# Patient Record
Sex: MALE | Race: WHITE | Employment: FULL TIME | URBAN - METROPOLITAN AREA
[De-identification: names, ages, dates, MRNs, and addresses within clinical notes are randomized per-mention and may not be internally consistent; named-entity substitution may affect disease eponyms.]

---

## 2017-01-05 ENCOUNTER — ANESTHESIA EVENT (OUTPATIENT)
Dept: SURGERY | Age: 52
DRG: 470 | End: 2017-01-05
Payer: COMMERCIAL

## 2017-01-06 ENCOUNTER — ANESTHESIA (OUTPATIENT)
Dept: SURGERY | Age: 52
DRG: 470 | End: 2017-01-06
Payer: COMMERCIAL

## 2017-01-06 ENCOUNTER — APPOINTMENT (OUTPATIENT)
Dept: GENERAL RADIOLOGY | Age: 52
DRG: 470 | End: 2017-01-06
Attending: ORTHOPAEDIC SURGERY
Payer: COMMERCIAL

## 2017-01-06 ENCOUNTER — SURGERY (OUTPATIENT)
Age: 52
End: 2017-01-06

## 2017-01-06 PROBLEM — Z96.649 S/P TOTAL HIP ARTHROPLASTY: Status: ACTIVE | Noted: 2017-01-06

## 2017-01-06 PROBLEM — M16.12 ARTHRITIS OF LEFT HIP: Status: ACTIVE | Noted: 2017-01-06

## 2017-01-06 PROCEDURE — 77030007880 HC KT SPN EPDRL BBMI -B: Performed by: ANESTHESIOLOGY

## 2017-01-06 PROCEDURE — 72170 X-RAY EXAM OF PELVIS: CPT

## 2017-01-06 PROCEDURE — 77030003665 HC NDL SPN BBMI -A: Performed by: ANESTHESIOLOGY

## 2017-01-06 PROCEDURE — 74011250636 HC RX REV CODE- 250/636

## 2017-01-06 PROCEDURE — 77030020782 HC GWN BAIR PAWS FLX 3M -B: Performed by: ANESTHESIOLOGY

## 2017-01-06 PROCEDURE — 74011000250 HC RX REV CODE- 250

## 2017-01-06 RX ORDER — ONDANSETRON 2 MG/ML
INJECTION INTRAMUSCULAR; INTRAVENOUS AS NEEDED
Status: DISCONTINUED | OUTPATIENT
Start: 2017-01-06 | End: 2017-01-06

## 2017-01-06 RX ORDER — SODIUM CHLORIDE, SODIUM LACTATE, POTASSIUM CHLORIDE, CALCIUM CHLORIDE 600; 310; 30; 20 MG/100ML; MG/100ML; MG/100ML; MG/100ML
INJECTION, SOLUTION INTRAVENOUS
Status: DISCONTINUED | OUTPATIENT
Start: 2017-01-06 | End: 2017-01-06 | Stop reason: HOSPADM

## 2017-01-06 RX ORDER — TRANEXAMIC ACID 100 MG/ML
INJECTION, SOLUTION INTRAVENOUS AS NEEDED
Status: DISCONTINUED | OUTPATIENT
Start: 2017-01-06 | End: 2017-01-06 | Stop reason: HOSPADM

## 2017-01-06 RX ORDER — CEFAZOLIN SODIUM 1 G/3ML
INJECTION, POWDER, FOR SOLUTION INTRAMUSCULAR; INTRAVENOUS AS NEEDED
Status: DISCONTINUED | OUTPATIENT
Start: 2017-01-06 | End: 2017-01-06 | Stop reason: HOSPADM

## 2017-01-06 RX ORDER — PROPOFOL 10 MG/ML
INJECTION, EMULSION INTRAVENOUS
Status: DISCONTINUED | OUTPATIENT
Start: 2017-01-06 | End: 2017-01-06 | Stop reason: HOSPADM

## 2017-01-06 RX ORDER — MIDAZOLAM HYDROCHLORIDE 1 MG/ML
INJECTION, SOLUTION INTRAMUSCULAR; INTRAVENOUS AS NEEDED
Status: DISCONTINUED | OUTPATIENT
Start: 2017-01-06 | End: 2017-01-06 | Stop reason: HOSPADM

## 2017-01-06 RX ORDER — DIPHENHYDRAMINE HYDROCHLORIDE 50 MG/ML
INJECTION, SOLUTION INTRAMUSCULAR; INTRAVENOUS AS NEEDED
Status: DISCONTINUED | OUTPATIENT
Start: 2017-01-06 | End: 2017-01-06 | Stop reason: HOSPADM

## 2017-01-06 RX ORDER — DEXAMETHASONE SODIUM PHOSPHATE 4 MG/ML
INJECTION, SOLUTION INTRA-ARTICULAR; INTRALESIONAL; INTRAMUSCULAR; INTRAVENOUS; SOFT TISSUE AS NEEDED
Status: DISCONTINUED | OUTPATIENT
Start: 2017-01-06 | End: 2017-01-06 | Stop reason: HOSPADM

## 2017-01-06 RX ORDER — ONDANSETRON 2 MG/ML
INJECTION INTRAMUSCULAR; INTRAVENOUS AS NEEDED
Status: DISCONTINUED | OUTPATIENT
Start: 2017-01-06 | End: 2017-01-06 | Stop reason: HOSPADM

## 2017-01-06 RX ADMIN — MORPHINE SULFATE 10 MG: 10 INJECTION INTRAMUSCULAR; INTRAVENOUS; SUBCUTANEOUS at 09:56

## 2017-01-06 RX ADMIN — MIDAZOLAM HYDROCHLORIDE 0.5 MG: 1 INJECTION, SOLUTION INTRAMUSCULAR; INTRAVENOUS at 10:20

## 2017-01-06 RX ADMIN — DEXAMETHASONE SODIUM PHOSPHATE 10 MG: 4 INJECTION, SOLUTION INTRA-ARTICULAR; INTRALESIONAL; INTRAMUSCULAR; INTRAVENOUS; SOFT TISSUE at 09:55

## 2017-01-06 RX ADMIN — ROPIVACAINE HYDROCHLORIDE 60 ML: 2 INJECTION, SOLUTION EPIDURAL; INFILTRATION at 09:56

## 2017-01-06 RX ADMIN — CEFAZOLIN SODIUM 2 G: 1 INJECTION, POWDER, FOR SOLUTION INTRAMUSCULAR; INTRAVENOUS at 09:15

## 2017-01-06 RX ADMIN — MIDAZOLAM HYDROCHLORIDE 1 MG: 1 INJECTION, SOLUTION INTRAMUSCULAR; INTRAVENOUS at 09:20

## 2017-01-06 RX ADMIN — ONDANSETRON 4 MG: 2 INJECTION INTRAMUSCULAR; INTRAVENOUS at 10:25

## 2017-01-06 RX ADMIN — TRANEXAMIC ACID 1000 MG: 100 INJECTION, SOLUTION INTRAVENOUS at 09:30

## 2017-01-06 RX ADMIN — KETOROLAC TROMETHAMINE 30 MG: 30 INJECTION, SOLUTION INTRAMUSCULAR; INTRAVENOUS at 09:56

## 2017-01-06 RX ADMIN — DIPHENHYDRAMINE HYDROCHLORIDE 25 MG: 50 INJECTION, SOLUTION INTRAMUSCULAR; INTRAVENOUS at 09:30

## 2017-01-06 RX ADMIN — MIDAZOLAM HYDROCHLORIDE 0.5 MG: 1 INJECTION, SOLUTION INTRAMUSCULAR; INTRAVENOUS at 09:45

## 2017-01-06 RX ADMIN — SODIUM CHLORIDE, SODIUM LACTATE, POTASSIUM CHLORIDE, CALCIUM CHLORIDE: 600; 310; 30; 20 INJECTION, SOLUTION INTRAVENOUS at 09:00

## 2017-01-06 RX ADMIN — PROPOFOL 100 MCG/KG/MIN: 10 INJECTION, EMULSION INTRAVENOUS at 09:30

## 2017-01-06 RX ADMIN — NEOMYCIN AND POLYMYXIN B SULFATES 3 AMPULE: 40; 200000 SOLUTION IRRIGATION at 09:56

## 2017-01-06 RX ADMIN — SODIUM CHLORIDE, SODIUM LACTATE, POTASSIUM CHLORIDE, CALCIUM CHLORIDE: 600; 310; 30; 20 INJECTION, SOLUTION INTRAVENOUS at 10:00

## 2017-01-06 NOTE — ANESTHESIA POSTPROCEDURE EVALUATION
Post-Anesthesia Evaluation and Assessment    Patient: Terrie Ridley MRN: [id number]  SSN: [id number]    YOB: 1965  Age: 54 y.o. Sex: male       Cardiovascular Function/Vital Signs  Visit Vitals    /72    Pulse 62    Temp 36.1 °C (97 °F)    Resp 18    Ht 5' 7\" (1.702 m)    Wt 98.9 kg (218 lb)    SpO2 97%    BMI 34.14 kg/m2       Patient is status post spinal anesthesia for Procedure(s):  LEFT TOTAL HIP ARTHROPLASTY . Nausea/Vomiting: None    Postoperative hydration reviewed and adequate. Pain:  Pain Scale 1: Visual (01/06/17 1149)  Pain Intensity 1: 0 (01/06/17 1149)   Managed    Neurological Status:   Neuro (WDL): Exceptions to WDL (01/06/17 1104)  Neuro  Neurologic State: Drowsy (01/06/17 1104)  Orientation Level: Unable to verbalize (01/06/17 1104)   At baseline    Mental Status and Level of Consciousness: Arousable    Pulmonary Status:   O2 Device: Room air (01/06/17 1149)   Adequate oxygenation and airway patent    Complications related to anesthesia: None    Post-anesthesia assessment completed.  No concerns    Signed By: Lala Palomo MD     January 6, 2017

## 2017-01-06 NOTE — ANESTHESIA PROCEDURE NOTES
Spinal Block    Start time: 1/6/2017 9:22 AM  End time: 1/6/2017 9:24 AM  Performed by: Minerva Le  Authorized by: Minerva eL     Pre-procedure:   Indications: primary anesthetic  Preanesthetic Checklist: patient identified, risks and benefits discussed, anesthesia consent, site marked, patient being monitored and timeout performed    Timeout Time: 09:22          Spinal Block:   Patient Position:  Seated  Prep Region:  Lumbar  Prep: chlorhexidine and patient draped      Location:  L3-4  Technique:  Single shot        Needle:   Needle Type:  Pencan  Needle Gauge:  25 G  Attempts:  1      Events: CSF confirmed, no blood with aspiration and no paresthesia        Assessment:  Insertion:  Uncomplicated  Patient tolerance:  Patient tolerated the procedure well with no immediate complications  9 mgs hyperbaric bupivacaine

## 2017-01-06 NOTE — ANESTHESIA PREPROCEDURE EVALUATION
Anesthetic History   No history of anesthetic complications            Review of Systems / Medical History  Patient summary reviewed and pertinent labs reviewed    Pulmonary  Within defined limits                 Neuro/Psych   Within defined limits           Cardiovascular                  Exercise tolerance: >4 METS     GI/Hepatic/Renal  Within defined limits              Endo/Other        Arthritis     Other Findings              Physical Exam    Airway  Mallampati: II  TM Distance: > 6 cm  Neck ROM: normal range of motion   Mouth opening: Normal     Cardiovascular    Rhythm: regular           Dental  No notable dental hx       Pulmonary                 Abdominal  GI exam deferred       Other Findings            Anesthetic Plan    ASA: 2  Anesthesia type: spinal          Induction: Intravenous  Anesthetic plan and risks discussed with: Patient

## 2017-01-08 ENCOUNTER — HOME HEALTH ADMISSION (OUTPATIENT)
Dept: HOME HEALTH SERVICES | Facility: HOME HEALTH | Age: 52
End: 2017-01-08
Payer: COMMERCIAL

## 2017-01-09 ENCOUNTER — HOME CARE VISIT (OUTPATIENT)
Dept: HOME HEALTH SERVICES | Facility: HOME HEALTH | Age: 52
End: 2017-01-09

## 2017-01-10 ENCOUNTER — HOME CARE VISIT (OUTPATIENT)
Dept: SCHEDULING | Facility: HOME HEALTH | Age: 52
End: 2017-01-10
Payer: COMMERCIAL

## 2017-01-10 VITALS
DIASTOLIC BLOOD PRESSURE: 68 MMHG | WEIGHT: 214 LBS | TEMPERATURE: 98 F | RESPIRATION RATE: 16 BRPM | SYSTOLIC BLOOD PRESSURE: 160 MMHG | HEART RATE: 76 BPM | BODY MASS INDEX: 34.39 KG/M2 | HEIGHT: 66 IN

## 2017-01-10 PROCEDURE — 400013 HH SOC

## 2017-01-10 PROCEDURE — G0151 HHCP-SERV OF PT,EA 15 MIN: HCPCS

## 2017-01-11 ENCOUNTER — HOME CARE VISIT (OUTPATIENT)
Dept: SCHEDULING | Facility: HOME HEALTH | Age: 52
End: 2017-01-11
Payer: COMMERCIAL

## 2017-01-11 VITALS
TEMPERATURE: 97.5 F | RESPIRATION RATE: 18 BRPM | DIASTOLIC BLOOD PRESSURE: 80 MMHG | HEART RATE: 68 BPM | SYSTOLIC BLOOD PRESSURE: 138 MMHG

## 2017-01-11 PROCEDURE — G0157 HHC PT ASSISTANT EA 15: HCPCS

## 2017-01-13 ENCOUNTER — HOME CARE VISIT (OUTPATIENT)
Dept: SCHEDULING | Facility: HOME HEALTH | Age: 52
End: 2017-01-13
Payer: COMMERCIAL

## 2017-01-13 PROCEDURE — G0157 HHC PT ASSISTANT EA 15: HCPCS

## 2017-01-14 VITALS
TEMPERATURE: 97.4 F | DIASTOLIC BLOOD PRESSURE: 78 MMHG | HEART RATE: 68 BPM | SYSTOLIC BLOOD PRESSURE: 138 MMHG | RESPIRATION RATE: 16 BRPM

## 2017-01-16 ENCOUNTER — HOME CARE VISIT (OUTPATIENT)
Dept: SCHEDULING | Facility: HOME HEALTH | Age: 52
End: 2017-01-16
Payer: COMMERCIAL

## 2017-01-16 PROCEDURE — G0157 HHC PT ASSISTANT EA 15: HCPCS

## 2017-01-17 VITALS
SYSTOLIC BLOOD PRESSURE: 124 MMHG | RESPIRATION RATE: 18 BRPM | HEART RATE: 72 BPM | TEMPERATURE: 97.1 F | DIASTOLIC BLOOD PRESSURE: 72 MMHG

## 2017-01-18 ENCOUNTER — HOME CARE VISIT (OUTPATIENT)
Dept: SCHEDULING | Facility: HOME HEALTH | Age: 52
End: 2017-01-18
Payer: COMMERCIAL

## 2017-01-18 VITALS
SYSTOLIC BLOOD PRESSURE: 122 MMHG | HEART RATE: 78 BPM | DIASTOLIC BLOOD PRESSURE: 70 MMHG | RESPIRATION RATE: 16 BRPM | TEMPERATURE: 96.9 F

## 2017-01-18 PROCEDURE — G0157 HHC PT ASSISTANT EA 15: HCPCS

## 2017-01-24 ENCOUNTER — HOME CARE VISIT (OUTPATIENT)
Dept: SCHEDULING | Facility: HOME HEALTH | Age: 52
End: 2017-01-24
Payer: COMMERCIAL

## 2017-01-24 VITALS — DIASTOLIC BLOOD PRESSURE: 88 MMHG | TEMPERATURE: 96.1 F | HEART RATE: 99 BPM | SYSTOLIC BLOOD PRESSURE: 137 MMHG

## 2017-01-24 PROCEDURE — G0151 HHCP-SERV OF PT,EA 15 MIN: HCPCS

## 2017-01-26 ENCOUNTER — HOME CARE VISIT (OUTPATIENT)
Dept: SCHEDULING | Facility: HOME HEALTH | Age: 52
End: 2017-01-26
Payer: COMMERCIAL

## 2017-01-26 VITALS — HEART RATE: 83 BPM | SYSTOLIC BLOOD PRESSURE: 140 MMHG | TEMPERATURE: 96.3 F | DIASTOLIC BLOOD PRESSURE: 94 MMHG

## 2017-01-26 PROCEDURE — G0151 HHCP-SERV OF PT,EA 15 MIN: HCPCS

## 2017-01-31 ENCOUNTER — HOME CARE VISIT (OUTPATIENT)
Dept: SCHEDULING | Facility: HOME HEALTH | Age: 52
End: 2017-01-31
Payer: COMMERCIAL

## 2017-01-31 VITALS
TEMPERATURE: 97 F | SYSTOLIC BLOOD PRESSURE: 132 MMHG | HEART RATE: 76 BPM | DIASTOLIC BLOOD PRESSURE: 80 MMHG | RESPIRATION RATE: 18 BRPM

## 2017-01-31 PROCEDURE — G0157 HHC PT ASSISTANT EA 15: HCPCS

## 2017-02-03 ENCOUNTER — HOME CARE VISIT (OUTPATIENT)
Dept: SCHEDULING | Facility: HOME HEALTH | Age: 52
End: 2017-02-03
Payer: COMMERCIAL

## 2017-02-03 VITALS
TEMPERATURE: 97 F | DIASTOLIC BLOOD PRESSURE: 80 MMHG | SYSTOLIC BLOOD PRESSURE: 142 MMHG | HEART RATE: 76 BPM | RESPIRATION RATE: 17 BRPM

## 2017-02-03 PROCEDURE — G0151 HHCP-SERV OF PT,EA 15 MIN: HCPCS

## 2017-02-07 ENCOUNTER — HOSPITAL ENCOUNTER (OUTPATIENT)
Dept: PHYSICAL THERAPY | Age: 52
Discharge: HOME | End: 2017-02-07
Payer: COMMERCIAL

## 2017-02-07 PROCEDURE — 97162 PT EVAL MOD COMPLEX 30 MIN: CPT

## 2017-02-07 PROCEDURE — 97110 THERAPEUTIC EXERCISES: CPT

## 2017-02-07 NOTE — PROGRESS NOTES
Raegan Philippe  : 1965 [facility] at [address] [place], [place] [address] [address] [place] [address],[address], [address] [facility]  [phone]   [phone]          OUTPATIENT PHYSICAL THERAPY:Initial Assessment 2017    ICD-10: Treatment Diagnosis: Low back pain (M54.5)  Precautions/Allergies:   Review of patient's allergies indicates no known allergies. Fall Risk Score: 1 (? 5 = High Risk)  MD Orders: eval and treat MEDICAL/REFERRING DIAGNOSIS:  hip, left   DATE OF ONSET: 1 month ago  REFERRING PHYSICIAN: Katherine Escudero,*  RETURN PHYSICIAN APPOINTMENT: unsure date     INITIAL ASSESSMENT:  Mr. Conley Nageotte presents s/p L JEIMY. He will benefit from skilled PT to assist with return to heavy physical job and working out at [employer]. PROBLEM LIST (Impacting functional limitations):  1. Decreased Strength  2. Decreased Ambulation Ability/Technique  3. Increased Pain  4. Decreased Activity Tolerance  5. Decreased Flexibility/Joint Mobility  6. Decreased Knowledge of Precautions INTERVENTIONS PLANNED:  1. Balance Exercise  2. Bed Mobility  3. Continuous Passive Motion (CPM)  4. Cryotherapy  5. Electrical Stimulation  6. Gait Training  7. Heat  8. Home Exercise Program (HEP)  9. Manual Therapy  10. Range of Motion (ROM)  11. Therapeutic Activites  12. Therapeutic Exercise/Strengthening   TREATMENT PLAN:  Effective Dates: 17 TO 3-7-17. Frequency/Duration: 2 times a week for 8 weeks  GOALS: (Goals have been discussed and agreed upon with patient.)  Short-Term Functional Goals: Time Frame: 4 weeks  1. Pt will be I with phase appropriate HEP to assist with strength and ROM lower quarter. 2. Pt will demonstrate reciprocal stair climbing with 1 hand rail. 3. Pt will ambulate with no assistive device and non-antalgic gait. Discharge Goals: Time Frame: 8 weeks  1. Pt will demonstrate ability to squat and left 50 pounds or greater to be able to tolerate return to work activities safely.   2. Pt will report ability perform all tasks at gym without increased pain or difficulty L hip. 3. Pt will demonstrate 5/5 strength hip flexion, hip ABD, and hip extension to assist with functional mobility ease. Rehabilitation Potential For Stated Goals: Good  Regarding Wandra Mode therapy, I certify that the treatment plan above will be carried out by a therapist or under their direction. Thank you for this referral,  Olya Scanlon PT     Referring Physician Signature: Haydee Olivares,*              Date                    The information in this section was collected on 2-7-17 (except where otherwise noted). HISTORY:   History of Present Injury/Illness (Reason for Referral):  S/p L JEIMY due to OA  Past Medical History/Comorbidities:   Mr. Diane Gao  has a past medical history of Chronic pain; Insomnia; MRSA (methicillin resistant Staphylococcus aureus) infection (2007); and Personal history of kidney stones. He also has no past medical history of Adverse effect of anesthesia; Difficult intubation; Malignant hyperthermia due to anesthesia; Nausea & vomiting; or Pseudocholinesterase deficiency. Mr. Diane Gao  has a past surgical history that includes other surgical and rotator cuff repair (Left, 2010). Social History/Living Environment:     private residence, no barriers  Prior Level of Function/Work/Activity:  I all activities   Personal Factors:          Sex:  male        Age:  54 y.o. Past/Current Experience:  Hx of PT and having good outcome with shoulder        Overall Behavior:  Very intense personality. May not listen to percaustions  Current Medications:       Current Outpatient Prescriptions:     oxyCODONE IR (ROXICODONE) 10 mg tab immediate release tablet, Take 10 mg by mouth as needed (pain). every 4-6 hours, Disp: , Rfl:     docusate sodium 100 mg tab, Take 100 mg by mouth daily.  1-3 softgels PRN for constipation  Indications: Constipation, Disp: , Rfl:     aspirin (ASPIRIN) 325 mg tablet, Take 325 mg by mouth daily. every 12 hours, Disp: , Rfl:     diphenhydrAMINE (BENADRYL) 25 mg tablet, Take 75 mg by mouth nightly. Indications: sleep, Disp: , Rfl:    Date Last Reviewed:  2-7-17   Number of Personal Factors/Comorbidities that affect the Plan of Care: 1-2: MODERATE COMPLEXITY   EXAMINATION:   ROM:          Hip: R flexion 90, IR 10, external rotation 30; L flexion 120, IR 30, external rotation 45  Strength:          Hip: L 5/5 throughout; R flexion 3/5, ABD 3/5, IR 3-/5, external roation 3/5  Functional Mobility:         Gait/Ambulation:  Use of straight cane in R hand. Antalgic gait        Transfers: Moderate use hands to push sit to stand        Stairs:  Not tested today  Balance:          Less than 10 second L, 10 seconds plus   Body Structures Involved:  1. Bones  2. Joints  3. Muscles  4. Ligaments Body Functions Affected:  1. Mental  2. Neuromusculoskeletal  3. Movement Related Activities and Participation Affected:  1. Learning and Applying Knowledge  2. Mobility  3. Self Care  4. Domestic Life  5. Community, Social and [place]   Number of elements (examined above) that affect the Plan of Care: 3: MODERATE COMPLEXITY   CLINICAL PRESENTATION:   Presentation: Evolving clinical presentation with changing clinical characteristics: MODERATE COMPLEXITY   CLINICAL DECISION MAKING:   Outcome Measure: Tool Used: Lower Extremity Functional Scale (LEFS)  Score:  Initial: 26/80 Most Recent: X/80 (Date: -- )   Interpretation of Score: 20 questions each scored on a 5 point scale with 0 representing \"extreme difficulty or unable to perform\" and 4 representing \"no difficulty\". The lower the score, the greater the functional disability. 80/80 represents no disability. Minimal detectable change is 9 points.   Score 80 79-63 62-48 47-32 31-16 15-1 0   Modifier CH CI CJ CK CL CM CN       Medical Necessity:   · Patient is expected to demonstrate progress in strength, range of motion and balance to decrease assistance required with functional mobility and ADLs. Reason for Services/Other Comments:  · Patient continues to require skilled intervention due to rescent L JEIMY. Use of outcome tool(s) and clinical judgement create a POC that gives a: Questionable prediction of patient's progress: MODERATE COMPLEXITY            TREATMENT:   (In addition to Assessment/Re-Assessment sessions the following treatments were rendered)  Pre-treatment Symptoms/Complaints:  5/10 with L hip movements  Pain: Initial:     5/10 L hip Post Session:  4/10 L hip     THERAPEUTIC EXERCISE: (10 minutes):  Exercises per grid below to improve mobility, strength and balance. Required minimal verbal, manual and tactile cues to promote proper body alignment. Progressed resistance, range and repetitions as indicated. MANUAL THERAPY: (5 minutes): Soft tissue mobilization was utilized and necessary because of the patient's restricted motion of soft tissue. Date:  2-8-17 Date:   Date:     Activity/Exercise Parameters Parameters Parameters   education Safety with gym and recreation activities; hip precautions in detail     clam L x20     Step up/over 2 inch x20 L                                   Treatment/Session Assessment:    · Response to Treatment:  Felt it felt a little better after treatment. · Compliance with Program/Exercises: Will assess as treatment progresses. · Recommendations/Intent for next treatment session: \"Next visit will focus on advancements to more challenging activities\".   Total Treatment Duration:  PT Patient Time In/Time Out  Time In: 1015  Time Out: Zeb 634, PT

## 2017-02-07 NOTE — PROGRESS NOTES
Ambulatory/Rehab Services H2 Model Falls Risk Assessment    Risk Factor Pts. ·   Confusion/Disorientation/Impulsivity  []    4 ·   Symptomatic Depression  []   2 ·   Altered Elimination  []   1 ·   Dizziness/Vertigo  []   1 ·   Gender (Male)  [x]   1 ·   Any administered antiepileptics (anticonvulsants):  []   2 ·   Any administered benzodiazepines:  []   1 ·   Visual Impairment (specify):  []   1 ·   Portable Oxygen Use  []   1 ·   Orthostatic ? BP  []   1 ·   History of Recent Falls (within 3 mos.)  []   5     Ability to Rise from Chair (choose one) Pts. ·   Ability to rise in a single movement  [x]   0 ·   Pushes up, successful in one attempt  []   1 ·   Multiple attempts, but successful  []   3 ·   Unable to rise without assistance  []   4   Total: (5 or greater = High Risk) 1     Falls Prevention Plan:   []                Physical Limitations to Exercise (specify):   []                Mobility Assistance Device (type):   []                Exercise/Equipment Adaptation (specify):    ©2010 [facility] [employer] Patent #[id number].  Federal Law prohibits the replication, distribution or use without written permission from [facility] [employer]

## 2017-02-09 ENCOUNTER — HOSPITAL ENCOUNTER (OUTPATIENT)
Dept: PHYSICAL THERAPY | Age: 52
Discharge: HOME | End: 2017-02-09
Payer: COMMERCIAL

## 2017-02-09 PROCEDURE — 97110 THERAPEUTIC EXERCISES: CPT

## 2017-02-09 PROCEDURE — 97140 MANUAL THERAPY 1/> REGIONS: CPT

## 2017-02-09 NOTE — PROGRESS NOTES
Anat Hightower  : 1965 Therapy Center at [address], [address] [place] [address],[address], [address] [facility]  [phone]   [phone]          OUTPATIENT PHYSICAL THERAPY:Daily Note 2017    ICD-10: Treatment Diagnosis: Low back pain (M54.5)  Precautions/Allergies:   Review of patient's allergies indicates no known allergies. Fall Risk Score: 1 (? 5 = High Risk)  MD Orders: eval and treat MEDICAL/REFERRING DIAGNOSIS:  hip, left   DATE OF ONSET: 1 month ago  REFERRING PHYSICIAN: Jacques Murillo,*  RETURN PHYSICIAN APPOINTMENT: unsure date     INITIAL ASSESSMENT:  Mr. Nikko Luevano presents s/p L JEIMY. He will benefit from skilled PT to assist with return to heavy physical job and working out at [employer]. PROBLEM LIST (Impacting functional limitations):  1. Decreased Strength  2. Decreased Ambulation Ability/Technique  3. Increased Pain  4. Decreased Activity Tolerance  5. Decreased Flexibility/Joint Mobility  6. Decreased Knowledge of Precautions INTERVENTIONS PLANNED:  1. Balance Exercise  2. Bed Mobility  3. Continuous Passive Motion (CPM)  4. Cryotherapy  5. Electrical Stimulation  6. Gait Training  7. Heat  8. Home Exercise Program (HEP)  9. Manual Therapy  10. Range of Motion (ROM)  11. Therapeutic Activites  12. Therapeutic Exercise/Strengthening   TREATMENT PLAN:  Effective Dates: 17 TO 3-7-17. Frequency/Duration: 2 times a week for 8 weeks  GOALS: (Goals have been discussed and agreed upon with patient.)  Short-Term Functional Goals: Time Frame: 4 weeks  1. Pt will be I with phase appropriate HEP to assist with strength and ROM lower quarter. 2. Pt will demonstrate reciprocal stair climbing with 1 hand rail. 3. Pt will ambulate with no assistive device and non-antalgic gait. Discharge Goals: Time Frame: 8 weeks  1. Pt will demonstrate ability to squat and left 50 pounds or greater to be able to tolerate return to work activities safely.   2. Pt will report ability perform all tasks at gym without increased pain or difficulty L hip. 3. Pt will demonstrate 5/5 strength hip flexion, hip ABD, and hip extension to assist with functional mobility ease. Rehabilitation Potential For Stated Goals: Good  Regarding Diana Lewis therapy, I certify that the treatment plan above will be carried out by a therapist or under their direction. Thank you for this referral,  Eliot Dang, PT     Referring Physician Signature: Alyssa Jamison,*              Date                    The information in this section was collected on 2-7-17 (except where otherwise noted). HISTORY:   History of Present Injury/Illness (Reason for Referral):  S/p L JEIMY due to OA  Past Medical History/Comorbidities:   Mr. Shyanne Hadley  has a past medical history of Chronic pain; Insomnia; MRSA (methicillin resistant Staphylococcus aureus) infection (2007); and Personal history of kidney stones. He also has no past medical history of Adverse effect of anesthesia; Difficult intubation; Malignant hyperthermia due to anesthesia; Nausea & vomiting; or Pseudocholinesterase deficiency. Mr. Shyanne Hadley  has a past surgical history that includes other surgical and rotator cuff repair (Left, 2010). Social History/Living Environment:     private residence, no barriers  Prior Level of Function/Work/Activity:  I all activities   Personal Factors:          Sex:  male        Age:  54 y.o. Past/Current Experience:  Hx of PT and having good outcome with shoulder        Overall Behavior:  Very intense personality. May not listen to percaustions  Current Medications:       Current Outpatient Prescriptions:     oxyCODONE IR (ROXICODONE) 10 mg tab immediate release tablet, Take 10 mg by mouth as needed (pain). every 4-6 hours, Disp: , Rfl:     docusate sodium 100 mg tab, Take 100 mg by mouth daily.  1-3 softgels PRN for constipation  Indications: Constipation, Disp: , Rfl:     aspirin (ASPIRIN) 325 mg tablet, Take 325 mg by mouth daily. every 12 hours, Disp: , Rfl:     diphenhydrAMINE (BENADRYL) 25 mg tablet, Take 75 mg by mouth nightly. Indications: sleep, Disp: , Rfl:    Date Last Reviewed:  2-7-17   Number of Personal Factors/Comorbidities that affect the Plan of Care: 1-2: MODERATE COMPLEXITY   EXAMINATION:   ROM:          Hip: R flexion 90, IR 10, external rotation 30; L flexion 120, IR 30, external rotation 45  Strength:          Hip: L 5/5 throughout; R flexion 3/5, ABD 3/5, IR 3-/5, external roation 3/5  Functional Mobility:         Gait/Ambulation:  Use of straight cane in R hand. Antalgic gait        Transfers: Moderate use hands to push sit to stand        Stairs:  Not tested today  Balance:          Less than 10 second L, 10 seconds plus   Body Structures Involved:  1. Bones  2. Joints  3. Muscles  4. Ligaments Body Functions Affected:  1. Mental  2. Neuromusculoskeletal  3. Movement Related Activities and Participation Affected:  1. Learning and Applying Knowledge  2. Mobility  3. Self Care  4. Domestic Life  5. Community, Social and [place]   Number of elements (examined above) that affect the Plan of Care: 3: MODERATE COMPLEXITY   CLINICAL PRESENTATION:   Presentation: Evolving clinical presentation with changing clinical characteristics: MODERATE COMPLEXITY   CLINICAL DECISION MAKING:   Outcome Measure: Tool Used: Lower Extremity Functional Scale (LEFS)  Score:  Initial: 26/80 Most Recent: X/80 (Date: -- )   Interpretation of Score: 20 questions each scored on a 5 point scale with 0 representing \"extreme difficulty or unable to perform\" and 4 representing \"no difficulty\". The lower the score, the greater the functional disability. 80/80 represents no disability. Minimal detectable change is 9 points.   Score 80 79-63 62-48 47-32 31-16 15-1 0   Modifier CH CI CJ CK CL CM CN       Medical Necessity:   · Patient is expected to demonstrate progress in strength, range of motion and balance to decrease assistance required with functional mobility and ADLs. Reason for Services/Other Comments:  · Patient continues to require skilled intervention due to rescent L JEIMY. Use of outcome tool(s) and clinical judgement create a POC that gives a: Questionable prediction of patient's progress: MODERATE COMPLEXITY            TREATMENT:   (In addition to Assessment/Re-Assessment sessions the following treatments were rendered)  Pre-treatment Symptoms/Complaints:  5/10 with L hip movements  Pain: Initial:     5/10 L hip Post Session:  4/10 L hip     THERAPEUTIC EXERCISE: (25 minutes):  Exercises per grid below to improve mobility, strength and balance. Required minimal verbal, manual and tactile cues to promote proper body alignment. Progressed resistance, range and repetitions as indicated. MANUAL THERAPY: (12 minutes): Soft tissue mobilization was utilized and necessary because of the patient's restricted motion of soft tissue. MODALITIES: (8 minutes):      *  Cold Pack Therapy in order to provide analgesia and reduce inflammation and edema. L hip. Date:  2-7-17 Date:  2-9-17 Date:     Activity/Exercise Parameters Parameters Parameters   education Safety with gym and recreation activities; hip precautions in detail Reviewed precautions and activities    clam L x20 L x20    Step up/over 2 inch x20 L  4 inch x20 L    bridge  To neutral x20    elliptical   10 minutes L2    Wall squat  10sx6               Treatment/Session Assessment:    · Response to Treatment:  Tolerated treatment well. No difficulty, some soreness after. · Compliance with Program/Exercises: Will assess as treatment progresses. · Recommendations/Intent for next treatment session: \"Next visit will focus on advancements to more challenging activities\".   Total Treatment Duration:  PT Patient Time In/Time Out  Time In: 1215  Time Out: 1300    Eugenia De Paz, PT

## 2017-02-14 ENCOUNTER — HOSPITAL ENCOUNTER (OUTPATIENT)
Dept: PHYSICAL THERAPY | Age: 52
Discharge: HOME | End: 2017-02-14
Payer: COMMERCIAL

## 2017-02-14 PROCEDURE — 97140 MANUAL THERAPY 1/> REGIONS: CPT

## 2017-02-14 PROCEDURE — 97110 THERAPEUTIC EXERCISES: CPT

## 2017-02-14 NOTE — PROGRESS NOTES
Linda Palomo  : 1965 [facility] at [address] [place] [address], [address], [address] [facility]  [phone]   [phone]          OUTPATIENT PHYSICAL THERAPY:Daily Note 2017    ICD-10: Treatment Diagnosis: Low back pain (M54.5)  Precautions/Allergies:   Review of patient's allergies indicates no known allergies. Fall Risk Score: 1 (? 5 = High Risk)  MD Orders: eval and treat MEDICAL/REFERRING DIAGNOSIS:  hip, left   DATE OF ONSET: 1 month ago  REFERRING PHYSICIAN: Rosita Gaucher,*  RETURN PHYSICIAN APPOINTMENT: unsure date     INITIAL ASSESSMENT:  Mr. Bernard Kelly presents s/p L JEIMY. He will benefit from skilled PT to assist with return to heavy physical job and working out at [employer]. PROBLEM LIST (Impacting functional limitations):  1. Decreased Strength  2. Decreased Ambulation Ability/Technique  3. Increased Pain  4. Decreased Activity Tolerance  5. Decreased Flexibility/Joint Mobility  6. Decreased Knowledge of Precautions INTERVENTIONS PLANNED:  1. Balance Exercise  2. Bed Mobility  3. Continuous Passive Motion (CPM)  4. Cryotherapy  5. Electrical Stimulation  6. Gait Training  7. Heat  8. Home Exercise Program (HEP)  9. Manual Therapy  10. Range of Motion (ROM)  11. Therapeutic Activites  12. Therapeutic Exercise/Strengthening   TREATMENT PLAN:  Effective Dates: 17 TO 3-7-17. Frequency/Duration: 2 times a week for 8 weeks  GOALS: (Goals have been discussed and agreed upon with patient.)  Short-Term Functional Goals: Time Frame: 4 weeks  1. Pt will be I with phase appropriate HEP to assist with strength and ROM lower quarter. 2. Pt will demonstrate reciprocal stair climbing with 1 hand rail. 3. Pt will ambulate with no assistive device and non-antalgic gait. Discharge Goals: Time Frame: 8 weeks  1. Pt will demonstrate ability to squat and left 50 pounds or greater to be able to tolerate return to work activities safely.   2. Pt will report ability perform all tasks at gym without increased pain or difficulty L hip. 3. Pt will demonstrate 5/5 strength hip flexion, hip ABD, and hip extension to assist with functional mobility ease. Rehabilitation Potential For Stated Goals: Good  Regarding Earnstine Hazard therapy, I certify that the treatment plan above will be carried out by a therapist or under their direction. Thank you for this referral,  Wendy Roberts, PT     Referring Physician Signature: Missy Moreno,*              Date                    The information in this section was collected on 2-7-17 (except where otherwise noted). HISTORY:   History of Present Injury/Illness (Reason for Referral):  S/p L JEIMY due to OA  Past Medical History/Comorbidities:   Mr. Sabine Lubin  has a past medical history of Chronic pain; Insomnia; MRSA (methicillin resistant Staphylococcus aureus) infection (2007); and Personal history of kidney stones. He also has no past medical history of Adverse effect of anesthesia; Difficult intubation; Malignant hyperthermia due to anesthesia; Nausea & vomiting; or Pseudocholinesterase deficiency. Mr. Sabine Lubin  has a past surgical history that includes other surgical and rotator cuff repair (Left, 2010). Social History/Living Environment:     private residence, no barriers  Prior Level of Function/Work/Activity:  I all activities   Personal Factors:          Sex:  male        Age:  54 y.o. Past/Current Experience:  Hx of PT and having good outcome with shoulder        Overall Behavior:  Very intense personality. May not listen to percaustions  Current Medications:       Current Outpatient Prescriptions:     oxyCODONE IR (ROXICODONE) 10 mg tab immediate release tablet, Take 10 mg by mouth as needed (pain). every 4-6 hours, Disp: , Rfl:     docusate sodium 100 mg tab, Take 100 mg by mouth daily.  1-3 softgels PRN for constipation  Indications: Constipation, Disp: , Rfl:     aspirin (ASPIRIN) 325 mg tablet, Take 325 mg by mouth daily. every 12 hours, Disp: , Rfl:     diphenhydrAMINE (BENADRYL) 25 mg tablet, Take 75 mg by mouth nightly. Indications: sleep, Disp: , Rfl:    Date Last Reviewed:  2-7-17   Number of Personal Factors/Comorbidities that affect the Plan of Care: 1-2: MODERATE COMPLEXITY   EXAMINATION:   ROM:          Hip: R flexion 90, IR 10, external rotation 30; L flexion 120, IR 30, external rotation 45  Strength:          Hip: L 5/5 throughout; R flexion 3/5, ABD 3/5, IR 3-/5, external roation 3/5  Functional Mobility:         Gait/Ambulation:  Use of straight cane in R hand. Antalgic gait        Transfers: Moderate use hands to push sit to stand        Stairs:  Not tested today  Balance:          Less than 10 second L, 10 seconds plus   Body Structures Involved:  1. Bones  2. Joints  3. Muscles  4. Ligaments Body Functions Affected:  1. Mental  2. Neuromusculoskeletal  3. Movement Related Activities and Participation Affected:  1. Learning and Applying Knowledge  2. Mobility  3. Self Care  4. Domestic Life  5. Community, Social and [place]   Number of elements (examined above) that affect the Plan of Care: 3: MODERATE COMPLEXITY   CLINICAL PRESENTATION:   Presentation: Evolving clinical presentation with changing clinical characteristics: MODERATE COMPLEXITY   CLINICAL DECISION MAKING:   Outcome Measure: Tool Used: Lower Extremity Functional Scale (LEFS)  Score:  Initial: 26/80 Most Recent: X/80 (Date: -- )   Interpretation of Score: 20 questions each scored on a 5 point scale with 0 representing \"extreme difficulty or unable to perform\" and 4 representing \"no difficulty\". The lower the score, the greater the functional disability. 80/80 represents no disability. Minimal detectable change is 9 points.   Score 80 79-63 62-48 47-32 31-16 15-1 0   Modifier CH CI CJ CK CL CM CN       Medical Necessity:   · Patient is expected to demonstrate progress in strength, range of motion and balance to decrease assistance required with functional mobility and ADLs. Reason for Services/Other Comments:  · Patient continues to require skilled intervention due to rescent L JEIMY. Use of outcome tool(s) and clinical judgement create a POC that gives a: Questionable prediction of patient's progress: MODERATE COMPLEXITY            TREATMENT:   (In addition to Assessment/Re-Assessment sessions the following treatments were rendered)  Pre-treatment Symptoms/Complaints:  5/10 with L hip movements  Pain: Initial:     5/10 L hip Post Session:  4/10 L hip     THERAPEUTIC EXERCISE: (30 minutes):  Exercises per grid below to improve mobility, strength and balance. Required minimal verbal, manual and tactile cues to promote proper body alignment. Progressed resistance, range and repetitions as indicated. MANUAL THERAPY: (15 minutes): Soft tissue mobilization was utilized and necessary because of the patient's restricted motion of soft tissue. MODALITIES: (0 minutes):      declined  L hip. Date:  2-7-17 Date:  2-9-17 Date:  2-14-17   Activity/Exercise Parameters Parameters Parameters   education Safety with gym and recreation activities; hip precautions in detail Reviewed precautions and activities cotninue to review percautions   clam L x20 L x20 HEP   Step up/over 2 inch x20 L  4 inch x20 L 6 inch x20 L and step taps   bridge  To neutral x20 To neutral x20   elliptical   10 minutes L2 10 minutes L2   Wall squat  10sx6 --   sled   50# push pull 50ft 3x each   Kneeling    With black t-band D2 chop and rows   Side stepping   With 12# MB psuh outs mini squats              Treatment/Session Assessment:    · Response to Treatment:  Tolerated treatment well. No difficulty, some soreness after. · Compliance with Program/Exercises: Will assess as treatment progresses. · Recommendations/Intent for next treatment session: \"Next visit will focus on advancements to more challenging activities\".   Total Treatment Duration:  PT Patient Time In/Time Out  Time In: 0730  Time Out: 0815    Eliecer Guillaume, PT

## 2017-02-16 ENCOUNTER — HOSPITAL ENCOUNTER (OUTPATIENT)
Dept: PHYSICAL THERAPY | Age: 52
Discharge: HOME | End: 2017-02-16
Payer: COMMERCIAL

## 2017-02-16 NOTE — PROGRESS NOTES
Claudio Fernandez  : 1965 [facility] at [place] [address] [facility], [address] [facility], [place] [address].  [phone]   [phone]          OUTPATIENT PHYSICAL THERAPY:Daily Note 2017              cx appointment

## 2017-02-17 ENCOUNTER — HOSPITAL ENCOUNTER (OUTPATIENT)
Dept: PHYSICAL THERAPY | Age: 52
Discharge: HOME | End: 2017-02-17
Payer: COMMERCIAL

## 2017-02-17 PROCEDURE — 97140 MANUAL THERAPY 1/> REGIONS: CPT

## 2017-02-17 PROCEDURE — 97110 THERAPEUTIC EXERCISES: CPT

## 2017-02-17 NOTE — PROGRESS NOTES
Gretta Poster  : 1965 [facility] at [place] [address] [facility], [address], [facility] [address]  [phone]   [phone]          OUTPATIENT PHYSICAL THERAPY:Daily Note 2017    ICD-10: Treatment Diagnosis: Low back pain (M54.5)  Precautions/Allergies:   Review of patient's allergies indicates no known allergies. Fall Risk Score: 1 (? 5 = High Risk)  MD Orders: eval and treat MEDICAL/REFERRING DIAGNOSIS:  hip, left   DATE OF ONSET: 1 month ago  REFERRING PHYSICIAN: Maria Antonia Townsend,*  RETURN PHYSICIAN APPOINTMENT: unsure date     INITIAL ASSESSMENT:  Mr. Lupe Roy presents s/p L JEIMY. He will benefit from skilled PT to assist with return to heavy physical job and working out at [employer]. PROBLEM LIST (Impacting functional limitations):  1. Decreased Strength  2. Decreased Ambulation Ability/Technique  3. Increased Pain  4. Decreased Activity Tolerance  5. Decreased Flexibility/Joint Mobility  6. Decreased Knowledge of Precautions INTERVENTIONS PLANNED:  1. Balance Exercise  2. Bed Mobility  3. Continuous Passive Motion (CPM)  4. Cryotherapy  5. Electrical Stimulation  6. Gait Training  7. Heat  8. Home Exercise Program (HEP)  9. Manual Therapy  10. Range of Motion (ROM)  11. Therapeutic Activites  12. Therapeutic Exercise/Strengthening   TREATMENT PLAN:  Effective Dates: 17 TO 3-7-17. Frequency/Duration: 2 times a week for 8 weeks  GOALS: (Goals have been discussed and agreed upon with patient.)  Short-Term Functional Goals: Time Frame: 4 weeks  1. Pt will be I with phase appropriate HEP to assist with strength and ROM lower quarter. 2. Pt will demonstrate reciprocal stair climbing with 1 hand rail. 3. Pt will ambulate with no assistive device and non-antalgic gait. Discharge Goals: Time Frame: 8 weeks  1. Pt will demonstrate ability to squat and left 50 pounds or greater to be able to tolerate return to work activities safely.   2. Pt will report ability perform all tasks at gym without increased pain or difficulty L hip. 3. Pt will demonstrate 5/5 strength hip flexion, hip ABD, and hip extension to assist with functional mobility ease. Rehabilitation Potential For Stated Goals: Good  Regarding Rajendra Bangura therapy, I certify that the treatment plan above will be carried out by a therapist or under their direction. Thank you for this referral,  Armen Cr, PT     Referring Physician Signature: Ewa Fine,*              Date                    The information in this section was collected on 2-7-17 (except where otherwise noted). HISTORY:   History of Present Injury/Illness (Reason for Referral):  S/p L JEIMY due to OA  Past Medical History/Comorbidities:   Mr. Tobias Tavares  has a past medical history of Chronic pain; Insomnia; MRSA (methicillin resistant Staphylococcus aureus) infection (2007); and Personal history of kidney stones. He also has no past medical history of Adverse effect of anesthesia; Difficult intubation; Malignant hyperthermia due to anesthesia; Nausea & vomiting; or Pseudocholinesterase deficiency. Mr. Tobias Tavares  has a past surgical history that includes other surgical and rotator cuff repair (Left, 2010). Social History/Living Environment:     private residence, no barriers  Prior Level of Function/Work/Activity:  I all activities   Personal Factors:          Sex:  male        Age:  54 y.o. Past/Current Experience:  Hx of PT and having good outcome with shoulder        Overall Behavior:  Very intense personality. May not listen to percaustions  Current Medications:       Current Outpatient Prescriptions:     oxyCODONE IR (ROXICODONE) 10 mg tab immediate release tablet, Take 10 mg by mouth as needed (pain). every 4-6 hours, Disp: , Rfl:     docusate sodium 100 mg tab, Take 100 mg by mouth daily.  1-3 softgels PRN for constipation  Indications: Constipation, Disp: , Rfl:     aspirin (ASPIRIN) 325 mg tablet, Take 325 mg by mouth daily. every 12 hours, Disp: , Rfl:     diphenhydrAMINE (BENADRYL) 25 mg tablet, Take 75 mg by mouth nightly. Indications: sleep, Disp: , Rfl:    Date Last Reviewed:  2-7-17   Number of Personal Factors/Comorbidities that affect the Plan of Care: 1-2: MODERATE COMPLEXITY   EXAMINATION:   ROM:          Hip: R flexion 90, IR 10, external rotation 30; L flexion 120, IR 30, external rotation 45  Strength:          Hip: L 5/5 throughout; R flexion 3/5, ABD 3/5, IR 3-/5, external roation 3/5  Functional Mobility:         Gait/Ambulation:  Use of straight cane in R hand. Antalgic gait        Transfers: Moderate use hands to push sit to stand        Stairs:  Not tested today  Balance:          Less than 10 second L, 10 seconds plus   Body Structures Involved:  1. Bones  2. Joints  3. Muscles  4. Ligaments Body Functions Affected:  1. Mental  2. Neuromusculoskeletal  3. Movement Related Activities and Participation Affected:  1. Learning and Applying Knowledge  2. Mobility  3. Self Care  4. Domestic Life  5. Community, Social and [place]   Number of elements (examined above) that affect the Plan of Care: 3: MODERATE COMPLEXITY   CLINICAL PRESENTATION:   Presentation: Evolving clinical presentation with changing clinical characteristics: MODERATE COMPLEXITY   CLINICAL DECISION MAKING:   Outcome Measure: Tool Used: Lower Extremity Functional Scale (LEFS)  Score:  Initial: 26/80 Most Recent: X/80 (Date: -- )   Interpretation of Score: 20 questions each scored on a 5 point scale with 0 representing \"extreme difficulty or unable to perform\" and 4 representing \"no difficulty\". The lower the score, the greater the functional disability. 80/80 represents no disability. Minimal detectable change is 9 points.   Score 80 79-63 62-48 47-32 31-16 15-1 0   Modifier CH CI CJ CK CL CM CN       Medical Necessity:   · Patient is expected to demonstrate progress in strength, range of motion and balance to decrease assistance required with functional mobility and ADLs. Reason for Services/Other Comments:  · Patient continues to require skilled intervention due to rescent L JEIMY. Use of outcome tool(s) and clinical judgement create a POC that gives a: Questionable prediction of patient's progress: MODERATE COMPLEXITY            TREATMENT:   (In addition to Assessment/Re-Assessment sessions the following treatments were rendered)  Pre-treatment Symptoms/Complaints:  5/10 with L hip movements  Pain: Initial:     5/10 L hip Post Session:  4/10 L hip     THERAPEUTIC EXERCISE: (30 minutes):  Exercises per grid below to improve mobility, strength and balance. Required minimal verbal, manual and tactile cues to promote proper body alignment. Progressed resistance, range and repetitions as indicated. MANUAL THERAPY: (15 minutes): Soft tissue mobilization was utilized and necessary because of the patient's restricted motion of soft tissue. MODALITIES: (0 minutes):      declined  L hip. Date:  2-7-17 Date:  2-9-17 Date:  2-14-17 Date  2-17-17   Activity/Exercise Parameters Parameters Parameters    education Safety with gym and recreation activities; hip precautions in detail Reviewed precautions and activities cotninue to review percautions Hip percautions   clam L x20 L x20 HEP    Step up/over 2 inch x20 L  4 inch x20 L 6 inch x20 L and step taps 6 inch x20 L   And step taps   bridge  To neutral x20 To neutral x20 To neutral x20   elliptical   10 minutes L2 10 minutes L2 10 minutes L2   Wall squat  10sx6 --    sled   50# push pull 50ft 3x each 50# push pull 50ft 3x each   Kneeling    With black t-band D2 chop and rows With black t-band D2 chop and rows   Side stepping   With 12# MB psuh outs mini squats With 12# MB    trampoline    Marching on 1 minx3        Treatment/Session Assessment:    · Response to Treatment:  Tolerated treatment well. No difficulty, some soreness after. · Compliance with Program/Exercises:  Will assess as treatment progresses. · Recommendations/Intent for next treatment session: \"Next visit will focus on advancements to more challenging activities\".   Total Treatment Duration:  PT Patient Time In/Time Out  Time In: 0730  Time Out: 0830    Armen Cr PT

## 2017-02-21 ENCOUNTER — HOSPITAL ENCOUNTER (OUTPATIENT)
Dept: PHYSICAL THERAPY | Age: 52
Discharge: HOME | End: 2017-02-21
Payer: COMMERCIAL

## 2017-02-21 PROCEDURE — 97140 MANUAL THERAPY 1/> REGIONS: CPT

## 2017-02-21 PROCEDURE — 97110 THERAPEUTIC EXERCISES: CPT

## 2017-02-21 NOTE — PROGRESS NOTES
Wilbert Wilde  : 1965 [facility] [place] [id number] [facility], [address], [facility] [place].  [phone]   [phone]          OUTPATIENT PHYSICAL THERAPY:Daily Note 2017    ICD-10: Treatment Diagnosis: Low back pain (M54.5)  Precautions/Allergies:   Review of patient's allergies indicates no known allergies. Fall Risk Score: 1 (? 5 = High Risk)  MD Orders: eval and treat MEDICAL/REFERRING DIAGNOSIS:  hip, left   DATE OF ONSET: 1 month ago  REFERRING PHYSICIAN: Hilario Dela Cruz,*  RETURN PHYSICIAN APPOINTMENT: unsure date     INITIAL ASSESSMENT:  Mr. Simms Shock presents s/p L JEIMY. He will benefit from skilled PT to assist with return to heavy physical job and working out at [employer]. PROBLEM LIST (Impacting functional limitations):  1. Decreased Strength  2. Decreased Ambulation Ability/Technique  3. Increased Pain  4. Decreased Activity Tolerance  5. Decreased Flexibility/Joint Mobility  6. Decreased Knowledge of Precautions INTERVENTIONS PLANNED:  1. Balance Exercise  2. Bed Mobility  3. Continuous Passive Motion (CPM)  4. Cryotherapy  5. Electrical Stimulation  6. Gait Training  7. Heat  8. Home Exercise Program (HEP)  9. Manual Therapy  10. Range of Motion (ROM)  11. Therapeutic Activites  12. Therapeutic Exercise/Strengthening   TREATMENT PLAN:  Effective Dates: 17 TO 3-7-17. Frequency/Duration: 2 times a week for 8 weeks  GOALS: (Goals have been discussed and agreed upon with patient.)  Short-Term Functional Goals: Time Frame: 4 weeks  1. Pt will be I with phase appropriate HEP to assist with strength and ROM lower quarter. 2. Pt will demonstrate reciprocal stair climbing with 1 hand rail. 3. Pt will ambulate with no assistive device and non-antalgic gait. Discharge Goals: Time Frame: 8 weeks  1. Pt will demonstrate ability to squat and left 50 pounds or greater to be able to tolerate return to work activities safely.   2. Pt will report ability perform all tasks at gym without increased pain or difficulty L hip. 3. Pt will demonstrate 5/5 strength hip flexion, hip ABD, and hip extension to assist with functional mobility ease. Rehabilitation Potential For Stated Goals: Good  Regarding [facility] therapy, I certify that the treatment plan above will be carried out by a therapist or under their direction. Thank you for this referral,  Sonia York, PT     Referring Physician Signature: Afsaneh Kearns,*              Date                    The information in this section was collected on 2-7-17 (except where otherwise noted). HISTORY:   History of Present Injury/Illness (Reason for Referral):  S/p L JEIMY due to OA  Past Medical History/Comorbidities:   Mr. Charlie Palma  has a past medical history of Chronic pain; Insomnia; MRSA (methicillin resistant Staphylococcus aureus) infection (2007); and Personal history of kidney stones. He also has no past medical history of Adverse effect of anesthesia; Difficult intubation; Malignant hyperthermia due to anesthesia; Nausea & vomiting; or Pseudocholinesterase deficiency. Mr. Charlie Palma  has a past surgical history that includes other surgical and rotator cuff repair (Left, 2010). Social History/Living Environment:     private residence, no barriers  Prior Level of Function/Work/Activity:  I all activities   Personal Factors:          Sex:  male        Age:  54 y.o. Past/Current Experience:  Hx of PT and having good outcome with shoulder        Overall Behavior:  Very intense personality. May not listen to percaustions  Current Medications:       Current Outpatient Prescriptions:     oxyCODONE IR (ROXICODONE) 10 mg tab immediate release tablet, Take 10 mg by mouth as needed (pain). every 4-6 hours, Disp: , Rfl:     docusate sodium 100 mg tab, Take 100 mg by mouth daily.  1-3 softgels PRN for constipation  Indications: Constipation, Disp: , Rfl:     aspirin (ASPIRIN) 325 mg tablet, Take 325 mg by mouth daily. every 12 hours, Disp: , Rfl:     diphenhydrAMINE (BENADRYL) 25 mg tablet, Take 75 mg by mouth nightly. Indications: sleep, Disp: , Rfl:    Date Last Reviewed:  2-7-17   Number of Personal Factors/Comorbidities that affect the Plan of Care: 1-2: MODERATE COMPLEXITY   EXAMINATION:   ROM:          Hip: R flexion 90, IR 10, external rotation 30; L flexion 120, IR 30, external rotation 45  Strength:          Hip: L 5/5 throughout; R flexion 3/5, ABD 3/5, IR 3-/5, external roation 3/5  Functional Mobility:         Gait/Ambulation:  Use of straight cane in R hand. Antalgic gait        Transfers: Moderate use hands to push sit to stand        Stairs:  Not tested today  Balance:          Less than 10 second L, 10 seconds plus   Body Structures Involved:  1. Bones  2. Joints  3. Muscles  4. Ligaments Body Functions Affected:  1. Mental  2. Neuromusculoskeletal  3. Movement Related Activities and Participation Affected:  1. Learning and Applying Knowledge  2. Mobility  3. Self Care  4. Domestic Life  5. Community, Social and [place]   Number of elements (examined above) that affect the Plan of Care: 3: MODERATE COMPLEXITY   CLINICAL PRESENTATION:   Presentation: Evolving clinical presentation with changing clinical characteristics: MODERATE COMPLEXITY   CLINICAL DECISION MAKING:   Outcome Measure: Tool Used: Lower Extremity Functional Scale (LEFS)  Score:  Initial: 26/80 Most Recent: X/80 (Date: -- )   Interpretation of Score: 20 questions each scored on a 5 point scale with 0 representing \"extreme difficulty or unable to perform\" and 4 representing \"no difficulty\". The lower the score, the greater the functional disability. 80/80 represents no disability. Minimal detectable change is 9 points.   Score 80 79-63 62-48 47-32 31-16 15-1 0   Modifier CH CI CJ CK CL CM CN       Medical Necessity:   · Patient is expected to demonstrate progress in strength, range of motion and balance to decrease assistance required with functional mobility and ADLs. Reason for Services/Other Comments:  · Patient continues to require skilled intervention due to rescent L JEIMY. Use of outcome tool(s) and clinical judgement create a POC that gives a: Questionable prediction of patient's progress: MODERATE COMPLEXITY            TREATMENT:   (In addition to Assessment/Re-Assessment sessions the following treatments were rendered)  Pre-treatment Symptoms/Complaints:  5/10 with L hip movements  Pain: Initial:     3-4/10 L hip Post Session:  3-4/10 L hip     THERAPEUTIC EXERCISE: (30 minutes):  Exercises per grid below to improve mobility, strength and balance. Required minimal verbal, manual and tactile cues to promote proper body alignment. Progressed resistance, range and repetitions as indicated. MANUAL THERAPY: (15 minutes): Soft tissue mobilization was utilized and necessary because of the patient's restricted motion of soft tissue. MODALITIES: (0 minutes):      declined  L hip.     Date:  2-7-17 Date:  2-9-17 Date:  2-14-17 Date  2-17-17 Date  2-21-17   Activity/Exercise Parameters Parameters Parameters     education Safety with gym and recreation activities; hip precautions in detail Reviewed precautions and activities cotninue to review percautions Hip percautions Continued educating on percautions   clam L x20 L x20 HEP     Step up/over 2 inch x20 L  4 inch x20 L 6 inch x20 L and step taps 6 inch x20 L   And step taps 6 inch x20 L and step taps   bridge  To neutral x20 To neutral x20 To neutral x20 To neutral x20   elliptical   10 minutes L2 10 minutes L2 10 minutes L2 10 min L3   Wall squat  10sx6 --  10sx3   sled   50# push pull 50ft 3x each 50# push pull 50ft 3x each --   Kneeling    With black t-band D2 chop and rows With black t-band D2 chop and rows With black t-band D2 chop/rows   Side stepping   With 12# MB psuh outs mini squats With 12# MB  With 12# MB    trampoline    Marching on 1 minx3 Marching on for 1 min x3        Treatment/Session Assessment:    · Response to Treatment:  Tolerated treatment well. No difficulty, some soreness after. · Compliance with Program/Exercises: Will assess as treatment progresses. · Recommendations/Intent for next treatment session: \"Next visit will focus on advancements to more challenging activities\".   Total Treatment Duration:  PT Patient Time In/Time Out  Time In: 1030  Time Out: 1115    Romaine Olvera PT

## 2017-02-23 ENCOUNTER — HOSPITAL ENCOUNTER (OUTPATIENT)
Dept: PHYSICAL THERAPY | Age: 52
Discharge: HOME | End: 2017-02-23
Payer: COMMERCIAL

## 2017-02-28 ENCOUNTER — HOSPITAL ENCOUNTER (OUTPATIENT)
Dept: PHYSICAL THERAPY | Age: 52
Discharge: HOME | End: 2017-02-28
Payer: COMMERCIAL

## 2017-02-28 PROCEDURE — 97110 THERAPEUTIC EXERCISES: CPT

## 2017-02-28 PROCEDURE — 97140 MANUAL THERAPY 1/> REGIONS: CPT

## 2017-02-28 NOTE — PROGRESS NOTES
Issa Rowe  : 1965 [facility] [place] [address] [facility], [address], [facility] [address]  [phone]   [phone]          OUTPATIENT PHYSICAL THERAPY:Daily Note 2017    ICD-10: Treatment Diagnosis: Low back pain (M54.5)  Precautions/Allergies:   Review of patient's allergies indicates no known allergies. Fall Risk Score: 1 (? 5 = High Risk)  MD Orders: eval and treat MEDICAL/REFERRING DIAGNOSIS:  hip, left   DATE OF ONSET: 1 month ago  REFERRING PHYSICIAN: Sulaiman Allan,*  RETURN PHYSICIAN APPOINTMENT: unsure date     INITIAL ASSESSMENT:  Mr. Ruthy Reed presents s/p L JEIMY. He will benefit from skilled PT to assist with return to heavy physical job and working out at [employer]. PROBLEM LIST (Impacting functional limitations):  1. Decreased Strength  2. Decreased Ambulation Ability/Technique  3. Increased Pain  4. Decreased Activity Tolerance  5. Decreased Flexibility/Joint Mobility  6. Decreased Knowledge of Precautions INTERVENTIONS PLANNED:  1. Balance Exercise  2. Bed Mobility  3. Continuous Passive Motion (CPM)  4. Cryotherapy  5. Electrical Stimulation  6. Gait Training  7. Heat  8. Home Exercise Program (HEP)  9. Manual Therapy  10. Range of Motion (ROM)  11. Therapeutic Activites  12. Therapeutic Exercise/Strengthening   TREATMENT PLAN:  Effective Dates: 17 TO 3-7-17. Frequency/Duration: 2 times a week for 8 weeks  GOALS: (Goals have been discussed and agreed upon with patient.)  Short-Term Functional Goals: Time Frame: 4 weeks  1. Pt will be I with phase appropriate HEP to assist with strength and ROM lower quarter. 2. Pt will demonstrate reciprocal stair climbing with 1 hand rail. 3. Pt will ambulate with no assistive device and non-antalgic gait. Discharge Goals: Time Frame: 8 weeks  1. Pt will demonstrate ability to squat and left 50 pounds or greater to be able to tolerate return to work activities safely.   2. Pt will report ability perform all tasks at gym without increased pain or difficulty L hip. 3. Pt will demonstrate 5/5 strength hip flexion, hip ABD, and hip extension to assist with functional mobility ease. Rehabilitation Potential For Stated Goals: Good  Regarding Jeimy Herzog therapy, I certify that the treatment plan above will be carried out by a therapist or under their direction. Thank you for this referral,  Luci Balderas PT     Referring Physician Signature: Katherine Escudero,*              Date                    The information in this section was collected on 2-7-17 (except where otherwise noted). HISTORY:   History of Present Injury/Illness (Reason for Referral):  S/p L JEIMY due to OA  Past Medical History/Comorbidities:   Mr. Conley Nageotte  has a past medical history of Chronic pain; Insomnia; MRSA (methicillin resistant Staphylococcus aureus) infection (2007); and Personal history of kidney stones. He also has no past medical history of Adverse effect of anesthesia; Difficult intubation; Malignant hyperthermia due to anesthesia; Nausea & vomiting; or Pseudocholinesterase deficiency. Mr. Conley Nageotte  has a past surgical history that includes other surgical and rotator cuff repair (Left, 2010). Social History/Living Environment:     private residence, no barriers  Prior Level of Function/Work/Activity:  I all activities   Personal Factors:          Sex:  male        Age:  54 y.o. Past/Current Experience:  Hx of PT and having good outcome with shoulder        Overall Behavior:  Very intense personality. May not listen to percaustions  Current Medications:       Current Outpatient Prescriptions:     oxyCODONE IR (ROXICODONE) 10 mg tab immediate release tablet, Take 10 mg by mouth as needed (pain). every 4-6 hours, Disp: , Rfl:     docusate sodium 100 mg tab, Take 100 mg by mouth daily.  1-3 softgels PRN for constipation  Indications: Constipation, Disp: , Rfl:     aspirin (ASPIRIN) 325 mg tablet, Take 325 mg by mouth daily. every 12 hours, Disp: , Rfl:     diphenhydrAMINE (BENADRYL) 25 mg tablet, Take 75 mg by mouth nightly. Indications: sleep, Disp: , Rfl:    Date Last Reviewed:  2-7-17   Number of Personal Factors/Comorbidities that affect the Plan of Care: 1-2: MODERATE COMPLEXITY   EXAMINATION:   ROM:          Hip: R flexion 90, IR 10, external rotation 30; L flexion 120, IR 30, external rotation 45  Strength:          Hip: L 5/5 throughout; R flexion 3/5, ABD 3/5, IR 3-/5, external roation 3/5  Functional Mobility:         Gait/Ambulation:  Use of straight cane in R hand. Antalgic gait        Transfers: Moderate use hands to push sit to stand        Stairs:  Not tested today  Balance:          Less than 10 second L, 10 seconds plus   Body Structures Involved:  1. Bones  2. Joints  3. Muscles  4. Ligaments Body Functions Affected:  1. Mental  2. Neuromusculoskeletal  3. Movement Related Activities and Participation Affected:  1. Learning and Applying Knowledge  2. Mobility  3. Self Care  4. Domestic Life  5. Community, Social and [place]   Number of elements (examined above) that affect the Plan of Care: 3: MODERATE COMPLEXITY   CLINICAL PRESENTATION:   Presentation: Evolving clinical presentation with changing clinical characteristics: MODERATE COMPLEXITY   CLINICAL DECISION MAKING:   Outcome Measure: Tool Used: Lower Extremity Functional Scale (LEFS)  Score:  Initial: 26/80 Most Recent: X/80 (Date: -- )   Interpretation of Score: 20 questions each scored on a 5 point scale with 0 representing \"extreme difficulty or unable to perform\" and 4 representing \"no difficulty\". The lower the score, the greater the functional disability. 80/80 represents no disability. Minimal detectable change is 9 points.   Score 80 79-63 62-48 47-32 31-16 15-1 0   Modifier CH CI CJ CK CL CM CN       Medical Necessity:   · Patient is expected to demonstrate progress in strength, range of motion and balance to decrease assistance required with functional mobility and ADLs. Reason for Services/Other Comments:  · Patient continues to require skilled intervention due to rescent L JEIMY. Use of outcome tool(s) and clinical judgement create a POC that gives a: Questionable prediction of patient's progress: MODERATE COMPLEXITY            TREATMENT:   (In addition to Assessment/Re-Assessment sessions the following treatments were rendered)  Pre-treatment Symptoms/Complaints:  3-4/10 with L hip movements  Pain: Initial:     3-4/10 L hip Post Session:  3-4/10 L hip     THERAPEUTIC EXERCISE: (30 minutes):  Exercises per grid below to improve mobility, strength and balance. Required minimal verbal, manual and tactile cues to promote proper body alignment. Progressed resistance, range and repetitions as indicated. MANUAL THERAPY: (15 minutes): Soft tissue mobilization was utilized and necessary because of the patient's restricted motion of soft tissue. MODALITIES: (0 minutes):      declined  L hip.     Date:  2-7-17 Date:  2-9-17 Date:  2-14-17 Date  2-17-17 Date  2-21-17 Date  2-28-17   Activity/Exercise Parameters Parameters Parameters      education Safety with gym and recreation activities; hip precautions in detail Reviewed precautions and activities cotninue to review percautions Hip percautions Continued educating on percautions precautions with all exercise   clam L x20 L x20 HEP      Step up/over 2 inch x20 L  4 inch x20 L 6 inch x20 L and step taps 6 inch x20 L   And step taps 6 inch x20 L and step taps 10 inch step taps and step ups   bridge  To neutral x20 To neutral x20 To neutral x20 To neutral x20 On swiss ball double leg x10 single leg x10   elliptical   10 minutes L2 10 minutes L2 10 minutes L2 10 min L3 10 minutes L3   Wall squat  10sx6 --  10sx3 15sx3   sled   50# push pull 50ft 3x each 50# push pull 50ft 3x each -- 75# push/pull in hallway up/down ramps   Kneeling    With black t-band D2 chop and rows With black t-band D2 chop and rows With black t-band D2 chop/rows Lunge kneel x20 each   Side stepping   With 12# MB psuh outs mini squats With 12# MB  With 12# MB  With squat 12# MB   trampoline    Marching on 1 minx3 Marching on for 1 min x3 Marching on for 1 min x3        Treatment/Session Assessment:    · Response to Treatment:  Tolerated treatment well. No difficulty, some soreness after. · Compliance with Program/Exercises: attends gym doing most machines. Pt does not complete exercise offered at PT even though instructed. · Recommendations/Intent for next treatment session: \"Next visit will focus on advancements to more challenging activities\".   Total Treatment Duration:       Eugenia De Paz, PT

## 2017-03-03 ENCOUNTER — HOSPITAL ENCOUNTER (OUTPATIENT)
Dept: PHYSICAL THERAPY | Age: 52
Discharge: HOME | End: 2017-03-03
Payer: COMMERCIAL

## 2017-03-03 PROCEDURE — 97110 THERAPEUTIC EXERCISES: CPT

## 2017-03-03 PROCEDURE — 97140 MANUAL THERAPY 1/> REGIONS: CPT

## 2017-03-03 NOTE — PROGRESS NOTES
Evita Castanon  : 1965 [facility]  [address], [address] [address],[address], [address] [facility]  [phone]   [phone]          OUTPATIENT PHYSICAL THERAPY:Daily Note 3/3/2017    ICD-10: Treatment Diagnosis: Low back pain (M54.5)  Precautions/Allergies:   Review of patient's allergies indicates no known allergies. Fall Risk Score: 1 (? 5 = High Risk)  MD Orders: eval and treat MEDICAL/REFERRING DIAGNOSIS:  hip, left   DATE OF ONSET: 1 month ago  REFERRING PHYSICIAN: Ewa Fine,*  RETURN PHYSICIAN APPOINTMENT: unsure date     INITIAL ASSESSMENT:  Mr. Tobias Tavares presents s/p L JEIMY. He will benefit from skilled PT to assist with return to heavy physical job and working out at [employer]. PROBLEM LIST (Impacting functional limitations):  1. Decreased Strength  2. Decreased Ambulation Ability/Technique  3. Increased Pain  4. Decreased Activity Tolerance  5. Decreased Flexibility/Joint Mobility  6. Decreased Knowledge of Precautions INTERVENTIONS PLANNED:  1. Balance Exercise  2. Bed Mobility  3. Continuous Passive Motion (CPM)  4. Cryotherapy  5. Electrical Stimulation  6. Gait Training  7. Heat  8. Home Exercise Program (HEP)  9. Manual Therapy  10. Range of Motion (ROM)  11. Therapeutic Activites  12. Therapeutic Exercise/Strengthening   TREATMENT PLAN:  Effective Dates: 17 TO 3-7-17. Frequency/Duration: 2 times a week for 8 weeks  GOALS: (Goals have been discussed and agreed upon with patient.)  Short-Term Functional Goals: Time Frame: 4 weeks  1. Pt will be I with phase appropriate HEP to assist with strength and ROM lower quarter. 2. Pt will demonstrate reciprocal stair climbing with 1 hand rail. 3. Pt will ambulate with no assistive device and non-antalgic gait. Discharge Goals: Time Frame: 8 weeks  1. Pt will demonstrate ability to squat and left 50 pounds or greater to be able to tolerate return to work activities safely.   2. Pt will report ability perform all tasks at gym without increased pain or difficulty L hip. 3. Pt will demonstrate 5/5 strength hip flexion, hip ABD, and hip extension to assist with functional mobility ease. Rehabilitation Potential For Stated Goals: Good  Regarding [facility] [place] therapy, I certify that the treatment plan above will be carried out by a therapist or under their direction. Thank you for this referral,  Eliecer Guillaume, PT     Referring Physician Signature: Lissette Carvalho,*              Date                    The information in this section was collected on 2-7-17 (except where otherwise noted). HISTORY:   History of Present Injury/Illness (Reason for Referral):  S/p L JEIMY due to OA  Past Medical History/Comorbidities:   Mr. Woody Seymour  has a past medical history of Chronic pain; Insomnia; MRSA (methicillin resistant Staphylococcus aureus) infection (2007); and Personal history of kidney stones. He also has no past medical history of Adverse effect of anesthesia; Difficult intubation; Malignant hyperthermia due to anesthesia; Nausea & vomiting; or Pseudocholinesterase deficiency. Mr. Woody Seymour  has a past surgical history that includes other surgical and rotator cuff repair (Left, 2010). Social History/Living Environment:     private residence, no barriers  Prior Level of Function/Work/Activity:  I all activities   Personal Factors:          Sex:  male        Age:  54 y.o. Past/Current Experience:  Hx of PT and having good outcome with shoulder        Overall Behavior:  Very intense personality. May not listen to percaustions  Current Medications:       Current Outpatient Prescriptions:     oxyCODONE IR (ROXICODONE) 10 mg tab immediate release tablet, Take 10 mg by mouth as needed (pain). every 4-6 hours, Disp: , Rfl:     docusate sodium 100 mg tab, Take 100 mg by mouth daily.  1-3 softgels PRN for constipation  Indications: Constipation, Disp: , Rfl:     aspirin (ASPIRIN) 325 mg tablet, Take 325 mg by mouth daily. every 12 hours, Disp: , Rfl:     diphenhydrAMINE (BENADRYL) 25 mg tablet, Take 75 mg by mouth nightly. Indications: sleep, Disp: , Rfl:    Date Last Reviewed:  2-7-17   Number of Personal Factors/Comorbidities that affect the Plan of Care: 1-2: MODERATE COMPLEXITY   EXAMINATION:   ROM:          Hip: R flexion 90, IR 10, external rotation 30; L flexion 120, IR 30, external rotation 45  Strength:          Hip: L 5/5 throughout; R flexion 3/5, ABD 3/5, IR 3-/5, external roation 3/5  Functional Mobility:         Gait/Ambulation:  Use of straight cane in R hand. Antalgic gait        Transfers: Moderate use hands to push sit to stand        Stairs:  Not tested today  Balance:          Less than 10 second L, 10 seconds plus   Body Structures Involved:  1. Bones  2. Joints  3. Muscles  4. Ligaments Body Functions Affected:  1. Mental  2. Neuromusculoskeletal  3. Movement Related Activities and Participation Affected:  1. Learning and Applying Knowledge  2. Mobility  3. Self Care  4. Domestic Life  5. Community, Social and [place]   Number of elements (examined above) that affect the Plan of Care: 3: MODERATE COMPLEXITY   CLINICAL PRESENTATION:   Presentation: Evolving clinical presentation with changing clinical characteristics: MODERATE COMPLEXITY   CLINICAL DECISION MAKING:   Outcome Measure: Tool Used: Lower Extremity Functional Scale (LEFS)  Score:  Initial: 26/80 Most Recent: X/80 (Date: -- )   Interpretation of Score: 20 questions each scored on a 5 point scale with 0 representing \"extreme difficulty or unable to perform\" and 4 representing \"no difficulty\". The lower the score, the greater the functional disability. 80/80 represents no disability. Minimal detectable change is 9 points.   Score 80 79-63 62-48 47-32 31-16 15-1 0   Modifier CH CI CJ CK CL CM CN       Medical Necessity:   · Patient is expected to demonstrate progress in strength, range of motion and balance to decrease assistance required with functional mobility and ADLs. Reason for Services/Other Comments:  · Patient continues to require skilled intervention due to rescent L JEIMY. Use of outcome tool(s) and clinical judgement create a POC that gives a: Questionable prediction of patient's progress: MODERATE COMPLEXITY            TREATMENT:   (In addition to Assessment/Re-Assessment sessions the following treatments were rendered)  Pre-treatment Symptoms/Complaints:  3-4/10 with L hip movements  Pain: Initial:     3-4/10 L hip Post Session:  3-4/10 L hip     THERAPEUTIC EXERCISE: (25 minutes):  Exercises per grid below to improve mobility, strength and balance. Required minimal verbal, manual and tactile cues to promote proper body alignment. Progressed resistance, range and repetitions as indicated. MANUAL THERAPY: (20 minutes): Soft tissue mobilization was utilized and necessary because of the patient's restricted motion of soft tissue. L hip.     Date:  2-7-17 Date:  2-9-17 Date:  2-14-17 Date  2-17-17 Date  2-21-17 Date  2-28-17 Date  3-3-17   Activity/Exercise Parameters Parameters Parameters       education Safety with gym and recreation activities; hip precautions in detail Reviewed precautions and activities cotninue to review percautions Hip percautions Continued educating on percautions precautions with all exercise same   clam L x20 L x20 HEP    --   Step up/over 2 inch x20 L  4 inch x20 L 6 inch x20 L and step taps 6 inch x20 L   And step taps 6 inch x20 L and step taps 10 inch step taps and step ups --   bridge  To neutral x20 To neutral x20 To neutral x20 To neutral x20 On swiss ball double leg x10 single leg x10 --   elliptical   10 minutes L2 10 minutes L2 10 minutes L2 10 min L3 10 minutes L3 12 minutes L3   Wall squat  10sx6 --  10sx3 15sx3 20sx2   sled   50# push pull 50ft 3x each 50# push pull 50ft 3x each -- 75# push/pull in hallway up/down ramps    Kneeling    With black t-band D2 chop and rows With black t-band D2 chop and rows With black t-band D2 chop/rows Lunge kneel x20 each x20 each   Side stepping   With 12# MB psuh outs mini squats With 12# MB  With 12# MB  With squat 12# MB --   trampoline    Marching on 1 minx3 Marching on for 1 min x3 Marching on for 1 min x3    Body mechanics education       With squatting safely, kneeling, lifting 10 minutes        Treatment/Session Assessment:    · Response to Treatment:  Tolerated treatment well. No difficulty, some soreness after. · Compliance with Program/Exercises: attends gym doing most machines. Pt does not complete exercise offered at PT even though instructed. · Recommendations/Intent for next treatment session: \"Next visit will focus on advancements to more challenging activities\".   Total Treatment Duration:  PT Patient Time In/Time Out  Time In: 0830  Time Out: 0915    Luci Balderas PT

## 2017-03-07 ENCOUNTER — HOSPITAL ENCOUNTER (OUTPATIENT)
Dept: PHYSICAL THERAPY | Age: 52
Discharge: HOME | End: 2017-03-07
Payer: COMMERCIAL

## 2017-03-07 PROCEDURE — 97110 THERAPEUTIC EXERCISES: CPT

## 2017-03-07 PROCEDURE — 97140 MANUAL THERAPY 1/> REGIONS: CPT

## 2017-03-07 NOTE — PROGRESS NOTES
Anat Hightower  : 1965 [facility] [address], [address] [place] [address],[address], [place] [address].  [phone]   [phone]          OUTPATIENT PHYSICAL THERAPY:Daily Note 3/7/2017    ICD-10: Treatment Diagnosis: Low back pain (M54.5)  Precautions/Allergies:   Review of patient's allergies indicates no known allergies. Fall Risk Score: 1 (? 5 = High Risk)  MD Orders: eval and treat MEDICAL/REFERRING DIAGNOSIS:  hip, left   DATE OF ONSET: 1 month ago  REFERRING PHYSICIAN: Jacques Murillo,*  RETURN PHYSICIAN APPOINTMENT: unsure date     INITIAL ASSESSMENT:  Mr. Nikko Luevano presents s/p L JEIMY. He will benefit from skilled PT to assist with return to heavy physical job and working out at [employer]. PROBLEM LIST (Impacting functional limitations):  1. Decreased Strength  2. Decreased Ambulation Ability/Technique  3. Increased Pain  4. Decreased Activity Tolerance  5. Decreased Flexibility/Joint Mobility  6. Decreased Knowledge of Precautions INTERVENTIONS PLANNED:  1. Balance Exercise  2. Bed Mobility  3. Continuous Passive Motion (CPM)  4. Cryotherapy  5. Electrical Stimulation  6. Gait Training  7. Heat  8. Home Exercise Program (HEP)  9. Manual Therapy  10. Range of Motion (ROM)  11. Therapeutic Activites  12. Therapeutic Exercise/Strengthening   TREATMENT PLAN:  Effective Dates: 17 TO 3-7-17. Frequency/Duration: 2 times a week for 8 weeks  GOALS: (Goals have been discussed and agreed upon with patient.)  Short-Term Functional Goals: Time Frame: 4 weeks  1. Pt will be I with phase appropriate HEP to assist with strength and ROM lower quarter. 2. Pt will demonstrate reciprocal stair climbing with 1 hand rail. 3. Pt will ambulate with no assistive device and non-antalgic gait. Discharge Goals: Time Frame: 8 weeks  1. Pt will demonstrate ability to squat and left 50 pounds or greater to be able to tolerate return to work activities safely.   2. Pt will report ability perform all tasks at gym without increased pain or difficulty L hip. 3. Pt will demonstrate 5/5 strength hip flexion, hip ABD, and hip extension to assist with functional mobility ease. Rehabilitation Potential For Stated Goals: Good  Regarding [place] therapy, I certify that the treatment plan above will be carried out by a therapist or under their direction. Thank you for this referral,  Liz Kidd, PT     Referring Physician Signature: Óscar Bolivar,*              Date                    The information in this section was collected on 17 (except where otherwise noted). HISTORY:   History of Present Injury/Illness (Reason for Referral):  S/p L JEIMY due to OA  Past Medical History/Comorbidities:   Mr. Jessica Denney  has a past medical history of Chronic pain; Insomnia; MRSA (methicillin resistant Staphylococcus aureus) infection (); and Personal history of kidney stones. He also has no past medical history of Adverse effect of anesthesia; Difficult intubation; Malignant hyperthermia due to anesthesia; Nausea & vomiting; or Pseudocholinesterase deficiency. Mr. Jessica Denney  has a past surgical history that includes other surgical and rotator cuff repair (Left, ). Social History/Living Environment:     private residence, no barriers  Prior Level of Function/Work/Activity:  I all activities   Personal Factors:          Sex:  male        Age:  54 y.o. Past/Current Experience:  Hx of PT and having good outcome with shoulder        Overall Behavior:  Very intense personality. May not listen to percaustions  Current Medications:       Current Outpatient Prescriptions:     oxyCODONE IR (ROXICODONE) 10 mg tab immediate release tablet, Take 10 mg by mouth as needed (pain). every 4-6 hours, Disp: , Rfl:     docusate sodium 100 mg tab, Take 100 mg by mouth daily.  1-3 softgels PRN for constipation  Indications: Constipation, Disp: , Rfl:     aspirin (ASPIRIN) 325 mg tablet, Take 325 mg by mouth daily. every 12 hours, Disp: , Rfl:     diphenhydrAMINE (BENADRYL) 25 mg tablet, Take 75 mg by mouth nightly. Indications: sleep, Disp: , Rfl:    Date Last Reviewed:  2-7-17   Number of Personal Factors/Comorbidities that affect the Plan of Care: 1-2: MODERATE COMPLEXITY   EXAMINATION:   ROM:          Hip: R flexion 90, IR 10, external rotation 30; L flexion 120, IR 30, external rotation 45  Strength:          Hip: L 5/5 throughout; R flexion 3/5, ABD 3/5, IR 3-/5, external roation 3/5  Functional Mobility:         Gait/Ambulation:  Use of straight cane in R hand. Antalgic gait        Transfers: Moderate use hands to push sit to stand        Stairs:  Not tested today  Balance:          Less than 10 second L, 10 seconds plus   Body Structures Involved:  1. Bones  2. Joints  3. Muscles  4. Ligaments Body Functions Affected:  1. Mental  2. Neuromusculoskeletal  3. Movement Related Activities and Participation Affected:  1. Learning and Applying Knowledge  2. Mobility  3. Self Care  4. Domestic Life  5. Community, Social and [place]   Number of elements (examined above) that affect the Plan of Care: 3: MODERATE COMPLEXITY   CLINICAL PRESENTATION:   Presentation: Evolving clinical presentation with changing clinical characteristics: MODERATE COMPLEXITY   CLINICAL DECISION MAKING:   Outcome Measure: Tool Used: Lower Extremity Functional Scale (LEFS)  Score:  Initial: 26/80 Most Recent: X/80 (Date: -- )   Interpretation of Score: 20 questions each scored on a 5 point scale with 0 representing \"extreme difficulty or unable to perform\" and 4 representing \"no difficulty\". The lower the score, the greater the functional disability. 80/80 represents no disability. Minimal detectable change is 9 points.   Score 80 79-63 62-48 47-32 31-16 15-1 0   Modifier CH CI CJ CK CL CM CN       Medical Necessity:   · Patient is expected to demonstrate progress in strength, range of motion and balance to decrease assistance required with functional mobility and ADLs. Reason for Services/Other Comments:  · Patient continues to require skilled intervention due to rescent L JEIMY. Use of outcome tool(s) and clinical judgement create a POC that gives a: Questionable prediction of patient's progress: MODERATE COMPLEXITY            TREATMENT:   (In addition to Assessment/Re-Assessment sessions the following treatments were rendered)  Pre-treatment Symptoms/Complaints:  3-4/10 with L hip movements  Pain: Initial:     3-4/10 L hip Post Session:  3-4/10 L hip     THERAPEUTIC EXERCISE: (25 minutes):  Exercises per grid below to improve mobility, strength and balance. Required minimal verbal, manual and tactile cues to promote proper body alignment. Progressed resistance, range and repetitions as indicated. MANUAL THERAPY: (20 minutes): Soft tissue mobilization was utilized and necessary because of the patient's restricted motion of soft tissue. L hip.     Date:  2-7-17 Date:  2-9-17 Date:  2-14-17 Date  2-17-17 Date  2-21-17 Date  2-28-17 Date  3-3-17 Date  3-7-17   Activity/Exercise Parameters Parameters Parameters        education Safety with gym and recreation activities; hip precautions in detail Reviewed precautions and activities cotninue to review percautions Hip percautions Continued educating on percautions precautions with all exercise same    clam L x20 L x20 HEP    --    Step up/over 2 inch x20 L  4 inch x20 L 6 inch x20 L and step taps 6 inch x20 L   And step taps 6 inch x20 L and step taps 10 inch step taps and step ups --    bridge  To neutral x20 To neutral x20 To neutral x20 To neutral x20 On swiss ball double leg x10 single leg x10 --    elliptical   10 minutes L2 10 minutes L2 10 minutes L2 10 min L3 10 minutes L3 12 minutes L3 12 minutes L3   Wall squat  10sx6 --  10sx3 15sx3 20sx2 20sx2   sled   50# push pull 50ft 3x each 50# push pull 50ft 3x each -- 75# push/pull in hallway up/down ramps     Kneeling    With black t-band D2 chop and rows With black t-band D2 chop and rows With black t-band D2 chop/rows Lunge kneel x20 each x20 each Kneel to stand x20 each side   Side stepping   With 12# MB psuh outs mini squats With 12# MB  With 12# MB  With squat 12# MB -- With squat 10x each way   trampoline    Marching on 1 minx3 Marching on for 1 min x3 Marching on for 1 min x3     Body mechanics education       With squatting safely, kneeling, lifting 10 minutes         Treatment/Session Assessment:    · Response to Treatment:  Tolerated treatment well. more focus manual work since patient is doing most work at gym for strengthening independently. · Compliance with Program/Exercises: attends gym doing most machines. Pt does not complete exercise offered at PT even though instructed. · Recommendations/Intent for next treatment session: \"Next visit will focus on advancements to more challenging activities\".   Total Treatment Duration:  PT Patient Time In/Time Out  Time In: 0830  Time Out: 0915    Fox Mullen, PT

## 2017-03-10 ENCOUNTER — APPOINTMENT (OUTPATIENT)
Dept: PHYSICAL THERAPY | Age: 52
End: 2017-03-10
Payer: COMMERCIAL

## 2017-03-15 ENCOUNTER — HOSPITAL ENCOUNTER (OUTPATIENT)
Dept: PHYSICAL THERAPY | Age: 52
Discharge: HOME | End: 2017-03-15
Payer: COMMERCIAL

## 2017-03-15 PROCEDURE — 97140 MANUAL THERAPY 1/> REGIONS: CPT

## 2017-03-15 PROCEDURE — 97110 THERAPEUTIC EXERCISES: CPT

## 2017-03-15 NOTE — PROGRESS NOTES
Ritchie Ring  : 1965 [facility]  [address], [address] [place] [address],[address], [address] [facility]  [phone]   [phone]          OUTPATIENT PHYSICAL THERAPY:Daily Note 3/15/2017    ICD-10: Treatment Diagnosis: Low back pain (M54.5)  Precautions/Allergies:   Review of patient's allergies indicates no known allergies. Fall Risk Score: 1 (? 5 = High Risk)  MD Orders: eval and treat MEDICAL/REFERRING DIAGNOSIS:  hip, left   DATE OF ONSET: 1 month ago  REFERRING PHYSICIAN: Carl Atwood,*  RETURN PHYSICIAN APPOINTMENT: unsure date     INITIAL ASSESSMENT:  Mr. Shyanne Hadley presents s/p L JEIMY. He will benefit from skilled PT to assist with return to heavy physical job and working out at [employer]. PROBLEM LIST (Impacting functional limitations):  1. Decreased Strength  2. Decreased Ambulation Ability/Technique  3. Increased Pain  4. Decreased Activity Tolerance  5. Decreased Flexibility/Joint Mobility  6. Decreased Knowledge of Precautions INTERVENTIONS PLANNED:  1. Balance Exercise  2. Bed Mobility  3. Continuous Passive Motion (CPM)  4. Cryotherapy  5. Electrical Stimulation  6. Gait Training  7. Heat  8. Home Exercise Program (HEP)  9. Manual Therapy  10. Range of Motion (ROM)  11. Therapeutic Activites  12. Therapeutic Exercise/Strengthening   TREATMENT PLAN:  Effective Dates: 17 TO 3-7-17. Frequency/Duration: 2 times a week for 8 weeks  GOALS: (Goals have been discussed and agreed upon with patient.)  Short-Term Functional Goals: Time Frame: 4 weeks  1. Pt will be I with phase appropriate HEP to assist with strength and ROM lower quarter. 2. Pt will demonstrate reciprocal stair climbing with 1 hand rail. 3. Pt will ambulate with no assistive device and non-antalgic gait. Discharge Goals: Time Frame: 8 weeks  1. Pt will demonstrate ability to squat and left 50 pounds or greater to be able to tolerate return to work activities safely.   2. Pt will report ability perform all tasks at gym without increased pain or difficulty L hip. 3. Pt will demonstrate 5/5 strength hip flexion, hip ABD, and hip extension to assist with functional mobility ease. Rehabilitation Potential For Stated Goals: Good  Regarding Jeimy Herzog therapy, I certify that the treatment plan above will be carried out by a therapist or under their direction. Thank you for this referral,  Luci Balderas PT     Referring Physician Signature: Katherine Escudero,*              Date                    The information in this section was collected on 2-7-17 (except where otherwise noted). HISTORY:   History of Present Injury/Illness (Reason for Referral):  S/p L JEIMY due to OA  Past Medical History/Comorbidities:   Mr. Conley Nageotte  has a past medical history of Chronic pain; Insomnia; MRSA (methicillin resistant Staphylococcus aureus) infection (2007); and Personal history of kidney stones. He also has no past medical history of Adverse effect of anesthesia; Difficult intubation; Malignant hyperthermia due to anesthesia; Nausea & vomiting; or Pseudocholinesterase deficiency. Mr. Conley Nageotte  has a past surgical history that includes other surgical and rotator cuff repair (Left, 2010). Social History/Living Environment:     private residence, no barriers  Prior Level of Function/Work/Activity:  I all activities   Personal Factors:          Sex:  male        Age:  54 y.o. Past/Current Experience:  Hx of PT and having good outcome with shoulder        Overall Behavior:  Very intense personality. May not listen to percaustions  Current Medications:       Current Outpatient Prescriptions:     oxyCODONE IR (ROXICODONE) 10 mg tab immediate release tablet, Take 10 mg by mouth as needed (pain). every 4-6 hours, Disp: , Rfl:     docusate sodium 100 mg tab, Take 100 mg by mouth daily.  1-3 softgels PRN for constipation  Indications: Constipation, Disp: , Rfl:     aspirin (ASPIRIN) 325 mg tablet, Take 325 mg by mouth daily. every 12 hours, Disp: , Rfl:     diphenhydrAMINE (BENADRYL) 25 mg tablet, Take 75 mg by mouth nightly. Indications: sleep, Disp: , Rfl:    Date Last Reviewed:  2-7-17   Number of Personal Factors/Comorbidities that affect the Plan of Care: 1-2: MODERATE COMPLEXITY   EXAMINATION:   ROM:          Hip: R flexion 90, IR 10, external rotation 30; L flexion 120, IR 30, external rotation 45  Strength:          Hip: L 5/5 throughout; R flexion 3/5, ABD 3/5, IR 3-/5, external roation 3/5  Functional Mobility:         Gait/Ambulation:  Use of straight cane in R hand. Antalgic gait        Transfers: Moderate use hands to push sit to stand        Stairs:  Not tested today  Balance:          Less than 10 second L, 10 seconds plus   Body Structures Involved:  1. Bones  2. Joints  3. Muscles  4. Ligaments Body Functions Affected:  1. Mental  2. Neuromusculoskeletal  3. Movement Related Activities and Participation Affected:  1. Learning and Applying Knowledge  2. Mobility  3. Self Care  4. Domestic Life  5. Community, Social and [place]   Number of elements (examined above) that affect the Plan of Care: 3: MODERATE COMPLEXITY   CLINICAL PRESENTATION:   Presentation: Evolving clinical presentation with changing clinical characteristics: MODERATE COMPLEXITY   CLINICAL DECISION MAKING:   Outcome Measure: Tool Used: Lower Extremity Functional Scale (LEFS)  Score:  Initial: 26/80 Most Recent: X/80 (Date: -- )   Interpretation of Score: 20 questions each scored on a 5 point scale with 0 representing \"extreme difficulty or unable to perform\" and 4 representing \"no difficulty\". The lower the score, the greater the functional disability. 80/80 represents no disability. Minimal detectable change is 9 points.   Score 80 79-63 62-48 47-32 31-16 15-1 0   Modifier CH CI CJ CK CL CM CN       Medical Necessity:   · Patient is expected to demonstrate progress in strength, range of motion and balance to decrease assistance required with functional mobility and ADLs. Reason for Services/Other Comments:  · Patient continues to require skilled intervention due to rescent L JEIMY. Use of outcome tool(s) and clinical judgement create a POC that gives a: Questionable prediction of patient's progress: MODERATE COMPLEXITY            TREATMENT:   (In addition to Assessment/Re-Assessment sessions the following treatments were rendered)  Pre-treatment Symptoms/Complaints:  3-4/10 with L hip movements  Pain: Initial:     3-4/10 L hip Post Session:  3-4/10 L hip     THERAPEUTIC EXERCISE: (25 minutes):  Exercises per grid below to improve mobility, strength and balance. Required minimal verbal, manual and tactile cues to promote proper body alignment. Progressed resistance, range and repetitions as indicated. MANUAL THERAPY: (20 minutes): Soft tissue mobilization was utilized and necessary because of the patient's restricted motion of soft tissue. L hip. Date  3-3-17 Date  3-7-17 Date  3/15/17    Activity/Exercise       education same  Lifting postures    clam --      Step up/over --      bridge --      elliptical  12 minutes L3 12 minutes L3 12 minutes L3    Wall squat 20sx2 20sx2     sled       Kneeling  x20 each Kneel to stand x20 each side Kneel to stand x20 each side    Side stepping -- With squat 10x each way With 25# box    trampoline       Body mechanics education With squatting safely, kneeling, lifting 10 minutes      Knee to chest lifts    30# box 20x    Chest to overhead   30# 20x    Knee to chest lift with turns   30# box stepping around 20x    sled   100# push/pull 50ft x4         Treatment/Session Assessment:    · Response to Treatment:  Tolerated treatment well. more focus manual work since patient is doing most work at gym for strengthening independently. · Compliance with Program/Exercises: attends gym doing most machines. Pt does not complete exercise offered at PT even though instructed. · Recommendations/Intent for next treatment session: \"Next visit will focus on advancements to more challenging activities\".   Total Treatment Duration:  PT Patient Time In/Time Out  Time In: 1215  Time Out: 1300    Armen Cr PT

## 2017-03-23 ENCOUNTER — HOSPITAL ENCOUNTER (OUTPATIENT)
Dept: PHYSICAL THERAPY | Age: 52
Discharge: HOME | End: 2017-03-23
Payer: COMMERCIAL

## 2017-03-23 PROCEDURE — 97110 THERAPEUTIC EXERCISES: CPT

## 2017-03-23 PROCEDURE — 97140 MANUAL THERAPY 1/> REGIONS: CPT

## 2017-03-23 NOTE — PROGRESS NOTES
Bindu Carranza  : 1965 [facility] [address], [address] [place] [address],[address], [place] [address].  [phone]   [phone]          OUTPATIENT PHYSICAL THERAPY:Daily Note and Discharge 3/23/2017    ICD-10: Treatment Diagnosis: Low back pain (M54.5)  Precautions/Allergies:   Review of patient's allergies indicates no known allergies. Fall Risk Score: 1 (? 5 = High Risk)  MD Orders: eval and treat MEDICAL/REFERRING DIAGNOSIS:  hip, left   DATE OF ONSET: 1 month ago  REFERRING PHYSICIAN: Atif Ellison,*  RETURN PHYSICIAN APPOINTMENT: unsure date     INITIAL ASSESSMENT:  Mr. Paul Mclean presents s/p L JEIMY. He is I all activities. He needed to return a very physical job starting in April and has been attending gym I preporation. PT attempted to get clearance from physician on 3 different occassions through his assistant in regards to persecutions and work but they would not call back  He will continue working out at the gym on own and plans to go back to work in April. He has an appointment with doctor prior. PROBLEM LIST (Impacting functional limitations):  1. Decreased Strength  2. Decreased Ambulation Ability/Technique  3. Increased Pain  4. Decreased Activity Tolerance  5. Decreased Flexibility/Joint Mobility  6. Decreased Knowledge of Precautions INTERVENTIONS PLANNED:  1. Balance Exercise  2. Bed Mobility  3. Continuous Passive Motion (CPM)  4. Cryotherapy  5. Electrical Stimulation  6. Gait Training  7. Heat  8. Home Exercise Program (HEP)  9. Manual Therapy  10. Range of Motion (ROM)  11. Therapeutic Activites  12. Therapeutic Exercise/Strengthening   TREATMENT PLAN:  Effective Dates: 17 TO 3-7-17. Frequency/Duration: 2 times a week for 8 weeks  GOALS: (Goals have been discussed and agreed upon with patient.)  Short-Term Functional Goals: Time Frame: 4 weeks  1. Pt will be I with phase appropriate HEP to assist with strength and ROM lower quarter.  GOAL MET  2. Pt will demonstrate reciprocal stair climbing with 1 hand rail. GOAL MET  3. Pt will ambulate with no assistive device and non-antalgic gait. GOAL MET  Discharge Goals: Time Frame: 8 weeks  1. Pt will demonstrate ability to squat and left 50 pounds or greater to be able to tolerate return to work activities safely. reports he completed this at gym (felt unsafe this early because still under percautions)  2. Pt will report ability perform all tasks at gym without increased pain or difficulty L hip. GOAL MET  3. Pt will demonstrate 5/5 strength hip flexion, hip ABD, and hip extension to assist with functional mobility ease. ONGOING  Rehabilitation Potential For Stated Goals: Good  Regarding [facility] therapy, I certify that the treatment plan above will be carried out by a therapist or under their direction. Thank you for this referral,  Maxim Meek, PT     Referring Physician Signature: Jairo Ibanez,*              Date                    The information in this section was collected on 2-7-17 (except where otherwise noted). HISTORY:   History of Present Injury/Illness (Reason for Referral):  S/p L JEIMY due to OA  Past Medical History/Comorbidities:   Mr. Aayush Ludwig  has a past medical history of Chronic pain; Insomnia; MRSA (methicillin resistant Staphylococcus aureus) infection (2007); and Personal history of kidney stones. He also has no past medical history of Adverse effect of anesthesia; Difficult intubation; Malignant hyperthermia due to anesthesia; Nausea & vomiting; or Pseudocholinesterase deficiency. Mr. Aayush Ludwig  has a past surgical history that includes other surgical and rotator cuff repair (Left, 2010). Social History/Living Environment:     private residence, no barriers  Prior Level of Function/Work/Activity:  I all activities   Personal Factors:          Sex:  male        Age:  54 y.o.         Past/Current Experience:  Hx of PT and having good outcome with shoulder Overall Behavior:  Very intense personality. May not listen to percaustions  Current Medications:       Current Outpatient Prescriptions:     oxyCODONE IR (ROXICODONE) 10 mg tab immediate release tablet, Take 10 mg by mouth as needed (pain). every 4-6 hours, Disp: , Rfl:     docusate sodium 100 mg tab, Take 100 mg by mouth daily. 1-3 softgels PRN for constipation  Indications: Constipation, Disp: , Rfl:     aspirin (ASPIRIN) 325 mg tablet, Take 325 mg by mouth daily. every 12 hours, Disp: , Rfl:     diphenhydrAMINE (BENADRYL) 25 mg tablet, Take 75 mg by mouth nightly. Indications: sleep, Disp: , Rfl:    Date Last Reviewed:  2-7-17   Number of Personal Factors/Comorbidities that affect the Plan of Care: 1-2: MODERATE COMPLEXITY   EXAMINATION:   ROM:          Hip: R flexion 100, IR 15, external rotation 30; L flexion 120, IR 30, external rotation 45  Strength:          Hip: L 5/5 throughout; R flexion 4-/5, ABD 4-/5, IR 3+/5, external roation 4-/5  Functional Mobility:         Gait/Ambulation:  Non-antalgic gait pattern        Transfers:  Good form, no deviations        Stairs: no difficulty occasional use of hand rail  Balance:          10 seconds plus single leg both   Body Structures Involved:  1. Bones  2. Joints  3. Muscles  4. Ligaments Body Functions Affected:  1. Mental  2. Neuromusculoskeletal  3. Movement Related Activities and Participation Affected:  1. Learning and Applying Knowledge  2. Mobility  3. Self Care  4. Domestic Life  5. Community, Social and [place] [facility] [place]   Number of elements (examined above) that affect the Plan of Care: 3: MODERATE COMPLEXITY   CLINICAL PRESENTATION:   Presentation: Evolving clinical presentation with changing clinical characteristics: MODERATE COMPLEXITY   CLINICAL DECISION MAKING:   Outcome Measure:    Tool Used: Lower Extremity Functional Scale (LEFS)  Score:  Initial: 26/80 Most Recent: 63/80 (Date: 3-23-17 )   Interpretation of Score: 20 questions each scored on a 5 point scale with 0 representing \"extreme difficulty or unable to perform\" and 4 representing \"no difficulty\". The lower the score, the greater the functional disability. 80/80 represents no disability. Minimal detectable change is 9 points. Score 80 79-63 62-48 47-32 31-16 15-1 0   Modifier CH CI CJ CK CL CM CN        Use of outcome tool(s) and clinical judgement create a POC that gives a: Questionable prediction of patient's progress: MODERATE COMPLEXITY            TREATMENT:   (In addition to Assessment/Re-Assessment sessions the following treatments were rendered)  Pre-treatment Symptoms/Complaints:  3-4/10 L hip after hard work outs and yard work  Pain: Initial:     3-4/10 L hip Post Session:  3-4/10 L hip     THERAPEUTIC EXERCISE: (25 minutes):  Exercises per grid below to improve mobility, strength and balance. Required minimal verbal, manual and tactile cues to promote proper body alignment. Progressed resistance, range and repetitions as indicated. MANUAL THERAPY: (20 minutes): Soft tissue mobilization was utilized and necessary because of the patient's restricted motion of soft tissue. L hip. Date  3-3-17 Date  3-7-17 Date  3/15/17    Activity/Exercise       education same  Lifting postures    clam --      Step up/over --      bridge --      elliptical  12 minutes L3 12 minutes L3 12 minutes L3    Wall squat 20sx2 20sx2     sled       Kneeling  x20 each Kneel to stand x20 each side Kneel to stand x20 each side    Side stepping -- With squat 10x each way With 25# box    trampoline       Body mechanics education With squatting safely, kneeling, lifting 10 minutes      Knee to chest lifts    30# box 20x    Chest to overhead   30# 20x    Knee to chest lift with turns   30# box stepping around 20x    sled   100# push/pull 50ft x4         Treatment/Session Assessment:    · Response to Treatment:  Tolerated treatment well. more focus manual work since patient is doing most work at gym for strengthening independently. · Compliance with Program/Exercises: attends gym doing most machines. Pt does not complete exercise offered at PT even though instructed.    · Recommendations/Intent for next treatment session: DC at this time  Total Treatment Duration:       Barbra Justice PT

## 2017-06-12 ENCOUNTER — ANESTHESIA EVENT (OUTPATIENT)
Dept: SURGERY | Age: 52
End: 2017-06-12
Payer: COMMERCIAL

## 2017-06-13 ENCOUNTER — HOSPITAL ENCOUNTER (OUTPATIENT)
Age: 52
Setting detail: OUTPATIENT SURGERY
Discharge: HOME | End: 2017-06-13
Attending: ORTHOPAEDIC SURGERY | Admitting: ORTHOPAEDIC SURGERY
Payer: COMMERCIAL

## 2017-06-13 ENCOUNTER — ANESTHESIA (OUTPATIENT)
Dept: SURGERY | Age: 52
End: 2017-06-13
Payer: COMMERCIAL

## 2017-06-13 VITALS
OXYGEN SATURATION: 98 % | DIASTOLIC BLOOD PRESSURE: 79 MMHG | TEMPERATURE: 97.4 F | SYSTOLIC BLOOD PRESSURE: 129 MMHG | WEIGHT: 216.38 LBS | HEART RATE: 67 BPM | BODY MASS INDEX: 33.89 KG/M2 | RESPIRATION RATE: 16 BRPM

## 2017-06-13 PROCEDURE — 77030006868 HC BLD SHV AUG STRY -B: Performed by: ORTHOPAEDIC SURGERY

## 2017-06-13 PROCEDURE — 77030013367: Performed by: ORTHOPAEDIC SURGERY

## 2017-06-13 PROCEDURE — 76010010054 HC POST OP PAIN BLOCK: Performed by: ORTHOPAEDIC SURGERY

## 2017-06-13 PROCEDURE — 77030036560 HC SHLDR ARM PAD ABDUCTN S2SG -B: Performed by: ORTHOPAEDIC SURGERY

## 2017-06-13 PROCEDURE — 74011250636 HC RX REV CODE- 250/636: Performed by: ANESTHESIOLOGY

## 2017-06-13 PROCEDURE — 77030035253 HC BIT DRL ICONIX DISP STRY -B: Performed by: ORTHOPAEDIC SURGERY

## 2017-06-13 PROCEDURE — 76210000020 HC REC RM PH II FIRST 0.5 HR: Performed by: ORTHOPAEDIC SURGERY

## 2017-06-13 PROCEDURE — 77030020275 HC MISC ORTHOPEDIC: Performed by: ORTHOPAEDIC SURGERY

## 2017-06-13 PROCEDURE — 74011250636 HC RX REV CODE- 250/636

## 2017-06-13 PROCEDURE — 77030003602 HC NDL NRV BLK BBMI -B: Performed by: ANESTHESIOLOGY

## 2017-06-13 PROCEDURE — 74011250636 HC RX REV CODE- 250/636: Performed by: ORTHOPAEDIC SURGERY

## 2017-06-13 PROCEDURE — 77030033005 HC TBNG ARTHSC PMP STRY -B: Performed by: ORTHOPAEDIC SURGERY

## 2017-06-13 PROCEDURE — 76210000063 HC OR PH I REC FIRST 0.5 HR: Performed by: ORTHOPAEDIC SURGERY

## 2017-06-13 PROCEDURE — 76010000162 HC OR TIME 1.5 TO 2 HR INTENSV-TIER 1: Performed by: ORTHOPAEDIC SURGERY

## 2017-06-13 PROCEDURE — 77030032490 HC SLV COMPR SCD KNE COVD -B: Performed by: ORTHOPAEDIC SURGERY

## 2017-06-13 PROCEDURE — 74011000250 HC RX REV CODE- 250

## 2017-06-13 PROCEDURE — 77030003666 HC NDL SPINAL BD -A: Performed by: ORTHOPAEDIC SURGERY

## 2017-06-13 PROCEDURE — 77030027384 HC PRB ARTHSCP SERFAS STRY -C: Performed by: ORTHOPAEDIC SURGERY

## 2017-06-13 PROCEDURE — 77030006891 HC BLD SHV RESECT STRY -B: Performed by: ORTHOPAEDIC SURGERY

## 2017-06-13 PROCEDURE — 77030018836 HC SOL IRR NACL ICUM -A: Performed by: ORTHOPAEDIC SURGERY

## 2017-06-13 PROCEDURE — 77030020143 HC AIRWY LARYN INTUB CGAS -A: Performed by: ANESTHESIOLOGY

## 2017-06-13 PROCEDURE — C1713 ANCHOR/SCREW BN/BN,TIS/BN: HCPCS | Performed by: ORTHOPAEDIC SURGERY

## 2017-06-13 PROCEDURE — 77030019940 HC BLNKT HYPOTHRM STRY -B: Performed by: ANESTHESIOLOGY

## 2017-06-13 PROCEDURE — 76942 ECHO GUIDE FOR BIOPSY: CPT | Performed by: ORTHOPAEDIC SURGERY

## 2017-06-13 PROCEDURE — 76060000034 HC ANESTHESIA 1.5 TO 2 HR: Performed by: ORTHOPAEDIC SURGERY

## 2017-06-13 PROCEDURE — 77030033073 HC TBNG ARTHSC PMP OUTFLO STRY -B: Performed by: ORTHOPAEDIC SURGERY

## 2017-06-13 PROCEDURE — 77030035258: Performed by: ORTHOPAEDIC SURGERY

## 2017-06-13 DEVICE — 2.3MM ICONIX 2 ANCHOR W/INTELLIBRAID TECHNOLOGY 2 STRANDS #2 FORCE FIBER
Type: IMPLANTABLE DEVICE | Site: SHOULDER | Status: FUNCTIONAL
Brand: ICONIX

## 2017-06-13 RX ORDER — SODIUM CHLORIDE 0.9 % (FLUSH) 0.9 %
5-10 SYRINGE (ML) INJECTION EVERY 8 HOURS
Status: DISCONTINUED | OUTPATIENT
Start: 2017-06-13 | End: 2017-06-13 | Stop reason: HOSPADM

## 2017-06-13 RX ORDER — SODIUM CHLORIDE 0.9 % (FLUSH) 0.9 %
5-10 SYRINGE (ML) INJECTION AS NEEDED
Status: DISCONTINUED | OUTPATIENT
Start: 2017-06-13 | End: 2017-06-13 | Stop reason: HOSPADM

## 2017-06-13 RX ORDER — SODIUM CHLORIDE, SODIUM LACTATE, POTASSIUM CHLORIDE, CALCIUM CHLORIDE 600; 310; 30; 20 MG/100ML; MG/100ML; MG/100ML; MG/100ML
125 INJECTION, SOLUTION INTRAVENOUS CONTINUOUS
Status: DISCONTINUED | OUTPATIENT
Start: 2017-06-13 | End: 2017-06-13 | Stop reason: HOSPADM

## 2017-06-13 RX ORDER — FENTANYL CITRATE 50 UG/ML
100 INJECTION, SOLUTION INTRAMUSCULAR; INTRAVENOUS ONCE
Status: COMPLETED | OUTPATIENT
Start: 2017-06-13 | End: 2017-06-13

## 2017-06-13 RX ORDER — EPINEPHRINE 1 MG/ML
INJECTION, SOLUTION, CONCENTRATE INTRAVENOUS AS NEEDED
Status: DISCONTINUED | OUTPATIENT
Start: 2017-06-13 | End: 2017-06-13 | Stop reason: HOSPADM

## 2017-06-13 RX ORDER — ONDANSETRON 2 MG/ML
INJECTION INTRAMUSCULAR; INTRAVENOUS AS NEEDED
Status: DISCONTINUED | OUTPATIENT
Start: 2017-06-13 | End: 2017-06-13 | Stop reason: HOSPADM

## 2017-06-13 RX ORDER — SODIUM CHLORIDE, SODIUM LACTATE, POTASSIUM CHLORIDE, CALCIUM CHLORIDE 600; 310; 30; 20 MG/100ML; MG/100ML; MG/100ML; MG/100ML
125 INJECTION, SOLUTION INTRAVENOUS CONTINUOUS
Status: CANCELLED | OUTPATIENT
Start: 2017-06-13

## 2017-06-13 RX ORDER — NALOXONE HYDROCHLORIDE 0.4 MG/ML
0.1 INJECTION, SOLUTION INTRAMUSCULAR; INTRAVENOUS; SUBCUTANEOUS AS NEEDED
Status: CANCELLED | OUTPATIENT
Start: 2017-06-13

## 2017-06-13 RX ORDER — MIDAZOLAM HYDROCHLORIDE 1 MG/ML
2 INJECTION, SOLUTION INTRAMUSCULAR; INTRAVENOUS ONCE
Status: COMPLETED | OUTPATIENT
Start: 2017-06-13 | End: 2017-06-13

## 2017-06-13 RX ORDER — SODIUM CHLORIDE 0.9 % (FLUSH) 0.9 %
5-10 SYRINGE (ML) INJECTION AS NEEDED
Status: CANCELLED | OUTPATIENT
Start: 2017-06-13

## 2017-06-13 RX ORDER — HYDROMORPHONE HYDROCHLORIDE 2 MG/ML
0.5 INJECTION, SOLUTION INTRAMUSCULAR; INTRAVENOUS; SUBCUTANEOUS
Status: CANCELLED | OUTPATIENT
Start: 2017-06-13

## 2017-06-13 RX ORDER — PROPOFOL 10 MG/ML
INJECTION, EMULSION INTRAVENOUS AS NEEDED
Status: DISCONTINUED | OUTPATIENT
Start: 2017-06-13 | End: 2017-06-13 | Stop reason: HOSPADM

## 2017-06-13 RX ORDER — DEXAMETHASONE SODIUM PHOSPHATE 4 MG/ML
INJECTION, SOLUTION INTRA-ARTICULAR; INTRALESIONAL; INTRAMUSCULAR; INTRAVENOUS; SOFT TISSUE AS NEEDED
Status: DISCONTINUED | OUTPATIENT
Start: 2017-06-13 | End: 2017-06-13 | Stop reason: HOSPADM

## 2017-06-13 RX ORDER — CEFAZOLIN SODIUM IN 0.9 % NACL 2 G/50 ML
2 INTRAVENOUS SOLUTION, PIGGYBACK (ML) INTRAVENOUS ONCE
Status: COMPLETED | OUTPATIENT
Start: 2017-06-13 | End: 2017-06-13

## 2017-06-13 RX ORDER — OXYCODONE HYDROCHLORIDE 5 MG/1
10 TABLET ORAL
Status: CANCELLED | OUTPATIENT
Start: 2017-06-13

## 2017-06-13 RX ORDER — BUPIVACAINE HYDROCHLORIDE AND EPINEPHRINE 5; 5 MG/ML; UG/ML
INJECTION, SOLUTION EPIDURAL; INTRACAUDAL; PERINEURAL AS NEEDED
Status: DISCONTINUED | OUTPATIENT
Start: 2017-06-13 | End: 2017-06-13 | Stop reason: HOSPADM

## 2017-06-13 RX ORDER — KETOROLAC TROMETHAMINE 30 MG/ML
30 INJECTION, SOLUTION INTRAMUSCULAR; INTRAVENOUS AS NEEDED
Status: CANCELLED | OUTPATIENT
Start: 2017-06-13 | End: 2017-06-13

## 2017-06-13 RX ORDER — LIDOCAINE HYDROCHLORIDE 10 MG/ML
0.1 INJECTION INFILTRATION; PERINEURAL AS NEEDED
Status: DISCONTINUED | OUTPATIENT
Start: 2017-06-13 | End: 2017-06-13 | Stop reason: HOSPADM

## 2017-06-13 RX ORDER — BUPIVACAINE HYDROCHLORIDE 2.5 MG/ML
INJECTION, SOLUTION EPIDURAL; INFILTRATION; INTRACAUDAL AS NEEDED
Status: DISCONTINUED | OUTPATIENT
Start: 2017-06-13 | End: 2017-06-13 | Stop reason: HOSPADM

## 2017-06-13 RX ORDER — LIDOCAINE HYDROCHLORIDE 20 MG/ML
INJECTION, SOLUTION EPIDURAL; INFILTRATION; INTRACAUDAL; PERINEURAL AS NEEDED
Status: DISCONTINUED | OUTPATIENT
Start: 2017-06-13 | End: 2017-06-13 | Stop reason: HOSPADM

## 2017-06-13 RX ADMIN — DEXAMETHASONE SODIUM PHOSPHATE 4 MG: 4 INJECTION, SOLUTION INTRA-ARTICULAR; INTRALESIONAL; INTRAMUSCULAR; INTRAVENOUS; SOFT TISSUE at 12:49

## 2017-06-13 RX ADMIN — BUPIVACAINE HYDROCHLORIDE AND EPINEPHRINE 10 ML: 5; 5 INJECTION, SOLUTION EPIDURAL; INTRACAUDAL; PERINEURAL at 12:04

## 2017-06-13 RX ADMIN — LIDOCAINE HYDROCHLORIDE 100 MG: 20 INJECTION, SOLUTION EPIDURAL; INFILTRATION; INTRACAUDAL; PERINEURAL at 12:29

## 2017-06-13 RX ADMIN — MIDAZOLAM HYDROCHLORIDE 2 MG: 1 INJECTION, SOLUTION INTRAMUSCULAR; INTRAVENOUS at 11:59

## 2017-06-13 RX ADMIN — ONDANSETRON 4 MG: 2 INJECTION INTRAMUSCULAR; INTRAVENOUS at 12:49

## 2017-06-13 RX ADMIN — BUPIVACAINE HYDROCHLORIDE 10 ML: 2.5 INJECTION, SOLUTION EPIDURAL; INFILTRATION; INTRACAUDAL at 12:04

## 2017-06-13 RX ADMIN — SODIUM CHLORIDE, SODIUM LACTATE, POTASSIUM CHLORIDE, AND CALCIUM CHLORIDE 125 ML/HR: 600; 310; 30; 20 INJECTION, SOLUTION INTRAVENOUS at 10:50

## 2017-06-13 RX ADMIN — CEFAZOLIN 2 G: 1 INJECTION, POWDER, FOR SOLUTION INTRAMUSCULAR; INTRAVENOUS; PARENTERAL at 12:21

## 2017-06-13 RX ADMIN — PROPOFOL 200 MG: 10 INJECTION, EMULSION INTRAVENOUS at 12:29

## 2017-06-13 RX ADMIN — FENTANYL CITRATE 100 MCG: 50 INJECTION, SOLUTION INTRAMUSCULAR; INTRAVENOUS at 11:59

## 2017-06-13 NOTE — ANESTHESIA PREPROCEDURE EVALUATION
Anesthetic History               Review of Systems / Medical History  Patient summary reviewed, nursing notes reviewed and pertinent labs reviewed    Pulmonary                   Neuro/Psych              Cardiovascular                  Exercise tolerance: >4 METS     GI/Hepatic/Renal         Renal disease: stones       Endo/Other        Obesity and arthritis     Other Findings   Comments: MRSA infection 10 yrs ago         Physical Exam    Airway  Mallampati: I  TM Distance: > 6 cm  Neck ROM: normal range of motion   Mouth opening: Normal     Cardiovascular  Regular rate and rhythm,  S1 and S2 normal,  no murmur, click, rub, or gallop             Dental  No notable dental hx       Pulmonary  Breath sounds clear to auscultation               Abdominal  GI exam deferred       Other Findings            Anesthetic Plan    ASA: 1  Anesthesia type: general      Post-op pain plan if not by surgeon: peripheral nerve block single      Anesthetic plan and risks discussed with: Patient and Spouse

## 2017-06-13 NOTE — OP NOTES
PATIENT:    Clint Powell      DATE OF SURGERY:  6/13/2017      PREOPERATIVE  DIAGNOSIS:  Rupture of supraspinatus tendon, right, subsequent encounter [S41.277D]  Rotator cuff impingement syndrome, right [M75.41]  Arthritis of shoulder region, right [M19.011]      POSTOPERATIVE DIAGNOSIS:   Rotator cuff impingement/  Arthritis of shoulder region. RIGHT SHOULDER      PROCEDURE:   Procedure(s):  RIGHT SHOULDER ARTHROSCOPY SUBACROMIAL DECOMPRESSION ROTATOR CUFF REPAIR/ DISTAL CLAVICLE RESECTION      PROCEDURE IN DETAIL:   The patient's right   shoulder  was prepped and draped in a sterile fashion. The arthroscope was inserted through a posterior portal  and the interior of the joint was examined. The labrum was intact. The biceps tendon was normal as well. The articular surfaces were normal. There was a tear of the rotator cuff which was full thickness. It involved the supraspinatus. The scope was then removed from the shoulder joint and then placed into the subacromial space. The bursal tissue was removed and visualization was obtained. The anterior acromion was prominent. A subacromial decompression was performed through the posterior portal using a cutting block technique. This provided a good decompression of the subacromial space. The rotator cuff was examined and a full thickness tear was seen. It was repaired next. The greater tuberosity was prepared with the arthroscopic brody. The edges of the tendon were freshened as well. It was repaired using two Iconix  anchors. The cuff was repaired back down to the tuberosity. four Darius sutures were used. The cuff tear measured 2.5 cms in length. The shoulder had good range of motion without undue tension on the repair. The distal clavicle was very degenerative. A distal clavicle resection was done through the anterior portal. The arthroscopic brody was used to remove approximately 1 centimeter of the bone.    The stab wounds were then closed with simple nylon sutures. A sterile dressing was applied. A sling with abduction pillow  was placed as well. The patient was then taken to the recovery room in satisfactory condition.           Wendy Gates M.D.

## 2017-06-13 NOTE — DISCHARGE INSTRUCTIONS
ACTIVITY  · As tolerated and as directed by your doctor. · Bathe or shower as directed by your doctor. DIET  · Clear liquids until no nausea or vomiting; then light diet for the first day. · Advance to regular diet on second day, unless your doctor orders otherwise. · If nausea and vomiting continues, call your doctor. PAIN  · Take pain medication as directed by your doctor. · Call your doctor if pain is NOT relieved by medication. · DO NOT take aspirin of blood thinners unless directed by your doctor. DRESSING CARE       CALL YOUR DOCTOR IF   · Excessive bleeding that does not stop after holding pressure over the area  · Temperature of 101 degrees F or above  · Excessive redness, swelling or bruising, and/ or green or yellow, smelly discharge from incision    AFTER ANESTHESIA   · For the first 24 hours: DO NOT Drive, Drink alcoholic beverages, or Make important decisions. · Be aware of dizziness following anesthesia and while taking pain medication. APPOINTMENT DATE/ TIME    June 20 @  1110am    @ [facility]  office    Vance Saucedo DOCTOR'S PHONE NUMBER #[phone]      DISCHARGE SUMMARY from Nurse    PATIENT INSTRUCTIONS:    After general anesthesia or intravenous sedation, for 24 hours or while taking prescription Narcotics:  · Limit your activities  · Do not drive and operate hazardous machinery  · Do not make important personal or business decisions  · Do  not drink alcoholic beverages  · If you have not urinated within 8 hours after discharge, please contact your surgeon on call. *  Please give a list of your current medications to your Primary Care Provider. *  Please update this list whenever your medications are discontinued, doses are      changed, or new medications (including over-the-counter products) are added. *  Please carry medication information at all times in case of emergency situations.       These are general instructions for a healthy lifestyle:    No smoking/ No tobacco products/ Avoid exposure to second hand smoke    Surgeon General's Warning:  Quitting smoking now greatly reduces serious risk to your health. Obesity, smoking, and sedentary lifestyle greatly increases your risk for illness    A healthy diet, regular physical exercise & weight monitoring are important for maintaining a healthy lifestyle    You may be retaining fluid if you have a history of heart failure or if you experience any of the following symptoms:  Weight gain of 3 pounds or more overnight or 5 pounds in a week, increased swelling in our hands or feet or shortness of breath while lying flat in bed. Please call your doctor as soon as you notice any of these symptoms; do not wait until your next office visit. Recognize signs and symptoms of STROKE:    F-face looks uneven    A-arms unable to move or move unevenly    S-speech slurred or non-existent    T-time-call 911 as soon as signs and symptoms begin-DO NOT go       Back to bed or wait to see if you get better-TIME IS BRAIN. Post-Operative Instructions   For  [facility]  Shoulder Rotator Cuff Repair   Phone:  [phone]    1. Apply an  Ice pack to the shoulder. 2. You may shower in three days  after the arthroscopy. Keep dressings in place for the first three days and do not get them wet. After three days, you may remove the dressings and leave the sutures in place. Cover the sutures with a waterproof bandaid. 3. Keep the sling on. You had a rotator cuff repair and it must be protected. Do not raise the arm at all. Be very careful with the arm. 5. Use the pain medication as instructed on the bottle. If you have pain medication from another physician do not use it with this medication. Also take ibuprofen with the pain medication that you receive here. If you cannot take ibuprofen then use acetaminophen. 6. You may have some side effects from your pain medication.   If you have nausea, try taking your medication with food. For itching, you may take over the counter Benadryl. 7. If you have a problem, please call [address] [place] [address] at [phone], P.A.

## 2017-06-13 NOTE — BRIEF OP NOTE
BRIEF OPERATIVE NOTE    Date of Procedure: 6/13/2017   Preoperative Diagnosis: Rupture of supraspinatus tendon, right, subsequent encounter [S43.421D]  Rotator cuff impingement syndrome, right [M75.41]  Arthritis of shoulder region, right [M19.011]  Postoperative Diagnosis: Rotator cuff impingement/  Arthritis of shoulder region. RIGHT SHOULDER    Procedure(s):  RIGHT SHOULDER ARTHROSCOPY SUBACROMIAL DECOMPRESSION ROTATOR CUFF REPAIR/ DISTAL CLAVICLE RESECTION  Surgeon(s) and Role:     * Sara Bowling III, MD - Primary         Assistant Staff:       Surgical Staff:  Circ-1: Margarita Dyson RN  Scrub Tech-1: Shalini Ramirez  Event Time In   Incision Start 1254   Incision Close      Anesthesia: General   Estimated Blood Loss:   Specimens: * No specimens in log *   Findings:    Complications:   Implants:   Implant Name Type Inv.  Item Serial No.  Lot No. LRB No. Used Action   ANCHOR SUT 2.3MM Kermit Sierra - [id number]   ANCHOR SUT 2.3MM Kermit Ortiz Annas ENDOSCOPY [id number] Right 2 Implanted

## 2017-06-13 NOTE — H&P
Outpatient Surgery History and Physical:  Asif Brown was seen and examined. CHIEF COMPLAINT:   Right shoulder. PE:     Visit Vitals    /89 (BP 1 Location: Left arm, BP Patient Position: Supine)    Pulse 78    Temp 98.3 °F (36.8 °C)    Resp 18    Wt 98.1 kg (216 lb 6 oz)    SpO2 98%    BMI 33.89 kg/m2       Heart:   Regular rhythm      Lungs:  Are clear      Past Medical History:    Patient Active Problem List    Diagnosis    S/P total hip arthroplasty    Arthritis of left hip    Acute pain of right shoulder    Hip pain       Surgical History:   Past Surgical History:   Procedure Laterality Date    HX ORTHOPAEDIC Left 01/2017    left hip replacement    HX OTHER SURGICAL      posterior rt leg spider bite excision per pt.  HX ROTATOR CUFF REPAIR Left 2010       Social History: Patient  reports that he quit smoking about 16 years ago. He has a 0.50 pack-year smoking history. He has never used smokeless tobacco. He reports that he drinks alcohol. He reports that he does not use illicit drugs. Family History:   Family History   Problem Relation Age of Onset    Hypertension Mother     Heart Disease Father        Allergies: Reviewed per EMR  No Known Allergies    Medications:    No current facility-administered medications on file prior to encounter. Current Outpatient Prescriptions on File Prior to Encounter   Medication Sig    diphenhydrAMINE (BENADRYL) 25 mg tablet Take 75 mg by mouth nightly. Indications: sleep       The surgery is planned for the right shoulder. The patient is here today for outpatient surgery. I have examined the patient, no changes are noted in the patient's medical status. Necessity for the procedure/care is still present and the history and physical above is current. See the office notes for the full long term history of the problem. Please see the recent office notes for the musculoskeletal examination.     Signed By: Caitlin Steel MD June 13, 2017 12:06 PM

## 2017-06-13 NOTE — ANESTHESIA PROCEDURE NOTES
Peripheral Block    Start time: 6/13/2017 12:00 PM  End time: 6/13/2017 12:04 PM  Performed by: Sil Berg by: Alma Knutson       Pre-procedure: Indications: at surgeon's request and post-op pain management    Preanesthetic Checklist: patient identified, risks and benefits discussed, site marked, timeout performed, anesthesia consent given and patient being monitored    Timeout Time: 11:59          Block Type:   Block Type:   Interscalene  Laterality:  Right  Monitoring:  Standard ASA monitoring, responsive to questions, oxygen, continuous pulse ox, frequent vital sign checks and heart rate  Injection Technique:  Single shot  Procedures: ultrasound guided and nerve stimulator    Patient Position: supine  Prep: chlorhexidine    Location:  Interscalene  Needle Type:  Stimuplex  Needle Gauge:  22 G  Needle Localization:  Nerve stimulator and ultrasound guidance  Motor Response: minimal motor response >0.4 mA    Medication Injected:  0.375%  bupivacaine  Adds:  Epi 1:400K  Volume (mL):  20    Assessment:  Number of attempts:  1  Injection Assessment:  Incremental injection every 5 mL, negative aspiration for CSF, ultrasound image on chart, local visualized surrounding nerve on ultrasound, negative aspiration for blood, no intravascular symptoms and no paresthesia  Patient tolerance:  Patient tolerated the procedure well with no immediate complications

## 2017-06-13 NOTE — IP AVS SNAPSHOT
[address] 
[phone] Patient: Kolby Sanford MRN: [id number] :1965 You are allergic to the following No active allergies Recent Documentation Weight BMI Smoking Status 98.1 kg 33.89 kg/m2 Former Smoker Emergency Contacts Name Discharge Info Relation Home Work Mobile Jammie Tello DISCHARGE CAREGIVER [3] Life Partner [7] [phone] About your hospitalization You were admitted on:  2017 You last received care in the:  [facility] OP PACU You were discharged on:  2017 Unit phone number:  [phone] Why you were hospitalized Your primary diagnosis was:  Not on File Providers Seen During Your Hospitalizations Provider Role Specialty Primary office phone Nena Church MD Attending Provider Orthopedic Surgery [phone] Your Primary Care Physician (PCP) Primary Care Physician Office Phone Office Fax Fransiscosylvia Bergok [phone] Follow-up Information Follow up With Details Comments Contact Info Steve Obando MD   [address] [place] [id number] 
[phone] Current Discharge Medication List  
  
CONTINUE these medications which have NOT CHANGED Dose & Instructions Dispensing Information Comments Morning Noon Evening Bedtime diphenhydrAMINE 25 mg tablet Commonly known as:  BENADRYL Your last dose was: Your next dose is:    
   
   
 Dose:  75 mg Take 75 mg by mouth nightly. Indications: sleep Refills:  0 Discharge Instructions ACTIVITY · As tolerated and as directed by your doctor. · Bathe or shower as directed by your doctor. DIET · Clear liquids until no nausea or vomiting; then light diet for the first day. 
· Advance to regular diet on second day, unless your doctor orders otherwise. · If nausea and vomiting continues, call your doctor. PAIN 
· Take pain medication as directed by your doctor. · Call your doctor if pain is NOT relieved by medication. · DO NOT take aspirin of blood thinners unless directed by your doctor. DRESSING CARE  
 
 
CALL YOUR DOCTOR IF  
· Excessive bleeding that does not stop after holding pressure over the area · Temperature of 101 degrees F or above · Excessive redness, swelling or bruising, and/ or green or yellow, smelly discharge from incision AFTER ANESTHESIA · For the first 24 hours: DO NOT Drive, Drink alcoholic beverages, or Make important decisions. · Be aware of dizziness following anesthesia and while taking pain medication. APPOINTMENT DATE/ TIME    June 20 @  1110am    @ [facility] [address] office YOUR DOCTOR'S PHONE NUMBER #[phone] DISCHARGE SUMMARY from Nurse PATIENT INSTRUCTIONS: 
 
After general anesthesia or intravenous sedation, for 24 hours or while taking prescription Narcotics: · Limit your activities · Do not drive and operate hazardous machinery · Do not make important personal or business decisions · Do  not drink alcoholic beverages · If you have not urinated within 8 hours after discharge, please contact your surgeon on call. *  Please give a list of your current medications to your Primary Care Provider. *  Please update this list whenever your medications are discontinued, doses are 
    changed, or new medications (including over-the-counter products) are added. *  Please carry medication information at all times in case of emergency situations. These are general instructions for a healthy lifestyle: No smoking/ No tobacco products/ Avoid exposure to second hand smoke Surgeon General's Warning:  Quitting smoking now greatly reduces serious risk to your health. Obesity, smoking, and sedentary lifestyle greatly increases your risk for illness A healthy diet, regular physical exercise & weight monitoring are important for maintaining a healthy lifestyle You may be retaining fluid if you have a history of heart failure or if you experience any of the following symptoms:  Weight gain of 3 pounds or more overnight or 5 pounds in a week, increased swelling in our hands or feet or shortness of breath while lying flat in bed. Please call your doctor as soon as you notice any of these symptoms; do not wait until your next office visit. Recognize signs and symptoms of STROKE: 
 
F-face looks uneven A-arms unable to move or move unevenly S-speech slurred or non-existent T-time-call 911 as soon as signs and symptoms begin-DO NOT go Back to bed or wait to see if you get better-TIME IS BRAIN. Post-Operative Instructions For  Sourav Hernandez Shoulder Rotator Cuff Repair Phone:  [phone] 1. Apply an  Ice pack to the shoulder. 2. You may shower in three days  after the arthroscopy. Keep dressings in place for the first three days and do not get them wet. After three days, you may remove the dressings and leave the sutures in place. Cover the sutures with a waterproof bandaid. 3. Keep the sling on. You had a rotator cuff repair and it must be protected. Do not raise the arm at all. Be very careful with the arm. 5. Use the pain medication as instructed on the bottle. If you have pain medication from another physician do not use it with this medication. Also take ibuprofen with the pain medication that you receive here. If you cannot take ibuprofen then use acetaminophen. 6. You may have some side effects from your pain medication. If you have nausea, try taking your medication with food. For itching, you may take over the counter Benadryl. 7. If you have a problem, please call [address] [place] [address] at [phone] Bibimie Levers III [employer] Discharge Orders None Introducing [facility]! Dear Rekha Hernandez: Thank you for requesting a [employer] account. Our records indicate that you already have an active [employer] account. You can access your account anytime at [email or web address] Did you know that you can access your hospital and ER discharge instructions at any time in [employer]? You can also review all of your test results from your hospital stay or ER visit. Additional Information If you have questions, please visit the Frequently Asked Questions section of the [employer] website at [email or web address] Remember, [employer] is NOT to be used for urgent needs. For medical emergencies, dial 911. Now available from your iPhone and Android! General Information Please provide this summary of care documentation to your next provider. Patient Signature:  ____________________________________________________________ Date:  ____________________________________________________________  
  
Tarik Breath Provider Signature:  ____________________________________________________________ Date:  ____________________________________________________________

## 2017-06-13 NOTE — ANESTHESIA POSTPROCEDURE EVALUATION
Post-Anesthesia Evaluation and Assessment    Patient: Raymond Coello MRN: [id number]  SSN: [id number]    YOB: 1965  Age: 50 y.o. Sex: male       Cardiovascular Function/Vital Signs  Visit Vitals    /79    Pulse 67    Temp 36.3 °C (97.4 °F)    Resp 16    Wt 98.1 kg (216 lb 6 oz)    SpO2 98%    BMI 33.89 kg/m2       Patient is status post general anesthesia for Procedure(s):  RIGHT SHOULDER ARTHROSCOPY SUBACROMIAL DECOMPRESSION ROTATOR CUFF REPAIR/ DISTAL CLAVICLE RESECTION. Nausea/Vomiting: None    Postoperative hydration reviewed and adequate. Pain:  Pain Scale 1: Numeric (0 - 10) (06/13/17 1412)  Pain Intensity 1: 0 (06/13/17 1412)   Managed    Neurological Status:   Neuro (WDL): Within Defined Limits (06/13/17 1412)   At baseline    Mental Status and Level of Consciousness: Arousable    Pulmonary Status:   O2 Device: Room air (06/13/17 1439)   Adequate oxygenation and airway patent    Complications related to anesthesia: None    Post-anesthesia assessment completed.  No concerns    Signed By: Beny Dawn MD     June 13, 2017

## 2017-07-05 ENCOUNTER — HOSPITAL ENCOUNTER (OUTPATIENT)
Dept: PHYSICAL THERAPY | Age: 52
Discharge: HOME | End: 2017-07-05
Payer: COMMERCIAL

## 2017-07-05 PROCEDURE — 97161 PT EVAL LOW COMPLEX 20 MIN: CPT

## 2017-07-05 NOTE — PROGRESS NOTES
Mine Moran  : 1965 [facility] [place] [id number] [facility], [address] [place] [address],[address], [facility].  [phone]   [phone]          OUTPATIENT PHYSICAL THERAPY:Initial Assessment 2017    ICD-10: Treatment Diagnosis:   · Pain in right shoulder (M25.511)  · Stiffness of right shoulder, not elsewhere classified (M25.611)      Precautions/Allergies:   Review of patient's allergies indicates no known allergies. Fall Risk Score: 1 (? 5 = High Risk)  MD Orders: 3 weeks post-op so PROM only 1x/week for 3 weeks, the increase to 2x/week AROM MEDICAL/REFERRING DIAGNOSIS:  Primary osteoarthritis, right shoulder [M19.011]  Sprain of right rotator cuff capsule, subsequent encounter [S43.421D]  Impingement syndrome of right shoulder [M75.41]   DATE OF ONSET: 17  REFERRING PHYSICIAN: Meseret Champagne MD  RETURN PHYSICIAN APPOINTMENT: 2 weeks     INITIAL ASSESSMENT:  Mr. Bingham Adjutant presents following full thickness tear and repair of supraspinatus R shoulder. He is restricted currently by the protocol. He will benefit from PT services to assist with regaining ROM and strength to resume current high level activity at gym and work. Pt has not been wearing sling and has been using arm to perform work around the house during normal ADLs as well as heavier physical labor like putting up a deck. PROBLEM LIST (Impacting functional limitations):  1. Decreased Strength  2. Decreased ADL/Functional Activities  3. Increased Pain  4. Decreased Activity Tolerance  5. Decreased Flexibility/Joint Mobility  6. Decreased Knowledge of Precautions  7. Decreased [place] with Home Exercise Program INTERVENTIONS PLANNED:  1. Cold  2. Cryotherapy  3. Electrical Stimulation  4. Heat  5. Home Exercise Program (HEP)  6. Manual Therapy  7. Range of Motion (ROM)  8. Therapeutic Activites  9. Therapeutic Exercise/Strengthening   TREATMENT PLAN:  Effective Dates: 17 TO 17. Frequency/Duration: 1-2 times a week for 8 weeks  GOALS: (Goals have been discussed and agreed upon with patient.)  Short-Term Functional Goals: Time Frame: 3 weeks  1. Pt will be I with phase appropriate HEP to assist with ROM R shoulder. 2. Pt will achieve R passive shoulder flexion to 160 degrees or greater to assist with overhead reaching. Discharge Goals: Time Frame: 8 weeks  1. Pt will improve R shoulder active external rotation to reach C7 or further down to assist with ADLs. 2. Pt will improve mid range strength R shoulder all planes to 5/5 to improve strength for work activities. 3. Pt will report pain no greater than 2/10 R shoulder on occasion. Rehabilitation Potential For Stated Goals: Good  Regarding Eva Hawkins therapy, I certify that the treatment plan above will be carried out by a therapist or under their direction. Thank you for this referral,  Bonnie Pickett PT     Referring Physician Signature: Víctor Polanco MD              Date                    The information in this section was collected on 75-17 (except where otherwise noted). HISTORY:   History of Present Injury/Illness (Reason for Referral):  Rotator cuff repair of full thickness tear 2.5cm R shoulder with subacromial decompression 6-20-17   Past Medical History/Comorbidities:   Mr. Lydia Nicholson  has a past medical history of Arthritis; Biceps muscle tear; Chronic pain; Insomnia; MRSA (methicillin resistant Staphylococcus aureus) infection (2007); Personal history of kidney stones; and Right shoulder pain. He also has no past medical history of Adverse effect of anesthesia; Difficult intubation; Malignant hyperthermia due to anesthesia; Nausea & vomiting; or Pseudocholinesterase deficiency. Mr. Lydia Nicholson  has a past surgical history that includes other surgical; rotator cuff repair (Left, 2010); and orthopaedic (Left, 01/2017). Social History/Living Environment:     lives in private residence with wife.   Prior Level of Function/Work/Activity:  I all activities. High physical activites  Dominant Side:         RIGHT    Current Medications:       Current Outpatient Prescriptions:     diphenhydrAMINE (BENADRYL) 25 mg tablet, Take 75 mg by mouth nightly. Indications: sleep, Disp: , Rfl:    Date Last Reviewed:  7-5-17   Number of Personal Factors/Comorbidities that affect the Plan of Care: 0: LOW COMPLEXITY   EXAMINATION:   Observation/Orthostatic Postural Assessment:          Scope holes healing nicely. Some atrophy present through supraspinatus region R vs L  PROM:          Left shoulder:   Flexion 160  External rotation 80  IR 75       Right shoulder:  Flexion 100  External rotation 30  IR 30  Strength:          No specific testing at this time secondary to restrictions in protocol         Body Structures Involved:  1. Bones  2. Joints  3. Muscles  4. Ligaments Body Functions Affected:  1. Sensory/Pain  2. Neuromusculoskeletal Activities and Participation Affected:  1. Learning and Applying Knowledge  2. Self Care  3. Domestic Life  4. Interpersonal Interactions and Relationships  5. Community, Social and [place]   Number of elements (examined above) that affect the Plan of Care: 3: MODERATE COMPLEXITY   CLINICAL PRESENTATION:   Presentation: Stable and uncomplicated: LOW COMPLEXITY   CLINICAL DECISION MAKING:   Outcome Measure: Tool Used: Disabilities of the Arm, Shoulder and Hand (DASH) Questionnaire - Quick Version  Score:  Initial: 24/55  Most Recent: X/55 (Date: -- )   Interpretation of Score: The DASH is designed to measure the activities of daily living in person's with upper extremity dysfunction or pain. Each section is scored on a 1-5 scale, 5 representing the greatest disability. The scores of each section are added together for a total score of 55.     Score 11 12-19 20-28 29-37 38-45 46-54 55   Modifier CH CI CJ CK CL CM CN     Medical Necessity:   · Patient is expected to demonstrate progress in strength and range of motion to increase independence with ADLs. .  Reason for Services/Other Comments:  · Patient continues to require skilled intervention due to rescent shoulder surgery. Use of outcome tool(s) and clinical judgement create a POC that gives a: Clear prediction of patient's progress: LOW COMPLEXITY            TREATMENT:   (In addition to Assessment/Re-Assessment sessions the following treatments were rendered)  Pre-treatment Symptoms/Complaints:  5/10 R shoulder aching mainly at rest and at night  Pain: Initial:   5/10 R shoulder Post Session:  5/10 r shoulder     Assessment only today, no treatment provided. PROM provided during evaluation. Treatment/Session Assessment:    · Response to Treatment:  Tolerated well. · Compliance with Program/Exercises: Will assess as treatment progresses. · Recommendations/Intent for next treatment session: \"Next visit will focus on advancements to more challenging activities\".   Total Treatment Duration:  PT Patient Time In/Time Out  Time In: 0815  Time Out: 0900    Nupur Bello, PT

## 2017-07-05 NOTE — PROGRESS NOTES
Ambulatory/Rehab Services H2 Model Falls Risk Assessment    Risk Factor Pts. ·   Confusion/Disorientation/Impulsivity  []    4 ·   Symptomatic Depression  []   2 ·   Altered Elimination  []   1 ·   Dizziness/Vertigo  []   1 ·   Gender (Male)  [x]   1 ·   Any administered antiepileptics (anticonvulsants):  []   2 ·   Any administered benzodiazepines:  []   1 ·   Visual Impairment (specify):  []   1 ·   Portable Oxygen Use  []   1 ·   Orthostatic ? BP  []   1 ·   History of Recent Falls (within 3 mos.)  []   5     Ability to Rise from Chair (choose one) Pts. ·   Ability to rise in a single movement  [x]   0 ·   Pushes up, successful in one attempt  []   1 ·   Multiple attempts, but successful  []   3 ·   Unable to rise without assistance  []   4   Total: (5 or greater = High Risk) 1     Falls Prevention Plan:   []                Physical Limitations to Exercise (specify):   []                Mobility Assistance Device (type):   []                Exercise/Equipment Adaptation (specify):    ©2010 [facility] Patent #[id number].  Federal Law prohibits the replication, distribution or use without written permission from [facility] [employer]

## 2017-07-11 ENCOUNTER — HOSPITAL ENCOUNTER (OUTPATIENT)
Dept: PHYSICAL THERAPY | Age: 52
Discharge: HOME | End: 2017-07-11
Payer: COMMERCIAL

## 2017-07-11 PROCEDURE — 97140 MANUAL THERAPY 1/> REGIONS: CPT

## 2017-07-11 NOTE — PROGRESS NOTES
Rosariopinocr Moses  : 1965 [facility] at [facility]  [address], [address] [address],[address], [address] [facility]  [phone]   [phone]          OUTPATIENT PHYSICAL THERAPY:Daily Note 2017    ICD-10: Treatment Diagnosis:   · Pain in right shoulder (M25.511)  · Stiffness of right shoulder, not elsewhere classified (M25.611)      Precautions/Allergies:   Review of patient's allergies indicates no known allergies. Fall Risk Score: 1 (? 5 = High Risk)  MD Orders: 3 weeks post-op so PROM only 1x/week for 3 weeks, the increase to 2x/week AROM MEDICAL/REFERRING DIAGNOSIS:  Primary osteoarthritis, right shoulder [M19.011]  Sprain of right rotator cuff capsule, subsequent encounter [S43.421D]  Impingement syndrome of right shoulder [M75.41]   DATE OF ONSET: 17  REFERRING PHYSICIAN: Mimi Champagne MD  RETURN PHYSICIAN APPOINTMENT: 2 weeks     INITIAL ASSESSMENT:  Mr. Kae Slade presents following full thickness tear and repair of supraspinatus R shoulder. He is restricted currently by the protocol. He will benefit from PT services to assist with regaining ROM and strength to resume current high level activity at gym and work. Pt has not been wearing sling and has been using arm to perform work around the house during normal ADLs as well as heavier physical labor like putting up a deck. PROBLEM LIST (Impacting functional limitations):  1. Decreased Strength  2. Decreased ADL/Functional Activities  3. Increased Pain  4. Decreased Activity Tolerance  5. Decreased Flexibility/Joint Mobility  6. Decreased Knowledge of Precautions  7. Decreased [place] with Home Exercise Program INTERVENTIONS PLANNED:  1. Cold  2. Cryotherapy  3. Electrical Stimulation  4. Heat  5. Home Exercise Program (HEP)  6. Manual Therapy  7. Range of Motion (ROM)  8. Therapeutic Activites  9. Therapeutic Exercise/Strengthening   TREATMENT PLAN:  Effective Dates: 17 TO 17.   Frequency/Duration: 1-2 times a week for 8 weeks  GOALS: (Goals have been discussed and agreed upon with patient.)  Short-Term Functional Goals: Time Frame: 3 weeks  1. Pt will be I with phase appropriate HEP to assist with ROM R shoulder. 2. Pt will achieve R passive shoulder flexion to 160 degrees or greater to assist with overhead reaching. Discharge Goals: Time Frame: 8 weeks  1. Pt will improve R shoulder active external rotation to reach C7 or further down to assist with ADLs. 2. Pt will improve mid range strength R shoulder all planes to 5/5 to improve strength for work activities. 3. Pt will report pain no greater than 2/10 R shoulder on occasion. Rehabilitation Potential For Stated Goals: Good  Regarding Lodema Lodi therapy, I certify that the treatment plan above will be carried out by a therapist or under their direction. Thank you for this referral,  Jenny Seals PT     Referring Physician Signature: Cheryl Hernandez MD              Date                    The information in this section was collected on 75-17 (except where otherwise noted). HISTORY:   History of Present Injury/Illness (Reason for Referral):  Rotator cuff repair of full thickness tear 2.5cm R shoulder with subacromial decompression 6-20-17   Past Medical History/Comorbidities:   Mr. Ludmila Orozco  has a past medical history of Arthritis; Biceps muscle tear; Chronic pain; Insomnia; MRSA (methicillin resistant Staphylococcus aureus) infection (2007); Personal history of kidney stones; and Right shoulder pain. He also has no past medical history of Adverse effect of anesthesia; Difficult intubation; Malignant hyperthermia due to anesthesia; Nausea & vomiting; or Pseudocholinesterase deficiency. Mr. Ludmila Orozco  has a past surgical history that includes other surgical; rotator cuff repair (Left, 2010); and orthopaedic (Left, 01/2017). Social History/Living Environment:     lives in private residence with wife.   Prior Level of Function/Work/Activity:  I all activities. High physical activites  Dominant Side:         RIGHT    Current Medications:       Current Outpatient Prescriptions:     diphenhydrAMINE (BENADRYL) 25 mg tablet, Take 75 mg by mouth nightly. Indications: sleep, Disp: , Rfl:    Date Last Reviewed:  7-5-17   Number of Personal Factors/Comorbidities that affect the Plan of Care: 0: LOW COMPLEXITY   EXAMINATION:   Observation/Orthostatic Postural Assessment:          Scope holes healing nicely. Some atrophy present through supraspinatus region R vs L  PROM:          Left shoulder:   Flexion 160  External rotation 80  IR 75       Right shoulder:  Flexion 100  External rotation 30  IR 30  Strength:          No specific testing at this time secondary to restrictions in protocol         Body Structures Involved:  1. Bones  2. Joints  3. Muscles  4. Ligaments Body Functions Affected:  1. Sensory/Pain  2. Neuromusculoskeletal Activities and Participation Affected:  1. Learning and Applying Knowledge  2. Self Care  3. Domestic Life  4. Interpersonal Interactions and Relationships  5. Community, Social and [place]   Number of elements (examined above) that affect the Plan of Care: 3: MODERATE COMPLEXITY   CLINICAL PRESENTATION:   Presentation: Stable and uncomplicated: LOW COMPLEXITY   CLINICAL DECISION MAKING:   Outcome Measure: Tool Used: Disabilities of the Arm, Shoulder and Hand (DASH) Questionnaire - Quick Version  Score:  Initial: 24/55  Most Recent: X/55 (Date: -- )   Interpretation of Score: The DASH is designed to measure the activities of daily living in person's with upper extremity dysfunction or pain. Each section is scored on a 1-5 scale, 5 representing the greatest disability. The scores of each section are added together for a total score of 55.     Score 11 12-19 20-28 29-37 38-45 46-54 55   Modifier CH CI CJ CK CL CM CN     Medical Necessity:   · Patient is expected to demonstrate progress in strength and range of motion to increase independence with ADLs. .  Reason for Services/Other Comments:  · Patient continues to require skilled intervention due to rescent shoulder surgery. Use of outcome tool(s) and clinical judgement create a POC that gives a: Clear prediction of patient's progress: LOW COMPLEXITY            TREATMENT:   (In addition to Assessment/Re-Assessment sessions the following treatments were rendered)  Pre-treatment Symptoms/Complaints:  5/10 R shoulder aching mainly at rest and at night  Pain: Initial:   5/10 R shoulder Post Session:  5/10 r shoulder     MANUAL THERAPY: (45 minutes): PROM was utilized and necessary because of the patient's restricted joint motion, painful spasm and restricted motion of soft tissue. performed PROM all planes R shoulder motion following physician instruction. Soft tissue through upper trap, lats. Scapular mobs. Treatment/Session Assessment:    · Response to Treatment:  Tolerated well. · Compliance with Program/Exercises: Will assess as treatment progresses. · Recommendations/Intent for next treatment session: \"Next visit will focus on advancements to more challenging activities\".   Total Treatment Duration:  PT Patient Time In/Time Out  Time In: 7275  Time Out: 622 [address],

## 2017-07-19 ENCOUNTER — HOSPITAL ENCOUNTER (OUTPATIENT)
Dept: PHYSICAL THERAPY | Age: 52
Discharge: HOME | End: 2017-07-19
Payer: COMMERCIAL

## 2017-07-19 PROCEDURE — 97140 MANUAL THERAPY 1/> REGIONS: CPT

## 2017-07-19 PROCEDURE — 97110 THERAPEUTIC EXERCISES: CPT

## 2017-07-19 NOTE — PROGRESS NOTES
Mony Horvath  : 1965 Therapy Center at [place] [address] [facility], [address] [place] [address],[address], [facility] [address]  [phone]   [phone]          OUTPATIENT PHYSICAL THERAPY:Daily Note 2017    ICD-10: Treatment Diagnosis:   · Pain in right shoulder (M25.511)  · Stiffness of right shoulder, not elsewhere classified (M25.611)      Precautions/Allergies:   Review of patient's allergies indicates no known allergies. Fall Risk Score: 1 (? 5 = High Risk)  MD Orders: 3 weeks post-op so PROM only 1x/week for 3 weeks, the increase to 2x/week AROM MEDICAL/REFERRING DIAGNOSIS:  Primary osteoarthritis, right shoulder [M19.011]  Sprain of right rotator cuff capsule, subsequent encounter [S43.421D]  Impingement syndrome of right shoulder [M75.41]   DATE OF ONSET: 17  REFERRING PHYSICIAN: Michael Champagne MD  RETURN PHYSICIAN APPOINTMENT: 2 weeks     INITIAL ASSESSMENT:  Mr. Augusta Lance presents following full thickness tear and repair of supraspinatus R shoulder. He is restricted currently by the protocol. He will benefit from PT services to assist with regaining ROM and strength to resume current high level activity at gym and work. Pt has not been wearing sling and has been using arm to perform work around the house during normal ADLs as well as heavier physical labor like putting up a deck. PROBLEM LIST (Impacting functional limitations):  1. Decreased Strength  2. Decreased ADL/Functional Activities  3. Increased Pain  4. Decreased Activity Tolerance  5. Decreased Flexibility/Joint Mobility  6. Decreased Knowledge of Precautions  7. Decreased [place] with Home Exercise Program INTERVENTIONS PLANNED:  1. Cold  2. Cryotherapy  3. Electrical Stimulation  4. Heat  5. Home Exercise Program (HEP)  6. Manual Therapy  7. Range of Motion (ROM)  8. Therapeutic Activites  9. Therapeutic Exercise/Strengthening   TREATMENT PLAN:  Effective Dates: 17 TO 17.   Frequency/Duration: 1-2 times a week for 8 weeks  GOALS: (Goals have been discussed and agreed upon with patient.)  Short-Term Functional Goals: Time Frame: 3 weeks  1. Pt will be I with phase appropriate HEP to assist with ROM R shoulder. 2. Pt will achieve R passive shoulder flexion to 160 degrees or greater to assist with overhead reaching. Discharge Goals: Time Frame: 8 weeks  1. Pt will improve R shoulder active external rotation to reach C7 or further down to assist with ADLs. 2. Pt will improve mid range strength R shoulder all planes to 5/5 to improve strength for work activities. 3. Pt will report pain no greater than 2/10 R shoulder on occasion. Rehabilitation Potential For Stated Goals: Good  Regarding Alba Schooling therapy, I certify that the treatment plan above will be carried out by a therapist or under their direction. Thank you for this referral,  Ilene Clark PT     Referring Physician Signature: Uday Maravilla MD              Date                    The information in this section was collected on 75-17 (except where otherwise noted). HISTORY:   History of Present Injury/Illness (Reason for Referral):  Rotator cuff repair of full thickness tear 2.5cm R shoulder with subacromial decompression 6-20-17   Past Medical History/Comorbidities:   Mr. Author Underwood  has a past medical history of Arthritis; Biceps muscle tear; Chronic pain; Insomnia; MRSA (methicillin resistant Staphylococcus aureus) infection (2007); Personal history of kidney stones; and Right shoulder pain. He also has no past medical history of Adverse effect of anesthesia; Difficult intubation; Malignant hyperthermia due to anesthesia; Nausea & vomiting; or Pseudocholinesterase deficiency. Mr. Author Underwood  has a past surgical history that includes other surgical; rotator cuff repair (Left, 2010); and orthopaedic (Left, 01/2017). Social History/Living Environment:     lives in private residence with wife.   Prior Level of Function/Work/Activity:  I all activities. High physical activites  Dominant Side:         RIGHT    Current Medications:       Current Outpatient Prescriptions:     diphenhydrAMINE (BENADRYL) 25 mg tablet, Take 75 mg by mouth nightly. Indications: sleep, Disp: , Rfl:    Date Last Reviewed:  7-5-17   Number of Personal Factors/Comorbidities that affect the Plan of Care: 0: LOW COMPLEXITY   EXAMINATION:   Observation/Orthostatic Postural Assessment:          Scope holes healing nicely. Some atrophy present through supraspinatus region R vs L  PROM:          Left shoulder:   Flexion 160  External rotation 80  IR 75       Right shoulder:  Flexion 100  External rotation 30  IR 30  Strength:          No specific testing at this time secondary to restrictions in protocol         Body Structures Involved:  1. Bones  2. Joints  3. Muscles  4. Ligaments Body Functions Affected:  1. Sensory/Pain  2. Neuromusculoskeletal Activities and Participation Affected:  1. Learning and Applying Knowledge  2. Self Care  3. Domestic Life  4. Interpersonal Interactions and Relationships  5. Community, Social and [place]   Number of elements (examined above) that affect the Plan of Care: 3: MODERATE COMPLEXITY   CLINICAL PRESENTATION:   Presentation: Stable and uncomplicated: LOW COMPLEXITY   CLINICAL DECISION MAKING:   Outcome Measure: Tool Used: Disabilities of the Arm, Shoulder and Hand (DASH) Questionnaire - Quick Version  Score:  Initial: 24/55  Most Recent: X/55 (Date: -- )   Interpretation of Score: The DASH is designed to measure the activities of daily living in person's with upper extremity dysfunction or pain. Each section is scored on a 1-5 scale, 5 representing the greatest disability. The scores of each section are added together for a total score of 55.     Score 11 12-19 20-28 29-37 38-45 46-54 55   Modifier CH CI CJ CK CL CM CN     Medical Necessity:   · Patient is expected to demonstrate progress in strength and range of motion to increase independence with ADLs. .  Reason for Services/Other Comments:  · Patient continues to require skilled intervention due to rescent shoulder surgery. Use of outcome tool(s) and clinical judgement create a POC that gives a: Clear prediction of patient's progress: LOW COMPLEXITY            TREATMENT:   (In addition to Assessment/Re-Assessment sessions the following treatments were rendered)  Pre-treatment Symptoms/Complaints:  4/10 R shoulder aching mainly at rest and at night  Pain: Initial:   5/10 R shoulder Post Session:  3/10 R shoulder     THERAPEUTIC EXERCISE: (20 minutes):  Exercises per grid below to improve mobility and strength. Required moderate verbal, manual and tactile cues to promote proper body alignment, promote proper body posture and promote proper body mechanics. Progressed resistance, range and repetitions as indicated. MANUAL THERAPY: (25 minutes): PROM was utilized and necessary because of the patient's restricted joint motion, painful spasm and restricted motion of soft tissue. performed PROM all planes R shoulder motion following physician instruction. Soft tissue through upper trap, lats. Scapular mobs. Date:  7-24-17 Date:   Date:     Activity/Exercise Parameters Parameters Parameters   pulleys x20     UBE L1      AROM All planes un supine     AAROM All planes in supine                            Treatment/Session Assessment:    · Response to Treatment:  Tolerated well. · Compliance with Program/Exercises: Will assess as treatment progresses. · Recommendations/Intent for next treatment session: \"Next visit will focus on advancements to more challenging activities\".   Total Treatment Duration:  PT Patient Time In/Time Out  Time In: 1130  Time Out: 1215    Delphine Ball PT

## 2017-07-25 ENCOUNTER — HOSPITAL ENCOUNTER (OUTPATIENT)
Dept: PHYSICAL THERAPY | Age: 52
Discharge: HOME | End: 2017-07-25
Payer: COMMERCIAL

## 2017-07-25 PROCEDURE — 97140 MANUAL THERAPY 1/> REGIONS: CPT

## 2017-07-25 PROCEDURE — 97110 THERAPEUTIC EXERCISES: CPT

## 2017-07-25 NOTE — PROGRESS NOTES
Rosemary Rockbridge  : 1965 Therapy Center at [facility]  [address], [address] [address],[address], [address] [facility]  [phone]   [phone]          OUTPATIENT PHYSICAL THERAPY:Daily Note 2017    ICD-10: Treatment Diagnosis:   · Pain in right shoulder (M25.511)  · Stiffness of right shoulder, not elsewhere classified (M25.611)      Precautions/Allergies:   Review of patient's allergies indicates no known allergies. Fall Risk Score: 1 (? 5 = High Risk)  MD Orders: 3 weeks post-op so PROM only 1x/week for 3 weeks, the increase to 2x/week AROM MEDICAL/REFERRING DIAGNOSIS:  Primary osteoarthritis, right shoulder [M19.011]  Sprain of right rotator cuff capsule, subsequent encounter [S43.421D]  Impingement syndrome of right shoulder [M75.41]   DATE OF ONSET: 17  REFERRING PHYSICIAN: Evelio Champagne MD  RETURN PHYSICIAN APPOINTMENT: 2 weeks     INITIAL ASSESSMENT:  Mr. Robbert Goodpasture presents following full thickness tear and repair of supraspinatus R shoulder. He is restricted currently by the protocol. He will benefit from PT services to assist with regaining ROM and strength to resume current high level activity at gym and work. Pt has not been wearing sling and has been using arm to perform work around the house during normal ADLs as well as heavier physical labor like putting up a deck. PROBLEM LIST (Impacting functional limitations):  1. Decreased Strength  2. Decreased ADL/Functional Activities  3. Increased Pain  4. Decreased Activity Tolerance  5. Decreased Flexibility/Joint Mobility  6. Decreased Knowledge of Precautions  7. Decreased [place] with Home Exercise Program INTERVENTIONS PLANNED:  1. Cold  2. Cryotherapy  3. Electrical Stimulation  4. Heat  5. Home Exercise Program (HEP)  6. Manual Therapy  7. Range of Motion (ROM)  8. Therapeutic Activites  9. Therapeutic Exercise/Strengthening   TREATMENT PLAN:  Effective Dates: 17 TO 17.   Frequency/Duration: 1-2 times a week for 8 weeks  GOALS: (Goals have been discussed and agreed upon with patient.)  Short-Term Functional Goals: Time Frame: 3 weeks  1. Pt will be I with phase appropriate HEP to assist with ROM R shoulder. 2. Pt will achieve R passive shoulder flexion to 160 degrees or greater to assist with overhead reaching. Discharge Goals: Time Frame: 8 weeks  1. Pt will improve R shoulder active external rotation to reach C7 or further down to assist with ADLs. 2. Pt will improve mid range strength R shoulder all planes to 5/5 to improve strength for work activities. 3. Pt will report pain no greater than 2/10 R shoulder on occasion. Rehabilitation Potential For Stated Goals: Good  Regarding Juanita Daniele therapy, I certify that the treatment plan above will be carried out by a therapist or under their direction. Thank you for this referral,  Emperatriz Douglas PT     Referring Physician Signature: Sue Corona MD              Date                    The information in this section was collected on 75-17 (except where otherwise noted). HISTORY:   History of Present Injury/Illness (Reason for Referral):  Rotator cuff repair of full thickness tear 2.5cm R shoulder with subacromial decompression 6-20-17   Past Medical History/Comorbidities:   Mr. David Cyr  has a past medical history of Arthritis; Biceps muscle tear; Chronic pain; Insomnia; MRSA (methicillin resistant Staphylococcus aureus) infection (2007); Personal history of kidney stones; and Right shoulder pain. He also has no past medical history of Adverse effect of anesthesia; Difficult intubation; Malignant hyperthermia due to anesthesia; Nausea & vomiting; or Pseudocholinesterase deficiency. Mr. David Cyr  has a past surgical history that includes other surgical; rotator cuff repair (Left, 2010); and orthopaedic (Left, 01/2017). Social History/Living Environment:     lives in private residence with wife.   Prior Level of Function/Work/Activity:  I all activities. High physical activites  Dominant Side:         RIGHT    Current Medications:       Current Outpatient Prescriptions:     diphenhydrAMINE (BENADRYL) 25 mg tablet, Take 75 mg by mouth nightly. Indications: sleep, Disp: , Rfl:    Date Last Reviewed:  7-5-17   Number of Personal Factors/Comorbidities that affect the Plan of Care: 0: LOW COMPLEXITY   EXAMINATION:   Observation/Orthostatic Postural Assessment:          Scope holes healing nicely. Some atrophy present through supraspinatus region R vs L  PROM:          Left shoulder:   Flexion 160  External rotation 80  IR 75       Right shoulder:  Flexion 100  External rotation 30  IR 30  Strength:          No specific testing at this time secondary to restrictions in protocol         Body Structures Involved:  1. Bones  2. Joints  3. Muscles  4. Ligaments Body Functions Affected:  1. Sensory/Pain  2. Neuromusculoskeletal Activities and Participation Affected:  1. Learning and Applying Knowledge  2. Self Care  3. Domestic Life  4. Interpersonal Interactions and Relationships  5. Community, Social and [place]   Number of elements (examined above) that affect the Plan of Care: 3: MODERATE COMPLEXITY   CLINICAL PRESENTATION:   Presentation: Stable and uncomplicated: LOW COMPLEXITY   CLINICAL DECISION MAKING:   Outcome Measure: Tool Used: Disabilities of the Arm, Shoulder and Hand (DASH) Questionnaire - Quick Version  Score:  Initial: 24/55  Most Recent: X/55 (Date: -- )   Interpretation of Score: The DASH is designed to measure the activities of daily living in person's with upper extremity dysfunction or pain. Each section is scored on a 1-5 scale, 5 representing the greatest disability. The scores of each section are added together for a total score of 55.     Score 11 12-19 20-28 29-37 38-45 46-54 55   Modifier CH CI CJ CK CL CM CN     Medical Necessity:   · Patient is expected to demonstrate progress in strength and range of motion to increase independence with ADLs. .  Reason for Services/Other Comments:  · Patient continues to require skilled intervention due to rescent shoulder surgery. Use of outcome tool(s) and clinical judgement create a POC that gives a: Clear prediction of patient's progress: LOW COMPLEXITY            TREATMENT:   (In addition to Assessment/Re-Assessment sessions the following treatments were rendered)  Pre-treatment Symptoms/Complaints:  4/10 R shoulder aching mainly at rest and at night  Pain: Initial:   5/10 R shoulder Post Session:  3/10 R shoulder     THERAPEUTIC EXERCISE: (20 minutes):  Exercises per grid below to improve mobility and strength. Required moderate verbal, manual and tactile cues to promote proper body alignment, promote proper body posture and promote proper body mechanics. Progressed resistance, range and repetitions as indicated. MANUAL THERAPY: (25 minutes): PROM was utilized and necessary because of the patient's restricted joint motion, painful spasm and restricted motion of soft tissue. performed PROM all planes R shoulder motion following physician instruction. Soft tissue through upper trap, lats. Scapular mobs. Date:  7-25-17 Date:   Date:     Activity/Exercise Parameters Parameters Parameters   pulleys x20     UBE L1 8 minutes     AROM All planes un supine     AAROM All planes in supine     S/L external rotation x20     Supine overhead reach x20     Standing scaption x20          Treatment/Session Assessment:    · Response to Treatment:  Tolerated well. He needs constant reminder to take it easy and not do too much with shoulder. He see's Dr. Ronit Bledsoe Monday. We will wait to add any resistance to motion until doctor gives the ok. · Compliance with Program/Exercises: Will assess as treatment progresses. · Recommendations/Intent for next treatment session: \"Next visit will focus on advancements to more challenging activities\".   Total Treatment Duration:  PT Patient Time In/Time Out  Time In: 1030  Time Out: 1115    Sharaurszula Ro, PT

## 2017-07-27 ENCOUNTER — HOSPITAL ENCOUNTER (OUTPATIENT)
Dept: PHYSICAL THERAPY | Age: 52
Discharge: HOME | End: 2017-07-27
Payer: COMMERCIAL

## 2017-07-27 PROCEDURE — 97140 MANUAL THERAPY 1/> REGIONS: CPT

## 2017-07-27 PROCEDURE — 97110 THERAPEUTIC EXERCISES: CPT

## 2017-07-27 NOTE — PROGRESS NOTES
Mine Moran  : 1965 [facility] [place] [id number] [facility], [address] [place] [address],[address], [facility].  [phone]   [phone]          OUTPATIENT PHYSICAL THERAPY:Daily Note 2017    ICD-10: Treatment Diagnosis:   · Pain in right shoulder (M25.511)  · Stiffness of right shoulder, not elsewhere classified (M25.611)      Precautions/Allergies:   Review of patient's allergies indicates no known allergies. Fall Risk Score: 1 (? 5 = High Risk)  MD Orders: 3 weeks post-op so PROM only 1x/week for 3 weeks, the increase to 2x/week AROM MEDICAL/REFERRING DIAGNOSIS:  Primary osteoarthritis, right shoulder [M19.011]  Sprain of right rotator cuff capsule, subsequent encounter [S43.421D]  Impingement syndrome of right shoulder [M75.41]   DATE OF ONSET: 17  REFERRING PHYSICIAN: Meseret Champagne MD  RETURN PHYSICIAN APPOINTMENT: 2 weeks     INITIAL ASSESSMENT:  Mr. Bingham Adjutant presents following full thickness tear and repair of supraspinatus R shoulder. He is restricted currently by the protocol. He will benefit from PT services to assist with regaining ROM and strength to resume current high level activity at gym and work. Pt has not been wearing sling and has been using arm to perform work around the house during normal ADLs as well as heavier physical labor like putting up a deck. PROBLEM LIST (Impacting functional limitations):  1. Decreased Strength  2. Decreased ADL/Functional Activities  3. Increased Pain  4. Decreased Activity Tolerance  5. Decreased Flexibility/Joint Mobility  6. Decreased Knowledge of Precautions  7. Decreased [place] with Home Exercise Program INTERVENTIONS PLANNED:  1. Cold  2. Cryotherapy  3. Electrical Stimulation  4. Heat  5. Home Exercise Program (HEP)  6. Manual Therapy  7. Range of Motion (ROM)  8. Therapeutic Activites  9. Therapeutic Exercise/Strengthening   TREATMENT PLAN:  Effective Dates: 17 TO 17.   Frequency/Duration: 1-2 times a week for 8 weeks  GOALS: (Goals have been discussed and agreed upon with patient.)  Short-Term Functional Goals: Time Frame: 3 weeks  1. Pt will be I with phase appropriate HEP to assist with ROM R shoulder. 2. Pt will achieve R passive shoulder flexion to 160 degrees or greater to assist with overhead reaching. Discharge Goals: Time Frame: 8 weeks  1. Pt will improve R shoulder active external rotation to reach C7 or further down to assist with ADLs. 2. Pt will improve mid range strength R shoulder all planes to 5/5 to improve strength for work activities. 3. Pt will report pain no greater than 2/10 R shoulder on occasion. Rehabilitation Potential For Stated Goals: Good  Regarding Emelyn Willard therapy, I certify that the treatment plan above will be carried out by a therapist or under their direction. Thank you for this referral,  Cecil Galvez PT     Referring Physician Signature: Nayeli Lazo MD              Date                    The information in this section was collected on 75-17 (except where otherwise noted). HISTORY:   History of Present Injury/Illness (Reason for Referral):  Rotator cuff repair of full thickness tear 2.5cm R shoulder with subacromial decompression 6-20-17   Past Medical History/Comorbidities:   Mr. Gretta Saini  has a past medical history of Arthritis; Biceps muscle tear; Chronic pain; Insomnia; MRSA (methicillin resistant Staphylococcus aureus) infection (2007); Personal history of kidney stones; and Right shoulder pain. He also has no past medical history of Adverse effect of anesthesia; Difficult intubation; Malignant hyperthermia due to anesthesia; Nausea & vomiting; or Pseudocholinesterase deficiency. Mr. Gretta Saini  has a past surgical history that includes other surgical; rotator cuff repair (Left, 2010); and orthopaedic (Left, 01/2017). Social History/Living Environment:     lives in private residence with wife.   Prior Level of Function/Work/Activity:  I all activities. High physical activites  Dominant Side:         RIGHT    Current Medications:       Current Outpatient Prescriptions:     diphenhydrAMINE (BENADRYL) 25 mg tablet, Take 75 mg by mouth nightly. Indications: sleep, Disp: , Rfl:    Date Last Reviewed:  7-5-17   Number of Personal Factors/Comorbidities that affect the Plan of Care: 0: LOW COMPLEXITY   EXAMINATION:   Observation/Orthostatic Postural Assessment:          Scope holes healing nicely. Some atrophy present through supraspinatus region R vs L  PROM:          Left shoulder:   Flexion 160  External rotation 80  IR 75       Right shoulder:  Flexion 100  External rotation 30  IR 30  Strength:          No specific testing at this time secondary to restrictions in protocol         Body Structures Involved:  1. Bones  2. Joints  3. Muscles  4. Ligaments Body Functions Affected:  1. Sensory/Pain  2. Neuromusculoskeletal Activities and Participation Affected:  1. Learning and Applying Knowledge  2. Self Care  3. Domestic Life  4. Interpersonal Interactions and Relationships  5. Community, Social and [place]   Number of elements (examined above) that affect the Plan of Care: 3: MODERATE COMPLEXITY   CLINICAL PRESENTATION:   Presentation: Stable and uncomplicated: LOW COMPLEXITY   CLINICAL DECISION MAKING:   Outcome Measure: Tool Used: Disabilities of the Arm, Shoulder and Hand (DASH) Questionnaire - Quick Version  Score:  Initial: 24/55  Most Recent: X/55 (Date: -- )   Interpretation of Score: The DASH is designed to measure the activities of daily living in person's with upper extremity dysfunction or pain. Each section is scored on a 1-5 scale, 5 representing the greatest disability. The scores of each section are added together for a total score of 55.     Score 11 12-19 20-28 29-37 38-45 46-54 55   Modifier CH CI CJ CK CL CM CN     Medical Necessity:   · Patient is expected to demonstrate progress in strength and range of motion to increase independence with ADLs. .  Reason for Services/Other Comments:  · Patient continues to require skilled intervention due to rescent shoulder surgery. Use of outcome tool(s) and clinical judgement create a POC that gives a: Clear prediction of patient's progress: LOW COMPLEXITY            TREATMENT:   (In addition to Assessment/Re-Assessment sessions the following treatments were rendered)  Pre-treatment Symptoms/Complaints:  4/10 R shoulder aching mainly at rest and at night  Pain: Initial:   5/10 R shoulder Post Session:  1/10 R shoulder     THERAPEUTIC EXERCISE: (20 minutes):  Exercises per grid below to improve mobility and strength. Required moderate verbal, manual and tactile cues to promote proper body alignment, promote proper body posture and promote proper body mechanics. Progressed resistance, range and repetitions as indicated. MANUAL THERAPY: (25 minutes): PROM was utilized and necessary because of the patient's restricted joint motion, painful spasm and restricted motion of soft tissue. performed PROM all planes R shoulder motion following physician instruction. Soft tissue through upper trap, lats. Scapular mobs. Date:  7-27-17 Date:   Date:     Activity/Exercise Parameters Parameters Parameters   pulleys x20     UBE L1 8 minutes     AROM All planes un supine     AAROM All planes in supine     S/L external rotation x20     Supine overhead reach x20     Standing scaption x20          Treatment/Session Assessment:    · Response to Treatment:  Tolerated well. He needs constant reminder to take it easy and not do too much with shoulder. He see's Dr. Britney Guaman Monday. We will wait to add any resistance to motion until doctor gives the ok. This is the same today. · Compliance with Program/Exercises: Will assess as treatment progresses. · Recommendations/Intent for next treatment session: \"Next visit will focus on advancements to more challenging activities\".   Total Treatment Duration:  PT Patient Time In/Time Out  Time In: 0915  Time Out: 1800 [address], PT

## 2017-08-01 ENCOUNTER — HOSPITAL ENCOUNTER (OUTPATIENT)
Dept: PHYSICAL THERAPY | Age: 52
Discharge: HOME | End: 2017-08-01
Payer: COMMERCIAL

## 2017-08-01 PROCEDURE — 97110 THERAPEUTIC EXERCISES: CPT

## 2017-08-01 PROCEDURE — 97140 MANUAL THERAPY 1/> REGIONS: CPT

## 2017-08-01 NOTE — PROGRESS NOTES
Morelia Beard  : 1965 [facility]  [address], [address] [place] [address],[address], [facility] 91.  [phone]   [phone]          OUTPATIENT PHYSICAL THERAPY:Daily Note 2017    ICD-10: Treatment Diagnosis:   · Pain in right shoulder (M25.511)  · Stiffness of right shoulder, not elsewhere classified (M25.611)      Precautions/Allergies:   Review of patient's allergies indicates no known allergies. Fall Risk Score: 1 (? 5 = High Risk)  MD Orders: 3 weeks post-op so PROM only 1x/week for 3 weeks, the increase to 2x/week AROM MEDICAL/REFERRING DIAGNOSIS:  Primary osteoarthritis, right shoulder [M19.011]  Sprain of right rotator cuff capsule, subsequent encounter [S43.421D]  Impingement syndrome of right shoulder [M75.41]   DATE OF ONSET: 17  REFERRING PHYSICIAN: Ho Champagne MD  RETURN PHYSICIAN APPOINTMENT: 2 weeks     INITIAL ASSESSMENT:  Mr. Hosea Lugo presents following full thickness tear and repair of supraspinatus R shoulder. He is restricted currently by the protocol. He will benefit from PT services to assist with regaining ROM and strength to resume current high level activity at gym and work. Pt has not been wearing sling and has been using arm to perform work around the house during normal ADLs as well as heavier physical labor like putting up a deck. PROBLEM LIST (Impacting functional limitations):  1. Decreased Strength  2. Decreased ADL/Functional Activities  3. Increased Pain  4. Decreased Activity Tolerance  5. Decreased Flexibility/Joint Mobility  6. Decreased Knowledge of Precautions  7. Decreased [place] with Home Exercise Program INTERVENTIONS PLANNED:  1. Cold  2. Cryotherapy  3. Electrical Stimulation  4. Heat  5. Home Exercise Program (HEP)  6. Manual Therapy  7. Range of Motion (ROM)  8. Therapeutic Activites  9. Therapeutic Exercise/Strengthening   TREATMENT PLAN:  Effective Dates: 17 TO 17.   Frequency/Duration: 1-2 times a week for 8 weeks  GOALS: (Goals have been discussed and agreed upon with patient.)  Short-Term Functional Goals: Time Frame: 3 weeks  1. Pt will be I with phase appropriate HEP to assist with ROM R shoulder. 2. Pt will achieve R passive shoulder flexion to 160 degrees or greater to assist with overhead reaching. Discharge Goals: Time Frame: 8 weeks  1. Pt will improve R shoulder active external rotation to reach C7 or further down to assist with ADLs. 2. Pt will improve mid range strength R shoulder all planes to 5/5 to improve strength for work activities. 3. Pt will report pain no greater than 2/10 R shoulder on occasion. Rehabilitation Potential For Stated Goals: Good  Regarding Gaston Nolberto therapy, I certify that the treatment plan above will be carried out by a therapist or under their direction. Thank you for this referral,  Elder Avalos PT     Referring Physician Signature: Maggie Wagner MD              Date                    The information in this section was collected on 75-17 (except where otherwise noted). HISTORY:   History of Present Injury/Illness (Reason for Referral):  Rotator cuff repair of full thickness tear 2.5cm R shoulder with subacromial decompression 6-20-17   Past Medical History/Comorbidities:   Mr. Memo Samson  has a past medical history of Arthritis; Biceps muscle tear; Chronic pain; Insomnia; MRSA (methicillin resistant Staphylococcus aureus) infection (2007); Personal history of kidney stones; and Right shoulder pain. He also has no past medical history of Adverse effect of anesthesia; Difficult intubation; Malignant hyperthermia due to anesthesia; Nausea & vomiting; or Pseudocholinesterase deficiency. Mr. Memo Samson  has a past surgical history that includes other surgical; rotator cuff repair (Left, 2010); and orthopaedic (Left, 01/2017). Social History/Living Environment:     lives in private residence with wife.   Prior Level of Function/Work/Activity:  I all activities. High physical activites  Dominant Side:         RIGHT    Current Medications:       Current Outpatient Prescriptions:     traMADol (ULTRAM) 50 mg tablet, TAKE 1 TABLET BY MOUTH EVERY 6 HOURS AS NEEDED FOR PAIN, Disp: , Rfl: 0    diphenhydrAMINE (BENADRYL) 25 mg tablet, Take 75 mg by mouth nightly. Indications: sleep, Disp: , Rfl:    Date Last Reviewed:  7-5-17   Number of Personal Factors/Comorbidities that affect the Plan of Care: 0: LOW COMPLEXITY   EXAMINATION:   Observation/Orthostatic Postural Assessment:          Scope holes healing nicely. Some atrophy present through supraspinatus region R vs L  PROM:          Left shoulder:   Flexion 160  External rotation 80  IR 75       Right shoulder:  Flexion 100  External rotation 30  IR 30  Strength:          No specific testing at this time secondary to restrictions in protocol         Body Structures Involved:  1. Bones  2. Joints  3. Muscles  4. Ligaments Body Functions Affected:  1. Sensory/Pain  2. Neuromusculoskeletal Activities and Participation Affected:  1. Learning and Applying Knowledge  2. Self Care  3. Domestic Life  4. Interpersonal Interactions and Relationships  5. Community, Social and [place]   Number of elements (examined above) that affect the Plan of Care: 3: MODERATE COMPLEXITY   CLINICAL PRESENTATION:   Presentation: Stable and uncomplicated: LOW COMPLEXITY   CLINICAL DECISION MAKING:   Outcome Measure: Tool Used: Disabilities of the Arm, Shoulder and Hand (DASH) Questionnaire - Quick Version  Score:  Initial: 24/55  Most Recent: X/55 (Date: -- )   Interpretation of Score: The DASH is designed to measure the activities of daily living in person's with upper extremity dysfunction or pain. Each section is scored on a 1-5 scale, 5 representing the greatest disability. The scores of each section are added together for a total score of 55.     Score 11 12-19 20-28 29-37 38-45 46-54 55   Modifier CH CI CJ CK CL CM CN     Medical Necessity:   · Patient is expected to demonstrate progress in strength and range of motion to increase independence with ADLs. .  Reason for Services/Other Comments:  · Patient continues to require skilled intervention due to rescent shoulder surgery. Use of outcome tool(s) and clinical judgement create a POC that gives a: Clear prediction of patient's progress: LOW COMPLEXITY            TREATMENT:   (In addition to Assessment/Re-Assessment sessions the following treatments were rendered)  Pre-treatment Symptoms/Complaints:  4/10 R shoulder aching mainly at rest and at night  Pain: Initial:   5/10 R shoulder Post Session:  1/10 R shoulder     THERAPEUTIC EXERCISE: (25 minutes):  Exercises per grid below to improve mobility and strength. Required moderate verbal, manual and tactile cues to promote proper body alignment, promote proper body posture and promote proper body mechanics. Progressed resistance, range and repetitions as indicated. MANUAL THERAPY: (20 minutes): PROM was utilized and necessary because of the patient's restricted joint motion, painful spasm and restricted motion of soft tissue. performed PROM all planes R shoulder motion following physician instruction. Soft tissue through upper trap, lats. Scapular mobs. Date:  7-27-17 Date:  8-1-17 Date:     Activity/Exercise Parameters Parameters Parameters   pulleys x20 x20    UBE L1 8 minutes L3 8 minutes    AROM All planes un supine All planes in supine    AAROM All planes in supine --    S/L external rotation x20 x20 5#    Supine overhead reach x20     Standing scaption x20     Green t-band  ER x20 at side then at 90 ABD    Ball drops  In flex, ABD, ER 20 drops each                     Treatment/Session Assessment:    · Response to Treatment:   was very pleased with his progress. He cleared him to workout with minding pain but ok to use restrictions. · Compliance with Program/Exercises:  Will assess as treatment progresses. · Recommendations/Intent for next treatment session: \"Next visit will focus on advancements to more challenging activities\".   Total Treatment Duration:   10:30to 11:15    Reyes Johnson PT

## 2017-08-03 ENCOUNTER — HOSPITAL ENCOUNTER (OUTPATIENT)
Dept: PHYSICAL THERAPY | Age: 52
Discharge: HOME | End: 2017-08-03
Payer: COMMERCIAL

## 2017-08-03 PROCEDURE — 97110 THERAPEUTIC EXERCISES: CPT

## 2017-08-03 PROCEDURE — 97140 MANUAL THERAPY 1/> REGIONS: CPT

## 2017-08-03 NOTE — PROGRESS NOTES
Dawn Bose  : 1965 [facility] [address], [address] [place] [address],[address], [place] [address].  [phone]   [phone]          OUTPATIENT PHYSICAL THERAPY:Daily Note 8/3/2017    ICD-10: Treatment Diagnosis:   · Pain in right shoulder (M25.511)  · Stiffness of right shoulder, not elsewhere classified (M25.611)      Precautions/Allergies:   Review of patient's allergies indicates no known allergies. Fall Risk Score: 1 (? 5 = High Risk)  MD Orders: 3 weeks post-op so PROM only 1x/week for 3 weeks, the increase to 2x/week AROM MEDICAL/REFERRING DIAGNOSIS:  Primary osteoarthritis, right shoulder [M19.011]  Sprain of right rotator cuff capsule, subsequent encounter [S43.421D]  Impingement syndrome of right shoulder [M75.41]   DATE OF ONSET: 17  REFERRING PHYSICIAN: Pantera Champagne MD  RETURN PHYSICIAN APPOINTMENT: 2 weeks     INITIAL ASSESSMENT:  Mr. Gretta Saini presents following full thickness tear and repair of supraspinatus R shoulder. He is restricted currently by the protocol. He will benefit from PT services to assist with regaining ROM and strength to resume current high level activity at gym and work. Pt has not been wearing sling and has been using arm to perform work around the house during normal ADLs as well as heavier physical labor like putting up a deck. PROBLEM LIST (Impacting functional limitations):  1. Decreased Strength  2. Decreased ADL/Functional Activities  3. Increased Pain  4. Decreased Activity Tolerance  5. Decreased Flexibility/Joint Mobility  6. Decreased Knowledge of Precautions  7. Decreased [place] with Home Exercise Program INTERVENTIONS PLANNED:  1. Cold  2. Cryotherapy  3. Electrical Stimulation  4. Heat  5. Home Exercise Program (HEP)  6. Manual Therapy  7. Range of Motion (ROM)  8. Therapeutic Activites  9. Therapeutic Exercise/Strengthening   TREATMENT PLAN:  Effective Dates: 17 TO 17.   Frequency/Duration: 1-2 times a week for 8 weeks  GOALS: (Goals have been discussed and agreed upon with patient.)  Short-Term Functional Goals: Time Frame: 3 weeks  1. Pt will be I with phase appropriate HEP to assist with ROM R shoulder. 2. Pt will achieve R passive shoulder flexion to 160 degrees or greater to assist with overhead reaching. Discharge Goals: Time Frame: 8 weeks  1. Pt will improve R shoulder active external rotation to reach C7 or further down to assist with ADLs. 2. Pt will improve mid range strength R shoulder all planes to 5/5 to improve strength for work activities. 3. Pt will report pain no greater than 2/10 R shoulder on occasion. Rehabilitation Potential For Stated Goals: Good  Regarding [facility] [place] therapy, I certify that the treatment plan above will be carried out by a therapist or under their direction. Thank you for this referral,  Farhana Browning PT     Referring Physician Signature: Michelle Gao MD              Date                    The information in this section was collected on 75-17 (except where otherwise noted). HISTORY:   History of Present Injury/Illness (Reason for Referral):  Rotator cuff repair of full thickness tear 2.5cm R shoulder with subacromial decompression 6-20-17   Past Medical History/Comorbidities:   Mr. Cristal Dumont  has a past medical history of Arthritis; Biceps muscle tear; Chronic pain; Insomnia; MRSA (methicillin resistant Staphylococcus aureus) infection (2007); Personal history of kidney stones; and Right shoulder pain. He also has no past medical history of Adverse effect of anesthesia; Difficult intubation; Malignant hyperthermia due to anesthesia; Nausea & vomiting; or Pseudocholinesterase deficiency. Mr. Cristal Dumont  has a past surgical history that includes other surgical; rotator cuff repair (Left, 2010); and orthopaedic (Left, 01/2017). Social History/Living Environment:     lives in private residence with wife.   Prior Level of Function/Work/Activity:  I all activities. High physical activites  Dominant Side:         RIGHT    Current Medications:       Current Outpatient Prescriptions:     traMADol (ULTRAM) 50 mg tablet, TAKE 1 TABLET BY MOUTH EVERY 6 HOURS AS NEEDED FOR PAIN, Disp: , Rfl: 0    diphenhydrAMINE (BENADRYL) 25 mg tablet, Take 75 mg by mouth nightly. Indications: sleep, Disp: , Rfl:    Date Last Reviewed:  7-5-17   Number of Personal Factors/Comorbidities that affect the Plan of Care: 0: LOW COMPLEXITY   EXAMINATION:   Observation/Orthostatic Postural Assessment:          Scope holes healing nicely. Some atrophy present through supraspinatus region R vs L  PROM:          Left shoulder:   Flexion 160  External rotation 80  IR 75       Right shoulder:  Flexion 100  External rotation 30  IR 30  Strength:          No specific testing at this time secondary to restrictions in protocol         Body Structures Involved:  1. Bones  2. Joints  3. Muscles  4. Ligaments Body Functions Affected:  1. Sensory/Pain  2. Neuromusculoskeletal Activities and Participation Affected:  1. Learning and Applying Knowledge  2. Self Care  3. Domestic Life  4. Interpersonal Interactions and Relationships  5. Community, Social and [place]   Number of elements (examined above) that affect the Plan of Care: 3: MODERATE COMPLEXITY   CLINICAL PRESENTATION:   Presentation: Stable and uncomplicated: LOW COMPLEXITY   CLINICAL DECISION MAKING:   Outcome Measure: Tool Used: Disabilities of the Arm, Shoulder and Hand (DASH) Questionnaire - Quick Version  Score:  Initial: 24/55  Most Recent: X/55 (Date: -- )   Interpretation of Score: The DASH is designed to measure the activities of daily living in person's with upper extremity dysfunction or pain. Each section is scored on a 1-5 scale, 5 representing the greatest disability. The scores of each section are added together for a total score of 55.     Score 11 12-19 20-28 29-37 38-45 46-54 55   Modifier CH CI CJ CK CL CM CN     Medical Necessity:   · Patient is expected to demonstrate progress in strength and range of motion to increase independence with ADLs. .  Reason for Services/Other Comments:  · Patient continues to require skilled intervention due to rescent shoulder surgery. Use of outcome tool(s) and clinical judgement create a POC that gives a: Clear prediction of patient's progress: LOW COMPLEXITY            TREATMENT:   (In addition to Assessment/Re-Assessment sessions the following treatments were rendered)  Pre-treatment Symptoms/Complaints:  4/10 R shoulder aching mainly at rest and at night  Pain: Initial:   5/10 R shoulder Post Session:  1/10 R shoulder     THERAPEUTIC EXERCISE: (25 minutes):  Exercises per grid below to improve mobility and strength. Required moderate verbal, manual and tactile cues to promote proper body alignment, promote proper body posture and promote proper body mechanics. Progressed resistance, range and repetitions as indicated. MANUAL THERAPY: (20 minutes): PROM was utilized and necessary because of the patient's restricted joint motion, painful spasm and restricted motion of soft tissue. performed PROM all planes R shoulder motion following physician instruction. Soft tissue through upper trap, lats. Scapular mobs. Date:  7-27-17 Date:  8-1-17 Date:  8-3-17   Activity/Exercise Parameters Parameters Parameters   pulleys x20 x20 x20   UBE L1 8 minutes L3 8 minutes L3 8 minutes   AROM All planes un supine All planes in supine All planes supine   AAROM All planes in supine --    S/L external rotation x20 x20 5# x20 5#   Supine overhead reach x20     Standing scaption x20     Green t-band  ER x20 at side then at 90 ABD ER x20 at side then at 90 ABD   Ball drops  In flex, ABD, ER 20 drops each In flex, ABD, ER 20 drops each                    Treatment/Session Assessment:    · Response to Treatment:   was very pleased with his progress.  He cleared him to workout with minding pain but ok to use restrictions. · Compliance with Program/Exercises: Will assess as treatment progresses. · Recommendations/Intent for next treatment session: \"Next visit will focus on advancements to more challenging activities\".   Total Treatment Duration:  PT Patient Time In/Time Out  Time In: 1045  Time Out: 1130    Jose Tobar PT

## 2017-08-08 ENCOUNTER — HOSPITAL ENCOUNTER (OUTPATIENT)
Dept: PHYSICAL THERAPY | Age: 52
Discharge: HOME | End: 2017-08-08
Payer: COMMERCIAL

## 2017-08-08 PROCEDURE — 97110 THERAPEUTIC EXERCISES: CPT

## 2017-08-08 PROCEDURE — 97140 MANUAL THERAPY 1/> REGIONS: CPT

## 2017-08-08 NOTE — PROGRESS NOTES
Severa Lacer  : 1965 [facility] [address], [address] [place] [address],[address], [facility] [address]  [phone]   [phone]          OUTPATIENT PHYSICAL THERAPY:Daily Note 2017    ICD-10: Treatment Diagnosis:   · Pain in right shoulder (M25.511)  · Stiffness of right shoulder, not elsewhere classified (M25.611)      Precautions/Allergies:   Review of patient's allergies indicates no known allergies. Fall Risk Score: 1 (? 5 = High Risk)  MD Orders: 3 weeks post-op so PROM only 1x/week for 3 weeks, the increase to 2x/week AROM MEDICAL/REFERRING DIAGNOSIS:  Primary osteoarthritis, right shoulder [M19.011]  Sprain of right rotator cuff capsule, subsequent encounter [S43.421D]  Impingement syndrome of right shoulder [M75.41]   DATE OF ONSET: 17  REFERRING PHYSICIAN: Miller Champagne MD  RETURN PHYSICIAN APPOINTMENT: 2 weeks     INITIAL ASSESSMENT:  Mr. Karlene Vaughn presents following full thickness tear and repair of supraspinatus R shoulder. He is restricted currently by the protocol. He will benefit from PT services to assist with regaining ROM and strength to resume current high level activity at gym and work. Pt has not been wearing sling and has been using arm to perform work around the house during normal ADLs as well as heavier physical labor like putting up a deck. PROBLEM LIST (Impacting functional limitations):  1. Decreased Strength  2. Decreased ADL/Functional Activities  3. Increased Pain  4. Decreased Activity Tolerance  5. Decreased Flexibility/Joint Mobility  6. Decreased Knowledge of Precautions  7. Decreased [place] with Home Exercise Program INTERVENTIONS PLANNED:  1. Cold  2. Cryotherapy  3. Electrical Stimulation  4. Heat  5. Home Exercise Program (HEP)  6. Manual Therapy  7. Range of Motion (ROM)  8. Therapeutic Activites  9. Therapeutic Exercise/Strengthening   TREATMENT PLAN:  Effective Dates: 17 TO 17.   Frequency/Duration: 1-2 times a week for 8 weeks  GOALS: (Goals have been discussed and agreed upon with patient.)  Short-Term Functional Goals: Time Frame: 3 weeks  1. Pt will be I with phase appropriate HEP to assist with ROM R shoulder. 2. Pt will achieve R passive shoulder flexion to 160 degrees or greater to assist with overhead reaching. Discharge Goals: Time Frame: 8 weeks  1. Pt will improve R shoulder active external rotation to reach C7 or further down to assist with ADLs. 2. Pt will improve mid range strength R shoulder all planes to 5/5 to improve strength for work activities. 3. Pt will report pain no greater than 2/10 R shoulder on occasion. Rehabilitation Potential For Stated Goals: Good  Regarding [facility] therapy, I certify that the treatment plan above will be carried out by a therapist or under their direction. Thank you for this referral,  Angelito Garay PT     Referring Physician Signature: Jaclyn Latham MD              Date                    The information in this section was collected on 75-17 (except where otherwise noted). HISTORY:   History of Present Injury/Illness (Reason for Referral):  Rotator cuff repair of full thickness tear 2.5cm R shoulder with subacromial decompression 6-20-17   Past Medical History/Comorbidities:   Mr. Noris Tabares  has a past medical history of Arthritis; Biceps muscle tear; Chronic pain; Insomnia; MRSA (methicillin resistant Staphylococcus aureus) infection (2007); Personal history of kidney stones; and Right shoulder pain. He also has no past medical history of Adverse effect of anesthesia; Difficult intubation; Malignant hyperthermia due to anesthesia; Nausea & vomiting; or Pseudocholinesterase deficiency. Mr. Noris Tabares  has a past surgical history that includes other surgical; rotator cuff repair (Left, 2010); and orthopaedic (Left, 01/2017). Social History/Living Environment:     lives in private residence with wife.   Prior Level of Function/Work/Activity:  I all activities. High physical activites  Dominant Side:         RIGHT    Current Medications:       Current Outpatient Prescriptions:     traMADol (ULTRAM) 50 mg tablet, TAKE 1 TABLET BY MOUTH EVERY 6 HOURS AS NEEDED FOR PAIN, Disp: , Rfl: 0    diphenhydrAMINE (BENADRYL) 25 mg tablet, Take 75 mg by mouth nightly. Indications: sleep, Disp: , Rfl:    Date Last Reviewed:  7-5-17   Number of Personal Factors/Comorbidities that affect the Plan of Care: 0: LOW COMPLEXITY   EXAMINATION:   Observation/Orthostatic Postural Assessment:          Scope holes healing nicely. Some atrophy present through supraspinatus region R vs L  PROM:          Left shoulder:   Flexion 160  External rotation 80  IR 75       Right shoulder:  Flexion 100  External rotation 30  IR 30  Strength:          No specific testing at this time secondary to restrictions in protocol         Body Structures Involved:  1. Bones  2. Joints  3. Muscles  4. Ligaments Body Functions Affected:  1. Sensory/Pain  2. Neuromusculoskeletal Activities and Participation Affected:  1. Learning and Applying Knowledge  2. Self Care  3. Domestic Life  4. Interpersonal Interactions and Relationships  5. Community, Social and [place]   Number of elements (examined above) that affect the Plan of Care: 3: MODERATE COMPLEXITY   CLINICAL PRESENTATION:   Presentation: Stable and uncomplicated: LOW COMPLEXITY   CLINICAL DECISION MAKING:   Outcome Measure: Tool Used: Disabilities of the Arm, Shoulder and Hand (DASH) Questionnaire - Quick Version  Score:  Initial: 24/55  Most Recent: X/55 (Date: -- )   Interpretation of Score: The DASH is designed to measure the activities of daily living in person's with upper extremity dysfunction or pain. Each section is scored on a 1-5 scale, 5 representing the greatest disability. The scores of each section are added together for a total score of 55.     Score 11 12-19 20-28 29-37 38-45 46-54 55   Modifier CH CI CJ CK CL CM CN     Medical Necessity:   · Patient is expected to demonstrate progress in strength and range of motion to increase independence with ADLs. .  Reason for Services/Other Comments:  · Patient continues to require skilled intervention due to rescent shoulder surgery. Use of outcome tool(s) and clinical judgement create a POC that gives a: Clear prediction of patient's progress: LOW COMPLEXITY            TREATMENT:   (In addition to Assessment/Re-Assessment sessions the following treatments were rendered)  Pre-treatment Symptoms/Complaints:  4/10 R shoulder aching mainly at rest and at night  Pain: Initial:   5/10 R shoulder Post Session:  1/10 R shoulder     THERAPEUTIC EXERCISE: (15 minutes):  Exercises per grid below to improve mobility and strength. Required moderate verbal, manual and tactile cues to promote proper body alignment, promote proper body posture and promote proper body mechanics. Progressed resistance, range and repetitions as indicated. MANUAL THERAPY: (25 minutes): PROM was utilized and necessary because of the patient's restricted joint motion, painful spasm and restricted motion of soft tissue. performed PROM all planes R shoulder motion following physician instruction. Soft tissue through upper trap, lats. Scapular mobs. Prepared ice for pt to take with him after treatment. Date:  8-3-17 Date:  8-8-17   Activity/Exercise Parameters Parameters   pulleys x20 x20   UBE L3 8 minutes L3 8 minutes   AROM All planes in supine All planes supine   AAROM --    S/L external rotation x20 5# At gym I   Supine overhead reach     Standing scaption     Green t-band ER x20 at side then at 90 ABD At gym I    Ball drops In flex, ABD, ER 20 drops each at gym I                  Treatment/Session Assessment:    · Response to Treatment:  Pt doing all his exercise at gym. He's doing well. Spend time stretching shoulder and get full ROM.     · Compliance with Program/Exercises: complaint  · Recommendations/Intent for next treatment session: \"Next visit will focus on advancements to more challenging activities\".   Total Treatment Duration:  PT Patient Time In/Time Out  Time In: 1045  Time Out: 1130    Nupur Bello PT

## 2017-08-10 ENCOUNTER — HOSPITAL ENCOUNTER (OUTPATIENT)
Dept: PHYSICAL THERAPY | Age: 52
Discharge: HOME | End: 2017-08-10
Payer: COMMERCIAL

## 2017-08-10 PROCEDURE — 97140 MANUAL THERAPY 1/> REGIONS: CPT

## 2017-08-10 PROCEDURE — 97110 THERAPEUTIC EXERCISES: CPT

## 2017-08-10 NOTE — PROGRESS NOTES
Robson Carreon  : 1965 [facility] [address], [address], [address]  [phone]   [phone]          OUTPATIENT PHYSICAL THERAPY:Daily Note 8/10/2017    ICD-10: Treatment Diagnosis:   · Pain in right shoulder (M25.511)  · Stiffness of right shoulder, not elsewhere classified (M25.611)      Precautions/Allergies:   Review of patient's allergies indicates no known allergies. Fall Risk Score: 1 (? 5 = High Risk)  MD Orders: 3 weeks post-op so PROM only 1x/week for 3 weeks, the increase to 2x/week AROM MEDICAL/REFERRING DIAGNOSIS:  Primary osteoarthritis, right shoulder [M19.011]  Sprain of right rotator cuff capsule, subsequent encounter [S43.421D]  Impingement syndrome of right shoulder [M75.41]   DATE OF ONSET: 17  REFERRING PHYSICIAN: Harry Champagne MD  RETURN PHYSICIAN APPOINTMENT: 2 weeks     INITIAL ASSESSMENT:  Mr. Wilson Robison presents following full thickness tear and repair of supraspinatus R shoulder. He is restricted currently by the protocol. He will benefit from PT services to assist with regaining ROM and strength to resume current high level activity at gym and work. Pt has not been wearing sling and has been using arm to perform work around the house during normal ADLs as well as heavier physical labor like putting up a deck. PROBLEM LIST (Impacting functional limitations):  1. Decreased Strength  2. Decreased ADL/Functional Activities  3. Increased Pain  4. Decreased Activity Tolerance  5. Decreased Flexibility/Joint Mobility  6. Decreased Knowledge of Precautions  7. Decreased [place] with Home Exercise Program INTERVENTIONS PLANNED:  1. Cold  2. Cryotherapy  3. Electrical Stimulation  4. Heat  5. Home Exercise Program (HEP)  6. Manual Therapy  7. Range of Motion (ROM)  8. Therapeutic Activites  9. Therapeutic Exercise/Strengthening   TREATMENT PLAN:  Effective Dates: 17 TO 17.   Frequency/Duration: 1-2 times a week for 8 weeks  GOALS: (Goals have been discussed and agreed upon with patient.)  Short-Term Functional Goals: Time Frame: 3 weeks  1. Pt will be I with phase appropriate HEP to assist with ROM R shoulder. 2. Pt will achieve R passive shoulder flexion to 160 degrees or greater to assist with overhead reaching. Discharge Goals: Time Frame: 8 weeks  1. Pt will improve R shoulder active external rotation to reach C7 or further down to assist with ADLs. 2. Pt will improve mid range strength R shoulder all planes to 5/5 to improve strength for work activities. 3. Pt will report pain no greater than 2/10 R shoulder on occasion. Rehabilitation Potential For Stated Goals: Good  Regarding [facility] Pippins therapy, I certify that the treatment plan above will be carried out by a therapist or under their direction. Thank you for this referral,  Timothy Heard PT     Referring Physician Signature: Haritha Lawrence MD              Date                    The information in this section was collected on 75-17 (except where otherwise noted). HISTORY:   History of Present Injury/Illness (Reason for Referral):  Rotator cuff repair of full thickness tear 2.5cm R shoulder with subacromial decompression 6-20-17   Past Medical History/Comorbidities:   Mr. Corina Dumont  has a past medical history of Arthritis; Biceps muscle tear; Chronic pain; Insomnia; MRSA (methicillin resistant Staphylococcus aureus) infection (2007); Personal history of kidney stones; and Right shoulder pain. He also has no past medical history of Adverse effect of anesthesia; Difficult intubation; Malignant hyperthermia due to anesthesia; Nausea & vomiting; or Pseudocholinesterase deficiency. Mr. Corina Dumont  has a past surgical history that includes other surgical; rotator cuff repair (Left, 2010); and orthopaedic (Left, 01/2017). Social History/Living Environment:     lives in private residence with wife.   Prior Level of Function/Work/Activity:  I all activities. High physical activites  Dominant Side:         RIGHT    Current Medications:       Current Outpatient Prescriptions:     traMADol (ULTRAM) 50 mg tablet, TAKE 1 TABLET BY MOUTH EVERY 6 HOURS AS NEEDED FOR PAIN, Disp: , Rfl: 0    diphenhydrAMINE (BENADRYL) 25 mg tablet, Take 75 mg by mouth nightly. Indications: sleep, Disp: , Rfl:    Date Last Reviewed:  7-5-17   Number of Personal Factors/Comorbidities that affect the Plan of Care: 0: LOW COMPLEXITY   EXAMINATION:   Observation/Orthostatic Postural Assessment:          Scope holes healing nicely. Some atrophy present through supraspinatus region R vs L  PROM:          Left shoulder:   Flexion 160  External rotation 80  IR 75       Right shoulder:  Flexion 100  External rotation 30  IR 30  Strength:          No specific testing at this time secondary to restrictions in protocol         Body Structures Involved:  1. Bones  2. Joints  3. Muscles  4. Ligaments Body Functions Affected:  1. Sensory/Pain  2. Neuromusculoskeletal Activities and Participation Affected:  1. Learning and Applying Knowledge  2. Self Care  3. Domestic Life  4. Interpersonal Interactions and Relationships  5. Community, Social and [place]   Number of elements (examined above) that affect the Plan of Care: 3: MODERATE COMPLEXITY   CLINICAL PRESENTATION:   Presentation: Stable and uncomplicated: LOW COMPLEXITY   CLINICAL DECISION MAKING:   Outcome Measure: Tool Used: Disabilities of the Arm, Shoulder and Hand (DASH) Questionnaire - Quick Version  Score:  Initial: 24/55  Most Recent: X/55 (Date: -- )   Interpretation of Score: The DASH is designed to measure the activities of daily living in person's with upper extremity dysfunction or pain. Each section is scored on a 1-5 scale, 5 representing the greatest disability. The scores of each section are added together for a total score of 55.     Score 11 12-19 20-28 29-37 38-45 46-54 55   Modifier CH CI CJ CK CL CM CN     Medical Necessity:   · Patient is expected to demonstrate progress in strength and range of motion to increase independence with ADLs. .  Reason for Services/Other Comments:  · Patient continues to require skilled intervention due to rescent shoulder surgery. Use of outcome tool(s) and clinical judgement create a POC that gives a: Clear prediction of patient's progress: LOW COMPLEXITY            TREATMENT:   (In addition to Assessment/Re-Assessment sessions the following treatments were rendered)  Pre-treatment Symptoms/Complaints:  4/10 R shoulder aching mainly at rest and at night  Pain: Initial:   5/10 R shoulder Post Session:  1/10 R shoulder     THERAPEUTIC EXERCISE: (15 minutes):  Exercises per grid below to improve mobility and strength. Required moderate verbal, manual and tactile cues to promote proper body alignment, promote proper body posture and promote proper body mechanics. Progressed resistance, range and repetitions as indicated. MANUAL THERAPY: (25 minutes): PROM was utilized and necessary because of the patient's restricted joint motion, painful spasm and restricted motion of soft tissue. performed PROM all planes R shoulder motion following physician instruction. Soft tissue through upper trap, lats. Scapular mobs. Prepared ice for pt to take with him after treatment. Date:  8-3-17 Date:  8-8-17 Date  8-10-17   Activity/Exercise Parameters Parameters    pulleys x20 x20 x20   UBE L3 8 minutes L3 8 minutes L3 8 minutes   AROM All planes in supine All planes supine All planes    AAROM --     S/L external rotation x20 5# At gym I At gym I   Supine overhead reach      Standing scaption      Green t-band ER x20 at side then at 90 ABD At gym I  At gym I   Ball drops In flex, ABD, ER 20 drops each at gym I At gym I                    Treatment/Session Assessment:    · Response to Treatment:  Pt doing all his exercise at gym. He's doing well.  Spend time stretching shoulder and get full ROM. · Compliance with Program/Exercises: complaint  · Recommendations/Intent for next treatment session: \"Next visit will focus on advancements to more challenging activities\".   Total Treatment Duration:  PT Patient Time In/Time Out  Time In: 1045  Time Out: 1130    Shantell Louie, PT

## 2017-08-15 ENCOUNTER — HOSPITAL ENCOUNTER (OUTPATIENT)
Dept: PHYSICAL THERAPY | Age: 52
Discharge: HOME | End: 2017-08-15
Payer: COMMERCIAL

## 2017-08-15 PROCEDURE — 97110 THERAPEUTIC EXERCISES: CPT

## 2017-08-15 PROCEDURE — 97140 MANUAL THERAPY 1/> REGIONS: CPT

## 2017-08-15 NOTE — PROGRESS NOTES
Nakul Adam  : 1965 [facility] at [place] [address] [facility], [address] [place] [address],[address], [facility].  [phone]   [phone]          OUTPATIENT PHYSICAL THERAPY:Daily Note 8/15/2017    ICD-10: Treatment Diagnosis:   · Pain in right shoulder (M25.511)  · Stiffness of right shoulder, not elsewhere classified (M25.611)      Precautions/Allergies:   Review of patient's allergies indicates no known allergies. Fall Risk Score: 1 (? 5 = High Risk)  MD Orders: 3 weeks post-op so PROM only 1x/week for 3 weeks, the increase to 2x/week AROM MEDICAL/REFERRING DIAGNOSIS:  Primary osteoarthritis, right shoulder [M19.011]  Sprain of right rotator cuff capsule, subsequent encounter [S43.421D]  Impingement syndrome of right shoulder [M75.41]   DATE OF ONSET: 17  REFERRING PHYSICIAN: Edouard Champagne MD  RETURN PHYSICIAN APPOINTMENT: 2 weeks     INITIAL ASSESSMENT:  Mr. Lydia Nicholson presents following full thickness tear and repair of supraspinatus R shoulder. He is restricted currently by the protocol. He will benefit from PT services to assist with regaining ROM and strength to resume current high level activity at gym and work. Pt has not been wearing sling and has been using arm to perform work around the house during normal ADLs as well as heavier physical labor like putting up a deck. PROBLEM LIST (Impacting functional limitations):  1. Decreased Strength  2. Decreased ADL/Functional Activities  3. Increased Pain  4. Decreased Activity Tolerance  5. Decreased Flexibility/Joint Mobility  6. Decreased Knowledge of Precautions  7. Decreased [place] with Home Exercise Program INTERVENTIONS PLANNED:  1. Cold  2. Cryotherapy  3. Electrical Stimulation  4. Heat  5. Home Exercise Program (HEP)  6. Manual Therapy  7. Range of Motion (ROM)  8. Therapeutic Activites  9. Therapeutic Exercise/Strengthening   TREATMENT PLAN:  Effective Dates: 17 TO 17.   Frequency/Duration: 1-2 times a week for 8 weeks  GOALS: (Goals have been discussed and agreed upon with patient.)  Short-Term Functional Goals: Time Frame: 3 weeks  1. Pt will be I with phase appropriate HEP to assist with ROM R shoulder. 2. Pt will achieve R passive shoulder flexion to 160 degrees or greater to assist with overhead reaching. Discharge Goals: Time Frame: 8 weeks  1. Pt will improve R shoulder active external rotation to reach C7 or further down to assist with ADLs. 2. Pt will improve mid range strength R shoulder all planes to 5/5 to improve strength for work activities. 3. Pt will report pain no greater than 2/10 R shoulder on occasion. Rehabilitation Potential For Stated Goals: Good  Regarding Juanita Daniele therapy, I certify that the treatment plan above will be carried out by a therapist or under their direction. Thank you for this referral,  Emperatriz Douglas PT     Referring Physician Signature: Sue Corona MD              Date                    The information in this section was collected on 75-17 (except where otherwise noted). HISTORY:   History of Present Injury/Illness (Reason for Referral):  Rotator cuff repair of full thickness tear 2.5cm R shoulder with subacromial decompression 6-20-17   Past Medical History/Comorbidities:   Mr. David Cyr  has a past medical history of Arthritis; Biceps muscle tear; Chronic pain; Insomnia; MRSA (methicillin resistant Staphylococcus aureus) infection (2007); Personal history of kidney stones; and Right shoulder pain. He also has no past medical history of Adverse effect of anesthesia; Difficult intubation; Malignant hyperthermia due to anesthesia; Nausea & vomiting; or Pseudocholinesterase deficiency. Mr. David Cyr  has a past surgical history that includes other surgical; rotator cuff repair (Left, 2010); and orthopaedic (Left, 01/2017). Social History/Living Environment:     lives in private residence with wife.   Prior Level of Function/Work/Activity:  I all activities. High physical activites  Dominant Side:         RIGHT    Current Medications:       Current Outpatient Prescriptions:     traMADol (ULTRAM) 50 mg tablet, TAKE 1 TABLET BY MOUTH EVERY 6 HOURS AS NEEDED FOR PAIN, Disp: , Rfl: 0    diphenhydrAMINE (BENADRYL) 25 mg tablet, Take 75 mg by mouth nightly. Indications: sleep, Disp: , Rfl:    Date Last Reviewed:  7-5-17   Number of Personal Factors/Comorbidities that affect the Plan of Care: 0: LOW COMPLEXITY   EXAMINATION:   Observation/Orthostatic Postural Assessment:          Scope holes healing nicely. Some atrophy present through supraspinatus region R vs L  PROM:          Left shoulder:   Flexion 160  External rotation 80  IR 75       Right shoulder:  Flexion 100  External rotation 30  IR 30  Strength:          No specific testing at this time secondary to restrictions in protocol         Body Structures Involved:  1. Bones  2. Joints  3. Muscles  4. Ligaments Body Functions Affected:  1. Sensory/Pain  2. Neuromusculoskeletal Activities and Participation Affected:  1. Learning and Applying Knowledge  2. Self Care  3. Domestic Life  4. Interpersonal Interactions and Relationships  5. Community, Social and [place]   Number of elements (examined above) that affect the Plan of Care: 3: MODERATE COMPLEXITY   CLINICAL PRESENTATION:   Presentation: Stable and uncomplicated: LOW COMPLEXITY   CLINICAL DECISION MAKING:   Outcome Measure: Tool Used: Disabilities of the Arm, Shoulder and Hand (DASH) Questionnaire - Quick Version  Score:  Initial: 24/55  Most Recent: X/55 (Date: -- )   Interpretation of Score: The DASH is designed to measure the activities of daily living in person's with upper extremity dysfunction or pain. Each section is scored on a 1-5 scale, 5 representing the greatest disability. The scores of each section are added together for a total score of 55.     Score 11 12-19 20-28 29-37 38-45 46-54 55   Modifier CH CI CJ CK CL CM CN     Medical Necessity:   · Patient is expected to demonstrate progress in strength and range of motion to increase independence with ADLs. .  Reason for Services/Other Comments:  · Patient continues to require skilled intervention due to rescent shoulder surgery. Use of outcome tool(s) and clinical judgement create a POC that gives a: Clear prediction of patient's progress: LOW COMPLEXITY            TREATMENT:   (In addition to Assessment/Re-Assessment sessions the following treatments were rendered)  Pre-treatment Symptoms/Complaints:  4/10 R shoulder aching mainly at rest and at night  Pain: Initial:   5/10 R shoulder Post Session:  1/10 R shoulder     THERAPEUTIC EXERCISE: (15 minutes):  Exercises per grid below to improve mobility and strength. Required moderate verbal, manual and tactile cues to promote proper body alignment, promote proper body posture and promote proper body mechanics. Progressed resistance, range and repetitions as indicated. MANUAL THERAPY: (25 minutes): PROM was utilized and necessary because of the patient's restricted joint motion, painful spasm and restricted motion of soft tissue. performed PROM all planes R shoulder motion following physician instruction. Soft tissue through upper trap, lats. Scapular mobs. Prepared ice for pt to take with him after treatment.    Date:  8-3-17 Date:  8-8-17 Date  8-10-17 Date  8-15-17   Activity/Exercise Parameters Parameters     pulleys x20 x20 x20 x20   UBE L3 8 minutes L3 8 minutes L3 8 minutes L3 8 minutes   AROM All planes in supine All planes supine All planes  All planes    AAROM --      S/L external rotation x20 5# At gym I At gym I At gym   Supine overhead reach       Standing scaption       Green t-band ER x20 at side then at 90 ABD At gym I  At gym I At gym    Ball drops In flex, ABD, ER 20 drops each at gym I At gym I At gym                      Treatment/Session Assessment:    · Response to Treatment:  Pt is still doing all his exercise at gym. He's doing well. Spend time stretching shoulder and get full ROM. He is back to work this weekend. · Compliance with Program/Exercises: complaint  · Recommendations/Intent for next treatment session: \"Next visit will focus on advancements to more challenging activities\".   Total Treatment Duration:  PT Patient Time In/Time Out  Time In: 0815  Time Out: 0900    Delphine Ball PT

## 2017-08-23 ENCOUNTER — HOSPITAL ENCOUNTER (OUTPATIENT)
Dept: PHYSICAL THERAPY | Age: 52
Discharge: HOME | End: 2017-08-23
Payer: COMMERCIAL

## 2017-08-23 PROCEDURE — 97140 MANUAL THERAPY 1/> REGIONS: CPT

## 2017-08-23 NOTE — PROGRESS NOTES
Edison Howard  : 1965 [facility] at [address] [place] [address],[address], [address] [facility]  [phone]   [phone]          OUTPATIENT PHYSICAL THERAPY:Daily Note 2017    ICD-10: Treatment Diagnosis:   · Pain in right shoulder (M25.511)  · Stiffness of right shoulder, not elsewhere classified (M25.611)      Precautions/Allergies:   Review of patient's allergies indicates no known allergies. Fall Risk Score: 1 (? 5 = High Risk)  MD Orders: 3 weeks post-op so PROM only 1x/week for 3 weeks, the increase to 2x/week AROM MEDICAL/REFERRING DIAGNOSIS:  Primary osteoarthritis, right shoulder [M19.011]  Sprain of right rotator cuff capsule, subsequent encounter [S43.421D]  Impingement syndrome of right shoulder [M75.41]   DATE OF ONSET: 17  REFERRING PHYSICIAN: Isaac Champagne MD  RETURN PHYSICIAN APPOINTMENT: 2 weeks     INITIAL ASSESSMENT:  Mr. Fred Gordon presents following full thickness tear and repair of supraspinatus R shoulder. He is restricted currently by the protocol. He will benefit from PT services to assist with regaining ROM and strength to resume current high level activity at gym and work. Pt has not been wearing sling and has been using arm to perform work around the house during normal ADLs as well as heavier physical labor like putting up a deck. PROBLEM LIST (Impacting functional limitations):  1. Decreased Strength  2. Decreased ADL/Functional Activities  3. Increased Pain  4. Decreased Activity Tolerance  5. Decreased Flexibility/Joint Mobility  6. Decreased Knowledge of Precautions  7. Decreased [place] with Home Exercise Program INTERVENTIONS PLANNED:  1. Cold  2. Cryotherapy  3. Electrical Stimulation  4. Heat  5. Home Exercise Program (HEP)  6. Manual Therapy  7. Range of Motion (ROM)  8. Therapeutic Activites  9. Therapeutic Exercise/Strengthening   TREATMENT PLAN:  Effective Dates: 17 TO 17.   Frequency/Duration: 1-2 times a week for 8 weeks  GOALS: (Goals have been discussed and agreed upon with patient.)  Short-Term Functional Goals: Time Frame: 3 weeks  1. Pt will be I with phase appropriate HEP to assist with ROM R shoulder. 2. Pt will achieve R passive shoulder flexion to 160 degrees or greater to assist with overhead reaching. Discharge Goals: Time Frame: 8 weeks  1. Pt will improve R shoulder active external rotation to reach C7 or further down to assist with ADLs. 2. Pt will improve mid range strength R shoulder all planes to 5/5 to improve strength for work activities. 3. Pt will report pain no greater than 2/10 R shoulder on occasion. Rehabilitation Potential For Stated Goals: Good  Regarding [facility] therapy, I certify that the treatment plan above will be carried out by a therapist or under their direction. Thank you for this referral,  Negro Mcintyre PT     Referring Physician Signature: Cassandra Ahmadi MD              Date                    The information in this section was collected on 75-17 (except where otherwise noted). HISTORY:   History of Present Injury/Illness (Reason for Referral):  Rotator cuff repair of full thickness tear 2.5cm R shoulder with subacromial decompression 6-20-17   Past Medical History/Comorbidities:   Mr. Jason Hernandez  has a past medical history of Arthritis; Biceps muscle tear; Chronic pain; Insomnia; MRSA (methicillin resistant Staphylococcus aureus) infection (2007); Personal history of kidney stones; and Right shoulder pain. He also has no past medical history of Adverse effect of anesthesia; Difficult intubation; Malignant hyperthermia due to anesthesia; Nausea & vomiting; or Pseudocholinesterase deficiency. Mr. Jason Hernandez  has a past surgical history that includes other surgical; rotator cuff repair (Left, 2010); and orthopaedic (Left, 01/2017). Social History/Living Environment:     lives in private residence with wife.   Prior Level of Function/Work/Activity:  I all activities. High physical activites  Dominant Side:         RIGHT    Current Medications:       Current Outpatient Prescriptions:     traMADol (ULTRAM) 50 mg tablet, TAKE 1 TABLET BY MOUTH EVERY 6 HOURS AS NEEDED FOR PAIN, Disp: , Rfl: 0    diphenhydrAMINE (BENADRYL) 25 mg tablet, Take 75 mg by mouth nightly. Indications: sleep, Disp: , Rfl:    Date Last Reviewed:  7-5-17   Number of Personal Factors/Comorbidities that affect the Plan of Care: 0: LOW COMPLEXITY   EXAMINATION:   Observation/Orthostatic Postural Assessment:          Scope holes healing nicely. Some atrophy present through supraspinatus region R vs L  PROM:          Left shoulder:   Flexion 160  External rotation 80  IR 75       Right shoulder:  Flexion 100  External rotation 30  IR 30  Strength:          No specific testing at this time secondary to restrictions in protocol         Body Structures Involved:  1. Bones  2. Joints  3. Muscles  4. Ligaments Body Functions Affected:  1. Sensory/Pain  2. Neuromusculoskeletal Activities and Participation Affected:  1. Learning and Applying Knowledge  2. Self Care  3. Domestic Life  4. Interpersonal Interactions and Relationships  5. Community, Social and [place]   Number of elements (examined above) that affect the Plan of Care: 3: MODERATE COMPLEXITY   CLINICAL PRESENTATION:   Presentation: Stable and uncomplicated: LOW COMPLEXITY   CLINICAL DECISION MAKING:   Outcome Measure: Tool Used: Disabilities of the Arm, Shoulder and Hand (DASH) Questionnaire - Quick Version  Score:  Initial: 24/55  Most Recent: X/55 (Date: -- )   Interpretation of Score: The DASH is designed to measure the activities of daily living in person's with upper extremity dysfunction or pain. Each section is scored on a 1-5 scale, 5 representing the greatest disability. The scores of each section are added together for a total score of 55.     Score 11 12-19 20-28 29-37 38-45 46-54 55   Modifier CH CI CJ CK CL CM CN     Medical Necessity:   · Patient is expected to demonstrate progress in strength and range of motion to increase independence with ADLs. .  Reason for Services/Other Comments:  · Patient continues to require skilled intervention due to rescent shoulder surgery. Use of outcome tool(s) and clinical judgement create a POC that gives a: Clear prediction of patient's progress: LOW COMPLEXITY            TREATMENT:   (In addition to Assessment/Re-Assessment sessions the following treatments were rendered)  Pre-treatment Symptoms/Complaints:  4/10 R shoulder aching mainly at rest and at night  Pain: Initial:   5/10 R shoulder Post Session:  1/10 R shoulder     THERAPEUTIC EXERCISE: (0 minutes):  Exercises per grid below to improve mobility and strength. Required moderate verbal, manual and tactile cues to promote proper body alignment, promote proper body posture and promote proper body mechanics. Progressed resistance, range and repetitions as indicated. MANUAL THERAPY: (30 minutes): PROM was utilized and necessary because of the patient's restricted joint motion, painful spasm and restricted motion of soft tissue. performed PROMAAROM/ resisted AROM all planes R shoulder motion following physician instruction. Soft tissue through upper trap, lats. Scapular mobs. Prepared ice for pt to take with him after treatment.    Date:  8-3-17 Date:  8-8-17 Date  8-10-17 Date  8-15-17   Activity/Exercise Parameters Parameters     pulleys x20 x20 x20 x20   UBE L3 8 minutes L3 8 minutes L3 8 minutes L3 8 minutes   AROM All planes in supine All planes supine All planes  All planes    AAROM --      S/L external rotation x20 5# At gym I At gym I At gym   Supine overhead reach       Standing scaption       Green t-band ER x20 at side then at 90 ABD At gym I  At gym I At gym    Ball drops In flex, ABD, ER 20 drops each at gym I At gym I At gym                      Treatment/Session Assessment:    · Response to Treatment:  Pt is back to work now. Has not really been having any specific problems with shoulder just some soreness at time. Just stretching and ranging today. Already strengthened at gym today. · Compliance with Program/Exercises: complaint  · Recommendations/Intent for next treatment session: \"Next visit will focus on advancements to more challenging activities\".   Total Treatment Duration:  PT Patient Time In/Time Out  Time In: 1130  Time Out: 1200    Darian Jordan PT

## 2017-09-01 ENCOUNTER — APPOINTMENT (OUTPATIENT)
Dept: PHYSICAL THERAPY | Age: 52
End: 2017-09-01
Payer: COMMERCIAL

## 2017-09-05 ENCOUNTER — HOSPITAL ENCOUNTER (OUTPATIENT)
Dept: PHYSICAL THERAPY | Age: 52
Discharge: HOME | End: 2017-09-05
Payer: COMMERCIAL

## 2017-09-05 PROCEDURE — 97140 MANUAL THERAPY 1/> REGIONS: CPT

## 2017-09-05 PROCEDURE — 97110 THERAPEUTIC EXERCISES: CPT

## 2017-09-05 NOTE — PROGRESS NOTES
Yosef Rosales  : 1965 [facility] Center at [facility]  [address], [address] [place] [address],[address], [place] [address].  [phone]   [phone]          OUTPATIENT PHYSICAL THERAPY:Recertification [id number]    ICD-10: Treatment Diagnosis:   · Pain in right shoulder (M25.511)  · Stiffness of right shoulder, not elsewhere classified (M25.611)      Precautions/Allergies:   Review of patient's allergies indicates no known allergies. Fall Risk Score: 1 (? 5 = High Risk)  MD Orders: 3 weeks post-op so PROM only 1x/week for 3 weeks, the increase to 2x/week AROM MEDICAL/REFERRING DIAGNOSIS:  Primary osteoarthritis, right shoulder [M19.011]  Sprain of right rotator cuff capsule, subsequent encounter [S43.421D]  Impingement syndrome of right shoulder [M75.41]   DATE OF ONSET: 17  REFERRING PHYSICIAN: Dereje Champagne MD  RETURN PHYSICIAN APPOINTMENT: 2 weeks     INITIAL ASSESSMENT:  Mr. Kostas Zelaya presents following full thickness tear and repair of supraspinatus R shoulder. He is doing very well. We are mainly seeing home for ROM and tissue restriction now as he independently completes strength routine at gym. Please sign for patient to continue therapy additional 8 weeks if in agreement. PROBLEM LIST (Impacting functional limitations):  1. Decreased Strength  2. Decreased ADL/Functional Activities  3. Increased Pain  4. Decreased Activity Tolerance  5. Decreased Flexibility/Joint Mobility  6. Decreased Knowledge of Precautions  7. Decreased [place] with Home Exercise Program INTERVENTIONS PLANNED:  1. Cold  2. Cryotherapy  3. Electrical Stimulation  4. Heat  5. Home Exercise Program (HEP)  6. Manual Therapy  7. Range of Motion (ROM)  8. Therapeutic Activites  9. Therapeutic Exercise/Strengthening   TREATMENT PLAN:  Effective Dates: 17 TO 17.   Frequency/Duration: 1-2 times a week for 8 weeks  GOALS: (Goals have been discussed and agreed upon with patient.)  Short-Term Functional Goals: Time Frame: 3 weeks  1. Pt will be I with phase appropriate HEP to assist with ROM R shoulder. GOAL MET  2. Pt will achieve R passive shoulder flexion to 160 degrees or greater to assist with overhead reaching. ONGOING  Discharge Goals: Time Frame: 8 weeks  1. Pt will improve R shoulder active external rotation to reach C7 or further down to assist with ADLs. ONGOING  2. Pt will improve mid range strength R shoulder all planes to 5/5 to improve strength for work activities. GOAL MET  3. Pt will report pain no greater than 2/10 R shoulder on occasion. ONGOING  Rehabilitation Potential For Stated Goals: Good  Regarding [place] therapy, I certify that the treatment plan above will be carried out by a therapist or under their direction. Thank you for this referral,  Clyde Mccurdy PT     Referring Physician Signature: Tasha Alonzo MD              Date                    The information in this section was collected on 75-17 (except where otherwise noted). HISTORY:   History of Present Injury/Illness (Reason for Referral):  Rotator cuff repair of full thickness tear 2.5cm R shoulder with subacromial decompression 6-20-17   Past Medical History/Comorbidities:   Mr. Franklin Mcclain  has a past medical history of Arthritis; Biceps muscle tear; Chronic pain; Insomnia; MRSA (methicillin resistant Staphylococcus aureus) infection (2007); Personal history of kidney stones; and Right shoulder pain. He also has no past medical history of Adverse effect of anesthesia; Difficult intubation; Malignant hyperthermia due to anesthesia; Nausea & vomiting; or Pseudocholinesterase deficiency. Mr. Franklin Mcclain  has a past surgical history that includes other surgical; rotator cuff repair (Left, 2010); and orthopaedic (Left, 01/2017). Social History/Living Environment:     lives in private residence with wife. Prior Level of Function/Work/Activity:  I all activities.  High physical activites  Dominant Side: RIGHT    Current Medications:       Current Outpatient Prescriptions:     traMADol (ULTRAM) 50 mg tablet, TAKE 1 TABLET BY MOUTH EVERY 6 HOURS AS NEEDED FOR PAIN, Disp: , Rfl: 0    diphenhydrAMINE (BENADRYL) 25 mg tablet, Take 75 mg by mouth nightly. Indications: sleep, Disp: , Rfl:    Date Last Reviewed:  7-5-17   Number of Personal Factors/Comorbidities that affect the Plan of Care: 0: LOW COMPLEXITY   EXAMINATION:   Observation/Orthostatic Postural Assessment:          Scope holes healing nicely. Some atrophy present through supraspinatus region R vs L  PROM:          Left shoulder:   Flexion 160  External rotation 80  IR 75       Right shoulder:  Flexion 150  External rotation 70  IR 75  Strength:          R shoulder: 4+/5 to 5/5 all planes, L shoulder 5/5 all planes         Body Structures Involved:  1. Bones  2. Joints  3. Muscles  4. Ligaments Body Functions Affected:  1. Sensory/Pain  2. Neuromusculoskeletal Activities and Participation Affected:  1. Learning and Applying Knowledge  2. Self Care  3. Domestic Life  4. Interpersonal Interactions and Relationships  5. Community, Social and [place]   Number of elements (examined above) that affect the Plan of Care: 3: MODERATE COMPLEXITY   CLINICAL PRESENTATION:   Presentation: Stable and uncomplicated: LOW COMPLEXITY   CLINICAL DECISION MAKING:   Outcome Measure: Tool Used: Disabilities of the Arm, Shoulder and Hand (DASH) Questionnaire - Quick Version  Score:  Initial: 24/55  Most Recent: X/55 (Date: -- )   Interpretation of Score: The DASH is designed to measure the activities of daily living in person's with upper extremity dysfunction or pain. Each section is scored on a 1-5 scale, 5 representing the greatest disability. The scores of each section are added together for a total score of 55.     Score 11 12-19 20-28 29-37 38-45 46-54 55   Modifier CH CI CJ CK CL CM CN     Medical Necessity:   · Patient is expected to demonstrate progress in strength and range of motion to increase independence with ADLs. .  Reason for Services/Other Comments:  · Patient continues to require skilled intervention due to rescent shoulder surgery. Use of outcome tool(s) and clinical judgement create a POC that gives a: Clear prediction of patient's progress: LOW COMPLEXITY            TREATMENT:   (In addition to Assessment/Re-Assessment sessions the following treatments were rendered)  Pre-treatment Symptoms/Complaints:  4/10 R shoulder aching mainly at rest and at night  Pain: Initial:   5/10 R shoulder Post Session:  1/10 R shoulder     THERAPEUTIC EXERCISE: (15 minutes):  Exercises per grid below to improve mobility and strength. Required moderate verbal, manual and tactile cues to promote proper body alignment, promote proper body posture and promote proper body mechanics. Progressed resistance, range and repetitions as indicated. MANUAL THERAPY: (30 minutes): PROM, AAROM, RAROM, PNF, acessory motion was utilized and necessary because of the patient's restricted joint motion, painful spasm and restricted motion of soft tissue. performed PROMAAROM/ resisted AROM all planes R shoulder motion following physician instruction. Soft tissue through upper trap, lats. Scapular mobs. Prepared ice for pt to take with him after treatment.    Date:  8-3-17 Date:  8-8-17 Date  8-10-17 Date  8-15-17 Date  9-5-17   Activity/Exercise Parameters Parameters      pulleys x20 x20 x20 x20    UBE L3 8 minutes L3 8 minutes L3 8 minutes L3 8 minutes L4 8 minutes   AROM All planes in supine All planes supine All planes  All planes  All planes    AAROM --       S/L external rotation x20 5# At gym I At gym I At gym    Supine overhead reach        Standing scaption        Green t-band ER x20 at side then at 90 ABD At gym I  At gym I At gym     Ball drops In flex, ABD, ER 20 drops each at gym I At gym I At gym                         Treatment/Session Assessment:    · Response to Treatment:  Pt doing well at work. Doing all strengthening at gym regularly. · Compliance with Program/Exercises: complaint  · Recommendations/Intent for next treatment session: \"Next visit will focus on advancements to more challenging activities\".   Total Treatment Duration:  PT Patient Time In/Time Out  Time In: 1115  Time Out: 1200    Farhana Browning, PT

## 2017-09-14 ENCOUNTER — HOSPITAL ENCOUNTER (OUTPATIENT)
Dept: PHYSICAL THERAPY | Age: 52
Discharge: HOME | End: 2017-09-14
Payer: COMMERCIAL

## 2017-09-14 PROCEDURE — 97140 MANUAL THERAPY 1/> REGIONS: CPT

## 2017-09-14 PROCEDURE — 97110 THERAPEUTIC EXERCISES: CPT

## 2017-09-14 NOTE — PROGRESS NOTES
Shanel Sánchez  : 1965 [facility] [place] [address] [facility], [address] [place] [address],[address], [facility] .  [phone]   [phone]          OUTPATIENT PHYSICAL THERAPY:Daily Note 2017    ICD-10: Treatment Diagnosis:   · Pain in right shoulder (M25.511)  · Stiffness of right shoulder, not elsewhere classified (M25.611)      Precautions/Allergies:   Review of patient's allergies indicates no known allergies. Fall Risk Score: 1 (? 5 = High Risk)  MD Orders: 3 weeks post-op so PROM only 1x/week for 3 weeks, the increase to 2x/week AROM MEDICAL/REFERRING DIAGNOSIS:  Primary osteoarthritis, right shoulder [M19.011]  Sprain of right rotator cuff capsule, subsequent encounter [S43.421D]  Impingement syndrome of right shoulder [M75.41]   DATE OF ONSET: 17  REFERRING PHYSICIAN: Juliette Champagne MD  RETURN PHYSICIAN APPOINTMENT: 2 weeks     INITIAL ASSESSMENT:  Mr. Yuly Florence presents following full thickness tear and repair of supraspinatus R shoulder. He is restricted currently by the protocol. He will benefit from PT services to assist with regaining ROM and strength to resume current high level activity at gym and work. Pt has not been wearing sling and has been using arm to perform work around the house during normal ADLs as well as heavier physical labor like putting up a deck. PROBLEM LIST (Impacting functional limitations):  1. Decreased Strength  2. Decreased ADL/Functional Activities  3. Increased Pain  4. Decreased Activity Tolerance  5. Decreased Flexibility/Joint Mobility  6. Decreased Knowledge of Precautions  7. Decreased [place] with Home Exercise Program INTERVENTIONS PLANNED:  1. Cold  2. Cryotherapy  3. Electrical Stimulation  4. Heat  5. Home Exercise Program (HEP)  6. Manual Therapy  7. Range of Motion (ROM)  8. Therapeutic Activites  9. Therapeutic Exercise/Strengthening   TREATMENT PLAN:  Effective Dates: 17 TO 17.   Frequency/Duration: 1-2 times a week for 8 weeks  GOALS: (Goals have been discussed and agreed upon with patient.)  Short-Term Functional Goals: Time Frame: 3 weeks  1. Pt will be I with phase appropriate HEP to assist with ROM R shoulder. 2. Pt will achieve R passive shoulder flexion to 160 degrees or greater to assist with overhead reaching. Discharge Goals: Time Frame: 8 weeks  1. Pt will improve R shoulder active external rotation to reach C7 or further down to assist with ADLs. 2. Pt will improve mid range strength R shoulder all planes to 5/5 to improve strength for work activities. 3. Pt will report pain no greater than 2/10 R shoulder on occasion. Rehabilitation Potential For Stated Goals: Good  Regarding Ana Cohen therapy, I certify that the treatment plan above will be carried out by a therapist or under their direction. Thank you for this referral,  Karina Person PT     Referring Physician Signature: Vinayak Wiggins MD              Date                    The information in this section was collected on 75-17 (except where otherwise noted). HISTORY:   History of Present Injury/Illness (Reason for Referral):  Rotator cuff repair of full thickness tear 2.5cm R shoulder with subacromial decompression 6-20-17   Past Medical History/Comorbidities:   Mr. Vianey Win  has a past medical history of Arthritis; Biceps muscle tear; Chronic pain; Insomnia; MRSA (methicillin resistant Staphylococcus aureus) infection (2007); Personal history of kidney stones; and Right shoulder pain. He also has no past medical history of Adverse effect of anesthesia; Difficult intubation; Malignant hyperthermia due to anesthesia; Nausea & vomiting; or Pseudocholinesterase deficiency. Mr. Vianey Win  has a past surgical history that includes other surgical; rotator cuff repair (Left, 2010); and orthopaedic (Left, 01/2017). Social History/Living Environment:     lives in private residence with wife.   Prior Level of Function/Work/Activity:  I all activities. High physical activites  Dominant Side:         RIGHT    Current Medications:       Current Outpatient Prescriptions:     diclofenac EC (VOLTAREN) 75 mg EC tablet, Take  by mouth., Disp: , Rfl:    Date Last Reviewed:  7-5-17   Number of Personal Factors/Comorbidities that affect the Plan of Care: 0: LOW COMPLEXITY   EXAMINATION:   Observation/Orthostatic Postural Assessment:          Scope holes healing nicely. Some atrophy present through supraspinatus region R vs L  PROM:          Left shoulder:   Flexion 160  External rotation 80  IR 75       Right shoulder:  Flexion 100  External rotation 30  IR 30  Strength:          No specific testing at this time secondary to restrictions in protocol         Body Structures Involved:  1. Bones  2. Joints  3. Muscles  4. Ligaments Body Functions Affected:  1. Sensory/Pain  2. Neuromusculoskeletal Activities and Participation Affected:  1. Learning and Applying Knowledge  2. Self Care  3. Domestic Life  4. Interpersonal Interactions and Relationships  5. Community, Social and [place]   Number of elements (examined above) that affect the Plan of Care: 3: MODERATE COMPLEXITY   CLINICAL PRESENTATION:   Presentation: Stable and uncomplicated: LOW COMPLEXITY   CLINICAL DECISION MAKING:   Outcome Measure: Tool Used: Disabilities of the Arm, Shoulder and Hand (DASH) Questionnaire - Quick Version  Score:  Initial: 24/55  Most Recent: X/55 (Date: -- )   Interpretation of Score: The DASH is designed to measure the activities of daily living in person's with upper extremity dysfunction or pain. Each section is scored on a 1-5 scale, 5 representing the greatest disability. The scores of each section are added together for a total score of 55.     Score 11 12-19 20-28 29-37 38-45 46-54 55   Modifier CH CI CJ CK CL CM CN     Medical Necessity:   · Patient is expected to demonstrate progress in strength and range of motion to increase independence with ADLs. .  Reason for Services/Other Comments:  · Patient continues to require skilled intervention due to rescent shoulder surgery. Use of outcome tool(s) and clinical judgement create a POC that gives a: Clear prediction of patient's progress: LOW COMPLEXITY            TREATMENT:   (In addition to Assessment/Re-Assessment sessions the following treatments were rendered)  Pre-treatment Symptoms/Complaints:  4/10 R shoulder aching mainly at rest and at night  Pain: Initial:   5/10 R shoulder Post Session:  1/10 R shoulder     THERAPEUTIC EXERCISE: (15 minutes):  Exercises per grid below to improve mobility and strength. Required moderate verbal, manual and tactile cues to promote proper body alignment, promote proper body posture and promote proper body mechanics. Progressed resistance, range and repetitions as indicated. MANUAL THERAPY: (30 minutes): PROM, AAROM, RAROM, PNF, acessory motion was utilized and necessary because of the patient's restricted joint motion, painful spasm and restricted motion of soft tissue. performed PROMAAROM/ resisted AROM all planes R shoulder motion following physician instruction. Soft tissue through upper trap, lats. Scapular mobs. Prepared ice for pt to take with him after treatment. Date  9-14-17   Activity/Exercise    pulleys    UBE L4 8 minutes   AROM All planes    AAROM    S/L external rotation    Supine overhead reach    Standing scaption    Green t-band    Ball drops                 Treatment/Session Assessment:    · Response to Treatment:  Pt doing well at work. Doing all strengthening at gym regularly. Focusing more stretching here. · Compliance with Program/Exercises: complaint  · Recommendations/Intent for next treatment session: \"Next visit will focus on advancements to more challenging activities\".   Total Treatment Duration:   11:15 to 12:00    Shantell Louie, PT

## 2017-09-25 ENCOUNTER — HOSPITAL ENCOUNTER (OUTPATIENT)
Dept: PHYSICAL THERAPY | Age: 52
Discharge: HOME | End: 2017-09-25
Payer: COMMERCIAL

## 2017-09-25 PROCEDURE — 97140 MANUAL THERAPY 1/> REGIONS: CPT

## 2017-09-25 NOTE — PROGRESS NOTES
Ara Merritt  : 1965 [facility] [place] [address] [facility], [address] [place] [address],[address], [address] [facility]  [phone]   [phone]          OUTPATIENT PHYSICAL THERAPY:Daily Note 2017    ICD-10: Treatment Diagnosis:   · Pain in right shoulder (M25.511)  · Stiffness of right shoulder, not elsewhere classified (M25.611)      Precautions/Allergies:   Review of patient's allergies indicates no known allergies. Fall Risk Score: 1 (? 5 = High Risk)  MD Orders: 3 weeks post-op so PROM only 1x/week for 3 weeks, the increase to 2x/week AROM MEDICAL/REFERRING DIAGNOSIS:  Primary osteoarthritis, right shoulder [M19.011]  Sprain of right rotator cuff capsule, subsequent encounter [S43.421D]  Impingement syndrome of right shoulder [M75.41]   DATE OF ONSET: 17  REFERRING PHYSICIAN: Paylor, Rosita Osler, MD  RETURN PHYSICIAN APPOINTMENT: 2 weeks     INITIAL ASSESSMENT:  Mr. Talia Murillo presents following full thickness tear and repair of supraspinatus R shoulder. He is restricted currently by the protocol. He will benefit from PT services to assist with regaining ROM and strength to resume current high level activity at gym and work. Pt has not been wearing sling and has been using arm to perform work around the house during normal ADLs as well as heavier physical labor like putting up a deck. PROBLEM LIST (Impacting functional limitations):  1. Decreased Strength  2. Decreased ADL/Functional Activities  3. Increased Pain  4. Decreased Activity Tolerance  5. Decreased Flexibility/Joint Mobility  6. Decreased Knowledge of Precautions  7. Decreased [place] with Home Exercise Program INTERVENTIONS PLANNED:  1. Cold  2. Cryotherapy  3. Electrical Stimulation  4. Heat  5. Home Exercise Program (HEP)  6. Manual Therapy  7. Range of Motion (ROM)  8. Therapeutic Activites  9. Therapeutic Exercise/Strengthening   TREATMENT PLAN:  Effective Dates: 17 TO 17.   Frequency/Duration: 1-2 times a week for 8 weeks  GOALS: (Goals have been discussed and agreed upon with patient.)  Short-Term Functional Goals: Time Frame: 3 weeks  1. Pt will be I with phase appropriate HEP to assist with ROM R shoulder. 2. Pt will achieve R passive shoulder flexion to 160 degrees or greater to assist with overhead reaching. Discharge Goals: Time Frame: 8 weeks  1. Pt will improve R shoulder active external rotation to reach C7 or further down to assist with ADLs. 2. Pt will improve mid range strength R shoulder all planes to 5/5 to improve strength for work activities. 3. Pt will report pain no greater than 2/10 R shoulder on occasion. Rehabilitation Potential For Stated Goals: Good  Regarding Hector Cuenca therapy, I certify that the treatment plan above will be carried out by a therapist or under their direction. Thank you for this referral,  Wade Koenig PT     Referring Physician Signature: Tatyana Navarrete MD              Date                    The information in this section was collected on 75-17 (except where otherwise noted). HISTORY:   History of Present Injury/Illness (Reason for Referral):  Rotator cuff repair of full thickness tear 2.5cm R shoulder with subacromial decompression 6-20-17   Past Medical History/Comorbidities:   Mr. Zofia Verdugo  has a past medical history of Arthritis; Biceps muscle tear; Chronic pain; Insomnia; MRSA (methicillin resistant Staphylococcus aureus) infection (2007); Personal history of kidney stones; and Right shoulder pain. He also has no past medical history of Adverse effect of anesthesia; Difficult intubation; Malignant hyperthermia due to anesthesia; Nausea & vomiting; or Pseudocholinesterase deficiency. Mr. Zofia Verdugo  has a past surgical history that includes other surgical; rotator cuff repair (Left, 2010); and orthopaedic (Left, 01/2017). Social History/Living Environment:     lives in private residence with wife.   Prior Level of Function/Work/Activity:  I all activities. High physical activites  Dominant Side:         RIGHT    Current Medications:       Current Outpatient Prescriptions:     fluticasone (FLONASE) 50 mcg/actuation nasal spray, 2 Sprays by Both Nostrils route two (2) times a day., Disp: 1 Bottle, Rfl: 1    FLECTOR 1.3 % pt12, 1 Patch by TransDERmal route every twelve (12) hours every twelve (12) hours. , Disp: 30 Patch, Rfl: 1    diclofenac EC (VOLTAREN) 75 mg EC tablet, Take  by mouth., Disp: , Rfl:    Date Last Reviewed:  7-5-17   Number of Personal Factors/Comorbidities that affect the Plan of Care: 0: LOW COMPLEXITY   EXAMINATION:   Observation/Orthostatic Postural Assessment:          Scope holes healing nicely. Some atrophy present through supraspinatus region R vs L  PROM:          Left shoulder:   Flexion 160  External rotation 80  IR 75       Right shoulder:  Flexion 100  External rotation 30  IR 30  Strength:          No specific testing at this time secondary to restrictions in protocol         Body Structures Involved:  1. Bones  2. Joints  3. Muscles  4. Ligaments Body Functions Affected:  1. Sensory/Pain  2. Neuromusculoskeletal Activities and Participation Affected:  1. Learning and Applying Knowledge  2. Self Care  3. Domestic Life  4. Interpersonal Interactions and Relationships  5. Community, Social and [place]   Number of elements (examined above) that affect the Plan of Care: 3: MODERATE COMPLEXITY   CLINICAL PRESENTATION:   Presentation: Stable and uncomplicated: LOW COMPLEXITY   CLINICAL DECISION MAKING:   Outcome Measure: Tool Used: Disabilities of the Arm, Shoulder and Hand (DASH) Questionnaire - Quick Version  Score:  Initial: 24/55  Most Recent: X/55 (Date: -- )   Interpretation of Score: The DASH is designed to measure the activities of daily living in person's with upper extremity dysfunction or pain. Each section is scored on a 1-5 scale, 5 representing the greatest disability.   The scores of each section are added together for a total score of 55. Score 11 12-19 20-28 29-37 38-45 46-54 55   Modifier CH CI CJ CK CL CM CN     Medical Necessity:   · Patient is expected to demonstrate progress in strength and range of motion to increase independence with ADLs. .  Reason for Services/Other Comments:  · Patient continues to require skilled intervention due to rescent shoulder surgery. Use of outcome tool(s) and clinical judgement create a POC that gives a: Clear prediction of patient's progress: LOW COMPLEXITY            TREATMENT:   (In addition to Assessment/Re-Assessment sessions the following treatments were rendered)  Pre-treatment Symptoms/Complaints:  4/10 R shoulder aching mainly at rest and at night  Pain: Initial:   5/10 R shoulder Post Session:  1/10 R shoulder     MANUAL THERAPY: (40 minutes): PROM, AAROM, RAROM, PNF, acessory motion was utilized and necessary because of the patient's restricted joint motion, painful spasm and restricted motion of soft tissue. performed PROMAAROM/ resisted AROM all planes R shoulder motion following physician instruction. Soft tissue through upper trap, lats. Scapular mobs. Prepared ice for pt to take with him after treatment. Date  9-14-17   Activity/Exercise    pulleys    UBE L4 8 minutes   AROM All planes    AAROM    S/L external rotation    Supine overhead reach    Standing scaption    Green t-band    Ball drops                 Treatment/Session Assessment:    · Response to Treatment:  Pt and I focusing on stretching and manual of shoulder. Completing strengthening independently. · Compliance with Program/Exercises: complaint  · Recommendations/Intent for next treatment session: \"Next visit will focus on advancements to more challenging activities\".   Total Treatment Duration:  PT Patient Time In/Time Out  Time In: 1115  Time Out: 1200    Emperatriz Douglas PT

## 2017-10-04 ENCOUNTER — HOSPITAL ENCOUNTER (OUTPATIENT)
Dept: PHYSICAL THERAPY | Age: 52
Discharge: HOME | End: 2017-10-04
Payer: COMMERCIAL

## 2017-10-04 PROCEDURE — 97140 MANUAL THERAPY 1/> REGIONS: CPT

## 2017-10-04 NOTE — PROGRESS NOTES
Amberly Yarbrough  : 1965 [facility]  [address], [address] [place] [address],[address], [facility].  [phone]   [phone]          OUTPATIENT PHYSICAL THERAPY:Daily Note 10/4/2017    ICD-10: Treatment Diagnosis:   · Pain in right shoulder (M25.511)  · Stiffness of right shoulder, not elsewhere classified (M25.611)      Precautions/Allergies:   Review of patient's allergies indicates no known allergies. Fall Risk Score: 1 (? 5 = High Risk)  MD Orders: 3 weeks post-op so PROM only 1x/week for 3 weeks, the increase to 2x/week AROM MEDICAL/REFERRING DIAGNOSIS:  Primary osteoarthritis, right shoulder [M19.011]  Sprain of right rotator cuff capsule, subsequent encounter [S43.421D]  Impingement syndrome of right shoulder [M75.41]   DATE OF ONSET: 17  REFERRING PHYSICIAN: Glynn Champagne MD  RETURN PHYSICIAN APPOINTMENT: 2 weeks     INITIAL ASSESSMENT:  Mr. Jessica Denney presents following full thickness tear and repair of supraspinatus R shoulder. He is restricted currently by the protocol. He will benefit from PT services to assist with regaining ROM and strength to resume current high level activity at gym and work. Pt has not been wearing sling and has been using arm to perform work around the house during normal ADLs as well as heavier physical labor like putting up a deck. PROBLEM LIST (Impacting functional limitations):  1. Decreased Strength  2. Decreased ADL/Functional Activities  3. Increased Pain  4. Decreased Activity Tolerance  5. Decreased Flexibility/Joint Mobility  6. Decreased Knowledge of Precautions  7. Decreased [place] with Home Exercise Program INTERVENTIONS PLANNED:  1. Cold  2. Cryotherapy  3. Electrical Stimulation  4. Heat  5. Home Exercise Program (HEP)  6. Manual Therapy  7. Range of Motion (ROM)  8. Therapeutic Activites  9. Therapeutic Exercise/Strengthening   TREATMENT PLAN:  Effective Dates: 17 TO 17.   Frequency/Duration: 1 times a week for 8 weeks  GOALS: (Goals have been discussed and agreed upon with patient.)  Short-Term Functional Goals: Time Frame: 3 weeks  1. Pt will be I with phase appropriate HEP to assist with ROM R shoulder. 2. Pt will achieve R passive shoulder flexion to 160 degrees or greater to assist with overhead reaching. Discharge Goals: Time Frame: 8 weeks  1. Pt will improve R shoulder active external rotation to reach C7 or further down to assist with ADLs. 2. Pt will improve mid range strength R shoulder all planes to 5/5 to improve strength for work activities. 3. Pt will report pain no greater than 2/10 R shoulder on occasion. Rehabilitation Potential For Stated Goals: Good  Regarding Dannie Kandice therapy, I certify that the treatment plan above will be carried out by a therapist or under their direction. Thank you for this referral,  Gerardine Hodgkins, PT     Referring Physician Signature: Ana Cooper MD              Date                    The information in this section was collected on 75-17 (except where otherwise noted). HISTORY:   History of Present Injury/Illness (Reason for Referral):  Rotator cuff repair of full thickness tear 2.5cm R shoulder with subacromial decompression 6-20-17   Past Medical History/Comorbidities:   Mr. Hal Liao  has a past medical history of Arthritis; Biceps muscle tear; Chronic pain; Insomnia; MRSA (methicillin resistant Staphylococcus aureus) infection (2007); Personal history of kidney stones; and Right shoulder pain. He also has no past medical history of Adverse effect of anesthesia; Difficult intubation; Malignant hyperthermia due to anesthesia; Nausea & vomiting; or Pseudocholinesterase deficiency. Mr. Hal Liao  has a past surgical history that includes other surgical; rotator cuff repair (Left, 2010); and orthopaedic (Left, 01/2017). Social History/Living Environment:     lives in private residence with wife.   Prior Level of Function/Work/Activity:  I all activities. High physical activites  Dominant Side:         RIGHT    Current Medications:       Current Outpatient Prescriptions:     fluticasone (FLONASE) 50 mcg/actuation nasal spray, 2 Sprays by Both Nostrils route two (2) times a day., Disp: 1 Bottle, Rfl: 1    FLECTOR 1.3 % pt12, 1 Patch by TransDERmal route every twelve (12) hours every twelve (12) hours. , Disp: 30 Patch, Rfl: 1    diclofenac EC (VOLTAREN) 75 mg EC tablet, Take  by mouth., Disp: , Rfl:    Date Last Reviewed:  7-5-17   Number of Personal Factors/Comorbidities that affect the Plan of Care: 0: LOW COMPLEXITY   EXAMINATION:   Observation/Orthostatic Postural Assessment:          Scope holes healing nicely. Some atrophy present through supraspinatus region R vs L  PROM:          Left shoulder:   Flexion 160  External rotation 80  IR 75       Right shoulder:  Flexion 100  External rotation 30  IR 30  Strength:          No specific testing at this time secondary to restrictions in protocol         Body Structures Involved:  1. Bones  2. Joints  3. Muscles  4. Ligaments Body Functions Affected:  1. Sensory/Pain  2. Neuromusculoskeletal Activities and Participation Affected:  1. Learning and Applying Knowledge  2. Self Care  3. Domestic Life  4. Interpersonal Interactions and Relationships  5. Community, Social and [place]   Number of elements (examined above) that affect the Plan of Care: 3: MODERATE COMPLEXITY   CLINICAL PRESENTATION:   Presentation: Stable and uncomplicated: LOW COMPLEXITY   CLINICAL DECISION MAKING:   Outcome Measure: Tool Used: Disabilities of the Arm, Shoulder and Hand (DASH) Questionnaire - Quick Version  Score:  Initial: 24/55  Most Recent: X/55 (Date: -- )   Interpretation of Score: The DASH is designed to measure the activities of daily living in person's with upper extremity dysfunction or pain. Each section is scored on a 1-5 scale, 5 representing the greatest disability.   The scores of each section are added together for a total score of 55. Score 11 12-19 20-28 29-37 38-45 46-54 55   Modifier CH CI CJ CK CL CM CN     Medical Necessity:   · Patient is expected to demonstrate progress in strength and range of motion to increase independence with ADLs. .  Reason for Services/Other Comments:  · Patient continues to require skilled intervention due to rescent shoulder surgery. Use of outcome tool(s) and clinical judgement create a POC that gives a: Clear prediction of patient's progress: LOW COMPLEXITY            TREATMENT:   (In addition to Assessment/Re-Assessment sessions the following treatments were rendered)  Pre-treatment Symptoms/Complaints:  4/10 R shoulder aching mainly at rest and at night  Pain: Initial:   5/10 R shoulder Post Session:  1/10 R shoulder     MANUAL THERAPY: (40 minutes): PROM, AAROM, RAROM, PNF, acessory motion was utilized and necessary because of the patient's restricted joint motion, painful spasm and restricted motion of soft tissue. performed PROMAAROM/ resisted AROM all planes R shoulder motion following physician instruction. Soft tissue through upper trap, lats. Scapular mobs. Prepared ice for pt to take with him after treatment. Date  9-14-17 Date  10-4-17     Activity/Exercise       pulleys       UBE L4 8 minutes L4 8 minutes     AROM All planes  All planes     AAROM       S/L external rotation       Supine overhead reach       Standing scaption       Green t-band       Ball drops                          Treatment/Session Assessment:    · Response to Treatment:  Continuing to focus on manual stretching, muscles work and joint mobs of shoulder only. He is only able to come once every 1.5 weeks because of work schedule. · Compliance with Program/Exercises: complaint  · Recommendations/Intent for next treatment session: \"Next visit will focus on advancements to more challenging activities\".   Total Treatment Duration:  PT Patient Time In/Time Out  Time In: 3882  Time Out: [address] Petra Ortiz

## 2017-10-13 ENCOUNTER — HOSPITAL ENCOUNTER (OUTPATIENT)
Dept: PHYSICAL THERAPY | Age: 52
Discharge: HOME | End: 2017-10-13
Payer: COMMERCIAL

## 2017-10-13 PROCEDURE — 97140 MANUAL THERAPY 1/> REGIONS: CPT

## 2017-10-13 NOTE — PROGRESS NOTES
Roseline Reason  : 1965 [facility] [place] [id number] [facility], [address] [place] [address],[address], [place] [address].  [phone]   [phone]          OUTPATIENT PHYSICAL THERAPY:Daily Note 10/13/2017    ICD-10: Treatment Diagnosis:   · Pain in right shoulder (M25.511)  · Stiffness of right shoulder, not elsewhere classified (M25.611)      Precautions/Allergies:   Review of patient's allergies indicates no known allergies. Fall Risk Score: 1 (? 5 = High Risk)  MD Orders: 3 weeks post-op so PROM only 1x/week for 3 weeks, the increase to 2x/week AROM MEDICAL/REFERRING DIAGNOSIS:  Primary osteoarthritis, right shoulder [M19.011]  Sprain of right rotator cuff capsule, subsequent encounter [S43.421D]  Impingement syndrome of right shoulder [M75.41]   DATE OF ONSET: 17  REFERRING PHYSICIAN: Marilyn Champagne MD  RETURN PHYSICIAN APPOINTMENT: 2 weeks     INITIAL ASSESSMENT:  Mr. Jenifer Russell presents following full thickness tear and repair of supraspinatus R shoulder. He is restricted currently by the protocol. He will benefit from PT services to assist with regaining ROM and strength to resume current high level activity at gym and work. Pt has not been wearing sling and has been using arm to perform work around the house during normal ADLs as well as heavier physical labor like putting up a deck. PROBLEM LIST (Impacting functional limitations):  1. Decreased Strength  2. Decreased ADL/Functional Activities  3. Increased Pain  4. Decreased Activity Tolerance  5. Decreased Flexibility/Joint Mobility  6. Decreased Knowledge of Precautions  7. Decreased [place] with Home Exercise Program INTERVENTIONS PLANNED:  1. Cold  2. Cryotherapy  3. Electrical Stimulation  4. Heat  5. Home Exercise Program (HEP)  6. Manual Therapy  7. Range of Motion (ROM)  8. Therapeutic Activites  9. Therapeutic Exercise/Strengthening   TREATMENT PLAN:  Effective Dates: 17 TO 17.   Frequency/Duration: 1 times a week for 8 weeks  GOALS: (Goals have been discussed and agreed upon with patient.)  Short-Term Functional Goals: Time Frame: 3 weeks  1. Pt will be I with phase appropriate HEP to assist with ROM R shoulder. 2. Pt will achieve R passive shoulder flexion to 160 degrees or greater to assist with overhead reaching. Discharge Goals: Time Frame: 8 weeks  1. Pt will improve R shoulder active external rotation to reach C7 or further down to assist with ADLs. 2. Pt will improve mid range strength R shoulder all planes to 5/5 to improve strength for work activities. 3. Pt will report pain no greater than 2/10 R shoulder on occasion. Rehabilitation Potential For Stated Goals: Good  Regarding Jasper Passey therapy, I certify that the treatment plan above will be carried out by a therapist or under their direction. Thank you for this referral,  Carlota Infante PT     Referring Physician Signature: Veda Newton MD              Date                    The information in this section was collected on 75-17 (except where otherwise noted). HISTORY:   History of Present Injury/Illness (Reason for Referral):  Rotator cuff repair of full thickness tear 2.5cm R shoulder with subacromial decompression 6-20-17   Past Medical History/Comorbidities:   Mr. Yareli Chavarria  has a past medical history of Arthritis; Biceps muscle tear; Chronic pain; Insomnia; MRSA (methicillin resistant Staphylococcus aureus) infection (2007); Personal history of kidney stones; and Right shoulder pain. He also has no past medical history of Adverse effect of anesthesia; Difficult intubation; Malignant hyperthermia due to anesthesia; Nausea & vomiting; or Pseudocholinesterase deficiency. Mr. Yareli Chavarria  has a past surgical history that includes other surgical; rotator cuff repair (Left, 2010); and orthopaedic (Left, 01/2017). Social History/Living Environment:     lives in private residence with wife.   Prior Level of Function/Work/Activity:  I all activities. High physical activites  Dominant Side:         RIGHT    Current Medications:       Current Outpatient Prescriptions:     fluticasone (FLONASE) 50 mcg/actuation nasal spray, 2 Sprays by Both Nostrils route two (2) times a day., Disp: 1 Bottle, Rfl: 1    FLECTOR 1.3 % pt12, 1 Patch by TransDERmal route every twelve (12) hours every twelve (12) hours. , Disp: 30 Patch, Rfl: 1    diclofenac EC (VOLTAREN) 75 mg EC tablet, Take  by mouth., Disp: , Rfl:    Date Last Reviewed:  7-5-17   Number of Personal Factors/Comorbidities that affect the Plan of Care: 0: LOW COMPLEXITY   EXAMINATION:   Observation/Orthostatic Postural Assessment:          Scope holes healing nicely. Some atrophy present through supraspinatus region R vs L  PROM:          Left shoulder:   Flexion 160  External rotation 80  IR 75       Right shoulder:  Flexion 100  External rotation 30  IR 30  Strength:          No specific testing at this time secondary to restrictions in protocol         Body Structures Involved:  1. Bones  2. Joints  3. Muscles  4. Ligaments Body Functions Affected:  1. Sensory/Pain  2. Neuromusculoskeletal Activities and Participation Affected:  1. Learning and Applying Knowledge  2. Self Care  3. Domestic Life  4. Interpersonal Interactions and Relationships  5. Community, Social and [place]   Number of elements (examined above) that affect the Plan of Care: 3: MODERATE COMPLEXITY   CLINICAL PRESENTATION:   Presentation: Stable and uncomplicated: LOW COMPLEXITY   CLINICAL DECISION MAKING:   Outcome Measure: Tool Used: Disabilities of the Arm, Shoulder and Hand (DASH) Questionnaire - Quick Version  Score:  Initial: 24/55  Most Recent: X/55 (Date: -- )   Interpretation of Score: The DASH is designed to measure the activities of daily living in person's with upper extremity dysfunction or pain. Each section is scored on a 1-5 scale, 5 representing the greatest disability.   The scores of each section are added together for a total score of 55. Score 11 12-19 20-28 29-37 38-45 46-54 55   Modifier CH CI CJ CK CL CM CN     Medical Necessity:   · Patient is expected to demonstrate progress in strength and range of motion to increase independence with ADLs. .  Reason for Services/Other Comments:  · Patient continues to require skilled intervention due to rescent shoulder surgery. Use of outcome tool(s) and clinical judgement create a POC that gives a: Clear prediction of patient's progress: LOW COMPLEXITY            TREATMENT:   (In addition to Assessment/Re-Assessment sessions the following treatments were rendered)  Pre-treatment Symptoms/Complaints:  4/10 R shoulder aching mainly at rest and at night  Pain: Initial:   5/10 R shoulder Post Session:  1/10 R shoulder     MANUAL THERAPY: (40 minutes): PROM, AAROM, RAROM, PNF, acessory motion was utilized and necessary because of the patient's restricted joint motion, painful spasm and restricted motion of soft tissue. performed PROMAAROM/ resisted AROM all planes R shoulder motion following physician instruction. Soft tissue through upper trap, lats. Scapular mobs. Prepared ice for pt to take with him after treatment. Date  9-14-17 Date  10-4-17     Activity/Exercise       pulleys       UBE L4 8 minutes L4 8 minutes     AROM All planes  All planes     AAROM       S/L external rotation       Supine overhead reach       Standing scaption       Green t-band       Ball drops                          Treatment/Session Assessment:    · Response to Treatment:  Continuing to come as able from work schedule. Doing well. Still doing all joint soft tissue work to normalize    · Compliance with Program/Exercises: complaint  · Recommendations/Intent for next treatment session: \"Next visit will focus on advancements to more challenging activities\".   Total Treatment Duration:  PT Patient Time In/Time Out  Time In: 1130  Time Out: [address], PT

## 2017-10-16 ENCOUNTER — HOSPITAL ENCOUNTER (OUTPATIENT)
Dept: PHYSICAL THERAPY | Age: 52
Discharge: HOME | End: 2017-10-16
Payer: COMMERCIAL

## 2017-10-16 PROCEDURE — 97140 MANUAL THERAPY 1/> REGIONS: CPT

## 2017-10-16 NOTE — PROGRESS NOTES
Mercedez Mc  : 1965 [facility] [address], [address] [place] [address],[address], [facility].  [phone]   [phone]          OUTPATIENT PHYSICAL THERAPY:Daily Note 10/16/2017    ICD-10: Treatment Diagnosis:   · Pain in right shoulder (M25.511)  · Stiffness of right shoulder, not elsewhere classified (M25.611)      Precautions/Allergies:   Review of patient's allergies indicates no known allergies. Fall Risk Score: 1 (? 5 = High Risk)  MD Orders: 3 weeks post-op so PROM only 1x/week for 3 weeks, the increase to 2x/week AROM MEDICAL/REFERRING DIAGNOSIS:  Primary osteoarthritis, right shoulder [M19.011]  Sprain of right rotator cuff capsule, subsequent encounter [S43.421D]  Impingement syndrome of right shoulder [M75.41]   DATE OF ONSET: 17  REFERRING PHYSICIAN: Bella Champagne MD  RETURN PHYSICIAN APPOINTMENT: 2 weeks     INITIAL ASSESSMENT:  Mr. Deborah Kumari presents following full thickness tear and repair of supraspinatus R shoulder. He is restricted currently by the protocol. He will benefit from PT services to assist with regaining ROM and strength to resume current high level activity at gym and work. Pt has not been wearing sling and has been using arm to perform work around the house during normal ADLs as well as heavier physical labor like putting up a deck. PROBLEM LIST (Impacting functional limitations):  1. Decreased Strength  2. Decreased ADL/Functional Activities  3. Increased Pain  4. Decreased Activity Tolerance  5. Decreased Flexibility/Joint Mobility  6. Decreased Knowledge of Precautions  7. Decreased [place] with Home Exercise Program INTERVENTIONS PLANNED:  1. Cold  2. Cryotherapy  3. Electrical Stimulation  4. Heat  5. Home Exercise Program (HEP)  6. Manual Therapy  7. Range of Motion (ROM)  8. Therapeutic Activites  9. Therapeutic Exercise/Strengthening   TREATMENT PLAN:  Effective Dates: 17 TO 17.   Frequency/Duration: 1 times a week for 8 weeks  GOALS: (Goals have been discussed and agreed upon with patient.)  Short-Term Functional Goals: Time Frame: 3 weeks  1. Pt will be I with phase appropriate HEP to assist with ROM R shoulder. 2. Pt will achieve R passive shoulder flexion to 160 degrees or greater to assist with overhead reaching. Discharge Goals: Time Frame: 8 weeks  1. Pt will improve R shoulder active external rotation to reach C7 or further down to assist with ADLs. 2. Pt will improve mid range strength R shoulder all planes to 5/5 to improve strength for work activities. 3. Pt will report pain no greater than 2/10 R shoulder on occasion. Rehabilitation Potential For Stated Goals: Good  Regarding Loco Evangelistayulissa therapy, I certify that the treatment plan above will be carried out by a therapist or under their direction. Thank you for this referral,  Annemarie Guerra PT     Referring Physician Signature: Albino Arnold MD              Date                    The information in this section was collected on 75-17 (except where otherwise noted). HISTORY:   History of Present Injury/Illness (Reason for Referral):  Rotator cuff repair of full thickness tear 2.5cm R shoulder with subacromial decompression 6-20-17   Past Medical History/Comorbidities:   Mr. Deborah Kumari  has a past medical history of Arthritis; Biceps muscle tear; Chronic pain; Insomnia; MRSA (methicillin resistant Staphylococcus aureus) infection (2007); Personal history of kidney stones; and Right shoulder pain. He also has no past medical history of Adverse effect of anesthesia; Difficult intubation; Malignant hyperthermia due to anesthesia; Nausea & vomiting; or Pseudocholinesterase deficiency. Mr. Deborah Kumari  has a past surgical history that includes other surgical; rotator cuff repair (Left, 2010); and orthopaedic (Left, 01/2017). Social History/Living Environment:     lives in private residence with wife.   Prior Level of Function/Work/Activity:  I all activities. High physical activites  Dominant Side:         RIGHT    Current Medications:       Current Outpatient Prescriptions:     fluticasone (FLONASE) 50 mcg/actuation nasal spray, 2 Sprays by Both Nostrils route two (2) times a day., Disp: 1 Bottle, Rfl: 1    FLECTOR 1.3 % pt12, 1 Patch by TransDERmal route every twelve (12) hours every twelve (12) hours. , Disp: 30 Patch, Rfl: 1    diclofenac EC (VOLTAREN) 75 mg EC tablet, Take  by mouth., Disp: , Rfl:    Date Last Reviewed:  7-5-17   Number of Personal Factors/Comorbidities that affect the Plan of Care: 0: LOW COMPLEXITY   EXAMINATION:   Observation/Orthostatic Postural Assessment:          Scope holes healing nicely. Some atrophy present through supraspinatus region R vs L  PROM:          Left shoulder:   Flexion 160  External rotation 80  IR 75       Right shoulder:  Flexion 100  External rotation 30  IR 30  Strength:          No specific testing at this time secondary to restrictions in protocol         Body Structures Involved:  1. Bones  2. Joints  3. Muscles  4. Ligaments Body Functions Affected:  1. Sensory/Pain  2. Neuromusculoskeletal Activities and Participation Affected:  1. Learning and Applying Knowledge  2. Self Care  3. Domestic Life  4. Interpersonal Interactions and Relationships  5. Community, Social and [place]   Number of elements (examined above) that affect the Plan of Care: 3: MODERATE COMPLEXITY   CLINICAL PRESENTATION:   Presentation: Stable and uncomplicated: LOW COMPLEXITY   CLINICAL DECISION MAKING:   Outcome Measure: Tool Used: Disabilities of the Arm, Shoulder and Hand (DASH) Questionnaire - Quick Version  Score:  Initial: 24/55  Most Recent: X/55 (Date: -- )   Interpretation of Score: The DASH is designed to measure the activities of daily living in person's with upper extremity dysfunction or pain. Each section is scored on a 1-5 scale, 5 representing the greatest disability.   The scores of each section are added together for a total score of 55. Score 11 12-19 20-28 29-37 38-45 46-54 55   Modifier CH CI CJ CK CL CM CN     Medical Necessity:   · Patient is expected to demonstrate progress in strength and range of motion to increase independence with ADLs. .  Reason for Services/Other Comments:  · Patient continues to require skilled intervention due to rescent shoulder surgery. Use of outcome tool(s) and clinical judgement create a POC that gives a: Clear prediction of patient's progress: LOW COMPLEXITY            TREATMENT:   (In addition to Assessment/Re-Assessment sessions the following treatments were rendered)  Pre-treatment Symptoms/Complaints:  4/10 R shoulder aching mainly at rest and at night  Pain: Initial:   5/10 R shoulder Post Session:  1/10 R shoulder     MANUAL THERAPY: (40 minutes): PROM, AAROM, RAROM, PNF, acessory motion was utilized and necessary because of the patient's restricted joint motion, painful spasm and restricted motion of soft tissue. performed PROMAAROM/ resisted AROM all planes R shoulder motion following physician instruction. Soft tissue through upper trap, lats. Scapular mobs. Prepared ice for pt to take with him after treatment. Date  9-14-17 Date  10-4-17     Activity/Exercise       pulleys       UBE L4 8 minutes L4 8 minutes     AROM All planes  All planes     AAROM       S/L external rotation       Supine overhead reach       Standing scaption       Green t-band       Ball drops                          Treatment/Session Assessment:    · Response to Treatment:  Continuing to come as able from work schedule. Doing well. continuing forward with push GH range R. Doing well. Occasional catching, but shoulder is really strong. · Compliance with Program/Exercises: complaint  · Recommendations/Intent for next treatment session: \"Next visit will focus on advancements to more challenging activities\".   Total Treatment Duration:  PT Patient Time In/Time Out  Time In: 1130  Time Out: [address], PT

## 2017-10-25 ENCOUNTER — HOSPITAL ENCOUNTER (OUTPATIENT)
Dept: PHYSICAL THERAPY | Age: 52
Discharge: HOME | End: 2017-10-25
Payer: COMMERCIAL

## 2017-10-25 PROCEDURE — 97140 MANUAL THERAPY 1/> REGIONS: CPT

## 2017-10-25 NOTE — PROGRESS NOTES
Gretta Poster  : 1965 [facility] [place] [address], [address], [facility] [address]  [phone]   [phone]          OUTPATIENT PHYSICAL THERAPY:Daily Note 10/25/2017    ICD-10: Treatment Diagnosis:   · Pain in right shoulder (M25.511)  · Stiffness of right shoulder, not elsewhere classified (M25.611)      Precautions/Allergies:   Review of patient's allergies indicates no known allergies. Fall Risk Score: 1 (? 5 = High Risk)  MD Orders: 3 weeks post-op so PROM only 1x/week for 3 weeks, the increase to 2x/week AROM MEDICAL/REFERRING DIAGNOSIS:  Primary osteoarthritis, right shoulder [M19.011]  Sprain of right rotator cuff capsule, subsequent encounter [S43.421D]  Impingement syndrome of right shoulder [M75.41]   DATE OF ONSET: 17  REFERRING PHYSICIAN: Tasha Champagne MD  RETURN PHYSICIAN APPOINTMENT: 2 weeks     INITIAL ASSESSMENT:  Mr. Lupe Roy presents following full thickness tear and repair of supraspinatus R shoulder. He is restricted currently by the protocol. He will benefit from PT services to assist with regaining ROM and strength to resume current high level activity at gym and work. Pt has not been wearing sling and has been using arm to perform work around the house during normal ADLs as well as heavier physical labor like putting up a deck. PROBLEM LIST (Impacting functional limitations):  1. Decreased Strength  2. Decreased ADL/Functional Activities  3. Increased Pain  4. Decreased Activity Tolerance  5. Decreased Flexibility/Joint Mobility  6. Decreased Knowledge of Precautions  7. Decreased [place] with Home Exercise Program INTERVENTIONS PLANNED:  1. Cold  2. Cryotherapy  3. Electrical Stimulation  4. Heat  5. Home Exercise Program (HEP)  6. Manual Therapy  7. Range of Motion (ROM)  8. Therapeutic Activites  9. Therapeutic Exercise/Strengthening   TREATMENT PLAN:  Effective Dates: 17 TO 17.   Frequency/Duration: 1 times a week for 8 weeks  GOALS: (Goals have been discussed and agreed upon with patient.)  Short-Term Functional Goals: Time Frame: 3 weeks  1. Pt will be I with phase appropriate HEP to assist with ROM R shoulder. 2. Pt will achieve R passive shoulder flexion to 160 degrees or greater to assist with overhead reaching. Discharge Goals: Time Frame: 8 weeks  1. Pt will improve R shoulder active external rotation to reach C7 or further down to assist with ADLs. 2. Pt will improve mid range strength R shoulder all planes to 5/5 to improve strength for work activities. 3. Pt will report pain no greater than 2/10 R shoulder on occasion. Rehabilitation Potential For Stated Goals: Good  Regarding Alfonso Bryant therapy, I certify that the treatment plan above will be carried out by a therapist or under their direction. Thank you for this referral,  Guerita Arreola PT     Referring Physician Signature: Fito Beck MD              Date                    The information in this section was collected on 75-17 (except where otherwise noted). HISTORY:   History of Present Injury/Illness (Reason for Referral):  Rotator cuff repair of full thickness tear 2.5cm R shoulder with subacromial decompression 6-20-17   Past Medical History/Comorbidities:   Mr. Rosa Maria German  has a past medical history of Arthritis; Biceps muscle tear; Chronic pain; Insomnia; MRSA (methicillin resistant Staphylococcus aureus) infection (2007); Personal history of kidney stones; and Right shoulder pain. He also has no past medical history of Adverse effect of anesthesia; Difficult intubation; Malignant hyperthermia due to anesthesia; Nausea & vomiting; or Pseudocholinesterase deficiency. Mr. Rosa Maria German  has a past surgical history that includes other surgical; rotator cuff repair (Left, 2010); and orthopaedic (Left, 01/2017). Social History/Living Environment:     lives in private residence with wife.   Prior Level of Function/Work/Activity:  I all activities. High physical activites  Dominant Side:         RIGHT    Current Medications:       Current Outpatient Prescriptions:     fluticasone (FLONASE) 50 mcg/actuation nasal spray, 2 Sprays by Both Nostrils route two (2) times a day., Disp: 1 Bottle, Rfl: 1    FLECTOR 1.3 % pt12, 1 Patch by TransDERmal route every twelve (12) hours every twelve (12) hours. , Disp: 30 Patch, Rfl: 1    diclofenac EC (VOLTAREN) 75 mg EC tablet, Take  by mouth., Disp: , Rfl:    Date Last Reviewed:  7-5-17   Number of Personal Factors/Comorbidities that affect the Plan of Care: 0: LOW COMPLEXITY   EXAMINATION:   Observation/Orthostatic Postural Assessment:          Scope holes healing nicely. Some atrophy present through supraspinatus region R vs L  PROM:          Left shoulder:   Flexion 160  External rotation 80  IR 75       Right shoulder:  Flexion 100  External rotation 30  IR 30  Strength:          No specific testing at this time secondary to restrictions in protocol         Body Structures Involved:  1. Bones  2. Joints  3. Muscles  4. Ligaments Body Functions Affected:  1. Sensory/Pain  2. Neuromusculoskeletal Activities and Participation Affected:  1. Learning and Applying Knowledge  2. Self Care  3. Domestic Life  4. Interpersonal Interactions and Relationships  5. Community, Social and [place]   Number of elements (examined above) that affect the Plan of Care: 3: MODERATE COMPLEXITY   CLINICAL PRESENTATION:   Presentation: Stable and uncomplicated: LOW COMPLEXITY   CLINICAL DECISION MAKING:   Outcome Measure: Tool Used: Disabilities of the Arm, Shoulder and Hand (DASH) Questionnaire - Quick Version  Score:  Initial: 24/55  Most Recent: X/55 (Date: -- )   Interpretation of Score: The DASH is designed to measure the activities of daily living in person's with upper extremity dysfunction or pain. Each section is scored on a 1-5 scale, 5 representing the greatest disability.   The scores of each section are added together for a total score of 55. Score 11 12-19 20-28 29-37 38-45 46-54 55   Modifier CH CI CJ CK CL CM CN     Medical Necessity:   · Patient is expected to demonstrate progress in strength and range of motion to increase independence with ADLs. .  Reason for Services/Other Comments:  · Patient continues to require skilled intervention due to rescent shoulder surgery. Use of outcome tool(s) and clinical judgement create a POC that gives a: Clear prediction of patient's progress: LOW COMPLEXITY            TREATMENT:   (In addition to Assessment/Re-Assessment sessions the following treatments were rendered)  Pre-treatment Symptoms/Complaints:  4/10 R shoulder aching mainly at rest and at night  Pain: Initial:   5/10 R shoulder Post Session:  1/10 R shoulder     MANUAL THERAPY: (40 minutes): PROM, AAROM, RAROM, PNF, acessory motion was utilized and necessary because of the patient's restricted joint motion, painful spasm and restricted motion of soft tissue. performed PROMAAROM/ resisted AROM all planes R shoulder motion following physician instruction. Soft tissue through upper trap, lats. Scapular mobs. Prepared ice for pt to take with him after treatment. Date  9-14-17 Date  10-4-17     Activity/Exercise       pulleys       UBE L4 8 minutes L4 8 minutes     AROM All planes  All planes     AAROM       S/L external rotation       Supine overhead reach       Standing scaption       Green t-band       Ball drops                          Treatment/Session Assessment:    · Response to Treatment:  Nothing big changing. Continuing manual treatment with slow but steady progress ROM. · Compliance with Program/Exercises: complaint  · Recommendations/Intent for next treatment session: \"Next visit will focus on advancements to more challenging activities\".   Total Treatment Duration:  PT Patient Time In/Time Out  Time In: 1130  Time Out: 1215    Ky Goodpasture, PT

## 2017-11-03 ENCOUNTER — HOSPITAL ENCOUNTER (OUTPATIENT)
Dept: PHYSICAL THERAPY | Age: 52
Discharge: HOME | End: 2017-11-03
Payer: COMMERCIAL

## 2017-11-03 PROCEDURE — 97140 MANUAL THERAPY 1/> REGIONS: CPT

## 2017-11-03 NOTE — PROGRESS NOTES
Lorelei Guerrero  : 1965 [facility] [address] [address],[address], [facility] [address].  [phone]   [phone]          OUTPATIENT PHYSICAL THERAPY:Daily Note and Discharge 11/3/2017    ICD-10: Treatment Diagnosis:   · Pain in right shoulder (M25.511)  · Stiffness of right shoulder, not elsewhere classified (M25.611)      Precautions/Allergies:   Review of patient's allergies indicates no known allergies. Fall Risk Score: 1 (? 5 = High Risk)  MD Orders: 3 weeks post-op so PROM only 1x/week for 3 weeks, the increase to 2x/week AROM MEDICAL/REFERRING DIAGNOSIS:  Primary osteoarthritis, right shoulder [M19.011]  Sprain of right rotator cuff capsule, subsequent encounter [S43.421D]  Impingement syndrome of right shoulder [M75.41]   DATE OF ONSET: 17  REFERRING PHYSICIAN: Gallito Champagne MD  RETURN PHYSICIAN APPOINTMENT: 2 weeks     INITIAL ASSESSMENT:  Mr. Christo Solomon is doing great he has met goals and will DC today. PROBLEM LIST (Impacting functional limitations):  1. Decreased Strength  2. Decreased ADL/Functional Activities  3. Increased Pain  4. Decreased Activity Tolerance  5. Decreased Flexibility/Joint Mobility  6. Decreased Knowledge of Precautions  7. Decreased [place] with Home Exercise Program INTERVENTIONS PLANNED:  1. Cold  2. Cryotherapy  3. Electrical Stimulation  4. Heat  5. Home Exercise Program (HEP)  6. Manual Therapy  7. Range of Motion (ROM)  8. Therapeutic Activites  9. Therapeutic Exercise/Strengthening   TREATMENT PLAN:  Effective Dates: 17 TO 17. Frequency/Duration: 1 times a week for 8 weeks  GOALS: (Goals have been discussed and agreed upon with patient.)  Short-Term Functional Goals: Time Frame: 3 weeks  1. Pt will be I with phase appropriate HEP to assist with ROM R shoulder. GOAL MET 11/3/2017   2. Pt will achieve R passive shoulder flexion to 160 degrees or greater to assist with overhead reaching.  GOAL MET 11/3/2017   Discharge Goals: Time Frame: 8 weeks  1. Pt will improve R shoulder active external rotation to reach C7 or further down to assist with ADLs. GOAL MET 11/3/2017   2. Pt will improve mid range strength R shoulder all planes to 5/5 to improve strength for work activities. GOAL MET 11/3/2017   3. Pt will report pain no greater than 2/10 R shoulder on occasion. GOAL MET 11/3/2017   Rehabilitation Potential For Stated Goals: Good  Regarding Honorio Pool therapy, I certify that the treatment plan above will be carried out by a therapist or under their direction. Thank you for this referral,  Ann-Marie De Leon PT     Referring Physician Signature: Radha Ruvalcaba MD              Date                    The information in this section was collected on 75-17 (except where otherwise noted). HISTORY:   History of Present Injury/Illness (Reason for Referral):  Rotator cuff repair of full thickness tear 2.5cm R shoulder with subacromial decompression 6-20-17   Past Medical History/Comorbidities:   Mr. Jorge Luis Strange  has a past medical history of Arthritis; Biceps muscle tear; Chronic pain; Insomnia; MRSA (methicillin resistant Staphylococcus aureus) infection (2007); Personal history of kidney stones; and Right shoulder pain. He also has no past medical history of Adverse effect of anesthesia; Difficult intubation; Malignant hyperthermia due to anesthesia; Nausea & vomiting; or Pseudocholinesterase deficiency. Mr. Jorge Luis Strange  has a past surgical history that includes other surgical; rotator cuff repair (Left, 2010); and orthopaedic (Left, 01/2017). Social History/Living Environment:     lives in private residence with wife. Prior Level of Function/Work/Activity:  I all activities.  High physical activites  Dominant Side:         RIGHT    Current Medications:       Current Outpatient Prescriptions:     fluticasone (FLONASE) 50 mcg/actuation nasal spray, 2 Sprays by Both Nostrils route two (2) times a day., Disp: 1 Bottle, Rfl: 1    FLECTOR 1.3 % pt12, 1 Patch by TransDERmal route every twelve (12) hours every twelve (12) hours. , Disp: 30 Patch, Rfl: 1    diclofenac EC (VOLTAREN) 75 mg EC tablet, Take  by mouth., Disp: , Rfl:    Date Last Reviewed:  7-5-17   Number of Personal Factors/Comorbidities that affect the Plan of Care: 0: LOW COMPLEXITY   EXAMINATION:   Observation/Orthostatic Postural Assessment:          Scope holes healing nicely. Some atrophy present through supraspinatus region R vs L  PROM:          Left shoulder:   Flexion 160  External rotation 80  IR 75       Right shoulder:  Flexion 160  External rotation 80  IR 74  Strength:          5/5 strength all planes both shoulders         Body Structures Involved:  1. Bones  2. Joints  3. Muscles  4. Ligaments Body Functions Affected:  1. Sensory/Pain  2. Neuromusculoskeletal Activities and Participation Affected:  1. Learning and Applying Knowledge  2. Self Care  3. Domestic Life  4. Interpersonal Interactions and Relationships  5. Community, Social and [place]   Number of elements (examined above) that affect the Plan of Care: 3: MODERATE COMPLEXITY   CLINICAL PRESENTATION:   Presentation: Stable and uncomplicated: LOW COMPLEXITY   CLINICAL DECISION MAKING:   Outcome Measure: Tool Used: Disabilities of the Arm, Shoulder and Hand (DASH) Questionnaire - Quick Version  Score:  Initial: 24/55  Most Recent: 11/55 (Date: 11/3/2017  )   Interpretation of Score: The DASH is designed to measure the activities of daily living in person's with upper extremity dysfunction or pain. Each section is scored on a 1-5 scale, 5 representing the greatest disability. The scores of each section are added together for a total score of 55. Score 11 12-19 20-28 29-37 38-45 46-54 55   Modifier CH CI CJ CK CL CM CN     Medical Necessity:   · Patient is expected to demonstrate progress in strength and range of motion to increase independence with ADLs. .  Reason for Services/Other Comments:  · Patient continues to require skilled intervention due to rescent shoulder surgery. Use of outcome tool(s) and clinical judgement create a POC that gives a: Clear prediction of patient's progress: LOW COMPLEXITY            TREATMENT:   (In addition to Assessment/Re-Assessment sessions the following treatments were rendered)  Pre-treatment Symptoms/Complaints:  1/10 R shoulder on and off  Pain: Initial:   5/10 R shoulder Post Session:  0/10 R shoulder     MANUAL THERAPY: (40 minutes): PROM, AAROM, RAROM, PNF, acessory motion was utilized and necessary because of the patient's restricted joint motion, painful spasm and restricted motion of soft tissue. performed PROMAAROM/ resisted AROM all planes R shoulder motion following physician instruction. Soft tissue through upper trap, lats. Scapular mobs. Prepared ice for pt to take with him after treatment. Date  9-14-17 Date  10-4-17     Activity/Exercise       pulleys       UBE L4 8 minutes L4 8 minutes     AROM All planes  All planes     AAROM       S/L external rotation       Supine overhead reach       Standing scaption       Green t-band       Ball drops                          Treatment/Session Assessment:    · Response to Treatment:  Doing great.  Ready for DC    Total Treatment Duration:  PT Patient Time In/Time Out  Time In: 1130  Time Out: 1215    Eugenia De Paz, PT

## 2018-10-29 ENCOUNTER — HOSPITAL ENCOUNTER (OUTPATIENT)
Dept: LAB | Age: 53
Discharge: HOME | End: 2018-10-29
Payer: COMMERCIAL

## 2018-10-29 LAB
BASOPHILS # BLD: 0 K/UL (ref 0–0.2)
BASOPHILS NFR BLD: 0 % (ref 0–2)
CRP SERPL-MCNC: 33.5 MG/DL (ref 0–0.9)
DIFFERENTIAL METHOD BLD: ABNORMAL
EOSINOPHIL # BLD: 0 K/UL (ref 0–0.8)
EOSINOPHIL NFR BLD: 0 % (ref 0.5–7.8)
ERYTHROCYTE [DISTWIDTH] IN BLOOD BY AUTOMATED COUNT: 14.6 %
ERYTHROCYTE [SEDIMENTATION RATE] IN BLOOD: 91 MM/HR (ref 0–20)
HCT VFR BLD AUTO: 42.5 % (ref 41.1–50.3)
HGB BLD-MCNC: 13.9 G/DL (ref 13.6–17.2)
IMM GRANULOCYTES # BLD: 0.2 K/UL (ref 0–0.5)
IMM GRANULOCYTES NFR BLD AUTO: 1 % (ref 0–5)
LYMPHOCYTES # BLD: 1.5 K/UL (ref 0.5–4.6)
LYMPHOCYTES NFR BLD: 5 % (ref 13–44)
MCH RBC QN AUTO: 29 PG (ref 26.1–32.9)
MCHC RBC AUTO-ENTMCNC: 32.7 G/DL (ref 31.4–35)
MCV RBC AUTO: 88.7 FL (ref 79.6–97.8)
MONOCYTES # BLD: 0.9 K/UL (ref 0.1–1.3)
MONOCYTES NFR BLD: 3 % (ref 4–12)
NEUTS SEG # BLD: 24.5 K/UL (ref 1.7–8.2)
NEUTS SEG NFR BLD: 90 % (ref 43–78)
NRBC # BLD: 0 K/UL (ref 0–0.2)
PLATELET # BLD AUTO: 571 K/UL (ref 150–450)
PMV BLD AUTO: 9.6 FL (ref 9.4–12.3)
RBC # BLD AUTO: 4.79 M/UL (ref 4.23–5.6)
WBC # BLD AUTO: 27.1 K/UL (ref 4.3–11.1)

## 2018-10-29 PROCEDURE — 86140 C-REACTIVE PROTEIN: CPT

## 2018-10-29 PROCEDURE — 87205 SMEAR GRAM STAIN: CPT

## 2018-10-29 PROCEDURE — 85025 COMPLETE CBC W/AUTO DIFF WBC: CPT

## 2018-10-29 PROCEDURE — 85652 RBC SED RATE AUTOMATED: CPT

## 2018-10-29 PROCEDURE — 87077 CULTURE AEROBIC IDENTIFY: CPT

## 2018-10-29 PROCEDURE — 36415 COLL VENOUS BLD VENIPUNCTURE: CPT

## 2018-10-29 PROCEDURE — 87186 SC STD MICRODIL/AGAR DIL: CPT

## 2018-11-01 LAB
BACTERIA SPEC CULT: ABNORMAL
GRAM STN SPEC: ABNORMAL
GRAM STN SPEC: ABNORMAL
SERVICE CMNT-IMP: ABNORMAL

## 2018-11-04 ENCOUNTER — HOSPITAL ENCOUNTER (EMERGENCY)
Age: 53
Discharge: HOME | End: 2018-11-04
Attending: EMERGENCY MEDICINE
Payer: COMMERCIAL

## 2018-11-04 ENCOUNTER — APPOINTMENT (OUTPATIENT)
Dept: GENERAL RADIOLOGY | Age: 53
End: 2018-11-04
Attending: EMERGENCY MEDICINE
Payer: COMMERCIAL

## 2018-11-04 VITALS
TEMPERATURE: 98.2 F | OXYGEN SATURATION: 95 % | RESPIRATION RATE: 18 BRPM | BODY MASS INDEX: 29.82 KG/M2 | WEIGHT: 190 LBS | DIASTOLIC BLOOD PRESSURE: 74 MMHG | HEART RATE: 105 BPM | HEIGHT: 67 IN | SYSTOLIC BLOOD PRESSURE: 156 MMHG

## 2018-11-04 DIAGNOSIS — M25.511 ACUTE PAIN OF RIGHT SHOULDER: Primary | ICD-10-CM

## 2018-11-04 DIAGNOSIS — M25.552 LEFT HIP PAIN: ICD-10-CM

## 2018-11-04 LAB
ALBUMIN SERPL-MCNC: 2.6 G/DL (ref 3.5–5)
ALBUMIN/GLOB SERPL: 0.5 {RATIO}
ALP SERPL-CCNC: 66 U/L (ref 50–136)
ALT SERPL-CCNC: 96 U/L (ref 12–65)
ANION GAP SERPL CALC-SCNC: 9 MMOL/L
APPEARANCE UR: ABNORMAL
AST SERPL-CCNC: 59 U/L (ref 15–37)
BACTERIA URNS QL MICRO: 0 /HPF
BASOPHILS # BLD: 0.1 K/UL (ref 0–0.2)
BASOPHILS NFR BLD: 1 % (ref 0–2)
BILIRUB SERPL-MCNC: 0.4 MG/DL (ref 0.2–1.1)
BILIRUB UR QL: NEGATIVE
BUN SERPL-MCNC: 38 MG/DL (ref 6–23)
CALCIUM SERPL-MCNC: 10 MG/DL (ref 8.3–10.4)
CASTS URNS QL MICRO: ABNORMAL /LPF
CHLORIDE SERPL-SCNC: 96 MMOL/L (ref 98–107)
CO2 SERPL-SCNC: 29 MMOL/L (ref 21–32)
COLOR UR: YELLOW
CREAT SERPL-MCNC: 1.32 MG/DL (ref 0.8–1.5)
CRP SERPL-MCNC: 32.4 MG/DL (ref 0–0.9)
DIFFERENTIAL METHOD BLD: ABNORMAL
EOSINOPHIL # BLD: 0.1 K/UL (ref 0–0.8)
EOSINOPHIL NFR BLD: 0 % (ref 0.5–7.8)
EPI CELLS #/AREA URNS HPF: ABNORMAL /HPF
ERYTHROCYTE [DISTWIDTH] IN BLOOD BY AUTOMATED COUNT: 14.8 %
ERYTHROCYTE [SEDIMENTATION RATE] IN BLOOD: 90 MM/HR (ref 0–20)
GLOBULIN SER CALC-MCNC: 5.2 G/DL (ref 2.3–3.5)
GLUCOSE SERPL-MCNC: 86 MG/DL (ref 65–100)
GLUCOSE UR STRIP.AUTO-MCNC: NEGATIVE MG/DL
HCT VFR BLD AUTO: 41.4 % (ref 41.1–50.3)
HGB BLD-MCNC: 13.8 G/DL (ref 13.6–17.2)
HGB UR QL STRIP: ABNORMAL
IMM GRANULOCYTES # BLD: 0.3 K/UL (ref 0–0.5)
IMM GRANULOCYTES NFR BLD AUTO: 2 % (ref 0–5)
KETONES UR QL STRIP.AUTO: NEGATIVE MG/DL
LACTATE BLD-SCNC: 1.52 MMOL/L (ref 0.5–1.9)
LEUKOCYTE ESTERASE UR QL STRIP.AUTO: NEGATIVE
LYMPHOCYTES # BLD: 2.4 K/UL (ref 0.5–4.6)
LYMPHOCYTES NFR BLD: 11 % (ref 13–44)
MCH RBC QN AUTO: 29.1 PG (ref 26.1–32.9)
MCHC RBC AUTO-ENTMCNC: 33.3 G/DL (ref 31.4–35)
MCV RBC AUTO: 87.2 FL (ref 79.6–97.8)
MONOCYTES # BLD: 1.5 K/UL (ref 0.1–1.3)
MONOCYTES NFR BLD: 7 % (ref 4–12)
NEUTS SEG # BLD: 16.3 K/UL (ref 1.7–8.2)
NEUTS SEG NFR BLD: 79 % (ref 43–78)
NITRITE UR QL STRIP.AUTO: NEGATIVE
NRBC # BLD: 0 K/UL (ref 0–0.2)
PH UR STRIP: 5.5 [PH] (ref 5–9)
PLATELET # BLD AUTO: 579 K/UL (ref 150–450)
PMV BLD AUTO: 9.7 FL (ref 9.4–12.3)
POTASSIUM SERPL-SCNC: 5.3 MMOL/L (ref 3.5–5.1)
PROT SERPL-MCNC: 7.8 G/DL
PROT UR STRIP-MCNC: 100 MG/DL
RBC # BLD AUTO: 4.75 M/UL (ref 4.23–5.6)
RBC #/AREA URNS HPF: ABNORMAL /HPF
SODIUM SERPL-SCNC: 134 MMOL/L (ref 136–145)
SP GR UR REFRACTOMETRY: 1.03 (ref 1–1.02)
UROBILINOGEN UR QL STRIP.AUTO: 0.2 EU/DL (ref 0.2–1)
WBC # BLD AUTO: 20.6 K/UL (ref 4.3–11.1)
WBC URNS QL MICRO: ABNORMAL /HPF

## 2018-11-04 PROCEDURE — 87040 BLOOD CULTURE FOR BACTERIA: CPT

## 2018-11-04 PROCEDURE — 96365 THER/PROPH/DIAG IV INF INIT: CPT | Performed by: EMERGENCY MEDICINE

## 2018-11-04 PROCEDURE — 87205 SMEAR GRAM STAIN: CPT

## 2018-11-04 PROCEDURE — 96375 TX/PRO/DX INJ NEW DRUG ADDON: CPT | Performed by: EMERGENCY MEDICINE

## 2018-11-04 PROCEDURE — 87077 CULTURE AEROBIC IDENTIFY: CPT

## 2018-11-04 PROCEDURE — 81001 URINALYSIS AUTO W/SCOPE: CPT

## 2018-11-04 PROCEDURE — 99284 EMERGENCY DEPT VISIT MOD MDM: CPT | Performed by: EMERGENCY MEDICINE

## 2018-11-04 PROCEDURE — 87641 MR-STAPH DNA AMP PROBE: CPT

## 2018-11-04 PROCEDURE — 74011000258 HC RX REV CODE- 258: Performed by: EMERGENCY MEDICINE

## 2018-11-04 PROCEDURE — 74011250636 HC RX REV CODE- 250/636: Performed by: EMERGENCY MEDICINE

## 2018-11-04 PROCEDURE — 85025 COMPLETE CBC W/AUTO DIFF WBC: CPT

## 2018-11-04 PROCEDURE — 85652 RBC SED RATE AUTOMATED: CPT

## 2018-11-04 PROCEDURE — 80053 COMPREHEN METABOLIC PANEL: CPT

## 2018-11-04 PROCEDURE — 86140 C-REACTIVE PROTEIN: CPT

## 2018-11-04 PROCEDURE — 87186 SC STD MICRODIL/AGAR DIL: CPT

## 2018-11-04 PROCEDURE — 83605 ASSAY OF LACTIC ACID: CPT

## 2018-11-04 PROCEDURE — 71046 X-RAY EXAM CHEST 2 VIEWS: CPT

## 2018-11-04 RX ORDER — ONDANSETRON 2 MG/ML
4 INJECTION INTRAMUSCULAR; INTRAVENOUS
Status: COMPLETED | OUTPATIENT
Start: 2018-11-04 | End: 2018-11-04

## 2018-11-04 RX ORDER — OXYCODONE HYDROCHLORIDE 5 MG/1
5-10 TABLET ORAL
Qty: 20 TAB | Refills: 0 | Status: SHIPPED | OUTPATIENT
Start: 2018-11-04 | End: 2018-12-11 | Stop reason: DRUGHIGH

## 2018-11-04 RX ORDER — ONDANSETRON 8 MG/1
8 TABLET, ORALLY DISINTEGRATING ORAL
Qty: 12 TAB | Refills: 1 | Status: SHIPPED | OUTPATIENT
Start: 2018-11-04 | End: 2018-11-20

## 2018-11-04 RX ORDER — IBUPROFEN 800 MG/1
800 TABLET ORAL
Qty: 20 TAB | Refills: 0 | Status: SHIPPED | OUTPATIENT
Start: 2018-11-04 | End: 2018-11-20

## 2018-11-04 RX ORDER — CEPHALEXIN 500 MG/1
500 CAPSULE ORAL 4 TIMES DAILY
Qty: 28 CAP | Refills: 0 | Status: SHIPPED | OUTPATIENT
Start: 2018-11-04 | End: 2018-11-20

## 2018-11-04 RX ORDER — HYDROMORPHONE HYDROCHLORIDE 2 MG/ML
1 INJECTION, SOLUTION INTRAMUSCULAR; INTRAVENOUS; SUBCUTANEOUS
Status: COMPLETED | OUTPATIENT
Start: 2018-11-04 | End: 2018-11-04

## 2018-11-04 RX ADMIN — ONDANSETRON HYDROCHLORIDE 4 MG: 2 INJECTION INTRAMUSCULAR; INTRAVENOUS at 19:46

## 2018-11-04 RX ADMIN — HYDROMORPHONE HYDROCHLORIDE 1 MG: 2 INJECTION, SOLUTION INTRAMUSCULAR; INTRAVENOUS; SUBCUTANEOUS at 19:45

## 2018-11-04 RX ADMIN — CEFAZOLIN 1 G: 1 INJECTION, POWDER, FOR SOLUTION INTRAMUSCULAR; INTRAVENOUS at 19:45

## 2018-11-04 RX ADMIN — SODIUM CHLORIDE 1000 ML: 900 INJECTION, SOLUTION INTRAVENOUS at 19:45

## 2018-11-04 NOTE — LETTER
[address] [facility] EMERGENCY DEPT 
[facility] [address] [place] [address] [phone] Work/School Note Date: 11/4/2018 To Whom It May concern: 
 
Raina Valenzuela was seen and treated today in the emergency room by the following provider(s): 
Attending Provider: Virgie Mcdonald MD.   
 
Raina Valenzuela may return to work on 10/06/18, Please excuse Thursday, Friday, Saturday, Sunday and Monday as needed.  
 
Sincerely, 
 
 
 
 
Jinny Durand MD

## 2018-11-04 NOTE — ED TRIAGE NOTES
Pt presents to the ED with pain to L hip and R arm,  Reports replacement to hip and rotator cuff surgery x 2 years ago,  For 2 weeks has had fever, fatigue, increased pain,  Reports elevated WBC last week,  Has been unable to walk without pain,  C/o nausea

## 2018-11-04 NOTE — ED PROVIDER NOTES
Pt presents with fevers and body aches, particularly right shoulder and left hip. Started 10/22 after leaving the gym from working out 10/22 started with bodyaches and fevers up to 103 and 104 
10/25 ortho appointment for right shoulder: steroid injection - a little better for 1-2 days, then as bad (or worse) therafter 10/28 af urgent care: started steroid pack 10/29 ortho appointment:  Hip xray, hip fluid drained, peripheral wbc 27k, crp and esr up 
 af urgent care: flu negative  er now wbc down from 27 to 20, esr still up, crp - pending Past Medical History:  
Diagnosis Date  Arthritis   
 right shoulder  Biceps muscle tear  Chronic pain  Insomnia  MRSA (methicillin resistant Staphylococcus aureus) infection   
 after a spider bite with I&D  Personal history of kidney stones  Right shoulder pain Past Surgical History:  
Procedure Laterality Date  HX ORTHOPAEDIC Left 2017  
 left hip replacement  HX OTHER SURGICAL    
 posterior rt leg spider bite excision per pt.  HX ROTATOR CUFF REPAIR Left   HX SEPTOPLASTY  11/15/2017 Family History:  
Problem Relation Age of Onset  Hypertension Mother  Heart Disease Father Social History Socioeconomic History  Marital status: LIFE PARTNER Spouse name: Not on file  Number of children: Not on file  Years of education: Not on file  Highest education level: Not on file Social Needs  Financial resource strain: Not on file  Food insecurity - worry: Not on file  Food insecurity - inability: Not on file  Transportation needs - medical: Not on file  Transportation needs - non-medical: Not on file Occupational History  Not on file Tobacco Use  Smoking status: Former Smoker Packs/day: 0.50 Years: 1.00 Pack years: 0.50 Last attempt to quit: 2001 Years since quittin.4  Smokeless tobacco: Never Used Substance and Sexual Activity  Alcohol use: Yes Comment: occasionally  Drug use: No  
 Sexual activity: Yes  
  Partners: Female Birth control/protection: None Other Topics Concern  Not on file Social History Narrative  Not on file ALLERGIES: Patient has no known allergies. Review of Systems Constitutional: Positive for chills, fatigue and fever. HENT: Negative for rhinorrhea and sore throat. Eyes: Negative for discharge and redness. Respiratory: Positive for cough. Negative for shortness of breath. Cardiovascular: Negative for chest pain and palpitations. Gastrointestinal: Negative for abdominal pain, nausea and vomiting. Genitourinary: Negative for difficulty urinating and dysuria. Musculoskeletal: Positive for arthralgias, back pain, gait problem and myalgias. Skin: Negative for rash. Neurological: Positive for headaches. Negative for dizziness, weakness and numbness. All other systems reviewed and are negative. Vitals:  
 11/04/18 1623 11/04/18 1625 11/04/18 1640 11/04/18 1700 BP: 171/74  169/77 175/81 Pulse:      
Resp:      
Temp:      
SpO2: 96% 97% 95% 96% Weight:      
Height:      
      
 
Physical Exam  
Constitutional: He is oriented to person, place, and time. He appears well-developed and well-nourished. No distress. HENT:  
Head: Normocephalic and atraumatic. Eyes: Conjunctivae are normal. Pupils are equal, round, and reactive to light. Right eye exhibits no discharge. Left eye exhibits no discharge. No scleral icterus. Neck: Normal range of motion. Neck supple. Cardiovascular: Regular rhythm and normal heart sounds. Exam reveals no gallop. No murmur heard. Pulmonary/Chest: Effort normal and breath sounds normal. No respiratory distress. He has no wheezes. He has no rales. Abdominal: Soft. Bowel sounds are normal. There is no tenderness. There is no guarding. Musculoskeletal: He exhibits tenderness. He exhibits no edema or deformity. Right shoulder: He exhibits decreased range of motion and tenderness. He exhibits no swelling and no deformity. Left hip: He exhibits decreased range of motion and tenderness (pain with range of motion). He exhibits no bony tenderness. Arms: 
     Legs: 
Neurological: He is alert and oriented to person, place, and time. He exhibits normal muscle tone. cni 2-12 grossly Skin: Skin is warm and dry. He is not diaphoretic. Psychiatric: He has a normal mood and affect. His behavior is normal.  
Nursing note and vitals reviewed. MDM Number of Diagnoses or Management Options Diagnosis management comments: Medical decision making note: 
Leukocytosis with high sedimentation rate and CRP. Joint fluid from the 29th suspicious for infection. Case discussed with Dr. Luci Hammer from [facility] Afebrile today with normal lactic acid and improving white count. ,  he does not appear septic Dr. Jose Downs recommends 1 g of IV Ancef, and home with Keflex, they will see him in the office tomorrow morning This concludes the \"medical decision making note\" part of this emergency department visit note. Amount and/or Complexity of Data Reviewed Clinical lab tests: reviewed and ordered (White count is coming down, esr is about the same, 
I was eventually able to find the results from the arthrocentesis on the 29th.  0-7 white cells and staph aureus were found. It is pansensitive.) Review and summarize past medical records: yes Procedures

## 2018-11-05 ENCOUNTER — HOSPITAL ENCOUNTER (INPATIENT)
Age: 53
LOS: 2 days | Discharge: ANOTHER HOSPITAL | DRG: 467 | End: 2018-11-07
Attending: ORTHOPAEDIC SURGERY | Admitting: ORTHOPAEDIC SURGERY
Payer: COMMERCIAL

## 2018-11-05 ENCOUNTER — APPOINTMENT (OUTPATIENT)
Dept: MRI IMAGING | Age: 53
DRG: 467 | End: 2018-11-05
Attending: PHYSICIAN ASSISTANT
Payer: COMMERCIAL

## 2018-11-05 ENCOUNTER — APPOINTMENT (OUTPATIENT)
Dept: GENERAL RADIOLOGY | Age: 53
DRG: 467 | End: 2018-11-05
Attending: PHYSICIAN ASSISTANT
Payer: COMMERCIAL

## 2018-11-05 PROBLEM — M19.011 OSTEOARTHRITIS OF RIGHT GLENOHUMERAL JOINT: Status: ACTIVE | Noted: 2018-11-05

## 2018-11-05 PROBLEM — T84.59XA INFECTED PROSTHETIC HIP (HCC): Status: ACTIVE | Noted: 2018-11-05

## 2018-11-05 PROBLEM — Z96.649 INFECTED PROSTHETIC HIP (HCC): Status: ACTIVE | Noted: 2018-11-05

## 2018-11-05 PROBLEM — M00.9 SEPTIC ARTHRITIS OF SHOULDER, RIGHT (HCC): Status: ACTIVE | Noted: 2018-11-05

## 2018-11-05 PROBLEM — M19.011 DEGENERATIVE JOINT DISEASE OF RIGHT ACROMIOCLAVICULAR JOINT: Status: ACTIVE | Noted: 2018-11-05

## 2018-11-05 PROBLEM — M75.121 COMPLETE TEAR OF RIGHT ROTATOR CUFF: Status: ACTIVE | Noted: 2018-11-05

## 2018-11-05 LAB
ABO + RH BLD: NORMAL
APTT PPP: 34.7 SEC (ref 23.2–35.3)
ATRIAL RATE: 105 BPM
BLOOD GROUP ANTIBODIES SERPL: NORMAL
CALCULATED P AXIS, ECG09: 59 DEGREES
CALCULATED R AXIS, ECG10: 1 DEGREES
CALCULATED T AXIS, ECG11: 47 DEGREES
DIAGNOSIS, 93000: NORMAL
INR PPP: 1.1
P-R INTERVAL, ECG05: 142 MS
PROTHROMBIN TIME: 14.6 SEC (ref 11.5–14.5)
Q-T INTERVAL, ECG07: 302 MS
QRS DURATION, ECG06: 90 MS
QTC CALCULATION (BEZET), ECG08: 399 MS
SPECIMEN EXP DATE BLD: NORMAL
VENTRICULAR RATE, ECG03: 105 BPM

## 2018-11-05 PROCEDURE — 65270000029 HC RM PRIVATE

## 2018-11-05 PROCEDURE — 74011250636 HC RX REV CODE- 250/636: Performed by: INTERNAL MEDICINE

## 2018-11-05 PROCEDURE — 93005 ELECTROCARDIOGRAM TRACING: CPT | Performed by: PHYSICIAN ASSISTANT

## 2018-11-05 PROCEDURE — 73221 MRI JOINT UPR EXTREM W/O DYE: CPT

## 2018-11-05 PROCEDURE — 85610 PROTHROMBIN TIME: CPT

## 2018-11-05 PROCEDURE — 86901 BLOOD TYPING SEROLOGIC RH(D): CPT

## 2018-11-05 PROCEDURE — 74011250637 HC RX REV CODE- 250/637: Performed by: INTERNAL MEDICINE

## 2018-11-05 PROCEDURE — 85730 THROMBOPLASTIN TIME PARTIAL: CPT

## 2018-11-05 PROCEDURE — 74011250636 HC RX REV CODE- 250/636: Performed by: ORTHOPAEDIC SURGERY

## 2018-11-05 PROCEDURE — 74011250637 HC RX REV CODE- 250/637: Performed by: PHYSICIAN ASSISTANT

## 2018-11-05 PROCEDURE — 73010 X-RAY EXAM OF SHOULDER BLADE: CPT

## 2018-11-05 PROCEDURE — 74011000250 HC RX REV CODE- 250: Performed by: INTERNAL MEDICINE

## 2018-11-05 PROCEDURE — 73030 X-RAY EXAM OF SHOULDER: CPT

## 2018-11-05 PROCEDURE — 74011250637 HC RX REV CODE- 250/637: Performed by: ORTHOPAEDIC SURGERY

## 2018-11-05 PROCEDURE — 74011250637 HC RX REV CODE- 250/637: Performed by: HOSPITALIST

## 2018-11-05 RX ORDER — ACETAMINOPHEN 325 MG/1
650 TABLET ORAL
Status: DISCONTINUED | OUTPATIENT
Start: 2018-11-05 | End: 2018-11-06

## 2018-11-05 RX ORDER — RIFAMPIN 300 MG/1
300 CAPSULE ORAL EVERY 12 HOURS
Status: DISCONTINUED | OUTPATIENT
Start: 2018-11-05 | End: 2018-11-06

## 2018-11-05 RX ORDER — OXYCODONE HYDROCHLORIDE 5 MG/1
10 TABLET ORAL
Status: DISCONTINUED | OUTPATIENT
Start: 2018-11-05 | End: 2018-11-06

## 2018-11-05 RX ORDER — TEMAZEPAM 15 MG/1
15 CAPSULE ORAL
Status: DISCONTINUED | OUTPATIENT
Start: 2018-11-05 | End: 2018-11-06

## 2018-11-05 RX ORDER — HYDROMORPHONE HYDROCHLORIDE 2 MG/ML
1 INJECTION, SOLUTION INTRAMUSCULAR; INTRAVENOUS; SUBCUTANEOUS
Status: DISCONTINUED | OUTPATIENT
Start: 2018-11-05 | End: 2018-11-06

## 2018-11-05 RX ADMIN — ACETAMINOPHEN 650 MG: 325 TABLET ORAL at 20:35

## 2018-11-05 RX ADMIN — HYDROMORPHONE HYDROCHLORIDE 1 MG: 2 INJECTION, SOLUTION INTRAMUSCULAR; INTRAVENOUS; SUBCUTANEOUS at 20:48

## 2018-11-05 RX ADMIN — Medication 3 AMPULE: at 18:00

## 2018-11-05 RX ADMIN — HYDROMORPHONE HYDROCHLORIDE 1 MG: 2 INJECTION, SOLUTION INTRAMUSCULAR; INTRAVENOUS; SUBCUTANEOUS at 18:00

## 2018-11-05 RX ADMIN — TEMAZEPAM 15 MG: 15 CAPSULE ORAL at 22:21

## 2018-11-05 RX ADMIN — RIFAMPIN 300 MG: 300 CAPSULE ORAL at 20:48

## 2018-11-05 RX ADMIN — OXYCODONE HYDROCHLORIDE 10 MG: 5 TABLET ORAL at 21:53

## 2018-11-05 RX ADMIN — OXYCODONE HYDROCHLORIDE 10 MG: 5 TABLET ORAL at 16:59

## 2018-11-05 RX ADMIN — HYDROMORPHONE HYDROCHLORIDE 1 MG: 2 INJECTION, SOLUTION INTRAMUSCULAR; INTRAVENOUS; SUBCUTANEOUS at 15:06

## 2018-11-05 RX ADMIN — NAFCILLIN 12 G: 10 INJECTION, POWDER, FOR SOLUTION INTRAVENOUS at 21:23

## 2018-11-05 NOTE — CONSULTS
[facility] CONSULT NOTE  WHAT:    RIGHT SHOULDER PAIN  HOW:    COMPLICATED HISTORY BUT RELATED TO INJECTION OVER LAST TWO WEEKS  REFERRING MD:  DR. Ludin Romero  Subjective:     Patient is a 48 y.o. RHD MALE WITH HISTORY OF PREVIOUS RIGHT SHOULDER SURGERY 6/17 AND LEFT JEIMY 1/17 S/P RIGHT SHOULDER CORTISONE INJECTION TWO WEEKS AGO WITH SUBSEQUENT SEVERE LEFT HIP AND RIGHT SHOULDER PAIN. LEFT HIP ASPIRATE POSITIVE FOR MSSA. 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 11/05/2018    Osteoarthritis of right glenohumeral joint 11/05/2018    Complete tear of right rotator cuff 11/05/2018    S/P total hip arthroplasty 01/06/2017    Arthritis of left hip 01/06/2017    Acute pain of right shoulder 06/10/2016    Hip pain 06/30/2015     Past Medical History:   Diagnosis Date    Arthritis     right shoulder     Biceps muscle tear     Chronic pain     Insomnia     MRSA (methicillin resistant Staphylococcus aureus) infection 2007    after a spider bite with I&D    Personal history of kidney stones     Right shoulder pain       Past Surgical History:   Procedure Laterality Date    HX ORTHOPAEDIC Left 01/2017    left hip replacement    HX OTHER SURGICAL      posterior rt leg spider bite excision per pt.  HX ROTATOR CUFF REPAIR Left 2010    HX SEPTOPLASTY  11/15/2017      Prior to Admission medications    Medication Sig Start Date End Date Taking? Authorizing Provider   oxyCODONE IR (ROXICODONE) 5 mg immediate release tablet Take 1-2 Tabs by mouth every six (6) hours as needed for Pain. Max Daily Amount: 40 mg. 11/4/18   Adeline Ashton MD   ondansetron (ZOFRAN ODT) 8 mg disintegrating tablet Take 1 Tab by mouth every eight (8) hours as needed for Nausea.  11/4/18   Adeline Ashton MD   ibuprofen (MOTRIN) 800 mg tablet Take 1 Tab by mouth every eight (8) hours as needed for Pain for up to 7 days. 18  Harriet Perez MD   cephALEXin (KEFLEX) 500 mg capsule Take 1 Cap by mouth four (4) times daily for 7 days. 18  Harriet Perez MD   HYDROcodone-acetaminophen [facility]) 5-325 mg per tablet 1-2 tab Q4-6 hours PRN pain  Indications: Pain 17   Mini Bragg MD   ondansetron (ZOFRAN ODT) 8 mg disintegrating tablet 1 tablet Q 8 hours PRN N/V 17   Mini Bragg MD   fluticasone [facility]) 50 mcg/actuation nasal spray 2 Sprays by Both Nostrils route two (2) times a day. 17   Pilar Kilgore MD   FLECTOR 1.3 % pt12 1 Patch by TransDERmal route every twelve (12) hours every twelve (12) hours. 17   Pilar Kilgore MD   diclofenac EC (VOLTAREN) 75 mg EC tablet Take  by mouth. Provider, Historical     No Known Allergies   Social History     Tobacco Use    Smoking status: Former Smoker     Packs/day: 0.50     Years: 1.00     Pack years: 0.50     Last attempt to quit: 2001     Years since quittin.4    Smokeless tobacco: Never Used   Substance Use Topics    Alcohol use: Yes     Comment: occasionally      Family History   Problem Relation Age of Onset    Hypertension Mother     Heart Disease Father       Review of Systems  A comprehensive review of systems was negative except for that written in the HPI. Objective:     Patient Vitals for the past 8 hrs:   BP Temp Pulse Resp SpO2   18 1710 (!) 174/102 99.3 °F (37.4 °C) (!) 107 16 97 %   18 1439 (!) 167/104 98.3 °F (36.8 °C) 99 16 97 %     Visit Vitals  BP (!) 174/102 (BP 1 Location: Left arm, BP Patient Position: At rest) Comment: RN notified   Pulse (!) 107 Comment: RN notified   Temp 99.3 °F (37.4 °C)   Resp 16   SpO2 97%     General:  Alert, cooperative, no distress, appears stated age. Head:  Normocephalic, without obvious abnormality, atraumatic. Eyes:  Conjunctivae/corneas clear. PERRL, EOMs intact. Fundi benign   Ears:  Normal TMs and external ear canals both ears. Nose: Nares normal. Septum midline. Mucosa normal. No drainage or sinus tenderness. Throat: Lips, mucosa, and tongue normal. Teeth and gums normal.   Neck: Supple, symmetrical, trachea midline, no adenopathy, thyroid: no enlargement/tenderness/nodules, no carotid bruit and no JVD. Back:   Symmetric, no curvature. ROM normal. No CVA tenderness. Lungs:   Clear to auscultation bilaterally. Chest wall:  No tenderness or deformity. Heart:  Regular rate and rhythm, S1, S2 normal, no murmur, click, rub or gallop. Abdomen:   Soft, non-tender. Bowel sounds normal. No masses,  No organomegaly. Genitalia:  Normal male without lesion, discharge or tenderness. Rectal:  Normal tone, normal prostate, no masses or tenderness  Guaiac negative stool. Extremities: Extremities normal, atraumatic, no cyanosis or edema. Pulses: 2+ and symmetric all extremities. Skin: Skin color, texture, turgor normal. No rashes or lesions   Lymph nodes: Cervical, supraclavicular, and axillary nodes normal.   Neurologic: CNII-XII intact. Normal strength, sensation and reflexes throughout. RIGHT SHOULDER WELL HEALED INCISIONS RED SWOLLEN PAINFUL LIMITED MOTION DUE TO PAIN  NEUROVASCULAR INTACT  LEFT SHOULDER NO PAIN OR SWELLING    LEFT HIP FLEXED LIMITED MOTION DUE TO PAIN.     Imaging Review  XRAY RIGHT SHOULDER GLENOHUMERAL AND AC OA    MRI RIGHT SHOULDER VERY LARGE EFFUSION, RECURRENT LARGE RETRACTED COMPLEX ROTATOR CUFF TEAR  [address] OA    Assessment:     Active Problems:    Infected prosthetic hip ([address] (11/5/2018)      Septic arthritis of shoulder, right ([address] (11/5/2018)      Degenerative joint disease of right acromioclavicular joint (11/5/2018)      Osteoarthritis of right glenohumeral joint (11/5/2018)      Complete tear of right rotator cuff (11/5/2018)        Plan:     [facility], DR Asad Stanley , AND DR KONG HOSPITALIST    PATIENT HAS MSSA PERIPROSTHETIC LEFT JEIMY INFECTION, AND MOST PROBABLY SEPTIC ARTHRITIS RIGHT SHOULDER. HIS BUN AND CREATININE ARE ELEVATED. HE IS HYPOKALEMIC AND TACHYCARDIC. HE IS BACTEREMIC WITH BOTH SETS OF BLOOD CULTURES POSITIVE FOR GRAM POSITIVE COCCI. WILL CONSULT HOSPITALIST TONIGHT TO ASSIST IN MANAGEMENT OF PATIENT AND BEGIN BROAD [facility]. WILL PLAN I AND D LEFT HIP TOMORROW BY DR Ranulfo Awad  I WILL PROVISIONALLY PLAN  I AND D RIGHT SHOULDER Wednesday AFTERNOON IF STABLE. ID WILL BE CONSULTED AND PATIENT MOST LIKELY WILL REQUIRE FUO WORKUP (ECHOCARDIOGRAM ETC)  SEE FULL DICTATED CONSULT.

## 2018-11-05 NOTE — PROGRESS NOTES
Pt admitted to room in a lot of pain via wheelchair. Family at bedside. Pt oriented to room and menu. Pt verbally understands he is NPO past midnight.

## 2018-11-05 NOTE — ED NOTES
I have reviewed discharge instructions with the patient. The patient verbalized understanding. Patient left ED via Discharge Method: wheelchair to Home with ( family, self). Opportunity for questions and clarification provided. Patient given 4 scripts. To continue your aftercare when you leave the hospital, you may receive an automated call from our care team to check in on how you are doing. This is a free service and part of our promise to provide the best care and service to meet your aftercare needs.  If you have questions, or wish to unsubscribe from this service please call [phone]. Thank you for Choosing our [facility].

## 2018-11-05 NOTE — PROGRESS NOTES
Blood cultures grown Gram-positive cocci. patient was d/c'd with Keflex RX after being given cefazolin IV once in the ER. Attempted to reach the patient had listed telephone number. Left message.

## 2018-11-05 NOTE — PROGRESS NOTES
SW following to assist with d/c plans. Chart shows plans for aspiration/cultures and ID consult for possible long term IVAB. Will follow closely. Rcik Bowling

## 2018-11-05 NOTE — DISCHARGE INSTRUCTIONS
Hip Pain: Care Instructions  Your Care Instructions    Hip pain may be caused by many things, including overuse, a fall, or a twisting movement. Another cause of hip pain is arthritis. Your pain may increase when you stand up, walk, or squat. The pain may come and go or may be constant. Home treatment can help relieve hip pain, swelling, and stiffness. If your pain is ongoing, you may need more tests and treatment. Follow-up care is a key part of your treatment and safety. Be sure to make and go to all appointments, and call your doctor if you are having problems. It's also a good idea to know your test results and keep a list of the medicines you take. How can you care for yourself at home? · Take pain medicines exactly as directed. ? If the doctor gave you a prescription medicine for pain, take it as prescribed. ? If you are not taking a prescription pain medicine, ask your doctor if you can take an over-the-counter medicine. · Rest and protect your hip. Take a break from any activity, including standing or walking, that may cause pain. · Put ice or a cold pack against your hip for 10 to 20 minutes at a time. Try to do this every 1 to 2 hours for the next 3 days (when you are awake) or until the swelling goes down. Put a thin cloth between the ice and your skin. · Sleep on your healthy side with a pillow between your knees, or sleep on your back with pillows under your knees. · If there is no swelling, you can put moist heat, a heating pad, or a warm cloth on your hip. Do gentle stretching exercises to help keep your hip flexible. · Learn how to prevent falls. Have your vision and hearing checked regularly. Wear slippers or shoes with a nonskid sole. · Stay at a healthy weight. · Wear comfortable shoes. When should you call for help? Call 911 anytime you think you may need emergency care.  For example, call if:    · You have sudden chest pain and shortness of breath, or you cough up blood.     · You are not able to stand or walk or bear weight.     · Your buttocks, legs, or feet feel numb or tingly.     · Your leg or foot is cool or pale or changes color.     · You have severe pain.    Call your doctor now or seek immediate medical care if:    · You have signs of infection, such as:  ? Increased pain, swelling, warmth, or redness in the hip area. ? Red streaks leading from the hip area. ? Pus draining from the hip area. ? A fever.     · You have signs of a blood clot, such as:  ? Pain in your calf, back of the knee, thigh, or groin. ? Redness and swelling in your leg or groin.     · You are not able to bend, straighten, or move your leg normally.     · You have trouble urinating or having bowel movements.    Watch closely for changes in your health, and be sure to contact your doctor if:    · You do not get better as expected. Where can you learn more? Go to http://venkatesh-carmelo.info/. Enter T223 in the search box to learn more about \"Hip Pain: Care Instructions. \"  Current as of: November 20, 2017  Content Version: 11.8  © 7868-5417 [employer]. Care instructions adapted under license by [employer] (which disclaims liability or warranty for this information). If you have questions about a medical condition or this instruction, always ask your healthcare professional. [place] [address] any warranty or liability for your use of this information. Shoulder Pain: Care Instructions  Your Care Instructions    You can hurt your shoulder by using it too much during an activity, such as fishing or baseball. It can also happen as part of the everyday wear and tear of getting older. Shoulder injuries can be slow to heal, but your shoulder should get better with time. Your doctor may recommend a sling to rest your shoulder. If you have injured your shoulder, you may need testing and treatment.   Follow-up care is a key part of your treatment and safety. Be sure to make and go to all appointments, and call your doctor if you are having problems. It's also a good idea to know your test results and keep a list of the medicines you take. How can you care for yourself at home? · Take pain medicines exactly as directed. ? If the doctor gave you a prescription medicine for pain, take it as prescribed. ? If you are not taking a prescription pain medicine, ask your doctor if you can take an over-the-counter medicine. ? Do not take two or more pain medicines at the same time unless the doctor told you to. Many pain medicines contain acetaminophen, which is Tylenol. Too much acetaminophen (Tylenol) can be harmful. · If your doctor recommends that you wear a sling, use it as directed. Do not take it off before your doctor tells you to. · Put ice or a cold pack on the sore area for 10 to 20 minutes at a time. Put a thin cloth between the ice and your skin. · If there is no swelling, you can put moist heat, a heating pad, or a warm cloth on your shoulder. Some doctors suggest alternating between hot and cold. · Rest your shoulder for a few days. If your doctor recommends it, you can then begin gentle exercise of the shoulder, but do not lift anything heavy. When should you call for help? Call 911 anytime you think you may need emergency care. For example, call if:    · You have chest pain or pressure. This may occur with:  ? Sweating. ? Shortness of breath. ? Nausea or vomiting. ? Pain that spreads from the chest to the neck, jaw, or one or both shoulders or arms. ? Dizziness or lightheadedness. ? A fast or uneven pulse. After calling 911, chew 1 adult-strength aspirin. Wait for an ambulance. Do not try to drive yourself.     · Your arm or hand is cool or pale or changes color.    Call your doctor now or seek immediate medical care if:    · You have signs of infection, such as:  ? Increased pain, swelling, warmth, or redness in your shoulder. ?  Red streaks leading from a place on your shoulder. ? Pus draining from an area of your shoulder. ? Swollen lymph nodes in your neck, armpits, or groin. ? A fever.    Watch closely for changes in your health, and be sure to contact your doctor if:    · You cannot use your shoulder.     · Your shoulder does not get better as expected. Where can you learn more? Go to http://venkatesh-carmelo.info/. Enter S438 in the search box to learn more about \"Shoulder Pain: Care Instructions. \"  Current as of: November 29, 2017  Content Version: 11.8  © 4318-5394 [employer]. Care instructions adapted under license by [employer] (which disclaims liability or warranty for this information). If you have questions about a medical condition or this instruction, always ask your healthcare professional. [address] any warranty or liability for your use of this information.

## 2018-11-06 ENCOUNTER — APPOINTMENT (OUTPATIENT)
Dept: GENERAL RADIOLOGY | Age: 53
DRG: 467 | End: 2018-11-06
Attending: PHYSICIAN ASSISTANT
Payer: COMMERCIAL

## 2018-11-06 ENCOUNTER — ANESTHESIA (OUTPATIENT)
Dept: SURGERY | Age: 53
DRG: 467 | End: 2018-11-06
Payer: COMMERCIAL

## 2018-11-06 ENCOUNTER — ANESTHESIA EVENT (OUTPATIENT)
Dept: SURGERY | Age: 53
DRG: 467 | End: 2018-11-06
Payer: COMMERCIAL

## 2018-11-06 ENCOUNTER — APPOINTMENT (OUTPATIENT)
Dept: GENERAL RADIOLOGY | Age: 53
DRG: 467 | End: 2018-11-06
Attending: ORTHOPAEDIC SURGERY
Payer: COMMERCIAL

## 2018-11-06 PROBLEM — N17.9 AKI (ACUTE KIDNEY INJURY) (HCC): Status: ACTIVE | Noted: 2018-11-06

## 2018-11-06 PROBLEM — M00.011 STAPHYLOCOCCAL ARTHRITIS OF RIGHT SHOULDER (HCC): Status: ACTIVE | Noted: 2018-11-06

## 2018-11-06 PROBLEM — M00.9 SEPTIC ARTHRITIS OF SHOULDER, RIGHT (HCC): Status: RESOLVED | Noted: 2018-11-05 | Resolved: 2018-11-06

## 2018-11-06 LAB
ANION GAP SERPL CALC-SCNC: 8 MMOL/L
ANION GAP SERPL CALC-SCNC: 8 MMOL/L
BASOPHILS # BLD: 0.1 K/UL (ref 0–0.2)
BASOPHILS NFR BLD: 1 % (ref 0–2)
BUN SERPL-MCNC: 31 MG/DL (ref 6–23)
BUN SERPL-MCNC: 34 MG/DL (ref 6–23)
CALCIUM SERPL-MCNC: 8.9 MG/DL (ref 8.3–10.4)
CALCIUM SERPL-MCNC: 9.3 MG/DL (ref 8.3–10.4)
CHLORIDE SERPL-SCNC: 102 MMOL/L (ref 98–107)
CHLORIDE SERPL-SCNC: 99 MMOL/L (ref 98–107)
CO2 SERPL-SCNC: 29 MMOL/L (ref 21–32)
CO2 SERPL-SCNC: 30 MMOL/L (ref 21–32)
CREAT SERPL-MCNC: 1.72 MG/DL (ref 0.8–1.5)
CREAT SERPL-MCNC: 2.22 MG/DL (ref 0.8–1.5)
DIFFERENTIAL METHOD BLD: ABNORMAL
EOSINOPHIL # BLD: 0.1 K/UL (ref 0–0.8)
EOSINOPHIL NFR BLD: 0 % (ref 0.5–7.8)
ERYTHROCYTE [DISTWIDTH] IN BLOOD BY AUTOMATED COUNT: 14.4 %
ERYTHROCYTE [DISTWIDTH] IN BLOOD BY AUTOMATED COUNT: 14.5 %
GLUCOSE BLD STRIP.AUTO-MCNC: 111 MG/DL (ref 65–100)
GLUCOSE SERPL-MCNC: 112 MG/DL (ref 65–100)
GLUCOSE SERPL-MCNC: 98 MG/DL (ref 65–100)
HCT VFR BLD AUTO: 31.9 % (ref 41.1–50.3)
HCT VFR BLD AUTO: 36 % (ref 41.1–50.3)
HGB BLD-MCNC: 10.2 G/DL (ref 13.6–17.2)
HGB BLD-MCNC: 11.5 G/DL (ref 13.6–17.2)
HGB BLD-MCNC: 9.2 G/DL (ref 13.6–17.2)
IMM GRANULOCYTES # BLD: 0.3 K/UL (ref 0–0.5)
IMM GRANULOCYTES NFR BLD AUTO: 2 % (ref 0–5)
LYMPHOCYTES # BLD: 2.7 K/UL (ref 0.5–4.6)
LYMPHOCYTES NFR BLD: 13 % (ref 13–44)
MAGNESIUM SERPL-MCNC: 2 MG/DL (ref 1.8–2.4)
MAGNESIUM SERPL-MCNC: 2.1 MG/DL (ref 1.8–2.4)
MCH RBC QN AUTO: 28 PG (ref 26.1–32.9)
MCH RBC QN AUTO: 28.7 PG (ref 26.1–32.9)
MCHC RBC AUTO-ENTMCNC: 31.9 G/DL (ref 31.4–35)
MCHC RBC AUTO-ENTMCNC: 32 G/DL (ref 31.4–35)
MCV RBC AUTO: 87.6 FL (ref 79.6–97.8)
MCV RBC AUTO: 89.6 FL (ref 79.6–97.8)
MONOCYTES # BLD: 2.3 K/UL (ref 0.1–1.3)
MONOCYTES NFR BLD: 11 % (ref 4–12)
NEUTS SEG # BLD: 15 K/UL (ref 1.7–8.2)
NEUTS SEG NFR BLD: 73 % (ref 43–78)
NRBC # BLD: 0 K/UL (ref 0–0.2)
NRBC # BLD: 0 K/UL (ref 0–0.2)
PLATELET # BLD AUTO: 351 K/UL (ref 150–450)
PLATELET # BLD AUTO: 432 K/UL (ref 150–450)
PMV BLD AUTO: 9.3 FL (ref 9.4–12.3)
PMV BLD AUTO: 9.6 FL (ref 9.4–12.3)
POTASSIUM SERPL-SCNC: 4.6 MMOL/L (ref 3.5–5.1)
POTASSIUM SERPL-SCNC: 5.5 MMOL/L (ref 3.5–5.1)
RBC # BLD AUTO: 3.56 M/UL (ref 4.23–5.6)
RBC # BLD AUTO: 4.11 M/UL (ref 4.23–5.6)
SODIUM SERPL-SCNC: 137 MMOL/L (ref 136–145)
SODIUM SERPL-SCNC: 139 MMOL/L (ref 136–145)
WBC # BLD AUTO: 20.4 K/UL (ref 4.3–11.1)
WBC # BLD AUTO: 24 K/UL (ref 4.3–11.1)

## 2018-11-06 PROCEDURE — 74011250636 HC RX REV CODE- 250/636: Performed by: ORTHOPAEDIC SURGERY

## 2018-11-06 PROCEDURE — 77030034849: Performed by: ORTHOPAEDIC SURGERY

## 2018-11-06 PROCEDURE — 86803 HEPATITIS C AB TEST: CPT

## 2018-11-06 PROCEDURE — 80048 BASIC METABOLIC PNL TOTAL CA: CPT

## 2018-11-06 PROCEDURE — 74011250637 HC RX REV CODE- 250/637: Performed by: PHYSICIAN ASSISTANT

## 2018-11-06 PROCEDURE — 77030031139 HC SUT VCRL2 J&J -A: Performed by: ORTHOPAEDIC SURGERY

## 2018-11-06 PROCEDURE — 77030034479 HC ADH SKN CLSR PRINEO J&J -B: Performed by: ORTHOPAEDIC SURGERY

## 2018-11-06 PROCEDURE — 85018 HEMOGLOBIN: CPT

## 2018-11-06 PROCEDURE — 74011250636 HC RX REV CODE- 250/636: Performed by: PHYSICIAN ASSISTANT

## 2018-11-06 PROCEDURE — 77030002966 HC SUT PDS J&J -A: Performed by: ORTHOPAEDIC SURGERY

## 2018-11-06 PROCEDURE — 77030011283 HC ELECTRD NDL COVD -A: Performed by: ORTHOPAEDIC SURGERY

## 2018-11-06 PROCEDURE — 77030013727 HC IRR FAN PULSVC ZIMM -B: Performed by: ORTHOPAEDIC SURGERY

## 2018-11-06 PROCEDURE — 74011000250 HC RX REV CODE- 250

## 2018-11-06 PROCEDURE — 77030019940 HC BLNKT HYPOTHRM STRY -B: Performed by: ANESTHESIOLOGY

## 2018-11-06 PROCEDURE — 74011000250 HC RX REV CODE- 250: Performed by: ORTHOPAEDIC SURGERY

## 2018-11-06 PROCEDURE — 77030039425 HC BLD LARYNG TRULITE DISP TELE -A: Performed by: ANESTHESIOLOGY

## 2018-11-06 PROCEDURE — 0SUE09Z SUPPLEMENT LEFT HIP JOINT, ACETABULAR SURFACE WITH LINER, OPEN APPROACH: ICD-10-PCS | Performed by: ORTHOPAEDIC SURGERY

## 2018-11-06 PROCEDURE — 87205 SMEAR GRAM STAIN: CPT

## 2018-11-06 PROCEDURE — 87186 SC STD MICRODIL/AGAR DIL: CPT

## 2018-11-06 PROCEDURE — 74011000250 HC RX REV CODE- 250: Performed by: INTERNAL MEDICINE

## 2018-11-06 PROCEDURE — 77030029883 HC RETRV SUT ARTHSCP HOFFE BEAT -B: Performed by: ORTHOPAEDIC SURGERY

## 2018-11-06 PROCEDURE — 76010000175 HC OR TIME 4 TO 4.5 HR INTENSV-TIER 1: Performed by: ORTHOPAEDIC SURGERY

## 2018-11-06 PROCEDURE — 83735 ASSAY OF MAGNESIUM: CPT

## 2018-11-06 PROCEDURE — 74011250636 HC RX REV CODE- 250/636: Performed by: ANESTHESIOLOGY

## 2018-11-06 PROCEDURE — 77030019908 HC STETH ESOPH SIMS -A: Performed by: ANESTHESIOLOGY

## 2018-11-06 PROCEDURE — 74011250637 HC RX REV CODE- 250/637: Performed by: INTERNAL MEDICINE

## 2018-11-06 PROCEDURE — 77030037088 HC TUBE ENDOTRACH ORAL NSL COVD-A: Performed by: ANESTHESIOLOGY

## 2018-11-06 PROCEDURE — C1776 JOINT DEVICE (IMPLANTABLE): HCPCS | Performed by: ORTHOPAEDIC SURGERY

## 2018-11-06 PROCEDURE — 87077 CULTURE AEROBIC IDENTIFY: CPT

## 2018-11-06 PROCEDURE — 77030020268 HC MISC GENERAL SUPPLY: Performed by: ORTHOPAEDIC SURGERY

## 2018-11-06 PROCEDURE — 74011250636 HC RX REV CODE- 250/636

## 2018-11-06 PROCEDURE — 36415 COLL VENOUS BLD VENIPUNCTURE: CPT

## 2018-11-06 PROCEDURE — 0LB10ZZ EXCISION OF RIGHT SHOULDER TENDON, OPEN APPROACH: ICD-10-PCS | Performed by: ORTHOPAEDIC SURGERY

## 2018-11-06 PROCEDURE — 85025 COMPLETE CBC W/AUTO DIFF WBC: CPT

## 2018-11-06 PROCEDURE — 77010033678 HC OXYGEN DAILY

## 2018-11-06 PROCEDURE — 77030018836 HC SOL IRR NACL ICUM -A: Performed by: ORTHOPAEDIC SURGERY

## 2018-11-06 PROCEDURE — 85027 COMPLETE CBC AUTOMATED: CPT

## 2018-11-06 PROCEDURE — 87075 CULTR BACTERIA EXCEPT BLOOD: CPT

## 2018-11-06 PROCEDURE — 0SRB03A REPLACEMENT OF LEFT HIP JOINT WITH CERAMIC SYNTHETIC SUBSTITUTE, UNCEMENTED, OPEN APPROACH: ICD-10-PCS | Performed by: ORTHOPAEDIC SURGERY

## 2018-11-06 PROCEDURE — 65270000029 HC RM PRIVATE

## 2018-11-06 PROCEDURE — 77030019557 HC ELECTRD VES SEAL MEDT -F: Performed by: ORTHOPAEDIC SURGERY

## 2018-11-06 PROCEDURE — 77030018547 HC SUT ETHBND1 J&J -B: Performed by: ORTHOPAEDIC SURGERY

## 2018-11-06 PROCEDURE — 72170 X-RAY EXAM OF PELVIS: CPT

## 2018-11-06 PROCEDURE — 77030002933 HC SUT MCRYL J&J -A: Performed by: ORTHOPAEDIC SURGERY

## 2018-11-06 PROCEDURE — 94760 N-INVAS EAR/PLS OXIMETRY 1: CPT

## 2018-11-06 PROCEDURE — 0SPB0JZ REMOVAL OF SYNTHETIC SUBSTITUTE FROM LEFT HIP JOINT, OPEN APPROACH: ICD-10-PCS | Performed by: ORTHOPAEDIC SURGERY

## 2018-11-06 PROCEDURE — 87070 CULTURE OTHR SPECIMN AEROBIC: CPT

## 2018-11-06 PROCEDURE — 73030 X-RAY EXAM OF SHOULDER: CPT

## 2018-11-06 PROCEDURE — 74011250636 HC RX REV CODE- 250/636: Performed by: INTERNAL MEDICINE

## 2018-11-06 PROCEDURE — 77030027138 HC INCENT SPIROMETER -A

## 2018-11-06 PROCEDURE — 0SPB09Z REMOVAL OF LINER FROM LEFT HIP JOINT, OPEN APPROACH: ICD-10-PCS | Performed by: ORTHOPAEDIC SURGERY

## 2018-11-06 PROCEDURE — 76060000039 HC ANESTHESIA 4 TO 4.5 HR: Performed by: ORTHOPAEDIC SURGERY

## 2018-11-06 PROCEDURE — 82962 GLUCOSE BLOOD TEST: CPT

## 2018-11-06 PROCEDURE — 74011000302 HC RX REV CODE- 302: Performed by: PHYSICIAN ASSISTANT

## 2018-11-06 PROCEDURE — 74011000258 HC RX REV CODE- 258: Performed by: ORTHOPAEDIC SURGERY

## 2018-11-06 PROCEDURE — 77030020255 HC SOL INJ LR 1000ML BG

## 2018-11-06 PROCEDURE — 86580 TB INTRADERMAL TEST: CPT | Performed by: PHYSICIAN ASSISTANT

## 2018-11-06 PROCEDURE — 76210000016 HC OR PH I REC 1 TO 1.5 HR: Performed by: ORTHOPAEDIC SURGERY

## 2018-11-06 PROCEDURE — C1713 ANCHOR/SCREW BN/BN,TIS/BN: HCPCS | Performed by: ORTHOPAEDIC SURGERY

## 2018-11-06 DEVICE — IMPLANTABLE DEVICE: Type: IMPLANTABLE DEVICE | Site: HIP | Status: FUNCTIONAL

## 2018-11-06 DEVICE — STIMULAN® RAPID CURE PROVIDED STERILE FOR SINGLE PATIENT USE. STIMULAN® RAPID CURE CONTAINS CALCIUM SULFATE POWDER AND MIXING SOLUTION IN PRE-MEASURED QUANTITIES SO THAT WHEN MIXED TOGETHER IN A STERILE MIXING BOWL, THE RESULTANT PASTE IS TO BE DIGITALLY PACKED INTO OPEN BONE VOID/GAP TO SET INSITU OR PLACED INTO THE MOULD PROVIDED, THE MIXTURE SETS TO FORM BEADS. THE BIODEGRADABLE, RADIOPAQUE BEADS ARE RESORBED IN APPROXIMATELY 30 – 60 DAYS WHEN USED IN ACCORDANCE WITH THE DEVICE LABELLING. STIMULAN® RAPID CURE IS MANUFACTURED FROM SYNTHETIC IMPLANT GRADE CALCIUM SULFATE DIHYDRATE(CASO4.2H2O) THAT RESORBS AND IS REPLACED WITH BONE DURING THE HEALING PROCESS. ALSO, AS THE BONE VOID FILLER BEADS ARE BIODEGRADABLE AND BIOCOMPATIBLE, THEY MAY BE USED AT AN INFECTED SITE.
Type: IMPLANTABLE DEVICE | Site: HIP | Status: FUNCTIONAL
Brand: STIMULAN® RAPID CURE

## 2018-11-06 DEVICE — SCREW BNE CANC STD 6.5X30 MM HIP ST PART THRD CANN NLCK TORX: Type: IMPLANTABLE DEVICE | Site: HIP | Status: FUNCTIONAL

## 2018-11-06 DEVICE — LINER MDM COCR 48MM G --: Type: IMPLANTABLE DEVICE | Site: HIP | Status: FUNCTIONAL

## 2018-11-06 DEVICE — INSERT ACET CUP X3 28X54MM -- RESTORATION ADM: Type: IMPLANTABLE DEVICE | Site: HIP | Status: FUNCTIONAL

## 2018-11-06 DEVICE — SCREW BNE L25MM DIA6.5MM STD CANC HIP TI ST CANN NONLOCKING: Type: IMPLANTABLE DEVICE | Site: HIP | Status: FUNCTIONAL

## 2018-11-06 RX ORDER — SODIUM CHLORIDE, SODIUM LACTATE, POTASSIUM CHLORIDE, CALCIUM CHLORIDE 600; 310; 30; 20 MG/100ML; MG/100ML; MG/100ML; MG/100ML
100 INJECTION, SOLUTION INTRAVENOUS CONTINUOUS
Status: DISCONTINUED | OUTPATIENT
Start: 2018-11-06 | End: 2018-11-06 | Stop reason: HOSPADM

## 2018-11-06 RX ORDER — CELECOXIB 200 MG/1
200 CAPSULE ORAL EVERY 12 HOURS
Status: DISCONTINUED | OUTPATIENT
Start: 2018-11-06 | End: 2018-11-07

## 2018-11-06 RX ORDER — DIPHENHYDRAMINE HYDROCHLORIDE 50 MG/ML
12.5 INJECTION, SOLUTION INTRAMUSCULAR; INTRAVENOUS
Status: CANCELLED | OUTPATIENT
Start: 2018-11-06

## 2018-11-06 RX ORDER — NALOXONE HYDROCHLORIDE 0.4 MG/ML
0.04 INJECTION, SOLUTION INTRAMUSCULAR; INTRAVENOUS; SUBCUTANEOUS
Status: DISCONTINUED | OUTPATIENT
Start: 2018-11-06 | End: 2018-11-06 | Stop reason: HOSPADM

## 2018-11-06 RX ORDER — MIDAZOLAM HYDROCHLORIDE 1 MG/ML
2 INJECTION, SOLUTION INTRAMUSCULAR; INTRAVENOUS ONCE
Status: CANCELLED | OUTPATIENT
Start: 2018-11-06 | End: 2018-11-06

## 2018-11-06 RX ORDER — DIPHENHYDRAMINE HCL 25 MG
25 CAPSULE ORAL
Status: DISCONTINUED | OUTPATIENT
Start: 2018-11-06 | End: 2018-11-07 | Stop reason: HOSPADM

## 2018-11-06 RX ORDER — GLYCOPYRROLATE 0.2 MG/ML
INJECTION INTRAMUSCULAR; INTRAVENOUS AS NEEDED
Status: DISCONTINUED | OUTPATIENT
Start: 2018-11-06 | End: 2018-11-06 | Stop reason: HOSPADM

## 2018-11-06 RX ORDER — HYDROMORPHONE HYDROCHLORIDE 2 MG/ML
0.5 INJECTION, SOLUTION INTRAMUSCULAR; INTRAVENOUS; SUBCUTANEOUS
Status: DISCONTINUED | OUTPATIENT
Start: 2018-11-06 | End: 2018-11-06 | Stop reason: HOSPADM

## 2018-11-06 RX ORDER — ACETAMINOPHEN 10 MG/ML
1000 INJECTION, SOLUTION INTRAVENOUS ONCE
Status: COMPLETED | OUTPATIENT
Start: 2018-11-06 | End: 2018-11-06

## 2018-11-06 RX ORDER — SODIUM CHLORIDE 0.9 % (FLUSH) 0.9 %
5-10 SYRINGE (ML) INJECTION AS NEEDED
Status: CANCELLED | OUTPATIENT
Start: 2018-11-06

## 2018-11-06 RX ORDER — LIDOCAINE HYDROCHLORIDE 20 MG/ML
INJECTION, SOLUTION EPIDURAL; INFILTRATION; INTRACAUDAL; PERINEURAL AS NEEDED
Status: DISCONTINUED | OUTPATIENT
Start: 2018-11-06 | End: 2018-11-06 | Stop reason: HOSPADM

## 2018-11-06 RX ORDER — MIDAZOLAM HYDROCHLORIDE 1 MG/ML
2 INJECTION, SOLUTION INTRAMUSCULAR; INTRAVENOUS ONCE
Status: COMPLETED | OUTPATIENT
Start: 2018-11-06 | End: 2018-11-06

## 2018-11-06 RX ORDER — NALOXONE HYDROCHLORIDE 0.4 MG/ML
.2-.4 INJECTION, SOLUTION INTRAMUSCULAR; INTRAVENOUS; SUBCUTANEOUS
Status: DISCONTINUED | OUTPATIENT
Start: 2018-11-06 | End: 2018-11-07 | Stop reason: HOSPADM

## 2018-11-06 RX ORDER — ROPIVACAINE HYDROCHLORIDE 2 MG/ML
INJECTION, SOLUTION EPIDURAL; INFILTRATION; PERINEURAL AS NEEDED
Status: DISCONTINUED | OUTPATIENT
Start: 2018-11-06 | End: 2018-11-06 | Stop reason: HOSPADM

## 2018-11-06 RX ORDER — ONDANSETRON 4 MG/1
4 TABLET, ORALLY DISINTEGRATING ORAL
Status: DISCONTINUED | OUTPATIENT
Start: 2018-11-06 | End: 2018-11-07 | Stop reason: HOSPADM

## 2018-11-06 RX ORDER — HYDROMORPHONE HYDROCHLORIDE 2 MG/ML
1 INJECTION, SOLUTION INTRAMUSCULAR; INTRAVENOUS; SUBCUTANEOUS
Status: DISCONTINUED | OUTPATIENT
Start: 2018-11-06 | End: 2018-11-07 | Stop reason: HOSPADM

## 2018-11-06 RX ORDER — ASPIRIN 81 MG/1
81 TABLET ORAL EVERY 12 HOURS
Status: DISCONTINUED | OUTPATIENT
Start: 2018-11-06 | End: 2018-11-07

## 2018-11-06 RX ORDER — AMOXICILLIN 250 MG
2 CAPSULE ORAL DAILY
Status: DISCONTINUED | OUTPATIENT
Start: 2018-11-07 | End: 2018-11-07 | Stop reason: HOSPADM

## 2018-11-06 RX ORDER — SODIUM CHLORIDE 0.9 % (FLUSH) 0.9 %
5-10 SYRINGE (ML) INJECTION AS NEEDED
Status: DISCONTINUED | OUTPATIENT
Start: 2018-11-06 | End: 2018-11-07 | Stop reason: HOSPADM

## 2018-11-06 RX ORDER — SODIUM CHLORIDE 0.9 % (FLUSH) 0.9 %
5-10 SYRINGE (ML) INJECTION EVERY 8 HOURS
Status: CANCELLED | OUTPATIENT
Start: 2018-11-06

## 2018-11-06 RX ORDER — KETOROLAC TROMETHAMINE 30 MG/ML
INJECTION, SOLUTION INTRAMUSCULAR; INTRAVENOUS AS NEEDED
Status: DISCONTINUED | OUTPATIENT
Start: 2018-11-06 | End: 2018-11-06 | Stop reason: HOSPADM

## 2018-11-06 RX ORDER — LIDOCAINE HYDROCHLORIDE 10 MG/ML
0.1 INJECTION, SOLUTION EPIDURAL; INFILTRATION; INTRACAUDAL; PERINEURAL AS NEEDED
Status: DISCONTINUED | OUTPATIENT
Start: 2018-11-06 | End: 2018-11-06 | Stop reason: HOSPADM

## 2018-11-06 RX ORDER — ACETAMINOPHEN 500 MG
1000 TABLET ORAL ONCE
Status: CANCELLED | OUTPATIENT
Start: 2018-11-06 | End: 2018-11-06

## 2018-11-06 RX ORDER — HYDROMORPHONE HYDROCHLORIDE 2 MG/ML
INJECTION, SOLUTION INTRAMUSCULAR; INTRAVENOUS; SUBCUTANEOUS AS NEEDED
Status: DISCONTINUED | OUTPATIENT
Start: 2018-11-06 | End: 2018-11-06 | Stop reason: HOSPADM

## 2018-11-06 RX ORDER — OXYCODONE HYDROCHLORIDE 5 MG/1
10 TABLET ORAL
Status: DISCONTINUED | OUTPATIENT
Start: 2018-11-06 | End: 2018-11-07 | Stop reason: HOSPADM

## 2018-11-06 RX ORDER — MIDAZOLAM HYDROCHLORIDE 1 MG/ML
2 INJECTION, SOLUTION INTRAMUSCULAR; INTRAVENOUS
Status: CANCELLED | OUTPATIENT
Start: 2018-11-06 | End: 2018-11-07

## 2018-11-06 RX ORDER — FENTANYL CITRATE 50 UG/ML
100 INJECTION, SOLUTION INTRAMUSCULAR; INTRAVENOUS ONCE
Status: CANCELLED | OUTPATIENT
Start: 2018-11-06 | End: 2018-11-06

## 2018-11-06 RX ORDER — SODIUM CHLORIDE 9 MG/ML
125 INJECTION, SOLUTION INTRAVENOUS CONTINUOUS
Status: DISCONTINUED | OUTPATIENT
Start: 2018-11-06 | End: 2018-11-07 | Stop reason: HOSPADM

## 2018-11-06 RX ORDER — OXYCODONE HYDROCHLORIDE 5 MG/1
5 TABLET ORAL
Status: DISCONTINUED | OUTPATIENT
Start: 2018-11-06 | End: 2018-11-06 | Stop reason: HOSPADM

## 2018-11-06 RX ORDER — FENTANYL CITRATE 50 UG/ML
INJECTION, SOLUTION INTRAMUSCULAR; INTRAVENOUS AS NEEDED
Status: DISCONTINUED | OUTPATIENT
Start: 2018-11-06 | End: 2018-11-06 | Stop reason: HOSPADM

## 2018-11-06 RX ORDER — ROCURONIUM BROMIDE 10 MG/ML
INJECTION, SOLUTION INTRAVENOUS AS NEEDED
Status: DISCONTINUED | OUTPATIENT
Start: 2018-11-06 | End: 2018-11-06 | Stop reason: HOSPADM

## 2018-11-06 RX ORDER — OXYCODONE HYDROCHLORIDE 5 MG/1
10 TABLET ORAL
Status: CANCELLED | OUTPATIENT
Start: 2018-11-06 | End: 2018-11-07

## 2018-11-06 RX ORDER — DEXAMETHASONE SODIUM PHOSPHATE 100 MG/10ML
10 INJECTION INTRAMUSCULAR; INTRAVENOUS ONCE
Status: DISCONTINUED | OUTPATIENT
Start: 2018-11-07 | End: 2018-11-07 | Stop reason: HOSPADM

## 2018-11-06 RX ORDER — VANCOMYCIN HYDROCHLORIDE 1 G/20ML
INJECTION, POWDER, LYOPHILIZED, FOR SOLUTION INTRAVENOUS AS NEEDED
Status: DISCONTINUED | OUTPATIENT
Start: 2018-11-06 | End: 2018-11-06 | Stop reason: HOSPADM

## 2018-11-06 RX ORDER — ZOLPIDEM TARTRATE 5 MG/1
5 TABLET ORAL
Status: DISCONTINUED | OUTPATIENT
Start: 2018-11-06 | End: 2018-11-07 | Stop reason: HOSPADM

## 2018-11-06 RX ORDER — PROPOFOL 10 MG/ML
INJECTION, EMULSION INTRAVENOUS AS NEEDED
Status: DISCONTINUED | OUTPATIENT
Start: 2018-11-06 | End: 2018-11-06 | Stop reason: HOSPADM

## 2018-11-06 RX ORDER — TOBRAMYCIN 1.2 G/30ML
INJECTION, POWDER, LYOPHILIZED, FOR SOLUTION INTRAVENOUS AS NEEDED
Status: DISCONTINUED | OUTPATIENT
Start: 2018-11-06 | End: 2018-11-06 | Stop reason: HOSPADM

## 2018-11-06 RX ORDER — LIDOCAINE HYDROCHLORIDE 10 MG/ML
0.1 INJECTION INFILTRATION; PERINEURAL AS NEEDED
Status: CANCELLED | OUTPATIENT
Start: 2018-11-06

## 2018-11-06 RX ORDER — SODIUM CHLORIDE 9 MG/ML
125 INJECTION, SOLUTION INTRAVENOUS CONTINUOUS
Status: DISCONTINUED | OUTPATIENT
Start: 2018-11-06 | End: 2018-11-06

## 2018-11-06 RX ORDER — ACETAMINOPHEN 500 MG
1000 TABLET ORAL EVERY 6 HOURS
Status: DISCONTINUED | OUTPATIENT
Start: 2018-11-07 | End: 2018-11-07 | Stop reason: HOSPADM

## 2018-11-06 RX ORDER — SODIUM CHLORIDE, SODIUM LACTATE, POTASSIUM CHLORIDE, CALCIUM CHLORIDE 600; 310; 30; 20 MG/100ML; MG/100ML; MG/100ML; MG/100ML
100 INJECTION, SOLUTION INTRAVENOUS CONTINUOUS
Status: CANCELLED | OUTPATIENT
Start: 2018-11-06

## 2018-11-06 RX ORDER — FLUMAZENIL 0.1 MG/ML
0.2 INJECTION INTRAVENOUS
Status: CANCELLED | OUTPATIENT
Start: 2018-11-06

## 2018-11-06 RX ORDER — HYDROMORPHONE HYDROCHLORIDE 2 MG/ML
0.5 INJECTION, SOLUTION INTRAMUSCULAR; INTRAVENOUS; SUBCUTANEOUS
Status: CANCELLED | OUTPATIENT
Start: 2018-11-06

## 2018-11-06 RX ORDER — NEOSTIGMINE METHYLSULFATE 1 MG/ML
INJECTION, SOLUTION INTRAVENOUS AS NEEDED
Status: DISCONTINUED | OUTPATIENT
Start: 2018-11-06 | End: 2018-11-06 | Stop reason: HOSPADM

## 2018-11-06 RX ORDER — NALOXONE HYDROCHLORIDE 0.4 MG/ML
0.2 INJECTION, SOLUTION INTRAMUSCULAR; INTRAVENOUS; SUBCUTANEOUS AS NEEDED
Status: CANCELLED | OUTPATIENT
Start: 2018-11-06

## 2018-11-06 RX ORDER — FENTANYL CITRATE 50 UG/ML
100 INJECTION, SOLUTION INTRAMUSCULAR; INTRAVENOUS ONCE
Status: DISCONTINUED | OUTPATIENT
Start: 2018-11-06 | End: 2018-11-06 | Stop reason: HOSPADM

## 2018-11-06 RX ORDER — NEOSTIGMINE METHYLSULFATE 1 MG/ML
INJECTION INTRAVENOUS AS NEEDED
Status: DISCONTINUED | OUTPATIENT
Start: 2018-11-06 | End: 2018-11-06 | Stop reason: HOSPADM

## 2018-11-06 RX ORDER — SODIUM CHLORIDE, SODIUM LACTATE, POTASSIUM CHLORIDE, CALCIUM CHLORIDE 600; 310; 30; 20 MG/100ML; MG/100ML; MG/100ML; MG/100ML
100 INJECTION, SOLUTION INTRAVENOUS CONTINUOUS
Status: CANCELLED | OUTPATIENT
Start: 2018-11-06 | End: 2018-11-07

## 2018-11-06 RX ORDER — KETAMINE HYDROCHLORIDE 100 MG/ML
INJECTION, SOLUTION INTRAMUSCULAR; INTRAVENOUS AS NEEDED
Status: DISCONTINUED | OUTPATIENT
Start: 2018-11-06 | End: 2018-11-06 | Stop reason: HOSPADM

## 2018-11-06 RX ORDER — RIFAMPIN 300 MG/1
600 CAPSULE ORAL
Status: DISCONTINUED | OUTPATIENT
Start: 2018-11-07 | End: 2018-11-07 | Stop reason: HOSPADM

## 2018-11-06 RX ORDER — OXYCODONE HYDROCHLORIDE 5 MG/1
5 TABLET ORAL
Status: CANCELLED | OUTPATIENT
Start: 2018-11-06 | End: 2018-11-07

## 2018-11-06 RX ORDER — MIDAZOLAM HYDROCHLORIDE 1 MG/ML
2 INJECTION, SOLUTION INTRAMUSCULAR; INTRAVENOUS
Status: DISCONTINUED | OUTPATIENT
Start: 2018-11-06 | End: 2018-11-06 | Stop reason: HOSPADM

## 2018-11-06 RX ORDER — SODIUM CHLORIDE 0.9 % (FLUSH) 0.9 %
5-10 SYRINGE (ML) INJECTION EVERY 8 HOURS
Status: DISCONTINUED | OUTPATIENT
Start: 2018-11-06 | End: 2018-11-07 | Stop reason: HOSPADM

## 2018-11-06 RX ADMIN — FENTANYL CITRATE 50 MCG: 50 INJECTION, SOLUTION INTRAMUSCULAR; INTRAVENOUS at 14:20

## 2018-11-06 RX ADMIN — PROPOFOL 200 MG: 10 INJECTION, EMULSION INTRAVENOUS at 14:20

## 2018-11-06 RX ADMIN — TUBERCULIN PURIFIED PROTEIN DERIVATIVE 5 UNITS: 5 INJECTION, SOLUTION INTRADERMAL at 08:32

## 2018-11-06 RX ADMIN — HYDROMORPHONE HYDROCHLORIDE 1 MG: 2 INJECTION, SOLUTION INTRAMUSCULAR; INTRAVENOUS; SUBCUTANEOUS at 21:10

## 2018-11-06 RX ADMIN — RIFAMPIN 300 MG: 300 CAPSULE ORAL at 08:27

## 2018-11-06 RX ADMIN — NEOSTIGMINE METHYLSULFATE 5 MG: 1 INJECTION INTRAVENOUS at 17:44

## 2018-11-06 RX ADMIN — OXYCODONE HYDROCHLORIDE 10 MG: 5 TABLET ORAL at 23:24

## 2018-11-06 RX ADMIN — HYDROMORPHONE HYDROCHLORIDE 0.5 MG: 2 INJECTION, SOLUTION INTRAMUSCULAR; INTRAVENOUS; SUBCUTANEOUS at 18:44

## 2018-11-06 RX ADMIN — SODIUM CHLORIDE, SODIUM LACTATE, POTASSIUM CHLORIDE, AND CALCIUM CHLORIDE: 600; 310; 30; 20 INJECTION, SOLUTION INTRAVENOUS at 16:46

## 2018-11-06 RX ADMIN — HYDROMORPHONE HYDROCHLORIDE 0.4 MG: 2 INJECTION, SOLUTION INTRAMUSCULAR; INTRAVENOUS; SUBCUTANEOUS at 14:52

## 2018-11-06 RX ADMIN — HYDROMORPHONE HYDROCHLORIDE 0.4 MG: 2 INJECTION, SOLUTION INTRAMUSCULAR; INTRAVENOUS; SUBCUTANEOUS at 16:55

## 2018-11-06 RX ADMIN — ASPIRIN 81 MG: 81 TABLET, DELAYED RELEASE ORAL at 21:27

## 2018-11-06 RX ADMIN — SODIUM CHLORIDE 100 ML/HR: 900 INJECTION, SOLUTION INTRAVENOUS at 19:40

## 2018-11-06 RX ADMIN — Medication 10 ML: at 21:27

## 2018-11-06 RX ADMIN — SODIUM CHLORIDE, SODIUM LACTATE, POTASSIUM CHLORIDE, AND CALCIUM CHLORIDE: 600; 310; 30; 20 INJECTION, SOLUTION INTRAVENOUS at 15:38

## 2018-11-06 RX ADMIN — KETOROLAC TROMETHAMINE 15 MG: 30 INJECTION, SOLUTION INTRAMUSCULAR; INTRAVENOUS at 17:41

## 2018-11-06 RX ADMIN — SODIUM CHLORIDE 125 ML/HR: 900 INJECTION, SOLUTION INTRAVENOUS at 10:00

## 2018-11-06 RX ADMIN — NAFCILLIN 12 G: 10 INJECTION, POWDER, FOR SOLUTION INTRAVENOUS at 21:40

## 2018-11-06 RX ADMIN — ACETAMINOPHEN 1000 MG: 10 INJECTION, SOLUTION INTRAVENOUS at 20:30

## 2018-11-06 RX ADMIN — FENTANYL CITRATE 50 MCG: 50 INJECTION, SOLUTION INTRAMUSCULAR; INTRAVENOUS at 14:15

## 2018-11-06 RX ADMIN — SODIUM CHLORIDE, SODIUM LACTATE, POTASSIUM CHLORIDE, AND CALCIUM CHLORIDE: 600; 310; 30; 20 INJECTION, SOLUTION INTRAVENOUS at 14:00

## 2018-11-06 RX ADMIN — HYDROMORPHONE HYDROCHLORIDE 0.5 MG: 2 INJECTION, SOLUTION INTRAMUSCULAR; INTRAVENOUS; SUBCUTANEOUS at 18:29

## 2018-11-06 RX ADMIN — HYDROMORPHONE HYDROCHLORIDE 1 MG: 2 INJECTION, SOLUTION INTRAMUSCULAR; INTRAVENOUS; SUBCUTANEOUS at 06:52

## 2018-11-06 RX ADMIN — KETAMINE HYDROCHLORIDE 50 MG: 100 INJECTION, SOLUTION INTRAMUSCULAR; INTRAVENOUS at 16:45

## 2018-11-06 RX ADMIN — LIDOCAINE HYDROCHLORIDE 100 MG: 20 INJECTION, SOLUTION EPIDURAL; INFILTRATION; INTRACAUDAL; PERINEURAL at 14:20

## 2018-11-06 RX ADMIN — MIDAZOLAM 2 MG: 1 INJECTION INTRAMUSCULAR; INTRAVENOUS at 13:29

## 2018-11-06 RX ADMIN — Medication 1 AMPULE: at 21:26

## 2018-11-06 RX ADMIN — OXYCODONE HYDROCHLORIDE 10 MG: 5 TABLET ORAL at 08:27

## 2018-11-06 RX ADMIN — OXYCODONE HYDROCHLORIDE 10 MG: 5 TABLET ORAL at 03:21

## 2018-11-06 RX ADMIN — ROCURONIUM BROMIDE 50 MG: 10 INJECTION, SOLUTION INTRAVENOUS at 14:20

## 2018-11-06 RX ADMIN — GLYCOPYRROLATE 1 MG: 0.2 INJECTION INTRAMUSCULAR; INTRAVENOUS at 17:45

## 2018-11-06 NOTE — BRIEF OP NOTE
BRIEF OPERATIVE NOTE Date of Procedure: 11/6/2018 Preoperative Diagnosis: Infected Left Hip Postoperative Diagnosis: nfected Left Hip Procedure(s): HIP ARTHROPLASTY TOTAL REVISION SHOULDER I&D Surgeon(s) and Role: 
Panel 1: 
   * Lawanda Holder MD - Primary Panel 2: 
   * Portia Bonner MD - Primary Surgical Assistant: JAY Lewis Surgical Staff: 
Circ-1: Vianey Huerta Circ-Relief: Mitzi Guillory RN Physician Assistant: JAY Dubon Scrub Tech-1: Sesar Martinez; Ronald Christine; Mallory Perkins Event Time In Time Out Incision Start 11/06/2018 1435 Incision Close Anesthesia: General  
Estimated Blood Loss: 750 cc Specimens:  
ID Type Source Tests Collected by Time Destination 1 : LEFT HIP OPENING CULTURE ANAEROBIC AND WOUND WITH GRAM STAIN  Wound Hip, left CULTURE, ANAEROBIC, CULTURE, WOUND W GRAM STAIN Lawanda Holder MD 11/6/2018 1446 Microbiology 2 : Acetabulum left hip culture anaerobic and wound with gram stain Wound Hip, left CULTURE, ANAEROBIC, CULTURE, WOUND W Charisse Naik MD 11/6/2018 1454 Microbiology Findings: infected hip Complications: none Implants:  
Implant Name Type Inv. Item Serial No.  Lot No. LRB No. Used Action GRAFT BNE PASTE RAPID CUR 10ML -- STIMULAN - [id number]  GRAFT BNE PASTE RAPID CUR 10ML -- STIMULAN [id number]/18-R374/[id number] [employer] 06/18-R374/375 Left 1 Implanted SCR BNE ACET CANC 6.5X25MM -- TRIDENT - [id number]  SCR BNE ACET CANC 6.5X25MM -- TRIDENT [id number] [employer] HOW [id number] Left 1 Implanted SCR BNE ACET CANC 6.5X30MM -- TRIDENT - [id number]  SCR BNE ACET CANC 6.5X30MM -- TRIDENT [id number] [employer] HOW [id number] Left 1 Implanted LINER MDM COCR 48MM G --  - [id number]  LINER MDM COCR 48MM G --  [id number] [employer] HOW [id number] Left 1 Implanted SHELL ACET HMSPHR MH 62MM --  - [id number]  SHELL ACET HMSPHR MH 62MM --  [id number] [employer] HOW [id number] Left 1 Implanted INSERT ACET CUP X3 [id number] -- RESTORATION ADM - [id number]  INSERT ACET CUP X3 [id number] -- RESTORATION ADM [id number] [employer] HOW [id number] Left 1 Implanted

## 2018-11-06 NOTE — CONSULTS
[address] [facility]  MR#: [id number]  : 1965  ACCOUNT #: [de-identified]   DATE OF SERVICE: 2018    WHAT:  Right shoulder pain. HOW:  Complicated history related to a cortisone injection 2 weeks ago. [facility] HEALTHCARE PROVIDER:  Tyrese Mehta MD     HISTORY OF PRESENT ILLNESS:  The patient is a 57-year-old hand dominant gentleman seen at the request of Dr. Jeanette Sorto for problems related to his right shoulder. The patient has a complicated medical history. He initially underwent a left total hip arthroplasty by Dr. Jeanette Sorto in 2017 without incident. He had an arthroscopy, right shoulder; arthroscopic subacromial decompression, arthroscopic distal clavicle resection, arthroscopic rotator cuff repair by Dr. Fern Downs  in 2017. Operative findings were notable for a 2.5 cm rotator cuff tear and 2 Iconic anchors were utilized to repair the rotator cuff. The patient began to develop shoulder pain and  flu-like symptoms roughly two weeks ago. He presented to Dr. Vignesh Peralta. He had a right shoulder cortisone injection. Immediately after that the next day he began to experience severe left hip pain and he complained of left hip and right shoulder severe pain. He had fevers. He did not feel well. He presented to Dr. Jeanette Sorto' office. The left hip was aspirated on 10/29. The aspirate came back positive for MSSA. The patient has had previous left shoulder surgery without incident. Up until the recent injection, his right shoulder was doing well. He has history of MRSA secondary spider bite in the past.  He was seen by Dr. Jeanette Sorto today. He was admitted to the hospital and is scheduled for an I and D of the left hip tomorrow. Given the issues about his right shoulder, Dr. Jeanette Sorto requested my intervention. The patient just does not feel well and I was asked to consult.     PHYSICAL EXAMINATION:  GENERAL:  Today, he is an awake, alert, pleasant gentleman. He is lying in bed. He states that he does not feel well and his face is flushed. EXTREMITIES:  His right arm exam is limited due to pain. His left hip exam is limited due to pain. He is able to answer questions. On examination today, his left shoulder has 0-180 degrees of active and passive forward elevation. External rotation is 45 degrees inside. There is no pain, erythema or swelling. He is neurovascularly intact. His right shoulder exam is limited due to pain in regards to motion. He has a painful swollen right shoulder, which is red and tender to the touch. He has well healed portals. He has limited function due to pain. He appears neurovascularly intact. His left hip is flexed and any motion causes discomfort. Radiographs including 2-view x-rays of the right shoulder demonstrate degenerative changes of glenohumeral joint, degenerative changes of the AC joint. MRI right shoulder is reviewed and demonstrates a large retracted complex rotator cuff tear, a very large shoulder effusion. He has degenerative changes in the glenohumeral joint and AC joint. He has postoperative changes as well. LABORATORY DATA:  Reviewed. His white count on 10/29 was 27.1, yesterday was 20.6. His BUN  yesterday was 38 and his creatinine is 1.32. His potassium is elevated at 5.3. Sodium is 134. His ESR is 90. His CRP is 32.4. Two sets of blood cultures yesterday are already growing gram-positive cocci. The aspirate from the left hip demonstrates MSSA. IMPRESSION:  1. Septic arthritis, right shoulder presumably MSSA  2. Osteoarthritis, right shoulder. 3.  Acromioclavicular joint arthritis, right shoulder. 4.  Previous right shoulder surgery. 5.  Periprosthetic left total hip arthroplasty infection with methicillin-susceptible Staphylococcus aureus. 6.  History of methicillin-resistant Staphylococcus aureus secondary to a spider bite.       PLAN:   I had a lengthy discussion with the patient, Dr. Emmett Leonard, and the hospitalist.  At this point, he has an aspirate, which is demonstrating MSSA and Dr. Emmett Leonard is planning on performing an I and D left hip tomorrow. In regards to his right shoulder, he has a very large effusion with a recurrent rotator cuff tear. In my opinion, the effusion is secondary to an infection. That infection will require I and D of his right shoulder. This will be done in the beach chair position via a deltopectoral approach. Given the magnitude of his infection, I believe this would be better handled through an open procedure rather than arthroscopic procedure and once his infection is eradicated given the combination of his cuff pathology and his degenerative joint disease, he may require further surgery down the road, but the immediate urgent need is to resolve his infection. After reviewing the patient's medical information and viewing the patient, he is tachycardic, his blood pressure is elevated, he does not look good, he does not feel good, he is bacteremic. I have discussed the case with Dr. Emmett Leonard and the hospitalist.  We will consult the hospitalist.  We will start antibiotics tonight to mitigate the risk of sepsis. Dr. Emmett Leonard will proceed tomorrow with I and D of the left hip. I will plan to address his right shoulder provisionally Wednesday afternoon. I discussed the complexity of the orthopedic issues, specifically the shoulder portion. We will work closely with Dr. Emmett Leonard to help resolve this issue down the road. The etiology of his infection is unclear. We will consult infectious disease. We will most probably need to do an FUO workup to address sources for this infection, which would include an echocardiogram as well as other testing, but the immediate urgent need now is to control his infection, so we will consult the hospitalist, will start antibiotics tonight.   We will proceed with a left hip I and D tomorrow, right shoulder provisionally for Wednesday.   We will follow his progress along in the hospital.      Janine Padron MD       AGP / SARATH  D: 11/05/2018 19:14     T: 11/05/2018 20:26  JOB #: [id number]  CC: Neva Arthur MD  CC: Marina Simpson MD

## 2018-11-06 NOTE — PROGRESS NOTES
Initial visit with patient and wife. Prayer and support given and card left. Moriah Rodríguez M.Div.

## 2018-11-06 NOTE — PROGRESS NOTES
Shift assessment completed. Pt is alert & oriented x4. Able to verbalize needs. Resting quietly with no distress noted. Bed low and locked. Call light within reach. Patient instructed to call for assistance. Pt verbalizes understanding. Will monitor.

## 2018-11-06 NOTE — PROGRESS NOTES
TRANSFER - IN REPORT: 
 
Verbal report received from [facility] (name) on Sonny De La Cruz  being received from [address] [place] [address](unit) for ordered procedure Report consisted of patients Situation, Background, Assessment and  
Recommendations(SBAR). Information from the following report(s) SBAR, Kardex and MAR was reviewed with the receiving nurse. Opportunity for questions and clarification was provided. Assessment completed upon patients arrival to unit and care assumed.

## 2018-11-06 NOTE — PROGRESS NOTES
Pt resting in bed, call light within reach, side rails up x 3, and bed low and locked. Shift assessment complete. No needs at this time.

## 2018-11-06 NOTE — CONSULTS
Infectious Disease Consult    Today's Date: 11/6/2018   Admit Date: 11/5/2018    Impression:   · Staphylococcus aureus bacteremia, MSSA  · Late onset L JEIMY infection, MSSA  · R shoulder septic arthritis, likely MSSA  · History R shoulder rotator cuff repair  · Oral HSV  · RANI  · Elevated LFTs        Plan:   ·  Patient for revision arthroplasty L hip today  · I and D, revision R shoulder planned 11/7  · Continue Nafcillin at present  · Add rifampin in presence of hardware  · Repeat BC  · TTE  · PICC when bacteremia clear  · Will require 6 weeks IV rx likely with followup oral rx  · Specifics of post hospital antibiotic prescription to be determined based on disposition locations/plan  · Oral HSV crusted at this point, will not start ACV  · Check Hep C, monitor LFTs    Anti-infectives:   · Nafcillin    Subjective:   Date of Consultation:  November 6, 2018  Referring Physician: Doug Gibson    Patient is a 48 y.o. male with history of L JEIMY 1/2017 and R rotator cuff repair 6/2017. Two weeks ago developed malaise, flu like symptoms, fever blisters, R shoulder pain. Had R shoulder cortisone injection. Next day began having pain L hip. Had hip aspirated 10/29 which grew MSSA. Now admitted for revision L JEIMY. Also with ongoing pain R shoulder pain, shoulder MRI with re-tear of supraspinatus tendon, large joint effusion. Patient denies IVDU or other needle use. Does have remote history of possible MRSA infection on thigh, \"brown recluse bite. 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 M19.011    Osteoarthritis of right glenohumeral joint M19.011    Complete tear of right rotator cuff M75.121    Staphylococcal arthritis of right shoulder ([address] M00.011    RANI (acute kidney injury) [facility] N17.9     Past Medical History:   Diagnosis Date    Arthritis     right shoulder     Biceps muscle tear     Chronic pain     Insomnia     MRSA (methicillin resistant Staphylococcus aureus) infection     after a spider bite with I&D    Personal history of kidney stones     Right shoulder pain       Family History   Problem Relation Age of Onset    Hypertension Mother     Heart Disease Father       Social History     Tobacco Use    Smoking status: Former Smoker     Packs/day: 0.50     Years: 1.00     Pack years: 0.50     Last attempt to quit: 2001     Years since quittin.4    Smokeless tobacco: Never Used   Substance Use Topics    Alcohol use: Yes     Comment: occasionally     Past Surgical History:   Procedure Laterality Date    HX ORTHOPAEDIC Left 2017    left hip replacement    HX OTHER SURGICAL      posterior rt leg spider bite excision per pt.  HX ROTATOR CUFF REPAIR Left     HX SEPTOPLASTY  11/15/2017      Prior to Admission medications    Medication Sig Start Date End Date Taking? Authorizing Provider   oxyCODONE IR (ROXICODONE) 5 mg immediate release tablet Take 1-2 Tabs by mouth every six (6) hours as needed for Pain. Max Daily Amount: 40 mg. 18   Broadus Dancer, MD   ondansetron (ZOFRAN ODT) 8 mg disintegrating tablet Take 1 Tab by mouth every eight (8) hours as needed for Nausea. 18   Broadus Dancer, MD   ibuprofen (MOTRIN) 800 mg tablet Take 1 Tab by mouth every eight (8) hours as needed for Pain for up to 7 days. 18  Broadus Dancer, MD   cephALEXin (KEFLEX) 500 mg capsule Take 1 Cap by mouth four (4) times daily for 7 days.  18  Broadus Dancer, MD   HYDROcodone-acetaminophen [facility]) 5-325 mg per tablet 1-2 tab Q4-6 hours PRN pain  Indications: Pain 17   Carol Graf MD   ondansetron (ZOFRAN ODT) 8 mg disintegrating tablet 1 tablet Q 8 hours PRN N/V 17   Carol Graf MD   fluticasone (FLONASE) 50 mcg/actuation nasal spray 2 Sprays by Both Nostrils route two (2) times a day. 17   Yvonne Kilgore MD   FLECTOR 1.3 % pt12 1 Patch by TransDERmal route every twelve (12) hours every twelve (12) hours. 17   Yvonne Kilgore MD   diclofenac EC (VOLTAREN) 75 mg EC tablet Take  by mouth. Provider, Historical       No Known Allergies     Review of Systems:  A comprehensive review of systems was negative except for that written in the History of Present Illness. Objective:     Visit Vitals  /70 (BP 1 Location: Left arm, BP Patient Position: At rest)   Pulse 76   Temp 99.1 °F (37.3 °C)   Resp 14   SpO2 93%     Temp (24hrs), Av.4 °F (37.4 °C), Min:98.3 °F (36.8 °C), Max:100.8 °F (38.2 °C)       Lines:  Peripheral IV:       Physical Exam:    General:  Alert, cooperative, well noursished, well developed, appears stated age   Eyes:  Sclera anicteric. Pupils equally round and reactive to light. Mouth/Throat: Crusting perioral lesions   Neck: Supple   Lungs:   Clear to auscultation bilaterally, good effort   CV:  Regular rate and rhythm,no murmur, click, rub or gallop   Abdomen:   Soft, non-tender. bowel sounds normal. non-distended   Extremities: No cyanosis or edema   Skin: Skin color, texture, turgor normal. no acute rash or lesions   Lymph nodes: Cervical and supraclavicular normal   Musculoskeletal: Muscular.   Swelling, decreased ROM R shoulder   Lines/Devices:  Intact, no erythema, drainage or tenderness   Psych: Alert and oriented, normal mood affect given the setting       Data Review:     CBC:  Recent Labs     18  0433 18  1634   WBC 20.4* 20.6*   GRANS 73 79*   MONOS 11 7   EOS 0* 0*   ANEU 15.0* 16.3*   ABL 2.7 2.4   HGB 11.5* 13.8   HCT 36.0* 41.4    579*       BMP:  Recent Labs     183 18  1745   CREA 1.72* 1.32   BUN 31* 38*    134*   K 4.6 5.3*   CL 99 96*   CO2 30 29   AGAP 8 9   GLU 98 86       LFTS:  Recent Labs 11/04/18  1745   TBILI 0.4   ALT 96*   SGOT 59*   AP 66   TP 7.8   ALB 2.6*       Microbiology:     All Micro Results     Procedure Component Value Units Date/Time    MSSA/MRSA SC BY PCR, NASAL SWAB [[id number]]     Order Status:  Sent Specimen:  Swab           Imaging:   MRI shoulder    Signed By: Reva Martino MD     November 6, 2018

## 2018-11-06 NOTE — BRIEF OP NOTE
BRIEF OPERATIVE NOTE Date of Procedure: 11/6/2018 Preoperative Diagnosis: Infected Left Hip 
septic arthritis right shoulder Postoperative Diagnosis: nfected Left Hip  
septic arthritis right shoulder Procedure(s): 1. LEFT HIP ARTHROPLASTY TOTAL REVISION 2. OPEN INCISION, DEBRIDEMENT, LAVAGE GLENOHUMERAL JOINT RIGHT SHOULDER Surgeon(s) and Role: 
Panel 1: 
   * Tej Funes MD - Primary Panel 2: 
   * Angélica Bonner MD - Primary Surgical Staff: 
Circ-1: Claudette Vogt Circ-Relief: Joycelyn Coffey RN; Poppy Munson RN Physician Assistant: JAY Johnson Scrub Tech-1: Elizabeth Dhaliwal; Gaudencio Giang; Eliseo Garcia Scrub Tech-Relief: Stella Beltran April D Event Time In Time Out Incision Start 11/06/2018 1435 Incision Close 11/06/2018 1757 Anesthesia: General  
 
Estimated Blood Loss: 50 cc. SHOULDER Specimens:  
ID Type Source Tests Collected by Time Destination 1 : LEFT HIP OPENING CULTURE ANAEROBIC AND WOUND WITH GRAM STAIN  Wound Hip, left CULTURE, ANAEROBIC, CULTURE, WOUND W GRAM STAIN Tej Funes MD 11/6/2018 1446 Microbiology 2 : Acetabulum left hip culture anaerobic and wound with gram stain Wound Hip, left CULTURE, ANAEROBIC, CULTURE, WOUND W Margie Mendez MD 11/6/2018 1454 Microbiology 3 : right shoulder #1 Wound Shoulder CULTURE, ANAEROBIC, CULTURE, WOUND W GRAM STAIN Tej Funes MD 11/6/2018 1708 Microbiology 4 : right shoulder #2 Wound Shoulder CULTURE, ANAEROBIC, CULTURE, WOUND W GRAM STAIN Tej Funes MD 11/6/2018 1710 Microbiology 5 : right shoulder #3 Wound Shoulder CULTURE, WOUND W GRAM STAIN, CULTURE, ANAEROBIC Tej Funes MD 11/6/2018 1710 Microbiology 6 : caseous material right shoulder  Wound Shoulder CULTURE, WOUND W GRAM STAIN, CULTURE, ANAEROBIC Tej Funes MD 11/6/2018 1712 Microbiology Complications: NONE Implants:  
Implant Name Type Inv. Item Serial No.  Lot No. LRB No. Used Action GRAFT BNE PASTE RAPID CUR 10ML -- STIMULAN - [id number]  GRAFT BNE PASTE RAPID CUR 10ML -- STIMULAN [id number]/18-[id number] [employer] [id number]/18-[id number]/[id number] Left 1 Implanted SCR BNE ACET CANC 6.5X25MM -- TRIDENT - [id number]  SCR BNE ACET CANC 6.5X25MM -- TRIDENT [id number] [employer] HOW [id number] Left 1 Implanted SCR BNE ACET CANC 6.5X30MM -- TRIDENT - [id number]  SCR BNE ACET CANC 6.5X30MM -- TRIDENT [id number] [employer] HOW [id number] Left 1 Implanted LINER MDM COCR 48MM G --  - [id number]  LINER MDM COCR 48MM G --  [id number] [employer] HOW [id number] Left 1 Implanted SHELL ACET HMSPHR MH 62MM --  - [id number]  SHELL ACET HMSPHR MH 62MM --  [id number] [facility] HOW [id number] Left 1 Implanted INSERT ACET CUP X3 [id number] -- RESTORATION ADM - [id number]  INSERT ACET CUP X3 [id number] -- RESTORATION ADM [id number] [employer] HOW [id number] Left 1 Implanted

## 2018-11-06 NOTE — ANESTHESIA PREPROCEDURE EVALUATION
Anesthetic History Review of Systems / Medical History Patient summary reviewed Pulmonary Smoker (Former) Neuro/Psych Cardiovascular Exercise tolerance: >4 METS 
  
GI/Hepatic/Renal 
  
 
 
 
 
 
 Endo/Other Arthritis Other Findings Physical Exam 
 
Airway Mallampati: II 
TM Distance: > 6 cm Neck ROM: normal range of motion Mouth opening: Normal 
 
 Cardiovascular Regular rate and rhythm,  S1 and S2 normal,  no murmur, click, rub, or gallop Dental 
No notable dental hx Pulmonary Breath sounds clear to auscultation Abdominal 
 
 
 
 Other Findings Anesthetic Plan ASA: 2 Anesthesia type: spinal 
 
 
 
 
 
Anesthetic plan and risks discussed with: Patient

## 2018-11-06 NOTE — PROGRESS NOTES
PC from Dr. Jem Gallo. Request for ID consult tonight for antibiotics. RN placed PC to ID. Awaiting call back.

## 2018-11-06 NOTE — CONSULTS
INTERNAL MEDICINE H&P/CONSULT    Subjective:     Patient is a 48years old male with history of Shoulder and hip surgeries due to OA who had recent intraarticular injection for his shoulder presents with fever and pain of shoulder. He was admitted by Ortho and had I&D today of shoulder planned to have another one of his hip tomorrow. His blood CS appears to grow staph. Past Medical History:   Diagnosis Date    Arthritis     right shoulder     Biceps muscle tear     Chronic pain     Insomnia     MRSA (methicillin resistant Staphylococcus aureus) infection 2007    after a spider bite with I&D    Personal history of kidney stones     Right shoulder pain       Past Surgical History:   Procedure Laterality Date    HX ORTHOPAEDIC Left 01/2017    left hip replacement    HX OTHER SURGICAL      posterior rt leg spider bite excision per pt.  HX ROTATOR CUFF REPAIR Left 2010    HX SEPTOPLASTY  11/15/2017      Prior to Admission medications    Medication Sig Start Date End Date Taking? Authorizing Provider   oxyCODONE IR (ROXICODONE) 5 mg immediate release tablet Take 1-2 Tabs by mouth every six (6) hours as needed for Pain. Max Daily Amount: 40 mg. 11/4/18   Judye Klinefelter, MD   ondansetron (ZOFRAN ODT) 8 mg disintegrating tablet Take 1 Tab by mouth every eight (8) hours as needed for Nausea. 11/4/18   Judye Klinefelter, MD   ibuprofen (MOTRIN) 800 mg tablet Take 1 Tab by mouth every eight (8) hours as needed for Pain for up to 7 days. 11/4/18 11/11/18  Judye Klinefelter, MD   cephALEXin (KEFLEX) 500 mg capsule Take 1 Cap by mouth four (4) times daily for 7 days.  11/4/18 11/11/18  Judye Klinefelter, MD   HYDROcodone-acetaminophen [facility]) 5-325 mg per tablet 1-2 tab Q4-6 hours PRN pain  Indications: Pain 11/30/17   Michael Watkins MD   ondansetron (ZOFRAN ODT) 8 mg disintegrating tablet 1 tablet Q 8 hours PRN N/V 11/8/17   Michael Watkins MD   fluticasone [facility]) 50 mcg/actuation nasal spray 2 Sprays by Both Nostrils route two (2) times a day. 17   Kaushik Kilgore MD   FLECTOR 1.3 % pt12 1 Patch by TransDERmal route every twelve (12) hours every twelve (12) hours. 17   Kaushik Kilgore MD   diclofenac EC (VOLTAREN) 75 mg EC tablet Take  by mouth. Provider, Historical     No Known Allergies   Social History     Tobacco Use    Smoking status: Former Smoker     Packs/day: 0.50     Years: 1.00     Pack years: 0.50     Last attempt to quit: 2001     Years since quittin.4    Smokeless tobacco: Never Used   Substance Use Topics    Alcohol use: Yes     Comment: occasionally        Family History:  HTN    Review of Systems   A comprehensive review of systems was negative except for that written in the HPI. Objective: Intake / Output:  No intake/output data recorded. No intake/output data recorded. Physical Exam:  Visit Vitals  /59 (BP 1 Location: Left arm, BP Patient Position: At rest)   Pulse (!) 104 Comment: RN notified   Temp (!) 100.8 °F (38.2 °C) Comment: RN notified   Resp 16   SpO2 93%     General appearance: awake, alert, cooperative, moderate distress, appears stated age   Head: Normocephalic, without obvious abnormality, atraumatic  Back: symmetric, no curvature. ROM normal. No CVA tenderness. Lungs: clear to auscultation bilaterally   Heart: regular rhythm, S1, S2 normal, no murmur, click, rub or gallop. - Tachycardia  Abdomen: soft, no tenderness, no distension, normal bowel sound, no masses, no organomegaly  Extremities: atraumatic, no cyanosis - Bilateral lower limbs edema none. Skin: No rashes or ulceration.   Neurologic: Grossly intact   ECG: sinus rhythm     Data Review (Labs):   Recent Results (from the past 24 hour(s))   EKG, 12 LEAD, INITIAL    Collection Time: 18  5:07 PM   Result Value Ref Range    Ventricular Rate 105 BPM    Atrial Rate 105 BPM    P-R Interval 142 ms    QRS Duration 90 ms    Q-T Interval 302 ms    QTC Calculation (Bezet) 399 ms Calculated P Axis 59 degrees    Calculated R Axis 1 degrees    Calculated T Axis 47 degrees    Diagnosis       !!! Poor data quality, interpretation may be adversely affected  Sinus tachycardia  RSR' or QR pattern in V1 suggests right ventricular conduction delay  Borderline ECG  When compared with ECG of 26-DEC-2016 12:18,  No significant change was found  Confirmed by ST PARESH TINOCO MD (), LONI SINGH ([id number]) on 11/5/2018 8:51:15 PM     TYPE & SCREEN    Collection Time: 11/05/18  5:23 PM   Result Value Ref Range    Crossmatch Expiration 11/08/2018     ABO/Rh(D) B POSITIVE     Antibody screen NEG    PROTHROMBIN TIME + INR    Collection Time: 11/05/18  5:23 PM   Result Value Ref Range    Prothrombin time 14.6 (H) 11.5 - 14.5 sec    INR 1.1     PTT    Collection Time: 11/05/18  5:23 PM   Result Value Ref Range    aPTT 34.7 23.2 - 35.3 SEC       Assessment:     Active Problems:    Infected prosthetic hip ([address] (11/5/2018)      Septic arthritis of shoulder, right ([address] (11/5/2018)      Degenerative joint disease of right acromioclavicular joint (11/5/2018)      Osteoarthritis of right glenohumeral joint (11/5/2018)      Complete tear of right rotator cuff (11/5/2018)        Plan:     Pt was seen by ID group who started him on Nafcillin. Pain well controlled  Would see the pt as needed.   Thank you    Signed By: Letiica Campbell MD     November 5, 2018

## 2018-11-06 NOTE — PROGRESS NOTES
Hospitalist Progress Note Admit Date:  2018  2:37 PM  
Name:  Kuldip Muniz Age:  48 y.o. 
:  1965 MRN:  [id number] PCP:  Melissa Barnes MD 
Treatment Team: Attending Provider: Lawanda Holder MD; Consulting Provider: Lisa Johnson MD; Consulting Provider: Katherine Hansen MD; Consulting Provider: Cherri Borden MD; Care Manager: Jose Elias Connolly; Consulting Provider: Hakeem Man MD; Consulting Provider: Jose Luis Snider MD; Utilization Review: Jaylene Mustafa Subjective:  
Patient is a 49 y/o M with history of L JEIMY 2017 and R rotator cuff repair 2017. Two weeks ago PTA developed malaise, flu like symptoms, fever blisters, R shoulder pain. Had R shoulder cortisone injection given. Next day began having pain L hip. Had hip aspirated 10/29 which grew MSSA. admitted by ortho on  for hip revision. ID and hospitalist consulted. Had worsening RANI on  with Cr of 1.7. Pt taken to OR that afternoon and postop Cr was 2.22, K 5.5. This morning Cr 4.5 and K 6. UOP has been very low despite being put on continuous IVF yesterday morning and given a bolus last night. Gong in place. Pt denies any symptoms other than low UOP for 4 days. No abd/flank pain. No edema. One kidney stone in past but no complications from it. Denies trauma, strenuous workout recently. Doesn't take any meds at home except vitamins and supplements, and three 5-hour Energy drinks daily. Denies any kind of injection drug use. No CP, SOB, palpitations. He appears well. Objective:  
 
Patient Vitals for the past 24 hrs: 
 Temp Pulse Resp BP SpO2  
18 1141 99.1 °F (37.3 °C) 81 16 121/67 92 % 18 0756     93 % 18 0705 99.1 °F (37.3 °C) 76 14 151/70 92 % 18 0309 99.4 °F (37.4 °C) 84 16 143/82 92 % 18 2306 99.7 °F (37.6 °C) 87 16 124/72 91 % 18 (!) 100.8 °F (38.2 °C) (!) 104 16 133/59 93 % 11/05/18 1710 99.3 °F (37.4 °C) (!) 107 16 (!) 174/102 97 % 11/05/18 1439 98.3 °F (36.8 °C) 99 16 (!) 167/104 97 % Oxygen Therapy O2 Sat (%): 92 % (11/06/18 1141) Pulse via Oximetry: 77 beats per minute (11/06/18 0756) O2 Device: Room air (11/06/18 0756) Intake/Output Summary (Last 24 hours) at 11/6/2018 1357 Last data filed at 11/6/2018 3902 Gross per 24 hour Intake 280 ml Output  Net 280 ml *Note that automatically entered I/Os may not be accurate; dependent on patient compliance with collection and accurate  by assistants. General:    Well nourished. Alert. CV:   RRR. No murmur, rub, or gallop. Lungs:   CTAB. No wheezing, rhonchi, or rales. Abdomen:   Soft, nontender, nondistended. Extremities: Warm and dry. No cyanosis or edema. Skin:     No rashes or jaundice. Neuro:  No gross focal deficits Data Review: 
I have reviewed all labs, meds, telemetry events, and studies from the last 24 hours: 
 
Recent Results (from the past 24 hour(s)) EKG, 12 LEAD, INITIAL Collection Time: 11/05/18  5:07 PM  
Result Value Ref Range Ventricular Rate 105 BPM  
 Atrial Rate 105 BPM  
 P-R Interval 142 ms QRS Duration 90 ms Q-T Interval 302 ms QTC Calculation (Bezet) 399 ms Calculated P Axis 59 degrees Calculated R Axis 1 degrees Calculated T Axis 47 degrees Diagnosis    
  !!! Poor data quality, interpretation may be adversely affected Sinus tachycardia RSR' or QR pattern in V1 suggests right ventricular conduction delay Borderline ECG When compared with ECG of 26-DEC-2016 12:18, No significant change was found Confirmed by ST PARESH TINOCO MD (), LONI SINGH ([id number]) on 11/5/2018 8:51:15 PM 
  
TYPE & SCREEN Collection Time: 11/05/18  5:23 PM  
Result Value Ref Range Crossmatch Expiration 11/08/2018 ABO/Rh(D) B POSITIVE Antibody screen NEG PROTHROMBIN TIME + INR Collection Time: 11/05/18  5:23 PM  
Result Value Ref Range Prothrombin time 14.6 (H) 11.5 - 14.5 sec INR 1.1 PTT Collection Time: 11/05/18  5:23 PM  
Result Value Ref Range aPTT 34.7 23.2 - 35.3 SEC  
CBC WITH AUTOMATED DIFF Collection Time: 11/06/18  4:33 AM  
Result Value Ref Range WBC 20.4 (H) 4.3 - 11.1 K/uL  
 RBC 4.11 (L) 4.23 - 5.6 M/uL  
 HGB 11.5 (L) 13.6 - 17.2 g/dL HCT 36.0 (L) 41.1 - 50.3 % MCV 87.6 79.6 - 97.8 FL  
 MCH 28.0 26.1 - 32.9 PG  
 MCHC 31.9 31.4 - 35.0 g/dL  
 RDW 14.5 % PLATELET 530 950 - 204 K/uL MPV 9.6 9.4 - 12.3 FL ABSOLUTE NRBC 0.00 0.0 - 0.2 K/uL  
 DF AUTOMATED NEUTROPHILS 73 43 - 78 % LYMPHOCYTES 13 13 - 44 % MONOCYTES 11 4.0 - 12.0 % EOSINOPHILS 0 (L) 0.5 - 7.8 % BASOPHILS 1 0.0 - 2.0 % IMMATURE GRANULOCYTES 2 0.0 - 5.0 %  
 ABS. NEUTROPHILS 15.0 (H) 1.7 - 8.2 K/UL  
 ABS. LYMPHOCYTES 2.7 0.5 - 4.6 K/UL  
 ABS. MONOCYTES 2.3 (H) 0.1 - 1.3 K/UL  
 ABS. EOSINOPHILS 0.1 0.0 - 0.8 K/UL  
 ABS. BASOPHILS 0.1 0.0 - 0.2 K/UL  
 ABS. IMM. GRANS. 0.3 0.0 - 0.5 K/UL METABOLIC PANEL, BASIC Collection Time: 11/06/18  4:33 AM  
Result Value Ref Range Sodium 137 136 - 145 mmol/L Potassium 4.6 3.5 - 5.1 mmol/L Chloride 99 98 - 107 mmol/L  
 CO2 30 21 - 32 mmol/L Anion gap 8 mmol/L Glucose 98 65 - 100 mg/dL BUN 31 (H) 6 - 23 MG/DL Creatinine 1.72 (H) 0.8 - 1.5 MG/DL  
 GFR est AA 54 (L) >60 ml/min/1.73m2 GFR est non-AA 45 ml/min/1.73m2 Calcium 9.3 8.3 - 10.4 MG/DL MAGNESIUM Collection Time: 11/06/18  4:33 AM  
Result Value Ref Range Magnesium 2.0 1.8 - 2.4 mg/dL GLUCOSE, POC Collection Time: 11/06/18  7:35 AM  
Result Value Ref Range Glucose (POC) 111 (H) 65 - 100 mg/dL All Micro Results Procedure Component Value Units Date/Time MSSA/MRSA SC BY PCR, NASAL SWAB [[id number]] Order Status:  Sent Specimen:  Swab No results found for this visit on 11/05/18. Current Meds: 
Current Facility-Administered Medications Medication Dose Route Frequency  lidocaine (PF) (XYLOCAINE) 10 mg/mL (1 %) injection 0.1 mL  0.1 mL SubCUTAneous PRN  
 lactated Ringers infusion  100 mL/hr IntraVENous CONTINUOUS  
 fentaNYL citrate (PF) injection 100 mcg  100 mcg IntraVENous ONCE  
 midazolam (VERSED) injection 2 mg  2 mg IntraVENous ONCE PRN  
 0.9% sodium chloride infusion  125 mL/hr IntraVENous CONTINUOUS  
 [START ON 11/7/2018] rifAMPin (RIFADIN) capsule 600 mg  600 mg Oral ACB  tuberculin injection 5 Units  5 Units IntraDERMal ONCE  
 HYDROmorphone (PF) (DILAUDID) injection 1 mg  1 mg IntraVENous Q3H PRN  
 oxyCODONE IR (ROXICODONE) tablet 10 mg  10 mg Oral Q4H PRN  
 temazepam (RESTORIL) capsule 15 mg  15 mg Oral QHS PRN  
 acetaminophen (TYLENOL) tablet 650 mg  650 mg Oral Q6H PRN  
 nafcillin (NALLPEN) 12 g in 0.9% sodium chloride 1,000 mL continuous infusion  12 g IntraVENous Q24H Other Studies (last 24 hours): 
Xr Scapula Rt Result Date: 11/5/2018 Right scapula History:  Chronic right shoulder pain Findings:  2 views submitted and demonstrate intact osseous cortical margins of the scapula. Hypertrophic degenerative changes are quite prominent at the inferior margin cortical process. There are no aggressive osseous lesions. Impression:  No acute bony findings Xr Shoulder Rt Ap/lat Min 2 V Result Date: 11/5/2018 Right shoulder History:  Chronic shoulder pain Findings: Three views submitted and demonstrate degenerative change at the greater tuberosity proximal humerus with sclerosis and osseous cystic formation. Normal alignment the glenohumeral joint. Degenerative changes are present in the [facility] joint. There is a small subacromial enthesophyte. Right lung apex clear. No aggressive osseous lesions. Impression:  Degenerative change at the glenohumeral joint and AC joint. Mri Shoulder Rt Wo Cont Result Date: 11/5/2018 Exam: Right shoulder MRI without contrast. ADDITIONAL CLINICAL INFORMATION: Severe right shoulder pain for 3 weeks. Surgery 17 months prior. COMPARISON: Right shoulder radiographs dated November 05, 2018. FINDINGS: Acromioclavicular Joint: Postsurgical change of prior partial distal clavicular resection and subacromial decompression with slight spurring of the anterolateral acromion and with osseous hypertrophy of the distal clavicle. There is fluid seen in the region of the acromioclavicular joint space. There is a large volume of fluid in the subacromial/subdeltoid bursa. Rotator Cuff: There is surgical change of prior supraspinatus rotator cuff repair with full thickness re-tear involving the majority of the supraspinatus tendon with some intact anterior and posterior fibers, this tear measures approximately 15 mm in AP extent with retraction of the tendon to the mid humeral head. Infraspinatus tendinosis without evidence of tearing. The teres minor tendon is intact. Subscapularis tendinosis without evidence of tearing. Biceps Tendon: Intra-articular biceps tendinosis. Labrum: No definite labral tear however evaluation is somewhat limited by patient motion. Articular Cartilage/Bones: No focal chondral defect. There is chondral thinning of the medial humeral head. Nerves: No evidence of mass effect in the region of the quadrilateral space or suprascapular nerve. Other: Large shoulder joint effusion. IMPRESSION: 1. Postsurgical change of prior supraspinatus tendon repair with full thickness re-tear involving the majority of the tendon with some intact anterior and posterior fibers. This tear measures approximately 15 mm in AP extent with retraction to the mid humeral head. 2. Postsurgical change of Cruz procedure with fluid seen within the acromioclavicular joint space and a large volume of fluid in the subacromial/subdeltoid joint space, all favored in continuity. There is slight spurring of the anterolateral acromion. 3. Large shoulder joint effusion. Assessment and Plan:  
 
Hospital Problems as of 11/6/2018 Date Reviewed: 11/4/2018 Codes Class Noted - Resolved POA * (Principal) Staphylococcal arthritis of right shoulder ([facility] ICD-10-CM: M00.011 ICD-9-CM: 711.01, 041.10  11/6/2018 - Present Yes RANI (acute kidney injury) ([facility] ICD-10-CM: N17.9 ICD-9-CM: 584.9  11/6/2018 - Present Yes Infected prosthetic hip ([facility] ICD-10-CM: T84.59XA, E22.744 ICD-9-CM: 996.66, V43.64  11/5/2018 - Present Yes Degenerative joint disease of right acromioclavicular joint ICD-10-CM: M19.011 
ICD-9-CM: 715.91  11/5/2018 - Present Yes Osteoarthritis of right glenohumeral joint ICD-10-CM: M19.011 
ICD-9-CM: 715.91  11/5/2018 - Present Yes Complete tear of right rotator cuff ICD-10-CM: M75.121 ICD-9-CM: 727.61  11/5/2018 - Present Yes RESOLVED: Septic arthritis of shoulder, right (HCC) ICD-10-CM: M00.9 ICD-9-CM: 711.01  11/5/2018 - 11/6/2018 Yes Plan: 
· Septic Arthritis shoulder and hip · ID and ortho managing. · Cont rifampin and nafcillin. PharmD to redose rifampin in ARF · ARF with hyperK · Expect hyperK to worsen given current renal function trajectory needs ICU until downtrending · Start cardiac monitor · Stat EKG ordered; has peaked T waves; needs ICU · Give bicarb, insulin/dextrose, kayexalate. · Got one bolus last night; give one more and then resume continuous IVF. Monitor closely for overload · Check CK · Stat renal US 
· UA if urine available · Serial BMP · Add on phos · Already has fuller. Strict I/Os. Scant urine in bag currently · Stopped ASA and celebrex · Check urine Eos · Renal diet · Nephrology consult · Transfer downtown to ICU · We will assume care, thanks · Discussed with Dr Pepito Cheney · Acute postop anemia · Monitor daily Critical care time spent 35 minutes Signed: 
Michael Martinez MD

## 2018-11-06 NOTE — PROGRESS NOTES
Mr Dwayne Teresa is septic with also an infected shoulder on the right. He has a MSSA. We plan on a revision with I and D on the left hip. I will leave the stem if it is stable without osteolysis. He will have his shoulder procedure tomorrow and would be unable to walk if I remove a well fixed stem with an osteotomy. He is aware of the lessened success rate and he could face a two stage revision later if his issue can not be controlled. Hopefully with the acute onset of symptoms and antibiotics his response is positive.

## 2018-11-06 NOTE — H&P
Date of Surgery Update: 
Evelio Brennan was seen and examined. History and physical has been reviewed. The patient has been examined.  There have been no significant clinical changes since the completion of the originally dated History and Physical. 
 
Signed By: Marisel Anderson MD   
 November 6, 2018 4:29 PM

## 2018-11-06 NOTE — PROGRESS NOTES
November 6, 2018 Post Op day: * No surgery found * Admit Diagnosis: infected left hi[ Infected prosthetic hip ([address] LAB:   
Recent Results (from the past 24 hour(s)) EKG, 12 LEAD, INITIAL Collection Time: 11/05/18  5:07 PM  
Result Value Ref Range Ventricular Rate 105 BPM  
 Atrial Rate 105 BPM  
 P-R Interval 142 ms QRS Duration 90 ms Q-T Interval 302 ms QTC Calculation (Bezet) 399 ms Calculated P Axis 59 degrees Calculated R Axis 1 degrees Calculated T Axis 47 degrees Diagnosis    
  !!! Poor data quality, interpretation may be adversely affected Sinus tachycardia RSR' or QR pattern in V1 suggests right ventricular conduction delay Borderline ECG When compared with ECG of 26-DEC-2016 12:18, No significant change was found Confirmed by ST APRESH TINOCO MD (), LONI SINGH ([id number]) on 11/5/2018 8:51:15 PM 
  
TYPE & SCREEN Collection Time: 11/05/18  5:23 PM  
Result Value Ref Range Crossmatch Expiration 11/08/2018 ABO/Rh(D) B POSITIVE Antibody screen NEG PROTHROMBIN TIME + INR Collection Time: 11/05/18  5:23 PM  
Result Value Ref Range Prothrombin time 14.6 (H) 11.5 - 14.5 sec INR 1.1 PTT Collection Time: 11/05/18  5:23 PM  
Result Value Ref Range aPTT 34.7 23.2 - 35.3 SEC  
CBC WITH AUTOMATED DIFF Collection Time: 11/06/18  4:33 AM  
Result Value Ref Range WBC 20.4 (H) 4.3 - 11.1 K/uL  
 RBC 4.11 (L) 4.23 - 5.6 M/uL  
 HGB 11.5 (L) 13.6 - 17.2 g/dL HCT 36.0 (L) 41.1 - 50.3 % MCV 87.6 79.6 - 97.8 FL  
 MCH 28.0 26.1 - 32.9 PG  
 MCHC 31.9 31.4 - 35.0 g/dL  
 RDW 14.5 % PLATELET 863 973 - 524 K/uL MPV 9.6 9.4 - 12.3 FL ABSOLUTE NRBC 0.00 0.0 - 0.2 K/uL  
 DF AUTOMATED NEUTROPHILS 73 43 - 78 % LYMPHOCYTES 13 13 - 44 % MONOCYTES 11 4.0 - 12.0 % EOSINOPHILS 0 (L) 0.5 - 7.8 % BASOPHILS 1 0.0 - 2.0 % IMMATURE GRANULOCYTES 2 0.0 - 5.0 %  
 ABS. NEUTROPHILS 15.0 (H) 1.7 - 8.2 K/UL ABS. LYMPHOCYTES 2.7 0.5 - 4.6 K/UL  
 ABS. MONOCYTES 2.3 (H) 0.1 - 1.3 K/UL  
 ABS. EOSINOPHILS 0.1 0.0 - 0.8 K/UL  
 ABS. BASOPHILS 0.1 0.0 - 0.2 K/UL  
 ABS. IMM. GRANS. 0.3 0.0 - 0.5 K/UL METABOLIC PANEL, BASIC Collection Time: 18  4:33 AM  
Result Value Ref Range Sodium 137 136 - 145 mmol/L Potassium 4.6 3.5 - 5.1 mmol/L Chloride 99 98 - 107 mmol/L  
 CO2 30 21 - 32 mmol/L Anion gap 8 mmol/L Glucose 98 65 - 100 mg/dL BUN 31 (H) 6 - 23 MG/DL Creatinine 1.72 (H) 0.8 - 1.5 MG/DL  
 GFR est AA 54 (L) >60 ml/min/1.73m2 GFR est non-AA 45 ml/min/1.73m2 Calcium 9.3 8.3 - 10.4 MG/DL MAGNESIUM Collection Time: 18  4:33 AM  
Result Value Ref Range Magnesium 2.0 1.8 - 2.4 mg/dL Vital Signs:   
Patient Vitals for the past 8 hrs: 
 BP Temp Pulse Resp SpO2  
18 0309 143/82 99.4 °F (37.4 °C) 84 16 92 % Temp (24hrs), Av.5 °F (37.5 °C), Min:98.3 °F (36.8 °C), Max:100.8 °F (38.2 °C) Pain Control:  
Pain Assessment Pain Scale 1: Numeric (0 - 10) Pain Intensity 1: 9 Pain Location 1: Shoulder Pain Orientation 1: Right Pain Description 1: Constant Pain Intervention(s) 1: Medication (see MAR) Subjective: Resting comfortably, pain fairly well controlled. Objective:  No Acute Distress, Alert and Oriented, Right shoulder TTP, painful and diminished ROM. Left hip ROM limited due to pain. Neurovascular exam is normal 
  
 
Assessment:  
Patient Active Problem List  
Diagnosis Code  Hip pain M25.559  Acute pain of right shoulder M25.511  S/P total hip arthroplasty Z96.649  Arthritis of left hip M16.12  Infected prosthetic hip ([facility] [address] T84.59XA, Y62.248  Septic arthritis of shoulder, right ([facility] [address] M00.9  Degenerative joint disease of right acromioclavicular joint M19.011  
 Osteoarthritis of right glenohumeral joint M19.011  
 Complete tear of right rotator cuff M75.121  
 
 
 Status Post   Infected left total hip arthroplasty with Cx growing MSSA. Plan:  Monitor Hgb. Elevated WBC (20,4) this AM.  MRI of right shoulder performed yesterday revealed recurrent RCT, large joint effusion. Dr. Norris Dumont tentatively planning I&D of right shoulder tomorrow due to concern for septic joint   Appreciate I.D.'s recs with abx selection. Continue current abx regimen. Keep NPO. To OR today for I&D and revision of infect left total hip arthroplasty. Pt seen by Dr. Rodolfo Pang this AM and in agreement with plan of care.   
Signed By: JAY Salazar

## 2018-11-07 ENCOUNTER — APPOINTMENT (OUTPATIENT)
Dept: ULTRASOUND IMAGING | Age: 53
DRG: 559 | End: 2018-11-07
Attending: INTERNAL MEDICINE
Payer: COMMERCIAL

## 2018-11-07 ENCOUNTER — APPOINTMENT (OUTPATIENT)
Dept: ULTRASOUND IMAGING | Age: 53
DRG: 467 | End: 2018-11-07
Attending: INTERNAL MEDICINE
Payer: COMMERCIAL

## 2018-11-07 ENCOUNTER — HOSPITAL ENCOUNTER (INPATIENT)
Age: 53
LOS: 13 days | Discharge: HOME | DRG: 559 | End: 2018-11-20
Attending: INTERNAL MEDICINE | Admitting: INTERNAL MEDICINE
Payer: COMMERCIAL

## 2018-11-07 VITALS
TEMPERATURE: 98 F | RESPIRATION RATE: 16 BRPM | DIASTOLIC BLOOD PRESSURE: 72 MMHG | OXYGEN SATURATION: 98 % | SYSTOLIC BLOOD PRESSURE: 123 MMHG | HEART RATE: 77 BPM

## 2018-11-07 PROBLEM — N17.9 ARF (ACUTE RENAL FAILURE) (HCC): Status: ACTIVE | Noted: 2018-11-07

## 2018-11-07 PROBLEM — A41.9 SEPSIS (HCC): Status: ACTIVE | Noted: 2018-11-07

## 2018-11-07 PROBLEM — N17.0 ACUTE RENAL FAILURE WITH TUBULAR NECROSIS (HCC): Status: ACTIVE | Noted: 2018-11-06

## 2018-11-07 PROBLEM — D62 POSTOPERATIVE ANEMIA DUE TO ACUTE BLOOD LOSS: Status: ACTIVE | Noted: 2018-11-07

## 2018-11-07 LAB
AMORPH CRY URNS QL MICRO: ABNORMAL
ANION GAP SERPL CALC-SCNC: 10 MMOL/L
ANION GAP SERPL CALC-SCNC: 10 MMOL/L (ref 7–16)
ANION GAP SERPL CALC-SCNC: 10 MMOL/L (ref 7–16)
ANION GAP SERPL CALC-SCNC: 9 MMOL/L
ANION GAP SERPL CALC-SCNC: 9 MMOL/L (ref 7–16)
APPEARANCE UR: ABNORMAL
ATRIAL RATE: 74 BPM
ATRIAL RATE: 75 BPM
BACTERIA SPEC CULT: ABNORMAL
BACTERIA URNS QL MICRO: ABNORMAL /HPF
BILIRUB UR QL: ABNORMAL
BUN SERPL-MCNC: 45 MG/DL (ref 6–23)
BUN SERPL-MCNC: 46 MG/DL (ref 6–23)
BUN SERPL-MCNC: 49 MG/DL (ref 6–23)
BUN SERPL-MCNC: 50 MG/DL (ref 6–23)
BUN SERPL-MCNC: 55 MG/DL (ref 6–23)
CALCIUM SERPL-MCNC: 7.5 MG/DL (ref 8.3–10.4)
CALCIUM SERPL-MCNC: 7.8 MG/DL (ref 8.3–10.4)
CALCIUM SERPL-MCNC: 8 MG/DL (ref 8.3–10.4)
CALCIUM SERPL-MCNC: 8 MG/DL (ref 8.3–10.4)
CALCIUM SERPL-MCNC: 8.2 MG/DL (ref 8.3–10.4)
CALCULATED P AXIS, ECG09: 57 DEGREES
CALCULATED P AXIS, ECG09: 60 DEGREES
CALCULATED R AXIS, ECG10: 16 DEGREES
CALCULATED R AXIS, ECG10: 19 DEGREES
CALCULATED T AXIS, ECG11: 31 DEGREES
CALCULATED T AXIS, ECG11: 31 DEGREES
CHLORIDE SERPL-SCNC: 100 MMOL/L (ref 98–107)
CHLORIDE SERPL-SCNC: 101 MMOL/L (ref 98–107)
CHLORIDE SERPL-SCNC: 102 MMOL/L (ref 98–107)
CK SERPL-CCNC: 722 U/L (ref 21–215)
CO2 SERPL-SCNC: 26 MMOL/L (ref 21–32)
CO2 SERPL-SCNC: 26 MMOL/L (ref 21–32)
CO2 SERPL-SCNC: 27 MMOL/L (ref 21–32)
CO2 SERPL-SCNC: 27 MMOL/L (ref 21–32)
CO2 SERPL-SCNC: 28 MMOL/L (ref 21–32)
COLOR UR: ABNORMAL
CREAT SERPL-MCNC: 4.52 MG/DL (ref 0.8–1.5)
CREAT SERPL-MCNC: 4.64 MG/DL (ref 0.8–1.5)
CREAT SERPL-MCNC: 5.25 MG/DL (ref 0.8–1.5)
CREAT SERPL-MCNC: 5.72 MG/DL (ref 0.8–1.5)
CREAT SERPL-MCNC: 6.43 MG/DL (ref 0.8–1.5)
CREAT UR-MCNC: 131 MG/DL
CREAT UR-MCNC: 133 MG/DL
DIAGNOSIS, 93000: NORMAL
DIAGNOSIS, 93000: NORMAL
EOSINOPHIL #/AREA URNS HPF: NEGATIVE /[HPF]
EPI CELLS #/AREA URNS HPF: ABNORMAL /HPF
ERYTHROCYTE [DISTWIDTH] IN BLOOD BY AUTOMATED COUNT: 14.4 %
GLUCOSE SERPL-MCNC: 121 MG/DL (ref 65–100)
GLUCOSE SERPL-MCNC: 138 MG/DL (ref 65–100)
GLUCOSE SERPL-MCNC: 141 MG/DL (ref 65–100)
GLUCOSE SERPL-MCNC: 152 MG/DL (ref 65–100)
GLUCOSE SERPL-MCNC: 243 MG/DL (ref 65–100)
GLUCOSE UR STRIP.AUTO-MCNC: 100 MG/DL
GRAM STN SPEC: ABNORMAL
HCT VFR BLD AUTO: 22.6 % (ref 41.1–50.3)
HCT VFR BLD AUTO: 24.2 % (ref 41.1–50.3)
HCT VFR BLD AUTO: 25.1 % (ref 41.1–50.3)
HCT VFR BLD AUTO: 26.3 % (ref 41.1–50.3)
HCV AB S/CO SERPL IA: <0.1 S/CO RATIO (ref 0–0.9)
HCV AB SERPL QL IA: NORMAL
HGB BLD-MCNC: 7.5 G/DL (ref 13.6–17.2)
HGB BLD-MCNC: 7.9 G/DL (ref 13.6–17.2)
HGB BLD-MCNC: 8.3 G/DL (ref 13.6–17.2)
HGB BLD-MCNC: 8.4 G/DL (ref 13.6–17.2)
HGB UR QL STRIP: ABNORMAL
KETONES UR QL STRIP.AUTO: 15 MG/DL
LEUKOCYTE ESTERASE UR QL STRIP.AUTO: ABNORMAL
MAGNESIUM SERPL-MCNC: 2.2 MG/DL (ref 1.8–2.4)
MCH RBC QN AUTO: 28.2 PG (ref 26.1–32.9)
MCHC RBC AUTO-ENTMCNC: 31.6 G/DL (ref 31.4–35)
MCV RBC AUTO: 89.5 FL (ref 79.6–97.8)
NITRITE UR QL STRIP.AUTO: POSITIVE
NRBC # BLD: 0 K/UL (ref 0–0.2)
OTHER OBSERVATIONS,UCOM: ABNORMAL
P-R INTERVAL, ECG05: 134 MS
P-R INTERVAL, ECG05: 140 MS
PH UR STRIP: 5.5 [PH] (ref 5–9)
PHOSPHATE SERPL-MCNC: 7.4 MG/DL (ref 2.5–4.5)
PLATELET # BLD AUTO: 340 K/UL (ref 150–450)
PMV BLD AUTO: 9.5 FL (ref 9.4–12.3)
POTASSIUM SERPL-SCNC: 4.8 MMOL/L (ref 3.5–5.1)
POTASSIUM SERPL-SCNC: 5 MMOL/L (ref 3.5–5.1)
POTASSIUM SERPL-SCNC: 5.1 MMOL/L (ref 3.5–5.1)
POTASSIUM SERPL-SCNC: 5.6 MMOL/L (ref 3.5–5.1)
POTASSIUM SERPL-SCNC: 6 MMOL/L (ref 3.5–5.1)
PROT UR STRIP-MCNC: 100 MG/DL
PROT UR-MCNC: 865 MG/DL
PROT/CREAT UR-RTO: 6.5
Q-T INTERVAL, ECG07: 374 MS
Q-T INTERVAL, ECG07: 386 MS
QRS DURATION, ECG06: 100 MS
QRS DURATION, ECG06: 86 MS
QTC CALCULATION (BEZET), ECG08: 417 MS
QTC CALCULATION (BEZET), ECG08: 428 MS
RBC # BLD AUTO: 2.94 M/UL (ref 4.23–5.6)
RBC #/AREA URNS HPF: ABNORMAL /HPF
SERVICE CMNT-IMP: ABNORMAL
SERVICE CMNT-IMP: ABNORMAL
SODIUM SERPL-SCNC: 136 MMOL/L (ref 136–145)
SODIUM SERPL-SCNC: 137 MMOL/L (ref 136–145)
SODIUM SERPL-SCNC: 138 MMOL/L (ref 136–145)
SODIUM UR-SCNC: 46 MMOL/L
SP GR UR REFRACTOMETRY: 1.02 (ref 1–1.02)
UROBILINOGEN UR QL STRIP.AUTO: 1 EU/DL (ref 0.2–1)
VENTRICULAR RATE, ECG03: 74 BPM
VENTRICULAR RATE, ECG03: 75 BPM
WBC # BLD AUTO: 16.9 K/UL (ref 4.3–11.1)
WBC URNS QL MICRO: ABNORMAL /HPF

## 2018-11-07 PROCEDURE — 80048 BASIC METABOLIC PNL TOTAL CA: CPT

## 2018-11-07 PROCEDURE — 83735 ASSAY OF MAGNESIUM: CPT

## 2018-11-07 PROCEDURE — 84156 ASSAY OF PROTEIN URINE: CPT

## 2018-11-07 PROCEDURE — 74011250637 HC RX REV CODE- 250/637: Performed by: INTERNAL MEDICINE

## 2018-11-07 PROCEDURE — 65610000001 HC ROOM ICU GENERAL

## 2018-11-07 PROCEDURE — 97530 THERAPEUTIC ACTIVITIES: CPT

## 2018-11-07 PROCEDURE — 74011250636 HC RX REV CODE- 250/636: Performed by: INTERNAL MEDICINE

## 2018-11-07 PROCEDURE — 77010033678 HC OXYGEN DAILY

## 2018-11-07 PROCEDURE — 86160 COMPLEMENT ANTIGEN: CPT

## 2018-11-07 PROCEDURE — 87176 TISSUE HOMOGENIZATION CULTR: CPT

## 2018-11-07 PROCEDURE — 36430 TRANSFUSION BLD/BLD COMPNT: CPT

## 2018-11-07 PROCEDURE — 97161 PT EVAL LOW COMPLEX 20 MIN: CPT

## 2018-11-07 PROCEDURE — 85027 COMPLETE CBC AUTOMATED: CPT

## 2018-11-07 PROCEDURE — 86923 COMPATIBILITY TEST ELECTRIC: CPT

## 2018-11-07 PROCEDURE — P9016 RBC LEUKOCYTES REDUCED: HCPCS

## 2018-11-07 PROCEDURE — 87205 SMEAR GRAM STAIN: CPT

## 2018-11-07 PROCEDURE — 77030039270 HC TU BLD FLTR CARD -A

## 2018-11-07 PROCEDURE — 30233N1 TRANSFUSION OF NONAUTOLOGOUS RED BLOOD CELLS INTO PERIPHERAL VEIN, PERCUTANEOUS APPROACH: ICD-10-PCS | Performed by: INTERNAL MEDICINE

## 2018-11-07 PROCEDURE — 85014 HEMATOCRIT: CPT

## 2018-11-07 PROCEDURE — 83520 IMMUNOASSAY QUANT NOS NONAB: CPT

## 2018-11-07 PROCEDURE — 93005 ELECTROCARDIOGRAM TRACING: CPT | Performed by: INTERNAL MEDICINE

## 2018-11-07 PROCEDURE — 74011000250 HC RX REV CODE- 250: Performed by: INTERNAL MEDICINE

## 2018-11-07 PROCEDURE — 74011636637 HC RX REV CODE- 636/637: Performed by: INTERNAL MEDICINE

## 2018-11-07 PROCEDURE — 82550 ASSAY OF CK (CPK): CPT

## 2018-11-07 PROCEDURE — 87077 CULTURE AEROBIC IDENTIFY: CPT

## 2018-11-07 PROCEDURE — 74011250637 HC RX REV CODE- 250/637: Performed by: PHYSICIAN ASSISTANT

## 2018-11-07 PROCEDURE — 36415 COLL VENOUS BLD VENIPUNCTURE: CPT

## 2018-11-07 PROCEDURE — 81001 URINALYSIS AUTO W/SCOPE: CPT

## 2018-11-07 PROCEDURE — 82570 ASSAY OF URINE CREATININE: CPT

## 2018-11-07 PROCEDURE — 84300 ASSAY OF URINE SODIUM: CPT

## 2018-11-07 PROCEDURE — 87186 SC STD MICRODIL/AGAR DIL: CPT

## 2018-11-07 PROCEDURE — 74011250636 HC RX REV CODE- 250/636: Performed by: PHYSICIAN ASSISTANT

## 2018-11-07 PROCEDURE — 77030020263 HC SOL INJ SOD CL0.9% LFCR 1000ML

## 2018-11-07 PROCEDURE — 86901 BLOOD TYPING SEROLOGIC RH(D): CPT

## 2018-11-07 PROCEDURE — 94760 N-INVAS EAR/PLS OXIMETRY 1: CPT

## 2018-11-07 PROCEDURE — 84100 ASSAY OF PHOSPHORUS: CPT

## 2018-11-07 PROCEDURE — 76770 US EXAM ABDO BACK WALL COMP: CPT

## 2018-11-07 RX ORDER — NALOXONE HYDROCHLORIDE 0.4 MG/ML
0.2 INJECTION, SOLUTION INTRAMUSCULAR; INTRAVENOUS; SUBCUTANEOUS AS NEEDED
Status: DISCONTINUED | OUTPATIENT
Start: 2018-11-07 | End: 2018-11-07

## 2018-11-07 RX ORDER — SODIUM CHLORIDE 0.9 % (FLUSH) 0.9 %
5-10 SYRINGE (ML) INJECTION AS NEEDED
Status: DISCONTINUED | OUTPATIENT
Start: 2018-11-07 | End: 2018-11-07

## 2018-11-07 RX ORDER — NALOXONE HYDROCHLORIDE 0.4 MG/ML
.2-.4 INJECTION, SOLUTION INTRAMUSCULAR; INTRAVENOUS; SUBCUTANEOUS
Status: DISCONTINUED | OUTPATIENT
Start: 2018-11-07 | End: 2018-11-20 | Stop reason: HOSPADM

## 2018-11-07 RX ORDER — HYDROMORPHONE HYDROCHLORIDE 1 MG/ML
0.5 INJECTION, SOLUTION INTRAMUSCULAR; INTRAVENOUS; SUBCUTANEOUS
Status: DISCONTINUED | OUTPATIENT
Start: 2018-11-07 | End: 2018-11-07

## 2018-11-07 RX ORDER — SODIUM CHLORIDE 9 MG/ML
125 INJECTION, SOLUTION INTRAVENOUS CONTINUOUS
Status: CANCELLED | OUTPATIENT
Start: 2018-11-07 | End: 2018-11-08

## 2018-11-07 RX ORDER — MIDAZOLAM HYDROCHLORIDE 1 MG/ML
2 INJECTION, SOLUTION INTRAMUSCULAR; INTRAVENOUS
Status: DISCONTINUED | OUTPATIENT
Start: 2018-11-07 | End: 2018-11-07

## 2018-11-07 RX ORDER — DIPHENHYDRAMINE HCL 25 MG
25 CAPSULE ORAL
Status: DISCONTINUED | OUTPATIENT
Start: 2018-11-07 | End: 2018-11-20 | Stop reason: HOSPADM

## 2018-11-07 RX ORDER — ZOLPIDEM TARTRATE 5 MG/1
5 TABLET ORAL
Status: CANCELLED | OUTPATIENT
Start: 2018-11-07

## 2018-11-07 RX ORDER — SODIUM CHLORIDE 9 MG/ML
250 INJECTION, SOLUTION INTRAVENOUS AS NEEDED
Status: DISCONTINUED | OUTPATIENT
Start: 2018-11-07 | End: 2018-11-19 | Stop reason: SDUPTHER

## 2018-11-07 RX ORDER — DIPHENHYDRAMINE HCL 25 MG
25 CAPSULE ORAL
Status: CANCELLED | OUTPATIENT
Start: 2018-11-07

## 2018-11-07 RX ORDER — SODIUM CHLORIDE 0.9 % (FLUSH) 0.9 %
5-10 SYRINGE (ML) INJECTION AS NEEDED
Status: CANCELLED | OUTPATIENT
Start: 2018-11-07

## 2018-11-07 RX ORDER — SODIUM CHLORIDE 0.9 % (FLUSH) 0.9 %
5-10 SYRINGE (ML) INJECTION EVERY 8 HOURS
Status: CANCELLED | OUTPATIENT
Start: 2018-11-07

## 2018-11-07 RX ORDER — ONDANSETRON 4 MG/1
4 TABLET, ORALLY DISINTEGRATING ORAL
Status: DISCONTINUED | OUTPATIENT
Start: 2018-11-07 | End: 2018-11-07

## 2018-11-07 RX ORDER — CEFAZOLIN SODIUM/WATER 2 G/20 ML
2 SYRINGE (ML) INTRAVENOUS EVERY 12 HOURS
Status: DISCONTINUED | OUTPATIENT
Start: 2018-11-07 | End: 2018-11-08

## 2018-11-07 RX ORDER — LIDOCAINE HYDROCHLORIDE 10 MG/ML
0.1 INJECTION INFILTRATION; PERINEURAL AS NEEDED
Status: DISCONTINUED | OUTPATIENT
Start: 2018-11-07 | End: 2018-11-07

## 2018-11-07 RX ORDER — SODIUM CHLORIDE, SODIUM LACTATE, POTASSIUM CHLORIDE, CALCIUM CHLORIDE 600; 310; 30; 20 MG/100ML; MG/100ML; MG/100ML; MG/100ML
100 INJECTION, SOLUTION INTRAVENOUS CONTINUOUS
Status: DISCONTINUED | OUTPATIENT
Start: 2018-11-07 | End: 2018-11-07

## 2018-11-07 RX ORDER — RIFAMPIN 300 MG/1
600 CAPSULE ORAL
Status: CANCELLED | OUTPATIENT
Start: 2018-11-08

## 2018-11-07 RX ORDER — OXYCODONE HYDROCHLORIDE 5 MG/1
10 TABLET ORAL
Status: CANCELLED | OUTPATIENT
Start: 2018-11-07

## 2018-11-07 RX ORDER — SODIUM CHLORIDE 0.9 % (FLUSH) 0.9 %
5-10 SYRINGE (ML) INJECTION EVERY 8 HOURS
Status: DISCONTINUED | OUTPATIENT
Start: 2018-11-07 | End: 2018-11-20 | Stop reason: HOSPADM

## 2018-11-07 RX ORDER — ACETAMINOPHEN 500 MG
1000 TABLET ORAL ONCE
Status: DISCONTINUED | OUTPATIENT
Start: 2018-11-07 | End: 2018-11-07

## 2018-11-07 RX ORDER — HYDROMORPHONE HYDROCHLORIDE 1 MG/ML
1 INJECTION, SOLUTION INTRAMUSCULAR; INTRAVENOUS; SUBCUTANEOUS
Status: DISCONTINUED | OUTPATIENT
Start: 2018-11-07 | End: 2018-11-20 | Stop reason: HOSPADM

## 2018-11-07 RX ORDER — DEXAMETHASONE SODIUM PHOSPHATE 100 MG/10ML
10 INJECTION INTRAMUSCULAR; INTRAVENOUS ONCE
Status: CANCELLED | OUTPATIENT
Start: 2018-11-07 | End: 2018-11-08

## 2018-11-07 RX ORDER — SODIUM BICARBONATE 1 MEQ/ML
50 SYRINGE (ML) INTRAVENOUS ONCE
Status: COMPLETED | OUTPATIENT
Start: 2018-11-07 | End: 2018-11-07

## 2018-11-07 RX ORDER — OXYCODONE HYDROCHLORIDE 5 MG/1
10 TABLET ORAL
Status: DISCONTINUED | OUTPATIENT
Start: 2018-11-07 | End: 2018-11-07

## 2018-11-07 RX ORDER — SODIUM CHLORIDE 9 MG/ML
125 INJECTION, SOLUTION INTRAVENOUS CONTINUOUS
Status: DISPENSED | OUTPATIENT
Start: 2018-11-07 | End: 2018-11-08

## 2018-11-07 RX ORDER — SODIUM CHLORIDE 0.9 % (FLUSH) 0.9 %
5-10 SYRINGE (ML) INJECTION EVERY 8 HOURS
Status: DISCONTINUED | OUTPATIENT
Start: 2018-11-07 | End: 2018-11-07

## 2018-11-07 RX ORDER — SODIUM POLYSTYRENE SULFONATE 15 G/60ML
15 SUSPENSION ORAL; RECTAL
Status: COMPLETED | OUTPATIENT
Start: 2018-11-07 | End: 2018-11-07

## 2018-11-07 RX ORDER — NALOXONE HYDROCHLORIDE 0.4 MG/ML
.2-.4 INJECTION, SOLUTION INTRAMUSCULAR; INTRAVENOUS; SUBCUTANEOUS
Status: CANCELLED | OUTPATIENT
Start: 2018-11-07

## 2018-11-07 RX ORDER — ZOLPIDEM TARTRATE 5 MG/1
5 TABLET ORAL
Status: DISCONTINUED | OUTPATIENT
Start: 2018-11-07 | End: 2018-11-20 | Stop reason: HOSPADM

## 2018-11-07 RX ORDER — OXYCODONE HYDROCHLORIDE 5 MG/1
10 TABLET ORAL
Status: DISCONTINUED | OUTPATIENT
Start: 2018-11-07 | End: 2018-11-20 | Stop reason: HOSPADM

## 2018-11-07 RX ORDER — ONDANSETRON 4 MG/1
4 TABLET, ORALLY DISINTEGRATING ORAL
Status: CANCELLED | OUTPATIENT
Start: 2018-11-07

## 2018-11-07 RX ORDER — SODIUM CHLORIDE 0.9 % (FLUSH) 0.9 %
5-10 SYRINGE (ML) INJECTION AS NEEDED
Status: DISCONTINUED | OUTPATIENT
Start: 2018-11-07 | End: 2018-11-20 | Stop reason: HOSPADM

## 2018-11-07 RX ORDER — FLUMAZENIL 0.1 MG/ML
0.2 INJECTION INTRAVENOUS
Status: DISCONTINUED | OUTPATIENT
Start: 2018-11-07 | End: 2018-11-07

## 2018-11-07 RX ORDER — ACETAMINOPHEN 500 MG
1000 TABLET ORAL EVERY 6 HOURS
Status: DISCONTINUED | OUTPATIENT
Start: 2018-11-07 | End: 2018-11-11

## 2018-11-07 RX ORDER — DEXAMETHASONE SODIUM PHOSPHATE 4 MG/ML
10 INJECTION, SOLUTION INTRA-ARTICULAR; INTRALESIONAL; INTRAMUSCULAR; INTRAVENOUS; SOFT TISSUE ONCE
Status: COMPLETED | OUTPATIENT
Start: 2018-11-07 | End: 2018-11-07

## 2018-11-07 RX ORDER — AMOXICILLIN 250 MG
2 CAPSULE ORAL DAILY
Status: DISCONTINUED | OUTPATIENT
Start: 2018-11-07 | End: 2018-11-20 | Stop reason: HOSPADM

## 2018-11-07 RX ORDER — SODIUM CHLORIDE 9 MG/ML
250 INJECTION, SOLUTION INTRAVENOUS AS NEEDED
Status: DISCONTINUED | OUTPATIENT
Start: 2018-11-07 | End: 2018-11-08

## 2018-11-07 RX ORDER — ONDANSETRON 2 MG/ML
4 INJECTION INTRAMUSCULAR; INTRAVENOUS
Status: DISCONTINUED | OUTPATIENT
Start: 2018-11-07 | End: 2018-11-20 | Stop reason: HOSPADM

## 2018-11-07 RX ORDER — MIDAZOLAM HYDROCHLORIDE 1 MG/ML
2 INJECTION, SOLUTION INTRAMUSCULAR; INTRAVENOUS ONCE
Status: DISCONTINUED | OUTPATIENT
Start: 2018-11-07 | End: 2018-11-07

## 2018-11-07 RX ORDER — FENTANYL CITRATE 50 UG/ML
100 INJECTION, SOLUTION INTRAMUSCULAR; INTRAVENOUS ONCE
Status: DISCONTINUED | OUTPATIENT
Start: 2018-11-07 | End: 2018-11-07

## 2018-11-07 RX ORDER — SODIUM POLYSTYRENE SULFONATE 4.1 MEQ/G
15 POWDER, FOR SUSPENSION ORAL; RECTAL
Status: DISCONTINUED | OUTPATIENT
Start: 2018-11-07 | End: 2018-11-07 | Stop reason: SDUPTHER

## 2018-11-07 RX ORDER — INSULIN LISPRO 100 [IU]/ML
10 INJECTION, SOLUTION INTRAVENOUS; SUBCUTANEOUS ONCE
Status: COMPLETED | OUTPATIENT
Start: 2018-11-07 | End: 2018-11-07

## 2018-11-07 RX ORDER — DIPHENHYDRAMINE HYDROCHLORIDE 50 MG/ML
12.5 INJECTION, SOLUTION INTRAMUSCULAR; INTRAVENOUS
Status: DISCONTINUED | OUTPATIENT
Start: 2018-11-07 | End: 2018-11-07

## 2018-11-07 RX ORDER — RIFAMPIN 300 MG/1
600 CAPSULE ORAL
Status: DISCONTINUED | OUTPATIENT
Start: 2018-11-07 | End: 2018-11-08

## 2018-11-07 RX ORDER — AMOXICILLIN 250 MG
2 CAPSULE ORAL DAILY
Status: CANCELLED | OUTPATIENT
Start: 2018-11-07

## 2018-11-07 RX ORDER — HYDROMORPHONE HYDROCHLORIDE 2 MG/ML
1 INJECTION, SOLUTION INTRAMUSCULAR; INTRAVENOUS; SUBCUTANEOUS
Status: CANCELLED | OUTPATIENT
Start: 2018-11-07

## 2018-11-07 RX ORDER — OXYCODONE HYDROCHLORIDE 5 MG/1
5 TABLET ORAL
Status: DISCONTINUED | OUTPATIENT
Start: 2018-11-07 | End: 2018-11-07

## 2018-11-07 RX ORDER — DEXTROSE 50 % IN WATER (D50W) INTRAVENOUS SYRINGE
50
Status: COMPLETED | OUTPATIENT
Start: 2018-11-07 | End: 2018-11-07

## 2018-11-07 RX ORDER — ACETAMINOPHEN 500 MG
1000 TABLET ORAL EVERY 6 HOURS
Status: CANCELLED | OUTPATIENT
Start: 2018-11-07

## 2018-11-07 RX ADMIN — ACETAMINOPHEN 1000 MG: 500 TABLET, FILM COATED ORAL at 12:28

## 2018-11-07 RX ADMIN — Medication 10 ML: at 21:39

## 2018-11-07 RX ADMIN — ONDANSETRON HYDROCHLORIDE 4 MG: 2 INJECTION, SOLUTION INTRAVENOUS at 17:59

## 2018-11-07 RX ADMIN — Medication 1 AMPULE: at 10:29

## 2018-11-07 RX ADMIN — Medication 10 ML: at 14:18

## 2018-11-07 RX ADMIN — HYDROMORPHONE HYDROCHLORIDE 1 MG: 2 INJECTION, SOLUTION INTRAMUSCULAR; INTRAVENOUS; SUBCUTANEOUS at 02:45

## 2018-11-07 RX ADMIN — DEXAMETHASONE SODIUM PHOSPHATE 10 MG: 4 INJECTION, SOLUTION INTRAMUSCULAR; INTRAVENOUS at 12:28

## 2018-11-07 RX ADMIN — SODIUM CHLORIDE 1000 ML: 900 INJECTION, SOLUTION INTRAVENOUS at 08:00

## 2018-11-07 RX ADMIN — ACETAMINOPHEN 1000 MG: 500 TABLET, FILM COATED ORAL at 02:41

## 2018-11-07 RX ADMIN — Medication 2 G: at 18:04

## 2018-11-07 RX ADMIN — SODIUM CHLORIDE 125 ML/HR: 900 INJECTION, SOLUTION INTRAVENOUS at 14:15

## 2018-11-07 RX ADMIN — OXYCODONE HYDROCHLORIDE 10 MG: 5 TABLET ORAL at 04:20

## 2018-11-07 RX ADMIN — Medication 50 MEQ: at 08:07

## 2018-11-07 RX ADMIN — ONDANSETRON HYDROCHLORIDE 4 MG: 2 INJECTION, SOLUTION INTRAVENOUS at 10:29

## 2018-11-07 RX ADMIN — SODIUM POLYSTYRENE SULFONATE 15 G: 15 SUSPENSION ORAL; RECTAL at 08:05

## 2018-11-07 RX ADMIN — SENNOSIDES AND DOCUSATE SODIUM 2 TABLET: 8.6; 5 TABLET ORAL at 12:28

## 2018-11-07 RX ADMIN — RIFAMPIN 600 MG: 300 CAPSULE ORAL at 06:57

## 2018-11-07 RX ADMIN — HYDROMORPHONE HYDROCHLORIDE 1 MG: 1 INJECTION, SOLUTION INTRAMUSCULAR; INTRAVENOUS; SUBCUTANEOUS at 14:57

## 2018-11-07 RX ADMIN — HYDROMORPHONE HYDROCHLORIDE 1 MG: 1 INJECTION, SOLUTION INTRAMUSCULAR; INTRAVENOUS; SUBCUTANEOUS at 21:36

## 2018-11-07 RX ADMIN — NAFCILLIN 12 G: 2 INJECTION, POWDER, FOR SOLUTION INTRAMUSCULAR; INTRAVENOUS at 10:29

## 2018-11-07 RX ADMIN — INSULIN LISPRO 10 UNITS: 100 INJECTION, SOLUTION INTRAVENOUS; SUBCUTANEOUS at 08:16

## 2018-11-07 RX ADMIN — ZOLPIDEM TARTRATE 5 MG: 5 TABLET ORAL at 21:36

## 2018-11-07 RX ADMIN — SODIUM CHLORIDE 125 ML/HR: 900 INJECTION, SOLUTION INTRAVENOUS at 10:06

## 2018-11-07 RX ADMIN — ACETAMINOPHEN 1000 MG: 500 TABLET, FILM COATED ORAL at 20:05

## 2018-11-07 RX ADMIN — DEXTROSE MONOHYDRATE 50 G: 25 INJECTION, SOLUTION INTRAVENOUS at 08:10

## 2018-11-07 RX ADMIN — RIFAMPIN 600 MG: 300 CAPSULE ORAL at 12:28

## 2018-11-07 RX ADMIN — Medication 1 AMPULE: at 20:05

## 2018-11-07 NOTE — PROGRESS NOTES
Dr. Drea rocha, Adebayo Fregoso. Updated on pt to include nausea, orders received for zofran iv and placed in computer. Dr. Tyrese Rawls at bedside discussed poc with pt and wife.

## 2018-11-07 NOTE — PROGRESS NOTES
TRANSFER - IN REPORT: 
 
Verbal report received from Juan RN (name) on Gwen Rosen  being received from [facility]) for routine progression of care Report consisted of patients Situation, Background, Assessment and  
Recommendations(SBAR). Information from the following report(s) SBAR, Kardex, OR Summary, Procedure Summary, Intake/Output, MAR, Recent Results and Cardiac Rhythm NSR 92 was reviewed with the receiving nurse. Opportunity for questions and clarification was provided. Assessment completed upon patients arrival to unit and care assumed.

## 2018-11-07 NOTE — PROGRESS NOTES
Orthopedic Joint Progress Note 2018 Admit Date: 2018 Admit Diagnosis: infected left hi[ Infected prosthetic hip ([address] 1 Day Post-Op Subjective: awake alert complaining of pain Tierrae Samanta Review of Systems: Pertinent items are noted in HPI. Objective:  
 
PT/OT:  
 
PATIENT MOBILITY Vital Signs:   
Blood pressure 121/70, pulse 79, temperature 98.2 °F (36.8 °C), resp. rate 16, SpO2 96 %. Temp (24hrs), Av °F (36.7 °C), Min:96.8 °F (36 °C), Max:99.1 °F (37.3 °C) Pain Control:  
Pain Assessment Pain Scale 1: Visual 
Pain Intensity 1: 4 Pain Location 1: Shoulder Pain Orientation 1: Right Pain Description 1: Constant Pain Intervention(s) 1: Medication (see MAR) Meds: 
Current Facility-Administered Medications Medication Dose Route Frequency  rifAMPin (RIFADIN) capsule 600 mg  600 mg Oral ACB  alcohol 62% (NOZIN) nasal  1 Ampule  1 Ampule Topical Q12H  
 0.9% sodium chloride infusion  100 mL/hr IntraVENous CONTINUOUS  
 sodium chloride (NS) flush 5-10 mL  5-10 mL IntraVENous Q8H  
 sodium chloride (NS) flush 5-10 mL  5-10 mL IntraVENous PRN  
 acetaminophen (TYLENOL) tablet 1,000 mg  1,000 mg Oral Q6H  
 naloxone (NARCAN) injection 0.2-0.4 mg  0.2-0.4 mg IntraVENous Q10MIN PRN  
 dexamethasone (DECADRON) injection 10 mg  10 mg IntraVENous ONCE  
 ondansetron (ZOFRAN ODT) tablet 4 mg  4 mg Oral Q4H PRN  
 diphenhydrAMINE (BENADRYL) capsule 25 mg  25 mg Oral Q4H PRN  
 zolpidem (AMBIEN) tablet 5 mg  5 mg Oral QHS PRN  
 oxyCODONE IR (ROXICODONE) tablet 10 mg  10 mg Oral Q3H PRN  
 HYDROmorphone (PF) (DILAUDID) injection 1 mg  1 mg IntraVENous Q3H PRN  
 senna-docusate (PERICOLACE) 8.6-50 mg per tablet 2 Tab  2 Tab Oral DAILY  tuberculin injection 5 Units  5 Units IntraDERMal ONCE  
 nafcillin (NALLPEN) 12 g in 0.9% sodium chloride 1,000 mL continuous infusion  12 g IntraVENous Q24H  
  
 
LAB:   
 Lab Results Component Value Date/Time INR 1.1 11/05/2018 05:23 PM  
 INR 1.0 12/26/2016 10:54 AM  
 
Lab Results Component Value Date/Time HGB 8.3 (L) 11/07/2018 05:27 AM  
 HGB 9.2 (L) 11/06/2018 08:31 PM  
 HGB 10.2 (L) 11/06/2018 06:50 PM  
 
 
Wound Hip Left (Active) Number of days: 076 Wound Hip Lateral (Active) Number of days: 845 Wound Shoulder Right (Active) Number of days: 512 Wound Hip Left (Active) DRESSING STATUS Clean, dry, and intact 11/6/2018  7:40 PM  
DRESSING TYPE Aquacel 11/6/2018  7:40 PM  
Number of days: 1 Wound Shoulder Right (Active) DRESSING STATUS Clean, dry, and intact 11/6/2018  7:40 PM  
DRESSING TYPE Dry dressing 11/6/2018  7:40 PM  
Number of days: 1 Physical Exam: No significant changes Assessment:  
  
Principal Problem: 
  Staphylococcal arthritis of right shoulder ([facility] [address] (11/6/2018) Active Problems: 
  Infected prosthetic hip ([facility] [address] (11/5/2018) Degenerative joint disease of right acromioclavicular joint (11/5/2018) Osteoarthritis of right glenohumeral joint (11/5/2018) Complete tear of right rotator cuff (11/5/2018) RANI (acute kidney injury) ([facility] [address] (11/6/2018) Plan:  
 
Continue PT/OT/Rehab Wbc down to 16.9 Hemoglobin 8.3 Potassium 6.0, BUN 45, creatinine up to 4.52 
50 cc urine output despite fluid bolus Patient currently in acute renal failure Discussed with Dr. Go Vicente and Dr. Akilah Goodwin Due to complexity of issues [place] May require dialysis Continue antibiotics Supportive care Will follow Appreciate hospitalist input and assistance Patient Expects to be Discharged to[de-identified] Private residence Signed By: Deborah Goldberg MD

## 2018-11-07 NOTE — PROGRESS NOTES
Shift assessment completed. Pt is drowsy but alert & oriented x4. Able to verbalize needs. Resting quietly with no distress noted. Dressing to right shoulder is clean, dry and intact. Ice pack provided. Aquacel to left hip is clean dry and intact. Ice pack provided. Neurovascular and peripheral vascular checks WNL. Patient is able to dorsi/planter flex bilaterally with +2 pedal pulses. Gong draining clear yellow urine to bag. Incentive spirometry at bedside. Patient encouraged to use hourly x 10 repetitions. Pain is being managed with PO Oxycodone and IV Dilaudid, patient tolerating well. Bed locked and lowered. Call light within reach. Patient instructed to call for assistance. Pt verbalizes understanding. Will monitor.

## 2018-11-07 NOTE — ANESTHESIA POSTPROCEDURE EVALUATION
Procedure(s): HIP ARTHROPLASTY TOTAL REVISION SHOULDER I&D. Anesthesia Post Evaluation Multimodal analgesia: multimodal analgesia used between 6 hours prior to anesthesia start to PACU discharge Patient location during evaluation: bedside Patient participation: complete - patient participated Level of consciousness: responsive to physical stimuli and awake Pain management: adequate Airway patency: patent Anesthetic complications: no 
Cardiovascular status: acceptable, stable and hemodynamically stable Respiratory status: unassisted, spontaneous ventilation, nonlabored ventilation and acceptable Hydration status: acceptable Visit Vitals /70 (BP 1 Location: Left arm, BP Patient Position: At rest) Pulse 79 Temp 36.8 °C (98.2 °F) Resp 16 SpO2 96%

## 2018-11-07 NOTE — PROGRESS NOTES
Pt seen in ICU s/p transfer from . Discussed possible d/c needs of IV abx and HH. Pt aware and agrees. CM to continue to follow for d/c needs. Care Management Interventions PCP Verified by CM: Yes(Dr. Harinder Cr) Mode of Transport at Discharge: Other (see comment) Transition of Care Consult (CM Consult): Discharge Planning Discharge Durable Medical Equipment: (cane for week) Current Support Network: Own Home, Other(lives with Lyme Hoof, Daughter at bedside. ) Confirm Follow Up Transport: Self(normally drives self, family wi ll assist) Plan discussed with Pt/Family/Caregiver: Yes Freedom of Choice Offered: Yes [employer] Information Provided?: (confirms [place]) Discharge Location Discharge Placement: Unable to determine at this time

## 2018-11-07 NOTE — CONSULTS
Full consult note dictated  RANI: oliguric with hyperkalemia. Cause not completely clear. Pt was on NSAID's, has sepsis. All these are a contributing factors. Rec:  Check urine protein, serology. Renal US  Continue with IVF. Recheck lab: bmp in few hours. No need for dialysis at this time.   Thanks

## 2018-11-07 NOTE — PROGRESS NOTES
Problem: Mobility Impaired (Adult and Pediatric) Goal: *Acute Goals and Plan of Care (Insert Text) STG: 
(1.)Mr. Yariel Lund will move from supine to sit and sit to supine , scoot up and down and roll side to side with CONTACT GUARD ASSIST within 3 treatment day(s). (2.)Mr. Yariel Lund will transfer from bed to chair and chair to bed with STAND BY ASSIST using the least restrictive device within 3 treatment day(s). (3.)Mr. Yariel Lund will ambulate with STAND BY ASSIST for 150 feet with the least restrictive device within 3 treatment day(s). (4.)Mr. Yariel Lund will tolerate AAROM, AROM and gentle pendulums of RUE with no increase in pain within 3 treatment days to improve independence with transfers. (5.)Mr. Yariel Lund will perform standing static and dynamic balance activities x 15 minutes with STAND BY ASSIST to improve safety within 3 day(s). LTG: 
(1.)Mr. Yariel Lund will move from supine to sit and sit to supine , scoot up and down and roll side to side in bed with MODIFIED INDEPENDENCE within 7 treatment day(s). (2.)Mr. Yariel Lund will transfer from bed to chair and chair to bed with MODIFIED INDEPENDENCE using the least restrictive device within 7 treatment day(s). (3.)Mr. Yariel Lund will ambulate with MODIFIED INDEPENDENCE for 500 feet with the least restrictive device within 7 treatment day(s). (4.)Mr. Yariel Lund will perform standing static and dynamic balance activities x 15 minutes with MODIFIED INDEPENDENCE to improve safety within 7 day(s). (5.)Mr. Yariel Lund will ascend and descend 2 stairs using L hand rail(s) with SUPERVISION to improve functional mobility and safety within 7 day(s). ________________________________________________________________________________________________ PHYSICAL THERAPY: Initial Assessment, Daily Note, PM 11/7/2018INPATIENT: Hospital Day: 1 Payor: Miladis Wyatt / Plan: SIMON YOUNGBLOOD OAP / Product Type: Commerical /  
 LLE total hip precautions, WBAT 
 RUE WBAT : Active and passive range of motion RIGHT elbow hand ACTIVE AND ACTIVE ASSISTED MOTION RIGHT SHOULDER GENTLE pendulums 
sling RIGHT shoulder NAME/AGE/GENDER: Verena Massey is a 48 y.o. male PRIMARY DIAGNOSIS: infected left hip  
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 
Patient has no known allergies. ASSESSMENT:  
Mr. Parvez Panda is a 48 y.o. male admitted with infected L hip and staphylococcal R shoulder. He is s/p Incision, debridement, lavage glenohumeral joint, right shoulder and I and D and revision left hip arthroplasty. He is supine in bed in ICU and agreeable to physical therapy evaluation and treatment. He is typically independent with ambulation, ADLs, driving and works a heavy labor job. He required moderate assistance to transfer to sitting, CGA to stand and ambulate within room with rolling walker and decreased weight bearing noted to LLE. Treatment initiated to include ambulation additional 25' with rolling walker to increase LLE weight shift and improve balance during ambulation. Safety cues required during ambulation. He returned to room to sit in recliner with all needs in reach, alarm applied and daughter and ex wife present. Verena Massey is currently functioning below his baseline and would benefit from skilled PT during acute care stay to maximize safety and independence with functional mobility. This section established at most recent assessment PROBLEM LIST (Impairments causing functional limitations): 1. Decreased Strength 2. Decreased ADL/Functional Activities 3. Decreased Transfer Abilities 4. Decreased Ambulation Ability/Technique 5. Decreased Balance 6. Increased Pain 7. Decreased Activity Tolerance 8. Decreased Pacing Skills 9. Decreased Knowledge of Precautions 10. Decreased [place] with Home Exercise Program 
 INTERVENTIONS PLANNED: (Benefits and precautions of physical therapy have been discussed with the patient.) 1. Balance Exercise 2. Bed Mobility 3. Gait Training 4. Group Therapy 5. Home Exercise Program (HEP) 6. Therapeutic Activites 7. Therapeutic Exercise/Strengthening 8. Transfer Training 9. Patient Education TREATMENT PLAN: Frequency/Duration: twice daily for duration of hospital stay Rehabilitation Potential For Stated Goals: Excellent RECOMMENDED REHABILITATION/EQUIPMENT: (at time of discharge pending progress): Due to the probability of continued deficits (see above) this patient will likely need continued skilled physical therapy after discharge. Equipment:  
? None at this time HISTORY:  
History of Present Injury/Illness (Reason for Referral): 
Pt is a 47 y/o male who is almost 2 years s/p L JEIMY who was seen in clinic on 10/29 c/o 5 day history of sudden onset of moderate left hip pain. Denied any known injury. Left hip joint aspirate was obtained and sent for culture which grew pansensitive staph. Aureus. Patient was also noted to have elevated WBC (27.1) as well as elevated ESR (90). Patient reports having flu like symptoms 2 weeks ago and began developing right shoulder pain around that same time. Patient was subsequently evaluated by Dr. Armen Shaikh and received right shoulder cortisone injection. He reports that he is still experiencing moderate to severe right shoulder pain. Denies fever, chills, nausea, vomiting, etc..  No other new complaints. Past Medical History/Comorbidities:  
Mr. Caty Ramirez  has a past medical history of Arthritis, Biceps muscle tear, Chronic pain, Insomnia, MRSA (methicillin resistant Staphylococcus aureus) infection, Personal history of kidney stones, and Right shoulder pain. Mr. Aviva Bustillos  has a past surgical history that includes hx other surgical; hx rotator cuff repair (Left, 2010); hx orthopaedic (Left, 01/2017); hx septoplasty (11/15/2017); HIP ARTHROPLASTY TOTAL REVISION (Left, 11/6/2018); SHOULDER I&D (Right, 11/6/2018); RIGHT SHOULDER ARTHROSCOPY SUBACROMIAL DECOMPRESSION ROTATOR CUFF REPAIR/ DISTAL CLAVICLE RESECTION (Right, 6/13/2017); LEFT TOTAL HIP ARTHROPLASTY (Left, 1/6/2017); and SHOULDER ARTHROSCOPY ACROMIOPLASTY ROTATOR CUFF REPAIR (Left, 10/21/2010). Social History/Living Environment:  
Home Environment: Private residence # Steps to Enter: 2 Rails to Enter: Yes Hand Rails : Left One/Two Story Residence: One story Living Alone: No 
Support Systems: Spouse/Significant Other/Partner Patient Expects to be Discharged to[de-identified] Private residence Current DME Used/Available at Home: Walker, rolling Prior Level of Function/Work/Activity: 
 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  
Most Recent Physical Functioning:  
Gross Assessment: 
AROM: Generally decreased, functional 
PROM: Generally decreased, functional 
Strength: Generally decreased, functional 
Coordination: Generally decreased, functional 
         
  
Posture: 
Posture (WDL): Exceptions to [facility] Posture Assessment: Forward head Balance: 
Sitting: Intact Standing: Impaired Standing - Static: Fair Standing - Dynamic : Fair Bed Mobility: 
Supine to Sit: Moderate assistance Scooting: Contact guard assistance Wheelchair Mobility: 
  
Transfers: 
Sit to Stand: Minimum assistance Stand to Sit: Minimum assistance Bed to Chair: Minimum assistance Gait: 
Right Side Weight Bearing: Full Left Side Weight Bearing: As tolerated Base of Support: Center of gravity altered; Widened Speed/Emma: Slow;Shuffled;Pace decreased (<100 feet/min) Step Length: Left shortened Gait Abnormalities: Antalgic;Decreased step clearance; Path deviations; Shuffling gait;Trunk sway increased Distance (ft): 25 Feet (ft) Assistive Device: Gait belt;Walker, rolling Ambulation - Level of Assistance: Contact guard assistance Interventions: Manual cues; Safety awareness training;Verbal cues Body Structures Involved: 1. Nerves 2. Metabolic 3. Endocrine 4. Bones 5. Joints 6. Muscles Body Functions Affected: 1. Sensory/Pain 2. Neuromusculoskeletal 
3. Movement Related 4. Metobolic/Endocrine Activities and Participation Affected: 1. Mobility 2. Self Care 3. Domestic Life 4. Interpersonal Interactions and Relationships 5. Community, Social and [place] Number of elements that affect the Plan of Care: 4+: HIGH COMPLEXITY CLINICAL PRESENTATION:  
Presentation: Stable and uncomplicated: LOW COMPLEXITY CLINICAL DECISION MAKIN[address] [place] [id number] AM-PAC 6 Clicks Basic Mobility Inpatient Short Form How much difficulty does the patient currently have. .. Unable A Lot A Little None 1. Turning over in bed (including adjusting bedclothes, sheets and blankets)? [] 1   [] 2   [x] 3   [] 4  
2. Sitting down on and standing up from a chair with arms ( e.g., wheelchair, bedside commode, etc.)   [] 1   [] 2   [x] 3   [] 4  
3. Moving from lying on back to sitting on the side of the bed? [] 1   [x] 2   [] 3   [] 4 How much help from another person does the patient currently need. .. Total A Lot A Little None 4. Moving to and from a bed to a chair (including a wheelchair)? [] 1   [] 2   [x] 3   [] 4  
5. Need to walk in hospital room? [] 1   [x] 2   [] 3   [] 4  
6. Climbing 3-5 steps with a railing? [] 1   [x] 2   [] 3   [] 4  
© , Trustees of [address] [place] [id number], under license to [employer]. All rights reserved Score:  Initial: 15 Most Recent: X (Date: -- ) Interpretation of Tool:  Represents activities that are increasingly more difficult (i.e. Bed mobility, Transfers, Gait). Score 24 23 22-20 19-15 14-10 9-7 6 Modifier CH CI CJ CK CL CM CN   
 
? Mobility - Walking and Moving Around:  
  - CURRENT STATUS: CK - 40%-59% impaired, limited or restricted  - GOAL STATUS: CJ - 20%-39% impaired, limited or restricted  - D/C STATUS:  ---------------To be determined--------------- Payor: Ferd Collet / Plan: [employer]ON YOUNGBLOOD OAP / Product Type: Commerical /   
 
Medical Necessity:    
· Patient demonstrates excellent rehab potential due to higher previous functional level. Reason for Services/Other Comments: 
· Patient continues to require modification of therapeutic interventions to increase complexity of exercises. Use of outcome tool(s) and clinical judgement create a POC that gives a: Clear prediction of patient's progress: LOW COMPLEXITY  
  
 
 
 
TREATMENT:  
(In addition to Assessment/Re-Assessment sessions the following treatments were rendered) Pre-treatment Symptoms/Complaints:  R shoulder L hip pain 
Pain: Initial:  
Pain Intensity 1: 1  Post Session:  1/10 Therapeutic Activity: (    10 minutes): Therapeutic activities including Chair transfers and Ambulation on level ground to improve mobility, strength and balance. Required minimal Manual cues; Safety awareness training;Verbal cues to promote static and dynamic balance in standing. Braces/Orthotics/Lines/Etc:  
· IV 
· sling RUE 
· O2 Device: Nasal cannula Treatment/Session Assessment:   
· Response to Treatment:  Patient tolerated well with no medical complications, increased ambulation distance with treatment. · Interdisciplinary Collaboration:  
o Physical Therapist 
o Registered Nurse · After treatment position/precautions:  
o Up in chair 
o Bed alarm/tab alert on 
o Bed/Chair-wheels locked 
o Bed in low position 
o Call light within reach 
o RN notified 
o Family at bedside · Compliance with Program/Exercises: Will assess as treatment progresses · Recommendations/Intent for next treatment session: \"Next visit will focus on advancements to more challenging activities and reduction in assistance provided\". Total Treatment Duration: PT Patient Time In/Time Out Time In: 1860 Time Out: 160 Danna Latif DPT

## 2018-11-07 NOTE — PROGRESS NOTES
November 7, 2018 Post Op day: 1 Day Post-Op Admit Diagnosis: infected left hi[ Infected prosthetic hip ([address] LAB:   
Recent Results (from the past 24 hour(s)) CBC W/O DIFF Collection Time: 11/06/18  6:50 PM  
Result Value Ref Range WBC 24.0 (H) 4.3 - 11.1 K/uL  
 RBC 3.56 (L) 4.23 - 5.6 M/uL  
 HGB 10.2 (L) 13.6 - 17.2 g/dL HCT 31.9 (L) 41.1 - 50.3 % MCV 89.6 79.6 - 97.8 FL  
 MCH 28.7 26.1 - 32.9 PG  
 MCHC 32.0 31.4 - 35.0 g/dL  
 RDW 14.4 % PLATELET 575 361 - 411 K/uL MPV 9.3 (L) 9.4 - 12.3 FL ABSOLUTE NRBC 0.00 0.0 - 0.2 K/uL METABOLIC PANEL, BASIC Collection Time: 11/06/18  6:50 PM  
Result Value Ref Range Sodium 139 136 - 145 mmol/L Potassium 5.5 (H) 3.5 - 5.1 mmol/L Chloride 102 98 - 107 mmol/L  
 CO2 29 21 - 32 mmol/L Anion gap 8 mmol/L Glucose 112 (H) 65 - 100 mg/dL BUN 34 (H) 6 - 23 MG/DL Creatinine 2.22 (H) 0.8 - 1.5 MG/DL  
 GFR est AA 40 (L) >60 ml/min/1.73m2 GFR est non-AA 33 ml/min/1.73m2 Calcium 8.9 8.3 - 10.4 MG/DL MAGNESIUM Collection Time: 11/06/18  6:50 PM  
Result Value Ref Range Magnesium 2.1 1.8 - 2.4 mg/dL HEMOGLOBIN Collection Time: 11/06/18  8:31 PM  
Result Value Ref Range HGB 9.2 (L) 13.6 - 17.2 g/dL CBC W/O DIFF Collection Time: 11/07/18  5:27 AM  
Result Value Ref Range WBC 16.9 (H) 4.3 - 11.1 K/uL  
 RBC 2.94 (L) 4.23 - 5.6 M/uL HGB 8.3 (L) 13.6 - 17.2 g/dL HCT 26.3 (L) 41.1 - 50.3 % MCV 89.5 79.6 - 97.8 FL  
 MCH 28.2 26.1 - 32.9 PG  
 MCHC 31.6 31.4 - 35.0 g/dL  
 RDW 14.4 % PLATELET 641 690 - 131 K/uL MPV 9.5 9.4 - 12.3 FL ABSOLUTE NRBC 0.00 0.0 - 0.2 K/uL METABOLIC PANEL, BASIC Collection Time: 11/07/18  5:27 AM  
Result Value Ref Range Sodium 137 136 - 145 mmol/L Potassium 6.0 (H) 3.5 - 5.1 mmol/L Chloride 100 98 - 107 mmol/L  
 CO2 28 21 - 32 mmol/L Anion gap 9 mmol/L Glucose 152 (H) 65 - 100 mg/dL  BUN 45 (H) 6 - 23 MG/DL  
 Creatinine 4.52 (H) 0.8 - 1.5 MG/DL  
 GFR est AA 18 (L) >60 ml/min/1.73m2 GFR est non-AA 15 ml/min/1.73m2 Calcium 8.2 (L) 8.3 - 10.4 MG/DL MAGNESIUM Collection Time: 18  5:27 AM  
Result Value Ref Range Magnesium 2.2 1.8 - 2.4 mg/dL Vital Signs:   
Patient Vitals for the past 8 hrs: 
 BP Temp Pulse Resp SpO2  
18 0715 123/72 98 °F (36.7 °C) 77 16 97 % 18 0335 121/70 98.2 °F (36.8 °C) 79 16 96 % Temp (24hrs), Av °F (36.7 °C), Min:96.8 °F (36 °C), Max:99.1 °F (37.3 °C) Pain Control:  
Pain Assessment Pain Scale 1: Visual 
Pain Intensity 1: 4 Pain Location 1: Shoulder Pain Orientation 1: Right Pain Description 1: Constant Pain Intervention(s) 1: Medication (see MAR) Subjective:  POD # 1- s/p I&D and revision of infected L JEIMY, I&D of septic right shoulder joiint. Pain is well controlled. Pt noted to be in acute renal failure. Hospitalist is managing and has arranged for patient to be transferred downtown for definitive acute care. Objective:  No Acute Distress, Alert and Oriented, left hip dressing is C/D/I. Posterior thigh and calves are soft, NT. Neurovascular exam is normal 
  
 
Assessment:  
Patient Active Problem List  
Diagnosis Code  Hip pain M25.559  Acute pain of right shoulder M25.511  S/P total hip arthroplasty Z96.649  Arthritis of left hip M16.12  Infected prosthetic hip ([address] T84.59XA, I88.522  Septic arthritis of shoulder, right ([address] M00.9  Degenerative joint disease of right acromioclavicular joint M19.011  
 Osteoarthritis of right glenohumeral joint M19.011  
 Complete tear of right rotator cuff M75.121  
 Staphylococcal arthritis of right shoulder ([address] M00.011  Acute renal failure with tubular necrosis (HCC) N17.0  Sepsis ([address] A41.9  Postoperative anemia due to acute blood loss D62 Status Post Procedure(s) (LRB): HIP ARTHROPLASTY TOTAL REVISION (Left) SHOULDER I&D (Right) Plan: Continue Physical Therapy, Monitor Hgb. Hospitalist providing medical management. I.D. Managing abx selection. As noted above, Pt being transferred downtown to ICU for closer monitoring and in case renal dialysis is needed. Patient seen by Dr. Debbie Corral this AM.  Pt will follow up in 2 weeks for recheck.   
Signed By: JAY Junior

## 2018-11-07 NOTE — CONSULTS
[facility]    Vishal Llanes  MR#: [id number]  : 1965  ACCOUNT #: [de-identified]   DATE OF SERVICE: 2018    REASON FOR CONSULTATION:  Acute kidney injury. HISTORY OF PRESENT ILLNESS:  This patient is a 68-year-old male with history of total hip replacement in 2017, has been complaining of left hip pain and right shoulder pain for a few weeks. Patient was seen in the clinic orthopedic on 10/29 and the hip was aspirated and a culture growing MSSA. Also got a cortisone shot in the right shoulder. The patient has been taking Diflucan prior to his admission, also switched to Motrin. It was noted his white count was elevated 27.1 and ESR 90. He had flu-like symptoms 2 weeks ago. Patient does not have any skin rash, but he has been complaining of subjective fever and chills. No other arthralgias besides the hip and in the shoulder. Patient stated that he was seen in the ER last , 4 days ago and he got 1 IV antibiotic, but discharged on Keflex. He had I and D of his shoulder and hip this time here in [facility] yesterday. We are consulted today because of the acute kidney injury with the creatinine has been rising and poor urine output and hyperkalemia. His potassium earlier was 5.6. His creatinine was 1.06 in 2016, 1.32 on 2018, up to 1.72 on 2018, 2.22 on the evening of the same day yesterday, on 2018, creatinine up to 4.56 and up to 4.64 today. His bicarbonate is 27. His CK is 722. Calcium is normal at 8.2, phosphorus 7.4. His liver function test is mildly elevated at LAT, ACT 96 and 59. His total bilirubin 0.4. White count is 24,000, down today to 16.9, low platelets at 525. There is left shift. No eosinophilia. REVIEW OF SYSTEMS:  Shoulder pain, hip pain, mild nausea, but no vomiting. No shortness of breath, no chest pain. No skin rash.   Patient has a Gnog catheter, but the patient did not describe difficulty of urination prior to the event. No GI bleed. CURRENT MEDICATIONS:  He is on nafcillin IV. Patient had IV vancomycin prior to his surgery yesterday. He is on IV fluid with normal saline at 125 mL per hour, Tylenol p.r.n., Decadron 4 mg gets it once per day IV, Emperatriz-Colace. Also, he is on rifampin 600 mg daily. PHYSICAL EXAMINATION:  VITAL SIGNS:  Temperature is 98.3. Blood pressure is 103/55, heart rate 71. GENERAL:  The patient is alert, oriented x3. He is not in acute respiratory distress. NECK:  No lymph node or thyromegaly. LUNGS:  Clear to auscultation bilaterally. There is no wheezing, no crackles. HEART:  Regular rhythm. Systolic murmur 2/6 in left sternal border. No rub, no gallop. ABDOMEN:  Soft, nontender. EXTREMITIES:  There is no edema. There is right shoulder swelling. SKIN:  No skin rash. ASSESSMENT:  Acute kidney injury, which is oliguric hyperkalemia. The cause not completely clear, but the patient was on nonsteroidal anti-inflammatory drugs and has sepsis. All these are contributing factors. RECOMMENDATIONS:  We will check the urine protein serology, urine eosinophilia, renal ultrasound. We will continue with IV fluid and will check the BMP again in few hours to see the patient how he is doing with his electrolyte. There is no need for dialysis today and will continue to assess.       MD MEGHAN Luevano / JYOTI  D: 11/07/2018 10:33     T: 11/07/2018 11:01  JOB #: [id number]

## 2018-11-07 NOTE — PROGRESS NOTES
Shift assessment complete. Patient is alert and oriented x4. Dressing change to right shoulder is dry and intact. Sling intact. Palpable pulses. Incentive spirometer at bedside. Left hip dressing in dry and intact. New orders received for MD for bolus and dextrose IV. He has elevated potassium and creatine This nurse has been at bedside since shift change. No signs of distress. Wife at bedside. Bed is low and locked. Call light within reach. Instructed to call for assistance. Patient going downtown ICU for treatment for his kidneys.

## 2018-11-07 NOTE — PROGRESS NOTES
TRANSFER - OUT REPORT: 
 
Verbal report given to Anastasiya Randall RN(name) on [facility]  being transferred to ICU downtown(unit) for change in patient condition(kidneys) Report consisted of patients Situation, Background, Assessment and  
Recommendations(SBAR). Information from the following report(s) SBAR, Kardex, Procedure Summary, Intake/Output and MAR was reviewed with the receiving nurse. Lines:  
Peripheral IV 11/05/18 Anterior; Inferior;Left;Lower Arm (Active) Site Assessment Clean, dry, & intact 11/7/2018  7:30 AM  
Phlebitis Assessment 0 11/7/2018  7:30 AM  
Infiltration Assessment 0 11/7/2018  7:30 AM  
Dressing Status Clean, dry, & intact 11/7/2018  7:30 AM  
Dressing Type Transparent;Tape 11/7/2018  7:30 AM  
Hub Color/Line Status Infusing 11/7/2018  7:30 AM  
  
 
Opportunity for questions and clarification was provided. Patient transported with: 
 Monitor

## 2018-11-07 NOTE — OP NOTES
[address] [place] [address] REPORT    Diane Ames.  MR#: [id number]  : 1965  ACCOUNT #: [de-identified]   DATE OF SERVICE: 2018    PREOPERATIVE DIAGNOSES:  1. Septic arthritis, right shoulder. 2.  Recurrent rotator cuff tear, right shoulder. 3.  Degenerative joint disease, right shoulder. 4.  Acromioclavicular joint arthritis, right shoulder. POSTOPERATIVE DIAGNOSES:  1. Septic arthritis, right shoulder. 2.  Recurrent rotator cuff tear, right shoulder. 3.  Degenerative joint disease, right shoulder. 4.  Acromioclavicular joint arthritis, right shoulder. PROCEDURE PERFORMED:  Incision, debridement, lavage glenohumeral joint, right shoulder. SURGEON:  Troy Perea MD      ANESTHESIA:  General.    ESTIMATED BLOOD LOSS:  From the shoulder is 50 mL. COMPLICATIONS:  None. SPECIMENS REMOVED:  Multiple cultures and specimens were sent for culture from the right shoulder. INDICATIONS:  The patient is a 37-year-old gentleman with a complex history . The patient initially underwent a left total hip arthroplasty by Dr. Doni Salcido in 2017. He underwent a rotator cuff repair of his right shoulder by Dr. Shaunna Landeros with 2 Iconix 2.3 anchors in 2017. A few weeks ago, he started to develop right shoulder pain and flu-like symptoms. He was seen by Dr. Shantel Garcia had a right shoulder injection. The next day he developed severe left hip and also began to have severe right shoulder pain, fevers, chills. He was seen by Dr. Ludmila Duenas, aspirate of the left hip demonstrated methicillin-sensitive Staphylococcus aureus. The patient complained of severe right shoulder pain. Dr. Ludmila Duenas contacted me yesterday. Due to the severity of his symptoms, the patient was admitted to the hospital.    Radiographs of his right shoulder and an MR of his right shoulder was obtained which demonstrated a large effusion, recurrent cuff tear, DJD, AC joint arthritis. Patient had an elevated white count of 20, elevated creatinine of 1.3, elevated CRP of over 30, ESR 90. This was consistent with a septic arthritis of his right shoulder and a periprosthetic left hip infection. Last night, the patient was tachycardic. He was bacteremic with 2 sets of positive blood cultures. The patient was started provisionally on nafcillin. He was brought to the operative suite today for an I and D of the left hip. Due to the severity of symptoms instead of doing his staged I and D of the left hip and the right shoulder. It was elected to proceed with both procedures in the same anesthetic setting. Dr. Rosanne Lopes performed his left hip procedure earlier under a different dictation. Once that procedure was complete, I was contacted and I came to the operative suite. The patient was then repositioned in the beach chair fashion. Right shoulder was then prepped and draped in sterile fashion. The center deltopectoral incision was identified and marked. InteguSeal was applied. Once the InteguSeal was allowed to cure,  the skin was incised, subcutaneous tissue was dissected down to the cephalic vein. This dissected laterally with the strap muscle retracted medialy. Once the subdeltoid bursa was exposed, there was noted to be gross purulence in the right shoulder. Cultures from right shoulder were obtained and labeled 1, 2 and 3 from the right shoulder. There is caseous material as well and this was removed in its entirety and a specimen was sent for culture. There was a large amount of purulence in the right shoulder. At this point, the purulent material was then debrided vigorously back to viable tissue. The biceps tendon was identified and intact. The subscapularis was intact, but the supra and infraspinatus were torn roughly 3.5 cm. The previous SonicAnchors were removed in their entirety.   At this point, the shoulder was then irrigated with 20 liters of jet pulse lavage and a vigorous debridement was performed. Once this was then complete, there was no further evidence of any purulence. At this point, the wound of the deltopectoral was approximated with #5 Mersilene sutures. Skin was closed with 0 Vicryl figure-of-eight sutures and then 2-0 Prolene subcuticular stitch. A sterile dressing was applied. Sling and swathe was applied. The patient was then transferred to the recovery in stable condition. The [place] the [place] 97[address],     ICD-10 code M00.011, M19.011 x 2, M75.121. Cultures were sent labeled 1,2, and 3 for the right shoulder and a separate tissue culture was sent as well for culture.       MD JUANI Elder /   D: 11/06/2018 18:34     T: 11/07/2018 08:38  JOB #: [id number]  CC: Ximena Turner MD

## 2018-11-07 NOTE — PROGRESS NOTES
TRANSFER - IN REPORT: 
 
Verbal report received from Bob Tang RN on [facility]  being received from ES room 320 for change in patient condition(RANI) Report consisted of patients Situation, Background, Assessment and  
Recommendations(SBAR). Information from the following report(s) SBAR, Intake/Output and MAR was reviewed with the receiving nurse. Opportunity for questions and clarification was provided. Assessment will be completed upon patients arrival to unit.

## 2018-11-07 NOTE — PROGRESS NOTES
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 · Will start cefazolin with renal dosing. He will need six weeks of treatment once blood cultures clear, followed by a long course of oral suppression. · Will repeat blood cultures in am and checkTTE. He denies history of prior heart murmur. Anti-infectives: · Nafcillin Subjective: Interval History:  
I&D and polyethylene exchange of L hip arthroplasty , with findings of purulence at the capsule. Cultures pending - GPCs on stain. She also had open debridement of the R shoulder joint, cultures pending. Creatinine elevated at 5.25 today and he was transferred to [facility]. Retroperitoneal US was unremarkable. He is not making much urine at this time. He denies nausea, vomiting, diarrhea, fevers, chills, sweats. WBCs 17K, improved. He has been afebrile since . No Known Allergies Review of Systems:  A comprehensive review of systems was negative except for that written in the History of Present Illness. Objective:  
 
Visit Vitals /52 Pulse 69 Temp 98.3 °F (36.8 °C) Resp 23 Wt 98 kg (216 lb 0.8 oz) SpO2 100% BMI 33.84 kg/m² Temp (24hrs), Av.9 °F (36.6 °C), Min:96.8 °F (36 °C), Max:98.3 °F (36.8 °C) Lines:  Peripheral IV:    
 
Physical Exam:   
General:  Alert, cooperative, well nourished, well developed, appears stated age, no acute distress, but appears uncomfortable Eyes:  Sclera anicteric. Pupils equally round and reactive to light. Mouth/Throat: Crusting perioral lesions Neck: Supple Lungs:   Clear to auscultation bilaterally, good effort, no increased work of breathing CV:  Regular rate and rhythm, 2/6 murmur best heard at LUSB Abdomen:   Soft, non tender, non distended, active bowel sounds Extremities: No cyanosis Skin: No acute rash Musculoskeletal: Muscular. R shoulder swollen with clean dressing, L hip not examined today Lines/Devices:  L forearm PIV; Gong with scant dark urine Psych: Alert and oriented, normal mood affect given the setting Data Review: CBC: 
Recent Labs 11/07/18 
1335 11/07/18 
0527 11/06/18 
2031 11/06/18 
1850 11/06/18 
0433 11/04/18 
1634 WBC  --  16.9*  --  24.0* 20.4* 20.6* GRANS  --   --   --   --  73 79* MONOS  --   --   --   --  11 7 EOS  --   --   --   --  0* 0* ANEU  --   --   --   --  15.0* 16.3* ABL  --   --   --   --  2.7 2.4 HGB 7.5* 8.3* 9.2* 10.2* 11.5* 13.8 HCT 22.6* 26.3*  --  31.9* 36.0* 41.4 PLT  --  340  --  351 432 579* BMP: 
Recent Labs 11/07/18 
1335 11/07/18 
0540 11/07/18 
8879 CREA 5.25* 4.64* 4.52* BUN 49* 46* 45*  137 137  
K 4.8 5.6* 6.0*  
 100 100 CO2 27 27 28 AGAP 9 10 9 * 243* 152* LFTS: 
Recent Labs 11/04/18 
1745 TBILI 0.4 ALT 96* SGOT 59* AP 66  
TP 7.8 ALB 2.6* Microbiology:  
 
All Micro Results None Imaging: MRI shoulder Signed By: Rabia Wellington NP November 7, 2018

## 2018-11-07 NOTE — PROGRESS NOTES
Initial visit was made, emotional support and a spiritual presence were provided for the patient, his wife and other family members.  card was left with the patient. Enrrique Willett

## 2018-11-07 NOTE — OP NOTES
[facility] REPORT    Jessica Ann.  MR#: [id number]  : 1965  ACCOUNT #: [de-identified]   DATE OF SERVICE: 2018    PREOPERATIVE DIAGNOSIS:  Infected left total hip arthroplasty. POSTOPERATIVE DIAGNOSIS:  Infected left total hip arthroplasty. PROCEDURE PERFORMED:  I and D and revision left hip arthroplasty. SURGEON:  Javier Delgado MD    ANESTHESIA:  General.    SPECIMENS REMOVED:  None. COMPLICATIONS:  None. ESTIMATED BLOOD LOSS:  750 mL. IMPLANTS:  Paterson/DePuy     INDICATIONS FOR PROCEDURE:  The patient is an unfortunate gentleman who began to have pain in his left shoulder and hip approximately 2 weeks ago. Aspirates revealed infections in both the hip and shoulder. The decision was made to perform a revision hip arthroplasty to address the infection on the left hip followed by a left shoulder I and D.    ASSISTANT:  JAY Akbar    DESCRIPTION OF PROCEDURE:  The patient was taken to the operating room after adequate induction with a general anesthetic, was prepped and draped in usual sterile fashion of the left hip. The old incision was utilized, carried through subcutaneous tissues and fascia with hemostasis obtained using Bovie cauterization. The capsule was T'd and purulence appreciated. This was cultured. The hip was dislocated. The 8.5 mm Biolox femoral head was tamped from the field. The trunnion was noted to be intact. Inspection around the trunnion and along the soft tissue areas including the bone junction through the implant revealed no suggestion of any osteomyelitis. Dissection was then carried anterior superior and the trunnion mobilized in this region. An osteotome and mallet was used to remove the polyethylene liner and 1 screw was removed. Soft tissue was dissected from around the acetabulum and a liner was reinstalled.   Acetabular osteotomes were used to start the dissection around the socket and then the Innomed acetabular osteotomes were used to dissect and bring the cup from the field. With this, he did have a posterolateral deficiency appreciated. Additional cultures were obtained in the acetabular region and purulence was noted. He was then reamed to clear the soft tissue out. He was irrigated, soaked in Betadine solution as the trunnion was scrubbed. He then had 1 liter of Bactisure solution infiltrated in the soft tissues and along the femoral stem exposed trunnion areas. Additional irrigation ensued to clean of debris. He was then sequentially reamed to 61 mm to accommodate a 62 mm multi-hole cup. Because of the posterolateral deficiencies in bone, the Trident Darius multi-hole cup was impacted into place. Two screws were placed in the ilium and it was noted to be stable. A dual mobility liner was installed. Trial reduction again with the 8.5 Biolox head on an articular polyethylene revealed his limb length to be equilibrated and him to be stable. The permanent Biolox 8.5 with a titanium sleeve was utilized and reduced again stable through range of motion. Additional irrigation ensued. A #1 PDS used to reapproximate the capsular tissue. Antibiotic beads were placed in the capsule and the #5 Ethibond used to reapproximate the capsule area over some bone graft placed in the posterolateral segment. He was then closed with #1 double-stranded PDS in the fascia and adipose layers, 2-0 Monocryl subcutaneous layer and staples were used to close the skin. Placed a sterile dressing and rolled supine. He was then positioned for I and D in his right shoulder to be dictated by Dr. Arely Dudley.       Sher Sol MD       [facility] JOSIAH / Greg Burden  D: 11/06/2018 16:48     T: 11/06/2018 23:41  JOB #: [id number]

## 2018-11-08 ENCOUNTER — APPOINTMENT (OUTPATIENT)
Dept: INTERVENTIONAL RADIOLOGY/VASCULAR | Age: 53
DRG: 559 | End: 2018-11-08
Attending: INTERNAL MEDICINE
Payer: COMMERCIAL

## 2018-11-08 LAB
ABO + RH BLD: NORMAL
ANION GAP SERPL CALC-SCNC: 10 MMOL/L (ref 7–16)
BLD PROD TYP BPU: NORMAL
BLD PROD TYP BPU: NORMAL
BLOOD GROUP ANTIBODIES SERPL: NORMAL
BPU ID: NORMAL
BPU ID: NORMAL
BUN SERPL-MCNC: 57 MG/DL (ref 6–23)
C3 SERPL-MCNC: 135 MG/DL (ref 82–167)
C4 SERPL-MCNC: 15 MG/DL (ref 14–44)
CALCIUM SERPL-MCNC: 7.8 MG/DL (ref 8.3–10.4)
CHLORIDE SERPL-SCNC: 101 MMOL/L (ref 98–107)
CO2 SERPL-SCNC: 24 MMOL/L (ref 21–32)
CREAT SERPL-MCNC: 7.08 MG/DL (ref 0.8–1.5)
CROSSMATCH RESULT,%XM: NORMAL
CROSSMATCH RESULT,%XM: NORMAL
ERYTHROCYTE [DISTWIDTH] IN BLOOD BY AUTOMATED COUNT: 15 %
FERRITIN SERPL-MCNC: 1259 NG/ML (ref 8–388)
GLUCOSE SERPL-MCNC: 124 MG/DL (ref 65–100)
HCT VFR BLD AUTO: 26.2 % (ref 41.1–50.3)
HCT VFR BLD AUTO: 26.7 % (ref 41.1–50.3)
HCT VFR BLD AUTO: 29.2 % (ref 41.1–50.3)
HGB BLD-MCNC: 8.7 G/DL (ref 13.6–17.2)
HGB BLD-MCNC: 8.8 G/DL (ref 13.6–17.2)
HGB BLD-MCNC: 9.6 G/DL (ref 13.6–17.2)
IRON SATN MFR SERPL: 21 %
IRON SERPL-MCNC: 32 UG/DL (ref 35–150)
MAGNESIUM SERPL-MCNC: 2.3 MG/DL (ref 1.8–2.4)
MCH RBC QN AUTO: 28.3 PG (ref 26.1–32.9)
MCHC RBC AUTO-ENTMCNC: 33.2 G/DL (ref 31.4–35)
MCV RBC AUTO: 85.3 FL (ref 79.6–97.8)
MM INDURATION POC: 0 MM (ref 0–5)
NRBC # BLD: 0 K/UL (ref 0–0.2)
PHOSPHATE SERPL-MCNC: 7.5 MG/DL (ref 2.5–4.5)
PLATELET # BLD AUTO: 330 K/UL (ref 150–450)
PMV BLD AUTO: 9.6 FL (ref 9.4–12.3)
POTASSIUM SERPL-SCNC: 5.2 MMOL/L (ref 3.5–5.1)
PPD POC: NEGATIVE NEGATIVE
RBC # BLD AUTO: 3.07 M/UL (ref 4.23–5.6)
SODIUM SERPL-SCNC: 135 MMOL/L (ref 136–145)
SPECIMEN EXP DATE BLD: NORMAL
STATUS OF UNIT,%ST: NORMAL
STATUS OF UNIT,%ST: NORMAL
TIBC SERPL-MCNC: 150 UG/DL (ref 250–450)
UNIT DIVISION, %UDIV: 0
UNIT DIVISION, %UDIV: 0
WBC # BLD AUTO: 15.5 K/UL (ref 4.3–11.1)

## 2018-11-08 PROCEDURE — 74011250636 HC RX REV CODE- 250/636: Performed by: INTERNAL MEDICINE

## 2018-11-08 PROCEDURE — 77030018719 HC DRSG PTCH ANTIMIC J&J -A

## 2018-11-08 PROCEDURE — 97535 SELF CARE MNGMENT TRAINING: CPT

## 2018-11-08 PROCEDURE — C1750 CATH, HEMODIALYSIS,LONG-TERM: HCPCS

## 2018-11-08 PROCEDURE — 93306 TTE W/DOPPLER COMPLETE: CPT

## 2018-11-08 PROCEDURE — 77030002916 HC SUT ETHLN J&J -A

## 2018-11-08 PROCEDURE — 65660000000 HC RM CCU STEPDOWN

## 2018-11-08 PROCEDURE — 85027 COMPLETE CBC AUTOMATED: CPT

## 2018-11-08 PROCEDURE — 74011250637 HC RX REV CODE- 250/637: Performed by: INTERNAL MEDICINE

## 2018-11-08 PROCEDURE — 82728 ASSAY OF FERRITIN: CPT

## 2018-11-08 PROCEDURE — 97110 THERAPEUTIC EXERCISES: CPT

## 2018-11-08 PROCEDURE — 87340 HEPATITIS B SURFACE AG IA: CPT

## 2018-11-08 PROCEDURE — 02H633Z INSERTION OF INFUSION DEVICE INTO RIGHT ATRIUM, PERCUTANEOUS APPROACH: ICD-10-PCS | Performed by: RADIOLOGY

## 2018-11-08 PROCEDURE — 36415 COLL VENOUS BLD VENIPUNCTURE: CPT

## 2018-11-08 PROCEDURE — 74011250636 HC RX REV CODE- 250/636: Performed by: RADIOLOGY

## 2018-11-08 PROCEDURE — 83735 ASSAY OF MAGNESIUM: CPT

## 2018-11-08 PROCEDURE — 77030027138 HC INCENT SPIROMETER -A

## 2018-11-08 PROCEDURE — 84100 ASSAY OF PHOSPHORUS: CPT

## 2018-11-08 PROCEDURE — 85014 HEMATOCRIT: CPT

## 2018-11-08 PROCEDURE — 87040 BLOOD CULTURE FOR BACTERIA: CPT

## 2018-11-08 PROCEDURE — 97530 THERAPEUTIC ACTIVITIES: CPT

## 2018-11-08 PROCEDURE — 80048 BASIC METABOLIC PNL TOTAL CA: CPT

## 2018-11-08 PROCEDURE — C1894 INTRO/SHEATH, NON-LASER: HCPCS

## 2018-11-08 PROCEDURE — 83540 ASSAY OF IRON: CPT

## 2018-11-08 PROCEDURE — 97165 OT EVAL LOW COMPLEX 30 MIN: CPT

## 2018-11-08 PROCEDURE — 77030020263 HC SOL INJ SOD CL0.9% LFCR 1000ML

## 2018-11-08 PROCEDURE — 76937 US GUIDE VASCULAR ACCESS: CPT

## 2018-11-08 RX ORDER — HEPARIN SODIUM 1000 [USP'U]/ML
1000-10000 INJECTION, SOLUTION INTRAVENOUS; SUBCUTANEOUS ONCE
Status: COMPLETED | OUTPATIENT
Start: 2018-11-08 | End: 2018-11-08

## 2018-11-08 RX ORDER — LIDOCAINE HYDROCHLORIDE 20 MG/ML
2-10 INJECTION, SOLUTION EPIDURAL; INFILTRATION; INTRACAUDAL; PERINEURAL ONCE
Status: COMPLETED | OUTPATIENT
Start: 2018-11-08 | End: 2018-11-08

## 2018-11-08 RX ORDER — VANCOMYCIN 2 GRAM/500 ML IN 0.9 % SODIUM CHLORIDE INTRAVENOUS
2000 ONCE
Status: COMPLETED | OUTPATIENT
Start: 2018-11-08 | End: 2018-11-08

## 2018-11-08 RX ORDER — FAMOTIDINE 20 MG/1
20 TABLET, FILM COATED ORAL 2 TIMES DAILY
Status: DISCONTINUED | OUTPATIENT
Start: 2018-11-08 | End: 2018-11-08

## 2018-11-08 RX ORDER — HEPARIN SODIUM 5000 [USP'U]/ML
5000 INJECTION, SOLUTION INTRAVENOUS; SUBCUTANEOUS EVERY 12 HOURS
Status: DISCONTINUED | OUTPATIENT
Start: 2018-11-08 | End: 2018-11-20 | Stop reason: HOSPADM

## 2018-11-08 RX ORDER — SODIUM CHLORIDE 9 MG/ML
75 INJECTION, SOLUTION INTRAVENOUS CONTINUOUS
Status: DISCONTINUED | OUTPATIENT
Start: 2018-11-08 | End: 2018-11-13

## 2018-11-08 RX ORDER — FAMOTIDINE 20 MG/1
20 TABLET, FILM COATED ORAL DAILY
Status: DISCONTINUED | OUTPATIENT
Start: 2018-11-09 | End: 2018-11-20 | Stop reason: HOSPADM

## 2018-11-08 RX ORDER — POLYETHYLENE GLYCOL 3350 17 G/17G
17 POWDER, FOR SOLUTION ORAL DAILY PRN
Status: DISCONTINUED | OUTPATIENT
Start: 2018-11-08 | End: 2018-11-20 | Stop reason: HOSPADM

## 2018-11-08 RX ORDER — HEPARIN SODIUM 200 [USP'U]/100ML
1000 INJECTION, SOLUTION INTRAVENOUS CONTINUOUS
Status: DISCONTINUED | OUTPATIENT
Start: 2018-11-08 | End: 2018-11-13

## 2018-11-08 RX ORDER — LIDOCAINE HYDROCHLORIDE 20 MG/ML
1-20 INJECTION, SOLUTION EPIDURAL; INFILTRATION; INTRACAUDAL; PERINEURAL ONCE
Status: DISCONTINUED | OUTPATIENT
Start: 2018-11-08 | End: 2018-11-08

## 2018-11-08 RX ADMIN — ACETAMINOPHEN 1000 MG: 500 TABLET, FILM COATED ORAL at 08:23

## 2018-11-08 RX ADMIN — VANCOMYCIN HYDROCHLORIDE 2000 MG: 10 INJECTION, POWDER, LYOPHILIZED, FOR SOLUTION INTRAVENOUS at 19:04

## 2018-11-08 RX ADMIN — ZOLPIDEM TARTRATE 5 MG: 5 TABLET ORAL at 22:01

## 2018-11-08 RX ADMIN — Medication 2 G: at 05:18

## 2018-11-08 RX ADMIN — FAMOTIDINE 20 MG: 20 TABLET ORAL at 11:32

## 2018-11-08 RX ADMIN — ACETAMINOPHEN 1000 MG: 500 TABLET, FILM COATED ORAL at 14:00

## 2018-11-08 RX ADMIN — Medication 1 AMPULE: at 22:02

## 2018-11-08 RX ADMIN — Medication 5 ML: at 22:03

## 2018-11-08 RX ADMIN — Medication 1 AMPULE: at 08:24

## 2018-11-08 RX ADMIN — Medication 10 ML: at 14:17

## 2018-11-08 RX ADMIN — ONDANSETRON HYDROCHLORIDE 4 MG: 2 INJECTION, SOLUTION INTRAVENOUS at 19:02

## 2018-11-08 RX ADMIN — HEPARIN SODIUM 2000 UNITS: 200 INJECTION, SOLUTION INTRAVENOUS at 16:47

## 2018-11-08 RX ADMIN — ERYTHROPOIETIN 20000 UNITS: 20000 INJECTION, SOLUTION INTRAVENOUS; SUBCUTANEOUS at 12:14

## 2018-11-08 RX ADMIN — SODIUM CHLORIDE 125 ML/HR: 900 INJECTION, SOLUTION INTRAVENOUS at 05:18

## 2018-11-08 RX ADMIN — ACETAMINOPHEN 1000 MG: 500 TABLET, FILM COATED ORAL at 02:33

## 2018-11-08 RX ADMIN — Medication 10 ML: at 05:18

## 2018-11-08 RX ADMIN — HYDROMORPHONE HYDROCHLORIDE 1 MG: 1 INJECTION, SOLUTION INTRAMUSCULAR; INTRAVENOUS; SUBCUTANEOUS at 19:02

## 2018-11-08 RX ADMIN — HEPARIN SODIUM 5000 UNITS: 5000 INJECTION INTRAVENOUS; SUBCUTANEOUS at 11:32

## 2018-11-08 RX ADMIN — HEPARIN SODIUM 5000 UNITS: 5000 INJECTION INTRAVENOUS; SUBCUTANEOUS at 22:01

## 2018-11-08 RX ADMIN — RIFAMPIN 600 MG: 300 CAPSULE ORAL at 08:24

## 2018-11-08 RX ADMIN — ACETAMINOPHEN 1000 MG: 500 TABLET, FILM COATED ORAL at 22:01

## 2018-11-08 RX ADMIN — HEPARIN SODIUM 1300 UNITS: 1000 INJECTION, SOLUTION INTRAVENOUS; SUBCUTANEOUS at 16:56

## 2018-11-08 RX ADMIN — HYDROMORPHONE HYDROCHLORIDE 1 MG: 1 INJECTION, SOLUTION INTRAMUSCULAR; INTRAVENOUS; SUBCUTANEOUS at 12:14

## 2018-11-08 RX ADMIN — LIDOCAINE HYDROCHLORIDE 100 MG: 20 INJECTION, SOLUTION EPIDURAL; INFILTRATION; INTRACAUDAL; PERINEURAL at 17:00

## 2018-11-08 RX ADMIN — SENNOSIDES AND DOCUSATE SODIUM 2 TABLET: 8.6; 5 TABLET ORAL at 08:24

## 2018-11-08 NOTE — PROGRESS NOTES
Physical Therapy Note: Attempted PM physical therapy treatment, patient going off the floor to interventional radiology. Will see patient tomorrow as patient is available to continue PT BID treatment. Thank you, IVAN EllisT

## 2018-11-08 NOTE — PROGRESS NOTES
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  
· Pt transitioned to vancomycin. All other antibiotics held. · Discussed with pharmacy - recommend vanc levels with each HD or before each dose in order to prevent overdose of vancomycin since we anticipate renal recovery. · Can consider restarting rifampin at some point, but rifampin is known to cause ATN as well so I am reluctant until renal function improves. · Discussed with Dr. Alex Prado - could consider kidney biopsy. Anti-infectives: · Nafcillin · Cefazolin · Rifampin Subjective: Interval History: seen on HD; no fevers; shoulder and hip feel okay No Known Allergies Review of Systems:  A comprehensive review of systems was negative except for that written in the History of Present Illness. Objective:  
 
Visit Vitals /76 Pulse 78 Temp 99.2 °F (37.3 °C) Resp 18 Wt 98.9 kg (218 lb) SpO2 91% BMI 34.14 kg/m² Temp (24hrs), Av.5 °F (36.9 °C), Min:97.7 °F (36.5 °C), Max:99.2 °F (37.3 °C) Lines:  CVC: R IJ HD cath Physical Exam:   
General:  Alert, cooperative, in no acute distress;  
Eyes:  Sclera anicteric Mouth/Throat: Crusting perioral lesions Neck: Supple Lungs:   Clear to auscultation bilaterally, good effort, no increased work of breathing CV:  Regular rate and rhythm, no murmur Abdomen:   Soft, non tender, non distended, active bowel sounds Extremities: No cyanosis Skin: No acute rash Musculoskeletal: Muscular. R shoulder swollen with clean dressing, L hip not examined today Lines/Devices:  L forearm PIV; Gong with scant dark urine Psych: Alert and oriented, normal mood affect given the setting Data Review: CBC: 
Recent Labs 18 
0802 18 
2156 18 
7225 18 
9200 WBC 13.4*  --   --  15.5*  --  16.9* HGB 8.7* 8.8* 9.6* 8.7*   < > 8.3* HCT 25.9* 26.7* 29.2* 26.2*   < > 26.3*  
  --   --  330  --  340  
 < > = values in this interval not displayed. BMP: 
Recent Labs 18 
0818 
2200 CREA 10.90* 7.08* 6.43* BUN 70* 57* 55* * 135* 136  
K 5.2* 5.2* 5.0  
 101 100 CO2 22 24 26 AGAP 12 10 10  
GLU 95 124* 138* LFTS: 
No results for input(s): TBILI, ALT, SGOT, AP, TP, ALB in the last 72 hours. Microbiology:  
 
All Micro Results Procedure Component Value Units Date/Time CULTURE, BLOOD [[id number]] Collected:  18 Order Status:  Completed Specimen:  Whole Blood Updated:  18 0502 CULTURE, BLOOD [[id number]] Collected:  18 Order Status:  Completed Specimen:  Whole Blood Updated:  18 Imagin/8/18 TTE: SUMMARY: 
 
-  Left ventricle: Systolic function was normal. Ejection fraction was 
estimated in the range of 55 % to 60 %. There were no regional wall motion 
abnormalities. -  Aortic valve: The valve was probably trileaflet. Leaflets exhibited mild 
sclerosis. There was no evidence for vegetation. -  Mitral valve: There was mild regurgitation. There was no evidence for 
vegetation. Signed By: Claudia Peters MD   
 2018

## 2018-11-08 NOTE — PROGRESS NOTES
Problem: Mobility Impaired (Adult and Pediatric) Goal: *Acute Goals and Plan of Care (Insert Text) STG: 
(1.)Mr. Eladio Davison will move from supine to sit and sit to supine , scoot up and down and roll side to side with CONTACT GUARD ASSIST within 3 treatment day(s). (2.)Mr. Eladio Davison will transfer from bed to chair and chair to bed with STAND BY ASSIST using the least restrictive device within 3 treatment day(s). (3.)Mr. Eladio Davison will ambulate with STAND BY ASSIST for 150 feet with the least restrictive device within 3 treatment day(s). (4.)Mr. Eladio Davison will tolerate AAROM, AROM and gentle pendulums of RUE with no increase in pain within 3 treatment days to improve independence with transfers. (5.)Mr. Eladio Davison will perform standing static and dynamic balance activities x 15 minutes with STAND BY ASSIST to improve safety within 3 day(s). LTG: 
(1.)Mr. Eladio Davison will move from supine to sit and sit to supine , scoot up and down and roll side to side in bed with MODIFIED INDEPENDENCE within 7 treatment day(s). (2.)Mr. Eladio Davison will transfer from bed to chair and chair to bed with MODIFIED INDEPENDENCE using the least restrictive device within 7 treatment day(s). (3.)Mr. Eladio Davison will ambulate with MODIFIED INDEPENDENCE for 500 feet with the least restrictive device within 7 treatment day(s). (4.)Mr. Eladio Davison will perform standing static and dynamic balance activities x 15 minutes with MODIFIED INDEPENDENCE to improve safety within 7 day(s). (5.)Mr. Eladio Davison will ascend and descend 2 stairs using L hand rail(s) with SUPERVISION to improve functional mobility and safety within 7 day(s). ________________________________________________________________________________________________ PHYSICAL THERAPY: Daily Note, Treatment Day: 1st, AM 11/8/2018INPATIENT: Hospital Day: 2 Payor: Monika Chaidez / Plan: SIMON YOUNGBLOOD OAP / Product Type: Commerical /  
 LLE total hip precautions, WBAT 
 RUE WBAT : Active and passive range of motion RIGHT elbow hand ACTIVE AND ACTIVE ASSISTED MOTION RIGHT SHOULDER GENTLE pendulums 
sling RIGHT shoulder NAME/AGE/GENDER: Sobeida Betancur is a 48 y.o. male PRIMARY DIAGNOSIS: infected left hip  
ARF (acute renal failure) ([facility]) Staphylococcal arthritis of right shoulder ([facility]) Staphylococcal arthritis of right shoulder ([facility] [address] ICD-10: Treatment Diagnosis: · Difficulty in walking, Not elsewhere classified (R26.2) Precaution/Allergies: 
Patient has no known allergies. ASSESSMENT:  
Mr. Hansa Velasquez is a 48 y.o. male admitted with infected L hip and staphylococcal R shoulder. He is s/p Incision, debridement, lavage glenohumeral joint, right shoulder and I and D and revision left hip arthroplasty. He is supine in bed in ICU and agreeable to physical therapy treatment. He is typically independent with ambulation, ADLs, driving and works a heavy labor job. Noted patient does not have sling applied to RUE, educated on importance of wearing this and RN reports he continually takes it off. States, \"it makes my shoulder uncomfortable and feels too stiff. \" AROM R hand and elbow performed in supine, AAROM to R shoulder to improve ROM for functional mobility. Reports no increase in pain with ROM of RUE. He required moderate assistance to transfer to sitting, CGA to stand and ambulate within room with rolling walker and decreased weight bearing noted to LLE. He required cues to decrease speed during ambulation and for correct walker use. Increased ambulation distance to 100' this session with improved weight shift to LLE. Educated once again on L hip precautions, as patient is only able to recall 1/3. Good progress towards goals, needs to improve significantly on bed mobility (attempts to reach for therapist instead of wanting to assist himself to sitting). Sling replaced and all needs within reach.  Sobeida Betancur is currently functioning below his baseline and would benefit from skilled PT during acute care stay to maximize safety and independence with functional mobility. This section established at most recent assessment PROBLEM LIST (Impairments causing functional limitations): 1. Decreased Strength 2. Decreased ADL/Functional Activities 3. Decreased Transfer Abilities 4. Decreased Ambulation Ability/Technique 5. Decreased Balance 6. Increased Pain 7. Decreased Activity Tolerance 8. Decreased Pacing Skills 9. Decreased Knowledge of Precautions 10. Decreased [place] with Home Exercise Program 
 INTERVENTIONS PLANNED: (Benefits and precautions of physical therapy have been discussed with the patient.) 1. Balance Exercise 2. Bed Mobility 3. Gait Training 4. Group Therapy 5. Home Exercise Program (HEP) 6. Therapeutic Activites 7. Therapeutic Exercise/Strengthening 8. Transfer Training 9. Patient Education TREATMENT PLAN: Frequency/Duration: twice daily for duration of hospital stay Rehabilitation Potential For Stated Goals: Excellent RECOMMENDED REHABILITATION/EQUIPMENT: (at time of discharge pending progress): Due to the probability of continued deficits (see above) this patient will likely need continued skilled physical therapy after discharge. Equipment:  
? None at this time HISTORY:  
History of Present Injury/Illness (Reason for Referral): 
Pt is a 47 y/o male who is almost 2 years s/p L JEIMY who was seen in clinic on 10/29 c/o 5 day history of sudden onset of moderate left hip pain. Denied any known injury. Left hip joint aspirate was obtained and sent for culture which grew pansensitive staph. Aureus. Patient was also noted to have elevated WBC (27.1) as well as elevated ESR (90). Patient reports having flu like symptoms 2 weeks ago and began developing right shoulder pain around that same time.   Patient was subsequently evaluated by  Ihsan and received right shoulder cortisone injection. He reports that he is still experiencing moderate to severe right shoulder pain. Denies fever, chills, nausea, vomiting, etc..  No other new complaints. Past Medical History/Comorbidities:  
Mr. Julio Maurice  has a past medical history of Arthritis, Biceps muscle tear, Chronic pain, Insomnia, MRSA (methicillin resistant Staphylococcus aureus) infection, Personal history of kidney stones, and Right shoulder pain. Mr. Julio Maurice  has a past surgical history that includes hx other surgical; hx rotator cuff repair (Left, 2010); hx orthopaedic (Left, 01/2017); hx septoplasty (11/15/2017); HIP ARTHROPLASTY TOTAL REVISION (Left, 11/6/2018); SHOULDER I&D (Right, 11/6/2018); RIGHT SHOULDER ARTHROSCOPY SUBACROMIAL DECOMPRESSION ROTATOR CUFF REPAIR/ DISTAL CLAVICLE RESECTION (Right, 6/13/2017); LEFT TOTAL HIP ARTHROPLASTY (Left, 1/6/2017); and SHOULDER ARTHROSCOPY ACROMIOPLASTY ROTATOR CUFF REPAIR (Left, 10/21/2010). Social History/Living Environment:  
Home Environment: Private residence # Steps to Enter: 2 Rails to Enter: Yes Hand Rails : Left One/Two Story Residence: One story Living Alone: No 
Support Systems: Spouse/Significant Other/Partner Patient Expects to be Discharged to[de-identified] Private residence Current DME Used/Available at Home: Walker, rolling, Raised toilet seat, Shower chair, Grab bars, Adaptive dressing aides, Adaptive bathing aides Tub or Shower Type: Tub/Shower combination Prior Level of Function/Work/Activity: 
 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  
Most Recent Physical Functioning:  
Gross Assessment: 
AROM: Generally decreased, functional 
PROM: Generally decreased, functional 
Strength: Generally decreased, functional 
Coordination: Generally decreased, functional 
         
  
 Posture: 
Posture (WDL): Exceptions to [facility] Posture Assessment: Forward head Balance: 
Sitting: Intact Standing: Impaired Standing - Static: Good Standing - Dynamic : Fair Bed Mobility: 
Supine to Sit: Moderate assistance Scooting: Contact guard assistance Wheelchair Mobility: 
  
Transfers: 
Sit to Stand: Contact guard assistance Stand to Sit: Contact guard assistance Bed to Chair: Minimum assistance Gait: 
Right Side Weight Bearing: Full Left Side Weight Bearing: As tolerated Base of Support: Center of gravity altered; Widened Speed/Emma: Shuffled;Pace decreased (<100 feet/min); Slow Step Length: Left shortened Gait Abnormalities: Antalgic;Decreased step clearance Distance (ft): 100 Feet (ft) Assistive Device: Gait belt;Walker, rolling Ambulation - Level of Assistance: Contact guard assistance Interventions: Safety awareness training;Manual cues; Verbal cues Body Structures Involved: 1. Nerves 2. Metabolic 3. Endocrine 4. Bones 5. Joints 6. Muscles Body Functions Affected: 1. Sensory/Pain 2. Neuromusculoskeletal 
3. Movement Related 4. Metobolic/Endocrine Activities and Participation Affected: 1. Mobility 2. Self Care 3. Domestic Life 4. Interpersonal Interactions and Relationships 5. Community, Social and [place] Number of elements that affect the Plan of Care: 4+: HIGH COMPLEXITY CLINICAL PRESENTATION:  
Presentation: Stable and uncomplicated: LOW COMPLEXITY CLINICAL DECISION MAKING:  
[employer] AM-PAC 6 Clicks Basic Mobility Inpatient Short Form How much difficulty does the patient currently have. .. Unable A Lot A Little None 1. Turning over in bed (including adjusting bedclothes, sheets and blankets)? [] 1   [] 2   [x] 3   [] 4  
2. Sitting down on and standing up from a chair with arms ( e.g., wheelchair, bedside commode, etc.)   [] 1   [] 2   [x] 3   [] 4  
3. Moving from lying on back to sitting on the side of the bed?    [] 1 [x] 2   [] 3   [] 4 How much help from another person does the patient currently need. .. Total A Lot A Little None 4. Moving to and from a bed to a chair (including a wheelchair)? [] 1   [] 2   [x] 3   [] 4  
5. Need to walk in hospital room? [] 1   [x] 2   [] 3   [] 4  
6. Climbing 3-5 steps with a railing? [] 1   [x] 2   [] 3   [] 4  
© 2007, Trustees of [facility] [place] [employer], under license to [employer]. All rights reserved Score:  Initial: 15 Most Recent: X (Date: -- ) Interpretation of Tool:  Represents activities that are increasingly more difficult (i.e. Bed mobility, Transfers, Gait). Score 24 23 22-20 19-15 14-10 9-7 6 Modifier CH CI CJ CK CL CM CN   
 
? Mobility - Walking and Moving Around:  
  - CURRENT STATUS: CK - 40%-59% impaired, limited or restricted  - GOAL STATUS: CJ - 20%-39% impaired, limited or restricted  - D/C STATUS:  ---------------To be determined--------------- Payor: Zuleika Lewis / Plan: SIMON ALBERTOP / Product Type: Commerical /   
 
Medical Necessity:    
· Patient demonstrates excellent rehab potential due to higher previous functional level. Reason for Services/Other Comments: 
· Patient continues to require modification of therapeutic interventions to increase complexity of exercises. Use of outcome tool(s) and clinical judgement create a POC that gives a: Clear prediction of patient's progress: LOW COMPLEXITY  
  
 
 
 
TREATMENT:  
(In addition to Assessment/Re-Assessment sessions the following treatments were rendered) Pre-treatment Symptoms/Complaints:  R shoulder L hip pain 
Pain: Initial:  
Pain Intensity 1: 4  Post Session:  1/10 Therapeutic Activity: (    23 minutes): Therapeutic activities including bed mobility, Chair transfers and Ambulation on level ground to improve mobility, strength and balance. Required minimal Safety awareness training;Manual cues; Verbal cues to promote static and dynamic balance in standing. Therapeutic Exercise: ( 15 minutes):  Exercises per grid below to improve mobility, strength and range of motion RUE. Required minimal visual, manual and tactile cues to promote proper body alignment and promote proper body posture. Progressed range as indicated. Date: 
11/8/18 Date: 
 Date: 
  
ACTIVITY/EXERCISE AM PM AM PM AM PM  
RUE shoulder flexion x15 AAROM to tolerance RUE elbow flexion x20 AROM       
RUE finger flexion/extension x20 AROM       
RUE shoulder abduction x20 AAROM Gentle pendulums B = bilateral; AA = active assistive; A = active; P = passive Braces/Orthotics/Lines/Etc:  
· IV 
· sling RUE 
· O2 Device: Nasal cannula Treatment/Session Assessment:   
· Response to Treatment:  Patient tolerated well with no medical complications, increased ambulation distance with treatment. · Interdisciplinary Collaboration:  
o Physical Therapist 
o Registered Nurse · After treatment position/precautions:  
o Up in chair 
o Bed/Chair-wheels locked 
o Bed in low position 
o Call light within reach 
o RN notified 
o Family at bedside 
o Nurse at bedside · Compliance with Program/Exercises: Will assess as treatment progresses · Recommendations/Intent for next treatment session: \"Next visit will focus on advancements to more challenging activities and reduction in assistance provided\". Total Treatment Duration: PT Patient Time In/Time Out Time In: 4516 Time Out: 1120 Deepak Pulse, DPT

## 2018-11-08 NOTE — PROCEDURES
Department of Interventional Radiology  [phone]        Interventional Radiology Brief Procedure Note    Patient: Sobeida Betancur MRN: [id number]  SSN: [id number]    YOB: 1965  Age: 48 y.o.   Sex: male      Date of Procedure: 11/8/2018    Pre-Procedure Diagnosis: RANI    Post-Procedure Diagnosis: SAME    Procedure(s): Temporary Central Venous Catheter    Brief Description of Procedure: Temporary dialysis catheter    Performed By: Denzel Recio MD     Assistants: None    Anesthesia:Lidocaine    Estimated Blood Loss: Less than 10ml    Specimens:  None    Implants:  Temporary Hemodialysis Catheter    Findings: patent right IJ    Complications: None    Recommendations: OK to use     Follow Up: as needed    Signed By: Denzel Recio MD     November 8, 2018

## 2018-11-08 NOTE — PROGRESS NOTES
Mr Brandy Guzman looks better. Less hip and shoulder pain. Dressings look good. Checking with Dr. Judi Clayton to see if we can free his arm up some for use. Needs a walker for 2 months to protect his hip. Continued medical recovery appreciated.

## 2018-11-08 NOTE — PROGRESS NOTES
Pharmacokinetic Consult to Pharmacist 
 
Andi Fraire is a 48 y.o. male being treated for MSSA bacteremia with Vancomycin. The patient was being treated with cefazolin and rifampin, but was switched today to vancomycin intermittent dosing per ID due to worsening renal failure. Weight: 98.9 kg (218 lb) Lab Results Component Value Date/Time BUN 57 (H) 11/08/2018 03:53 AM  
 Creatinine 7.08 (H) 11/08/2018 03:53 AM  
 WBC 15.5 (H) 11/08/2018 03:53 AM  
 Lactic Acid (POC) 1.52 11/04/2018 04:40 PM  
  
Estimated Creatinine Clearance: 13.8 mL/min (A) (based on SCr of 7.08 mg/dL (H)). Day 1 of vancomycin. Goal trough is 15-20, but dosing intermittently due to worsening renal failure. Vancomycin dose initiated at 2000 mg X 1. Patient had HD cath placed today by IR. Will follow closely for next planned dialysis. Anticipate because line placed late in the evening today that first dialysis will be tomorrow, but RN to alert pharmacy if patient does go for dialysis this evening. Will plan to check random level post next dialysis and re-dose if less than 20. Will continue to follow patient. Thank you, 
Oliva Newton, PharmD Clinical Pharmacist 
[phone]

## 2018-11-08 NOTE — PROGRESS NOTES
TRANSFER - OUT REPORT: 
 
Verbal report given to Shanice Baker (name) on Nevaeh Ramsay  being transferred to 7th floor (unit) for routine progression of care Report consisted of patients Situation, Background, Assessment and  
Recommendations(SBAR). Information from the following report(s) Procedure Summary and MAR was reviewed with the receiving nurse. Lines:  
Peripheral IV 11/07/18 Left Wrist (Active) Site Assessment Clean, dry, & intact 11/8/2018  7:36 AM  
Phlebitis Assessment 0 11/8/2018  7:36 AM  
Infiltration Assessment 0 11/8/2018  7:36 AM  
Dressing Status Clean, dry, & intact 11/8/2018  7:36 AM  
Dressing Type Transparent;Tape 11/8/2018  7:36 AM  
Hub Color/Line Status Pink; Infusing;Patent 11/8/2018  7:36 AM  
Alcohol Cap Used No 11/8/2018  7:36 AM  
  
 
Opportunity for questions and clarification was provided. Patient transported with: 
 [email or web address]

## 2018-11-08 NOTE — PROGRESS NOTES
IR Nurse Pre-Procedure Checklist Part 2 Consent signed: Yes 
 
H&P complete:  Yes Antibiotics: Not applicable Airway/Mallampati Done: Not applicable Shaved: Yes Pregnancy Form:Not applicable Patient Position: Yes MD Side: Yes Biopsy Worksheet: Not applicable Specimen Medium: Not applicable

## 2018-11-08 NOTE — PROGRESS NOTES
Chart reviewed. Pt not seen today. Pt was in IR getting HD catheter today when I came by. Case discussed with Dr. Tessie Hendrix and Dr. Abdiel Middleton. Differential for kidney injury includes nafcillin nephrotoxicity, post-op ATN (?intra-operative hypotension), rifampin-induced ATN, beta-lactam AIN. Will hold rifampin for now. Stop cefazolin. Will start vancomycin alone for now, dosed by level intermittently - this will need to be followed closely by pharmacy as he is not a typical HD vanc patient.

## 2018-11-08 NOTE — PROGRESS NOTES
Hospitalist Progress Note   
2018 Admit Date: 2018  9:42 AM  
NAME: Dolores Soliz :  1965 MRN:  [id number] Attending: Steven Cruz DO 
PCP:  Christen De Luna MD 
 
SUBJECTIVE:  
Patient is a 57 y/o [address] history of L JEIMY 2017 and R rotator cuff repair 2017.  Two weeks PTA developed malaise, flu like symptoms, fever blisters, R shoulder pain.  Had R shoulder cortisone injection given.  Next day began having pain L hip.  Had hip aspirated 10/29 which grew MSSA.  Admitted by ortho on  for hip revision. ID and hospitalist consulted. Had worsening RANI on  with Cr of 1.7. Pt taken to OR that afternoon and postop Cr was 2.22, K 5.5. This morning Cr 4.5 and K 6. Transferred to S[facility]T on  for possible HD. Pt reports he is scared but otherwise feeling well. Total UOP 45cc/24 hrs. Denies CP, SOB. Pain controlled. Updated pt and girlfriend, all questions answered. Review of Systems negative with exception of pertinent positives noted above PHYSICAL EXAM  
 
Visit Vitals /58 Pulse 80 Temp 98.1 °F (36.7 °C) Resp 16 Wt 98 kg (216 lb 0.8 oz) SpO2 96% BMI 33.84 kg/m² Temp (24hrs), Av.2 °F (36.8 °C), Min:97.9 °F (36.6 °C), Max:98.9 °F (37.2 °C) Oxygen Therapy O2 Sat (%): 96 % (18) Pulse via Oximetry: 82 beats per minute (18) O2 Device: Room air (18 0736) O2 Flow Rate (L/min): 3 l/min (18 0945) Intake/Output Summary (Last 24 hours) at 2018 9252 Last data filed at 2018 5369 Gross per 24 hour Intake 2428.22 ml Output 45 ml Net 2383.22 ml General: No acute distress   
Neck:  No LAD Lungs:  CTA Bilaterally Heart:  Regular rate and rhythm,  + murmur Abdomen: Soft, Non distended, Non tender, Positive bowel sounds MSK:  R shoulder in sling Extremities: No cyanosis, clubbing or edema Neurologic:  No focal deficits Psych:  Calm, cooperative ASSESSMENT Active Hospital Problems Diagnosis Date Noted  Sepsis ([facility] [address] 11/07/2018  Postoperative anemia due to acute blood loss 11/07/2018  Staphylococcal arthritis of right shoulder ([facility] [address] 11/06/2018  Acute renal failure with tubular necrosis ([facility] [address] 11/06/2018  Infected prosthetic hip ([facility] [address] 11/05/2018  Septic arthritis of shoulder, right ([facility] [address] 11/05/2018  Degenerative joint disease of right acromioclavicular joint 11/05/2018  Osteoarthritis of right glenohumeral joint 11/05/2018  Complete tear of right rotator cuff 11/05/2018 Septic Arthritis shoulder and hip MSSA bacteremia - ID following 
- ortho following 
- Cont rifampin and cefazolin - will need 6 weeks of treatment once cultures clear - f/u repeat cultures 
- TTE pending 
  
ARF, oliguria Hyperkalemia UOP only 45cc over past 24 hours. - s/p bicarb, insulin/dextrose, kayexalate. - K improved, relatively stable in low 5s 
- CK 700s 
- renal US unremarkable - UA with large blood, 100 protein, + nitrites, mod LE,  WBC, 4+ bacteria - Stopped ASA and celebrex 
- urine Eos negative 
- nephrology following - lytes stable, no acidosis 
- cont IVFs 
- follow BMP Asymptomatic bacteriuria 
- no indication to treat but should be covered with above abx 
  
Acute postop anemia Hgb down to 7.5, s/p 2 units pRBCs. - follow hgb q12 Constipation No BM in 2-3 days. - on pericolace 
- add Miralax Full code Proph - SQ heparin Dispo - pending clinical improvement, likely home with [facility]. May be able to transfer to floor today pending nephro plan Signed By: Shira Francois MD   
 November 8, 2018

## 2018-11-08 NOTE — PROGRESS NOTES
TRANSFER - OUT REPORT: 
 
Verbal report given to Harjeet Beltran RN on Peggy Purcell  being transferred to 7th floor for routine progression of care Report consisted of patients Situation, Background, Assessment and  
Recommendations(SBAR). Information from the following report(s) SBAR, Kardex, Procedure Summary, Intake/Output, MAR, Recent Results and Cardiac Rhythm NSR was reviewed with the receiving nurse. Lines:  
Peripheral IV 11/07/18 Left Wrist (Active) Site Assessment Clean, dry, & intact 11/8/2018  7:36 AM  
Phlebitis Assessment 0 11/8/2018  7:36 AM  
Infiltration Assessment 0 11/8/2018  7:36 AM  
Dressing Status Clean, dry, & intact 11/8/2018  7:36 AM  
Dressing Type Transparent;Tape 11/8/2018  7:36 AM  
Hub Color/Line Status Pink; Infusing;Patent 11/8/2018  7:36 AM  
Alcohol Cap Used No 11/8/2018  7:36 AM  
  
 
Opportunity for questions and clarification was provided. Patient transported with: 
 Registered Nurse Pt belongings

## 2018-11-08 NOTE — PROGRESS NOTES
Bedside shift report given to [facility] Daniele Seun,[address], RN. Pt up in chair, family/visitors present.

## 2018-11-08 NOTE — PROGRESS NOTES
May use right arm as tolerated May use to ambulate Gram positive cocci in all 3 intraoperative right shoulder cultures

## 2018-11-08 NOTE — PROGRESS NOTES
Problem: Self Care Deficits Care Plan (Adult) Goal: *Acute Goals and Plan of Care (Insert Text) 1. Pt will toilet with SBA 2. Pt will complete functional mobility for ADLs with SBA 3. Pt will complete lower body dressing with SBA using AE as needed 4. Pt will complete grooming and hygiene at sink with SBA 5. Pt will complete UB bathing/ dressing with set up 6. Pt will complete bed mobility with SBA in prep for ADLs Timeframe: 7 days OCCUPATIONAL THERAPY: Initial Assessment and Treatment Day: Day of Assessment 11/8/2018INPATIENT: Hospital Day: 2 Payor: Zane Fernandez / Plan[place] [employer] [place] / Product Type: Commerical /  
  
NAME/AGE/GENDER: Jessee Maxwell is a 48 y.o. male PRIMARY DIAGNOSIS:  infected left hip  
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  
 · Pain in Right Shoulder (M25.511) Precautions/Allergies: 
  RUE WBAT, LLE WBAT, hip precautions Patient has no known allergies. ASSESSMENT:  
Mr. Jasvir Farooq was admitted w/ R shoulder staphylococcal arthritis and L hip infection, s/p R shldr I&D and revision of L JEIMY. LLE WBAT, RUE WBAT no ROM restrictions, UE sling. Pt lives with his wife and was fully independent at baseline, works a physical job. Pt has all necessary DME/ AE from initial [facility]. This session, pt presented with deficits in RUE ROM and mobility, balance, and limitations from hip precautions impacting ADLs. Pt c/o pain and stiffness in R shoulder, demo'ed < ROM at shoulder ~50* flxn/ abd, ~30* adduction, ~20* extension. Pt required mod assistance for bed mobility with cues for technique, min assistance to stand, and CGA for mobility in room/ toilet transfer with cues for overall xfer technique and use of RW.  Pt able to recall 1 hip precaution, educated on hip precautions and on adaptive techniques/ use of AE to maintain during ADLs and mobility for ADLs. Pt donned/ doffed socks with SBA using reacher and sock aide. Pt required min assistance for clothing management for toileting, remained on toilet to have a BM with needs/ call  bell in reach and RN aware. Pt would benefit from skilled OT services to address deficits. This section established at most recent assessment PROBLEM LIST (Impairments causing functional limitations): 1. Decreased Strength 2. Decreased ADL/Functional Activities 3. Decreased Transfer Abilities 4. Decreased Balance 5. Increased Pain 6. Decreased Activity Tolerance INTERVENTIONS PLANNED: (Benefits and precautions of occupational therapy have been discussed with the patient.) 1. Activities of daily living training 2. Adaptive equipment training 3. Balance training 4. Clothing management 5. Donning&doffing training 6. Therapeutic activity 7. Therapeutic exercise TREATMENT PLAN: Frequency/Duration: Follow patient 6 times/ week to address above goals. Rehabilitation Potential For Stated Goals: Excellent RECOMMENDED REHABILITATION/EQUIPMENT: (at time of discharge pending progress): Due to the probability of continued deficits (see above) this patient will likely need continued skilled occupational therapy after discharge. Equipment:  
? None at this time OCCUPATIONAL PROFILE AND HISTORY:  
History of Present Injury/Illness (Reason for Referral): 
See H&P Past Medical History/Comorbidities:  
Mr. Howard Espinal  has a past medical history of Arthritis, Biceps muscle tear, Chronic pain, Insomnia, MRSA (methicillin resistant Staphylococcus aureus) infection, Personal history of kidney stones, and Right shoulder pain. Mr. Howard Espinal  has a past surgical history that includes hx other surgical; hx rotator cuff repair (Left, 2010); hx orthopaedic (Left, 01/2017); hx septoplasty (11/15/2017); HIP ARTHROPLASTY TOTAL REVISION (Left, 11/6/2018);  SHOULDER I&D (Right, 11/6/2018); RIGHT SHOULDER ARTHROSCOPY SUBACROMIAL DECOMPRESSION ROTATOR CUFF REPAIR/ DISTAL CLAVICLE RESECTION (Right, 6/13/2017); LEFT TOTAL HIP ARTHROPLASTY (Left, 1/6/2017); and SHOULDER ARTHROSCOPY ACROMIOPLASTY ROTATOR CUFF REPAIR (Left, 10/21/2010). Social History/Living Environment:  
Home Environment: Private residence # Steps to Enter: 2 Rails to Enter: Yes Hand Rails : Left One/Two Story Residence: One story Living Alone: No 
Support Systems: Spouse/Significant Other/Partner Patient Expects to be Discharged to[de-identified] Private residence Current DME Used/Available at Home: Walker, rolling, Raised toilet seat, Shower chair, Grab bars, Adaptive dressing aides, Adaptive bathing aides Tub or Shower Type: Tub/Shower combination Prior Level of Function/Work/Activity: 
Pt lives with his wife and was fully independent at baseline, works a physical job. Pt has all necessary DME/ AE from initial [facility]. Number of Personal Factors/Comorbidities that affect the Plan of Care: Brief history (0):  LOW COMPLEXITY ASSESSMENT OF OCCUPATIONAL PERFORMANCE[de-identified]  
Activities of Daily Living:  
Basic ADLs (From Assessment) Complex ADLs (From Assessment) Feeding: Setup Oral Facial Hygiene/Grooming: Contact guard assistance Bathing: Minimum assistance Upper Body Dressing: Minimum assistance Lower Body Dressing: Contact guard assistance Toileting: Minimum assistance Instrumental ADL Meal Preparation: Moderate assistance Homemaking: Maximum assistance Medication Management: Setup Financial Management: Independent Grooming/Bathing/Dressing Activities of Daily Living Cognitive Retraining Safety/Judgement: Fall prevention Toileting Toileting Assistance: Minimum assistance Functional Transfers Toilet Transfer : Minimum assistance Lower Body Dressing Assistance Socks: Stand-by assistance Adaptive Equipment Used: Reacher;Sock aid Bed/Mat Mobility Supine to Sit: Moderate assistance Sit to Stand: Minimum assistance Most Recent Physical Functioning:  
Gross Assessment: 
AROM: Generally decreased, functional(< R shoulder post op) PROM: Generally decreased, functional 
Strength: Generally decreased, functional 
Coordination: Within functional limits Posture: 
Posture (WDL): Exceptions to [facility] Posture Assessment: Forward head Balance: 
Sitting: Intact Standing: Impaired Standing - Static: Fair Standing - Dynamic : Fair Bed Mobility: 
Supine to Sit: Moderate assistance Wheelchair Mobility: 
  
Transfers: 
Sit to Stand: Minimum assistance Stand to Sit: Minimum assistance Patient Vitals for the past 6 hrs: 
 BP BP Patient Position SpO2 Pulse 11/08/18 0405 122/59  96 % 81  
11/08/18 0436 118/58  95 % 75  
11/08/18 0505 105/54  95 % 71  
11/08/18 0536 128/62  97 % 86  
11/08/18 0605 113/55  96 % 70  
11/08/18 0705 124/58  98 % 82  
11/08/18 0736 126/65 At rest;Head of bed elevated (Comment degrees) 98 % 66  
11/08/18 0805 120/58  96 % 80 Mental Status Neurologic State: Alert Orientation Level: Oriented X4 Cognition: Follows commands Perception: Appears intact Perseveration: No perseveration noted Safety/Judgement: Fall prevention Physical Skills Involved: 1. Range of Motion 2. Balance 3. Strength 4. Activity Tolerance 5. Pain (acute) Cognitive Skills Affected (resulting in the inability to perform in a timely and safe manner): 1. none Psychosocial Skills Affected: 1. Habits/Routines Number of elements that affect the Plan of Care: 3-5:  MODERATE COMPLEXITY CLINICAL DECISION MAKING:  
[facility] [place] [facility] 6 Clicks Daily Activity Inpatient Short Form How much help from another person does the patient currently need. .. Total A Lot A Little None 1. Putting on and taking off regular lower body clothing? [] 1   [] 2   [x] 3   [] 4  
2. Bathing (including washing, rinsing, drying)?    [] 1   [] 2   [x] 3   [] 4  
 3.  Toileting, which includes using toilet, bedpan or urinal?   [] 1   [] 2   [x] 3   [] 4  
4. Putting on and taking off regular upper body clothing? [] 1   [] 2   [x] 3   [] 4  
5. Taking care of personal grooming such as brushing teeth? [] 1   [] 2   [] 3   [x] 4  
6. Eating meals? [] 1   [] 2   [] 3   [x] 4  
© 2007, Trustees of [address] [place] [address] [address] [address], under license to [employer]. All rights reserved Score:  Initial: 20 Most Recent: X (Date: -- ) Interpretation of Tool:  Represents activities that are increasingly more difficult (i.e. Bed mobility, Transfers, Gait). Score 24 23 22-20 19-15 14-10 9-7 6 Modifier CH CI CJ CK CL CM CN   
 
? Self Care:  
  - CURRENT STATUS: CJ - 20%-39% impaired, limited or restricted  - GOAL STATUS: CI - 1%-19% impaired, limited or restricted  - D/C STATUS:  ---------------To be determined--------------- Payor: Anu Hill / [place] [employer] [place] / Product Type: Commerical /   
 
Medical Necessity:    
· Patient demonstrates excellent rehab potential due to higher previous functional level. Reason for Services/Other Comments: 
· Patient continues to require skilled intervention due to decreased ADLs and functional performance from baseline. Use of outcome tool(s) and clinical judgement create a POC that gives a: LOW COMPLEXITY  
 
 
 
TREATMENT:  
(In addition to Assessment/Re-Assessment sessions the following treatments were rendered) Pre-treatment Symptoms/Complaints:   
Pain: Initial:  
Pain Intensity 1: 4 Pain Location 1: Hip, Arm Pain Orientation 1: Right, Left Pain Intervention(s) 1: (pre-medicated)  Post Session:  4 Self Care: (8 min): Procedure(s) (per grid) utilized to improve and/or restore self-care/home management as related to dressing and toileting. Required minimal visual and verbal cueing to facilitate activities of daily living skills and compensatory activities. Braces/Orthotics/Lines/Etc:  
· fuller catheter · O2 Device: Room air Treatment/Session Assessment:   
· Response to Treatment:  No adverse reaction · Interdisciplinary Collaboration:  
o Occupational Therapist 
o Registered Nurse · After treatment position/precautions:  
o Call light within reach 
o Nurse at bedside · Compliance with Program/Exercises: Will assess as treatment progresses. · Recommendations/Intent for next treatment session: \"Next visit will focus on advancements to more challenging activities and reduction in assistance provided\". Total Treatment Duration: OT Patient Time In/Time Out Time In: 0855 Time Out: 2166 Lara Kumar OT

## 2018-11-08 NOTE — PROGRESS NOTES
Subjective:  
Daily Progress Note: 11/8/2018 10:38 AM 
 
No complaints. F/U RANI. Chart reviewed. Discussed with patient, spouse, Dr Hortensia Person. Comfortable No CP No SOB No Abd pain MS - 
 
 
Current Facility-Administered Medications Medication Dose Route Frequency  polyethylene glycol (MIRALAX) packet 17 g  17 g Oral DAILY PRN  
 heparin (porcine) injection 5,000 Units  5,000 Units SubCUTAneous Q12H  
 famotidine (PEPCID) tablet 20 mg  20 mg Oral BID  
 0.9% sodium chloride infusion  75 mL/hr IntraVENous CONTINUOUS  
 epoetin zee (EPOGEN;PROCRIT) injection 20,000 Units  20,000 Units SubCUTAneous Q7D  
 acetaminophen (TYLENOL) tablet 1,000 mg  1,000 mg Oral Q6H  
 diphenhydrAMINE (BENADRYL) capsule 25 mg  25 mg Oral Q4H PRN  
 HYDROmorphone (PF) (DILAUDID) injection 1 mg  1 mg IntraVENous Q3H PRN  
 naloxone (NARCAN) injection 0.2-0.4 mg  0.2-0.4 mg IntraVENous Q10MIN PRN  
 oxyCODONE IR (ROXICODONE) tablet 10 mg  10 mg Oral Q3H PRN  
 senna-docusate (PERICOLACE) 8.6-50 mg per tablet 2 Tab  2 Tab Oral DAILY  sodium chloride (NS) flush 5-10 mL  5-10 mL IntraVENous Q8H  
 sodium chloride (NS) flush 5-10 mL  5-10 mL IntraVENous PRN  
 zolpidem (AMBIEN) tablet 5 mg  5 mg Oral QHS PRN  
 alcohol 62% (NOZIN) nasal  1 Ampule  1 Ampule Topical Q12H  
 rifAMPin (RIFADIN) capsule 600 mg  600 mg Oral ACB  ondansetron (ZOFRAN) injection 4 mg  4 mg IntraVENous Q4H PRN  
 0.9% sodium chloride infusion 250 mL  250 mL IntraVENous PRN  
 ceFAZolin (ANCEF) 2 g/20 mL in sterile water IV syringe  2 g IntraVENous Q12H  
 NUTRITIONAL SUPPORT ELECTROLYTE PRN ORDERS   Does Not Apply PRN  
 0.9% sodium chloride infusion 250 mL  250 mL IntraVENous PRN Objective:  
 
Visit Vitals /58 Pulse 80 Temp 98.1 °F (36.7 °C) Resp 16 Wt 98 kg (216 lb 0.8 oz) SpO2 96% BMI 33.84 kg/m² O2 Flow Rate (L/min): 3 l/min O2 Device: Room air Temp (24hrs), Av.2 °F (36.8 °C), Min:97.9 °F (36.6 °C), Max:98.9 °F (37.2 °C) No intake/output data recorded.  1901 -  0700 In: 2428.2 [P.O.:600; I.V.:972.9] Out: 45 [Urine:45] Visit Vitals /58 Pulse 80 Temp 98.1 °F (36.7 °C) Resp 16 Wt 98 kg (216 lb 0.8 oz) SpO2 96% BMI 33.84 kg/m² Head: Normocephalic, without obvious abnormality Neck: no JVD Lungs: clear to auscultation bilaterally Heart: regular rate and rhythm Abdomen: soft, non-tender. Extremities: no edema Data Review Recent Results (from the past 48 hour(s)) HCV AB W/RFLX TO MARRY Collection Time: 18 11:28 AM  
Result Value Ref Range HCV Ab <0.1 0.0 - 0.9 s/co ratio HCV INTERPRETATION Collection Time: 18 11:28 AM  
Result Value Ref Range HCV Interpretation Comment CULTURE, WOUND W GRAM STAIN Collection Time: 18  2:46 PM  
Result Value Ref Range Special Requests: LEFT 
OPENING 
    
 GRAM STAIN FEW GRAM POSITIVE COCCI    
 GRAM STAIN 2 TO 18 WBC'S PER OIF Culture result: LIGHT STAPHYLOCOCCUS AUREUS (A) Culture result: SENSITIVITY TO FOLLOW CULTURE, ANAEROBIC Collection Time: 18  2:46 PM  
Result Value Ref Range Special Requests: LEFT 
OPENING Culture result:     
  NO GROWTH AFTER SHORT PERIOD OF INCUBATION. FURTHER RESULTS TO FOLLOW AFTER OVERNIGHT INCUBATION. CULTURE, WOUND W GRAM STAIN Collection Time: 18  2:54 PM  
Result Value Ref Range Special Requests: LEFT 
ACETABULUM 
    
 GRAM STAIN NO DEFINITE ORGANISM SEEN    
 GRAM STAIN 0 TO 5 WBC'S PER OIF Culture result: HEAVY STAPHYLOCOCCUS AUREUS (A) Culture result: SENSITIVITY TO FOLLOW CULTURE, ANAEROBIC Collection Time: 18  2:54 PM  
Result Value Ref Range Special Requests: LEFT 
ACETABULUM Culture result:     
  NO GROWTH AFTER SHORT PERIOD OF INCUBATION. FURTHER RESULTS TO FOLLOW AFTER OVERNIGHT INCUBATION. CULTURE, WOUND W GRAM STAIN Collection Time: 11/06/18  5:45 PM  
Result Value Ref Range Special Requests: RIGHT SHOULDER 
    
 GRAM STAIN 0 TO 5 WBCS/OIF   
 GRAM STAIN NO DEFINITE ORGANISM SEEN Culture result: SCANT GRAM POSITIVE COCCI SUBCULTURE IN PROGRESS (A) CULTURE, ANAEROBIC Collection Time: 11/06/18  5:45 PM  
Result Value Ref Range Special Requests: RIGHT SHOULDER 
1 Culture result:     
  NO GROWTH AFTER SHORT PERIOD OF INCUBATION. FURTHER RESULTS TO FOLLOW AFTER OVERNIGHT INCUBATION. CULTURE, ANAEROBIC Collection Time: 11/06/18  5:45 PM  
Result Value Ref Range Special Requests: RIGHT SHOULDER 
2 Culture result:     
  NO GROWTH AFTER SHORT PERIOD OF INCUBATION. FURTHER RESULTS TO FOLLOW AFTER OVERNIGHT INCUBATION. CULTURE, WOUND W GRAM STAIN Collection Time: 11/06/18  5:45 PM  
Result Value Ref Range Special Requests: RIGHT SHOULDER 
2 
    
 GRAM STAIN 0 TO 5 WBCS/OIF   
 GRAM STAIN NO DEFINITE ORGANISM SEEN Culture result: SCANT GRAM POSITIVE COCCI SUBCULTURE IN PROGRESS (A) CULTURE, ANAEROBIC Collection Time: 11/06/18  5:45 PM  
Result Value Ref Range Special Requests: RIGHT SHOULDER 
3 Culture result:     
  NO GROWTH AFTER SHORT PERIOD OF INCUBATION. FURTHER RESULTS TO FOLLOW AFTER OVERNIGHT INCUBATION. CULTURE, WOUND W GRAM STAIN Collection Time: 11/06/18  5:45 PM  
Result Value Ref Range Special Requests: RIGHT SHOULDER 
3 
    
 GRAM STAIN 0 TO 5 WBCS/OIF   
 GRAM STAIN NO DEFINITE ORGANISM SEEN Culture result: SCANT GRAM POSITIVE COCCI SUBCULTURE IN PROGRESS (A) CULTURE, ANAEROBIC Collection Time: 11/06/18  5:45 PM  
Result Value Ref Range Special Requests: CASEOUS MATERIAL 
RIGHT SHOULDER Culture result:     
  NO GROWTH AFTER SHORT PERIOD OF INCUBATION. FURTHER RESULTS TO FOLLOW AFTER OVERNIGHT INCUBATION. CULTURE, WOUND W GRAM STAIN  Collection Time: 11/06/18  5:45 PM  
 Result Value Ref Range Special Requests: CASEOUS MATERIAL 
RIGHT SHOULDER 
   
 GRAM STAIN 0 TO 1 WBCS/OIF   
 GRAM STAIN NO DEFINITE ORGANISM SEEN Culture result:     
  NO GROWTH AFTER SHORT PERIOD OF INCUBATION. FURTHER RESULTS TO FOLLOW AFTER OVERNIGHT INCUBATION. CBC W/O DIFF Collection Time: 11/06/18  6:50 PM  
Result Value Ref Range WBC 24.0 (H) 4.3 - 11.1 K/uL  
 RBC 3.56 (L) 4.23 - 5.6 M/uL  
 HGB 10.2 (L) 13.6 - 17.2 g/dL HCT 31.9 (L) 41.1 - 50.3 % MCV 89.6 79.6 - 97.8 FL  
 MCH 28.7 26.1 - 32.9 PG  
 MCHC 32.0 31.4 - 35.0 g/dL  
 RDW 14.4 % PLATELET 188 936 - 019 K/uL MPV 9.3 (L) 9.4 - 12.3 FL ABSOLUTE NRBC 0.00 0.0 - 0.2 K/uL METABOLIC PANEL, BASIC Collection Time: 11/06/18  6:50 PM  
Result Value Ref Range Sodium 139 136 - 145 mmol/L Potassium 5.5 (H) 3.5 - 5.1 mmol/L Chloride 102 98 - 107 mmol/L  
 CO2 29 21 - 32 mmol/L Anion gap 8 mmol/L Glucose 112 (H) 65 - 100 mg/dL BUN 34 (H) 6 - 23 MG/DL Creatinine 2.22 (H) 0.8 - 1.5 MG/DL  
 GFR est AA 40 (L) >60 ml/min/1.73m2 GFR est non-AA 33 ml/min/1.73m2 Calcium 8.9 8.3 - 10.4 MG/DL MAGNESIUM Collection Time: 11/06/18  6:50 PM  
Result Value Ref Range Magnesium 2.1 1.8 - 2.4 mg/dL HEMOGLOBIN Collection Time: 11/06/18  8:31 PM  
Result Value Ref Range HGB 9.2 (L) 13.6 - 17.2 g/dL CBC W/O DIFF Collection Time: 11/07/18  5:27 AM  
Result Value Ref Range WBC 16.9 (H) 4.3 - 11.1 K/uL  
 RBC 2.94 (L) 4.23 - 5.6 M/uL HGB 8.3 (L) 13.6 - 17.2 g/dL HCT 26.3 (L) 41.1 - 50.3 % MCV 89.5 79.6 - 97.8 FL  
 MCH 28.2 26.1 - 32.9 PG  
 MCHC 31.6 31.4 - 35.0 g/dL  
 RDW 14.4 % PLATELET 687 189 - 997 K/uL MPV 9.5 9.4 - 12.3 FL ABSOLUTE NRBC 0.00 0.0 - 0.2 K/uL METABOLIC PANEL, BASIC Collection Time: 11/07/18  5:27 AM  
Result Value Ref Range Sodium 137 136 - 145 mmol/L Potassium 6.0 (H) 3.5 - 5.1 mmol/L  Chloride 100 98 - 107 mmol/L  
 CO2 28 21 - 32 mmol/L  
 Anion gap 9 mmol/L Glucose 152 (H) 65 - 100 mg/dL BUN 45 (H) 6 - 23 MG/DL Creatinine 4.52 (H) 0.8 - 1.5 MG/DL  
 GFR est AA 18 (L) >60 ml/min/1.73m2 GFR est non-AA 15 ml/min/1.73m2 Calcium 8.2 (L) 8.3 - 10.4 MG/DL MAGNESIUM Collection Time: 11/07/18  5:27 AM  
Result Value Ref Range Magnesium 2.2 1.8 - 2.4 mg/dL CK Collection Time: 11/07/18  5:27 AM  
Result Value Ref Range  (H) 21 - 215 U/L  
PHOSPHORUS Collection Time: 11/07/18  5:27 AM  
Result Value Ref Range Phosphorus 7.4 (H) 2.5 - 4.5 MG/DL  
EKG, 12 LEAD, INITIAL Collection Time: 11/07/18  7:40 AM  
Result Value Ref Range Ventricular Rate 75 BPM  
 Atrial Rate 75 BPM  
 P-R Interval 140 ms QRS Duration 86 ms  
 Q-T Interval 374 ms QTC Calculation (Bezet) 417 ms Calculated P Axis 60 degrees Calculated R Axis 16 degrees Calculated T Axis 31 degrees Diagnosis Normal sinus rhythm RSR' or QR pattern in V1 suggests right ventricular conduction delay Borderline ECG When compared with ECG of 05-NOV-2018 17:07, No significant change was found Confirmed by Micky De Jesus MD (), Neda Hampton ([id number]) on 11/7/2018 0:18:44 PM 
  
METABOLIC PANEL, BASIC Collection Time: 11/07/18  8:35 AM  
Result Value Ref Range Sodium 137 136 - 145 mmol/L Potassium 5.6 (H) 3.5 - 5.1 mmol/L Chloride 100 98 - 107 mmol/L  
 CO2 27 21 - 32 mmol/L Anion gap 10 mmol/L Glucose 243 (H) 65 - 100 mg/dL BUN 46 (H) 6 - 23 MG/DL Creatinine 4.64 (H) 0.8 - 1.5 MG/DL  
 GFR est AA 17 (L) >60 ml/min/1.73m2 GFR est non-AA 14 ml/min/1.73m2 Calcium 7.8 (L) 8.3 - 10.4 MG/DL  
EKG, 12 LEAD, INITIAL Collection Time: 11/07/18  9:52 AM  
Result Value Ref Range Ventricular Rate 74 BPM  
 Atrial Rate 74 BPM  
 P-R Interval 134 ms QRS Duration 100 ms Q-T Interval 386 ms QTC Calculation (Bezet) 428 ms Calculated P Axis 57 degrees Calculated R Axis 19 degrees Calculated T Axis 31 degrees Diagnosis Normal sinus rhythm Normal ECG When compared with ECG of 05-NOV-2018 17:07, No significant change was found Confirmed by EVENS MORILLO (), [facility] ([id number]) on 11/7/2018 10:41:47 AM 
  
COMPLEMENT, C3 Collection Time: 11/07/18  1:35 PM  
Result Value Ref Range Complement C3 135 82 - 167 mg/dL COMPLEMENT, C4 Collection Time: 11/07/18  1:35 PM  
Result Value Ref Range Complement C4 15 14 - 44 mg/dL METABOLIC PANEL, BASIC Collection Time: 11/07/18  1:35 PM  
Result Value Ref Range Sodium 138 136 - 145 mmol/L Potassium 4.8 3.5 - 5.1 mmol/L Chloride 102 98 - 107 mmol/L  
 CO2 27 21 - 32 mmol/L Anion gap 9 7 - 16 mmol/L Glucose 121 (H) 65 - 100 mg/dL BUN 49 (H) 6 - 23 MG/DL Creatinine 5.25 (H) 0.8 - 1.5 MG/DL  
 GFR est AA 15 (L) >60 ml/min/1.73m2 GFR est non-AA 12 (L) >60 ml/min/1.73m2 Calcium 7.5 (L) 8.3 - 10.4 MG/DL  
HGB & HCT Collection Time: 11/07/18  1:35 PM  
Result Value Ref Range HGB 7.5 (L) 13.6 - 17.2 g/dL HCT 22.6 (L) 41.1 - 50.3 % METABOLIC PANEL, BASIC Collection Time: 11/07/18  4:23 PM  
Result Value Ref Range Sodium 137 136 - 145 mmol/L Potassium 5.1 3.5 - 5.1 mmol/L Chloride 101 98 - 107 mmol/L  
 CO2 26 21 - 32 mmol/L Anion gap 10 7 - 16 mmol/L Glucose 141 (H) 65 - 100 mg/dL BUN 50 (H) 6 - 23 MG/DL Creatinine 5.72 (H) 0.8 - 1.5 MG/DL  
 GFR est AA 14 (L) >60 ml/min/1.73m2 GFR est non-AA 11 (L) >60 ml/min/1.73m2 Calcium 8.0 (L) 8.3 - 10.4 MG/DL  
TYPE + CROSSMATCH Collection Time: 11/07/18  4:23 PM  
Result Value Ref Range Crossmatch Expiration 11/10/2018 ABO/Rh(D) B POSITIVE Antibody screen NEG Unit number [id number] Blood component type [facility] Unit division 00 Status of unit TRANSFUSED Crossmatch result Compatible Unit number [id number] Blood component type [facility] Unit division 00 Status of unit TRANSFUSED Crossmatch result Compatible HGB & HCT Collection Time: 11/07/18  4:23 PM  
Result Value Ref Range HGB 7.9 (L) 13.6 - 17.2 g/dL HCT 24.2 (L) 41.1 - 50.3 % URINALYSIS W/ RFLX MICROSCOPIC Collection Time: 11/07/18  5:37 PM  
Result Value Ref Range Color RED Appearance TURBID Specific gravity 1.024 (H) 1.001 - 1.023    
 pH (UA) 5.5 5.0 - 9.0 Protein 100 (A) NEG mg/dL Glucose 100 mg/dL Ketone 15 (A) NEG mg/dL Bilirubin MODERATE (A) NEG Blood LARGE (A) NEG Urobilinogen 1.0 0.2 - 1.0 EU/dL Nitrites POSITIVE (A) NEG Leukocyte Esterase MODERATE (A) NEG    
 WBC  0 /hpf  
 RBC 20-50 0 /hpf Epithelial cells 0-3 0 /hpf Bacteria 4+ (H) 0 /hpf Amorphous Crystals 3+ (H) 0 Other observations RESULTS VERIFIED MANUALLY    
SODIUM, UR, RANDOM Collection Time: 11/07/18  5:37 PM  
Result Value Ref Range Sodium,urine random 46 MMOL/L  
CREATININE, UR, RANDOM Collection Time: 11/07/18  5:37 PM  
Result Value Ref Range Creatinine, urine 131.00 mg/dL PROTEIN/CREATININE RATIO, URINE Collection Time: 11/07/18  5:37 PM  
Result Value Ref Range Protein, urine random 865 (H) <11.9 mg/dL Creatinine, urine 133.00 mg/dL Protein/Creat. urine Ratio 6.5 EOSINOPHILS, URINE Collection Time: 11/07/18  5:37 PM  
Result Value Ref Range Eosinophils,urine NEGATIVE  NEG    
METABOLIC PANEL, BASIC Collection Time: 11/07/18 10:00 PM  
Result Value Ref Range Sodium 136 136 - 145 mmol/L Potassium 5.0 3.5 - 5.1 mmol/L Chloride 100 98 - 107 mmol/L  
 CO2 26 21 - 32 mmol/L Anion gap 10 7 - 16 mmol/L Glucose 138 (H) 65 - 100 mg/dL BUN 55 (H) 6 - 23 MG/DL Creatinine 6.43 (H) 0.8 - 1.5 MG/DL  
 GFR est AA 12 (L) >60 ml/min/1.73m2 GFR est non-AA 10 (L) >60 ml/min/1.73m2 Calcium 8.0 (L) 8.3 - 10.4 MG/DL  
HGB & HCT Collection Time: 11/07/18 10:00 PM  
Result Value Ref Range HGB 8.4 (L) 13.6 - 17.2 g/dL HCT 25.1 (L) 41.1 - 50.3 % CBC W/O DIFF  
 Collection Time: 11/08/18  3:53 AM  
Result Value Ref Range WBC 15.5 (H) 4.3 - 11.1 K/uL  
 RBC 3.07 (L) 4.23 - 5.6 M/uL HGB 8.7 (L) 13.6 - 17.2 g/dL HCT 26.2 (L) 41.1 - 50.3 % MCV 85.3 79.6 - 97.8 FL  
 MCH 28.3 26.1 - 32.9 PG  
 MCHC 33.2 31.4 - 35.0 g/dL  
 RDW 15.0 % PLATELET 081 422 - 502 K/uL MPV 9.6 9.4 - 12.3 FL ABSOLUTE NRBC 0.00 0.0 - 0.2 K/uL METABOLIC PANEL, BASIC Collection Time: 11/08/18  3:53 AM  
Result Value Ref Range Sodium 135 (L) 136 - 145 mmol/L Potassium 5.2 (H) 3.5 - 5.1 mmol/L Chloride 101 98 - 107 mmol/L  
 CO2 24 21 - 32 mmol/L Anion gap 10 7 - 16 mmol/L Glucose 124 (H) 65 - 100 mg/dL BUN 57 (H) 6 - 23 MG/DL Creatinine 7.08 (H) 0.8 - 1.5 MG/DL  
 GFR est AA 11 (L) >60 ml/min/1.73m2 GFR est non-AA 9 (L) >60 ml/min/1.73m2 Calcium 7.8 (L) 8.3 - 10.4 MG/DL MAGNESIUM Collection Time: 11/08/18  3:53 AM  
Result Value Ref Range Magnesium 2.3 1.8 - 2.4 mg/dL PHOSPHORUS Collection Time: 11/08/18  3:53 AM  
Result Value Ref Range Phosphorus 7.5 (H) 2.5 - 4.5 MG/DL  
HGB & HCT Collection Time: 11/08/18  8:06 AM  
Result Value Ref Range HGB 9.6 (L) 13.6 - 17.2 g/dL HCT 29.2 (L) 41.1 - 50.3 % Assessment Patient Active Problem List  
 Diagnosis Date Noted  Sepsis (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 Renal function worse. Adjust Ancef but consider changing to a non beta lactam. Serologies some pending. Complement normal. Urine eos pending. Discussed dialysis with patient and spouse. Consent obtained. Will arrange for catheter placement by IR and start HD today or tomorrow depending on logistics.

## 2018-11-08 NOTE — PROGRESS NOTES
Dr. King Pretty notified of hgb 8.4 after one unit of blood. Dr. King Pretty to look through pt's chart. Ordered 1u PRBC.

## 2018-11-08 NOTE — PROGRESS NOTES
Interdisciplinary team rounds were held 11/8/2018 with the following team members:Care Management, Nursing, Nurse Practitioner, Pastoral Care, Pharmacy, Physical Therapy, Physician, Respiratory Therapy and Clinical Coordinator and the patient. Plan of care discussed. See clinical pathway and/or care plan for interventions and desired outcomes.

## 2018-11-08 NOTE — PROGRESS NOTES
LEAPFROG PROTOCOL NOTE Evelio Back 
11/8/2018 The patient is currently in the critical care setting managed by hospitalist. 
The patient's chart is reviewed and the patient is discussed with the staff. Patient is currently hemodynamically stable. Patient has no needs identified for Intensivist management in the critical care setting at this time. Please notify us if can be of assistance. No charge billed to the patient. Thank you.  
 
Hayley Canela MD

## 2018-11-09 ENCOUNTER — APPOINTMENT (OUTPATIENT)
Dept: GENERAL RADIOLOGY | Age: 53
DRG: 559 | End: 2018-11-09
Attending: HOSPITALIST
Payer: COMMERCIAL

## 2018-11-09 LAB
ANION GAP SERPL CALC-SCNC: 12 MMOL/L (ref 7–16)
BACTERIA SPEC CULT: ABNORMAL
BUN SERPL-MCNC: 70 MG/DL (ref 6–23)
CALCIUM SERPL-MCNC: 8.6 MG/DL (ref 8.3–10.4)
CHLORIDE SERPL-SCNC: 100 MMOL/L (ref 98–107)
CO2 SERPL-SCNC: 22 MMOL/L (ref 21–32)
CREAT SERPL-MCNC: 10.9 MG/DL (ref 0.8–1.5)
ERYTHROCYTE [DISTWIDTH] IN BLOOD BY AUTOMATED COUNT: 15.6 %
GLUCOSE SERPL-MCNC: 95 MG/DL (ref 65–100)
GRAM STN SPEC: ABNORMAL
GRAM STN SPEC: ABNORMAL
HBV SURFACE AG SERPL QL IA: NEGATIVE
HCT VFR BLD AUTO: 25.9 % (ref 41.1–50.3)
HGB BLD-MCNC: 8.7 G/DL (ref 13.6–17.2)
LACTATE SERPL-SCNC: 0.4 MMOL/L (ref 0.4–2)
LIPASE SERPL-CCNC: 238 U/L (ref 73–393)
MAGNESIUM SERPL-MCNC: 2.4 MG/DL (ref 1.8–2.4)
MCH RBC QN AUTO: 29 PG (ref 26.1–32.9)
MCHC RBC AUTO-ENTMCNC: 33.6 G/DL (ref 31.4–35)
MCV RBC AUTO: 86.3 FL (ref 79.6–97.8)
NRBC # BLD: 0 K/UL (ref 0–0.2)
PHOSPHATE SERPL-MCNC: 7.1 MG/DL (ref 2.5–4.5)
PLATELET # BLD AUTO: 337 K/UL (ref 150–450)
PMV BLD AUTO: 9.6 FL (ref 9.4–12.3)
POTASSIUM SERPL-SCNC: 5.2 MMOL/L (ref 3.5–5.1)
RBC # BLD AUTO: 3 M/UL (ref 4.23–5.6)
SERVICE CMNT-IMP: ABNORMAL
SODIUM SERPL-SCNC: 134 MMOL/L (ref 136–145)
WBC # BLD AUTO: 13.4 K/UL (ref 4.3–11.1)

## 2018-11-09 PROCEDURE — 85027 COMPLETE CBC AUTOMATED: CPT

## 2018-11-09 PROCEDURE — 97530 THERAPEUTIC ACTIVITIES: CPT

## 2018-11-09 PROCEDURE — 74018 RADEX ABDOMEN 1 VIEW: CPT

## 2018-11-09 PROCEDURE — 5A1D70Z PERFORMANCE OF URINARY FILTRATION, INTERMITTENT, LESS THAN 6 HOURS PER DAY: ICD-10-PCS | Performed by: INTERNAL MEDICINE

## 2018-11-09 PROCEDURE — 74011250637 HC RX REV CODE- 250/637: Performed by: HOSPITALIST

## 2018-11-09 PROCEDURE — 83605 ASSAY OF LACTIC ACID: CPT

## 2018-11-09 PROCEDURE — 74011250637 HC RX REV CODE- 250/637: Performed by: INTERNAL MEDICINE

## 2018-11-09 PROCEDURE — 84100 ASSAY OF PHOSPHORUS: CPT

## 2018-11-09 PROCEDURE — 74011000250 HC RX REV CODE- 250: Performed by: INTERNAL MEDICINE

## 2018-11-09 PROCEDURE — 83690 ASSAY OF LIPASE: CPT

## 2018-11-09 PROCEDURE — 80048 BASIC METABOLIC PNL TOTAL CA: CPT

## 2018-11-09 PROCEDURE — 83735 ASSAY OF MAGNESIUM: CPT

## 2018-11-09 PROCEDURE — 74011250636 HC RX REV CODE- 250/636: Performed by: INTERNAL MEDICINE

## 2018-11-09 PROCEDURE — 65660000000 HC RM CCU STEPDOWN

## 2018-11-09 PROCEDURE — 90935 HEMODIALYSIS ONE EVALUATION: CPT

## 2018-11-09 PROCEDURE — 71045 X-RAY EXAM CHEST 1 VIEW: CPT

## 2018-11-09 RX ORDER — SIMETHICONE 80 MG
80 TABLET,CHEWABLE ORAL
Status: DISCONTINUED | OUTPATIENT
Start: 2018-11-09 | End: 2018-11-20 | Stop reason: HOSPADM

## 2018-11-09 RX ADMIN — FAMOTIDINE 20 MG: 20 TABLET ORAL at 10:26

## 2018-11-09 RX ADMIN — ZOLPIDEM TARTRATE 5 MG: 5 TABLET ORAL at 21:52

## 2018-11-09 RX ADMIN — SENNOSIDES AND DOCUSATE SODIUM 2 TABLET: 8.6; 5 TABLET ORAL at 10:26

## 2018-11-09 RX ADMIN — ACETAMINOPHEN 1000 MG: 500 TABLET, FILM COATED ORAL at 14:20

## 2018-11-09 RX ADMIN — Medication 1 AMPULE: at 10:26

## 2018-11-09 RX ADMIN — HYDROMORPHONE HYDROCHLORIDE 1 MG: 1 INJECTION, SOLUTION INTRAMUSCULAR; INTRAVENOUS; SUBCUTANEOUS at 20:42

## 2018-11-09 RX ADMIN — ACETAMINOPHEN 1000 MG: 500 TABLET, FILM COATED ORAL at 03:11

## 2018-11-09 RX ADMIN — OXYCODONE HYDROCHLORIDE 10 MG: 5 TABLET ORAL at 14:21

## 2018-11-09 RX ADMIN — OXYCODONE HYDROCHLORIDE 10 MG: 5 TABLET ORAL at 00:33

## 2018-11-09 RX ADMIN — SIMETHICONE CHEW TAB 80 MG 80 MG: 80 TABLET ORAL at 14:20

## 2018-11-09 RX ADMIN — SIMETHICONE CHEW TAB 80 MG 80 MG: 80 TABLET ORAL at 20:42

## 2018-11-09 RX ADMIN — SIMETHICONE CHEW TAB 80 MG 80 MG: 80 TABLET ORAL at 18:01

## 2018-11-09 RX ADMIN — HYDROMORPHONE HYDROCHLORIDE 1 MG: 1 INJECTION, SOLUTION INTRAMUSCULAR; INTRAVENOUS; SUBCUTANEOUS at 10:27

## 2018-11-09 RX ADMIN — Medication 10 ML: at 14:21

## 2018-11-09 RX ADMIN — POLYETHYLENE GLYCOL 3350 17 G: 17 POWDER, FOR SOLUTION ORAL at 18:01

## 2018-11-09 RX ADMIN — HYDROMORPHONE HYDROCHLORIDE 1 MG: 1 INJECTION, SOLUTION INTRAMUSCULAR; INTRAVENOUS; SUBCUTANEOUS at 02:02

## 2018-11-09 RX ADMIN — Medication 5 ML: at 05:52

## 2018-11-09 RX ADMIN — HEPARIN SODIUM 5000 UNITS: 5000 INJECTION INTRAVENOUS; SUBCUTANEOUS at 21:53

## 2018-11-09 RX ADMIN — HYDROMORPHONE HYDROCHLORIDE 1 MG: 1 INJECTION, SOLUTION INTRAMUSCULAR; INTRAVENOUS; SUBCUTANEOUS at 05:50

## 2018-11-09 RX ADMIN — ACETAMINOPHEN 1000 MG: 500 TABLET, FILM COATED ORAL at 20:42

## 2018-11-09 RX ADMIN — Medication 10 ML: at 21:52

## 2018-11-09 RX ADMIN — Medication 1 AMPULE: at 20:45

## 2018-11-09 RX ADMIN — HEPARIN SODIUM 5000 UNITS: 5000 INJECTION INTRAVENOUS; SUBCUTANEOUS at 10:27

## 2018-11-09 NOTE — PROGRESS NOTES
11/08/18 1149 Dual Skin Pressure Injury Assessment Dual Skin Pressure Injury Assessment X Second Care Provider (Based on Facility Policy) Moises Sanchez RN Skin Integumentary Skin Integumentary (WDL) X Skin Condition/Temp Warm;Dry Skin Color Appropriate for ethnicity Skin Integrity Scars (comment); Tattoos (comment); Incision (comment) (Upper chest, incision on RUE and RLE) Turgor Non-tenting

## 2018-11-09 NOTE — DIALYSIS
TRANSFER IN - DIALYSIS Received patient in dialysis unit  from [facility] (unit) for ordered procedure. Consent verified for renal replacement therapy. Patient alert and oriented and vital signs stable. /71 P83 Hemodialysis initiated using right temporary cath. Aspirated and flushed both ports without difficulty. Dressing clean, dry and intact. Machine settings per MD order. Will monitor during treatment.

## 2018-11-09 NOTE — PROGRESS NOTES
Subjective:  
Daily Progress Note: 11/9/2018 10:38 AM 
 
No complaints. F/U RANI. Chart reviewed. Patient seen on dialysis. First treatment today. Right IJ temp cath. 0.5 to 1 kg UF. Tolerating well. /84, HR 87. C/o abdominal pain earlier. None now. Denies sob, cp, n/v. Comfortable No CP No SOB 
c/o Abd pain Current Facility-Administered Medications Medication Dose Route Frequency  polyethylene glycol (MIRALAX) packet 17 g  17 g Oral DAILY PRN  
 heparin (porcine) injection 5,000 Units  5,000 Units SubCUTAneous Q12H  
 0.9% sodium chloride infusion  75 mL/hr IntraVENous CONTINUOUS  
 epoetin zee (EPOGEN;PROCRIT) injection 20,000 Units  20,000 Units SubCUTAneous Q7D  
 tuberculin injection 5 Units  5 Units IntraDERMal ONCE  
 famotidine (PEPCID) tablet 20 mg  20 mg Oral DAILY  heparin (PF) 2 units/ml in NS infusion 2,000 Units  1,000 mL Irrigation CONTINUOUS  Vancomycin Pharmacy Intermittent Dosing Placeholder   Other Rx Dosing/Monitoring  acetaminophen (TYLENOL) tablet 1,000 mg  1,000 mg Oral Q6H  
 diphenhydrAMINE (BENADRYL) capsule 25 mg  25 mg Oral Q4H PRN  
 HYDROmorphone (PF) (DILAUDID) injection 1 mg  1 mg IntraVENous Q3H PRN  
 naloxone (NARCAN) injection 0.2-0.4 mg  0.2-0.4 mg IntraVENous Q10MIN PRN  
 oxyCODONE IR (ROXICODONE) tablet 10 mg  10 mg Oral Q3H PRN  
 senna-docusate (PERICOLACE) 8.6-50 mg per tablet 2 Tab  2 Tab Oral DAILY  sodium chloride (NS) flush 5-10 mL  5-10 mL IntraVENous Q8H  
 sodium chloride (NS) flush 5-10 mL  5-10 mL IntraVENous PRN  
 zolpidem (AMBIEN) tablet 5 mg  5 mg Oral QHS PRN  
 alcohol 62% (NOZIN) nasal  1 Ampule  1 Ampule Topical Q12H  
 ondansetron (ZOFRAN) injection 4 mg  4 mg IntraVENous Q4H PRN  
 NUTRITIONAL SUPPORT ELECTROLYTE PRN ORDERS   Does Not Apply PRN  
 0.9% sodium chloride infusion 250 mL  250 mL IntraVENous PRN Objective:  
 
Visit Vitals /80 Pulse 78 Temp 99.2 °F (37.3 °C) Resp 18 Wt 98.9 kg (218 lb) SpO2 91% BMI 34.14 kg/m² O2 Flow Rate (L/min): 3 l/min O2 Device: Room air Temp (24hrs), Av.5 °F (36.9 °C), Min:97.7 °F (36.5 °C), Max:99.2 °F (37.3 °C) No intake/output data recorded.  1901 -  0700 In: 753.3 [P.O.:120] Out: 45 [Urine:45] Visit Vitals /80 Pulse 78 Temp 99.2 °F (37.3 °C) Resp 18 Wt 98.9 kg (218 lb) SpO2 91% BMI 34.14 kg/m² Head: Normocephalic, without obvious abnormality Neck: no JVD Lungs: clear to auscultation bilaterally Heart: regular rate and rhythm Abdomen: soft, non-tender. Extremities: no edema Data Review Recent Results (from the past 48 hour(s)) COMPLEMENT, C3 Collection Time: 18  1:35 PM  
Result Value Ref Range Complement C3 135 82 - 167 mg/dL COMPLEMENT, C4 Collection Time: 18  1:35 PM  
Result Value Ref Range Complement C4 15 14 - 44 mg/dL METABOLIC PANEL, BASIC Collection Time: 18  1:35 PM  
Result Value Ref Range Sodium 138 136 - 145 mmol/L Potassium 4.8 3.5 - 5.1 mmol/L Chloride 102 98 - 107 mmol/L  
 CO2 27 21 - 32 mmol/L Anion gap 9 7 - 16 mmol/L Glucose 121 (H) 65 - 100 mg/dL BUN 49 (H) 6 - 23 MG/DL Creatinine 5.25 (H) 0.8 - 1.5 MG/DL  
 GFR est AA 15 (L) >60 ml/min/1.73m2 GFR est non-AA 12 (L) >60 ml/min/1.73m2 Calcium 7.5 (L) 8.3 - 10.4 MG/DL  
HGB & HCT Collection Time: 18  1:35 PM  
Result Value Ref Range HGB 7.5 (L) 13.6 - 17.2 g/dL HCT 22.6 (L) 41.1 - 50.3 % METABOLIC PANEL, BASIC Collection Time: 18  4:23 PM  
Result Value Ref Range Sodium 137 136 - 145 mmol/L Potassium 5.1 3.5 - 5.1 mmol/L Chloride 101 98 - 107 mmol/L  
 CO2 26 21 - 32 mmol/L Anion gap 10 7 - 16 mmol/L Glucose 141 (H) 65 - 100 mg/dL BUN 50 (H) 6 - 23 MG/DL Creatinine 5.72 (H) 0.8 - 1.5 MG/DL  
 GFR est AA 14 (L) >60 ml/min/1.73m2 GFR est non-AA 11 (L) >60 ml/min/1.73m2 Calcium 8.0 (L) 8.3 - 10.4 MG/DL  
TYPE + CROSSMATCH Collection Time: 11/07/18  4:23 PM  
Result Value Ref Range Crossmatch Expiration 11/10/2018 ABO/Rh(D) B POSITIVE Antibody screen NEG Unit number [id number] Blood component type  LR Unit division 00 Status of unit TRANSFUSED Crossmatch result Compatible Unit number [id number] Blood component type  LR Unit division 00 Status of unit TRANSFUSED Crossmatch result Compatible HGB & HCT Collection Time: 11/07/18  4:23 PM  
Result Value Ref Range HGB 7.9 (L) 13.6 - 17.2 g/dL HCT 24.2 (L) 41.1 - 50.3 % URINALYSIS W/ RFLX MICROSCOPIC Collection Time: 11/07/18  5:37 PM  
Result Value Ref Range Color RED Appearance TURBID Specific gravity 1.024 (H) 1.001 - 1.023    
 pH (UA) 5.5 5.0 - 9.0 Protein 100 (A) NEG mg/dL Glucose 100 mg/dL Ketone 15 (A) NEG mg/dL Bilirubin MODERATE (A) NEG Blood LARGE (A) NEG Urobilinogen 1.0 0.2 - 1.0 EU/dL Nitrites POSITIVE (A) NEG Leukocyte Esterase MODERATE (A) NEG    
 WBC  0 /hpf  
 RBC 20-50 0 /hpf Epithelial cells 0-3 0 /hpf Bacteria 4+ (H) 0 /hpf Amorphous Crystals 3+ (H) 0 Other observations RESULTS VERIFIED MANUALLY    
SODIUM, UR, RANDOM Collection Time: 11/07/18  5:37 PM  
Result Value Ref Range Sodium,urine random 46 MMOL/L  
CREATININE, UR, RANDOM Collection Time: 11/07/18  5:37 PM  
Result Value Ref Range Creatinine, urine 131.00 mg/dL PROTEIN/CREATININE RATIO, URINE Collection Time: 11/07/18  5:37 PM  
Result Value Ref Range Protein, urine random 865 (H) <11.9 mg/dL Creatinine, urine 133.00 mg/dL Protein/Creat. urine Ratio 6.5 EOSINOPHILS, URINE Collection Time: 11/07/18  5:37 PM  
Result Value Ref Range Eosinophils,urine NEGATIVE  NEG    
METABOLIC PANEL, BASIC Collection Time: 11/07/18 10:00 PM  
Result Value Ref Range Sodium 136 136 - 145 mmol/L Potassium 5.0 3.5 - 5.1 mmol/L Chloride 100 98 - 107 mmol/L  
 CO2 26 21 - 32 mmol/L Anion gap 10 7 - 16 mmol/L Glucose 138 (H) 65 - 100 mg/dL BUN 55 (H) 6 - 23 MG/DL Creatinine 6.43 (H) 0.8 - 1.5 MG/DL  
 GFR est AA 12 (L) >60 ml/min/1.73m2 GFR est non-AA 10 (L) >60 ml/min/1.73m2 Calcium 8.0 (L) 8.3 - 10.4 MG/DL  
HGB & HCT Collection Time: 11/07/18 10:00 PM  
Result Value Ref Range HGB 8.4 (L) 13.6 - 17.2 g/dL HCT 25.1 (L) 41.1 - 50.3 % CBC W/O DIFF Collection Time: 11/08/18  3:53 AM  
Result Value Ref Range WBC 15.5 (H) 4.3 - 11.1 K/uL  
 RBC 3.07 (L) 4.23 - 5.6 M/uL HGB 8.7 (L) 13.6 - 17.2 g/dL HCT 26.2 (L) 41.1 - 50.3 % MCV 85.3 79.6 - 97.8 FL  
 MCH 28.3 26.1 - 32.9 PG  
 MCHC 33.2 31.4 - 35.0 g/dL  
 RDW 15.0 % PLATELET 045 851 - 335 K/uL MPV 9.6 9.4 - 12.3 FL ABSOLUTE NRBC 0.00 0.0 - 0.2 K/uL METABOLIC PANEL, BASIC Collection Time: 11/08/18  3:53 AM  
Result Value Ref Range Sodium 135 (L) 136 - 145 mmol/L Potassium 5.2 (H) 3.5 - 5.1 mmol/L Chloride 101 98 - 107 mmol/L  
 CO2 24 21 - 32 mmol/L Anion gap 10 7 - 16 mmol/L Glucose 124 (H) 65 - 100 mg/dL BUN 57 (H) 6 - 23 MG/DL Creatinine 7.08 (H) 0.8 - 1.5 MG/DL  
 GFR est AA 11 (L) >60 ml/min/1.73m2 GFR est non-AA 9 (L) >60 ml/min/1.73m2 Calcium 7.8 (L) 8.3 - 10.4 MG/DL MAGNESIUM Collection Time: 11/08/18  3:53 AM  
Result Value Ref Range Magnesium 2.3 1.8 - 2.4 mg/dL PHOSPHORUS Collection Time: 11/08/18  3:53 AM  
Result Value Ref Range Phosphorus 7.5 (H) 2.5 - 4.5 MG/DL  
HGB & HCT Collection Time: 11/08/18  8:06 AM  
Result Value Ref Range HGB 9.6 (L) 13.6 - 17.2 g/dL HCT 29.2 (L) 41.1 - 50.3 % PLEASE READ & DOCUMENT PPD TEST IN 48 HRS Collection Time: 11/08/18  8:32 AM  
Result Value Ref Range PPD negative Negative  
 mm Induration 0 mm FERRITIN  Collection Time: 11/08/18 11:05 AM  
 Result Value Ref Range Ferritin 1,259 (H) 8 - 388 NG/ML  
TRANSFERRIN SATURATION Collection Time: 11/08/18 11:05 AM  
Result Value Ref Range Iron 32 (L) 35 - 150 ug/dL TIBC 150 (L) 250 - 450 ug/dL Transferrin Saturation 21 >20 % HEPATITIS B SURFACE AG W/REFLEX Collection Time: 11/08/18 11:05 AM  
Result Value Ref Range Hep B surface Ag screen NEGATIVE  NEGATIVE    
HGB & HCT Collection Time: 11/08/18  9:56 PM  
Result Value Ref Range HGB 8.8 (L) 13.6 - 17.2 g/dL HCT 26.7 (L) 41.1 - 50.3 % CBC W/O DIFF Collection Time: 11/09/18  8:02 AM  
Result Value Ref Range WBC 13.4 (H) 4.3 - 11.1 K/uL  
 RBC 3.00 (L) 4.23 - 5.6 M/uL HGB 8.7 (L) 13.6 - 17.2 g/dL HCT 25.9 (L) 41.1 - 50.3 % MCV 86.3 79.6 - 97.8 FL  
 MCH 29.0 26.1 - 32.9 PG  
 MCHC 33.6 31.4 - 35.0 g/dL  
 RDW 15.6 % PLATELET 333 477 - 021 K/uL MPV 9.6 9.4 - 12.3 FL ABSOLUTE NRBC 0.00 0.0 - 0.2 K/uL MAGNESIUM Collection Time: 11/09/18  8:02 AM  
Result Value Ref Range Magnesium 2.4 1.8 - 2.4 mg/dL METABOLIC PANEL, BASIC Collection Time: 11/09/18  8:02 AM  
Result Value Ref Range Sodium 134 (L) 136 - 145 mmol/L Potassium 5.2 (H) 3.5 - 5.1 mmol/L Chloride 100 98 - 107 mmol/L  
 CO2 22 21 - 32 mmol/L Anion gap 12 7 - 16 mmol/L Glucose 95 65 - 100 mg/dL BUN 70 (H) 6 - 23 MG/DL Creatinine 10.90 (H) 0.8 - 1.5 MG/DL  
 GFR est AA 6 (L) >60 ml/min/1.73m2 GFR est non-AA 5 (L) >60 ml/min/1.73m2 Calcium 8.6 8.3 - 10.4 MG/DL  
PHOSPHORUS Collection Time: 11/09/18  8:02 AM  
Result Value Ref Range Phosphorus 7.1 (H) 2.5 - 4.5 MG/DL  
LACTIC ACID Collection Time: 11/09/18  8:02 AM  
Result Value Ref Range Lactic acid 0.4 0.4 - 2.0 MMOL/L  
LIPASE Collection Time: 11/09/18  8:02 AM  
Result Value Ref Range Lipase 238 73 - 393 U/L Assessment Patient Active Problem List  
 Diagnosis Date Noted  [facility] 11/07/2018  Postoperative anemia due to acute blood loss 11/07/2018  Staphylococcal arthritis of right shoulder ([address] 11/06/2018  Acute renal failure with tubular necrosis ([facility] 11/06/2018  Infected prosthetic hip ([facility] 11/05/2018  Septic arthritis of shoulder, right ([facility] 11/05/2018  Degenerative joint disease of right acromioclavicular joint 11/05/2018  Osteoarthritis of right glenohumeral joint 11/05/2018  Complete tear of right rotator cuff 11/05/2018  S/P total hip arthroplasty 01/06/2017  Arthritis of left hip 01/06/2017  Acute pain of right shoulder 06/10/2016  Hip pain 06/30/2015 Assessment and Plan: RANI - ATN vs AIN. Had been on celebrex 
- remains oliguric with worsening renal function-->s/p temp cath and started HD today. First treatment. Aniket well 
- urine eos negative - complements normal 
- urine prot/creat 6.5 
- serologies pending Hyperkalemia 
- should improve with HD. Low K bath with HD 
 
MSSA bacteremia/septic shoulder and hip 
- ID following 
- cefazolin and rifampin changed to vanc per ID for possible AIN 
- pharmacy to dose vanc with close monitoring of levels Anemia 
- s/p 2 units prbc

## 2018-11-09 NOTE — PROGRESS NOTES
Problem: Mobility Impaired (Adult and Pediatric) Goal: *Acute Goals and Plan of Care (Insert Text) STG: 
(1.)Mr. Aviva Bustillos will move from supine to sit and sit to supine , scoot up and down and roll side to side with CONTACT GUARD ASSIST within 3 treatment day(s). (2.)Mr. Aviva Bustillos will transfer from bed to chair and chair to bed with STAND BY ASSIST using the least restrictive device within 3 treatment day(s). (3.)Mr. Aviva Bustillos will ambulate with STAND BY ASSIST for 150 feet with the least restrictive device within 3 treatment day(s). (4.)Mr. Aviva Bustillos will tolerate AAROM, AROM and gentle pendulums of RUE with no increase in pain within 3 treatment days to improve independence with transfers. (5.)Mr. Aviva Bustillos will perform standing static and dynamic balance activities x 15 minutes with STAND BY ASSIST to improve safety within 3 day(s). LTG: 
(1.)Mr. Aviva Bustillos will move from supine to sit and sit to supine , scoot up and down and roll side to side in bed with MODIFIED INDEPENDENCE within 7 treatment day(s). (2.)Mr. Aviva Bustillos will transfer from bed to chair and chair to bed with MODIFIED INDEPENDENCE using the least restrictive device within 7 treatment day(s). (3.)Mr. Aviva Bustillos will ambulate with MODIFIED INDEPENDENCE for 500 feet with the least restrictive device within 7 treatment day(s). (4.)Mr. Aviva Bustillos will perform standing static and dynamic balance activities x 15 minutes with MODIFIED INDEPENDENCE to improve safety within 7 day(s). (5.)Mr. Aviva Bustillos will ascend and descend 2 stairs using L hand rail(s) with SUPERVISION to improve functional mobility and safety within 7 day(s). ________________________________________________________________________________________________ PHYSICAL THERAPY: Daily Note, Treatment Day: 2nd, PM 11/9/2018INPATIENT: Hospital Day: 3 Payor: Zuleika Lewis / Plan: [employer]ON [employer] OAP / Product Type: Commerical /  
 LLE total hip precautions, WBAT 
 RUE WBAT : Active and passive range of motion RIGHT elbow hand ACTIVE AND ACTIVE ASSISTED MOTION RIGHT SHOULDER GENTLE pendulums 
sling RIGHT shoulder NAME/AGE/GENDER: Woody Parrish is a 48 y.o. male PRIMARY DIAGNOSIS: infected left hip  
ARF (acute renal failure) (HCC) Staphylococcal arthritis of right shoulder (HCC) Staphylococcal arthritis of right shoulder ([facility] ICD-10: Treatment Diagnosis: · Difficulty in walking, Not elsewhere classified (R26.2) Precaution/Allergies: 
Patient has no known allergies. ASSESSMENT:  
Mr. Maxwell Wong is a 48 y.o. male admitted with infected L hip and staphylococcal R shoulder. Patient is standing in room with RW with friend brushing teeth upon contact and agreeable to therapy. Patient ambulated 250' with RW and CGA needing extra time due to upset stomach and slow gait. Patient returned to recliner in room and visitors in room. Woody Parrsih is currently functioning below his baseline and would benefit from skilled PT during acute care stay to maximize safety and independence with functional mobility. This section established at most recent assessment PROBLEM LIST (Impairments causing functional limitations): 1. Decreased Strength 2. Decreased ADL/Functional Activities 3. Decreased Transfer Abilities 4. Decreased Ambulation Ability/Technique 5. Decreased Balance 6. Increased Pain 7. Decreased Activity Tolerance 8. Decreased Pacing Skills 9. Decreased Knowledge of Precautions 10. Decreased [place] with Home Exercise Program 
 INTERVENTIONS PLANNED: (Benefits and precautions of physical therapy have been discussed with the patient.) 1. Balance Exercise 2. Bed Mobility 3. Gait Training 4. Group Therapy 5. Home Exercise Program (HEP) 6. Therapeutic Activites 7. Therapeutic Exercise/Strengthening 8. Transfer Training 9. Patient Education TREATMENT PLAN: Frequency/Duration: twice daily for duration of hospital stay Rehabilitation Potential For Stated Goals: Excellent RECOMMENDED REHABILITATION/EQUIPMENT: (at time of discharge pending progress): Due to the probability of continued deficits (see above) this patient will likely need continued skilled physical therapy after discharge. Equipment:  
? None at this time HISTORY:  
History of Present Injury/Illness (Reason for Referral): 
Pt is a 47 y/o male who is almost 2 years s/p L JEIMY who was seen in clinic on 10/29 c/o 5 day history of sudden onset of moderate left hip pain. Denied any known injury. Left hip joint aspirate was obtained and sent for culture which grew pansensitive staph. Aureus. Patient was also noted to have elevated WBC (27.1) as well as elevated ESR (90). Patient reports having flu like symptoms 2 weeks ago and began developing right shoulder pain around that same time. Patient was subsequently evaluated by Dr. Trey Shannon and received right shoulder cortisone injection. He reports that he is still experiencing moderate to severe right shoulder pain. Denies fever, chills, nausea, vomiting, etc..  No other new complaints. Past Medical History/Comorbidities:  
Mr. Gisell Fowler  has a past medical history of Arthritis, Biceps muscle tear, Chronic pain, Insomnia, MRSA (methicillin resistant Staphylococcus aureus) infection, Personal history of kidney stones, and Right shoulder pain. Mr. Gisell Fowler  has a past surgical history that includes hx other surgical; hx rotator cuff repair (Left, 2010); hx orthopaedic (Left, 01/2017); hx septoplasty (11/15/2017); HIP ARTHROPLASTY TOTAL REVISION (Left, 11/6/2018); SHOULDER I&D (Right, 11/6/2018); RIGHT SHOULDER ARTHROSCOPY SUBACROMIAL DECOMPRESSION ROTATOR CUFF REPAIR/ DISTAL CLAVICLE RESECTION (Right, 6/13/2017); LEFT TOTAL HIP ARTHROPLASTY (Left, 1/6/2017); and SHOULDER ARTHROSCOPY ACROMIOPLASTY ROTATOR CUFF REPAIR (Left, 10/21/2010). Social History/Living Environment: Home Environment: Private residence # Steps to Enter: 2 Rails to Enter: Yes Hand Rails : Left One/Two Story Residence: One story Living Alone: No 
Support Systems: Spouse/Significant Other/Partner Patient Expects to be Discharged to[de-identified] Private residence Current DME Used/Available at Home: Walker, rolling, Raised toilet seat, Grab bars, Adaptive bathing aides, Adaptive dressing aides Tub or Shower Type: Tub/Shower combination Prior Level of Function/Work/Activity: 
 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  
Most Recent Physical Functioning:  
Gross Assessment: 
  
         
  
Posture: 
  
Balance: 
Sitting: Intact Standing: Impaired Standing - Static: Good Standing - Dynamic : Fair Bed Mobility: 
  
Wheelchair Mobility: 
  
Transfers: 
Sit to Stand: Stand-by assistance Stand to Sit: Stand-by assistance Level of Assistance: Stand-by assistance Interventions: Safety awareness training;Verbal cues Gait: 
  
Base of Support: Center of gravity altered; Widened Speed/Emma: Slow;Shuffled Step Length: Right shortened;Left shortened Gait Abnormalities: Decreased step clearance;Trunk sway increased; Step to gait Distance (ft): 250 Feet (ft) Assistive Device: Gait belt;Walker, rolling Ambulation - Level of Assistance: Contact guard assistance Interventions: Safety awareness training;Verbal cues Body Structures Involved: 1. Nerves 2. Metabolic 3. Endocrine 4. Bones 5. Joints 6. Muscles Body Functions Affected: 1. Sensory/Pain 2. Neuromusculoskeletal 
3. Movement Related 4. Metobolic/Endocrine Activities and Participation Affected: 1. Mobility 2. Self Care 3. Domestic Life 4. Interpersonal Interactions and Relationships 5. Community, Social and [place] Number of elements that affect the Plan of Care: 4+: HIGH COMPLEXITY CLINICAL PRESENTATION:  
Presentation: Stable and uncomplicated: LOW COMPLEXITY CLINICAL DECISION MAKING:  
[facility] AM-PAC 6 Clicks Basic Mobility Inpatient Short Form How much difficulty does the patient currently have. .. Unable A Lot A Little None 1. Turning over in bed (including adjusting bedclothes, sheets and blankets)? [] 1   [] 2   [x] 3   [] 4  
2. Sitting down on and standing up from a chair with arms ( e.g., wheelchair, bedside commode, etc.)   [] 1   [] 2   [x] 3   [] 4  
3. Moving from lying on back to sitting on the side of the bed? [] 1   [x] 2   [] 3   [] 4 How much help from another person does the patient currently need. .. Total A Lot A Little None 4. Moving to and from a bed to a chair (including a wheelchair)? [] 1   [] 2   [x] 3   [] 4  
5. Need to walk in hospital room? [] 1   [x] 2   [] 3   [] 4  
6. Climbing 3-5 steps with a railing? [] 1   [x] 2   [] 3   [] 4  
© 2007, Trustees of [facility], under license to [employer]. All rights reserved Score:  Initial: 15 Most Recent: X (Date: -- ) Interpretation of Tool:  Represents activities that are increasingly more difficult (i.e. Bed mobility, Transfers, Gait). Score 24 23 22-20 19-15 14-10 9-7 6 Modifier CH CI CJ CK CL CM CN   
 
? Mobility - Walking and Moving Around:  
  - CURRENT STATUS: CK - 40%-59% impaired, limited or restricted  - GOAL STATUS: CJ - 20%-39% impaired, limited or restricted  - D/C STATUS:  ---------------To be determined--------------- Payor: Wilhemena Leyden / Plan: [place] [employer] OAP / Product Type: Commerical /   
 
Medical Necessity:    
· Patient demonstrates excellent rehab potential due to higher previous functional level. Reason for Services/Other Comments: 
· Patient continues to require modification of therapeutic interventions to increase complexity of exercises.   
Use of outcome tool(s) and clinical judgement create a POC that gives a: Clear prediction of patient's progress: LOW COMPLEXITY  
  
 
 
 
TREATMENT:  
(In addition to Assessment/Re-Assessment sessions the following treatments were rendered) Pre-treatment Symptoms/Complaints:  Stomach pain 
Pain: Initial:  
Pain Intensity 1: 0  Post Session:  1/10 Therapeutic Activity: (    25 minutes): Therapeutic activities including static/dynamic sitting balance, static/dynamic standing balance, Chair transfers and Ambulation on level ground to improve mobility, strength and balance. Required minimal Safety awareness training;Verbal cues to promote static and dynamic balance in standing. Therapeutic Exercise: (  minutes):  Exercises per grid below to improve mobility, strength and range of motion RUE. Required minimal visual, manual and tactile cues to promote proper body alignment and promote proper body posture. Progressed range as indicated. Date: 
11/8/18 Date: 
 Date: 
  
ACTIVITY/EXERCISE AM PM AM PM AM PM  
RUE shoulder flexion x15 AAROM to tolerance RUE elbow flexion x20 AROM       
RUE finger flexion/extension x20 AROM       
RUE shoulder abduction x20 AAROM Gentle pendulums B = bilateral; AA = active assistive; A = active; P = passive Braces/Orthotics/Lines/Etc:  
· IV 
· sling RUE 
· O2 Device: Nasal cannula Treatment/Session Assessment:   
· Response to Treatment: see above · Interdisciplinary Collaboration:  
o Physical Therapy Assistant 
o SPTA · After treatment position/precautions:  
o Up in chair 
o Bed/Chair-wheels locked 
o Bed in low position 
o Call light within reach 
o RN notified 
o Family at bedside 
o Nurse at bedside · Compliance with Program/Exercises: Will assess as treatment progresses · Recommendations/Intent for next treatment session: \"Next visit will focus on advancements to more challenging activities and reduction in assistance provided\". Total Treatment Duration: PT Patient Time In/Time Out Time In: 2921 Time Out: 4620 Daniel Kelley, PTA

## 2018-11-09 NOTE — PROGRESS NOTES
Pt conts to c/o abd pain prior to going to dialysis and was told that this RN would contact the hospitalist. Hospitalist notified and stated that he would see pt when returned to floor.

## 2018-11-09 NOTE — DIALYSIS
TRANSFER OUT- DIALYSIS Hemodialysis treatment completed without complications. Patient alert and oriented and VS stable  /84  P 87   
 
 .9 Kgs removed. Flushed both ports with 10 mL of NS.  CVC dressing clean, dry, and intact, tego caps intact, bilateral lumens wrapped with 4x4 gauze. Patient to return to [facility] [phone] after dialysis.

## 2018-11-09 NOTE — PROGRESS NOTES
Problem: Falls - Risk of 
Goal: *Absence of Falls Document Genevive Camera Fall Risk and appropriate interventions in the flowsheet. Outcome: Progressing Towards Goal 
Fall Risk Interventions: 
Mobility Interventions: Bed/chair exit alarm, OT consult for ADLs, Patient to call before getting OOB, PT Consult for mobility concerns, PT Consult for assist device competence, Strengthening exercises (ROM-active/passive), Utilize walker, cane, or other assistive device Mentation Interventions: Adequate sleep, hydration, pain control, Bed/chair exit alarm, Door open when patient unattended Medication Interventions: Assess postural VS orthostatic hypotension, Bed/chair exit alarm, Patient to call before getting OOB, Teach patient to arise slowly Elimination Interventions: Bed/chair exit alarm, Call light in reach, Patient to call for help with toileting needs, Toilet paper/wipes in reach

## 2018-11-09 NOTE — PROGRESS NOTES
Pharmacokinetic Consult to Pharmacist 
 
Amada Jacobs is a 48 y.o. male being treated for MSSA bacteremia with vancomycin. Previously was treated with nafcillin and rifampin as well as cefazolin. Treatment was changed to vancomycin on 11/8 due to concern for ATN/AIN associated with antimicrobial therapy. Weight: 98.9 kg (218 lb) Lab Results Component Value Date/Time BUN 70 (H) 11/09/2018 08:02 AM  
 Creatinine 10.90 (H) 11/09/2018 08:02 AM  
 WBC 13.4 (H) 11/09/2018 08:02 AM  
 Lactic acid 0.4 11/09/2018 08:02 AM  
 Lactic Acid (POC) 1.52 11/04/2018 04:40 PM  
  
Estimated Creatinine Clearance: 9 mL/min (A) (based on SCr of 10.9 mg/dL (H)). CULTURES: 
All Micro Results Procedure Component Value Units Date/Time CULTURE, BLOOD [[id number]] Collected:  11/08/18 0353 Order Status:  Completed Specimen:  Whole Blood Updated:  11/09/18 1122 Special Requests: --     
  RIGHT 
HAND Culture result: NO GROWTH 1 DAY     
 CULTURE, BLOOD [[id number]] Collected:  11/08/18 0353 Order Status:  Completed Specimen:  Whole Blood Updated:  11/09/18 1122 Special Requests: --     
  RIGHT 
HAND Culture result: NO GROWTH 1 DAY Day 2 of vancomycin. Goal trough is 15-20. I will dose intermittently based on HD schedule and random levels. Per discussion with ID, pharmacist will plan to obtain levels regularly before subsequent doses of vancomycin are given. Patient is currently receiving HD, but is not ESRD and close monitoring of vancomycin levels will be done. Pharmacist will continue to follow patient. Please call with any questions. Thank you, Radha Guillen, PharmD, [facility] Clinical Pharmacist 
[phone]

## 2018-11-09 NOTE — PROGRESS NOTES
11/08/18 1149 Dual Skin Pressure Injury Assessment Dual Skin Pressure Injury Assessment X Second Care Provider (Based on Facility Policy) Jacqulynn Castleman, RN Skin Integumentary Skin Integumentary (WDL) X Skin Condition/Temp Warm;Dry Skin Color Appropriate for ethnicity Skin Integrity Scars (comment); Tattoos (comment); Incision (comment) (Upper chest, incision on RUE and RLE) Turgor Non-tenting

## 2018-11-09 NOTE — PROGRESS NOTES
PT Daily Note: Attempted to see patient for physical therapy this morning but patient was off the floor in dialysis. Will check back on patient this afternoon.  
Thank you, 
Davi Hernandez, PTA

## 2018-11-09 NOTE — PROGRESS NOTES
Hospitalist Progress Note   
2018 Admit Date: 2018  9:42 AM  
NAME: Nevaeh Ramsay :  1965 MRN:  [id number] Attending: Nanda Mitchell DO 
PCP:  Uday Davenport MD 
 
SUBJECTIVE:  
Patient is a 57 y/o [address] history of L JEIMY 2017 and R rotator cuff repair 2017.  Two weeks PTA developed malaise, flu like symptoms, fever blisters, R shoulder pain.  Had R shoulder cortisone injection given.  Next day began having pain L hip.  Had hip aspirated 10/29 which grew MSSA.  Admitted by ortho on  for hip revision. ID and hospitalist consulted. Had worsening RANI on  with Cr of 1.7. Pt taken to OR that afternoon and postop Cr was 2.22, K 5.5. This morning Cr 4.5 and K 6. Transferred to [facility] on  for possible HD. Today, had dialysis in am. C/o periumbilical abdominal pain radiates to his back and thinks it may be gas. Had normal BM last night after 3 days of constipation and small BM this am. 
 
 
PHYSICAL EXAM  
 
Visit Vitals /87 (BP 1 Location: Left arm, BP Patient Position: Sitting) Pulse 85 Temp 99.1 °F (37.3 °C) Resp 18 Wt 98.9 kg (218 lb) SpO2 93% BMI 34.14 kg/m² Temp (24hrs), Av.8 °F (37.1 °C), Min:98.4 °F (36.9 °C), Max:99.2 °F (37.3 °C) Oxygen Therapy O2 Sat (%): 93 % (18 1604) Pulse via Oximetry: 83 beats per minute (18 1036) O2 Device: Room air (18 0736) O2 Flow Rate (L/min): 3 l/min (18 0945) Intake/Output Summary (Last 24 hours) at 2018 1642 Last data filed at 2018 9684 Gross per 24 hour Intake  Output 900 ml Net -900 ml General: moderate distress   
Lungs:  CTA Bilaterally Heart:  Regular rate and rhythm,  + murmur Abdomen: Soft, Non distended, RUQ tender, Positive bowel sounds MSK:  R shoulder in sling Extremities: No cyanosis, clubbing or edema Neurologic:  No focal deficits Psych:  Calm, cooperative ASSESSMENT Active Hospital Problems Diagnosis Date Noted  Sepsis ([facility] [address] 11/07/2018  Postoperative anemia due to acute blood loss 11/07/2018  Staphylococcal arthritis of right shoulder ([facility] [address] 11/06/2018  Acute renal failure with tubular necrosis ([facility] [address] 11/06/2018  Infected prosthetic hip ([facility] [address] 11/05/2018  Septic arthritis of shoulder, right ([facility] [address] 11/05/2018  Degenerative joint disease of right acromioclavicular joint 11/05/2018  Osteoarthritis of right glenohumeral joint 11/05/2018  Complete tear of right rotator cuff 11/05/2018 1- Staph aureus bacteremia due to septic R shoulder, pain controlled postop, f/u with ID 
2- ATN vs AIN, started on dialysis, severely oliguric 3- Normocytic anemia, s/p PRBC 2U transfusion, stable Hb 
4- Abdominal pain, periumbilical per description and RUQ per exam, symptomatic Rx for ''gas'' is added. Follow LFT in am. Lipase is normal. Consider full abdominal US if persist 
 
Dispo; TBD, needs arrangement for dialysis and possible IV abx extension Signed By: Leonard Gilbert MD   
 November 9, 2018

## 2018-11-09 NOTE — PROGRESS NOTES
Pt c/o abdominal pain, relieved some with pain med @ 0215. Pt to BR @ 0530, c/o pain returning. Abdomen distended and tender. Pt reports being able to pass gas. Hospitalist notified, orders received for STAT KUB w/ chest and Lactic acid lab with morning labs.

## 2018-11-10 ENCOUNTER — APPOINTMENT (OUTPATIENT)
Dept: CT IMAGING | Age: 53
DRG: 559 | End: 2018-11-10
Attending: HOSPITALIST
Payer: COMMERCIAL

## 2018-11-10 ENCOUNTER — APPOINTMENT (OUTPATIENT)
Dept: ULTRASOUND IMAGING | Age: 53
DRG: 559 | End: 2018-11-10
Attending: HOSPITALIST
Payer: COMMERCIAL

## 2018-11-10 LAB
ALBUMIN SERPL-MCNC: 1.6 G/DL (ref 3.5–5)
ALBUMIN SERPL-MCNC: 1.8 G/DL (ref 3.5–5)
ALBUMIN/GLOB SERPL: 0.4 {RATIO} (ref 1.2–3.5)
ALBUMIN/GLOB SERPL: 0.4 {RATIO} (ref 1.2–3.5)
ALP SERPL-CCNC: 61 U/L (ref 50–136)
ALP SERPL-CCNC: 69 U/L (ref 50–136)
ALT SERPL-CCNC: 13 U/L (ref 12–65)
ALT SERPL-CCNC: 16 U/L (ref 12–65)
ANION GAP SERPL CALC-SCNC: 12 MMOL/L (ref 7–16)
ANION GAP SERPL CALC-SCNC: 13 MMOL/L (ref 7–16)
AST SERPL-CCNC: 70 U/L (ref 15–37)
AST SERPL-CCNC: 75 U/L (ref 15–37)
BACTERIA SPEC CULT: ABNORMAL
BILIRUB DIRECT SERPL-MCNC: <0.1 MG/DL
BILIRUB SERPL-MCNC: 0.6 MG/DL (ref 0.2–1.1)
BILIRUB SERPL-MCNC: 0.8 MG/DL (ref 0.2–1.1)
BUN SERPL-MCNC: 43 MG/DL (ref 6–23)
BUN SERPL-MCNC: 66 MG/DL (ref 6–23)
CALCIUM SERPL-MCNC: 8.6 MG/DL (ref 8.3–10.4)
CALCIUM SERPL-MCNC: 8.9 MG/DL (ref 8.3–10.4)
CHLORIDE SERPL-SCNC: 100 MMOL/L (ref 98–107)
CHLORIDE SERPL-SCNC: 99 MMOL/L (ref 98–107)
CO2 SERPL-SCNC: 20 MMOL/L (ref 21–32)
CO2 SERPL-SCNC: 22 MMOL/L (ref 21–32)
CREAT SERPL-MCNC: 11.2 MG/DL (ref 0.8–1.5)
CREAT SERPL-MCNC: 8.28 MG/DL (ref 0.8–1.5)
GLOBULIN SER CALC-MCNC: 4.3 G/DL (ref 2.3–3.5)
GLOBULIN SER CALC-MCNC: 4.8 G/DL (ref 2.3–3.5)
GLUCOSE SERPL-MCNC: 101 MG/DL (ref 65–100)
GLUCOSE SERPL-MCNC: 93 MG/DL (ref 65–100)
GRAM STN SPEC: ABNORMAL
HCT VFR BLD AUTO: 25.8 % (ref 41.1–50.3)
HGB BLD-MCNC: 8.5 G/DL (ref 13.6–17.2)
LACTATE SERPL-SCNC: 0.5 MMOL/L (ref 0.4–2)
LDH SERPL L TO P-CCNC: 485 U/L (ref 100–190)
MAGNESIUM SERPL-MCNC: 2.4 MG/DL (ref 1.8–2.4)
PHOSPHATE SERPL-MCNC: 7.4 MG/DL (ref 2.5–4.5)
POTASSIUM SERPL-SCNC: 5.5 MMOL/L (ref 3.5–5.1)
POTASSIUM SERPL-SCNC: 6 MMOL/L (ref 3.5–5.1)
PROT SERPL-MCNC: 5.9 G/DL (ref 6.3–8.2)
PROT SERPL-MCNC: 6.6 G/DL (ref 6.3–8.2)
SERVICE CMNT-IMP: ABNORMAL
SODIUM SERPL-SCNC: 132 MMOL/L (ref 136–145)
SODIUM SERPL-SCNC: 134 MMOL/L (ref 136–145)
VANCOMYCIN SERPL-MCNC: 29.9 UG/ML

## 2018-11-10 PROCEDURE — 76700 US EXAM ABDOM COMPLETE: CPT

## 2018-11-10 PROCEDURE — 84100 ASSAY OF PHOSPHORUS: CPT

## 2018-11-10 PROCEDURE — 74011250637 HC RX REV CODE- 250/637: Performed by: INTERNAL MEDICINE

## 2018-11-10 PROCEDURE — 83605 ASSAY OF LACTIC ACID: CPT

## 2018-11-10 PROCEDURE — 87086 URINE CULTURE/COLONY COUNT: CPT

## 2018-11-10 PROCEDURE — 83735 ASSAY OF MAGNESIUM: CPT

## 2018-11-10 PROCEDURE — 74011250636 HC RX REV CODE- 250/636: Performed by: INTERNAL MEDICINE

## 2018-11-10 PROCEDURE — 83615 LACTATE (LD) (LDH) ENZYME: CPT

## 2018-11-10 PROCEDURE — 94760 N-INVAS EAR/PLS OXIMETRY 1: CPT

## 2018-11-10 PROCEDURE — 74011000258 HC RX REV CODE- 258: Performed by: HOSPITALIST

## 2018-11-10 PROCEDURE — 36415 COLL VENOUS BLD VENIPUNCTURE: CPT

## 2018-11-10 PROCEDURE — 65660000000 HC RM CCU STEPDOWN

## 2018-11-10 PROCEDURE — 74011250637 HC RX REV CODE- 250/637: Performed by: HOSPITALIST

## 2018-11-10 PROCEDURE — 74011250636 HC RX REV CODE- 250/636: Performed by: HOSPITALIST

## 2018-11-10 PROCEDURE — 80076 HEPATIC FUNCTION PANEL: CPT

## 2018-11-10 PROCEDURE — 97110 THERAPEUTIC EXERCISES: CPT

## 2018-11-10 PROCEDURE — 90935 HEMODIALYSIS ONE EVALUATION: CPT

## 2018-11-10 PROCEDURE — 74176 CT ABD & PELVIS W/O CONTRAST: CPT

## 2018-11-10 PROCEDURE — 85018 HEMOGLOBIN: CPT

## 2018-11-10 PROCEDURE — 80202 ASSAY OF VANCOMYCIN: CPT

## 2018-11-10 PROCEDURE — 74011636320 HC RX REV CODE- 636/320: Performed by: INTERNAL MEDICINE

## 2018-11-10 PROCEDURE — 80053 COMPREHEN METABOLIC PANEL: CPT

## 2018-11-10 RX ORDER — MAG HYDROX/ALUMINUM HYD/SIMETH 200-200-20
30 SUSPENSION, ORAL (FINAL DOSE FORM) ORAL ONCE
Status: COMPLETED | OUTPATIENT
Start: 2018-11-10 | End: 2018-11-10

## 2018-11-10 RX ORDER — CLONIDINE HYDROCHLORIDE 0.2 MG/1
0.2 TABLET ORAL
Status: DISCONTINUED | OUTPATIENT
Start: 2018-11-10 | End: 2018-11-11

## 2018-11-10 RX ORDER — HYDRALAZINE HYDROCHLORIDE 20 MG/ML
10 INJECTION INTRAMUSCULAR; INTRAVENOUS
Status: DISCONTINUED | OUTPATIENT
Start: 2018-11-10 | End: 2018-11-20 | Stop reason: HOSPADM

## 2018-11-10 RX ADMIN — SENNOSIDES AND DOCUSATE SODIUM 2 TABLET: 8.6; 5 TABLET ORAL at 08:03

## 2018-11-10 RX ADMIN — CLONIDINE HYDROCHLORIDE 0.2 MG: 0.2 TABLET ORAL at 15:59

## 2018-11-10 RX ADMIN — DIATRIZOATE MEGLUMINE AND DIATRIZOATE SODIUM 15 ML: 600; 100 SOLUTION ORAL; RECTAL at 12:00

## 2018-11-10 RX ADMIN — HEPARIN SODIUM 5000 UNITS: 5000 INJECTION INTRAVENOUS; SUBCUTANEOUS at 09:00

## 2018-11-10 RX ADMIN — HYDROMORPHONE HYDROCHLORIDE 1 MG: 1 INJECTION, SOLUTION INTRAMUSCULAR; INTRAVENOUS; SUBCUTANEOUS at 02:24

## 2018-11-10 RX ADMIN — HYDROMORPHONE HYDROCHLORIDE 1 MG: 1 INJECTION, SOLUTION INTRAMUSCULAR; INTRAVENOUS; SUBCUTANEOUS at 19:54

## 2018-11-10 RX ADMIN — HYDROMORPHONE HYDROCHLORIDE 1 MG: 1 INJECTION, SOLUTION INTRAMUSCULAR; INTRAVENOUS; SUBCUTANEOUS at 14:36

## 2018-11-10 RX ADMIN — HYDROMORPHONE HYDROCHLORIDE 1 MG: 1 INJECTION, SOLUTION INTRAMUSCULAR; INTRAVENOUS; SUBCUTANEOUS at 08:04

## 2018-11-10 RX ADMIN — ZOLPIDEM TARTRATE 5 MG: 5 TABLET ORAL at 19:47

## 2018-11-10 RX ADMIN — OXYCODONE HYDROCHLORIDE 10 MG: 5 TABLET ORAL at 17:14

## 2018-11-10 RX ADMIN — FAMOTIDINE 20 MG: 20 TABLET ORAL at 08:03

## 2018-11-10 RX ADMIN — ACETAMINOPHEN 1000 MG: 500 TABLET, FILM COATED ORAL at 08:03

## 2018-11-10 RX ADMIN — Medication 1 AMPULE: at 08:03

## 2018-11-10 RX ADMIN — ACETAMINOPHEN 1000 MG: 500 TABLET, FILM COATED ORAL at 19:46

## 2018-11-10 RX ADMIN — HEPARIN SODIUM 5000 UNITS: 5000 INJECTION INTRAVENOUS; SUBCUTANEOUS at 19:47

## 2018-11-10 RX ADMIN — ONDANSETRON HYDROCHLORIDE 4 MG: 2 INJECTION, SOLUTION INTRAVENOUS at 02:24

## 2018-11-10 RX ADMIN — HYDRALAZINE HYDROCHLORIDE 10 MG: 20 INJECTION INTRAMUSCULAR; INTRAVENOUS at 14:35

## 2018-11-10 RX ADMIN — ALUMINUM HYDROXIDE, MAGNESIUM HYDROXIDE, AND SIMETHICONE 30 ML: 200; 200; 20 SUSPENSION ORAL at 14:34

## 2018-11-10 RX ADMIN — OXYCODONE HYDROCHLORIDE 10 MG: 5 TABLET ORAL at 10:09

## 2018-11-10 RX ADMIN — Medication 1 AMPULE: at 19:47

## 2018-11-10 RX ADMIN — CEFTRIAXONE SODIUM 1 G: 1 INJECTION, POWDER, FOR SOLUTION INTRAMUSCULAR; INTRAVENOUS at 14:35

## 2018-11-10 RX ADMIN — ACETAMINOPHEN 1000 MG: 500 TABLET, FILM COATED ORAL at 14:00

## 2018-11-10 RX ADMIN — ACETAMINOPHEN 1000 MG: 500 TABLET, FILM COATED ORAL at 02:21

## 2018-11-10 NOTE — PROGRESS NOTES
Pt c/o to mid/lower right epigastric pain that appears to be radiating from umbilical area. MD at bedside now.

## 2018-11-10 NOTE — PROGRESS NOTES
Oral contrast taken down to pt in dialysis and to be given by RN when pt is about an hour out from when pt is finished. Pt then to go straight to CT.

## 2018-11-10 NOTE — PROGRESS NOTES
Pt in dialysis and is suppose to go to get STAT ultrasound after finished with dialysis. This RN was called down to dialysis to give pt pain meds. Pt requesting to get ultrasound while he is in dialysis due to extreme pain. This RN called ultrasound and spoke with Ángel regarding situation and that the ultrasound is STAT and would need to come down ASAP. Ángel stated that should would finish up with current pt and go down as soon as should could.

## 2018-11-10 NOTE — PROGRESS NOTES
Problem: Falls - Risk of 
Goal: *Absence of Falls Document Genevive Camera Fall Risk and appropriate interventions in the flowsheet. Outcome: Progressing Towards Goal 
Fall Risk Interventions: 
Mobility Interventions: Bed/chair exit alarm, OT consult for ADLs, Patient to call before getting OOB, PT Consult for mobility concerns, Strengthening exercises (ROM-active/passive), PT Consult for assist device competence, Utilize walker, cane, or other assistive device Mentation Interventions: Adequate sleep, hydration, pain control, Bed/chair exit alarm, Door open when patient unattended Medication Interventions: Assess postural VS orthostatic hypotension, Bed/chair exit alarm, Patient to call before getting OOB, Teach patient to arise slowly Elimination Interventions: Bed/chair exit alarm, Call light in reach, Patient to call for help with toileting needs, Toilet paper/wipes in reach

## 2018-11-10 NOTE — PROGRESS NOTES
Orthopedic Joint Progress Note 2018 Admit Date: 2018 Admit Diagnosis: infected left hip  
ARF (acute renal failure) ([facility] [address] * No surgery found * Subjective: sitting up in chair some pain right shoulder, left hip abdomen hurts more [employer] Review of Systems: Pertinent items are noted in HPI. Objective:  
 
PT/OT:  
 
PATIENT MOBILITY Bed Mobility Supine to Sit: Moderate assistance Scooting: Contact guard assistance Transfers Sit to Stand: Stand-by assistance Stand to Sit: Stand-by assistance Bed to Chair: Minimum assistance Gait Base of Support: Center of gravity altered, Widened Speed/Emma: Slow, Shuffled Step Length: Right shortened, Left shortened Gait Abnormalities: Decreased step clearance, Trunk sway increased, Step to gait Ambulation - Level of Assistance: Contact guard assistance Distance (ft): 250 Feet (ft) Assistive Device: Gait belt, Walker, rolling Interventions: Safety awareness training, Verbal cues Weight Bearing Status Right Side Weight Bearing: Full Left Side Weight Bearing: As tolerated Vital Signs:   
Blood pressure 175/80, pulse 84, temperature 98.8 °F (37.1 °C), resp. rate 18, weight 98.9 kg (218 lb), SpO2 96 %. Temp (24hrs), Av.9 °F (37.2 °C), Min:98.5 °F (36.9 °C), Max:99.2 °F (37.3 °C) Pain Control:  
Pain Assessment Pain Scale 1: Numeric (0 - 10) Pain Intensity 1: 10 
Pain Onset 1: acute Pain Location 1: Abdomen Pain Orientation 1: Mid 
Pain Description 1: Aching Pain Intervention(s) 1: Medication (see MAR) Meds: 
Current Facility-Administered Medications Medication Dose Route Frequency  [START ON 11/10/2018] Vancomycin random level reminder   Other ONCE  
 simethicone (MYLICON) tablet 80 mg  80 mg Oral QID PRN  polyethylene glycol (MIRALAX) packet 17 g  17 g Oral DAILY PRN  
 heparin (porcine) injection 5,000 Units  5,000 Units SubCUTAneous Q12H  0.9% sodium chloride infusion  75 mL/hr IntraVENous CONTINUOUS  
 epoetin zee (EPOGEN;PROCRIT) injection 20,000 Units  20,000 Units SubCUTAneous Q7D  
 tuberculin injection 5 Units  5 Units IntraDERMal ONCE  
 famotidine (PEPCID) tablet 20 mg  20 mg Oral DAILY  heparin (PF) 2 units/ml in NS infusion 2,000 Units  1,000 mL Irrigation CONTINUOUS  Vancomycin Pharmacy Intermittent Dosing Placeholder   Other Rx Dosing/Monitoring  acetaminophen (TYLENOL) tablet 1,000 mg  1,000 mg Oral Q6H  
 diphenhydrAMINE (BENADRYL) capsule 25 mg  25 mg Oral Q4H PRN  
 HYDROmorphone (PF) (DILAUDID) injection 1 mg  1 mg IntraVENous Q3H PRN  
 naloxone (NARCAN) injection 0.2-0.4 mg  0.2-0.4 mg IntraVENous Q10MIN PRN  
 oxyCODONE IR (ROXICODONE) tablet 10 mg  10 mg Oral Q3H PRN  
 senna-docusate (PERICOLACE) 8.6-50 mg per tablet 2 Tab  2 Tab Oral DAILY  sodium chloride (NS) flush 5-10 mL  5-10 mL IntraVENous Q8H  
 sodium chloride (NS) flush 5-10 mL  5-10 mL IntraVENous PRN  
 zolpidem (AMBIEN) tablet 5 mg  5 mg Oral QHS PRN  
 alcohol 62% (NOZIN) nasal  1 Ampule  1 Ampule Topical Q12H  
 ondansetron (ZOFRAN) injection 4 mg  4 mg IntraVENous Q4H PRN  
 NUTRITIONAL SUPPORT ELECTROLYTE PRN ORDERS   Does Not Apply PRN  
 0.9% sodium chloride infusion 250 mL  250 mL IntraVENous PRN  
  
 
LAB:   
Lab Results Component Value Date/Time INR 1.1 11/05/2018 05:23 PM  
 INR 1.0 12/26/2016 10:54 AM  
 
Lab Results Component Value Date/Time HGB 8.7 (L) 11/09/2018 08:02 AM  
 HGB 8.8 (L) 11/08/2018 09:56 PM  
 HGB 9.6 (L) 11/08/2018 08:06 AM  
 
 
Wound Hip Left (Active) Number of days: 030 Wound Hip Lateral (Active) Number of days: 012 Wound Shoulder Right (Active) Number of days: 514 Wound Hip Left (Active) DRESSING STATUS Clean, dry, and intact 11/9/2018  7:40 AM  
DRESSING TYPE Aquacel 11/9/2018  7:40 AM  
Number of days: 3 Wound Shoulder Right (Active) DRESSING STATUS Clean, dry, and intact 11/9/2018  7:40 AM  
DRESSING TYPE 4 x 4;Transparent film 11/9/2018  7:40 AM  
Number of days: 3 Physical Exam: No significant changes Assessment:  
  
Principal Problem: 
  Staphylococcal arthritis of right shoulder ([address] (11/6/2018) Active Problems: 
  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  
 
Continue PT/OT/Rehab Events of past few days reviewed Right shoulder septic arthritis staph aureus Left hip periprosthetic infection with staph aureus Acute renal failure Continue antibiotics Continue supportive renal care Will follow Patient Expects to be Discharged to[de-identified] Private residence Signed By: Wilmer Go MD

## 2018-11-10 NOTE — DIALYSIS
TRANSFER OUT- DIALYSIS Hemodialysis treatment completed without complications. Patient alert and oriented x 4 and VS stable  /102  P 85  
 
 1 Kg removed. Flushed both ports with 10 mL of NS.  CVC dressing clean, dry, and intact, tego caps intact, bilateral lumens wrapped with 4x4 gauze. Patient to CT after dialysis.

## 2018-11-10 NOTE — DIALYSIS
TRANSFER IN - DIALYSIS Received patient in dialysis unit  from [facility] (unit) for ordered procedure. Consent verified for renal replacement therapy. Patient hurting and complaining of pain in rlq 10/10  and vital signs stable. /95 P77 Hemodialysis initiated using RPC . Aspirated and flushed both ports without difficulty. Dressing clean, dry and intact. Machine settings per MD order. Will monitor during treatment.

## 2018-11-10 NOTE — PROGRESS NOTES
PM note:  Patient is still in dialysis and will be going to CT afterwards. Will return as time permits.   Josephine Sanchez, ROXANNE

## 2018-11-10 NOTE — PROGRESS NOTES
Patient is currently in dialysis per RN. Ordered has been placed for oral contrast. Please give to patient mixed with water, soda or juice and call [phone], when patient is finished drinking.

## 2018-11-10 NOTE — PROGRESS NOTES
Hospitalist Progress Note 11/10/2018 Admit Date: 2018  9:42 AM  
NAME: Raina Valenzuela :  1965 MRN:  [id number] Attending: Sulaiman Fink DO 
PCP:  Cody Acosta MD 
 
SUBJECTIVE:  
Patient is a 55 y/o [address] history of L JEIMY 2017 and R rotator cuff repair 2017.  Two weeks PTA developed malaise, flu like symptoms, fever blisters, R shoulder pain.  Had R shoulder cortisone injection given.  Next day began having pain L hip.  Had hip aspirated 10/29 which grew MSSA.  Admitted by ortho on  for hip revision. ID and hospitalist consulted. Had worsening RANI on  with Cr of 1.7. Pt taken to OR that afternoon and postop Cr was 2.22, K 5.5. This morning Cr 4.5 and K 6. Transferred to [facility] on  for possible HD. Today, had dialysis in am again. K >6. Abdominal pain more severe. No fever PHYSICAL EXAM  
 
Visit Vitals BP (!) 202/102 Pulse 85 Temp 98.5 °F (36.9 °C) Resp 22 Wt 100.7 kg (221 lb 14.4 oz) SpO2 99% BMI 34.75 kg/m² Temp (24hrs), Av.9 °F (37.2 °C), Min:98.5 °F (36.9 °C), Max:99.1 °F (37.3 °C) Oxygen Therapy O2 Sat (%): 99 % (11/10/18 0812) Pulse via Oximetry: 83 beats per minute (18 1036) O2 Device: Room air (11/10/18 0739) O2 Flow Rate (L/min): 3 l/min (18 0945) Intake/Output Summary (Last 24 hours) at 11/10/2018 1422 Last data filed at 11/10/2018 1258 Gross per 24 hour Intake  Output 1000 ml Net -1000 ml General: Moderate to severe distress   
Lungs:  CTA Bilaterally Heart:  Regular rate and rhythm,  + murmur Abdomen: Soft, mild to moderate distended, RUQ and periumbilical tender, Positive bowel sounds MSK:  R shoulder in sling Extremities: No cyanosis, clubbing or edema Neurologic:  No focal deficits Psych:  Calm, cooperative ASSESSMENT Active Hospital Problems Diagnosis Date Noted  Sepsis ([facility] [address] 2018  Postoperative anemia due to acute blood loss 2018  Staphylococcal arthritis of right shoulder ([facility] [address] 11/06/2018  Acute renal failure with tubular necrosis ([address] 11/06/2018  Infected prosthetic hip ([address] 11/05/2018  Septic arthritis of shoulder, right ([facility] [address] 11/05/2018  Degenerative joint disease of right acromioclavicular joint 11/05/2018  Osteoarthritis of right glenohumeral joint 11/05/2018  Complete tear of right rotator cuff 11/05/2018 1- Staph aureus bacteremia due to septic R shoulder, pain controlled postop, f/u with ID 
2- ATN vs AIN, started on dialysis, severely oliguric 3- Normocytic anemia, s/p PRBC 2U transfusion, stable Hb 
4- Abdominal pain, US unremarkable, CT pending, lactic a negative and AP mildly elevated. UA appears infected and pt started on Rocephin. Follow urine CS. Pain control. Gi cocktail as needed. Dispo; TBD, needs arrangement for dialysis and possible IV abx extension Signed By: Arnel Rockwell MD   
 November 10, 2018

## 2018-11-10 NOTE — PROGRESS NOTES
Problem: Mobility Impaired (Adult and Pediatric) Goal: *Acute Goals and Plan of Care (Insert Text) STG: 
(1.)Mr. Yariel Lund will move from supine to sit and sit to supine , scoot up and down and roll side to side with CONTACT GUARD ASSIST within 3 treatment day(s). (2.)Mr. Yariel Lund will transfer from bed to chair and chair to bed with STAND BY ASSIST using the least restrictive device within 3 treatment day(s). (3.)Mr. Yariel Lund will ambulate with STAND BY ASSIST for 150 feet with the least restrictive device within 3 treatment day(s). (4.)Mr. Yariel Lund will tolerate AAROM, AROM and gentle pendulums of RUE with no increase in pain within 3 treatment days to improve independence with transfers. (5.)Mr. Yariel Lund will perform standing static and dynamic balance activities x 15 minutes with STAND BY ASSIST to improve safety within 3 day(s). LTG: 
(1.)Mr. Yariel Lund will move from supine to sit and sit to supine , scoot up and down and roll side to side in bed with MODIFIED INDEPENDENCE within 7 treatment day(s). (2.)Mr. Yariel Lund will transfer from bed to chair and chair to bed with MODIFIED INDEPENDENCE using the least restrictive device within 7 treatment day(s). (3.)Mr. Yariel Lund will ambulate with MODIFIED INDEPENDENCE for 500 feet with the least restrictive device within 7 treatment day(s). (4.)Mr. Yariel Lund will perform standing static and dynamic balance activities x 15 minutes with MODIFIED INDEPENDENCE to improve safety within 7 day(s). (5.)Mr. Yariel Lund will ascend and descend 2 stairs using L hand rail(s) with SUPERVISION to improve functional mobility and safety within 7 day(s). ________________________________________________________________________________________________ PHYSICAL THERAPY: Daily Note, Treatment Day: 3rd, AM 11/10/2018INPATIENT: Hospital Day: 4 Payor: Miladis Wyatt / Plan: SIMON YOUNGBLO[employer] OAP / Product Type: Commerical /  
 LLE total hip precautions, WBAT 
 RUE WBAT : Active and passive range of motion RIGHT elbow hand ACTIVE AND ACTIVE ASSISTED MOTION RIGHT SHOULDER GENTLE pendulums 
sling RIGHT shoulder NAME/AGE/GENDER: Landon Mcdonald is a 48 y.o. male PRIMARY DIAGNOSIS: infected left hip  
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 
Patient has no known allergies. ASSESSMENT:  
Mr. Pranav Vargas is a 48 y.o. male admitted with infected L hip and staphylococcal R shoulder. Patient is sitting in recliner with family present. Patient states that his stomach hurts so bad. He is scheduled to go to dialysis shortly but is agreeable to shoulder exercises. Patient tolerate exercises well active assistive to passive. Patient is left sitting in the recliner with needs within reach. Gait deferred as patient experciencing too much pain at this time. Landon Mcdonald is currently functioning below his baseline and would benefit from skilled PT during acute care stay to maximize safety and independence with functional mobility. Will continue PT efforts. This section established at most recent assessment PROBLEM LIST (Impairments causing functional limitations): 1. Decreased Strength 2. Decreased ADL/Functional Activities 3. Decreased Transfer Abilities 4. Decreased Ambulation Ability/Technique 5. Decreased Balance 6. Increased Pain 7. Decreased Activity Tolerance 8. Decreased Pacing Skills 9. Decreased Knowledge of Precautions 10. Decreased Philip with Home Exercise Program 
 INTERVENTIONS PLANNED: (Benefits and precautions of physical therapy have been discussed with the patient.) 1. Balance Exercise 2. Bed Mobility 3. Gait Training 4. Group Therapy 5. Home Exercise Program (HEP) 6. Therapeutic Activites 7. Therapeutic Exercise/Strengthening 8. Transfer Training 9. Patient Education TREATMENT PLAN: Frequency/Duration: twice daily for duration of hospital stay Rehabilitation Potential For Stated Goals: Excellent RECOMMENDED REHABILITATION/EQUIPMENT: (at time of discharge pending progress): Due to the probability of continued deficits (see above) this patient will likely need continued skilled physical therapy after discharge. Equipment:  
? None at this time HISTORY:  
History of Present Injury/Illness (Reason for Referral): 
Pt is a 49 y/o male who is almost 2 years s/p L JEIMY who was seen in clinic on 10/29 c/o 5 day history of sudden onset of moderate left hip pain. Denied any known injury. Left hip joint aspirate was obtained and sent for culture which grew pansensitive staph. Aureus. Patient was also noted to have elevated WBC (27.1) as well as elevated ESR (90). Patient reports having flu like symptoms 2 weeks ago and began developing right shoulder pain around that same time. Patient was subsequently evaluated by Dr. Marisol Prater and received right shoulder cortisone injection. He reports that he is still experiencing moderate to severe right shoulder pain. Denies fever, chills, nausea, vomiting, etc..  No other new complaints. Past Medical History/Comorbidities:  
Mr. Gloria Choi  has a past medical history of Arthritis, Biceps muscle tear, Chronic pain, Insomnia, MRSA (methicillin resistant Staphylococcus aureus) infection, Personal history of kidney stones, and Right shoulder pain. Mr. Gloria Choi  has a past surgical history that includes hx other surgical; hx rotator cuff repair (Left, 2010); hx orthopaedic (Left, 01/2017); hx septoplasty (11/15/2017); HIP ARTHROPLASTY TOTAL REVISION (Left, 11/6/2018);  SHOULDER I&D (Right, 11/6/2018); RIGHT SHOULDER ARTHROSCOPY SUBACROMIAL DECOMPRESSION ROTATOR CUFF REPAIR/ DISTAL CLAVICLE RESECTION (Right, 6/13/2017); LEFT TOTAL HIP ARTHROPLASTY (Left, 1/6/2017); and SHOULDER ARTHROSCOPY ACROMIOPLASTY ROTATOR CUFF REPAIR (Left, 10/21/2010). Social History/Living Environment:  
Home Environment: Private residence # Steps to Enter: 2 Rails to Enter: Yes Hand Rails : Left One/Two Story Residence: One story Living Alone: No 
Support Systems: Spouse/Significant Other/Partner Patient Expects to be Discharged to[de-identified] Private residence Current DME Used/Available at Home: Walker, rolling, Raised toilet seat, Grab bars, Adaptive bathing aides, Adaptive dressing aides Tub or Shower Type: Tub/Shower combination Prior Level of Function/Work/Activity: 
 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  
Most Recent Physical Functioning:  
Gross Assessment: 
  
         
  
Posture: 
  
Balance: 
Sitting: (n/a) Bed Mobility: 
  
Wheelchair Mobility: 
  
Transfers: 
  
Gait: 
  
   
  
Body Structures Involved: 1. Nerves 2. Metabolic 3. Endocrine 4. Bones 5. Joints 6. Muscles Body Functions Affected: 1. Sensory/Pain 2. Neuromusculoskeletal 
3. Movement Related 4. Metobolic/Endocrine Activities and Participation Affected: 1. Mobility 2. Self Care 3. Domestic Life 4. Interpersonal Interactions and Relationships 5. Community, Social and [place] Number of elements that affect the Plan of Care: 4+: HIGH COMPLEXITY CLINICAL PRESENTATION:  
Presentation: Stable and uncomplicated: LOW COMPLEXITY CLINICAL DECISION MAKING:  
[employer] 6 Clicks Basic Mobility Inpatient Short Form How much difficulty does the patient currently have. .. Unable A Lot A Little None 1. Turning over in bed (including adjusting bedclothes, sheets and blankets)? [] 1   [] 2   [x] 3   [] 4  
2.   Sitting down on and standing up from a chair with arms ( e.g., wheelchair, bedside commode, etc.)   [] 1   [] 2   [x] 3   [] 4  
3. Moving from lying on back to sitting on the side of the bed? [] 1   [x] 2   [] 3   [] 4 How much help from another person does the patient currently need. .. Total A Lot A Little None 4. Moving to and from a bed to a chair (including a wheelchair)? [] 1   [] 2   [x] 3   [] 4  
5. Need to walk in hospital room? [] 1   [x] 2   [] 3   [] 4  
6. Climbing 3-5 steps with a railing? [] 1   [x] 2   [] 3   [] 4  
© 2007, Trustees of [facility] [place] [employer], under license to [employer] All rights reserved Score:  Initial: 15 Most Recent: X (Date: -- ) Interpretation of Tool:  Represents activities that are increasingly more difficult (i.e. Bed mobility, Transfers, Gait). Score 24 23 22-20 19-15 14-10 9-7 6 Modifier CH CI CJ CK CL CM CN   
 
? Mobility - Walking and Moving Around:  
  - CURRENT STATUS: CK - 40%-59% impaired, limited or restricted  - GOAL STATUS: CJ - 20%-39% impaired, limited or restricted  - D/C STATUS:  ---------------To be determined--------------- Payor: Olya Carmona / Plan: SIMON YOUNGBLO[employer] OAP / Product Type: Commerical /   
 
Medical Necessity:    
· Patient demonstrates excellent rehab potential due to higher previous functional level. Reason for Services/Other Comments: 
· Patient continues to require modification of therapeutic interventions to increase complexity of exercises. Use of outcome tool(s) and clinical judgement create a POC that gives a: Clear prediction of patient's progress: LOW COMPLEXITY  
  
 
 
 
TREATMENT:  
(In addition to Assessment/Re-Assessment sessions the following treatments were rendered) Pre-treatment Symptoms/Complaints: '\"My stomach is hurting so bad\" Pain: Initial:  
Pain Intensity 1: 10 
Pain Location 1: Abdomen  Post Session:  10/10 Therapeutic Activity: (     minutes):   Therapeutic activities including static/dynamic sitting balance, static/dynamic standing balance, Chair transfers and Ambulation on level ground to improve mobility, strength and balance. Required minimal   to promote static and dynamic balance in standing. Therapeutic Exercise: (   31 minutes):  Exercises per grid below to improve mobility, strength and range of motion RUE. Required minimal visual, manual and tactile cues to promote proper body alignment and promote proper body posture. Progressed range as indicated. Date: 
11/8/18 Date: 
11/10/18 Date: 
  
ACTIVITY/EXERCISE AM PM AM PM AM PM  
RUE shoulder flexion x15 AAROM to tolerance  30 passive RUE elbow flexion x20 AROM  30 AA     
RUE finger flexion/extension x20 AROM  30 AA     
RUE shoulder abduction x20 AAROM   
30 passive Gentle pendulums B = bilateral; AA = active assistive; A = active; P = passive Braces/Orthotics/Lines/Etc:  
· Treatment/Session Assessment:   
· Response to Treatment: tolerated exercises well · Interdisciplinary Collaboration:  
o Physical Therapy Assistant 
o Registered Nurse · After treatment position/precautions:  
o Up in chair 
o Bed alarm/tab alert on 
o Bed/Chair-wheels locked 
o Bed in low position 
o Call light within reach 
o RN notified 
o Family at bedside · Compliance with Program/Exercises: Will assess as treatment progresses · Recommendations/Intent for next treatment session: \"Next visit will focus on advancements to more challenging activities and reduction in assistance provided\". Total Treatment Duration: PT Patient Time In/Time Out Time In: 2118 Time Out: 2433 Tri Obando PTA

## 2018-11-10 NOTE — PROGRESS NOTES
Subjective:  
Daily Progress Note: 11/10/2018 10:38 AM 
 
F/U RANI. Chart reviewed. Patient seen on dialysis. C/o severe p[eriumbilical pain radiating to his back and right flank worsening for the past 3 days. Anorexia and some nausea no emesis and regular bowel movements. uncomfortable c/o Abd pain Current Facility-Administered Medications Medication Dose Route Frequency  simethicone (MYLICON) tablet 80 mg  80 mg Oral QID PRN  polyethylene glycol (MIRALAX) packet 17 g  17 g Oral DAILY PRN  
 heparin (porcine) injection 5,000 Units  5,000 Units SubCUTAneous Q12H  
 0.9% sodium chloride infusion  75 mL/hr IntraVENous CONTINUOUS  
 epoetin zee (EPOGEN;PROCRIT) injection 20,000 Units  20,000 Units SubCUTAneous Q7D  
 tuberculin injection 5 Units  5 Units IntraDERMal ONCE  
 famotidine (PEPCID) tablet 20 mg  20 mg Oral DAILY  heparin (PF) 2 units/ml in NS infusion 2,000 Units  1,000 mL Irrigation CONTINUOUS  Vancomycin Pharmacy Intermittent Dosing Placeholder   Other Rx Dosing/Monitoring  acetaminophen (TYLENOL) tablet 1,000 mg  1,000 mg Oral Q6H  
 diphenhydrAMINE (BENADRYL) capsule 25 mg  25 mg Oral Q4H PRN  
 HYDROmorphone (PF) (DILAUDID) injection 1 mg  1 mg IntraVENous Q3H PRN  
 naloxone (NARCAN) injection 0.2-0.4 mg  0.2-0.4 mg IntraVENous Q10MIN PRN  
 oxyCODONE IR (ROXICODONE) tablet 10 mg  10 mg Oral Q3H PRN  
 senna-docusate (PERICOLACE) 8.6-50 mg per tablet 2 Tab  2 Tab Oral DAILY  sodium chloride (NS) flush 5-10 mL  5-10 mL IntraVENous Q8H  
 sodium chloride (NS) flush 5-10 mL  5-10 mL IntraVENous PRN  
 zolpidem (AMBIEN) tablet 5 mg  5 mg Oral QHS PRN  
 alcohol 62% (NOZIN) nasal  1 Ampule  1 Ampule Topical Q12H  
 ondansetron (ZOFRAN) injection 4 mg  4 mg IntraVENous Q4H PRN  
 NUTRITIONAL SUPPORT ELECTROLYTE PRN ORDERS   Does Not Apply PRN  
 0.9% sodium chloride infusion 250 mL  250 mL IntraVENous PRN Objective:  
 
Visit Vitals BP (!) 205/104 Pulse 78 Temp 98.5 °F (36.9 °C) Resp 22 Wt 100.7 kg (221 lb 14.4 oz) SpO2 99% BMI 34.75 kg/m² O2 Flow Rate (L/min): 3 l/min O2 Device: Room air Temp (24hrs), Av.9 °F (37.2 °C), Min:98.5 °F (36.9 °C), Max:99.1 °F (37.3 °C) No intake/output data recorded.  1901 - 11/10 07 In: -  
Out: 900 Visit Vitals BP (!) 205/104 Pulse 78 Temp 98.5 °F (36.9 °C) Resp 22 Wt 100.7 kg (221 lb 14.4 oz) SpO2 99% BMI 34.75 kg/m² Head: Normocephalic, without obvious abnormality Neck: no JVD Lungs: clear to auscultation bilaterally Heart: regular rate and rhythm Abdomen: guarding right upperquadrant extremely tender. Worst with recline easier when sitting up. Extremities: no edema Data Review Recent Results (from the past 48 hour(s)) FERRITIN Collection Time: 18 11:05 AM  
Result Value Ref Range Ferritin 1,259 (H) 8 - 388 NG/ML  
TRANSFERRIN SATURATION Collection Time: 18 11:05 AM  
Result Value Ref Range Iron 32 (L) 35 - 150 ug/dL TIBC 150 (L) 250 - 450 ug/dL Transferrin Saturation 21 >20 % HEPATITIS B SURFACE AG W/REFLEX Collection Time: 18 11:05 AM  
Result Value Ref Range Hep B surface Ag screen NEGATIVE  NEGATIVE    
HGB & HCT Collection Time: 18  9:56 PM  
Result Value Ref Range HGB 8.8 (L) 13.6 - 17.2 g/dL HCT 26.7 (L) 41.1 - 50.3 % CBC W/O DIFF Collection Time: 18  8:02 AM  
Result Value Ref Range WBC 13.4 (H) 4.3 - 11.1 K/uL  
 RBC 3.00 (L) 4.23 - 5.6 M/uL HGB 8.7 (L) 13.6 - 17.2 g/dL HCT 25.9 (L) 41.1 - 50.3 % MCV 86.3 79.6 - 97.8 FL  
 MCH 29.0 26.1 - 32.9 PG  
 MCHC 33.6 31.4 - 35.0 g/dL  
 RDW 15.6 % PLATELET 831 648 - 706 K/uL MPV 9.6 9.4 - 12.3 FL ABSOLUTE NRBC 0.00 0.0 - 0.2 K/uL MAGNESIUM Collection Time: 18  8:02 AM  
Result Value Ref Range Magnesium 2.4 1.8 - 2.4 mg/dL METABOLIC PANEL, BASIC  Collection Time: 18  8:02 AM  
 Result Value Ref Range Sodium 134 (L) 136 - 145 mmol/L Potassium 5.2 (H) 3.5 - 5.1 mmol/L Chloride 100 98 - 107 mmol/L  
 CO2 22 21 - 32 mmol/L Anion gap 12 7 - 16 mmol/L Glucose 95 65 - 100 mg/dL BUN 70 (H) 6 - 23 MG/DL Creatinine 10.90 (H) 0.8 - 1.5 MG/DL  
 GFR est AA 6 (L) >60 ml/min/1.73m2 GFR est non-AA 5 (L) >60 ml/min/1.73m2 Calcium 8.6 8.3 - 10.4 MG/DL  
PHOSPHORUS Collection Time: 11/09/18  8:02 AM  
Result Value Ref Range Phosphorus 7.1 (H) 2.5 - 4.5 MG/DL  
LACTIC ACID Collection Time: 11/09/18  8:02 AM  
Result Value Ref Range Lactic acid 0.4 0.4 - 2.0 MMOL/L  
LIPASE Collection Time: 11/09/18  8:02 AM  
Result Value Ref Range Lipase 238 73 - 393 U/L MAGNESIUM Collection Time: 11/10/18  5:12 AM  
Result Value Ref Range Magnesium 2.4 1.8 - 2.4 mg/dL METABOLIC PANEL, BASIC Collection Time: 11/10/18  5:12 AM  
Result Value Ref Range Sodium 132 (L) 136 - 145 mmol/L Potassium 6.0 (H) 3.5 - 5.1 mmol/L Chloride 99 98 - 107 mmol/L  
 CO2 20 (L) 21 - 32 mmol/L Anion gap 13 7 - 16 mmol/L Glucose 93 65 - 100 mg/dL BUN 66 (H) 6 - 23 MG/DL Creatinine 11.20 (H) 0.8 - 1.5 MG/DL  
 GFR est AA 6 (L) >60 ml/min/1.73m2 GFR est non-AA 5 (L) >60 ml/min/1.73m2 Calcium 8.9 8.3 - 10.4 MG/DL  
PHOSPHORUS Collection Time: 11/10/18  5:12 AM  
Result Value Ref Range Phosphorus 7.4 (H) 2.5 - 4.5 MG/DL  
HGB & HCT Collection Time: 11/10/18  5:12 AM  
Result Value Ref Range HGB 8.5 (L) 13.6 - 17.2 g/dL HCT 25.8 (L) 41.1 - 50.3 % HEPATIC FUNCTION PANEL Collection Time: 11/10/18  5:12 AM  
Result Value Ref Range Protein, total 5.9 (L) 6.3 - 8.2 g/dL Albumin 1.6 (L) 3.5 - 5.0 g/dL Globulin 4.3 (H) 2.3 - 3.5 g/dL A-G Ratio 0.4 (L) 1.2 - 3.5 Bilirubin, total 0.8 0.2 - 1.1 MG/DL Bilirubin, direct <0.1 <0.4 MG/DL Alk.  phosphatase 61 50 - 136 U/L  
 AST (SGOT) 70 (H) 15 - 37 U/L  
 ALT (SGPT) 13 12 - 65 U/L  
 Melisa Jones Collection Time: 11/10/18  7:44 AM  
Result Value Ref Range Vancomycin, random 29.9 UG/ML Assessment Patient Active Problem List  
 Diagnosis Date Noted  Sepsis ([facility] [address] 11/07/2018  Postoperative anemia due to acute blood loss 11/07/2018  Staphylococcal arthritis of right shoulder ([facility] [address] 11/06/2018  Acute renal failure with tubular necrosis ([address] 11/06/2018  Infected prosthetic hip ([address] 11/05/2018  Septic arthritis of shoulder, right ([facility] [address] 11/05/2018  Degenerative joint disease of right acromioclavicular joint 11/05/2018  Osteoarthritis of right glenohumeral joint 11/05/2018  Complete tear of right rotator cuff 11/05/2018  S/P total hip arthroplasty 01/06/2017  Arthritis of left hip 01/06/2017  Acute pain of right shoulder 06/10/2016  Hip pain 06/30/2015 Assessment and Plan: RANI - ATN vs AIN. Had been on celebrex, B-lactams and rifampin 
- remains oliguric with worsening renal function-->s/p temp cath and started HD 11/9/18 Hyperkalemia persists despite dialysis concern for necrotic gut MSSA bacteremia/septic shoulder and hip 
- ID following 
- cefazolin and rifampin changed to vanc per ID for possible AIN 
- pharmacy to dose vanc with close monitoring of levels Anemia 
- s/p 2 units prbc Abdominal pain with right upper quadrant guarding ? Acute choley vs ischemic bowel with radiation to left side and back. Case d/w Dr. Del Valle Larger Lactic acid, abdominal US and CT w/o IV contrast will be pursued Check LDH for kidney infarction Will follow closely

## 2018-11-10 NOTE — PROGRESS NOTES
Problem: Falls - Risk of 
Goal: *Absence of Falls Document [place] Fall Risk and appropriate interventions in the flowsheet. Outcome: Progressing Towards Goal 
Fall Risk Interventions: 
Mobility Interventions: Bed/chair exit alarm, OT consult for ADLs, Patient to call before getting OOB, PT Consult for mobility concerns, Strengthening exercises (ROM-active/passive), PT Consult for assist device competence, Utilize walker, cane, or other assistive device Mentation Interventions: Adequate sleep, hydration, pain control, Bed/chair exit alarm, Door open when patient unattended Medication Interventions: Assess postural VS orthostatic hypotension, Bed/chair exit alarm, Patient to call before getting OOB, Teach patient to arise slowly Elimination Interventions: Bed/chair exit alarm, Call light in reach, Patient to call for help with toileting needs, Toilet paper/wipes in reach

## 2018-11-11 LAB
ALBUMIN SERPL-MCNC: 1.6 G/DL (ref 3.5–5)
ALBUMIN/GLOB SERPL: 0.4 {RATIO} (ref 1.2–3.5)
ALP SERPL-CCNC: 61 U/L (ref 50–136)
ALT SERPL-CCNC: 18 U/L (ref 12–65)
ANION GAP SERPL CALC-SCNC: 12 MMOL/L (ref 7–16)
AST SERPL-CCNC: 54 U/L (ref 15–37)
BILIRUB DIRECT SERPL-MCNC: 0.1 MG/DL
BILIRUB SERPL-MCNC: 0.5 MG/DL (ref 0.2–1.1)
BUN SERPL-MCNC: 52 MG/DL (ref 6–23)
CALCIUM SERPL-MCNC: 8.4 MG/DL (ref 8.3–10.4)
CHLORIDE SERPL-SCNC: 100 MMOL/L (ref 98–107)
CO2 SERPL-SCNC: 24 MMOL/L (ref 21–32)
CREAT SERPL-MCNC: 10.2 MG/DL (ref 0.8–1.5)
GLOBULIN SER CALC-MCNC: 4.2 G/DL (ref 2.3–3.5)
GLUCOSE SERPL-MCNC: 105 MG/DL (ref 65–100)
HCT VFR BLD AUTO: 24.7 % (ref 41.1–50.3)
HGB BLD-MCNC: 8.2 G/DL (ref 13.6–17.2)
MM INDURATION POC: 0 MM (ref 0–5)
POTASSIUM SERPL-SCNC: 4.7 MMOL/L (ref 3.5–5.1)
PPD POC: NORMAL NEGATIVE
PROT SERPL-MCNC: 5.8 G/DL (ref 6.3–8.2)
SODIUM SERPL-SCNC: 136 MMOL/L (ref 136–145)

## 2018-11-11 PROCEDURE — 36415 COLL VENOUS BLD VENIPUNCTURE: CPT

## 2018-11-11 PROCEDURE — 97110 THERAPEUTIC EXERCISES: CPT

## 2018-11-11 PROCEDURE — 80048 BASIC METABOLIC PNL TOTAL CA: CPT

## 2018-11-11 PROCEDURE — 77010033678 HC OXYGEN DAILY

## 2018-11-11 PROCEDURE — 97530 THERAPEUTIC ACTIVITIES: CPT

## 2018-11-11 PROCEDURE — 74011250636 HC RX REV CODE- 250/636: Performed by: INTERNAL MEDICINE

## 2018-11-11 PROCEDURE — 74011250636 HC RX REV CODE- 250/636: Performed by: HOSPITALIST

## 2018-11-11 PROCEDURE — 51798 US URINE CAPACITY MEASURE: CPT

## 2018-11-11 PROCEDURE — 74011250637 HC RX REV CODE- 250/637: Performed by: INTERNAL MEDICINE

## 2018-11-11 PROCEDURE — 65660000000 HC RM CCU STEPDOWN

## 2018-11-11 PROCEDURE — 74011250637 HC RX REV CODE- 250/637: Performed by: HOSPITALIST

## 2018-11-11 PROCEDURE — 85018 HEMOGLOBIN: CPT

## 2018-11-11 PROCEDURE — 94760 N-INVAS EAR/PLS OXIMETRY 1: CPT

## 2018-11-11 PROCEDURE — 74011000258 HC RX REV CODE- 258: Performed by: HOSPITALIST

## 2018-11-11 PROCEDURE — 80076 HEPATIC FUNCTION PANEL: CPT

## 2018-11-11 RX ORDER — CLONIDINE HYDROCHLORIDE 0.2 MG/1
0.2 TABLET ORAL 2 TIMES DAILY
Status: DISCONTINUED | OUTPATIENT
Start: 2018-11-11 | End: 2018-11-20 | Stop reason: HOSPADM

## 2018-11-11 RX ORDER — ACETAMINOPHEN 500 MG
1000 TABLET ORAL EVERY 8 HOURS
Status: DISCONTINUED | OUTPATIENT
Start: 2018-11-11 | End: 2018-11-13

## 2018-11-11 RX ADMIN — CLONIDINE HYDROCHLORIDE 0.2 MG: 0.2 TABLET ORAL at 17:11

## 2018-11-11 RX ADMIN — FAMOTIDINE 20 MG: 20 TABLET ORAL at 08:35

## 2018-11-11 RX ADMIN — ONDANSETRON HYDROCHLORIDE 4 MG: 2 INJECTION, SOLUTION INTRAVENOUS at 00:56

## 2018-11-11 RX ADMIN — SIMETHICONE CHEW TAB 80 MG 80 MG: 80 TABLET ORAL at 21:01

## 2018-11-11 RX ADMIN — OXYCODONE HYDROCHLORIDE 10 MG: 5 TABLET ORAL at 00:56

## 2018-11-11 RX ADMIN — Medication 1 AMPULE: at 08:35

## 2018-11-11 RX ADMIN — ZOLPIDEM TARTRATE 5 MG: 5 TABLET ORAL at 21:01

## 2018-11-11 RX ADMIN — Medication 10 ML: at 13:56

## 2018-11-11 RX ADMIN — Medication 1 AMPULE: at 21:02

## 2018-11-11 RX ADMIN — ACETAMINOPHEN 1000 MG: 500 TABLET, FILM COATED ORAL at 21:01

## 2018-11-11 RX ADMIN — Medication 10 ML: at 21:01

## 2018-11-11 RX ADMIN — CEFTRIAXONE SODIUM 1 G: 1 INJECTION, POWDER, FOR SOLUTION INTRAMUSCULAR; INTRAVENOUS at 13:57

## 2018-11-11 RX ADMIN — HYDROMORPHONE HYDROCHLORIDE 1 MG: 1 INJECTION, SOLUTION INTRAMUSCULAR; INTRAVENOUS; SUBCUTANEOUS at 08:40

## 2018-11-11 RX ADMIN — CLONIDINE HYDROCHLORIDE 0.2 MG: 0.2 TABLET ORAL at 08:35

## 2018-11-11 RX ADMIN — HEPARIN SODIUM 5000 UNITS: 5000 INJECTION INTRAVENOUS; SUBCUTANEOUS at 10:00

## 2018-11-11 RX ADMIN — OXYCODONE HYDROCHLORIDE 10 MG: 5 TABLET ORAL at 21:01

## 2018-11-11 RX ADMIN — OXYCODONE HYDROCHLORIDE 10 MG: 5 TABLET ORAL at 13:59

## 2018-11-11 RX ADMIN — ACETAMINOPHEN 1000 MG: 500 TABLET, FILM COATED ORAL at 13:56

## 2018-11-11 RX ADMIN — HYDRALAZINE HYDROCHLORIDE 10 MG: 20 INJECTION INTRAMUSCULAR; INTRAVENOUS at 11:52

## 2018-11-11 RX ADMIN — ACETAMINOPHEN 1000 MG: 500 TABLET, FILM COATED ORAL at 00:55

## 2018-11-11 RX ADMIN — HEPARIN SODIUM 5000 UNITS: 5000 INJECTION INTRAVENOUS; SUBCUTANEOUS at 21:05

## 2018-11-11 RX ADMIN — SENNOSIDES AND DOCUSATE SODIUM 2 TABLET: 8.6; 5 TABLET ORAL at 08:35

## 2018-11-11 NOTE — PROGRESS NOTES
Problem: Mobility Impaired (Adult and Pediatric) Goal: *Acute Goals and Plan of Care (Insert Text) STG: 
(1.)Mr. Renetta Dhaliwal will move from supine to sit and sit to supine , scoot up and down and roll side to side with CONTACT GUARD ASSIST within 3 treatment day(s). (2.)Mr. Renetta Dhaliwal will transfer from bed to chair and chair to bed with STAND BY ASSIST using the least restrictive device within 3 treatment day(s). (3.)Mr. Renetta Dhaliwal will ambulate with STAND BY ASSIST for 150 feet with the least restrictive device within 3 treatment day(s). (4.)Mr. Renetta Dhaliwal will tolerate AAROM, AROM and gentle pendulums of RUE with no increase in pain within 3 treatment days to improve independence with transfers. (5.)Mr. Renetta Dhaliwal will perform standing static and dynamic balance activities x 15 minutes with STAND BY ASSIST to improve safety within 3 day(s). LTG: 
(1.)Mr. Renetta Dhaliwal will move from supine to sit and sit to supine , scoot up and down and roll side to side in bed with MODIFIED INDEPENDENCE within 7 treatment day(s). (2.)Mr. Renetta Dhaliwal will transfer from bed to chair and chair to bed with MODIFIED INDEPENDENCE using the least restrictive device within 7 treatment day(s). (3.)Mr. Renetta Dhaliwal will ambulate with MODIFIED INDEPENDENCE for 500 feet with the least restrictive device within 7 treatment day(s). (4.)Mr. Renetta Dhaliwal will perform standing static and dynamic balance activities x 15 minutes with MODIFIED INDEPENDENCE to improve safety within 7 day(s). (5.)Mr. Renetta Dhaliwal will ascend and descend 2 stairs using L hand rail(s) with SUPERVISION to improve functional mobility and safety within 7 day(s). ________________________________________________________________________________________________ PHYSICAL THERAPY: Daily Note, Treatment Day: 4th, PM 11/11/2018INPATIENT: Hospital Day: 5 Payor: Kendrick Davis / Plan: [employer] OAP / Product Type: Commerical /  
 LLE total hip precautions, WBAT 
 RUE WBAT : Active and passive range of motion RIGHT elbow hand ACTIVE AND ACTIVE ASSISTED MOTION RIGHT SHOULDER GENTLE pendulums 
sling RIGHT shoulder NAME/AGE/GENDER: Hadley Ro is a 48 y.o. male PRIMARY DIAGNOSIS: infected left hip  
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 
Patient has no known allergies. ASSESSMENT:  
Mr. Binta Price is a 48 y.o. male admitted with infected L hip and staphylococcal R shoulder. Patient is supine  in bed and agreeable to therapy. Exercises for the right shoulder while supine in bed. Bed mobility requires min assist.  Sitting balance edge of bed is good unsupported. Sit to stand with min assist.  Gait training with rolling walker x 125 feet with slow palak. Patient is returned to the recliner and left  with needs within reach and MD visiting. Good session. Patient participated more today as he has less abdominal pain today. Progressing towards goals. Hadley Ro is currently functioning below his baseline and would benefit from skilled PT during acute care stay to maximize safety and independence with functional mobility. Will continue PT efforts. PM note:  Patient is supine in the bed with girlfriend present. Bed mobility requires min to moderate assist especially with supine to sit. Sitting balance good edge of bed. Sit to stand with contact guard assist.  Gait training with rolling walker x 250 feet with slow but steady palak. Patient has decreased step length on the left. ROM exercises for the right shoulder. Tolerated well. Good session. Some progress demonstrated. Will continue PT efforts. This section established at most recent assessment PROBLEM LIST (Impairments causing functional limitations): 1. Decreased Strength 2. Decreased ADL/Functional Activities 3. Decreased Transfer Abilities 4. Decreased Ambulation Ability/Technique 5. Decreased Balance 6. Increased Pain 7. Decreased Activity Tolerance 8. Decreased Pacing Skills 9. Decreased Knowledge of Precautions 10. Decreased [place] with Home Exercise Program 
 INTERVENTIONS PLANNED: (Benefits and precautions of physical therapy have been discussed with the patient.) 1. Balance Exercise 2. Bed Mobility 3. Gait Training 4. Group Therapy 5. Home Exercise Program (HEP) 6. Therapeutic Activites 7. Therapeutic Exercise/Strengthening 8. Transfer Training 9. Patient Education TREATMENT PLAN: Frequency/Duration: twice daily for duration of hospital stay Rehabilitation Potential For Stated Goals: Excellent RECOMMENDED REHABILITATION/EQUIPMENT: (at time of discharge pending progress): Due to the probability of continued deficits (see above) this patient will likely need continued skilled physical therapy after discharge. Equipment:  
? None at this time HISTORY:  
History of Present Injury/Illness (Reason for Referral): 
Pt is a 47 y/o male who is almost 2 years s/p L JEIMY who was seen in clinic on 10/29 c/o 5 day history of sudden onset of moderate left hip pain. Denied any known injury. Left hip joint aspirate was obtained and sent for culture which grew pansensitive staph. Aureus. Patient was also noted to have elevated WBC (27.1) as well as elevated ESR (90). Patient reports having flu like symptoms 2 weeks ago and began developing right shoulder pain around that same time. Patient was subsequently evaluated by Dr. Greyson Bejarano and received right shoulder cortisone injection. He reports that he is still experiencing moderate to severe right shoulder pain.   Denies fever, chills, nausea, vomiting, etc..  No other new complaints. Past Medical History/Comorbidities:  
Mr. Anuradha Chacon  has a past medical history of Arthritis, Biceps muscle tear, Chronic pain, Insomnia, MRSA (methicillin resistant Staphylococcus aureus) infection, Personal history of kidney stones, and Right shoulder pain. Mr. Anuradha Chacon  has a past surgical history that includes hx other surgical; hx rotator cuff repair (Left, 2010); hx orthopaedic (Left, 01/2017); hx septoplasty (11/15/2017); HIP ARTHROPLASTY TOTAL REVISION (Left, 11/6/2018); SHOULDER I&D (Right, 11/6/2018); RIGHT SHOULDER ARTHROSCOPY SUBACROMIAL DECOMPRESSION ROTATOR CUFF REPAIR/ DISTAL CLAVICLE RESECTION (Right, 6/13/2017); LEFT TOTAL HIP ARTHROPLASTY (Left, 1/6/2017); and SHOULDER ARTHROSCOPY ACROMIOPLASTY ROTATOR CUFF REPAIR (Left, 10/21/2010). Social History/Living Environment:  
Home Environment: Private residence # Steps to Enter: 2 Rails to Enter: Yes Hand Rails : Left One/Two Story Residence: One story Living Alone: No 
Support Systems: Spouse/Significant Other/Partner Patient Expects to be Discharged to[de-identified] Private residence Current DME Used/Available at Home: Walker, rolling, Raised toilet seat, Grab bars, Adaptive bathing aides, Adaptive dressing aides Tub or Shower Type: Tub/Shower combination Prior Level of Function/Work/Activity: 
 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  
Most Recent Physical Functioning:  
Gross Assessment: 
  
         
  
Posture: 
  
Balance: 
Sitting: Intact Standing: Impaired Standing - Static: Good Standing - Dynamic : Fair Bed Mobility: 
Rolling: Minimum assistance Supine to Sit: Minimum assistance Sit to Supine: Minimum assistance Scooting: Minimum assistance Wheelchair Mobility: 
  
Transfers: 
Sit to Stand: Stand-by assistance Stand to Sit: Stand-by assistance Gait: 
Right Side Weight Bearing: Full Left Side Weight Bearing: As tolerated Base of Support: Center of gravity altered; Widened Speed/Emma: Shuffled; Slow Step Length: Left shortened;Right shortened Gait Abnormalities: Decreased step clearance Distance (ft): 250 Feet (ft) Assistive Device: Gait belt;Walker, rolling Ambulation - Level of Assistance: (contact guard assist ) Interventions: Safety awareness training Body Structures Involved: 1. Nerves 2. Metabolic 3. Endocrine 4. Bones 5. Joints 6. Muscles Body Functions Affected: 1. Sensory/Pain 2. Neuromusculoskeletal 
3. Movement Related 4. Metobolic/Endocrine Activities and Participation Affected: 1. Mobility 2. Self Care 3. Domestic Life 4. Interpersonal Interactions and Relationships 5. Community, Social and [place] Number of elements that affect the Plan of Care: 4+: HIGH COMPLEXITY CLINICAL PRESENTATION:  
Presentation: Stable and uncomplicated: LOW COMPLEXITY CLINICAL DECISION MAKING:  
[facility] AM-PAC 6 Clicks Basic Mobility Inpatient Short Form How much difficulty does the patient currently have. .. Unable A Lot A Little None 1. Turning over in bed (including adjusting bedclothes, sheets and blankets)? [] 1   [] 2   [x] 3   [] 4  
2. Sitting down on and standing up from a chair with arms ( e.g., wheelchair, bedside commode, etc.)   [] 1   [] 2   [x] 3   [] 4  
3. Moving from lying on back to sitting on the side of the bed? [] 1   [x] 2   [] 3   [] 4 How much help from another person does the patient currently need. .. Total A Lot A Little None 4. Moving to and from a bed to a chair (including a wheelchair)? [] 1   [] 2   [x] 3   [] 4  
5. Need to walk in hospital room? [] 1   [x] 2   [] 3   [] 4  
6. Climbing 3-5 steps with a railing? [] 1   [x] 2   [] 3   [] 4  
© 2007, Trustees of [facility], under license to [employer].  All rights reserved Score:  Initial: 15 Most Recent: X (Date: -- ) Interpretation of Tool:  Represents activities that are increasingly more difficult (i.e. Bed mobility, Transfers, Gait). Score 24 23 22-20 19-15 14-10 9-7 6 Modifier CH CI CJ CK CL CM CN   
 
? Mobility - Walking and Moving Around:  
  - CURRENT STATUS: CK - 40%-59% impaired, limited or restricted  - GOAL STATUS: CJ - 20%-39% impaired, limited or restricted  - D/C STATUS:  ---------------To be determined--------------- Payor: Elroy Gamboa / Plan: [place] [employer] OAP / Product Type: Commerical /   
 
Medical Necessity:    
· Patient demonstrates excellent rehab potential due to higher previous functional level. Reason for Services/Other Comments: 
· Patient continues to require modification of therapeutic interventions to increase complexity of exercises. Use of outcome tool(s) and clinical judgement create a POC that gives a: Clear prediction of patient's progress: LOW COMPLEXITY  
  
 
 
 
TREATMENT:  
(In addition to Assessment/Re-Assessment sessions the following treatments were rendered) Pre-treatment Symptoms/Complaints: \"OK \" 
Pain: Initial:  
Pain Intensity 1: 0 Pain Location 1: Abdomen  Post Session:  0/10 Therapeutic Activity: (     24 minutes): Therapeutic activities including static/dynamic sitting balance, static/dynamic standing balance, bed mobility,  Chair transfers and Ambulation on level ground to improve mobility, strength and balance. Required minimal Safety awareness training to promote static and dynamic balance in standing. Therapeutic Exercise: (   10 minutes):  Exercises per grid below to improve mobility, strength and range of motion RUE. Required minimal visual, manual and tactile cues to promote proper body alignment and promote proper body posture. Progressed range as indicated. Date: 
11/8/18 Date: 
11/10/18 Date: 
11/11/18 ACTIVITY/EXERCISE AM PM AM PM AM PM  
 RUE shoulder flexion x15 AAROM to tolerance  30 passive   30 passive 30 passive RUE elbow flexion x20 AROM  30 AA  30 AA 30 AA  
RUE finger flexion/extension x20 AROM  30 AA  30 AA 30 AA  
RUE shoulder abduction x20 AAROM   
30 passive  30 passive 30 passive Gentle pendulums B = bilateral; AA = active assistive; A = active; P = passive Braces/Orthotics/Lines/Etc:  
· Treatment/Session Assessment:   
· Response to Treatment: tolerated  well · Interdisciplinary Collaboration:  
o Physical Therapy Assistant 
o Registered Nurse · After treatment position/precautions:  
o Supine in bed 
o Bed alarm/tab alert on 
o Bed/Chair-wheels locked 
o Bed in low position 
o Call light within reach 
o RN notified 
o Family at bedside · Compliance with Program/Exercises: doing better · Recommendations/Intent for next treatment session: \"Next visit will focus on advancements to more challenging activities and reduction in assistance provided\". Total Treatment Duration: PT Patient Time In/Time Out Time In: 1600 Time Out: 9622 Snow Hinds, PTA

## 2018-11-11 NOTE — PROGRESS NOTES
Hospitalist Progress Note 2018 Admit Date: 2018  9:42 AM  
NAME: Barrett Vicente :  1965 MRN:  [id number] Attending: Nasim Barton DO 
PCP:  Murphy Vo MD 
 
SUBJECTIVE:  
Patient is a 57 y/o [address] history of L JEIMY 2017 and R rotator cuff repair 2017.  Two weeks PTA developed malaise, flu like symptoms, fever blisters, R shoulder pain.  Had R shoulder cortisone injection given.  Next day began having pain L hip.  Had hip aspirated 10/29 which grew MSSA.  Admitted by ortho on  for hip revision. ID and hospitalist consulted. Had worsening RANI on  with Cr of 1.7. Pt taken to OR that afternoon and postop Cr was 2.22, K 5.5. This morning Cr 4.5 and K 6. Transferred to [facility] on  for possible HD. Today, less abd pain today, no fever, makes scarce amounts of urine PHYSICAL EXAM  
 
Visit Vitals BP (!) 192/92 (BP 1 Location: Left arm, BP Patient Position: At rest) Comment: Post activity Pulse 78 Temp 98 °F (36.7 °C) Resp 18 Wt 99.4 kg (219 lb 3.2 oz) SpO2 95% BMI 34.33 kg/m² Temp (24hrs), Av.5 °F (36.9 °C), Min:98 °F (36.7 °C), Max:99.4 °F (37.4 °C) Oxygen Therapy O2 Sat (%): 95 % (18 1148) Pulse via Oximetry: 83 beats per minute (18 1036) O2 Device: Room air (11/10/18 0739) O2 Flow Rate (L/min): 3 l/min (18 0945) Intake/Output Summary (Last 24 hours) at 2018 1354 Last data filed at 2018 1149 Gross per 24 hour Intake  Output 200 ml Net -200 ml General: Moderate distress   
Lungs:  CTA Bilaterally Heart:  Regular rate and rhythm,  + murmur Abdomen: Soft, mildly distended, less RUQ and periumbilical tender, Positive bowel sounds MSK:  R shoulder in sling Extremities: No cyanosis, clubbing or edema Neurologic:  No focal deficits Psych:  Calm, cooperative ASSESSMENT Active Hospital Problems Diagnosis Date Noted  [facility] 2018  Postoperative anemia due to acute blood loss 11/07/2018  Staphylococcal arthritis of right shoulder ([address] 11/06/2018  Acute renal failure with tubular necrosis ([address] 11/06/2018  Infected prosthetic hip ([address] 11/05/2018  Septic arthritis of shoulder, right ([address] 11/05/2018  Degenerative joint disease of right acromioclavicular joint 11/05/2018  Osteoarthritis of right glenohumeral joint 11/05/2018  Complete tear of right rotator cuff 11/05/2018 1- Staph aureus bacteremia due to septic R shoulder, pain controlled postop, f/u with ID 
2- ATN vs AIN, started on dialysis, severely oliguric 3- Normocytic anemia, s/p PRBC 2U transfusion, stable Hb 
4- Abdominal pain, US unremarkable, CT pending, lactic a negative and AP mildly elevated. UA appears infected and pt started on Rocephin. Follow urine CS. Pain control. Gi cocktail as needed. Dispo; TBD, needs arrangement for dialysis and possible IV abx extension Signed By: Diana Chou MD   
 November 11, 2018

## 2018-11-11 NOTE — PROGRESS NOTES
Problem: Mobility Impaired (Adult and Pediatric) Goal: *Acute Goals and Plan of Care (Insert Text) STG: 
(1.)Mr. Kory Ambriz will move from supine to sit and sit to supine , scoot up and down and roll side to side with CONTACT GUARD ASSIST within 3 treatment day(s). (2.)Mr. Kory Ambriz will transfer from bed to chair and chair to bed with STAND BY ASSIST using the least restrictive device within 3 treatment day(s). (3.)Mr. Kory Ambriz will ambulate with STAND BY ASSIST for 150 feet with the least restrictive device within 3 treatment day(s). (4.)Mr. Kory Ambriz will tolerate AAROM, AROM and gentle pendulums of RUE with no increase in pain within 3 treatment days to improve independence with transfers. (5.)Mr. Kory Ambriz will perform standing static and dynamic balance activities x 15 minutes with STAND BY ASSIST to improve safety within 3 day(s). LTG: 
(1.)Mr. Kory Ambriz will move from supine to sit and sit to supine , scoot up and down and roll side to side in bed with MODIFIED INDEPENDENCE within 7 treatment day(s). (2.)Mr. Kory Ambriz will transfer from bed to chair and chair to bed with MODIFIED INDEPENDENCE using the least restrictive device within 7 treatment day(s). (3.)Mr. Kory Ambriz will ambulate with MODIFIED INDEPENDENCE for 500 feet with the least restrictive device within 7 treatment day(s). (4.)Mr. Kory Ambriz will perform standing static and dynamic balance activities x 15 minutes with MODIFIED INDEPENDENCE to improve safety within 7 day(s). (5.)Mr. Kory Ambriz will ascend and descend 2 stairs using L hand rail(s) with SUPERVISION to improve functional mobility and safety within 7 day(s). ________________________________________________________________________________________________ PHYSICAL THERAPY: Daily Note, Treatment Day: 4th, AM 11/11/2018INPATIENT: Hospital Day: 5 Payor: Lukasz Jimenez / Plan: [employer] OAP / Product Type: Commerical /  
 LLE total hip precautions, WBAT 
 RUE WBAT : Active and passive range of motion RIGHT elbow hand ACTIVE AND ACTIVE ASSISTED MOTION RIGHT SHOULDER GENTLE pendulums 
sling RIGHT shoulder NAME/AGE/GENDER: Gwen Rosen is a 48 y.o. male PRIMARY DIAGNOSIS: infected left hip  
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 
Patient has no known allergies. ASSESSMENT:  
Mr. Yariel Lund is a 48 y.o. male admitted with infected L hip and staphylococcal R shoulder. Patient is supine  in bed and agreeable to therapy. Exercises for the right shoulder while supine in bed. Bed mobility requires min assist.  Sitting balance edge of bed is good unsupported. Sit to stand with min assist.  Gait training with rolling walker x 125 feet with slow palak. Patient is returned to the recliner and left  with needs within reach and MD visiting. Good session. Patient participated more today as he has less abdominal pain today. Progressing towards goals. Gwen Rosen is currently functioning below his baseline and would benefit from skilled PT during acute care stay to maximize safety and independence with functional mobility. Will continue PT efforts. This section established at most recent assessment PROBLEM LIST (Impairments causing functional limitations): 1. Decreased Strength 2. Decreased ADL/Functional Activities 3. Decreased Transfer Abilities 4. Decreased Ambulation Ability/Technique 5. Decreased Balance 6. Increased Pain 7. Decreased Activity Tolerance 8. Decreased Pacing Skills 9. Decreased Knowledge of Precautions 10. Decreased [place] with Home Exercise Program 
 INTERVENTIONS PLANNED: (Benefits and precautions of physical therapy have been discussed with the patient.) 1. Balance Exercise 2. Bed Mobility 3. Gait Training 4. Group Therapy 5. Home Exercise Program (HEP) 6. Therapeutic Activites 7. Therapeutic Exercise/Strengthening 8. Transfer Training 9. Patient Education TREATMENT PLAN: Frequency/Duration: twice daily for duration of hospital stay Rehabilitation Potential For Stated Goals: Excellent RECOMMENDED REHABILITATION/EQUIPMENT: (at time of discharge pending progress): Due to the probability of continued deficits (see above) this patient will likely need continued skilled physical therapy after discharge. Equipment:  
? None at this time HISTORY:  
History of Present Injury/Illness (Reason for Referral): 
Pt is a 49 y/o male who is almost 2 years s/p L JEIMY who was seen in clinic on 10/29 c/o 5 day history of sudden onset of moderate left hip pain. Denied any known injury. Left hip joint aspirate was obtained and sent for culture which grew pansensitive staph. Aureus. Patient was also noted to have elevated WBC (27.1) as well as elevated ESR (90). Patient reports having flu like symptoms 2 weeks ago and began developing right shoulder pain around that same time. Patient was subsequently evaluated by Dr. Bob Jensen and received right shoulder cortisone injection. He reports that he is still experiencing moderate to severe right shoulder pain. Denies fever, chills, nausea, vomiting, etc..  No other new complaints. Past Medical History/Comorbidities:  
Mr. Dwayne Teresa  has a past medical history of Arthritis, Biceps muscle tear, Chronic pain, Insomnia, MRSA (methicillin resistant Staphylococcus aureus) infection, Personal history of kidney stones, and Right shoulder pain. Mr. Dwayne Teresa  has a past surgical history that includes hx other surgical; hx rotator cuff repair (Left, 2010); hx orthopaedic (Left, 01/2017); hx septoplasty (11/15/2017); HIP ARTHROPLASTY TOTAL REVISION (Left, 11/6/2018);  SHOULDER I&D (Right, 11/6/2018); RIGHT SHOULDER ARTHROSCOPY SUBACROMIAL DECOMPRESSION ROTATOR CUFF REPAIR/ DISTAL CLAVICLE RESECTION (Right, 6/13/2017); LEFT TOTAL HIP ARTHROPLASTY (Left, 1/6/2017); and SHOULDER ARTHROSCOPY ACROMIOPLASTY ROTATOR CUFF REPAIR (Left, 10/21/2010). Social History/Living Environment:  
Home Environment: Private residence # Steps to Enter: 2 Rails to Enter: Yes Hand Rails : Left One/Two Story Residence: One story Living Alone: No 
Support Systems: Spouse/Significant Other/Partner Patient Expects to be Discharged to[de-identified] Private residence Current DME Used/Available at Home: Walker, rolling, Raised toilet seat, Grab bars, Adaptive bathing aides, Adaptive dressing aides Tub or Shower Type: Tub/Shower combination Prior Level of Function/Work/Activity: 
 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  
Most Recent Physical Functioning:  
Gross Assessment: 
  
         
  
Posture: 
  
Balance: 
Sitting: Intact Standing: Impaired Standing - Static: Good Standing - Dynamic : Fair Bed Mobility: 
Rolling: Minimum assistance Supine to Sit: Minimum assistance Scooting: Minimum assistance Wheelchair Mobility: 
  
Transfers: 
Sit to Stand: Stand-by assistance Stand to Sit: Stand-by assistance Gait: 
Right Side Weight Bearing: Full Left Side Weight Bearing: As tolerated Base of Support: Center of gravity altered; Widened Speed/Emma: Shuffled; Slow Step Length: Left shortened;Right shortened Gait Abnormalities: Decreased step clearance Distance (ft): 250 Feet (ft) Assistive Device: Gait belt;Walker, rolling Ambulation - Level of Assistance: Contact guard assistance Interventions: Safety awareness training Body Structures Involved: 1. Nerves 2. Metabolic 3. Endocrine 4. Bones 5. Joints 6. Muscles Body Functions Affected: 1. Sensory/Pain 2.  Neuromusculoskeletal 
 3. Movement Related 4. Metobolic/Endocrine Activities and Participation Affected: 1. Mobility 2. Self Care 3. Domestic Life 4. Interpersonal Interactions and Relationships 5. Community, Social and [place] Number of elements that affect the Plan of Care: 4+: HIGH COMPLEXITY CLINICAL PRESENTATION:  
Presentation: Stable and uncomplicated: LOW COMPLEXITY CLINICAL DECISION MAKIN[address] [place] [address] AM-PAC 6 Clicks Basic Mobility Inpatient Short Form How much difficulty does the patient currently have. .. Unable A Lot A Little None 1. Turning over in bed (including adjusting bedclothes, sheets and blankets)? [] 1   [] 2   [x] 3   [] 4  
2. Sitting down on and standing up from a chair with arms ( e.g., wheelchair, bedside commode, etc.)   [] 1   [] 2   [x] 3   [] 4  
3. Moving from lying on back to sitting on the side of the bed? [] 1   [x] 2   [] 3   [] 4 How much help from another person does the patient currently need. .. Total A Lot A Little None 4. Moving to and from a bed to a chair (including a wheelchair)? [] 1   [] 2   [x] 3   [] 4  
5. Need to walk in hospital room? [] 1   [x] 2   [] 3   [] 4  
6. Climbing 3-5 steps with a railing? [] 1   [x] 2   [] 3   [] 4  
© , Trustees of [address] [place] [address], under license to [employer]. All rights reserved Score:  Initial: 15 Most Recent: X (Date: -- ) Interpretation of Tool:  Represents activities that are increasingly more difficult (i.e. Bed mobility, Transfers, Gait). Score 24 23 22-20 19-15 14-10 9-7 6 Modifier CH CI CJ CK CL CM CN   
 
? Mobility - Walking and Moving Around:  
  - CURRENT STATUS: CK - 40%-59% impaired, limited or restricted  - GOAL STATUS: CJ - 20%-39% impaired, limited or restricted  - D/C STATUS:  ---------------To be determined--------------- Payor: Willian Gerber / Plan: [place] [employer] OAP / Product Type: Commerical /   
 
Medical Necessity: · Patient demonstrates excellent rehab potential due to higher previous functional level. Reason for Services/Other Comments: 
· Patient continues to require modification of therapeutic interventions to increase complexity of exercises. Use of outcome tool(s) and clinical judgement create a POC that gives a: Clear prediction of patient's progress: LOW COMPLEXITY  
  
 
 
 
TREATMENT:  
(In addition to Assessment/Re-Assessment sessions the following treatments were rendered) Pre-treatment Symptoms/Complaints: \"OK, I am ready to try\" Pain: Initial:  
Pain Intensity 1: 0 Pain Location 1: Abdomen  Post Session:  0/10 Therapeutic Activity: (     25 minutes): Therapeutic activities including static/dynamic sitting balance, static/dynamic standing balance, bed mobility,  Chair transfers and Ambulation on level ground to improve mobility, strength and balance. Required minimal Safety awareness training to promote static and dynamic balance in standing. Therapeutic Exercise: (   15 minutes):  Exercises per grid below to improve mobility, strength and range of motion RUE. Required minimal visual, manual and tactile cues to promote proper body alignment and promote proper body posture. Progressed range as indicated. Date: 
11/8/18 Date: 
11/10/18 Date: 
11/11/18 ACTIVITY/EXERCISE AM PM AM PM AM PM  
RUE shoulder flexion x15 AAROM to tolerance  30 passive   30 passive RUE elbow flexion x20 AROM  30 AA  30 AA   
RUE finger flexion/extension x20 AROM  30 AA  30 AA   
RUE shoulder abduction x20 AAROM   
30 passive  30 passive Gentle pendulums B = bilateral; AA = active assistive; A = active; P = passive Braces/Orthotics/Lines/Etc:  
· Treatment/Session Assessment:   
· Response to Treatment: tolerated  well · Interdisciplinary Collaboration:  
o Physical Therapy Assistant 
o Registered Nurse · After treatment position/precautions:  
o Up in chair o Bed/Chair-wheels locked 
o Call light within reach 
o RN notified 
o MD at bedside · Compliance with Program/Exercises: doing better · Recommendations/Intent for next treatment session: \"Next visit will focus on advancements to more challenging activities and reduction in assistance provided\". Total Treatment Duration: PT Patient Time In/Time Out Time In: 0487 Time Out: 1115 Evelyn Fuentes, PTA

## 2018-11-11 NOTE — PROGRESS NOTES
Pt c/o feeling SOB. VSS O2 97 RA. Lung sounds clear to ascultation. Pt placed on 1.5L O2 for comfort. Will cont to monitor.

## 2018-11-11 NOTE — PROGRESS NOTES
Problem: Falls - Risk of 
Goal: *Absence of Falls Document Aspen Gardner Fall Risk and appropriate interventions in the flowsheet. Outcome: Progressing Towards Goal 
Fall Risk Interventions: 
Mobility Interventions: Bed/chair exit alarm, OT consult for ADLs, Patient to call before getting OOB, PT Consult for mobility concerns, PT Consult for assist device competence, Strengthening exercises (ROM-active/passive), Utilize walker, cane, or other assistive device Mentation Interventions: Bed/chair exit alarm, Adequate sleep, hydration, pain control Medication Interventions: Assess postural VS orthostatic hypotension, Bed/chair exit alarm, Patient to call before getting OOB, Teach patient to arise slowly Elimination Interventions: Bed/chair exit alarm, Call light in reach, Patient to call for help with toileting needs, Toilet paper/wipes in reach

## 2018-11-11 NOTE — PROGRESS NOTES
Problem: Falls - Risk of 
Goal: *Absence of Falls Document Aspen Gardner Fall Risk and appropriate interventions in the flowsheet. Outcome: Progressing Towards Goal 
Fall Risk Interventions: 
Mobility Interventions: Bed/chair exit alarm, Patient to call before getting OOB Mentation Interventions: Bed/chair exit alarm, Adequate sleep, hydration, pain control Medication Interventions: Bed/chair exit alarm, Patient to call before getting OOB, Teach patient to arise slowly Elimination Interventions: Bed/chair exit alarm, Call light in reach, Patient to call for help with toileting needs

## 2018-11-11 NOTE — PROGRESS NOTES
Subjective:  
Daily Progress Note: 11/11/2018 10:38 AM 
 
F/U RANI. Feels better, minimal abdominal pain Current Facility-Administered Medications Medication Dose Route Frequency  acetaminophen (TYLENOL) tablet 1,000 mg  1,000 mg Oral Q8H  
 cloNIDine HCl (CATAPRES) tablet 0.2 mg  0.2 mg Oral BID  hydrALAZINE (APRESOLINE) 20 mg/mL injection 10 mg  10 mg IntraVENous Q6H PRN  
 cefTRIAXone (ROCEPHIN) 1 g in 0.9% sodium chloride (MBP/ADV) 50 mL  1 g IntraVENous Q24H  
 simethicone (MYLICON) tablet 80 mg  80 mg Oral QID PRN  polyethylene glycol (MIRALAX) packet 17 g  17 g Oral DAILY PRN  
 heparin (porcine) injection 5,000 Units  5,000 Units SubCUTAneous Q12H  
 0.9% sodium chloride infusion  75 mL/hr IntraVENous CONTINUOUS  
 epoetin zee (EPOGEN;PROCRIT) injection 20,000 Units  20,000 Units SubCUTAneous Q7D  
 famotidine (PEPCID) tablet 20 mg  20 mg Oral DAILY  heparin (PF) 2 units/ml in NS infusion 2,000 Units  1,000 mL Irrigation CONTINUOUS  Vancomycin Pharmacy Intermittent Dosing Placeholder   Other Rx Dosing/Monitoring  diphenhydrAMINE (BENADRYL) capsule 25 mg  25 mg Oral Q4H PRN  
 HYDROmorphone (PF) (DILAUDID) injection 1 mg  1 mg IntraVENous Q3H PRN  
 naloxone (NARCAN) injection 0.2-0.4 mg  0.2-0.4 mg IntraVENous Q10MIN PRN  
 oxyCODONE IR (ROXICODONE) tablet 10 mg  10 mg Oral Q3H PRN  
 senna-docusate (PERICOLACE) 8.6-50 mg per tablet 2 Tab  2 Tab Oral DAILY  sodium chloride (NS) flush 5-10 mL  5-10 mL IntraVENous Q8H  
 sodium chloride (NS) flush 5-10 mL  5-10 mL IntraVENous PRN  
 zolpidem (AMBIEN) tablet 5 mg  5 mg Oral QHS PRN  
 alcohol 62% (NOZIN) nasal  1 Ampule  1 Ampule Topical Q12H  
 ondansetron (ZOFRAN) injection 4 mg  4 mg IntraVENous Q4H PRN  
 NUTRITIONAL SUPPORT ELECTROLYTE PRN ORDERS   Does Not Apply PRN  
 0.9% sodium chloride infusion 250 mL  250 mL IntraVENous PRN Objective:  
 
Visit Vitals BP (!) 192/92 (BP 1 Location: Left arm, BP Patient Position: At rest) Comment: Post activity Pulse 78 Temp 98 °F (36.7 °C) Resp 18 Wt 99.4 kg (219 lb 3.2 oz) SpO2 95% BMI 34.33 kg/m² O2 Flow Rate (L/min): 3 l/min O2 Device: Room air Temp (24hrs), Av.5 °F (36.9 °C), Min:98 °F (36.7 °C), Max:99.4 °F (37.4 °C) 
 
 
701 - 1900 In: -  
Out: 200 [Urine:200] 1901 - 700 In: -  
Out: 1000 Visit Vitals BP (!) 192/92 (BP 1 Location: Left arm, BP Patient Position: At rest) Comment: Post activity Pulse 78 Temp 98 °F (36.7 °C) Resp 18 Wt 99.4 kg (219 lb 3.2 oz) SpO2 95% BMI 34.33 kg/m² Head: Normocephalic, without obvious abnormality Neck: no JVD Lungs: clear to auscultation bilaterally Heart: regular rate and rhythm Abdomen: soft no guarding Extremities: no edema Data Review Recent Results (from the past 48 hour(s)) MAGNESIUM Collection Time: 11/10/18  5:12 AM  
Result Value Ref Range Magnesium 2.4 1.8 - 2.4 mg/dL METABOLIC PANEL, BASIC Collection Time: 11/10/18  5:12 AM  
Result Value Ref Range Sodium 132 (L) 136 - 145 mmol/L Potassium 6.0 (H) 3.5 - 5.1 mmol/L Chloride 99 98 - 107 mmol/L  
 CO2 20 (L) 21 - 32 mmol/L Anion gap 13 7 - 16 mmol/L Glucose 93 65 - 100 mg/dL BUN 66 (H) 6 - 23 MG/DL Creatinine 11.20 (H) 0.8 - 1.5 MG/DL  
 GFR est AA 6 (L) >60 ml/min/1.73m2 GFR est non-AA 5 (L) >60 ml/min/1.73m2 Calcium 8.9 8.3 - 10.4 MG/DL  
PHOSPHORUS Collection Time: 11/10/18  5:12 AM  
Result Value Ref Range Phosphorus 7.4 (H) 2.5 - 4.5 MG/DL  
HGB & HCT Collection Time: 11/10/18  5:12 AM  
Result Value Ref Range HGB 8.5 (L) 13.6 - 17.2 g/dL HCT 25.8 (L) 41.1 - 50.3 % HEPATIC FUNCTION PANEL Collection Time: 11/10/18  5:12 AM  
Result Value Ref Range Protein, total 5.9 (L) 6.3 - 8.2 g/dL Albumin 1.6 (L) 3.5 - 5.0 g/dL Globulin 4.3 (H) 2.3 - 3.5 g/dL A-G Ratio 0.4 (L) 1.2 - 3.5 Bilirubin, total 0.8 0.2 - 1.1 MG/DL Bilirubin, direct <0.1 <0.4 MG/DL Alk. phosphatase 61 50 - 136 U/L  
 AST (SGOT) 70 (H) 15 - 37 U/L  
 ALT (SGPT) 13 12 - 65 U/L  
LD Collection Time: 11/10/18  5:12 AM  
Result Value Ref Range  (H) 100 - 190 U/L  
VANCOMYCIN, RANDOM Collection Time: 11/10/18  7:44 AM  
Result Value Ref Range Vancomycin, random 29.9 UG/ML  
LACTIC ACID Collection Time: 11/10/18 10:56 AM  
Result Value Ref Range Lactic acid 0.5 0.4 - 2.0 MMOL/L  
CULTURE, URINE Collection Time: 11/10/18  2:24 PM  
Result Value Ref Range Special Requests: NO SPECIAL REQUESTS Culture result: NO GROWTH 1 DAY METABOLIC PANEL, COMPREHENSIVE Collection Time: 11/10/18  4:31 PM  
Result Value Ref Range Sodium 134 (L) 136 - 145 mmol/L Potassium 5.5 (H) 3.5 - 5.1 mmol/L Chloride 100 98 - 107 mmol/L  
 CO2 22 21 - 32 mmol/L Anion gap 12 7 - 16 mmol/L Glucose 101 (H) 65 - 100 mg/dL BUN 43 (H) 6 - 23 MG/DL Creatinine 8.28 (H) 0.8 - 1.5 MG/DL  
 GFR est AA 9 (L) >60 ml/min/1.73m2 GFR est non-AA 7 (L) >60 ml/min/1.73m2 Calcium 8.6 8.3 - 10.4 MG/DL Bilirubin, total 0.6 0.2 - 1.1 MG/DL  
 ALT (SGPT) 16 12 - 65 U/L  
 AST (SGOT) 75 (H) 15 - 37 U/L Alk. phosphatase 69 50 - 136 U/L Protein, total 6.6 6.3 - 8.2 g/dL Albumin 1.8 (L) 3.5 - 5.0 g/dL Globulin 4.8 (H) 2.3 - 3.5 g/dL A-G Ratio 0.4 (L) 1.2 - 3.5 METABOLIC PANEL, BASIC Collection Time: 11/11/18  5:00 AM  
Result Value Ref Range Sodium 136 136 - 145 mmol/L Potassium 4.7 3.5 - 5.1 mmol/L Chloride 100 98 - 107 mmol/L  
 CO2 24 21 - 32 mmol/L Anion gap 12 7 - 16 mmol/L Glucose 105 (H) 65 - 100 mg/dL BUN 52 (H) 6 - 23 MG/DL Creatinine 10.20 (H) 0.8 - 1.5 MG/DL  
 GFR est AA 7 (L) >60 ml/min/1.73m2 GFR est non-AA 6 (L) >60 ml/min/1.73m2 Calcium 8.4 8.3 - 10.4 MG/DL  
HGB & HCT  Collection Time: 11/11/18  5:00 AM  
 Result Value Ref Range HGB 8.2 (L) 13.6 - 17.2 g/dL HCT 24.7 (L) 41.1 - 50.3 % HEPATIC FUNCTION PANEL Collection Time: 11/11/18  5:00 AM  
Result Value Ref Range Protein, total 5.8 (L) 6.3 - 8.2 g/dL Albumin 1.6 (L) 3.5 - 5.0 g/dL Globulin 4.2 (H) 2.3 - 3.5 g/dL A-G Ratio 0.4 (L) 1.2 - 3.5 Bilirubin, total 0.5 0.2 - 1.1 MG/DL Bilirubin, direct 0.1 <0.4 MG/DL Alk. phosphatase 61 50 - 136 U/L  
 AST (SGOT) 54 (H) 15 - 37 U/L  
 ALT (SGPT) 18 12 - 65 U/L Assessment Patient Active Problem List  
 Diagnosis Date Noted  Sepsis ([address] 11/07/2018  Postoperative anemia due to acute blood loss 11/07/2018  Staphylococcal arthritis of right shoulder ([address] 11/06/2018  Acute renal failure with tubular necrosis ([address] 11/06/2018  Infected prosthetic hip ([address] 11/05/2018  Septic arthritis of shoulder, right ([address] 11/05/2018  Degenerative joint disease of right acromioclavicular joint 11/05/2018  Osteoarthritis of right glenohumeral joint 11/05/2018  Complete tear of right rotator cuff 11/05/2018  S/P total hip arthroplasty 01/06/2017  Arthritis of left hip 01/06/2017  Acute pain of right shoulder 06/10/2016  Hip pain 06/30/2015 Assessment and Plan: RANI - ATN vs AIN. Had been on celebrex, B-lactams and rifampin 
- remains oliguric with worsening renal function-->s/p temp cath and started HD 11/9/18 Will need TDC this week if no signs of recovery for out pt acute slot Hyperkalemia resolved with dialysis MSSA bacteremia/septic shoulder and hip 
- ID following 
- cefazolin and rifampin changed to vanc per ID for possible AIN 
- pharmacy to dose vanc with close monitoring of levels Anemia 
- s/p 2 units prbc Abdominal pain improved US and CT negative Will follow closely

## 2018-11-12 LAB
ANION GAP SERPL CALC-SCNC: 11 MMOL/L (ref 7–16)
BACTERIA SPEC CULT: NORMAL
BUN SERPL-MCNC: 65 MG/DL (ref 6–23)
C-ANCA TITR SER IF: NORMAL TITER
CALCIUM SERPL-MCNC: 8.8 MG/DL (ref 8.3–10.4)
CHLORIDE SERPL-SCNC: 99 MMOL/L (ref 98–107)
CO2 SERPL-SCNC: 23 MMOL/L (ref 21–32)
CREAT SERPL-MCNC: 12.8 MG/DL (ref 0.8–1.5)
GLUCOSE SERPL-MCNC: 93 MG/DL (ref 65–100)
HCT VFR BLD AUTO: 26.1 % (ref 41.1–50.3)
HGB BLD-MCNC: 8.8 G/DL (ref 13.6–17.2)
MYELOPEROXIDASE AB SER IA-ACNC: <9 U/ML (ref 0–9)
P-ANCA ATYPICAL TITR SER IF: NORMAL TITER
P-ANCA TITR SER IF: NORMAL TITER
POTASSIUM SERPL-SCNC: 5.1 MMOL/L (ref 3.5–5.1)
PROTEINASE3 AB SER IA-ACNC: <3.5 U/ML (ref 0–3.5)
SERVICE CMNT-IMP: NORMAL
SODIUM SERPL-SCNC: 133 MMOL/L (ref 136–145)
VANCOMYCIN SERPL-MCNC: 15.3 UG/ML

## 2018-11-12 PROCEDURE — 74011250637 HC RX REV CODE- 250/637: Performed by: HOSPITALIST

## 2018-11-12 PROCEDURE — 74011250637 HC RX REV CODE- 250/637: Performed by: INTERNAL MEDICINE

## 2018-11-12 PROCEDURE — 74640000002 HC CRRT RECIRC/LINE CHNGE

## 2018-11-12 PROCEDURE — 74011250636 HC RX REV CODE- 250/636: Performed by: INTERNAL MEDICINE

## 2018-11-12 PROCEDURE — 97110 THERAPEUTIC EXERCISES: CPT

## 2018-11-12 PROCEDURE — 85018 HEMOGLOBIN: CPT

## 2018-11-12 PROCEDURE — 65660000000 HC RM CCU STEPDOWN

## 2018-11-12 PROCEDURE — 80202 ASSAY OF VANCOMYCIN: CPT

## 2018-11-12 PROCEDURE — 90935 HEMODIALYSIS ONE EVALUATION: CPT

## 2018-11-12 PROCEDURE — 74011250636 HC RX REV CODE- 250/636: Performed by: NURSE PRACTITIONER

## 2018-11-12 PROCEDURE — 97530 THERAPEUTIC ACTIVITIES: CPT

## 2018-11-12 PROCEDURE — 36415 COLL VENOUS BLD VENIPUNCTURE: CPT

## 2018-11-12 PROCEDURE — 74011250636 HC RX REV CODE- 250/636: Performed by: HOSPITALIST

## 2018-11-12 PROCEDURE — 80048 BASIC METABOLIC PNL TOTAL CA: CPT

## 2018-11-12 RX ORDER — METOPROLOL TARTRATE 25 MG/1
25 TABLET, FILM COATED ORAL EVERY 12 HOURS
Status: DISCONTINUED | OUTPATIENT
Start: 2018-11-12 | End: 2018-11-13

## 2018-11-12 RX ORDER — CEFAZOLIN SODIUM/WATER 2 G/20 ML
2 SYRINGE (ML) INTRAVENOUS EVERY 24 HOURS
Status: DISCONTINUED | OUTPATIENT
Start: 2018-11-12 | End: 2018-11-13

## 2018-11-12 RX ADMIN — DIPHENHYDRAMINE HYDROCHLORIDE 25 MG: 25 CAPSULE ORAL at 22:09

## 2018-11-12 RX ADMIN — Medication 1 AMPULE: at 22:09

## 2018-11-12 RX ADMIN — OXYCODONE HYDROCHLORIDE 10 MG: 5 TABLET ORAL at 11:41

## 2018-11-12 RX ADMIN — SIMETHICONE CHEW TAB 80 MG 80 MG: 80 TABLET ORAL at 22:09

## 2018-11-12 RX ADMIN — OXYCODONE HYDROCHLORIDE 10 MG: 5 TABLET ORAL at 06:23

## 2018-11-12 RX ADMIN — Medication 2 G: at 15:02

## 2018-11-12 RX ADMIN — Medication 5 ML: at 22:10

## 2018-11-12 RX ADMIN — OXYCODONE HYDROCHLORIDE 10 MG: 5 TABLET ORAL at 22:09

## 2018-11-12 RX ADMIN — METOPROLOL TARTRATE 25 MG: 25 TABLET ORAL at 22:09

## 2018-11-12 RX ADMIN — HEPARIN SODIUM 5000 UNITS: 5000 INJECTION INTRAVENOUS; SUBCUTANEOUS at 22:10

## 2018-11-12 RX ADMIN — HEPARIN SODIUM 5000 UNITS: 5000 INJECTION INTRAVENOUS; SUBCUTANEOUS at 11:10

## 2018-11-12 RX ADMIN — FAMOTIDINE 20 MG: 20 TABLET ORAL at 11:11

## 2018-11-12 RX ADMIN — Medication 10 ML: at 15:02

## 2018-11-12 RX ADMIN — HYDRALAZINE HYDROCHLORIDE 10 MG: 20 INJECTION INTRAMUSCULAR; INTRAVENOUS at 06:23

## 2018-11-12 RX ADMIN — ACETAMINOPHEN 1000 MG: 500 TABLET, FILM COATED ORAL at 15:01

## 2018-11-12 RX ADMIN — CLONIDINE HYDROCHLORIDE 0.2 MG: 0.2 TABLET ORAL at 11:11

## 2018-11-12 RX ADMIN — ACETAMINOPHEN 1000 MG: 500 TABLET, FILM COATED ORAL at 22:09

## 2018-11-12 RX ADMIN — CLONIDINE HYDROCHLORIDE 0.2 MG: 0.2 TABLET ORAL at 17:23

## 2018-11-12 RX ADMIN — Medication 1 AMPULE: at 11:10

## 2018-11-12 RX ADMIN — ACETAMINOPHEN 1000 MG: 500 TABLET, FILM COATED ORAL at 06:23

## 2018-11-12 RX ADMIN — Medication 10 ML: at 06:23

## 2018-11-12 RX ADMIN — SENNOSIDES AND DOCUSATE SODIUM 2 TABLET: 8.6; 5 TABLET ORAL at 11:11

## 2018-11-12 NOTE — DIALYSIS
TRANSFER IN - DIALYSIS Received patient in dialysis unit  from [facility] (unit) for ordered procedure. Consent verified for renal replacement therapy. Patient awake and alert and vital signs stable. /86 P76 Hemodialysis initiated using Right IJ cathether. Aspirated and flushed both ports without difficulty. Dressing clean, dry and intact. Machine settings per MD order. Will monitor during treatment.

## 2018-11-12 NOTE — PROGRESS NOTES
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  
· Pt transitioned to vancomycin. Duration 6 weeks. · Closely watch renal function and vancomycin levels. · Can consider restarting rifampin at some point, but rifampin is known to cause ATN as well so I am reluctant until renal function improves. Anti-infectives: · Nafcillin · Cefazolin---Vanc (- · Rifampin Subjective: Interval History: Seen in HD--doing well. Denies nausea, vomiting, diarrhea, fever, chills, or sweats No Known Allergies Review of Systems:  A comprehensive review of systems was negative except for that written in the History of Present Illness. Objective:  
 
Visit Vitals /86 Pulse 77 Temp 98.6 °F (37 °C) Resp 18 Wt 103.1 kg (227 lb 3.2 oz) SpO2 99% BMI 35.58 kg/m² Temp (24hrs), Av.4 °F (36.9 °C), Min:98 °F (36.7 °C), Max:98.6 °F (37 °C) Lines:  CVC: R IJ HD cath Physical Exam:   
General:  Alert, cooperative, in no acute distress;  
Eyes:  Sclera anicteric Mouth/Throat: Crusting perioral lesions Neck: Supple Lungs:   Clear to auscultation bilaterally, good effort, no increased work of breathing CV:  Regular rate and rhythm, no murmur Abdomen:   Soft, non tender, non distended, active bowel sounds Extremities: No cyanosis Skin: No acute rash Musculoskeletal: Muscular. R shoulder swollen with clean dressing, L hip not examined today Lines/Devices:  R IJ HD line Psych: Alert and oriented, normal mood affect given the setting Data Review: CBC: 
Recent Labs 18 
0451 18 
0500 11/10/18 
9438 HGB 8.8* 8.2* 8.5* HCT 26.1* 24.7* 25.8* BMP: 
Recent Labs 18 
0451 18 
0500 11/10/18 
1631 CREA 12.80* 10.20* 8.28* BUN 65* 52* 43* * 136 134* K 5.1 4.7 5.5* CL 99 100 100 CO2 23 24 22 AGAP 11 12 12 GLU 93 105* 101* LFTS: 
Recent Labs 18 
0500 11/10/18 
1631 11/10/18 
8211 TBILI 0.5 0.6 0.8 ALT 18 16 13 SGOT 54* 75* 70* AP 61 69 61  
TP 5.8* 6.6 5.9* ALB 1.6* 1.8* 1.6* Microbiology:  
 
All Micro Results Procedure Component Value Units Date/Time CULTURE, URINE [[id number]] Collected:  11/10/18 1424 Order Status:  Completed Specimen:  Urine Updated:  18 0725 Special Requests: NO SPECIAL REQUESTS Culture result: NO GROWTH 2 DAYS     
 CULTURE, BLOOD [[id number]] Collected:  18 Order Status:  Completed Specimen:  Whole Blood Updated:  18 6802 Special Requests: --     
  RIGHT 
HAND Culture result: NO GROWTH 4 DAYS     
 CULTURE, BLOOD [[id number]] Collected:  18 Order Status:  Completed Specimen:  Whole Blood Updated:  18 0177 Special Requests: --     
  RIGHT 
HAND Culture result: NO GROWTH 4 DAYS     
 CULTURE, TISSUE Adelita Pina STAIN [[id number]]  (Abnormal)  (Susceptibility) Collected:  18 3510 Order Status:  Completed Specimen:  Joint, Shoulder Updated:  11/10/18 1149 Special Requests: NO SPECIAL REQUESTS     
  GRAM STAIN 0 TO 1 WBC'S/OIF  
   NO DEFINITE ORGANISM SEEN Culture result:    
  LIGHT STAPHYLOCOCCUS AUREUS  
     
   LIGHT 
NORMAL SKIN LATASHA ISOLATED 
   
      
  THIS ORGANISM WILL BE HELD FOR 7 DAYS. IF FURTHER TESTING IS REQUIRED PLEASE NOTIFY MICROBIOLOGY Imagin/8/18 TTE: SUMMARY: 
 
 -  Left ventricle: Systolic function was normal. Ejection fraction was 
estimated in the range of 55 % to 60 %. There were no regional wall motion 
abnormalities. -  Aortic valve: The valve was probably trileaflet. Leaflets exhibited mild 
sclerosis. There was no evidence for vegetation. -  Mitral valve: There was mild regurgitation. There was no evidence for 
vegetation. Signed By: Kulwant Herring NP November 12, 2018

## 2018-11-12 NOTE — PROGRESS NOTES
Problem: Mobility Impaired (Adult and Pediatric) Goal: *Acute Goals and Plan of Care (Insert Text) STG: 
(1.)Mr. Malgorzata Sandoval will move from supine to sit and sit to supine , scoot up and down and roll side to side with CONTACT GUARD ASSIST within 3 treatment day(s). (2.)Mr. Malgorzata Sandoval will transfer from bed to chair and chair to bed with STAND BY ASSIST using the least restrictive device within 3 treatment day(s). (3.)Mr. Malgorzata Sandoval will ambulate with STAND BY ASSIST for 150 feet with the least restrictive device within 3 treatment day(s). (4.)Mr. Malgorzata Sandoval will tolerate AAROM, AROM and gentle pendulums of RUE with no increase in pain within 3 treatment days to improve independence with transfers. (5.)Mr. Malgorzata Sandoval will perform standing static and dynamic balance activities x 15 minutes with STAND BY ASSIST to improve safety within 3 day(s). LTG: 
(1.)Mr. Malgorzata Sandoval will move from supine to sit and sit to supine , scoot up and down and roll side to side in bed with MODIFIED INDEPENDENCE within 7 treatment day(s). (2.)Mr. Malgorzata Sandoval will transfer from bed to chair and chair to bed with MODIFIED INDEPENDENCE using the least restrictive device within 7 treatment day(s). (3.)Mr. Malgorzata Sandoval will ambulate with MODIFIED INDEPENDENCE for 500 feet with the least restrictive device within 7 treatment day(s). (4.)Mr. Malgorzata Sandoval will perform standing static and dynamic balance activities x 15 minutes with MODIFIED INDEPENDENCE to improve safety within 7 day(s). (5.)Mr. Malgorzata Sandoval will ascend and descend 2 stairs using L hand rail(s) with SUPERVISION to improve functional mobility and safety within 7 day(s). ________________________________________________________________________________________________ PHYSICAL THERAPY: Daily Note, Treatment Day: 4th, PM 11/12/2018INPATIENT: Hospital Day: 6 Payor: Jan Rodriguez / Plan: SIMON YOUNGBLOOD OAP / Product Type: Commerical /  
 LLE total hip precautions, WBAT 
 RUE WBAT : Active and passive range of motion RIGHT elbow hand ACTIVE AND ACTIVE ASSISTED MOTION RIGHT SHOULDER GENTLE pendulums 
sling RIGHT shoulder NAME/AGE/GENDER: Dolores Soliz is a 48 y.o. male PRIMARY DIAGNOSIS: infected left hip  
ARF (acute renal failure) (HCC) Staphylococcal arthritis of right shoulder (HCC) Staphylococcal arthritis of right shoulder ([facility] [address] ICD-10: Treatment Diagnosis: · Difficulty in walking, Not elsewhere classified (R26.2) Precaution/Allergies: 
Patient has no known allergies. ASSESSMENT:  
Mr. Cristiano Bai is a 48 y.o. male admitted with infected L hip and staphylococcal R shoulder. Patient was supine upon contact and agreeable to PT. Patient is very talkative throughout treatment but pleasant and motivated. Patient performs supine to sit with min assist, additional time, and cues for improved/proper technique. Patient participates in RUE ROM exercises per chart below requiring cues to perform properly and with good technique. Patient transfers to standing with SBA and ambulates 100' with use of rolling walker, SBA, and good weight bearing through LLE noted. Overall good progress towards physical therapy goals. No goals have been met thus far. Will continue efforts. This section established at most recent assessment PROBLEM LIST (Impairments causing functional limitations): 1. Decreased Strength 2. Decreased ADL/Functional Activities 3. Decreased Transfer Abilities 4. Decreased Ambulation Ability/Technique 5. Decreased Balance 6. Increased Pain 7. Decreased Activity Tolerance 8. Decreased Pacing Skills 9. Decreased Knowledge of Precautions 10. Decreased [place] with Home Exercise Program 
 INTERVENTIONS PLANNED: (Benefits and precautions of physical therapy have been discussed with the patient.) 1. Balance Exercise 2. Bed Mobility 3. Gait Training 4. Group Therapy 5. Home Exercise Program (HEP) 6. Therapeutic Activites 7. Therapeutic Exercise/Strengthening 8. Transfer Training 9. Patient Education TREATMENT PLAN: Frequency/Duration: twice daily for duration of hospital stay Rehabilitation Potential For Stated Goals: Excellent RECOMMENDED REHABILITATION/EQUIPMENT: (at time of discharge pending progress): Due to the probability of continued deficits (see above) this patient will likely need continued skilled physical therapy after discharge. Equipment:  
? None at this time HISTORY:  
History of Present Injury/Illness (Reason for Referral): 
Pt is a 47 y/o male who is almost 2 years s/p L JEIMY who was seen in clinic on 10/29 c/o 5 day history of sudden onset of moderate left hip pain. Denied any known injury. Left hip joint aspirate was obtained and sent for culture which grew pansensitive staph. Aureus. Patient was also noted to have elevated WBC (27.1) as well as elevated ESR (90). Patient reports having flu like symptoms 2 weeks ago and began developing right shoulder pain around that same time. Patient was subsequently evaluated by Dr. Kim Krause and received right shoulder cortisone injection. He reports that he is still experiencing moderate to severe right shoulder pain. Denies fever, chills, nausea, vomiting, etc..  No other new complaints. Past Medical History/Comorbidities:  
Mr. Renetta Dhaliwal  has a past medical history of Arthritis, Biceps muscle tear, Chronic pain, Insomnia, MRSA (methicillin resistant Staphylococcus aureus) infection, Personal history of kidney stones, and Right shoulder pain. Mr. Renetta Dhaliwal  has a past surgical history that includes hx other surgical; hx rotator cuff repair (Left, 2010); hx orthopaedic (Left, 01/2017); hx septoplasty (11/15/2017); HIP ARTHROPLASTY TOTAL REVISION (Left, 11/6/2018);  SHOULDER I&D (Right, 11/6/2018); RIGHT SHOULDER ARTHROSCOPY SUBACROMIAL DECOMPRESSION ROTATOR CUFF REPAIR/ DISTAL CLAVICLE RESECTION (Right, 6/13/2017); LEFT TOTAL HIP ARTHROPLASTY (Left, 1/6/2017); and SHOULDER ARTHROSCOPY ACROMIOPLASTY ROTATOR CUFF REPAIR (Left, 10/21/2010). Social History/Living Environment:  
Home Environment: Private residence # Steps to Enter: 2 Rails to Enter: Yes Hand Rails : Left One/Two Story Residence: One story Living Alone: No 
Support Systems: Spouse/Significant Other/Partner Patient Expects to be Discharged to[de-identified] Private residence Current DME Used/Available at Home: Walker, rolling, Raised toilet seat, Grab bars, Adaptive bathing aides, Adaptive dressing aides Tub or Shower Type: Tub/Shower combination Prior Level of Function/Work/Activity: 
 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  
Most Recent Physical Functioning:  
Gross Assessment: 
  
         
  
Posture: 
  
Balance: 
Sitting: Intact Standing: Impaired Standing - Static: Good Standing - Dynamic : Fair Bed Mobility: 
Supine to Sit: Minimum assistance Wheelchair Mobility: 
  
Transfers: 
Sit to Stand: Stand-by assistance Stand to Sit: Stand-by assistance Gait: 
  
Base of Support: Center of gravity altered; Widened Speed/Emma: Shuffled; Slow Step Length: Left shortened;Right shortened Gait Abnormalities: Decreased step clearance Distance (ft): 100 Feet (ft) Assistive Device: Walker, rolling Ambulation - Level of Assistance: Stand-by assistance Interventions: Tactile cues; Verbal cues; Safety awareness training Body Structures Involved: 1. Nerves 2. Metabolic 3. Endocrine 4. Bones 5. Joints 6. Muscles Body Functions Affected: 1. Sensory/Pain 2. Neuromusculoskeletal 
3. Movement Related 4. Metobolic/Endocrine Activities and Participation Affected: 1. Mobility 2. Self Care 3. Domestic Life 4. Interpersonal Interactions and Relationships 5. Community, Social and [place] Number of elements that affect the Plan of Care: 4+: HIGH COMPLEXITY CLINICAL PRESENTATION:  
Presentation: Stable and uncomplicated: LOW COMPLEXITY CLINICAL DECISION MAKIN [facility] [address] [address] AM-PAC 6 Clicks Basic Mobility Inpatient Short Form How much difficulty does the patient currently have. .. Unable A Lot A Little None 1. Turning over in bed (including adjusting bedclothes, sheets and blankets)? [] 1   [] 2   [x] 3   [] 4  
2. Sitting down on and standing up from a chair with arms ( e.g., wheelchair, bedside commode, etc.)   [] 1   [] 2   [x] 3   [] 4  
3. Moving from lying on back to sitting on the side of the bed? [] 1   [x] 2   [] 3   [] 4 How much help from another person does the patient currently need. .. Total A Lot A Little None 4. Moving to and from a bed to a chair (including a wheelchair)? [] 1   [] 2   [x] 3   [] 4  
5. Need to walk in hospital room? [] 1   [x] 2   [] 3   [] 4  
6. Climbing 3-5 steps with a railing? [] 1   [x] 2   [] 3   [] 4  
© , Trustees of [facility] [address] [address], under license to [employer]. All rights reserved Score:  Initial: 15 Most Recent: X (Date: -- ) Interpretation of Tool:  Represents activities that are increasingly more difficult (i.e. Bed mobility, Transfers, Gait). Score 24 23 22-20 19-15 14-10 9-7 6 Modifier CH CI CJ CK CL CM CN   
 
? Mobility - Walking and Moving Around:  
  - CURRENT STATUS: CK - 40%-59% impaired, limited or restricted  - GOAL STATUS: CJ - 20%-39% impaired, limited or restricted  - D/C STATUS:  ---------------To be determined--------------- Payor: Miladis Wyatt / Plan: [place] FORD OAP / Product Type: Commerical /   
 
Medical Necessity:    
· Patient demonstrates excellent rehab potential due to higher previous functional level.  
Reason for Services/Other Comments: 
· Patient continues to require modification of therapeutic interventions to increase complexity of exercises. Use of outcome tool(s) and clinical judgement create a POC that gives a: Clear prediction of patient's progress: LOW COMPLEXITY  
  
 
 
 
TREATMENT:  
(In addition to Assessment/Re-Assessment sessions the following treatments were rendered) Pre-treatment Symptoms/Complaints: \"OK \" 
Pain: Initial:  
Pain Intensity 1: 0  Post Session:  0/10 Therapeutic Activity: (     10 minutes): Therapeutic activities including bed mobility training, transfer training, ambulation on level ground, static/dynamic standing balance,and patient education to improve mobility, strength and balance. Required minimal Tactile cues; Verbal cues; Safety awareness training to promote static and dynamic balance in standing. Therapeutic Exercise: (   15 minutes):  Exercises per grid below to improve mobility, strength and range of motion RUE. Required minimal visual, manual and tactile cues to promote proper body alignment and promote proper body posture. Progressed range as indicated. Date: 
11/11/18 Date: 
11/12/18 ACTIVITY/EXERCISE AM PM PM  
RUE shoulder flexion 30 passive 30 passive 2x15 R 
PROM  
RUE elbow flexion 30 AA 30 AA 2x15 R 
 AAROM  
RUE finger flexion/extension 30 AA 30 AA x15 R Active RUE shoulder abduction 30 passive 30 passive 2x15R PROM Gentle pendulums Forearm pronation/supination   x10R A  
     
B = bilateral; AA = active assistive; A = active; P = passive Braces/Orthotics/Lines/Etc:  
·  IV Treatment/Session Assessment:   
· Response to Treatment: see above · Interdisciplinary Collaboration:  
o Physical Therapy Assistant 
o Registered Nurse · After treatment position/precautions:  
o Up in chair 
o Bed/Chair-wheels locked 
o Call light within reach 
o RN notified · Compliance with Program/Exercises: doing better · Recommendations/Intent for next treatment session:   \"Next visit will focus on advancements to more challenging activities and reduction in assistance provided\". Total Treatment Duration: PT Patient Time In/Time Out Time In: 9234 Time Out: 1530 Joanna Kelley, PTA

## 2018-11-12 NOTE — PROGRESS NOTES
Problem: Falls - Risk of 
Goal: *Absence of Falls Document Dolly Tang Fall Risk and appropriate interventions in the flowsheet. Outcome: Progressing Towards Goal 
Fall Risk Interventions: 
Mobility Interventions: Bed/chair exit alarm, Patient to call before getting OOB, PT Consult for mobility concerns, PT Consult for assist device competence, OT consult for ADLs Mentation Interventions: Bed/chair exit alarm, Evaluate medications/consider consulting pharmacy Medication Interventions: Bed/chair exit alarm, Evaluate medications/consider consulting pharmacy, Patient to call before getting OOB Elimination Interventions: Call light in reach, Patient to call for help with toileting needs

## 2018-11-12 NOTE — PROGRESS NOTES
Subjective:  
Daily Progress Note: 11/12/2018 10:38 AM 
 
Some increase in UO. No complaints Comfortable No CP No SOB No Abd Pain MS - 
 
 
 
Current Facility-Administered Medications Medication Dose Route Frequency  acetaminophen (TYLENOL) tablet 1,000 mg  1,000 mg Oral Q8H  
 cloNIDine HCl (CATAPRES) tablet 0.2 mg  0.2 mg Oral BID  hydrALAZINE (APRESOLINE) 20 mg/mL injection 10 mg  10 mg IntraVENous Q6H PRN  
 cefTRIAXone (ROCEPHIN) 1 g in 0.9% sodium chloride (MBP/ADV) 50 mL  1 g IntraVENous Q24H  
 simethicone (MYLICON) tablet 80 mg  80 mg Oral QID PRN  polyethylene glycol (MIRALAX) packet 17 g  17 g Oral DAILY PRN  
 heparin (porcine) injection 5,000 Units  5,000 Units SubCUTAneous Q12H  
 0.9% sodium chloride infusion  75 mL/hr IntraVENous CONTINUOUS  
 epoetin zee (EPOGEN;PROCRIT) injection 20,000 Units  20,000 Units SubCUTAneous Q7D  
 famotidine (PEPCID) tablet 20 mg  20 mg Oral DAILY  heparin (PF) 2 units/ml in NS infusion 2,000 Units  1,000 mL Irrigation CONTINUOUS  Vancomycin Pharmacy Intermittent Dosing Placeholder   Other Rx Dosing/Monitoring  diphenhydrAMINE (BENADRYL) capsule 25 mg  25 mg Oral Q4H PRN  
 HYDROmorphone (PF) (DILAUDID) injection 1 mg  1 mg IntraVENous Q3H PRN  
 naloxone (NARCAN) injection 0.2-0.4 mg  0.2-0.4 mg IntraVENous Q10MIN PRN  
 oxyCODONE IR (ROXICODONE) tablet 10 mg  10 mg Oral Q3H PRN  
 senna-docusate (PERICOLACE) 8.6-50 mg per tablet 2 Tab  2 Tab Oral DAILY  sodium chloride (NS) flush 5-10 mL  5-10 mL IntraVENous Q8H  
 sodium chloride (NS) flush 5-10 mL  5-10 mL IntraVENous PRN  
 zolpidem (AMBIEN) tablet 5 mg  5 mg Oral QHS PRN  
 alcohol 62% (NOZIN) nasal  1 Ampule  1 Ampule Topical Q12H  
 ondansetron (ZOFRAN) injection 4 mg  4 mg IntraVENous Q4H PRN  
 NUTRITIONAL SUPPORT ELECTROLYTE PRN ORDERS   Does Not Apply PRN  
 0.9% sodium chloride infusion 250 mL  250 mL IntraVENous PRN Objective: Visit Vitals /84 Pulse 74 Temp 98.6 °F (37 °C) Resp 18 Wt 103.1 kg (227 lb 3.2 oz) SpO2 99% BMI 35.58 kg/m² O2 Flow Rate (L/min): 2 l/min O2 Device: Nasal cannula Temp (24hrs), Av.4 °F (36.9 °C), Min:98 °F (36.7 °C), Max:98.6 °F (37 °C) No intake/output data recorded. 11/10 1901 -  0700 In: -  
Out: 662 [[id number]] Visit Vitals /84 Pulse 74 Temp 98.6 °F (37 °C) Resp 18 Wt 103.1 kg (227 lb 3.2 oz) SpO2 99% BMI 35.58 kg/m² Head: Normocephalic, without obvious abnormality Neck: no JVD Lungs: clear to auscultation bilaterally Heart: regular rate and rhythm Abdomen: soft no guarding Extremities: no edema Data Review Recent Results (from the past 48 hour(s)) LACTIC ACID Collection Time: 11/10/18 10:56 AM  
Result Value Ref Range Lactic acid 0.5 0.4 - 2.0 MMOL/L  
CULTURE, URINE Collection Time: 11/10/18  2:24 PM  
Result Value Ref Range Special Requests: NO SPECIAL REQUESTS Culture result: NO GROWTH 2 DAYS    
METABOLIC PANEL, COMPREHENSIVE Collection Time: 11/10/18  4:31 PM  
Result Value Ref Range Sodium 134 (L) 136 - 145 mmol/L Potassium 5.5 (H) 3.5 - 5.1 mmol/L Chloride 100 98 - 107 mmol/L  
 CO2 22 21 - 32 mmol/L Anion gap 12 7 - 16 mmol/L Glucose 101 (H) 65 - 100 mg/dL BUN 43 (H) 6 - 23 MG/DL Creatinine 8.28 (H) 0.8 - 1.5 MG/DL  
 GFR est AA 9 (L) >60 ml/min/1.73m2 GFR est non-AA 7 (L) >60 ml/min/1.73m2 Calcium 8.6 8.3 - 10.4 MG/DL Bilirubin, total 0.6 0.2 - 1.1 MG/DL  
 ALT (SGPT) 16 12 - 65 U/L  
 AST (SGOT) 75 (H) 15 - 37 U/L Alk. phosphatase 69 50 - 136 U/L Protein, total 6.6 6.3 - 8.2 g/dL Albumin 1.8 (L) 3.5 - 5.0 g/dL Globulin 4.8 (H) 2.3 - 3.5 g/dL A-G Ratio 0.4 (L) 1.2 - 3.5 METABOLIC PANEL, BASIC Collection Time: 18  5:00 AM  
Result Value Ref Range Sodium 136 136 - 145 mmol/L Potassium 4.7 3.5 - 5.1 mmol/L  Chloride 100 98 - 107 mmol/L  
 CO2 24 21 - 32 mmol/L Anion gap 12 7 - 16 mmol/L Glucose 105 (H) 65 - 100 mg/dL BUN 52 (H) 6 - 23 MG/DL Creatinine 10.20 (H) 0.8 - 1.5 MG/DL  
 GFR est AA 7 (L) >60 ml/min/1.73m2 GFR est non-AA 6 (L) >60 ml/min/1.73m2 Calcium 8.4 8.3 - 10.4 MG/DL  
HGB & HCT Collection Time: 11/11/18  5:00 AM  
Result Value Ref Range HGB 8.2 (L) 13.6 - 17.2 g/dL HCT 24.7 (L) 41.1 - 50.3 % HEPATIC FUNCTION PANEL Collection Time: 11/11/18  5:00 AM  
Result Value Ref Range Protein, total 5.8 (L) 6.3 - 8.2 g/dL Albumin 1.6 (L) 3.5 - 5.0 g/dL Globulin 4.2 (H) 2.3 - 3.5 g/dL A-G Ratio 0.4 (L) 1.2 - 3.5 Bilirubin, total 0.5 0.2 - 1.1 MG/DL Bilirubin, direct 0.1 <0.4 MG/DL Alk. phosphatase 61 50 - 136 U/L  
 AST (SGOT) 54 (H) 15 - 37 U/L  
 ALT (SGPT) 18 12 - 65 U/L  
METABOLIC PANEL, BASIC Collection Time: 11/12/18  4:51 AM  
Result Value Ref Range Sodium 133 (L) 136 - 145 mmol/L Potassium 5.1 3.5 - 5.1 mmol/L Chloride 99 98 - 107 mmol/L  
 CO2 23 21 - 32 mmol/L Anion gap 11 7 - 16 mmol/L Glucose 93 65 - 100 mg/dL BUN 65 (H) 6 - 23 MG/DL Creatinine 12.80 (H) 0.8 - 1.5 MG/DL  
 GFR est AA 5 (L) >60 ml/min/1.73m2 GFR est non-AA 4 (L) >60 ml/min/1.73m2 Calcium 8.8 8.3 - 10.4 MG/DL  
HGB & HCT Collection Time: 11/12/18  4:51 AM  
Result Value Ref Range HGB 8.8 (L) 13.6 - 17.2 g/dL HCT 26.1 (L) 41.1 - 50.3 % Assessment Patient Active Problem List  
 Diagnosis Date Noted  Sepsis ([address] 11/07/2018  Postoperative anemia due to acute blood loss 11/07/2018  Staphylococcal arthritis of right shoulder ([address] 11/06/2018  Acute renal failure with tubular necrosis ([address] 11/06/2018  Infected prosthetic hip ([address] 11/05/2018  Septic arthritis of shoulder, right ([address] 11/05/2018  Degenerative joint disease of right acromioclavicular joint 11/05/2018  Osteoarthritis of right glenohumeral joint 11/05/2018  Complete tear of right rotator cuff 11/05/2018  S/P total hip arthroplasty 01/06/2017  Arthritis of left hip 01/06/2017  Acute pain of right shoulder 06/10/2016  Hip pain 06/30/2015 Assessment and Plan: RANI - ATN vs AIN. Had been on celebrex, B-lactams and rifampin 
- remains oliguric with worsening renal function-->s/p temp cath and started HD 11/9/18 Will need TDC this week if no signs of recovery for out pt acute slot. Seen on HD, well tolerated. Some increase in UO encouraging. Hyperkalemia resolved with dialysis MSSA bacteremia/septic shoulder and hip 
- ID following 
- cefazolin and rifampin changed to vanc per ID for possible AIN 
- pharmacy to dose vanc with close monitoring of levels Anemia

## 2018-11-12 NOTE — PROGRESS NOTES
2018 Post Op day: * No surgery found * Admit Diagnosis: infected left hip  
ARF (acute renal failure) ([facility] [address] LAB:   
Recent Results (from the past 24 hour(s)) METABOLIC PANEL, BASIC Collection Time: 18  4:51 AM  
Result Value Ref Range Sodium 133 (L) 136 - 145 mmol/L Potassium 5.1 3.5 - 5.1 mmol/L Chloride 99 98 - 107 mmol/L  
 CO2 23 21 - 32 mmol/L Anion gap 11 7 - 16 mmol/L Glucose 93 65 - 100 mg/dL BUN 65 (H) 6 - 23 MG/DL Creatinine 12.80 (H) 0.8 - 1.5 MG/DL  
 GFR est AA 5 (L) >60 ml/min/1.73m2 GFR est non-AA 4 (L) >60 ml/min/1.73m2 Calcium 8.8 8.3 - 10.4 MG/DL  
HGB & HCT Collection Time: 18  4:51 AM  
Result Value Ref Range HGB 8.8 (L) 13.6 - 17.2 g/dL HCT 26.1 (L) 41.1 - 50.3 % Chikis Coup Collection Time: 18  3:10 PM  
Result Value Ref Range Vancomycin, random 15.3 UG/ML Vital Signs:   
Patient Vitals for the past 8 hrs: 
 BP Temp Pulse Resp SpO2  
18 1535 170/79 97.8 °F (36.6 °C) 75 18 94 % 18 1120 155/80 97.7 °F (36.5 °C) 80 17 97 % 18 1047 155/80  80    
18 1033 150/77  68    
18 1001 169/86  77    
18 0930 103/78  71    
18 0900 172/84  74   Temp (24hrs), Av.2 °F (36.8 °C), Min:97.7 °F (36.5 °C), Max:98.6 °F (37 °C) Pain Control:  
Pain Assessment Pain Scale 1: Visual 
Pain Intensity 1: 0 Pain Onset 1: post op Pain Location 1: Shoulder Pain Orientation 1: Right Pain Description 1: Aching Pain Intervention(s) 1: Medication (see MAR) Subjective: Doing well, left hip pain is well controlled, reports hemodialysis causing fatigue. No other complaints Objective:  No Acute Distress, Alert and Oriented, left hip dressing intact with minimal bloody drainage, Neurovascular exam is normal 
  
 
Assessment:  
Patient Active Problem List  
Diagnosis Code  Hip pain M25.559  Acute pain of right shoulder M25.511  
  S/P total hip arthroplasty Z96.649  Arthritis of left hip M16.12  Infected prosthetic hip ([facility] [address] T84.59XA, R29.206  Septic arthritis of shoulder, right ([address] M00.9  Degenerative joint disease of right acromioclavicular joint M19.011  
 Osteoarthritis of right glenohumeral joint M19.011  
 Complete tear of right rotator cuff M75.121  
 Staphylococcal arthritis of right shoulder ([address] M00.011  Acute renal failure with tubular necrosis (HCC) N17.0  Sepsis ([address] A41.9  Postoperative anemia due to acute blood loss D62 Status Post  I &D, revision of infected left total hip arthroplasty Plan: Continue Physical Therapy, Monitor Hgb. Heparin/SCDs for DVT prophylaxis per primary team.  I.D. managing long term abx treatment. F/U with Dr. Robert Bolaños on 11/28 at 10:30 AM- [address] office for routine postop recheck.   
Signed By: JAY Esqueda

## 2018-11-12 NOTE — DIALYSIS
TRANSFER OUT- DIALYSIS Hemodialysis treatment completed without complications. Patient alert and VS stable  /80  P 80   
 
 3.5 Kgs removed. Flushed both ports with 10 mL of NS.  CVC dressing clean, dry, and intact, tego caps intact, bilateral lumens wrapped with 4x4 gauze. Patient to 024 [id number] 50 30 after dialysis.

## 2018-11-12 NOTE — PROGRESS NOTES
Hospitalist Progress Note 2018 Admit Date: 2018  9:42 AM  
NAME: Bela Lane :  1965 MRN:  [id number] Attending: Sherine Red DO 
PCP:  Marcos Loya MD 
HPI: 
 
50 y/o [address] history of L JVJ FXA 4767 and R rotator cuff repair 2017.  Two weeks PTA developed malaise, flu like symptoms, fever blisters, R shoulder pain.  Had R shoulder cortisone injection given.  Next day began having pain L hip.  Had hip aspirated 10/29 which grew MSSA.  Admitted by ortho on  for hip revision.  ID and hospitalist consulted. Cali Major worsening RANI on  with Cr of 1.7 and Pt taken to OR that afternoon. Developed RANI due to ATN with hyperkalemia. Transferred to [facility] on  for possible HD. Nephro, Ortho and ID following. SUBJECTIVE:  
: Feels better, Had HD, Making minimal urine. No abd pain or CP, reports fatigue. Nursing notes and chart reviewed. Review of Systems negative with exception of pertinent positives noted above. PHYSICAL EXAM  
 
Visit Vitals /79 (BP 1 Location: Right arm, BP Patient Position: Sitting) Comment (BP Patient Position): Sitting up in the recliner Pulse 75 Temp 97.8 °F (36.6 °C) Resp 18 Wt 103.1 kg (227 lb 3.2 oz) SpO2 94% BMI 35.58 kg/m² Temp (24hrs), Av.2 °F (36.8 °C), Min:97.7 °F (36.5 °C), Max:98.6 °F (37 °C) Oxygen Therapy O2 Sat (%): 94 % (18 1535) Pulse via Oximetry: 75 beats per minute (18) O2 Device: Nasal cannula (18) O2 Flow Rate (L/min): 2 l/min (18) FIO2 (%): 28 % (18) Intake/Output Summary (Last 24 hours) at 2018 1844 Last data filed at 2018 1047 Gross per 24 hour Intake  Output 7150 ml Net -7150 ml General: No acute distress. Alert.   
Head:  AT/NC Lungs:  CTABL. Heart:  RRR, no murmur, rub, or gallop Abdomen: Soft, non-distended, non-tender, +bs Extremities: No cyanosis or clubbing. Neurologic:  No focal deficits. Moves all extremities. Skin:  No Obvious Rash Psych:  Normal affect. LABS AND STUDIES: 
Personally reviewed all labs, meds, and studies for past 24hrs. ASSESSMENT Active Hospital Problems Diagnosis Date Noted  Sepsis ([address] 11/07/2018  Postoperative anemia due to acute blood loss 11/07/2018  Staphylococcal arthritis of right shoulder ([address] 11/06/2018  Acute renal failure with tubular necrosis ([address] 11/06/2018  Infected prosthetic hip ([address] 11/05/2018  Septic arthritis of shoulder, right ([address] 11/05/2018  Degenerative joint disease of right acromioclavicular joint 11/05/2018  Osteoarthritis of right glenohumeral joint 11/05/2018  Complete tear of right rotator cuff 11/05/2018 PLAN: 
 
1- Staph aureus bacteremia due to septic R shoulder, pain controlled postop, f/u with ID 
2- ATN vs AIN, started on dialysis, severely oliguric, Nephro managing, plan for TC. 3- Normocytic anemia, s/p PRBC 2U transfusion, stable Hb 
4- Abdominal pain, US and CT unremarkable. Pain has resolved. 5- UA appears infected and pt started on Rocephin. Follow urine CS. 6- Pain control. GI cocktail as needed. 7- HTN. Started on clonidine, add Metoprolol today.  
  
Dispo; TBD, needs arrangement for dialysis and extended IV abx High risk pt. DVT ppx:  hsq Discussed plan with pt who is in agreement. All questions answered. Signed By: Giancarlo Ramirez MD   
 November 12, 2018

## 2018-11-12 NOTE — PROGRESS NOTES
Problem: Interdisciplinary Rounds Goal: Interdisciplinary Rounds Outcome: Progressing Towards Goal 
Interdisciplinary team rounds were held 11/12/2018 with the following team members:Care Management, Physical Therapy, Physician and  and the patient. Plan of care discussed. See clinical pathway and/or care plan for interventions and desired outcomes.

## 2018-11-12 NOTE — PROGRESS NOTES
PT Daily Note: Attempted to see patient for physical therapy this morning but patient is off the floor in dialysis. Will check back on patient this afternoon.  
Thank you, 
Radha Mackay, PTA

## 2018-11-12 NOTE — PROGRESS NOTES
Problem: Self Care Deficits Care Plan (Adult) Goal: *Acute Goals and Plan of Care (Insert Text) 1. Pt will toilet with SBA 2. Pt will complete functional mobility for ADLs with SBA 3. Pt will complete lower body dressing with SBA using AE as needed 4. Pt will complete grooming and hygiene at sink with SBA 5. Pt will complete UB bathing/ dressing with set up 6. Pt will complete bed mobility with SBA in prep for ADLs Timeframe: 7 days OCCUPATIONAL THERAPY: Daily Note, Treatment Day: 1st and PM  
 11/12/2018INPATIENT: Hospital Day: 6 Payor: Emeli Barr / [place] [employer] [place] / Product Type: Commerical /  
  
NAME/AGE/GENDER: David Bragg is a 48 y.o. male PRIMARY DIAGNOSIS:  infected left hip  
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  
 · Pain in Right Shoulder (M25.511) Precautions/Allergies: 
  RUE WBAT, LLE WBAT, hip precautions Patient has no known allergies. ASSESSMENT:  
Mr. Wing Guillen was admitted w/ R shoulder staphylococcal arthritis and L hip infection, s/p R shldr I&D and revision of L JEIMY. LLE WBAT, RUE WBAT no ROM restrictions, UE sling. Pt lives with his wife and was fully independent at baseline, works a physical job. Pt has all necessary DME/ AE from initial JEIMY. 11/12/2018 Pt presents up in the chair upon arrival. Pt completed LUE exercises with good effort. Pt very talkative requiring constant redirection. Pt left up in the chair as found. Continue OT POC. This section established at most recent assessment PROBLEM LIST (Impairments causing functional limitations): 1. Decreased Strength 2. Decreased ADL/Functional Activities 3. Decreased Transfer Abilities 4. Decreased Balance 5. Increased Pain 6. Decreased Activity Tolerance INTERVENTIONS PLANNED: (Benefits and precautions of occupational therapy have been discussed with the patient.) 1. Activities of daily living training 2. Adaptive equipment training 3. Balance training 4. Clothing management 5. Donning&doffing training 6. Therapeutic activity 7. Therapeutic exercise TREATMENT PLAN: Frequency/Duration: Follow patient 6 times/ week to address above goals. Rehabilitation Potential For Stated Goals: Excellent RECOMMENDED REHABILITATION/EQUIPMENT: (at time of discharge pending progress): Due to the probability of continued deficits (see above) this patient will likely need continued skilled occupational therapy after discharge. Equipment:  
? None at this time OCCUPATIONAL PROFILE AND HISTORY:  
History of Present Injury/Illness (Reason for Referral): 
See H&P Past Medical History/Comorbidities:  
Mr. Pranav Vargas  has a past medical history of Arthritis, Biceps muscle tear, Chronic pain, Insomnia, MRSA (methicillin resistant Staphylococcus aureus) infection, Personal history of kidney stones, and Right shoulder pain. Mr. Pranav Vargas  has a past surgical history that includes hx other surgical; hx rotator cuff repair (Left, 2010); hx orthopaedic (Left, 01/2017); hx septoplasty (11/15/2017); HIP ARTHROPLASTY TOTAL REVISION (Left, 11/6/2018); SHOULDER I&D (Right, 11/6/2018); RIGHT SHOULDER ARTHROSCOPY SUBACROMIAL DECOMPRESSION ROTATOR CUFF REPAIR/ DISTAL CLAVICLE RESECTION (Right, 6/13/2017); LEFT TOTAL HIP ARTHROPLASTY (Left, 1/6/2017); and SHOULDER ARTHROSCOPY ACROMIOPLASTY ROTATOR CUFF REPAIR (Left, 10/21/2010). Social History/Living Environment:  
Home Environment: Private residence # Steps to Enter: 2 Rails to Enter: Yes Hand Rails : Left One/Two Story Residence: One story Living Alone: No 
Support Systems: Spouse/Significant Other/Partner Patient Expects to be Discharged to[de-identified] Private residence Current DME Used/Available at Home: Walker, rolling, Raised toilet seat, Grab bars, Adaptive bathing aides, Adaptive dressing aides Tub or Shower Type: Tub/Shower combination Prior Level of Function/Work/Activity: 
Pt lives with his wife and was fully independent at baseline, works a physical job. Pt has all necessary DME/ AE from initial JEIMY. Number of Personal Factors/Comorbidities that affect the Plan of Care: Brief history (0):  LOW COMPLEXITY ASSESSMENT OF OCCUPATIONAL PERFORMANCE[de-identified]  
Activities of Daily Living:  
Basic ADLs (From Assessment) Complex ADLs (From Assessment) Feeding: Setup Oral Facial Hygiene/Grooming: Contact guard assistance Bathing: Minimum assistance Upper Body Dressing: Minimum assistance Lower Body Dressing: Contact guard assistance Toileting: Minimum assistance Instrumental ADL Meal Preparation: Moderate assistance Homemaking: Maximum assistance Medication Management: Setup Financial Management: Independent Grooming/Bathing/Dressing Activities of Daily Living Bed/Mat Mobility Supine to Sit: Minimum assistance Sit to Stand: Stand-by assistance Most Recent Physical Functioning:  
Gross Assessment: 
  
         
  
Posture: 
Posture (WDL): Exceptions to [facility] Posture Assessment: Forward head Balance: 
Sitting: Intact Standing: Impaired Standing - Static: Good Standing - Dynamic : Fair Bed Mobility: 
Supine to Sit: Minimum assistance Wheelchair Mobility: 
  
Transfers: 
Sit to Stand: Stand-by assistance Stand to Sit: Stand-by assistance Patient Vitals for the past 6 hrs: 
 BP BP Patient Position SpO2 Pulse 11/12/18 1033 150/77   68  
11/12/18 1047 155/80   80  
11/12/18 1120 155/80 At rest 97 % 80  
11/12/18 1535 170/79 Sitting 94 % 75 Mental Status Neurologic State: Alert Orientation Level: Oriented X4 Cognition: Follows commands Perception: Appears intact Perseveration: No perseveration noted Safety/Judgement: Fall prevention Physical Skills Involved: 1. Range of Motion 2. Balance 3. Strength 4. Activity Tolerance 5. Pain (acute) Cognitive Skills Affected (resulting in the inability to perform in a timely and safe manner): 1. none Psychosocial Skills Affected: 1. Habits/Routines Number of elements that affect the Plan of Care: 3-5:  MODERATE COMPLEXITY CLINICAL DECISION MAKING:  
[facility] [employer] AM-PAC 6 Clicks Daily Activity Inpatient Short Form How much help from another person does the patient currently need. .. Total A Lot A Little None 1. Putting on and taking off regular lower body clothing? [] 1   [] 2   [x] 3   [] 4  
2. Bathing (including washing, rinsing, drying)? [] 1   [] 2   [x] 3   [] 4  
3. Toileting, which includes using toilet, bedpan or urinal?   [] 1   [] 2   [x] 3   [] 4  
4. Putting on and taking off regular upper body clothing? [] 1   [] 2   [x] 3   [] 4  
5. Taking care of personal grooming such as brushing teeth? [] 1   [] 2   [] 3   [x] 4  
6. Eating meals? [] 1   [] 2   [] 3   [x] 4  
© 2007, Trustees of [facility] [employer], under license to [employer]. All rights reserved Score:  Initial: 20 Most Recent: X (Date: -- ) Interpretation of Tool:  Represents activities that are increasingly more difficult (i.e. Bed mobility, Transfers, Gait). Score 24 23 22-20 19-15 14-10 9-7 6 Modifier CH CI CJ CK CL CM CN   
 
? Self Care:  
  - CURRENT STATUS: CJ - 20%-39% impaired, limited or restricted  - GOAL STATUS: CI - 1%-19% impaired, limited or restricted  - D/C STATUS:  ---------------To be determined--------------- Payor: Erick Sepulveda / [place] [employer] [place] / Product Type: Commerical /   
 
Medical Necessity:    
· Patient demonstrates excellent rehab potential due to higher previous functional level. Reason for Services/Other Comments: 
· Patient continues to require skilled intervention due to decreased ADLs and functional performance from baseline. Use of outcome tool(s) and clinical judgement create a POC that gives a: LOW COMPLEXITY TREATMENT:  
(In addition to Assessment/Re-Assessment sessions the following treatments were rendered) Pre-treatment Symptoms/Complaints:   
Pain: Initial:  
Pain Intensity 1: 0  Post Session:  same Therapeutic Exercise: (28 minutes):  Exercises per grid below to improve mobility and strength. Required maximal visual and verbal cues to promote proper body posture and promote proper body mechanics. Progressed resistance, range, repetitions and complexity of movement as indicated. Date: 
11/12/18 Date: 
 Date: Activity/Exercise Parameters Parameters Parameters Shoulder flexion/extension 4 sets of 30 reps Shoulder scaption 4 sets of 30 reps Chest presses 4 sets of 30 reps Elbow flexion/extension 4 sets of 30 reps Tricep extension 4 sets of 30 reps Braces/Orthotics/Lines/Etc:  
· fuller catheter · O2 Device: Room air Treatment/Session Assessment:   
· Response to Treatment:  No adverse reaction · Interdisciplinary Collaboration:  
o Certified Occupational Therapy Assistant 
o Registered Nurse · After treatment position/precautions:  
o Call light within reach 
o Nurse at bedside · Compliance with Program/Exercises: Will assess as treatment progresses. · Recommendations/Intent for next treatment session: \"Next visit will focus on advancements to more challenging activities and reduction in assistance provided\". Total Treatment Duration: OT Patient Time In/Time Out Time In: 2330 Time Out: 1600 Tracey Navarro

## 2018-11-13 LAB
ANION GAP SERPL CALC-SCNC: 13 MMOL/L (ref 7–16)
BACTERIA SPEC CULT: NORMAL
BACTERIA SPEC CULT: NORMAL
BUN SERPL-MCNC: 48 MG/DL (ref 6–23)
CALCIUM SERPL-MCNC: 8.4 MG/DL (ref 8.3–10.4)
CHLORIDE SERPL-SCNC: 98 MMOL/L (ref 98–107)
CO2 SERPL-SCNC: 23 MMOL/L (ref 21–32)
CREAT SERPL-MCNC: 10.6 MG/DL (ref 0.8–1.5)
GLUCOSE SERPL-MCNC: 81 MG/DL (ref 65–100)
POTASSIUM SERPL-SCNC: 5.1 MMOL/L (ref 3.5–5.1)
SERVICE CMNT-IMP: NORMAL
SERVICE CMNT-IMP: NORMAL
SODIUM SERPL-SCNC: 134 MMOL/L (ref 136–145)

## 2018-11-13 PROCEDURE — 74011250637 HC RX REV CODE- 250/637: Performed by: INTERNAL MEDICINE

## 2018-11-13 PROCEDURE — 97530 THERAPEUTIC ACTIVITIES: CPT

## 2018-11-13 PROCEDURE — 74011000250 HC RX REV CODE- 250: Performed by: INTERNAL MEDICINE

## 2018-11-13 PROCEDURE — 74011250637 HC RX REV CODE- 250/637: Performed by: HOSPITALIST

## 2018-11-13 PROCEDURE — 97110 THERAPEUTIC EXERCISES: CPT

## 2018-11-13 PROCEDURE — 65660000000 HC RM CCU STEPDOWN

## 2018-11-13 PROCEDURE — 77030020263 HC SOL INJ SOD CL0.9% LFCR 1000ML

## 2018-11-13 PROCEDURE — 80048 BASIC METABOLIC PNL TOTAL CA: CPT

## 2018-11-13 PROCEDURE — 74011250636 HC RX REV CODE- 250/636: Performed by: INTERNAL MEDICINE

## 2018-11-13 PROCEDURE — 36415 COLL VENOUS BLD VENIPUNCTURE: CPT

## 2018-11-13 RX ORDER — METOPROLOL TARTRATE 25 MG/1
25 TABLET, FILM COATED ORAL ONCE
Status: COMPLETED | OUTPATIENT
Start: 2018-11-13 | End: 2018-11-13

## 2018-11-13 RX ORDER — METOPROLOL TARTRATE 50 MG/1
50 TABLET ORAL EVERY 12 HOURS
Status: DISCONTINUED | OUTPATIENT
Start: 2018-11-13 | End: 2018-11-20 | Stop reason: HOSPADM

## 2018-11-13 RX ORDER — VANCOMYCIN/0.9 % SOD CHLORIDE 1.5G/250ML
1500 PLASTIC BAG, INJECTION (ML) INTRAVENOUS ONCE
Status: COMPLETED | OUTPATIENT
Start: 2018-11-13 | End: 2018-11-13

## 2018-11-13 RX ORDER — ACETAMINOPHEN 500 MG
1000 TABLET ORAL
Status: DISCONTINUED | OUTPATIENT
Start: 2018-11-13 | End: 2018-11-20 | Stop reason: HOSPADM

## 2018-11-13 RX ADMIN — VANCOMYCIN HYDROCHLORIDE 1500 MG: 10 INJECTION, POWDER, LYOPHILIZED, FOR SOLUTION INTRAVENOUS at 12:31

## 2018-11-13 RX ADMIN — DIPHENHYDRAMINE HYDROCHLORIDE 25 MG: 25 CAPSULE ORAL at 21:11

## 2018-11-13 RX ADMIN — Medication 1 AMPULE: at 08:37

## 2018-11-13 RX ADMIN — CLONIDINE HYDROCHLORIDE 0.2 MG: 0.2 TABLET ORAL at 17:52

## 2018-11-13 RX ADMIN — SIMETHICONE CHEW TAB 80 MG 80 MG: 80 TABLET ORAL at 21:12

## 2018-11-13 RX ADMIN — ACETAMINOPHEN 1000 MG: 500 TABLET, FILM COATED ORAL at 06:02

## 2018-11-13 RX ADMIN — POLYETHYLENE GLYCOL 3350 17 G: 17 POWDER, FOR SOLUTION ORAL at 06:10

## 2018-11-13 RX ADMIN — HEPARIN SODIUM 5000 UNITS: 5000 INJECTION INTRAVENOUS; SUBCUTANEOUS at 11:53

## 2018-11-13 RX ADMIN — Medication 5 ML: at 21:10

## 2018-11-13 RX ADMIN — FAMOTIDINE 20 MG: 20 TABLET ORAL at 08:38

## 2018-11-13 RX ADMIN — OXYCODONE HYDROCHLORIDE 10 MG: 5 TABLET ORAL at 06:02

## 2018-11-13 RX ADMIN — METOPROLOL TARTRATE 25 MG: 25 TABLET ORAL at 08:38

## 2018-11-13 RX ADMIN — SIMETHICONE CHEW TAB 80 MG 80 MG: 80 TABLET ORAL at 06:02

## 2018-11-13 RX ADMIN — HEPARIN SODIUM 5000 UNITS: 5000 INJECTION INTRAVENOUS; SUBCUTANEOUS at 22:00

## 2018-11-13 RX ADMIN — Medication 1 AMPULE: at 21:11

## 2018-11-13 RX ADMIN — METOPROLOL TARTRATE 50 MG: 50 TABLET ORAL at 21:11

## 2018-11-13 RX ADMIN — OXYCODONE HYDROCHLORIDE 10 MG: 5 TABLET ORAL at 17:52

## 2018-11-13 RX ADMIN — SENNOSIDES AND DOCUSATE SODIUM 2 TABLET: 8.6; 5 TABLET ORAL at 08:38

## 2018-11-13 RX ADMIN — Medication 5 ML: at 06:02

## 2018-11-13 RX ADMIN — METOPROLOL TARTRATE 25 MG: 25 TABLET ORAL at 14:03

## 2018-11-13 RX ADMIN — Medication 5 ML: at 14:04

## 2018-11-13 RX ADMIN — CLONIDINE HYDROCHLORIDE 0.2 MG: 0.2 TABLET ORAL at 08:37

## 2018-11-13 RX ADMIN — OXYCODONE HYDROCHLORIDE 10 MG: 5 TABLET ORAL at 21:11

## 2018-11-13 RX ADMIN — OXYCODONE HYDROCHLORIDE 10 MG: 5 TABLET ORAL at 11:57

## 2018-11-13 NOTE — PROGRESS NOTES
Problem: Mobility Impaired (Adult and Pediatric) Goal: *Acute Goals and Plan of Care (Insert Text) STG: 
(1.)Mr. Kory Ambriz will move from supine to sit and sit to supine , scoot up and down and roll side to side with CONTACT GUARD ASSIST within 3 treatment day(s). (2.)Mr. Kory Ambriz will transfer from bed to chair and chair to bed with STAND BY ASSIST using the least restrictive device within 3 treatment day(s). (3.)Mr. Kory Ambriz will ambulate with STAND BY ASSIST for 150 feet with the least restrictive device within 3 treatment day(s). (4.)Mr. Kory Ambriz will tolerate AAROM, AROM and gentle pendulums of RUE with no increase in pain within 3 treatment days to improve independence with transfers. (5.)Mr. Kory Ambriz will perform standing static and dynamic balance activities x 15 minutes with STAND BY ASSIST to improve safety within 3 day(s). LTG: 
(1.)Mr. Kory Ambriz will move from supine to sit and sit to supine , scoot up and down and roll side to side in bed with MODIFIED INDEPENDENCE within 7 treatment day(s). (2.)Mr. Kory Ambriz will transfer from bed to chair and chair to bed with MODIFIED INDEPENDENCE using the least restrictive device within 7 treatment day(s). (3.)Mr. Kory Ambriz will ambulate with MODIFIED INDEPENDENCE for 500 feet with the least restrictive device within 7 treatment day(s). (4.)Mr. Kory Ambriz will perform standing static and dynamic balance activities x 15 minutes with MODIFIED INDEPENDENCE to improve safety within 7 day(s). (5.)Mr. Kory Ambriz will ascend and descend 2 stairs using L hand rail(s) with SUPERVISION to improve functional mobility and safety within 7 day(s). ________________________________________________________________________________________________ PHYSICAL THERAPY: Daily Note, Treatment Day: 5th, PM 11/13/2018INPATIENT: Hospital Day: 7 Payor: Lukasz Jimenez / Plan: [employer] OAP / Product Type: Commerical /  
 LLE total hip precautions, WBAT 
 RUE WBAT : Active and passive range of motion RIGHT elbow hand ACTIVE AND ACTIVE ASSISTED MOTION RIGHT SHOULDER GENTLE pendulums 
sling RIGHT shoulder NAME/AGE/GENDER: Sonny De La Cruz is a 48 y.o. male PRIMARY DIAGNOSIS: infected left hip  
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 
Patient has no known allergies. ASSESSMENT:  
Mr. Yas Bennett is a 48 y.o. male admitted with infected L hip and staphylococcal R shoulder. Patient was supine upon contact and agreeable to PT. Patient is very talkative throughout treatment but pleasant and motivated. Patient performs supine to sit with SBA, additional time, and cues for improved/proper technique. Patient participates in RUE ROM exercises per chart below requiring cues to perform properly and with good technique. Patient transfers to standing with SBA and ambulates 250' with use of rolling walker, SBA, and good weight bearing through LLE noted. Returns to EOB and to supine with min assist. Overall good progress towards physical therapy goals. No goals have been met thus far. Will continue efforts. This section established at most recent assessment PROBLEM LIST (Impairments causing functional limitations): 1. Decreased Strength 2. Decreased ADL/Functional Activities 3. Decreased Transfer Abilities 4. Decreased Ambulation Ability/Technique 5. Decreased Balance 6. Increased Pain 7. Decreased Activity Tolerance 8. Decreased Pacing Skills 9. Decreased Knowledge of Precautions 10. Decreased [place] with Home Exercise Program 
 INTERVENTIONS PLANNED: (Benefits and precautions of physical therapy have been discussed with the patient.) 1. Balance Exercise 2. Bed Mobility 3. Gait Training 4. Group Therapy 5. Home Exercise Program (HEP) 6. Therapeutic Activites 7. Therapeutic Exercise/Strengthening 8. Transfer Training 9. Patient Education TREATMENT PLAN: Frequency/Duration: twice daily for duration of hospital stay Rehabilitation Potential For Stated Goals: Excellent RECOMMENDED REHABILITATION/EQUIPMENT: (at time of discharge pending progress): Due to the probability of continued deficits (see above) this patient will likely need continued skilled physical therapy after discharge. Equipment:  
? None at this time HISTORY:  
History of Present Injury/Illness (Reason for Referral): 
Pt is a 49 y/o male who is almost 2 years s/p L JEIMY who was seen in clinic on 10/29 c/o 5 day history of sudden onset of moderate left hip pain. Denied any known injury. Left hip joint aspirate was obtained and sent for culture which grew pansensitive staph. Aureus. Patient was also noted to have elevated WBC (27.1) as well as elevated ESR (90). Patient reports having flu like symptoms 2 weeks ago and began developing right shoulder pain around that same time. Patient was subsequently evaluated by Dr. Vignesh Peralta and received right shoulder cortisone injection. He reports that he is still experiencing moderate to severe right shoulder pain. Denies fever, chills, nausea, vomiting, etc..  No other new complaints. Past Medical History/Comorbidities:  
Mr. Perez Seymour  has a past medical history of Arthritis, Biceps muscle tear, Chronic pain, Insomnia, MRSA (methicillin resistant Staphylococcus aureus) infection, Personal history of kidney stones, and Right shoulder pain. Mr. Perez Seymour  has a past surgical history that includes hx other surgical; hx rotator cuff repair (Left, 2010); hx orthopaedic (Left, 01/2017); hx septoplasty (11/15/2017); HIP ARTHROPLASTY TOTAL REVISION (Left, 11/6/2018);  SHOULDER I&D (Right, 11/6/2018); RIGHT SHOULDER ARTHROSCOPY SUBACROMIAL DECOMPRESSION ROTATOR CUFF REPAIR/ DISTAL CLAVICLE RESECTION (Right, 6/13/2017); LEFT TOTAL HIP ARTHROPLASTY (Left, 1/6/2017); and SHOULDER ARTHROSCOPY ACROMIOPLASTY ROTATOR CUFF REPAIR (Left, 10/21/2010). Social History/Living Environment:  
Home Environment: Private residence # Steps to Enter: 2 Rails to Enter: Yes Hand Rails : Left One/Two Story Residence: One story Living Alone: No 
Support Systems: Spouse/Significant Other/Partner Patient Expects to be Discharged to[de-identified] Private residence Current DME Used/Available at Home: Walker, rolling, Raised toilet seat, Grab bars, Adaptive bathing aides, Adaptive dressing aides Tub or Shower Type: Tub/Shower combination Prior Level of Function/Work/Activity: 
 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  
Most Recent Physical Functioning:  
Gross Assessment: 
  
         
  
Posture: 
  
Balance: 
Sitting: Intact Standing: Impaired Standing - Static: Good Standing - Dynamic : Fair Bed Mobility: 
Supine to Sit: Stand-by assistance Wheelchair Mobility: 
  
Transfers: 
Sit to Stand: Supervision Stand to Sit: Supervision Gait: 
  
Base of Support: Center of gravity altered Speed/Emma: Slow Step Length: Right shortened;Left shortened Gait Abnormalities: Decreased step clearance Distance (ft): 250 Feet (ft) Assistive Device: Walker, rolling Ambulation - Level of Assistance: Stand-by assistance Interventions: Safety awareness training;Verbal cues Body Structures Involved: 1. Nerves 2. Metabolic 3. Endocrine 4. Bones 5. Joints 6. Muscles Body Functions Affected: 1. Sensory/Pain 2. Neuromusculoskeletal 
3. Movement Related 4. Metobolic/Endocrine Activities and Participation Affected: 1. Mobility 2. Self Care 3. Domestic Life 4. Interpersonal Interactions and Relationships 5. Community, Social and [place] Number of elements that affect the Plan of Care: 4+: HIGH COMPLEXITY CLINICAL PRESENTATION:  
Presentation: Stable and uncomplicated: LOW COMPLEXITY CLINICAL DECISION MAKING:  
[facility] 6 Clicks Basic Mobility Inpatient Short Form How much difficulty does the patient currently have. .. Unable A Lot A Little None 1. Turning over in bed (including adjusting bedclothes, sheets and blankets)? [] 1   [] 2   [x] 3   [] 4  
2. Sitting down on and standing up from a chair with arms ( e.g., wheelchair, bedside commode, etc.)   [] 1   [] 2   [x] 3   [] 4  
3. Moving from lying on back to sitting on the side of the bed? [] 1   [x] 2   [] 3   [] 4 How much help from another person does the patient currently need. .. Total A Lot A Little None 4. Moving to and from a bed to a chair (including a wheelchair)? [] 1   [] 2   [x] 3   [] 4  
5. Need to walk in hospital room? [] 1   [x] 2   [] 3   [] 4  
6. Climbing 3-5 steps with a railing? [] 1   [x] 2   [] 3   [] 4  
© 2007, Trustees of [facility], under license to [employer]. All rights reserved Score:  Initial: 15 Most Recent: X (Date: -- ) Interpretation of Tool:  Represents activities that are increasingly more difficult (i.e. Bed mobility, Transfers, Gait). Score 24 23 22-20 19-15 14-10 9-7 6 Modifier CH CI CJ CK CL CM CN   
 
? Mobility - Walking and Moving Around:  
  - CURRENT STATUS: CK - 40%-59% impaired, limited or restricted  - GOAL STATUS: CJ - 20%-39% impaired, limited or restricted  - D/C STATUS:  ---------------To be determined--------------- Payor: Lukasz Jimenez / Plan: [employer] OAP / Product Type: Commerical /   
 
Medical Necessity:    
· Patient demonstrates excellent rehab potential due to higher previous functional level. Reason for Services/Other Comments: 
· Patient continues to require modification of therapeutic interventions to increase complexity of exercises. Use of outcome tool(s) and clinical judgement create a POC that gives a: Clear prediction of patient's progress: LOW COMPLEXITY  
  
 
 
 
TREATMENT:  
(In addition to Assessment/Re-Assessment sessions the following treatments were rendered) Pre-treatment Symptoms/Complaints: \"OK \" 
Pain: Initial:  
Pain Intensity 1: 0  Post Session:  0/10 Therapeutic Activity: (     10 minutes): Therapeutic activities including bed mobility training, transfer training, ambulation on level ground, static/dynamic standing balance,and patient education to improve mobility, strength and balance. Required minimal Safety awareness training;Verbal cues to promote static and dynamic balance in standing. Therapeutic Exercise: (   15 minutes):  Exercises per grid below to improve mobility, strength and range of motion RUE. Required minimal visual, manual and tactile cues to promote proper body alignment and promote proper body posture. Progressed range as indicated. Date: 
11/11/18 Date: 
11/12/18 Date: 
11/13/18 Date: 
11/13/18 ACTIVITY/EXERCISE AM PM PM AM PM  
RUE shoulder flexion 30 passive 30 passive 2x15 R 
PROM 2x15R PROM 2x20R PROM  
RUE elbow flexion 30 AA 30 AA 2x15 R 
 AAROM 2x15R 
AROM 2x15R  
AROM  
RUE finger flexion/extension 30 AA 30 AA x15 R Active x15R A x15R A  
RUE shoulder abduction 30 passive 30 passive 2x15R PROM 2x15R PROM 2x15R PROM Gentle pendulums Forearm pronation/supination   x10R A x10R A x15R A  
       
B = bilateral; AA = active assistive; A = active; P = passive Braces/Orthotics/Lines/Etc:  
 -None Treatment/Session Assessment:   
· Response to Treatment: see above · Interdisciplinary Collaboration:  
o Physical Therapy Assistant 
o Registered Nurse · After treatment position/precautions:  
o Supine in bed 
o Bed/Chair-wheels locked 
o Bed in low position 
o Call light within reach 
o RN notified · Compliance with Program/Exercises: doing better · Recommendations/Intent for next treatment session: \"Next visit will focus on advancements to more challenging activities and reduction in assistance provided\". Total Treatment Duration: PT Patient Time In/Time Out Time In: 6512 Time Out: 4970 Dang Kelley, PTA

## 2018-11-13 NOTE — PROGRESS NOTES
Problem: Interdisciplinary Rounds Goal: Interdisciplinary Rounds Outcome: Progressing Towards Goal 
Interdisciplinary team rounds were held 11/13/2018 with the following team members:Care Management, Physical Therapy, Physician and  and the patient. Plan of care discussed. See clinical pathway and/or care plan for interventions and desired outcomes.

## 2018-11-13 NOTE — PROGRESS NOTES
Per Horacio Bob, RN/Charge Nurse, patient's extra 3rd lumen on central line used for dialysis, may not be accessed for blood draws or any medications. She stated she asked the dialysis nurse today. Continue to have lab drawing blood.

## 2018-11-13 NOTE — PROGRESS NOTES
Problem: Mobility Impaired (Adult and Pediatric) Goal: *Acute Goals and Plan of Care (Insert Text) STG: 
(1.)Mr. Sinan Wilson will move from supine to sit and sit to supine , scoot up and down and roll side to side with CONTACT GUARD ASSIST within 3 treatment day(s). (2.)Mr. Sinan Wilson will transfer from bed to chair and chair to bed with STAND BY ASSIST using the least restrictive device within 3 treatment day(s). (3.)Mr. Sinan Wilson will ambulate with STAND BY ASSIST for 150 feet with the least restrictive device within 3 treatment day(s). (4.)Mr. Sinan Wilson will tolerate AAROM, AROM and gentle pendulums of RUE with no increase in pain within 3 treatment days to improve independence with transfers. (5.)Mr. Sinan Wilson will perform standing static and dynamic balance activities x 15 minutes with STAND BY ASSIST to improve safety within 3 day(s). LTG: 
(1.)Mr. Sinan Wilson will move from supine to sit and sit to supine , scoot up and down and roll side to side in bed with MODIFIED INDEPENDENCE within 7 treatment day(s). (2.)Mr. Sinan Wilson will transfer from bed to chair and chair to bed with MODIFIED INDEPENDENCE using the least restrictive device within 7 treatment day(s). (3.)Mr. Sinan Wilson will ambulate with MODIFIED INDEPENDENCE for 500 feet with the least restrictive device within 7 treatment day(s). (4.)Mr. Sinan Wilson will perform standing static and dynamic balance activities x 15 minutes with MODIFIED INDEPENDENCE to improve safety within 7 day(s). (5.)Mr. Sinan Wilson will ascend and descend 2 stairs using L hand rail(s) with SUPERVISION to improve functional mobility and safety within 7 day(s). ________________________________________________________________________________________________ PHYSICAL THERAPY: Daily Note, Treatment Day: 5th, AM 11/13/2018INPATIENT: Hospital Day: 7 Payor: Marques George / Plan: [employer] OAP / Product Type: Commerical /  
 LLE total hip precautions, WBAT 
 RUE WBAT : Active and passive range of motion RIGHT elbow hand ACTIVE AND ACTIVE ASSISTED MOTION RIGHT SHOULDER GENTLE pendulums 
sling RIGHT shoulder NAME/AGE/GENDER: Dolores Soliz is a 48 y.o. male PRIMARY DIAGNOSIS: infected left hip  
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 
Patient has no known allergies. ASSESSMENT:  
Mr. Cristiano Bai is a 48 y.o. male admitted with infected L hip and staphylococcal R shoulder. Patient was supine upon contact and agreeable to PT. Patient is very talkative throughout treatment but pleasant and motivated. Patient performs supine to sit with SBA, additional time, and cues for improved/proper technique. Patient participates in RUE ROM exercises per chart below requiring cues to perform properly and with good technique. Patient transfers to standing with SBA and ambulates 250' with use of rolling walker, SBA, and good weight bearing through LLE noted. Overall good progress towards physical therapy goals. No goals have been met thus far. Will continue efforts. This section established at most recent assessment PROBLEM LIST (Impairments causing functional limitations): 1. Decreased Strength 2. Decreased ADL/Functional Activities 3. Decreased Transfer Abilities 4. Decreased Ambulation Ability/Technique 5. Decreased Balance 6. Increased Pain 7. Decreased Activity Tolerance 8. Decreased Pacing Skills 9. Decreased Knowledge of Precautions 10. Decreased [place] with Home Exercise Program 
 INTERVENTIONS PLANNED: (Benefits and precautions of physical therapy have been discussed with the patient.) 1. Balance Exercise 2. Bed Mobility 3. Gait Training 4. Group Therapy 5. Home Exercise Program (HEP) 6. Therapeutic Activites 7. Therapeutic Exercise/Strengthening 8. Transfer Training 9. Patient Education TREATMENT PLAN: Frequency/Duration: twice daily for duration of hospital stay Rehabilitation Potential For Stated Goals: Excellent RECOMMENDED REHABILITATION/EQUIPMENT: (at time of discharge pending progress): Due to the probability of continued deficits (see above) this patient will likely need continued skilled physical therapy after discharge. Equipment:  
? None at this time HISTORY:  
History of Present Injury/Illness (Reason for Referral): 
Pt is a 49 y/o male who is almost 2 years s/p L JEIMY who was seen in clinic on 10/29 c/o 5 day history of sudden onset of moderate left hip pain. Denied any known injury. Left hip joint aspirate was obtained and sent for culture which grew pansensitive staph. Aureus. Patient was also noted to have elevated WBC (27.1) as well as elevated ESR (90). Patient reports having flu like symptoms 2 weeks ago and began developing right shoulder pain around that same time. Patient was subsequently evaluated by Dr. Sean Parada and received right shoulder cortisone injection. He reports that he is still experiencing moderate to severe right shoulder pain. Denies fever, chills, nausea, vomiting, etc..  No other new complaints. Past Medical History/Comorbidities:  
Mr. Parvez Panda  has a past medical history of Arthritis, Biceps muscle tear, Chronic pain, Insomnia, MRSA (methicillin resistant Staphylococcus aureus) infection, Personal history of kidney stones, and Right shoulder pain. Mr. Parvez Panda  has a past surgical history that includes hx other surgical; hx rotator cuff repair (Left, 2010); hx orthopaedic (Left, 01/2017); hx septoplasty (11/15/2017); HIP ARTHROPLASTY TOTAL REVISION (Left, 11/6/2018);  SHOULDER I&D (Right, 11/6/2018); RIGHT SHOULDER ARTHROSCOPY SUBACROMIAL DECOMPRESSION ROTATOR CUFF REPAIR/ DISTAL CLAVICLE RESECTION (Right, 6/13/2017); LEFT TOTAL HIP ARTHROPLASTY (Left, 1/6/2017); and SHOULDER ARTHROSCOPY ACROMIOPLASTY ROTATOR CUFF REPAIR (Left, 10/21/2010). Social History/Living Environment:  
Home Environment: Private residence # Steps to Enter: 2 Rails to Enter: Yes Hand Rails : Left One/Two Story Residence: One story Living Alone: No 
Support Systems: Spouse/Significant Other/Partner Patient Expects to be Discharged to[de-identified] Private residence Current DME Used/Available at Home: Walker, rolling, Raised toilet seat, Grab bars, Adaptive bathing aides, Adaptive dressing aides Tub or Shower Type: Tub/Shower combination Prior Level of Function/Work/Activity: 
 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  
Most Recent Physical Functioning:  
Gross Assessment: 
  
         
  
Posture: 
  
Balance: 
Sitting: Intact Standing: Impaired Standing - Static: Good Standing - Dynamic : Fair Bed Mobility: 
Supine to Sit: Stand-by assistance Wheelchair Mobility: 
  
Transfers: 
Sit to Stand: Supervision Stand to Sit: Supervision Gait: 
  
Base of Support: Center of gravity altered Speed/Emma: Slow Step Length: Right shortened;Left shortened Gait Abnormalities: Decreased step clearance Distance (ft): 250 Feet (ft) Assistive Device: Walker, rolling Ambulation - Level of Assistance: Stand-by assistance Interventions: Safety awareness training;Verbal cues Body Structures Involved: 1. Nerves 2. Metabolic 3. Endocrine 4. Bones 5. Joints 6. Muscles Body Functions Affected: 1. Sensory/Pain 2. Neuromusculoskeletal 
3. Movement Related 4. Metobolic/Endocrine Activities and Participation Affected: 1. Mobility 2. Self Care 3. Domestic Life 4. Interpersonal Interactions and Relationships 5. Community, Social and [place] Number of elements that affect the Plan of Care: 4+: HIGH COMPLEXITY CLINICAL PRESENTATION:  
Presentation: Stable and uncomplicated: LOW COMPLEXITY CLINICAL DECISION MAKIN [facility] [address] [address] AM-PAC 6 Clicks Basic Mobility Inpatient Short Form How much difficulty does the patient currently have. .. Unable A Lot A Little None 1. Turning over in bed (including adjusting bedclothes, sheets and blankets)? [] 1   [] 2   [x] 3   [] 4  
2. Sitting down on and standing up from a chair with arms ( e.g., wheelchair, bedside commode, etc.)   [] 1   [] 2   [x] 3   [] 4  
3. Moving from lying on back to sitting on the side of the bed? [] 1   [x] 2   [] 3   [] 4 How much help from another person does the patient currently need. .. Total A Lot A Little None 4. Moving to and from a bed to a chair (including a wheelchair)? [] 1   [] 2   [x] 3   [] 4  
5. Need to walk in hospital room? [] 1   [x] 2   [] 3   [] 4  
6. Climbing 3-5 steps with a railing? [] 1   [x] 2   [] 3   [] 4  
© , Trustees of [facility] [address] [address], under license to [employer]. All rights reserved Score:  Initial: 15 Most Recent: X (Date: -- ) Interpretation of Tool:  Represents activities that are increasingly more difficult (i.e. Bed mobility, Transfers, Gait). Score 24 23 22-20 19-15 14-10 9-7 6 Modifier CH CI CJ CK CL CM CN   
 
? Mobility - Walking and Moving Around:  
  - CURRENT STATUS: CK - 40%-59% impaired, limited or restricted  - GOAL STATUS: CJ - 20%-39% impaired, limited or restricted  - D/C STATUS:  ---------------To be determined--------------- Payor: Angelita Jacobson / Plan: [employer] OAP / Product Type: Commerical /   
 
Medical Necessity:    
· Patient demonstrates excellent rehab potential due to higher previous functional level. Reason for Services/Other Comments: 
· Patient continues to require modification of therapeutic interventions to increase complexity of exercises. Use of outcome tool(s) and clinical judgement create a POC that gives a: Clear prediction of patient's progress: LOW COMPLEXITY  
  
 
 
 
TREATMENT:  
(In addition to Assessment/Re-Assessment sessions the following treatments were rendered) Pre-treatment Symptoms/Complaints: \"OK \" 
Pain: Initial:  
Pain Intensity 1: 0  Post Session:  0/10 Therapeutic Activity: (     10 minutes): Therapeutic activities including bed mobility training, transfer training, ambulation on level ground, static/dynamic standing balance,and patient education to improve mobility, strength and balance. Required minimal Safety awareness training;Verbal cues to promote static and dynamic balance in standing. Therapeutic Exercise: (   15 minutes):  Exercises per grid below to improve mobility, strength and range of motion RUE. Required minimal visual, manual and tactile cues to promote proper body alignment and promote proper body posture. Progressed range as indicated. Date: 
11/11/18 Date: 
11/12/18 Date: 
11/13/18 ACTIVITY/EXERCISE AM PM PM AM  
RUE shoulder flexion 30 passive 30 passive 2x15 R 
PROM 2x15R PROM  
RUE elbow flexion 30 AA 30 AA 2x15 R 
 AAROM 2x15R 
AROM  
RUE finger flexion/extension 30 AA 30 AA x15 R Active x15R A  
RUE shoulder abduction 30 passive 30 passive 2x15R PROM 2x15R PROM Gentle pendulums Forearm pronation/supination   x10R A x10R A  
      
B = bilateral; AA = active assistive; A = active; P = passive Braces/Orthotics/Lines/Etc:  
 -None Treatment/Session Assessment:   
· Response to Treatment: see above · Interdisciplinary Collaboration:  
o Physical Therapy Assistant 
o Registered Nurse · After treatment position/precautions:  
o Up in chair 
o Bed/Chair-wheels locked 
o Call light within reach 
o RN notified · Compliance with Program/Exercises: doing better · Recommendations/Intent for next treatment session:   \"Next visit will focus on advancements to more challenging activities and reduction in assistance provided\". Total Treatment Duration: PT Patient Time In/Time Out Time In: 3468 Time Out: 1115 Albino Jeanne Kelley PTA

## 2018-11-13 NOTE — PROGRESS NOTES
Infectious Disease Progress Note Today's Date: 2018 Admit Date: 2018 Impression: · MSSA bacteremia, (18) -  [address] NG; TTE no evidence of endocarditis · Late onset L JEIMY infection, MSSA (10/29), s/p I&D and polyethylene exchange  · MSSA R shoulder septic arthritis, s/p I and D  · History R shoulder rotator cuff repair · Oral HSV, resolved · RANI - ATN (post-op hypotension, Staphylococcal kidney injury, rifampin) vs AIN (nafcillin, keflex, or cefazolin) - now on HD · Elevated LFTs, Hep B, Hep C neg Plan:  
· Pt transitioned to vancomycin. Duration 6 weeks (EOT 18). Will need oral therapy following in presence of orthopedic hardware. Would use non-beta lactam regimen given possible AIN. · Closely watch renal function and vancomycin levels, adjust dosing based on renal function recovery Anti-infectives: · Nafcillin · Cefazolin---Vanc (- · Rifampin Subjective: Interval History: Urine output increasing. Still with R shoulder pain, decreased ROM No Known Allergies Review of Systems:  A comprehensive review of systems was negative except for that written in the History of Present Illness. Objective:  
 
Visit Vitals /80 (BP 1 Location: Left arm, BP Patient Position: At rest) Pulse 67 Temp 98.5 °F (36.9 °C) Resp 20 Wt 97 kg (213 lb 12.8 oz) SpO2 99% BMI 33.49 kg/m² Temp (24hrs), Av.2 °F (36.8 °C), Min:97.7 °F (36.5 °C), Max:98.7 °F (37.1 °C) Lines:  CVC: R IJ HD cath Physical Exam:   
General:  Alert, cooperative, in no acute distress;  
Eyes:  Sclera anicteric Neck: Supple Lungs:   Unlabored Abdomen:   Soft, non tender, non distended, active bowel sounds Extremities: No cyanosis Skin: No acute rash Musculoskeletal: Muscular. R shoulder swollen with clean dressing, L hip not examined today Lines/Devices:  R IJ HD line Psych: Alert and oriented, normal mood affect given the setting Data Review: CBC: 
Recent Labs 18 
0451 18 
0500 HGB 8.8* 8.2* HCT 26.1* 24.7* BMP: 
Recent Labs 18 
0523 18 
0451 18 
0500 CREA 10.60* 12.80* 10.20* BUN 48* 65* 52* * 133* 136  
K 5.1 5.1 4.7 CL 98 99 100 CO2 23 23 24 AGAP 13 11 12 GLU 81 93 105* LFTS: 
Recent Labs 18 
0500 11/10/18 
1631 TBILI 0.5 0.6 ALT 18 16 SGOT 54* 75* AP 61 69  
TP 5.8* 6.6 ALB 1.6* 1.8* Microbiology:  
 
All Micro Results Procedure Component Value Units Date/Time CULTURE, BLOOD [[id number]] Collected:  18 Order Status:  Completed Specimen:  Whole Blood Updated:  18 3997 Special Requests: --     
  RIGHT 
HAND Culture result: NO GROWTH 5 DAYS     
 CULTURE, BLOOD [[id number]] Collected:  18 0353 Order Status:  Completed Specimen:  Whole Blood Updated:  18 3262 Special Requests: --     
  RIGHT 
HAND Culture result: NO GROWTH 5 DAYS     
 CULTURE, URINE [[id number]] Collected:  11/10/18 1424 Order Status:  Completed Specimen:  Urine Updated:  18 0725 Special Requests: NO SPECIAL REQUESTS Culture result: NO GROWTH 2 DAYS     
 CULTURE, TISSUE [place] STAIN [[id number]]  (Abnormal)  (Susceptibility) Collected:  18 9981 Order Status:  Completed Specimen:  Joint, Shoulder Updated:  11/10/18 1144 Special Requests: NO SPECIAL REQUESTS     
  GRAM STAIN 0 TO 1 WBC'S/OIF  
   NO DEFINITE ORGANISM SEEN Culture result:    
  LIGHT STAPHYLOCOCCUS AUREUS  
     
   LIGHT 
NORMAL SKIN LATASHA ISOLATED 
   
      
  THIS ORGANISM WILL BE HELD FOR 7 DAYS. IF FURTHER TESTING IS REQUIRED PLEASE NOTIFY MICROBIOLOGY Imagin/8/18 TTE: SUMMARY: 
 
-  Left ventricle: Systolic function was normal. Ejection fraction was 
estimated in the range of 55 % to 60 %. There were no regional wall motion 
abnormalities. -  Aortic valve: The valve was probably trileaflet. Leaflets exhibited mild 
sclerosis. There was no evidence for vegetation. -  Mitral valve: There was mild regurgitation. There was no evidence for 
vegetation. Signed By: Glen Baxter MD   
 November 13, 2018

## 2018-11-13 NOTE — PROGRESS NOTES
Problem: Falls - Risk of 
Goal: *Absence of Falls Document Cynthia Villalobos Fall Risk and appropriate interventions in the flowsheet. Outcome: Progressing Towards Goal 
Fall Risk Interventions: 
Mobility Interventions: Bed/chair exit alarm, Patient to call before getting OOB, PT Consult for mobility concerns, PT Consult for assist device competence Mentation Interventions: Bed/chair exit alarm, Evaluate medications/consider consulting pharmacy Medication Interventions: Bed/chair exit alarm, Evaluate medications/consider consulting pharmacy, Patient to call before getting OOB Elimination Interventions: Call light in reach, Patient to call for help with toileting needs

## 2018-11-13 NOTE — PROGRESS NOTES
Hospitalist Progress Note 2018 Admit Date: 2018  9:42 AM  
NAME: Barrett Vicente :  1965 MRN:  [id number] Attending: Nasim Barton DO 
PCP:  Murphy Vo MD 
HPI: 
 
50 y/o [address] history of L TOF VHZ  and R rotator cuff repair 2017.  Two weeks PTA developed malaise, flu like symptoms, fever blisters, R shoulder pain.  Had R shoulder cortisone injection given.  Next day began having pain L hip.  Had hip aspirated 10/29 which grew MSSA.  Admitted by ortho on  for hip revision.  ID and hospitalist consulted. Nicholas Estevez worsening RANI on  with Cr of 1.7 and Pt taken to OR that afternoon. Developed RANI due to ATN with hyperkalemia. Transferred to [facility] on  for possible HD. Nephro, Ortho and ID following. SUBJECTIVE:  
: Feels better, Making more urine. No abd pain or CP. Nursing notes and chart reviewed. Review of Systems negative with exception of pertinent positives noted above. PHYSICAL EXAM  
 
Visit Vitals /80 (BP 1 Location: Left arm, BP Patient Position: At rest) Pulse 67 Temp 98.5 °F (36.9 °C) Resp 20 Wt 97 kg (213 lb 12.8 oz) SpO2 99% BMI 33.49 kg/m² Temp (24hrs), Av.2 °F (36.8 °C), Min:97.7 °F (36.5 °C), Max:98.7 °F (37.1 °C) Oxygen Therapy O2 Sat (%): 99 % (18 0741) Pulse via Oximetry: 75 beats per minute (18) O2 Device: Nasal cannula (18) O2 Flow Rate (L/min): 2 l/min (18) FIO2 (%): 28 % (18) No intake or output data in the 24 hours ending 18 1056 General: No acute distress. Alert.   
Head:  AT/NC Lungs:  CTABL. Heart:  RRR, no murmur, rub, or gallop Abdomen: Soft, non-distended, non-tender, +bs Extremities: No cyanosis or clubbing. Neurologic:  No focal deficits. Moves all extremities. Skin:  No Obvious Rash Psych:  Normal affect. LABS AND STUDIES: 
Personally reviewed all labs, meds, and studies for past 24hrs. ASSESSMENT Active Hospital Problems Diagnosis Date Noted  Sepsis ([facility] [address] 11/07/2018  Postoperative anemia due to acute blood loss 11/07/2018  Staphylococcal arthritis of right shoulder ([facility] [address] 11/06/2018  Acute renal failure with tubular necrosis ([facility] [address] 11/06/2018  Infected prosthetic hip ([facility] [address] 11/05/2018  Septic arthritis of shoulder, right ([facility] [address] 11/05/2018  Degenerative joint disease of right acromioclavicular joint 11/05/2018  Osteoarthritis of right glenohumeral joint 11/05/2018  Complete tear of right rotator cuff 11/05/2018 PLAN: 
 
1- Staph aureus bacteremia due to septic R shoulder, pain controlled postop, Abx per ID on Vanco X 6 weeks. 2- ATN vs AIN, started on dialysis, severely oliguric, Nephro managing. 3- Normocytic anemia, s/p PRBC 2U transfusion, stable Hb 
4- Abdominal pain, US and CT unremarkable. Pain has resolved. 5- Pain control. GI cocktail as needed. 6- HTN. Started on clonidine, increase Metoprolol today. Adjust as needed, watch HR.  
  
Dispo; TBD, needs arrangement for dialysis and extended IV abx High risk pt. DVT ppx:  hsq Discussed plan with pt who is in agreement. All questions answered. Signed By: [address], MD   
 November 13, 2018

## 2018-11-13 NOTE — PROGRESS NOTES
Subjective:  
Daily Progress Note: 11/13/2018 10:38 AM 
 
E#xcellent UO. No complaints Comfortable No CP No SOB No Abd Pain MS - 
 
 
 
Current Facility-Administered Medications Medication Dose Route Frequency  ceFAZolin (ANCEF) 2 g/20 mL in sterile water IV syringe  2 g IntraVENous Q24H  
 metoprolol tartrate (LOPRESSOR) tablet 25 mg  25 mg Oral Q12H  
 acetaminophen (TYLENOL) tablet 1,000 mg  1,000 mg Oral Q8H  
 cloNIDine HCl (CATAPRES) tablet 0.2 mg  0.2 mg Oral BID  hydrALAZINE (APRESOLINE) 20 mg/mL injection 10 mg  10 mg IntraVENous Q6H PRN  
 simethicone (MYLICON) tablet 80 mg  80 mg Oral QID PRN  polyethylene glycol (MIRALAX) packet 17 g  17 g Oral DAILY PRN  
 heparin (porcine) injection 5,000 Units  5,000 Units SubCUTAneous Q12H  
 0.9% sodium chloride infusion  75 mL/hr IntraVENous CONTINUOUS  
 epoetin zee (EPOGEN;PROCRIT) injection 20,000 Units  20,000 Units SubCUTAneous Q7D  
 famotidine (PEPCID) tablet 20 mg  20 mg Oral DAILY  heparin (PF) 2 units/ml in NS infusion 2,000 Units  1,000 mL Irrigation CONTINUOUS  
 diphenhydrAMINE (BENADRYL) capsule 25 mg  25 mg Oral Q4H PRN  
 HYDROmorphone (PF) (DILAUDID) injection 1 mg  1 mg IntraVENous Q3H PRN  
 naloxone (NARCAN) injection 0.2-0.4 mg  0.2-0.4 mg IntraVENous Q10MIN PRN  
 oxyCODONE IR (ROXICODONE) tablet 10 mg  10 mg Oral Q3H PRN  
 senna-docusate (PERICOLACE) 8.6-50 mg per tablet 2 Tab  2 Tab Oral DAILY  sodium chloride (NS) flush 5-10 mL  5-10 mL IntraVENous Q8H  
 sodium chloride (NS) flush 5-10 mL  5-10 mL IntraVENous PRN  
 zolpidem (AMBIEN) tablet 5 mg  5 mg Oral QHS PRN  
 alcohol 62% (NOZIN) nasal  1 Ampule  1 Ampule Topical Q12H  
 ondansetron (ZOFRAN) injection 4 mg  4 mg IntraVENous Q4H PRN  
 NUTRITIONAL SUPPORT ELECTROLYTE PRN ORDERS   Does Not Apply PRN  
 0.9% sodium chloride infusion 250 mL  250 mL IntraVENous PRN Objective:  
 
Visit Vitals /80 (BP 1 Location: Left arm, BP Patient Position: At rest) Pulse 67 Temp 98.5 °F (36.9 °C) Resp 20 Wt 97 kg (213 lb 12.8 oz) SpO2 99% BMI 33.49 kg/m² O2 Flow Rate (L/min): 2 l/min O2 Device: Nasal cannula Temp (24hrs), Av.2 °F (36.8 °C), Min:97.7 °F (36.5 °C), Max:98.7 °F (37.1 °C) No intake/output data recorded.  1901 -  0700 In: -  
Out: 7150 [Urine:150] Visit Vitals /80 (BP 1 Location: Left arm, BP Patient Position: At rest) Pulse 67 Temp 98.5 °F (36.9 °C) Resp 20 Wt 97 kg (213 lb 12.8 oz) SpO2 99% BMI 33.49 kg/m² Head: Normocephalic, without obvious abnormality Neck: no JVD Lungs: clear to auscultation bilaterally Heart: regular rate and rhythm Abdomen: soft no guarding Extremities: no edema Data Review Recent Results (from the past 48 hour(s)) METABOLIC PANEL, BASIC Collection Time: 18  4:51 AM  
Result Value Ref Range Sodium 133 (L) 136 - 145 mmol/L Potassium 5.1 3.5 - 5.1 mmol/L Chloride 99 98 - 107 mmol/L  
 CO2 23 21 - 32 mmol/L Anion gap 11 7 - 16 mmol/L Glucose 93 65 - 100 mg/dL BUN 65 (H) 6 - 23 MG/DL Creatinine 12.80 (H) 0.8 - 1.5 MG/DL  
 GFR est AA 5 (L) >60 ml/min/1.73m2 GFR est non-AA 4 (L) >60 ml/min/1.73m2 Calcium 8.8 8.3 - 10.4 MG/DL  
HGB & HCT Collection Time: 18  4:51 AM  
Result Value Ref Range HGB 8.8 (L) 13.6 - 17.2 g/dL HCT 26.1 (L) 41.1 - 50.3 % Sk[employer] Smart Collection Time: 18  3:10 PM  
Result Value Ref Range Vancomycin, random 73.5 UG/ML  
METABOLIC PANEL, BASIC Collection Time: 18  5:23 AM  
Result Value Ref Range Sodium 134 (L) 136 - 145 mmol/L Potassium 5.1 3.5 - 5.1 mmol/L Chloride 98 98 - 107 mmol/L  
 CO2 23 21 - 32 mmol/L Anion gap 13 7 - 16 mmol/L Glucose 81 65 - 100 mg/dL BUN 48 (H) 6 - 23 MG/DL Creatinine 10.60 (H) 0.8 - 1.5 MG/DL  
 GFR est AA 7 (L) >60 ml/min/1.73m2 GFR est non-AA 5 (L) >60 ml/min/1.73m2 Calcium 8.4 8.3 - 10.4 MG/DL Assessment Patient Active Problem List  
 Diagnosis Date Noted  Sepsis ([facility] [address] 11/07/2018  Postoperative anemia due to acute blood loss 11/07/2018  Staphylococcal arthritis of right shoulder ([facility] [address] 11/06/2018  Acute renal failure with tubular necrosis ([facility] [address] 11/06/2018  Infected prosthetic hip ([facility] [address] 11/05/2018  Septic arthritis of shoulder, right ([facility] [address] 11/05/2018  Degenerative joint disease of right acromioclavicular joint 11/05/2018  Osteoarthritis of right glenohumeral joint 11/05/2018  Complete tear of right rotator cuff 11/05/2018  S/P total hip arthroplasty 01/06/2017  Arthritis of left hip 01/06/2017  Acute pain of right shoulder 06/10/2016  Hip pain 06/30/2015 Assessment and Plan: RANI - ATN vs AIN. Had been on celebrex, B-lactams and rifampin 
- remains oliguric with worsening renal function-->s/p temp cath and started HD 11/9/18 Will need TDC this week if no signs of recovery for out pt acute slot. Dialysis probably tomorrow. Creatinine still up. PC tomorrow if there is no recovery. Hyperkalemia resolved with dialysis MSSA bacteremia/septic shoulder and hip 
- ID following 
- cefazolin and rifampin changed to vanc per ID for possible AIN. Still on Ancef - change to Vanco today. - pharmacy to dose vanc with close monitoring of levels Anemia

## 2018-11-13 NOTE — PROGRESS NOTES
Pt is pending final decision about Dialysis after DC from Nephrology, per Dr Correia Floor will know by Wednesday if outpt slot needed or not, pt has had Hep B antigen and PPD completed, will need to apply for Diaysis slot if needed. Pt will also need additional 6 weeks of IV antibx after DC, Facesheet and notes faxed to [facility] [place] to check pts benefits for possible home with antibx and will possible need HH, CM will continue to follow for DC needs and assist as needed.

## 2018-11-14 LAB
ANION GAP SERPL CALC-SCNC: 14 MMOL/L (ref 7–16)
BACTERIA SPEC CULT: NORMAL
BACTERIA SPEC CULT: NORMAL
BUN SERPL-MCNC: 60 MG/DL (ref 6–23)
CALCIUM SERPL-MCNC: 8.5 MG/DL (ref 8.3–10.4)
CHLORIDE SERPL-SCNC: 98 MMOL/L (ref 98–107)
CO2 SERPL-SCNC: 22 MMOL/L (ref 21–32)
CREAT SERPL-MCNC: 13 MG/DL (ref 0.8–1.5)
GLUCOSE SERPL-MCNC: 82 MG/DL (ref 65–100)
HCT VFR BLD AUTO: 22.7 % (ref 41.1–50.3)
HCT VFR BLD AUTO: 24.2 % (ref 41.1–50.3)
HGB BLD-MCNC: 7.3 G/DL (ref 13.6–17.2)
HGB BLD-MCNC: 7.7 G/DL (ref 13.6–17.2)
POTASSIUM SERPL-SCNC: 4.8 MMOL/L (ref 3.5–5.1)
SERVICE CMNT-IMP: NORMAL
SERVICE CMNT-IMP: NORMAL
SODIUM SERPL-SCNC: 134 MMOL/L (ref 136–145)
VANCOMYCIN SERPL-MCNC: 22.1 UG/ML

## 2018-11-14 PROCEDURE — 90935 HEMODIALYSIS ONE EVALUATION: CPT

## 2018-11-14 PROCEDURE — 97530 THERAPEUTIC ACTIVITIES: CPT

## 2018-11-14 PROCEDURE — 74011250636 HC RX REV CODE- 250/636: Performed by: INTERNAL MEDICINE

## 2018-11-14 PROCEDURE — 74011250637 HC RX REV CODE- 250/637: Performed by: INTERNAL MEDICINE

## 2018-11-14 PROCEDURE — 74011250637 HC RX REV CODE- 250/637: Performed by: HOSPITALIST

## 2018-11-14 PROCEDURE — 74011250636 HC RX REV CODE- 250/636: Performed by: HOSPITALIST

## 2018-11-14 PROCEDURE — 65660000000 HC RM CCU STEPDOWN

## 2018-11-14 PROCEDURE — 85014 HEMATOCRIT: CPT

## 2018-11-14 PROCEDURE — 36415 COLL VENOUS BLD VENIPUNCTURE: CPT

## 2018-11-14 PROCEDURE — 80202 ASSAY OF VANCOMYCIN: CPT

## 2018-11-14 PROCEDURE — 80048 BASIC METABOLIC PNL TOTAL CA: CPT

## 2018-11-14 PROCEDURE — 97110 THERAPEUTIC EXERCISES: CPT

## 2018-11-14 RX ORDER — AMLODIPINE BESYLATE 5 MG/1
5 TABLET ORAL DAILY
Status: DISCONTINUED | OUTPATIENT
Start: 2018-11-15 | End: 2018-11-20 | Stop reason: HOSPADM

## 2018-11-14 RX ORDER — HEPARIN SODIUM 1000 [USP'U]/ML
5000 INJECTION, SOLUTION INTRAVENOUS; SUBCUTANEOUS
Status: DISCONTINUED | OUTPATIENT
Start: 2018-11-14 | End: 2018-11-20 | Stop reason: HOSPADM

## 2018-11-14 RX ADMIN — ACETAMINOPHEN 1000 MG: 500 TABLET, FILM COATED ORAL at 19:19

## 2018-11-14 RX ADMIN — OXYCODONE HYDROCHLORIDE 10 MG: 5 TABLET ORAL at 16:32

## 2018-11-14 RX ADMIN — ZOLPIDEM TARTRATE 5 MG: 5 TABLET ORAL at 20:02

## 2018-11-14 RX ADMIN — HEPARIN SODIUM 5000 UNITS: 5000 INJECTION INTRAVENOUS; SUBCUTANEOUS at 21:56

## 2018-11-14 RX ADMIN — OXYCODONE HYDROCHLORIDE 10 MG: 5 TABLET ORAL at 21:56

## 2018-11-14 RX ADMIN — FAMOTIDINE 20 MG: 20 TABLET ORAL at 11:46

## 2018-11-14 RX ADMIN — METOPROLOL TARTRATE 50 MG: 50 TABLET ORAL at 11:46

## 2018-11-14 RX ADMIN — METOPROLOL TARTRATE 50 MG: 50 TABLET ORAL at 20:02

## 2018-11-14 RX ADMIN — Medication 5 ML: at 11:46

## 2018-11-14 RX ADMIN — CLONIDINE HYDROCHLORIDE 0.2 MG: 0.2 TABLET ORAL at 11:46

## 2018-11-14 RX ADMIN — Medication 1 AMPULE: at 20:02

## 2018-11-14 RX ADMIN — SIMETHICONE CHEW TAB 80 MG 80 MG: 80 TABLET ORAL at 04:30

## 2018-11-14 RX ADMIN — HYDRALAZINE HYDROCHLORIDE 10 MG: 20 INJECTION INTRAMUSCULAR; INTRAVENOUS at 15:39

## 2018-11-14 RX ADMIN — Medication 10 ML: at 21:56

## 2018-11-14 RX ADMIN — HEPARIN SODIUM 5000 UNITS: 1000 INJECTION INTRAVENOUS; SUBCUTANEOUS at 07:18

## 2018-11-14 RX ADMIN — OXYCODONE HYDROCHLORIDE 10 MG: 5 TABLET ORAL at 04:30

## 2018-11-14 RX ADMIN — HEPARIN SODIUM 5000 UNITS: 5000 INJECTION INTRAVENOUS; SUBCUTANEOUS at 11:45

## 2018-11-14 RX ADMIN — OXYCODONE HYDROCHLORIDE 10 MG: 5 TABLET ORAL at 11:46

## 2018-11-14 RX ADMIN — Medication 1 AMPULE: at 11:45

## 2018-11-14 RX ADMIN — CLONIDINE HYDROCHLORIDE 0.2 MG: 0.2 TABLET ORAL at 17:25

## 2018-11-14 RX ADMIN — SENNOSIDES AND DOCUSATE SODIUM 2 TABLET: 8.6; 5 TABLET ORAL at 11:46

## 2018-11-14 RX ADMIN — Medication 5 ML: at 04:30

## 2018-11-14 NOTE — DIALYSIS
TRANSFER IN - DIALYSIS Received patient in dialysis unit  from room 735 (unit) for ordered procedure. Consent verified for renal replacement therapy. Patient alert and oriented x 4 and vital signs stable. /87 P 66 Hemodialysis initiated using right temp IJ. Aspirated and flushed both ports without difficulty. Dressing clean, dry and intact. Machine settings per MD order. Heparin 1000 unit bolus and 500 units/hr. Will monitor during treatment.

## 2018-11-14 NOTE — PROGRESS NOTES
[facility] PROGRESS NOTE Follow up for: RANI Subjective:  
Patient seen and examined on dialysis. States his UOP has been picking up. ROS: 
Gen - no fever, no chills, appetite okay CV - no chest pain, no orthopnea Lung - no shortness of breath, no cough Abd - no tenderness, no nausea, no vomiting Ext - no edema Objective:  
Exam: 
Vitals:  
 11/14/18 1179 11/14/18 0729 11/14/18 0801 11/14/18 6127 BP: 182/87 155/84 171/83 146/75 Pulse: 66 61 64 61 Resp:      
Temp:      
SpO2:      
Weight:      
 
 
 
Intake/Output Summary (Last 24 hours) at 11/14/2018 5124 Last data filed at 11/13/2018 2125 Gross per 24 hour Intake  Output 200 ml Net -200 ml Current Facility-Administered Medications Medication Dose Route Frequency  heparin (porcine) 1,000 unit/mL injection 5,000 Units  5,000 Units Hemodialysis DIALYSIS PRN  
 metoprolol tartrate (LOPRESSOR) tablet 50 mg  50 mg Oral Q12H  
 acetaminophen (TYLENOL) tablet 1,000 mg  1,000 mg Oral Q6H PRN  
 VANCOMYCIN INTERMITTENT DOSING   Other Rx Dosing/Monitoring  cloNIDine HCl (CATAPRES) tablet 0.2 mg  0.2 mg Oral BID  hydrALAZINE (APRESOLINE) 20 mg/mL injection 10 mg  10 mg IntraVENous Q6H PRN  
 simethicone (MYLICON) tablet 80 mg  80 mg Oral QID PRN  polyethylene glycol (MIRALAX) packet 17 g  17 g Oral DAILY PRN  
 heparin (porcine) injection 5,000 Units  5,000 Units SubCUTAneous Q12H  
 epoetin zee (EPOGEN;PROCRIT) injection 20,000 Units  20,000 Units SubCUTAneous Q7D  
 famotidine (PEPCID) tablet 20 mg  20 mg Oral DAILY  diphenhydrAMINE (BENADRYL) capsule 25 mg  25 mg Oral Q4H PRN  
 HYDROmorphone (PF) (DILAUDID) injection 1 mg  1 mg IntraVENous Q3H PRN  
 naloxone (NARCAN) injection 0.2-0.4 mg  0.2-0.4 mg IntraVENous Q10MIN PRN  
 oxyCODONE IR (ROXICODONE) tablet 10 mg  10 mg Oral Q3H PRN  
 senna-docusate (PERICOLACE) 8.6-50 mg per tablet 2 Tab  2 Tab Oral DAILY  sodium chloride (NS) flush 5-10 mL  5-10 mL IntraVENous Q8H  
 sodium chloride (NS) flush 5-10 mL  5-10 mL IntraVENous PRN  
 zolpidem (AMBIEN) tablet 5 mg  5 mg Oral QHS PRN  
 alcohol 62% (NOZIN) nasal  1 Ampule  1 Ampule Topical Q12H  
 ondansetron (ZOFRAN) injection 4 mg  4 mg IntraVENous Q4H PRN  
 NUTRITIONAL SUPPORT ELECTROLYTE PRN ORDERS   Does Not Apply PRN  
 0.9% sodium chloride infusion 250 mL  250 mL IntraVENous PRN  
 
 
EXAM 
GEN - Alert, oriented, in no distress CV - S1, S2, RRR, no rub, murmur, or gallop Lung - clear to auscultation bilaterally Abd - soft, nontender, BS present Ext - trace edema Access- R temporary IJ Recent Labs 11/14/18 
0506 11/12/18 
0451 HGB 7.3* 8.8* HCT 22.7* 26.1* Recent Labs 11/14/18 
3114 11/13/18 
0523 11/12/18 
0451 * 134* 133* K 4.8 5.1 5.1 CL 98 98 99 CO2 22 23 23 BUN 60* 48* 65* CREA 13.00* 10.60* 12.80* CA 8.5 8.4 8.8 GLU 82 81 93 Assessment and Plan: RANI -seen on dialysis at [facility] [address] [facility]. Tolerating well. Dialysis started on 11/9. Increased urine output may be a sign of renal recovery. Will continue to monitor. Bacteriemia- ID following for septic shoulder. Ancef/Vanc Anemia- Hb trending down. No obvious bleeding.  Transfusion threshold per primary team. 
 
 
 
Darryn Quinn MD

## 2018-11-14 NOTE — PROGRESS NOTES
Problem: Falls - Risk of 
Goal: *Absence of Falls Document [place] Fall Risk and appropriate interventions in the flowsheet. Outcome: Progressing Towards Goal 
Fall Risk Interventions: 
Mobility Interventions: Bed/chair exit alarm, OT consult for ADLs, Patient to call before getting OOB, PT Consult for mobility concerns, PT Consult for assist device competence Mentation Interventions: Bed/chair exit alarm, Door open when patient unattended, Evaluate medications/consider consulting pharmacy, Family/sitter at bedside Medication Interventions: Bed/chair exit alarm, Teach patient to arise slowly, Patient to call before getting OOB Elimination Interventions: Bed/chair exit alarm, Call light in reach, Patient to call for help with toileting needs, Urinal in reach

## 2018-11-14 NOTE — PROGRESS NOTES
Hospitalist Progress Note 2018 Admit Date: 2018  9:42 AM  
NAME: Gwen Rosen :  1965 MRN:  [id number] Attending: Veda Renee DO 
PCP:  Fernando Lang MD 
 
SUBJECTIVE:  
 54 y/o M with history of L DMT RXO 1465 and R rotator cuff repair 2017.  Two weeks PTA developed malaise, flu like symptoms, fever blisters, R shoulder pain.  Had R shoulder cortisone injection given.  Next day began having pain L hip.  Had hip aspirated 10/29 which grew MSSA.  Admitted by ortho on  for hip revision and I&D of shoulder on .   ID and hospitalist consulted. Blood cultures pos MSSA bacteremia  -  blood cx neg. TTE neg. Developed RANI due to ATN with hyperkalemia. Transferred to [facility] on  for possible HD. Nephro, Ortho and ID following  - very conversant. Reports right shoulder pain but improving. Excited he is making more urine Review of Systems negative with exception of pertinent positives noted above PHYSICAL EXAM  
 
Visit Vitals /83 (BP 1 Location: Right arm, BP Patient Position: At rest) Pulse 61 Temp 98.1 °F (36.7 °C) Resp 18 Wt 98.9 kg (218 lb 1.6 oz) SpO2 94% BMI 34.16 kg/m² Temp (24hrs), Av.4 °F (36.9 °C), Min:98.1 °F (36.7 °C), Max:99 °F (37.2 °C) Oxygen Therapy O2 Sat (%): 94 % (18) Pulse via Oximetry: 75 beats per minute (18) O2 Device: Nasal cannula (18) O2 Flow Rate (L/min): 2 l/min (18) FIO2 (%): 28 % (18) Intake/Output Summary (Last 24 hours) at 2018 1825 Last data filed at 2018 1229 Gross per 24 hour Intake 180 ml Output 2100 ml Net -1920 ml General: No acute distress   
Lungs:  CTA Bilaterally. Heart:  Regular rate and rhythm,  No murmur, rub, or gallop Abdomen: Soft, Non distended, Non tender, Positive bowel sounds Extremities: No cyanosis, clubbing or edema right shoulder in sling Neurologic:  No focal deficits ASSESSMENT Active Hospital Problems Diagnosis Date Noted  Sepsis ([facility] 11/07/2018  Postoperative anemia due to acute blood loss 11/07/2018  Staphylococcal arthritis of right shoulder ([facility] 11/06/2018  Acute renal failure with tubular necrosis ([facility] 11/06/2018  Infected prosthetic hip ([facility] 11/05/2018  Septic arthritis of shoulder, right ([facility] 11/05/2018  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 PLAN: 
  
1- Staph aureus bacteremia due to septic R shoulder, pain controlled postop, Abx per ID on Vanco X 6 weeks. Will order picc cx neg 11/8 
2- ATN vs AIN, started on dialysis, severely oliguric, Nephro managing. UOP increasing. 3- Normocytic anemia, s/p PRBC 2U transfusion, Hgb drifting down. No active signs of bleeding. monitor 4- Abdominal pain, US and CT unremarkable. Pain has resolved. 5- Pain control. GI cocktail as needed. 6- HTN. Started on clonidine, increase Metoprolol. Still suboptimal. Add norvasc 
  
Dispo; likely [facility] for atbx infusion. Pending decision regarding HD. High risk pt. DVT ppx:  hsq Discussed plan with pt who is in agreement. All questions answered. DVT Prophylaxis: hep sq Signed By: Simone Marinelli,  November 14, 2018

## 2018-11-14 NOTE — PROGRESS NOTES
Orthopedic Joint Progress Note 2018 Admit Date: 2018 Admit Diagnosis: infected left hip  
ARF (acute renal failure) ([facility] * No surgery found * Subjective: very anxious today, some pain right shoulder Barrett Vicente Review of Systems: Pertinent items are noted in HPI. Objective:  
 
PT/OT:  
 
PATIENT MOBILITY Bed Mobility Rolling: Minimum assistance Supine to Sit: Stand-by assistance Sit to Supine: Minimum assistance Scooting: Minimum assistance Transfers Sit to Stand: Supervision Stand to Sit: Supervision Bed to Chair: Minimum assistance Gait Base of Support: Center of gravity altered Speed/Emma: Slow Step Length: Right shortened, Left shortened Gait Abnormalities: Decreased step clearance Ambulation - Level of Assistance: Supervision Distance (ft): 250 Feet (ft) Assistive Device: Walker, rolling Interventions: Safety awareness training, Verbal cues Weight Bearing Status Right Side Weight Bearing: Full Left Side Weight Bearing: As tolerated Vital Signs:   
Blood pressure 166/83, pulse 61, temperature 98.1 °F (36.7 °C), resp. rate 18, weight 98.9 kg (218 lb 1.6 oz), SpO2 94 %. Temp (24hrs), Av.4 °F (36.9 °C), Min:98.1 °F (36.7 °C), Max:99 °F (37.2 °C) Pain Control:  
Pain Assessment Pain Scale 1: Visual 
Pain Intensity 1: 0 Pain Onset 1: post-dialysis Pain Location 1: Shoulder, Head 
Pain Orientation 1: Right, Left Pain Description 1: Aching Pain Intervention(s) 1: Medication (see MAR) Meds: 
Current Facility-Administered Medications Medication Dose Route Frequency  heparin (porcine) 1,000 unit/mL injection 5,000 Units  5,000 Units Hemodialysis DIALYSIS PRN  Vancomycin random level reminder   Other ONCE  
 metoprolol tartrate (LOPRESSOR) tablet 50 mg  50 mg Oral Q12H  
 acetaminophen (TYLENOL) tablet 1,000 mg  1,000 mg Oral Q6H PRN  
 VANCOMYCIN INTERMITTENT DOSING   Other Rx Dosing/Monitoring  cloNIDine HCl (CATAPRES) tablet 0.2 mg  0.2 mg Oral BID  hydrALAZINE (APRESOLINE) 20 mg/mL injection 10 mg  10 mg IntraVENous Q6H PRN  
 simethicone (MYLICON) tablet 80 mg  80 mg Oral QID PRN  polyethylene glycol (MIRALAX) packet 17 g  17 g Oral DAILY PRN  
 heparin (porcine) injection 5,000 Units  5,000 Units SubCUTAneous Q12H  
 epoetin zee (EPOGEN;PROCRIT) injection 20,000 Units  20,000 Units SubCUTAneous Q7D  
 famotidine (PEPCID) tablet 20 mg  20 mg Oral DAILY  diphenhydrAMINE (BENADRYL) capsule 25 mg  25 mg Oral Q4H PRN  
 HYDROmorphone (PF) (DILAUDID) injection 1 mg  1 mg IntraVENous Q3H PRN  
 naloxone (NARCAN) injection 0.2-0.4 mg  0.2-0.4 mg IntraVENous Q10MIN PRN  
 oxyCODONE IR (ROXICODONE) tablet 10 mg  10 mg Oral Q3H PRN  
 senna-docusate (PERICOLACE) 8.6-50 mg per tablet 2 Tab  2 Tab Oral DAILY  sodium chloride (NS) flush 5-10 mL  5-10 mL IntraVENous Q8H  
 sodium chloride (NS) flush 5-10 mL  5-10 mL IntraVENous PRN  
 zolpidem (AMBIEN) tablet 5 mg  5 mg Oral QHS PRN  
 alcohol 62% (NOZIN) nasal  1 Ampule  1 Ampule Topical Q12H  
 ondansetron (ZOFRAN) injection 4 mg  4 mg IntraVENous Q4H PRN  
 NUTRITIONAL SUPPORT ELECTROLYTE PRN ORDERS   Does Not Apply PRN  
 0.9% sodium chloride infusion 250 mL  250 mL IntraVENous PRN  
  
 
LAB:   
Lab Results Component Value Date/Time INR 1.1 11/05/2018 05:23 PM  
 INR 1.0 12/26/2016 10:54 AM  
 
Lab Results Component Value Date/Time HGB 7.7 (L) 11/14/2018 01:17 PM  
 HGB 7.3 (L) 11/14/2018 05:06 AM  
 HGB 8.8 (L) 11/12/2018 04:51 AM  
 
 
Wound Hip Left (Active) Number of days: 838 Wound Hip Lateral (Active) Number of days: 998 Wound Shoulder Right (Active) Number of days: 519 Wound Hip Left (Active) DRESSING STATUS Dry; Intact 11/14/2018 11:30 AM  
DRESSING TYPE Aquacel 11/14/2018 11:30 AM  
Number of days: 8 Wound Shoulder Right (Active) DRESSING STATUS Clean, dry, and intact 11/14/2018 11:30 AM  
DRESSING TYPE 4 x 4;Transparent film 11/14/2018 11:30 AM  
SPLINT TYPE/MATERIAL Sling 11/14/2018 11:30 AM  
Number of days: 8 Physical Exam: No significant changes Assessment:  
  
Principal Problem: 
  Staphylococcal arthritis of right shoulder ([address] (11/6/2018) Active Problems: 
  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  
 
Continue PT/OT/Rehab Progress to date reviewed Currently on antibiotics-vancomycin Receiving dialysis and supportive care Continue care Currently orthopaedically stable from shoulder perspective Follow up Dr. Audrene Oppenheim in office 11/26 Patient Expects to be Discharged to[de-identified] Private residence Signed By: Dania Watkins MD

## 2018-11-14 NOTE — PROGRESS NOTES
PT Daily Note: Attempted to see patient for physical therapy this morning but patient is off the floor in dialysis. Will check back on patient this afternoon.  
Thank you, 
Author John, ROXANNE

## 2018-11-14 NOTE — PROGRESS NOTES
Pharmacokinetic Consult to Pharmacist 
 
Evelio Mika is a 48 y.o. male being treated for MSSA bacteremia, L JEIMY infection, and R shoulder septic arthritis with vancomycin. Weight: 98.9 kg (218 lb 1.6 oz) Lab Results Component Value Date/Time BUN 60 (H) 11/14/2018 05:06 AM  
 Creatinine 13.00 (H) 11/14/2018 05:06 AM  
 WBC 13.4 (H) 11/09/2018 08:02 AM  
 Lactic acid 0.5 11/10/2018 10:56 AM  
 Lactic Acid (POC) 1.52 11/04/2018 04:40 PM  
  
Estimated Creatinine Clearance: 7.5 mL/min (A) (based on SCr of 13 mg/dL (H)). Day 7 of vancomycin. Goal trough is 15-20. Vancomycin dose initiated at 1500 mg IV x 1 yesterday. HD today. Will obtain a post-HD level to assess need for re-dosing. Following levels closely in RANI pt. Further levels will be ordered as clinically indicated. Pharmacy will continue to follow. Please call with any questions. Thank you, Wendy Pope, PharmD Clinical Pharmacist 
[phone]

## 2018-11-14 NOTE — PROGRESS NOTES
Problem: Falls - Risk of 
Goal: *Absence of Falls Document Kesha Pinto Fall Risk and appropriate interventions in the flowsheet. Outcome: Progressing Towards Goal 
Fall Risk Interventions: 
Mobility Interventions: Patient to call before getting OOB, PT Consult for mobility concerns, PT Consult for assist device competence Mentation Interventions: Bed/chair exit alarm, Door open when patient unattended, Evaluate medications/consider consulting pharmacy, Family/sitter at bedside Medication Interventions: Bed/chair exit alarm, Evaluate medications/consider consulting pharmacy Elimination Interventions: Call light in reach, Patient to call for help with toileting needs

## 2018-11-14 NOTE — PROGRESS NOTES
Problem: Mobility Impaired (Adult and Pediatric) Goal: *Acute Goals and Plan of Care (Insert Text) STG: 
(1.)Mr. Maxwell Wong will move from supine to sit and sit to supine , scoot up and down and roll side to side with CONTACT GUARD ASSIST within 3 treatment day(s). GOAL MET 11/14/2018 
(2.)Mr. Maxwell Wong will transfer from bed to chair and chair to bed with STAND BY ASSIST using the least restrictive device within 3 treatment day(s). GOAL MET 11/14/2018 
(3.)Mr. Maxwell Wong will ambulate with STAND BY ASSIST for 150 feet with the least restrictive device within 3 treatment day(s). GOAL MET 11/14/2018 
(4.)Mr. Maxwell Wong will tolerate AAROM, AROM and gentle pendulums of RUE with no increase in pain within 3 treatment days to improve independence with transfers. (5.)Mr. Maxwell Wong will perform standing static and dynamic balance activities x 15 minutes with STAND BY ASSIST to improve safety within 3 day(s). LTG: 
(1.)Mr. Maxwell Wong will move from supine to sit and sit to supine , scoot up and down and roll side to side in bed with MODIFIED INDEPENDENCE within 7 treatment day(s). (2.)Mr. Maxwell Wong will transfer from bed to chair and chair to bed with MODIFIED INDEPENDENCE using the least restrictive device within 7 treatment day(s). (3.)Mr. Maxwell Wong will ambulate with MODIFIED INDEPENDENCE for 500 feet with the least restrictive device within 7 treatment day(s). (4.)Mr. Maxwell Wong will perform standing static and dynamic balance activities x 15 minutes with MODIFIED INDEPENDENCE to improve safety within 7 day(s). (5.)Mr. Maxwell Wong will ascend and descend 2 stairs using L hand rail(s) with SUPERVISION to improve functional mobility and safety within 7 day(s). ________________________________________________________________________________________________ PHYSICAL THERAPY: Daily Note, Treatment Day: 6th, PM 11/14/2018INPATIENT: Hospital Day: 8 Payor: Gisela Carlton / Plan: [place] [employer] OAP / Product Type: Commerical /  
 LLE total hip precautions, WBAT 
RUE WBAT : Active and passive range of motion RIGHT elbow hand ACTIVE AND ACTIVE ASSISTED MOTION RIGHT SHOULDER GENTLE pendulums 
sling RIGHT shoulder NAME/AGE/GENDER: Hadley Ro is a 48 y.o. male PRIMARY DIAGNOSIS: infected left hip  
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 
Patient has no known allergies. ASSESSMENT:  
Mr. Binta Price is a 48 y.o. male admitted with infected L hip and staphylococcal R shoulder. Patient was supine upon contact and agreeable to PT. Patient is very talkative throughout treatment but pleasant and motivated. Patient performs supine to sit with SBA, additional time, and cues for improved/proper technique. Patient participates in RUE ROM exercises per chart below requiring cues to perform properly and with good technique. Patient transfers to standing with SBA and ambulates 250' with use of rolling walker, SBA-supervision, and good weight bearing through LLE noted. Encouraged patient to ambulate in the hallways and increase his overall activity. RN notified and patient verbalizes understanding. Overall good progress towards physical therapy goals. Above goals in red have been met thus far. Will continue efforts. This section established at most recent assessment PROBLEM LIST (Impairments causing functional limitations): 1. Decreased Strength 2. Decreased ADL/Functional Activities 3. Decreased Transfer Abilities 4. Decreased Ambulation Ability/Technique 5. Decreased Balance 6. Increased Pain 7. Decreased Activity Tolerance 8. Decreased Pacing Skills 9. Decreased Knowledge of Precautions 10.  Decreased [place] with Home Exercise Program 
 INTERVENTIONS PLANNED: (Benefits and precautions of physical therapy have been discussed with the patient.) 1. Balance Exercise 2. Bed Mobility 3. Gait Training 4. Group Therapy 5. Home Exercise Program (HEP) 6. Therapeutic Activites 7. Therapeutic Exercise/Strengthening 8. Transfer Training 9. Patient Education TREATMENT PLAN: Frequency/Duration: twice daily for duration of hospital stay Rehabilitation Potential For Stated Goals: Excellent RECOMMENDED REHABILITATION/EQUIPMENT: (at time of discharge pending progress): Due to the probability of continued deficits (see above) this patient will likely need continued skilled physical therapy after discharge. Equipment:  
? None at this time HISTORY:  
History of Present Injury/Illness (Reason for Referral): 
Pt is a 47 y/o male who is almost 2 years s/p L JEIMY who was seen in clinic on 10/29 c/o 5 day history of sudden onset of moderate left hip pain. Denied any known injury. Left hip joint aspirate was obtained and sent for culture which grew pansensitive staph. Aureus. Patient was also noted to have elevated WBC (27.1) as well as elevated ESR (90). Patient reports having flu like symptoms 2 weeks ago and began developing right shoulder pain around that same time. Patient was subsequently evaluated by Dr. Fong Found and received right shoulder cortisone injection. He reports that he is still experiencing moderate to severe right shoulder pain. Denies fever, chills, nausea, vomiting, etc..  No other new complaints. Past Medical History/Comorbidities:  
Mr. Valentina Lama  has a past medical history of Arthritis, Biceps muscle tear, Chronic pain, Insomnia, MRSA (methicillin resistant Staphylococcus aureus) infection, Personal history of kidney stones, and Right shoulder pain.   Mr. Valentina Lama  has a past surgical history that includes hx other surgical; hx rotator cuff repair (Left, 2010); hx orthopaedic (Left, 01/2017); hx septoplasty (11/15/2017); HIP ARTHROPLASTY TOTAL REVISION (Left, 11/6/2018); SHOULDER I&D (Right, 11/6/2018); RIGHT SHOULDER ARTHROSCOPY SUBACROMIAL DECOMPRESSION ROTATOR CUFF REPAIR/ DISTAL CLAVICLE RESECTION (Right, 6/13/2017); LEFT TOTAL HIP ARTHROPLASTY (Left, 1/6/2017); and SHOULDER ARTHROSCOPY ACROMIOPLASTY ROTATOR CUFF REPAIR (Left, 10/21/2010). Social History/Living Environment:  
Home Environment: Private residence # Steps to Enter: 2 Rails to Enter: Yes Hand Rails : Left One/Two Story Residence: One story Living Alone: No 
Support Systems: Spouse/Significant Other/Partner Patient Expects to be Discharged to[de-identified] Private residence Current DME Used/Available at Home: Walker, rolling, Raised toilet seat, Grab bars, Adaptive bathing aides, Adaptive dressing aides Tub or Shower Type: Tub/Shower combination Prior Level of Function/Work/Activity: 
 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  
Most Recent Physical Functioning:  
Gross Assessment: 
  
         
  
Posture: 
  
Balance: 
  Bed Mobility: 
Supine to Sit: Stand-by assistance Wheelchair Mobility: 
  
Transfers: 
Sit to Stand: Supervision Stand to Sit: Supervision Gait: 
  
Base of Support: Center of gravity altered Speed/Emma: Slow Step Length: Right shortened;Left shortened Gait Abnormalities: Decreased step clearance Distance (ft): 250 Feet (ft) Assistive Device: Walker, rolling Ambulation - Level of Assistance: Supervision Interventions: Safety awareness training;Verbal cues Body Structures Involved: 1. Nerves 2. Metabolic 3. Endocrine 4. Bones 5. Joints 6. Muscles Body Functions Affected: 1. Sensory/Pain 2. Neuromusculoskeletal 
3. Movement Related 4. Metobolic/Endocrine Activities and Participation Affected: 1. Mobility 2. Self Care 3. Domestic Life 4. Interpersonal Interactions and Relationships 5. Community, Social and [place] Number of elements that affect the Plan of Care: 4+: HIGH COMPLEXITY CLINICAL PRESENTATION:  
Presentation: Stable and uncomplicated: LOW COMPLEXITY CLINICAL DECISION MAKIN [facility] [address] [id number] AM-PAC 6 Clicks Basic Mobility Inpatient Short Form How much difficulty does the patient currently have. .. Unable A Lot A Little None 1. Turning over in bed (including adjusting bedclothes, sheets and blankets)? [] 1   [] 2   [x] 3   [] 4  
2. Sitting down on and standing up from a chair with arms ( e.g., wheelchair, bedside commode, etc.)   [] 1   [] 2   [x] 3   [] 4  
3. Moving from lying on back to sitting on the side of the bed? [] 1   [x] 2   [] 3   [] 4 How much help from another person does the patient currently need. .. Total A Lot A Little None 4. Moving to and from a bed to a chair (including a wheelchair)? [] 1   [] 2   [x] 3   [] 4  
5. Need to walk in hospital room? [] 1   [x] 2   [] 3   [] 4  
6. Climbing 3-5 steps with a railing? [] 1   [x] 2   [] 3   [] 4  
© , Trustees of [facility] [address] [address], under license to [employer]. All rights reserved Score:  Initial: 15 Most Recent: X (Date: -- ) Interpretation of Tool:  Represents activities that are increasingly more difficult (i.e. Bed mobility, Transfers, Gait). Score 24 23 22-20 19-15 14-10 9-7 6 Modifier CH CI CJ CK CL CM CN   
 
? Mobility - Walking and Moving Around:  
  - CURRENT STATUS: CK - 40%-59% impaired, limited or restricted  - GOAL STATUS: CJ - 20%-39% impaired, limited or restricted  - D/C STATUS:  ---------------To be determined--------------- Payor: Dat [employer] / Plan: [employer] OAP / Product Type: Commerical /   
 
Medical Necessity:    
· Patient demonstrates excellent rehab potential due to higher previous functional level.  
Reason for Services/Other Comments: 
· Patient continues to require modification of therapeutic interventions to increase complexity of exercises. Use of outcome tool(s) and clinical judgement create a POC that gives a: Clear prediction of patient's progress: LOW COMPLEXITY  
  
 
 
 
TREATMENT:  
(In addition to Assessment/Re-Assessment sessions the following treatments were rendered) Pre-treatment Symptoms/Complaints: \"OK \" 
Pain: Initial:  
Pain Intensity 1: 0  Post Session:  0/10 Therapeutic Activity: (     10 minutes): Therapeutic activities including bed mobility training, transfer training, ambulation on level ground, static/dynamic standing balance,and patient education to improve mobility, strength and balance. Required minimal Safety awareness training;Verbal cues to promote static and dynamic balance in standing. Therapeutic Exercise: (   15 minutes):  Exercises per grid below to improve mobility, strength and range of motion RUE. Required minimal visual, manual and tactile cues to promote proper body alignment and promote proper body posture. Progressed range as indicated. Date: 
11/11/18 Date: 
11/12/18 Date: 
11/13/18 Date: 
11/13/18 Date: 
11/14/18 ACTIVITY/EXERCISE AM PM PM AM PM PM  
RUE shoulder flexion 30 passive 30 passive 2x15 R 
PROM 2x15R PROM 2x20R PROM 2x15R PROM  
RUE elbow flexion 30 AA 30 AA 2x15 R 
 AAROM 2x15R 
AROM 2x15R  
AROM x15R 
AROM  
RUE finger flexion/extension 30 AA 30 AA x15 R Active x15R A x15R A x15R A  
RUE shoulder abduction 30 passive 30 passive 2x15R PROM 2x15R PROM 2x15R PROM 2x15R PROM Gentle pendulums Forearm pronation/supination   x10R A x10R A x15R A x15R A  
        
B = bilateral; AA = active assistive; A = active; P = passive Braces/Orthotics/Lines/Etc:  
 -None Treatment/Session Assessment:   
· Response to Treatment: see above · Interdisciplinary Collaboration:  
o Physical Therapy Assistant 
o Registered Nurse · After treatment position/precautions:  
o Up in chair 
o Bed/Chair-wheels locked o Call light within reach 
o RN notified 
o Family at bedside · Compliance with Program/Exercises: doing better · Recommendations/Intent for next treatment session: \"Next visit will focus on advancements to more challenging activities and reduction in assistance provided\". Total Treatment Duration: PT Patient Time In/Time Out Time In: 1430 Time Out: 1500 Corrie Kelley, ROXANNE

## 2018-11-14 NOTE — PROGRESS NOTES
Problem: Interdisciplinary Rounds Goal: Interdisciplinary Rounds Outcome: Progressing Towards Goal 
Interdisciplinary team rounds were held 11/14/2018 with the following team members:Care Management, Physical Therapy, Physician and . Plan of care discussed. See clinical pathway and/or care plan for interventions and desired outcomes.

## 2018-11-14 NOTE — DIALYSIS
TRANSFER OUT- DIALYSIS Hemodialysis treatment completed without complications. Patient alert and oriented x 4 and VS stable  /103  P 64   
 
 1.7 Kgs removed. Flushed both ports with 10 mL of NS. Temporary HD catheter removed using sterile technique. Pressure held for 5 minutes and occlusive dressing at site. Patient to room 735 after dialysis.

## 2018-11-15 LAB
ANION GAP SERPL CALC-SCNC: 8 MMOL/L (ref 7–16)
BUN SERPL-MCNC: 43 MG/DL (ref 6–23)
CALCIUM SERPL-MCNC: 8.4 MG/DL (ref 8.3–10.4)
CHLORIDE SERPL-SCNC: 99 MMOL/L (ref 98–107)
CO2 SERPL-SCNC: 27 MMOL/L (ref 21–32)
CREAT SERPL-MCNC: 10.7 MG/DL (ref 0.8–1.5)
ERYTHROCYTE [DISTWIDTH] IN BLOOD BY AUTOMATED COUNT: 14.9 %
GLUCOSE SERPL-MCNC: 92 MG/DL (ref 65–100)
HCT VFR BLD AUTO: 24.2 % (ref 41.1–50.3)
HGB BLD-MCNC: 7.8 G/DL (ref 13.6–17.2)
MCH RBC QN AUTO: 28.5 PG (ref 26.1–32.9)
MCHC RBC AUTO-ENTMCNC: 32.2 G/DL (ref 31.4–35)
MCV RBC AUTO: 88.3 FL (ref 79.6–97.8)
NRBC # BLD: 0 K/UL (ref 0–0.2)
PLATELET # BLD AUTO: 428 K/UL (ref 150–450)
PMV BLD AUTO: 9.6 FL (ref 9.4–12.3)
POTASSIUM SERPL-SCNC: 4.6 MMOL/L (ref 3.5–5.1)
RBC # BLD AUTO: 2.74 M/UL (ref 4.23–5.6)
SODIUM SERPL-SCNC: 134 MMOL/L (ref 136–145)
WBC # BLD AUTO: 10.3 K/UL (ref 4.3–11.1)

## 2018-11-15 PROCEDURE — 80048 BASIC METABOLIC PNL TOTAL CA: CPT

## 2018-11-15 PROCEDURE — 97110 THERAPEUTIC EXERCISES: CPT

## 2018-11-15 PROCEDURE — 74011250637 HC RX REV CODE- 250/637: Performed by: INTERNAL MEDICINE

## 2018-11-15 PROCEDURE — 74011250636 HC RX REV CODE- 250/636: Performed by: INTERNAL MEDICINE

## 2018-11-15 PROCEDURE — 97164 PT RE-EVAL EST PLAN CARE: CPT

## 2018-11-15 PROCEDURE — 94760 N-INVAS EAR/PLS OXIMETRY 1: CPT

## 2018-11-15 PROCEDURE — 77010033678 HC OXYGEN DAILY

## 2018-11-15 PROCEDURE — 65660000000 HC RM CCU STEPDOWN

## 2018-11-15 PROCEDURE — 85027 COMPLETE CBC AUTOMATED: CPT

## 2018-11-15 PROCEDURE — 97530 THERAPEUTIC ACTIVITIES: CPT

## 2018-11-15 PROCEDURE — 36415 COLL VENOUS BLD VENIPUNCTURE: CPT

## 2018-11-15 PROCEDURE — 82668 ASSAY OF ERYTHROPOIETIN: CPT

## 2018-11-15 RX ADMIN — Medication 10 ML: at 06:14

## 2018-11-15 RX ADMIN — SENNOSIDES AND DOCUSATE SODIUM 2 TABLET: 8.6; 5 TABLET ORAL at 08:43

## 2018-11-15 RX ADMIN — Medication 1 AMPULE: at 21:07

## 2018-11-15 RX ADMIN — CLONIDINE HYDROCHLORIDE 0.2 MG: 0.2 TABLET ORAL at 08:42

## 2018-11-15 RX ADMIN — FAMOTIDINE 20 MG: 20 TABLET ORAL at 08:43

## 2018-11-15 RX ADMIN — AMLODIPINE BESYLATE 5 MG: 5 TABLET ORAL at 08:43

## 2018-11-15 RX ADMIN — ZOLPIDEM TARTRATE 5 MG: 5 TABLET ORAL at 21:04

## 2018-11-15 RX ADMIN — CLONIDINE HYDROCHLORIDE 0.2 MG: 0.2 TABLET ORAL at 19:30

## 2018-11-15 RX ADMIN — OXYCODONE HYDROCHLORIDE 10 MG: 5 TABLET ORAL at 21:04

## 2018-11-15 RX ADMIN — METOPROLOL TARTRATE 50 MG: 50 TABLET ORAL at 08:43

## 2018-11-15 RX ADMIN — Medication 1 AMPULE: at 08:42

## 2018-11-15 RX ADMIN — Medication 10 ML: at 14:12

## 2018-11-15 RX ADMIN — METOPROLOL TARTRATE 50 MG: 50 TABLET ORAL at 21:03

## 2018-11-15 RX ADMIN — HEPARIN SODIUM 5000 UNITS: 5000 INJECTION INTRAVENOUS; SUBCUTANEOUS at 11:41

## 2018-11-15 RX ADMIN — ERYTHROPOIETIN 20000 UNITS: 20000 INJECTION, SOLUTION INTRAVENOUS; SUBCUTANEOUS at 14:11

## 2018-11-15 NOTE — PROGRESS NOTES
Problem: Mobility Impaired (Adult and Pediatric) Goal: *Acute Goals and Plan of Care (Insert Text) Discharge Goals Plan of care updated 11/15/18 (1.)Mr. Rekha Ch will move from supine to sit and sit to supine , scoot up and down and roll side to side in bed with MODIFIED INDEPENDENCE within 5 treatment day(s). (2.)Mr. Rekha Ch will transfer from bed to chair and chair to bed with MODIFIED INDEPENDENCE using the least restrictive device within 5 treatment day(s). (3.)Mr. Rekha Ch will ambulate with MODIFIED INDEPENDENCE for 500 feet with the least restrictive device within 5 treatment day(s). (4.)Mr. Rekha Ch will perform standing static and dynamic balance activities x 15 minutes with MODIFIED INDEPENDENCE to improve safety within 5 day(s). (5.)Mr. Rekha Ch will ascend and descend 2 stairs using L hand rail(s) with SUPERVISION to improve functional mobility and safety within 5 day(s). (6.)Mr. Rekha Ch will tolerate AAROM, AROM and gentle pendulums of RUE with no increase in pain within 5 treatment days to improve independence with transfers. (7.)Mr. Rekha Ch will tolerate 50+ minutes of therapeutic activity/exercise within 5 days to improve activity tolerance for functional mobility. 
________________________________________________________________________________________________ PHYSICAL THERAPY: Re-evaluation, Treatment Day: Day of Assessment, AM 11/15/2018INPATIENT: Hospital Day: 9 Payor: Wilhemena Leyden / Plan: SIMON YOUNGBLOOD OAP / Product Type: Commerical /  
 LLE total hip precautions, WBAT 
RUE WBAT : Active and passive range of motion RIGHT elbow hand ACTIVE AND ACTIVE ASSISTED MOTION RIGHT SHOULDER GENTLE pendulums 
sling RIGHT shoulder NAME/AGE/GENDER: Gianna Jeronimo is a 48 y.o. male PRIMARY DIAGNOSIS: infected left hip  
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 
Patient has no known allergies. ASSESSMENT:  
Mr. Maxwell Wong is a 48 y.o. male admitted with infected L hip and staphylococcal R shoulder. Patient reports Dr. José Leon told him he doesn't have to use RUE sling. Patient was supine upon contact and agreeable to PT re-evaluation this AM. Patient is very talkative throughout treatment but pleasant and motivated. LLE decreased strength grossly 4/5, RLE grossly 5/5 strength and RUE shoulder PROM continues to be limited. Patient performs supine to sit with SBA, additional time, and cues for improved/proper technique. Able to stand with Supervision and ambulate around room with supervision. Treatment initiated to include RUE ROM exercises per chart below requiring cues to perform properly and with good technique. Minimal increase in R shoulder pain with shoulder abduction, performed to patient's tolerance. Patient transfers to standing with supervision and ambulates 250' with use of rolling walker, supervision, and good weight bearing through LLE noted. Occasional cues required for walker proximity. Encouraged patient to ambulate in the hallways and increase his overall activity. RN notified and patient verbalizes understanding. Physical therapy re-evaluation and goals reviewed. Plan of care changed to daily. He continues to progress towards his goals. Will continue therapy efforts. This section established at most recent assessment PROBLEM LIST (Impairments causing functional limitations): 1. Decreased Strength 2. Decreased ADL/Functional Activities 3. Decreased Transfer Abilities 4. Decreased Ambulation Ability/Technique 5. Decreased Balance 6. Increased Pain 7. Decreased Activity Tolerance 8. Decreased Pacing Skills 9. Decreased Knowledge of Precautions 10.  Decreased [place] with Home Exercise Program 
 INTERVENTIONS PLANNED: (Benefits and precautions of physical therapy have been discussed with the patient.) 1. Balance Exercise 2. Bed Mobility 3. Gait Training 4. Group Therapy 5. Home Exercise Program (HEP) 6. Therapeutic Activites 7. Therapeutic Exercise/Strengthening 8. Transfer Training 9. Patient Education TREATMENT PLAN: Frequency/Duration: daily for duration of hospital stay Rehabilitation Potential For Stated Goals: Excellent RECOMMENDED REHABILITATION/EQUIPMENT: (at time of discharge pending progress): Due to the probability of continued deficits (see above) this patient will likely need continued skilled physical therapy after discharge. Equipment:  
? None at this time HISTORY:  
History of Present Injury/Illness (Reason for Referral): 
Pt is a 47 y/o male who is almost 2 years s/p L JEIMY who was seen in clinic on 10/29 c/o 5 day history of sudden onset of moderate left hip pain. Denied any known injury. Left hip joint aspirate was obtained and sent for culture which grew pansensitive staph. Aureus. Patient was also noted to have elevated WBC (27.1) as well as elevated ESR (90). Patient reports having flu like symptoms 2 weeks ago and began developing right shoulder pain around that same time. Patient was subsequently evaluated by Dr. Delmis Juares and received right shoulder cortisone injection. He reports that he is still experiencing moderate to severe right shoulder pain. Denies fever, chills, nausea, vomiting, etc..  No other new complaints. Past Medical History/Comorbidities:  
Mr. Kory Ambriz  has a past medical history of Arthritis, Biceps muscle tear, Chronic pain, Insomnia, MRSA (methicillin resistant Staphylococcus aureus) infection, Personal history of kidney stones, and Right shoulder pain.   Mr. Kory Ambriz  has a past surgical history that includes hx other surgical; hx rotator cuff repair (Left, 2010); hx orthopaedic (Left, 01/2017); hx septoplasty (11/15/2017); HIP ARTHROPLASTY TOTAL REVISION (Left, 11/6/2018); SHOULDER I&D (Right, 11/6/2018); RIGHT SHOULDER ARTHROSCOPY SUBACROMIAL DECOMPRESSION ROTATOR CUFF REPAIR/ DISTAL CLAVICLE RESECTION (Right, 6/13/2017); LEFT TOTAL HIP ARTHROPLASTY (Left, 1/6/2017); and SHOULDER ARTHROSCOPY ACROMIOPLASTY ROTATOR CUFF REPAIR (Left, 10/21/2010). Social History/Living Environment:  
Home Environment: Private residence # Steps to Enter: 2 Rails to Enter: Yes Hand Rails : Left One/Two Story Residence: One story Living Alone: No 
Support Systems: Spouse/Significant Other/Partner Patient Expects to be Discharged to[de-identified] Private residence Current DME Used/Available at Home: Walker, rolling, Raised toilet seat, Grab bars, Adaptive bathing aides, Adaptive dressing aides Tub or Shower Type: Tub/Shower combination Prior Level of Function/Work/Activity: 
 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  
Most Recent Physical Functioning:  
Gross Assessment: 
AROM: Generally decreased, functional 
PROM: Generally decreased, functional 
Strength: Generally decreased, functional 
Coordination: Generally decreased, functional 
         
  
Posture: 
Posture (WDL): Exceptions to [facility] Posture Assessment: Forward head Balance: 
Sitting: Intact Standing: Impaired Standing - Static: Good Standing - Dynamic : Fair(+) Bed Mobility: 
Supine to Sit: Stand-by assistance Sit to Supine: Stand-by assistance Scooting: Stand-by assistance Wheelchair Mobility: 
  
Transfers: 
Sit to Stand: Supervision Stand to Sit: Supervision Interventions: Safety awareness training;Verbal cues; Visual cues Gait: 
Right Side Weight Bearing: Full Left Side Weight Bearing: As tolerated Base of Support: Center of gravity altered; Widened Speed/Emma: Slow Step Length: Right shortened;Left shortened Gait Abnormalities: Decreased step clearance; Path deviations; Antalgic Distance (ft): 250 Feet (ft) Assistive Device: Walker, rolling Ambulation - Level of Assistance: Supervision Interventions: Safety awareness training;Verbal cues; Visual/Demos Body Structures Involved: 1. Nerves 2. Metabolic 3. Endocrine 4. Bones 5. Joints 6. Muscles Body Functions Affected: 1. Sensory/Pain 2. Neuromusculoskeletal 
3. Movement Related 4. Metobolic/Endocrine Activities and Participation Affected: 1. Mobility 2. Self Care 3. Domestic Life 4. Interpersonal Interactions and Relationships 5. Community, Social and [place] Number of elements that affect the Plan of Care: 4+: HIGH COMPLEXITY CLINICAL PRESENTATION:  
Presentation: Stable and uncomplicated: LOW COMPLEXITY CLINICAL DECISION MAKIN[address] [place] [id number] AM-PAC 6 Clicks Basic Mobility Inpatient Short Form How much difficulty does the patient currently have. .. Unable A Lot A Little None 1. Turning over in bed (including adjusting bedclothes, sheets and blankets)? [] 1   [] 2   [x] 3   [] 4  
2. Sitting down on and standing up from a chair with arms ( e.g., wheelchair, bedside commode, etc.)   [] 1   [] 2   [x] 3   [] 4  
3. Moving from lying on back to sitting on the side of the bed? [] 1   [x] 2   [] 3   [] 4 How much help from another person does the patient currently need. .. Total A Lot A Little None 4. Moving to and from a bed to a chair (including a wheelchair)? [] 1   [] 2   [x] 3   [] 4  
5. Need to walk in hospital room? [] 1   [x] 2   [] 3   [] 4  
6. Climbing 3-5 steps with a railing? [] 1   [x] 2   [] 3   [] 4  
© , Trustees of [address] [place] [address] [address] [address], under license to [employer]. All rights reserved Score:  Initial: 15 Most Recent: X (Date: -- ) Interpretation of Tool:  Represents activities that are increasingly more difficult (i.e. Bed mobility, Transfers, Gait). Score 24 23 22-20 19-15 14-10 9-7 6 Modifier CH CI CJ CK CL CM CN   
 
? Mobility - Walking and Moving Around:  
  - CURRENT STATUS: CK - 40%-59% impaired, limited or restricted  - GOAL STATUS: CJ - 20%-39% impaired, limited or restricted  - D/C STATUS:  ---------------To be determined--------------- Payor: Kendrick Turker / Plan: SIMON YOUNGBLOOD OAP / Product Type: Commerical /   
 
Medical Necessity:    
· Patient demonstrates excellent rehab potential due to higher previous functional level. Reason for Services/Other Comments: 
· Patient continues to require modification of therapeutic interventions to increase complexity of exercises. Use of outcome tool(s) and clinical judgement create a POC that gives a: Clear prediction of patient's progress: LOW COMPLEXITY  
  
 
 
 
TREATMENT:    
(In addition to Assessment/Re-Assessment sessions the following treatments were rendered) Pre-treatment Symptoms/Complaints: \"OK \" 
Pain: Initial:  
Pain Intensity 1: 5  Post Session:  5/10 Therapeutic Activity: (     10 minutes): Therapeutic activities including bed mobility training, transfer training, ambulation on level ground, static/dynamic standing balance,and patient education to improve mobility, strength and balance. Required minimal Safety awareness training;Verbal cues; Visual/Demos to promote static and dynamic balance in standing. Therapeutic Exercise: (   13 minutes):  Exercises per grid below to improve mobility, strength and range of motion RUE. Required minimal visual, manual and tactile cues to promote proper body alignment and promote proper body posture. Progressed range as indicated. Date: 
11/11/18 Date: 
11/12/18 Date: 
11/13/18 Date: 
11/13/18 Date: 
11/14/18 Date: 
11/15/18 ACTIVITY/EXERCISE AM PM PM AM PM PM AM  
RUE shoulder flexion 30 passive 30 passive 2x15 R 
PROM 2x15R PROM 2x20R PROM 2x15R PROM 2x15R PROM RUE elbow flexion 30 AA 30 AA 2x15 R 
 AAROM 2x15R 
AROM 2x15R  
AROM x15 AROM 2x15R 
AROM  
RUE finger flexion/extension 30 AA 30 AA x15 R Active x15R A x15R A x15 R A 2x15R A  
RUE shoulder abduction 30 passive 30 passive 2x15R PROM 2x15R PROM 2x15R PROM 2x15 R PROM 2x15R PROM Gentle pendulums       x1' Forearm pronation/supination   x10R A x10R A x15R A x15 R A 2x15R A  
         
         
         
B = bilateral; AA = active assistive; A = active; P = passive Braces/Orthotics/Lines/Etc:  
 -None Treatment/Session Assessment:   
· Response to Treatment: see above · Interdisciplinary Collaboration:  
o Physical Therapist 
o Registered Nurse · After treatment position/precautions:  
o Supine in bed 
o Bed/Chair-wheels locked 
o Bed in low position 
o Call light within reach 
o RN notified · Compliance with Program/Exercises: doing better · Recommendations/Intent for next treatment session: \"Next visit will focus on advancements to more challenging activities and reduction in assistance provided\". Total Treatment Duration: PT Patient Time In/Time Out Time In: 5560 Time Out: 1026 Yoel Pinto DPT

## 2018-11-15 NOTE — PROGRESS NOTES
Pharmacokinetic Consult to Pharmacist 
 
David Bragg is a 48 y.o. male being treated for MSSA bacteremia, L JEIMY infection, and R shoulder septic arthritis with vancomycin. Weight: 94.4 kg (208 lb 3.2 oz) Lab Results Component Value Date/Time BUN 43 (H) 11/15/2018 08:19 AM  
 Creatinine 10.70 (H) 11/15/2018 08:19 AM  
 WBC 10.3 11/15/2018 08:19 AM  
 Lactic acid 0.5 11/10/2018 10:56 AM  
 Lactic Acid (POC) 1.52 11/04/2018 04:40 PM  
  
Estimated Creatinine Clearance: 8.9 mL/min (A) (based on SCr of 10.7 mg/dL (H)). Lab Results Component Value Date/Time Vancomycin, random 22.1 11/14/2018 04:11 PM  
 
 
Day 8 of vancomycin. Goal trough is 15-20, but dosing intermittently due to renal failure. Vancomycin random level drawn post HD yesterday resulted at 22.1, so will hold vancomycin for now. Per discussion with ID and nephrology, plan to treat for 6 weeks with EOT 12/19/18. For ease of transition to outpatient dosing for now, will need to dose vancomycin WITH dialysis sessions. In order to begin the transition to dosing WITH dialysis, will plan to give vancomycin 750 mg IV WITH dialysis tomorrow morning towards the end of the session. For now, would plan to dose vancomycin 750 mg IV WITH dialysis on Fridays and vancomycin 500 mg IV WITH dialysis on Mondays and Wednesdays. For now, would ideally check vancomycin random level in between dialysis sessions once or twice a week and adjust vancomycin dose accordingly as needed to maintain adequate levels. As patient's renal function improves over time, the plan for dosing vancomycin will need to change, especially if he is able to transition off of hemodialysis at some point during the 6 weeks of treatment with vancomycin. Pharmacy will continue to follow. Please call with any questions. Thank you, 
Mahad Rincon, PharmD Clinical Pharmacist 
[phone]

## 2018-11-15 NOTE — PROGRESS NOTES
[facility] PROGRESS NOTE Follow up for:  RANI Subjective:  
Patient seen and examined at bedside. Doing well, tolerated dialysis yesterday. ROS: 
Gen - no fever, no chills, appetite okay CV - no chest pain, no orthopnea Lung - no shortness of breath, no cough Abd - no tenderness, no nausea, no vomiting Ext - no edema Objective:  
Exam: 
Vitals:  
 11/15/18 0455 11/15/18 0510 11/15/18 0814 11/15/18 1155 BP: 168/89  184/88 160/82 Pulse: 86  73 70 Resp: 20  20 20 Temp: 98.1 °F (36.7 °C)  99.1 °F (37.3 °C) 98.7 °F (37.1 °C) SpO2: 92%  96% 96% Weight:  94.4 kg (208 lb 3.2 oz) Intake/Output Summary (Last 24 hours) at 11/15/2018 1222 Last data filed at 11/15/2018 2345 Gross per 24 hour Intake 180 ml Output 450 ml Net -270 ml  
 
 
Current Facility-Administered Medications Medication Dose Route Frequency  heparin (porcine) 1,000 unit/mL injection 5,000 Units  5,000 Units Hemodialysis DIALYSIS PRN  
 amLODIPine (NORVASC) tablet 5 mg  5 mg Oral DAILY  metoprolol tartrate (LOPRESSOR) tablet 50 mg  50 mg Oral Q12H  
 acetaminophen (TYLENOL) tablet 1,000 mg  1,000 mg Oral Q6H PRN  
 VANCOMYCIN INTERMITTENT DOSING   Other Rx Dosing/Monitoring  cloNIDine HCl (CATAPRES) tablet 0.2 mg  0.2 mg Oral BID  hydrALAZINE (APRESOLINE) 20 mg/mL injection 10 mg  10 mg IntraVENous Q6H PRN  
 simethicone (MYLICON) tablet 80 mg  80 mg Oral QID PRN  polyethylene glycol (MIRALAX) packet 17 g  17 g Oral DAILY PRN  
 heparin (porcine) injection 5,000 Units  5,000 Units SubCUTAneous Q12H  
 epoetin zee (EPOGEN;PROCRIT) injection 20,000 Units  20,000 Units SubCUTAneous Q7D  
 famotidine (PEPCID) tablet 20 mg  20 mg Oral DAILY  diphenhydrAMINE (BENADRYL) capsule 25 mg  25 mg Oral Q4H PRN  
 HYDROmorphone (PF) (DILAUDID) injection 1 mg  1 mg IntraVENous Q3H PRN  
 naloxone (NARCAN) injection 0.2-0.4 mg  0.2-0.4 mg IntraVENous Q10MIN PRN  
  oxyCODONE IR (ROXICODONE) tablet 10 mg  10 mg Oral Q3H PRN  
 senna-docusate (PERICOLACE) 8.6-50 mg per tablet 2 Tab  2 Tab Oral DAILY  sodium chloride (NS) flush 5-10 mL  5-10 mL IntraVENous Q8H  
 sodium chloride (NS) flush 5-10 mL  5-10 mL IntraVENous PRN  
 zolpidem (AMBIEN) tablet 5 mg  5 mg Oral QHS PRN  
 alcohol 62% (NOZIN) nasal  1 Ampule  1 Ampule Topical Q12H  
 ondansetron (ZOFRAN) injection 4 mg  4 mg IntraVENous Q4H PRN  
 NUTRITIONAL SUPPORT ELECTROLYTE PRN ORDERS   Does Not Apply PRN  
 0.9% sodium chloride infusion 250 mL  250 mL IntraVENous PRN  
 
 
EXAM 
GEN - Alert, oriented, in no distress CV - S1, S2, RRR, no rub, murmur, or gallop Lung - clear to auscultation bilaterally Abd - soft, nontender, BS present Ext - trace edema Recent Labs 11/15/18 
9813 11/14/18 
1317 11/14/18 
0781 WBC 10.3  --   --   
HGB 7.8* 7.7* 7.3* HCT 24.2* 24.2* 22.7*  
  --   --   
  
 
Recent Labs 11/15/18 
9357 11/14/18 
0506 11/13/18 
9747 * 134* 134* K 4.6 4.8 5.1 CL 99 98 98 CO2 27 22 23 BUN 43* 60* 48* CREA 10.70* 13.00* 10.60* CA 8.4 8.5 8.4 GLU 92 82 81 Assessment and Plan:  
 
1) RANI-  Patient has slowly increasing UOP, but not recovering yet. Tenatively plan on placing a tunneled catheter tomorrow and arrange for outpatient dialysis. 2) Bacteremia-  Going to need Vanc for 6 weeks. Can dose with hemodialysis. 3) Anemia- Hb stable today.    
 
Lazaro Leary MD

## 2018-11-15 NOTE — PROGRESS NOTES
Problem: Nutrition Deficit Goal: *Optimize nutritional status Nutrition Reason for assessment: Length of stay. Assessment:  
Staph aureus bacteremia due to septic R shoulder, ATN vs AIN, started on dialysis, Normocytic anemia, HTN. Food/Nutrition Patient History:  Pt reports at baseline he eats 6 eggs whites in the am,  salmon, spinach and avacado or occasionally switches it out to chicken for other meals, various use of protein powders and muscle milk recovery. C/o disinterest in eating and taste alterations here, had requested his wife bring him [employer] tonight to see if he can eat that. He recalls a corbin brought him an egg white delight from [employer] earlier and he was able to eat that. Briefly discussed general renal diet guidelines, nephrology indicates ? tentative tunneled catheter. Agrees to try ONS to assist with meeting estimated needs. Diet order(s): Renal.Anthropometrics: 67\",  Weight: 94.4 kg (208 lb 3.2 oz), Weight Source: Standing scale (comment), Body mass index is 32.61 kg/m². BMI class of obesity class I - noted pt with significant muscle mass. Macronutrient needs: EER:  0664-1871 kcal /day (25-30 kcal/kg ideal BW) EPR:  90-98 grams protein/day (1.2-1.3 grams/kg ideal BW) Intake/Comparative Standards: Only 1 meal recorded for 7 days. Pt recalls eating only bites of food. Insufficient data to assess. Nutrition Diagnosis: Predicted suboptimal energy intake related to c/o taste alterations, poor appetite as evidenced by pt recalls eating only bites of foods. Intervention: 
Meals and snacks: Continue current diet. Nutrition Supplement Therapy: Nepro TID. Discharge Nutrition Plan: Too soon to determine. [place], [address], [place]

## 2018-11-15 NOTE — PROGRESS NOTES
Infectious Disease Progress Note Today's Date: 11/15/2018 Admit Date: 2018 Impression: · MSSA bacteremia, (18) -  [address] NG; TTE no evidence of endocarditis · Late onset L JEIMY infection, MSSA (10/29), s/p I&D and polyethylene exchange  · MSSA R shoulder septic arthritis, s/p I and D  · History R shoulder rotator cuff repair · Oral HSV, resolved · RANI - ATN (post-op hypotension, Staphylococcal kidney injury, rifampin) vs AIN (nafcillin, keflex, or cefazolin) - now on HD with some sign of recovery, however nephro still evaluating · Elevated LFTs, Hep B, Hep C neg Plan:  
· Continue renally dosed Vancomycin: pt will need 6 weeks of treatment (EOT 18). Will need oral therapy following in presence of orthopedic hardware. Would use non-beta lactam regimen given possible AIN. · Closely watch renal function and vancomycin levels, adjust dosing based on renal function recovery · Discussed with Dr Simon Granados today: willing to assist with coordinating monitoring/medication ordering. Follow recommendations tomorrow · ID follow up 18 @ 9:00am and 18 @ 9:00am 
 
 
Anti-infectives: · Nafcillin · Cefazolin---Vanc (- · Rifampin Subjective: Interval History:  
Constipation resolved, and overall he feels better. Still very anxious about infection, how he got it and hospitalization. Infection, ID plan and treatment discussed at length with Dr William Santizo, Dr Simon Granados, CM and pt, >35 mins spent seeing pt today, educating/coordinating care. No Known Allergies Review of Systems:  A comprehensive review of systems was negative except for that written in the History of Present Illness. Objective:  
 
Visit Vitals /82 (BP 1 Location: Left arm, BP Patient Position: At rest) Pulse 70 Temp 98.7 °F (37.1 °C) Resp 20 Wt 94.4 kg (208 lb 3.2 oz) SpO2 96% BMI 32.61 kg/m² Temp (24hrs), Av.5 °F (36.9 °C), Min:98.1 °F (36.7 °C), Max:99.1 °F (37.3 °C) Lines:  CVC: R IJ HD cath Physical Exam:   
General:  Alert, cooperative, in no acute distress;  
Eyes:  Sclera anicteric Neck: Supple Lungs:   Unlabored, CTA anterior fields Abdomen:   Soft, non tender, non distended, active bowel sounds Extremities: No cyanosis, trace LE edema Skin: No acute rash Musculoskeletal: Muscular. R shoulder edema with clean dressing, L hip not examined today Lines/Devices:  none Psych: Alert and oriented, normal mood affect given the setting Data Review: CBC: 
Recent Labs 11/15/18 
6656 11/14/18 
1317 11/14/18 
7652 WBC 10.3  --   --   
HGB 7.8* 7.7* 7.3* HCT 24.2* 24.2* 22.7*  
  --   --   
 
 
BMP: 
Recent Labs 11/15/18 
7903 11/14/18 
0506 11/13/18 
6583 CREA 10.70* 13.00* 10.60* BUN 43* 60* 48* * 134* 134* K 4.6 4.8 5.1 CL 99 98 98 CO2 27 22 23 AGAP 8 14 13 GLU 92 82 81 LFTS: 
No results for input(s): TBILI, ALT, SGOT, AP, TP, ALB in the last 72 hours. Microbiology:  
 
All Micro Results Procedure Component Value Units Date/Time CULTURE, BLOOD [[id number]] Collected:  11/08/18 0353 Order Status:  Completed Specimen:  Whole Blood Updated:  11/13/18 1617 Special Requests: --     
  RIGHT 
HAND Culture result: NO GROWTH 5 DAYS     
 CULTURE, BLOOD [[id number]] Collected:  11/08/18 0353 Order Status:  Completed Specimen:  Whole Blood Updated:  11/13/18 8091 Special Requests: --     
  RIGHT 
HAND Culture result: NO GROWTH 5 DAYS     
 CULTURE, URINE [[id number]] Collected:  11/10/18 1424 Order Status:  Completed Specimen:  Urine Updated:  11/12/18 4049 Special Requests: NO SPECIAL REQUESTS Culture result: NO GROWTH 2 DAYS     
 CULTURE, TISSUE Trenia Antolin STAIN [[id number]]  (Abnormal)  (Susceptibility) Collected:  11/06/18 1924 Order Status:  Completed Specimen:  Joint, Shoulder Updated:  11/10/18 1149 Special Requests: NO SPECIAL REQUESTS GRAM STAIN 0 TO 1 WBC'S/OIF  
   NO DEFINITE ORGANISM SEEN Culture result:    
  LIGHT STAPHYLOCOCCUS AUREUS  
     
   LIGHT 
NORMAL SKIN LATASHA ISOLATED 
   
      
  THIS ORGANISM WILL BE HELD FOR 7 DAYS. IF FURTHER TESTING IS REQUIRED PLEASE NOTIFY MICROBIOLOGY Imagin/8/18 TTE: SUMMARY: 
 
-  Left ventricle: Systolic function was normal. Ejection fraction was 
estimated in the range of 55 % to 60 %. There were no regional wall motion 
abnormalities. -  Aortic valve: The valve was probably trileaflet. Leaflets exhibited mild 
sclerosis. There was no evidence for vegetation. -  Mitral valve: There was mild regurgitation. There was no evidence for 
vegetation. Signed By: Gris Chi NP November 15, 2018

## 2018-11-16 ENCOUNTER — APPOINTMENT (OUTPATIENT)
Dept: INTERVENTIONAL RADIOLOGY/VASCULAR | Age: 53
DRG: 559 | End: 2018-11-16
Attending: INTERNAL MEDICINE
Payer: COMMERCIAL

## 2018-11-16 LAB
ANION GAP SERPL CALC-SCNC: 13 MMOL/L (ref 7–16)
BUN SERPL-MCNC: 51 MG/DL (ref 6–23)
CALCIUM SERPL-MCNC: 8.2 MG/DL (ref 8.3–10.4)
CHLORIDE SERPL-SCNC: 99 MMOL/L (ref 98–107)
CO2 SERPL-SCNC: 25 MMOL/L (ref 21–32)
CREAT SERPL-MCNC: 12.5 MG/DL (ref 0.8–1.5)
ERYTHROCYTE [DISTWIDTH] IN BLOOD BY AUTOMATED COUNT: 14.9 %
GLUCOSE SERPL-MCNC: 85 MG/DL (ref 65–100)
HCT VFR BLD AUTO: 24 % (ref 41.1–50.3)
HGB BLD-MCNC: 7.5 G/DL (ref 13.6–17.2)
MCH RBC QN AUTO: 28.3 PG (ref 26.1–32.9)
MCHC RBC AUTO-ENTMCNC: 31.3 G/DL (ref 31.4–35)
MCV RBC AUTO: 90.6 FL (ref 79.6–97.8)
NRBC # BLD: 0 K/UL (ref 0–0.2)
PLATELET # BLD AUTO: 474 K/UL (ref 150–450)
PMV BLD AUTO: 9.7 FL (ref 9.4–12.3)
POTASSIUM SERPL-SCNC: 4.9 MMOL/L (ref 3.5–5.1)
RBC # BLD AUTO: 2.65 M/UL (ref 4.23–5.6)
SODIUM SERPL-SCNC: 137 MMOL/L (ref 136–145)
WBC # BLD AUTO: 8.3 K/UL (ref 4.3–11.1)

## 2018-11-16 PROCEDURE — 99152 MOD SED SAME PHYS/QHP 5/>YRS: CPT

## 2018-11-16 PROCEDURE — 80048 BASIC METABOLIC PNL TOTAL CA: CPT

## 2018-11-16 PROCEDURE — 65660000000 HC RM CCU STEPDOWN

## 2018-11-16 PROCEDURE — 77001 FLUOROGUIDE FOR VEIN DEVICE: CPT

## 2018-11-16 PROCEDURE — C1769 GUIDE WIRE: HCPCS

## 2018-11-16 PROCEDURE — 74011250637 HC RX REV CODE- 250/637: Performed by: INTERNAL MEDICINE

## 2018-11-16 PROCEDURE — 74011000250 HC RX REV CODE- 250: Performed by: INTERNAL MEDICINE

## 2018-11-16 PROCEDURE — 77030037400 HC ADH TISS HI VISC EXOFIN CHMP -B

## 2018-11-16 PROCEDURE — 77030003028 HC SUT VCRL J&J -A

## 2018-11-16 PROCEDURE — 77010033678 HC OXYGEN DAILY

## 2018-11-16 PROCEDURE — 77030020263 HC SOL INJ SOD CL0.9% LFCR 1000ML

## 2018-11-16 PROCEDURE — 74011000250 HC RX REV CODE- 250: Performed by: RADIOLOGY

## 2018-11-16 PROCEDURE — 74011250636 HC RX REV CODE- 250/636: Performed by: INTERNAL MEDICINE

## 2018-11-16 PROCEDURE — C1750 CATH, HEMODIALYSIS,LONG-TERM: HCPCS

## 2018-11-16 PROCEDURE — 77030002916 HC SUT ETHLN J&J -A

## 2018-11-16 PROCEDURE — 85027 COMPLETE CBC AUTOMATED: CPT

## 2018-11-16 PROCEDURE — C1894 INTRO/SHEATH, NON-LASER: HCPCS

## 2018-11-16 PROCEDURE — 97110 THERAPEUTIC EXERCISES: CPT

## 2018-11-16 PROCEDURE — 74011250636 HC RX REV CODE- 250/636

## 2018-11-16 PROCEDURE — 0JH63XZ INSERTION OF TUNNELED VASCULAR ACCESS DEVICE INTO CHEST SUBCUTANEOUS TISSUE AND FASCIA, PERCUTANEOUS APPROACH: ICD-10-PCS | Performed by: RADIOLOGY

## 2018-11-16 PROCEDURE — 74011250636 HC RX REV CODE- 250/636: Performed by: RADIOLOGY

## 2018-11-16 PROCEDURE — 36415 COLL VENOUS BLD VENIPUNCTURE: CPT

## 2018-11-16 PROCEDURE — 94760 N-INVAS EAR/PLS OXIMETRY 1: CPT

## 2018-11-16 RX ORDER — HEPARIN SODIUM 200 [USP'U]/100ML
1000 INJECTION, SOLUTION INTRAVENOUS CONTINUOUS
Status: DISCONTINUED | OUTPATIENT
Start: 2018-11-16 | End: 2018-11-20 | Stop reason: HOSPADM

## 2018-11-16 RX ORDER — FENTANYL CITRATE 50 UG/ML
25-50 INJECTION, SOLUTION INTRAMUSCULAR; INTRAVENOUS
Status: DISCONTINUED | OUTPATIENT
Start: 2018-11-16 | End: 2018-11-20 | Stop reason: HOSPADM

## 2018-11-16 RX ORDER — LIDOCAINE HYDROCHLORIDE AND EPINEPHRINE 20; 5 MG/ML; UG/ML
1-20 INJECTION, SOLUTION EPIDURAL; INFILTRATION; INTRACAUDAL; PERINEURAL ONCE
Status: COMPLETED | OUTPATIENT
Start: 2018-11-16 | End: 2018-11-16

## 2018-11-16 RX ORDER — SODIUM CHLORIDE 9 MG/ML
25 INJECTION, SOLUTION INTRAVENOUS CONTINUOUS
Status: DISCONTINUED | OUTPATIENT
Start: 2018-11-16 | End: 2018-11-20 | Stop reason: HOSPADM

## 2018-11-16 RX ORDER — HEPARIN SODIUM 1000 [USP'U]/ML
10000 INJECTION, SOLUTION INTRAVENOUS; SUBCUTANEOUS ONCE
Status: ACTIVE | OUTPATIENT
Start: 2018-11-16 | End: 2018-11-17

## 2018-11-16 RX ORDER — LIDOCAINE HYDROCHLORIDE 20 MG/ML
20-400 INJECTION, SOLUTION INFILTRATION; PERINEURAL ONCE
Status: COMPLETED | OUTPATIENT
Start: 2018-11-16 | End: 2018-11-16

## 2018-11-16 RX ORDER — MIDAZOLAM HYDROCHLORIDE 1 MG/ML
.5-2 INJECTION, SOLUTION INTRAMUSCULAR; INTRAVENOUS
Status: DISCONTINUED | OUTPATIENT
Start: 2018-11-16 | End: 2018-11-20 | Stop reason: HOSPADM

## 2018-11-16 RX ADMIN — METOPROLOL TARTRATE 50 MG: 50 TABLET ORAL at 10:34

## 2018-11-16 RX ADMIN — Medication 1 AMPULE: at 10:35

## 2018-11-16 RX ADMIN — METOPROLOL TARTRATE 50 MG: 50 TABLET ORAL at 22:48

## 2018-11-16 RX ADMIN — ZOLPIDEM TARTRATE 5 MG: 5 TABLET ORAL at 22:48

## 2018-11-16 RX ADMIN — SENNOSIDES AND DOCUSATE SODIUM 2 TABLET: 8.6; 5 TABLET ORAL at 10:35

## 2018-11-16 RX ADMIN — CLONIDINE HYDROCHLORIDE 0.2 MG: 0.2 TABLET ORAL at 19:38

## 2018-11-16 RX ADMIN — AMLODIPINE BESYLATE 5 MG: 5 TABLET ORAL at 10:34

## 2018-11-16 RX ADMIN — LIDOCAINE HYDROCHLORIDE,EPINEPHRINE BITARTRATE 100 MG: 20; .005 INJECTION, SOLUTION EPIDURAL; INFILTRATION; INTRACAUDAL; PERINEURAL at 17:30

## 2018-11-16 RX ADMIN — CLONIDINE HYDROCHLORIDE 0.2 MG: 0.2 TABLET ORAL at 10:35

## 2018-11-16 RX ADMIN — SODIUM CHLORIDE 250 ML: 900 INJECTION, SOLUTION INTRAVENOUS at 17:03

## 2018-11-16 RX ADMIN — Medication 5 ML: at 22:00

## 2018-11-16 RX ADMIN — FAMOTIDINE 20 MG: 20 TABLET ORAL at 10:35

## 2018-11-16 RX ADMIN — MIDAZOLAM HYDROCHLORIDE 1 MG: 1 INJECTION, SOLUTION INTRAMUSCULAR; INTRAVENOUS at 16:59

## 2018-11-16 RX ADMIN — OXYCODONE HYDROCHLORIDE 10 MG: 5 TABLET ORAL at 22:48

## 2018-11-16 RX ADMIN — FENTANYL CITRATE 50 MCG: 50 INJECTION, SOLUTION INTRAMUSCULAR; INTRAVENOUS at 16:59

## 2018-11-16 RX ADMIN — HEPARIN SODIUM 3200 UNITS: 1000 INJECTION INTRAVENOUS; SUBCUTANEOUS at 17:29

## 2018-11-16 RX ADMIN — LIDOCAINE HYDROCHLORIDE 140 MG: 20 INJECTION, SOLUTION INFILTRATION; PERINEURAL at 17:00

## 2018-11-16 RX ADMIN — POLYETHYLENE GLYCOL 3350 17 G: 17 POWDER, FOR SOLUTION ORAL at 22:49

## 2018-11-16 RX ADMIN — HEPARIN SODIUM 5000 UNITS: 5000 INJECTION INTRAVENOUS; SUBCUTANEOUS at 22:48

## 2018-11-16 RX ADMIN — Medication 1 AMPULE: at 22:48

## 2018-11-16 NOTE — PROGRESS NOTES
TRANSFER - OUT REPORT: 
 
Verbal report given to [facility] [place] [facility], RN on Sonny De La Cruz  being transferred to [facility] [place] for routine progression of care Report consisted of patients Situation, Background, Assessment and  
Recommendations(SBAR). Information from the following report(s) SBAR and MAR was reviewed with the receiving nurse. Lines:  
Peripheral IV 11/10/18 Left Antecubital (Active) Site Assessment Clean, dry, & intact 11/15/2018  8:50 AM  
Phlebitis Assessment 0 11/15/2018  8:50 AM  
Infiltration Assessment 0 11/15/2018  8:50 AM  
Dressing Status Clean, dry, & intact 11/15/2018  8:50 AM  
Dressing Type Tape;Transparent 11/15/2018  8:50 AM  
Hub Color/Line Status Flushed 11/15/2018  8:50 AM  
Action Taken Open ports on tubing capped 11/14/2018  9:23 PM  
Alcohol Cap Used No 11/14/2018  9:23 PM  
  
 
Opportunity for questions and clarification was provided. Patient transported with: 
 Registered Nurse

## 2018-11-16 NOTE — PROGRESS NOTES
Pertinent patient information given to Geno Friday for review for possible [facility] placement. CM discussed possible [facility] placement with patient and he states he may not be willing to stay in the hospital but is willing to speak with Yue Acevedo regarding [facility] expectations and criteria. Cm will continue to follow.

## 2018-11-16 NOTE — PROGRESS NOTES
Hospitalist Progress Note 11/15/2018 Admit Date: 2018  9:42 AM  
NAME: Sobeida Betancur :  1965 MRN:  [id number] Attending: Rayna Akbar DO 
PCP:  Letty Sepulveda MD 
 
SUBJECTIVE:  
 52 y/o M with history of L FNA XSA 9537 and R rotator cuff repair 2017.  Two weeks PTA developed malaise, flu like symptoms, fever blisters, R shoulder pain.  Had R shoulder cortisone injection given.  Next day began having pain L hip.  Had hip aspirated 10/29 which grew MSSA.  Admitted by ortho on  for hip revision and I&D of shoulder on .   ID and hospitalist consulted. Blood cultures pos MSSA bacteremia  -  blood cx neg. TTE neg. Developed RANI due to ATN with hyperkalemia. Transferred to [facility] on  for possible HD. Nephro, Ortho and ID following 11/15 - no new complaints. Right shoulder pain improving. Review of Systems negative with exception of pertinent positives noted above PHYSICAL EXAM  
 
Visit Vitals BP (!) 172/91 (BP 1 Location: Left arm, BP Patient Position: At rest) Pulse 60 Temp 98.4 °F (36.9 °C) Resp 20 Wt 94.4 kg (208 lb 3.2 oz) SpO2 95% BMI 32.61 kg/m² Temp (24hrs), Av.5 °F (36.9 °C), Min:98.1 °F (36.7 °C), Max:99.1 °F (37.3 °C) Oxygen Therapy O2 Sat (%): 95 % (11/15/18 1603) Pulse via Oximetry: 75 beats per minute (18) O2 Device: Nasal cannula (18) O2 Flow Rate (L/min): 2 l/min (18) FIO2 (%): 28 % (18) Intake/Output Summary (Last 24 hours) at 11/15/2018 1958 Last data filed at 11/15/2018 1810 Gross per 24 hour Intake  Output 675 ml Net -675 ml General: No acute distress   
Lungs:  CTA Bilaterally. Heart:  Regular rate and rhythm,  No murmur, rub, or gallop Abdomen: Soft, Non distended, Non tender, Positive bowel sounds Extremities: No cyanosis, clubbing or edema right shoulder in sling Neurologic:  No focal deficits ASSESSMENT Active Hospital Problems Diagnosis Date Noted  Sepsis ([facility] 11/07/2018  Postoperative anemia due to acute blood loss 11/07/2018  Staphylococcal arthritis of right shoulder ([facility] 11/06/2018  Acute renal failure with tubular necrosis ([facility] 11/06/2018  Infected prosthetic hip ([facility] 11/05/2018  Septic arthritis of shoulder, right ([facility] 11/05/2018  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 
  
1- Staph aureus bacteremia due to septic R shoulder, pain controlled postop, Abx per ID on Vanco X 6 weeks. 2- ATN vs AIN, started on dialysis, severely oliguric, Nephro managing. Will need outpatient HD slot. Plans for permcath placement tomorrow. 3- Normocytic anemia, s/p PRBC 2U transfusion, Hgb drifting down. No active signs of bleeding. Monitor. Check EPO 
4- Abdominal pain, US and CT unremarkable. Pain has resolved. 5- Pain control. GI cocktail as needed. 6- HTN. Started on clonidine, increased Metoprolol. Still suboptimal. Added norvasc.  
  
Dispo; likely [facility] with IV atbx at HD High risk pt. DVT ppx:  hsq Discussed plan with pt who is in agreement. All questions answered. DVT Prophylaxis: hep sq Signed By: Carmela Barragan DO November 15, 2018

## 2018-11-16 NOTE — PROGRESS NOTES
Infectious Disease Progress Note Today's Date: 2018 Admit Date: 2018 Impression: · MSSA bacteremia (18) -  BCX NG; TTE no evidence of endocarditis · Late onset L JEIMY infection, MSSA (10/29), s/p I&D and polyethylene exchange  · MSSA R shoulder septic arthritis, s/p I and D  · History R shoulder rotator cuff repair · Oral HSV, resolved · RANI - ATN (post-op hypotension, Staphylococcal kidney injury, rifampin) vs AIN (nafcillin, keflex, or cefazolin) - now on HD with some sign of recovery, however nephro still evaluating · Elevated LFTs, Hep B, Hep C neg Plan:  
· Continue renally dosed Vancomycin, reviewed dosing with pharmacy yesterday, would recommend 500mg with HD Monday and Wednesday, and 750mg with HD on Friday: pt will need 6 weeks of treatment (EOT 18). Will need oral therapy given presence of orthopedic hardware. Would use non-beta lactam regimen given possible AIN. · Closely watch renal function and vancomycin levels, adjust dosing based on renal function recovery · Discussed with Dr Aretha Ann: willing to assist with coordinating monitoring/medication ordering. Follow nephro recommendations · ID follow up 18 @ 9:00am and 18 @ 9:00am 
 
 
Anti-infectives: · Nafcillin · Cefazolin---Vanc (- · Rifampin Subjective: Interval History:  
Slept well, feeling better today. Anxious about HD needs, has been  NPO. Reports increased UO. No Known Allergies Review of Systems:  A comprehensive review of systems was negative except for that written in the History of Present Illness. Objective:  
 
Visit Vitals /83 (BP 1 Location: Left arm, BP Patient Position: At rest) Pulse 66 Temp 98.2 °F (36.8 °C) Resp 20 Wt 94.4 kg (208 lb 3.2 oz) SpO2 96% BMI 32.61 kg/m² Temp (24hrs), Av.4 °F (36.9 °C), Min:98.2 °F (36.8 °C), Max:98.7 °F (37.1 °C) Lines:  CVC: R IJ HD cath Physical Exam: General:  Alert, cooperative, in no acute distress;  
Eyes:  Sclera anicteric Neck: Supple Lungs:   Unlabored, CTA anterior fields Abdomen:   Soft, non tender, non distended, active bowel sounds Extremities: No cyanosis, trace LE edema Skin: No acute rash Musculoskeletal: Muscular. R shoulder edema with clean dressing, L hip covered, no erythema Lines/Devices:  none Psych: Alert and oriented, normal mood affect given the setting Data Review: CBC: 
Recent Labs 11/16/18 
0417 11/15/18 
0819 11/14/18 
1317 WBC 8.3 10.3  --   
HGB 7.5* 7.8* 7.7* HCT 24.0* 24.2* 24.2*  
* 428  --   
 
 
BMP: 
Recent Labs 11/16/18 
0417 11/15/18 
9926 11/14/18 
3562 CREA 12.50* 10.70* 13.00* BUN 51* 43* 60*  134* 134* K 4.9 4.6 4.8  
CL 99 99 98  
CO2 25 27 22 AGAP 13 8 14 GLU 85 92 82 LFTS: 
No results for input(s): TBILI, ALT, SGOT, AP, TP, ALB in the last 72 hours. Microbiology:  
 
All Micro Results Procedure Component Value Units Date/Time CULTURE, BLOOD [[id number]] Collected:  11/08/18 0353 Order Status:  Completed Specimen:  Whole Blood Updated:  11/13/18 0022 Special Requests: --     
  RIGHT 
HAND Culture result: NO GROWTH 5 DAYS     
 CULTURE, BLOOD [[id number]] Collected:  11/08/18 0353 Order Status:  Completed Specimen:  Whole Blood Updated:  11/13/18 0022 Special Requests: --     
  RIGHT 
HAND Culture result: NO GROWTH 5 DAYS     
 CULTURE, URINE [[id number]] Collected:  11/10/18 6754 Order Status:  Completed Specimen:  Urine Updated:  11/12/18 0702 Special Requests: NO SPECIAL REQUESTS Culture result: NO GROWTH 2 DAYS     
 CULTURE, TISSUE Mily Mango STAIN [[id number]]  (Abnormal)  (Susceptibility) Collected:  11/06/18 8995 Order Status:  Completed Specimen:  Joint, Shoulder Updated:  11/10/18 9887   Special Requests: NO SPECIAL REQUESTS     
  GRAM STAIN 0 TO 1 WBC'S/OIF  
   NO DEFINITE ORGANISM SEEN     
 Culture result:    
  LIGHT STAPHYLOCOCCUS AUREUS  
     
   LIGHT 
NORMAL SKIN LATASHA ISOLATED 
   
      
  THIS ORGANISM WILL BE HELD FOR 7 DAYS. IF FURTHER TESTING IS REQUIRED PLEASE NOTIFY MICROBIOLOGY Imagin/8/18 TTE: SUMMARY: 
 
-  Left ventricle: Systolic function was normal. Ejection fraction was 
estimated in the range of 55 % to 60 %. There were no regional wall motion 
abnormalities. -  Aortic valve: The valve was probably trileaflet. Leaflets exhibited mild 
sclerosis. There was no evidence for vegetation. -  Mitral valve: There was mild regurgitation. There was no evidence for 
vegetation. Signed By: Kwesi Brooks NP 2018

## 2018-11-16 NOTE — PROGRESS NOTES
Pharmacokinetic Consult to Pharmacist 
 
Dannaurszula Rojas is a 48 y.o. male being treated for MSSA bacteremia, L JEIMY infection, and R shoulder septic arthritis with vancomycin. Weight: 94.4 kg (208 lb 3.2 oz) Lab Results Component Value Date/Time BUN 51 (H) 11/16/2018 04:17 AM  
 Creatinine 12.50 (H) 11/16/2018 04:17 AM  
 WBC 8.3 11/16/2018 04:17 AM  
 Lactic acid 0.5 11/10/2018 10:56 AM  
 Lactic Acid (POC) 1.52 11/04/2018 04:40 PM  
  
Estimated Creatinine Clearance: 7.7 mL/min (A) (based on SCr of 12.5 mg/dL (H)). Lab Results Component Value Date/Time Vancomycin, random 22.1 11/14/2018 04:11 PM  
 
 
Day 9 of vancomycin. Goal trough is 15-20, but dosing intermittently due to renal failure. Per discussion with ID and nephrology, plan to treat for 6 weeks with EOT 12/19/18. For ease of transition to outpatient dosing for now, will need to dose vancomycin WITH dialysis sessions. Perm cath to be placed later today, so patient to skip dialysis today and transition to a Tues,Thurs, Sat schedule. Plan to dose vancomycin 750 mg IV WITH dialysis on Saturdays and vancomycin 500 mg IV WITH dialysis on Tuesdays and Thursdays. For now, would ideally check vancomycin random level in between dialysis sessions once or twice a week and adjust vancomycin dose accordingly as needed to maintain adequate levels. As patient's renal function improves over time, the plan for dosing vancomycin will need to change, especially if he is able to transition off of hemodialysis at some point during the 6 weeks of treatment with vancomycin. Appears that social work checked with patient's insurance and daptomycin would be covered, so may eventually plan to place IV access and switch from vancomycin to daptomycin for the duration of therapy if patient is improving and able to come off dialysis prior to end of therapy for ease of dosing. Pharmacy will continue to follow. Please call with any questions. Thank you, 
Manjeet Sandhu, PharmD Clinical Pharmacist 
[phone]

## 2018-11-16 NOTE — PROGRESS NOTES
Problem: Mobility Impaired (Adult and Pediatric) Goal: *Acute Goals and Plan of Care (Insert Text) Discharge Goals Plan of care updated 11/15/18 (1.)Mr. Jonny Tellez will move from supine to sit and sit to supine , scoot up and down and roll side to side in bed with MODIFIED INDEPENDENCE within 5 treatment day(s). (2.)Mr. Jonny Tellez will transfer from bed to chair and chair to bed with MODIFIED INDEPENDENCE using the least restrictive device within 5 treatment day(s). (3.)Mr. Jonny Tellez will ambulate with MODIFIED INDEPENDENCE for 500 feet with the least restrictive device within 5 treatment day(s). (4.)Mr. Jonny Tellez will perform standing static and dynamic balance activities x 15 minutes with MODIFIED INDEPENDENCE to improve safety within 5 day(s). (5.)Mr. Jonny Tellez will ascend and descend 2 stairs using L hand rail(s) with SUPERVISION to improve functional mobility and safety within 5 day(s). (6.)Mr. Jonny Tellez will tolerate AAROM, AROM and gentle pendulums of RUE with no increase in pain within 5 treatment days to improve independence with transfers. (7.)Mr. Jonny Tellez will tolerate 50+ minutes of therapeutic activity/exercise within 5 days to improve activity tolerance for functional mobility. 
________________________________________________________________________________________________ PHYSICAL THERAPY: Daily Note, Treatment Day: 1st, PM 11/16/2018INPATIENT: Hospital Day: 10 Payor: Dedra Villatoro / Plan: [employer] OAP / Product Type: Commerical /  
 LLE total hip precautions, WBAT 
RUE WBAT : Active and passive range of motion RIGHT elbow hand ACTIVE AND ACTIVE ASSISTED MOTION RIGHT SHOULDER GENTLE pendulums 
sling RIGHT shoulder NAME/AGE/GENDER: Jaswant Samuel is a 48 y.o. male PRIMARY DIAGNOSIS: infected left hip  
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 
Patient has no known allergies. ASSESSMENT:  
Mr. Rekha Ch presents supine in bed and is agreeable to ROM. Pt states he is waiting to go for a procedure. Treatment included RUE ROM exercises per chart below requiring cues to perform properly and with good technique. Minimal increase in R shoulder pain with shoulder abduction, performed to patient's tolerance. Pt was left supine with needs in reach. Will continue with POC. This section established at most recent assessment PROBLEM LIST (Impairments causing functional limitations): 1. Decreased Strength 2. Decreased ADL/Functional Activities 3. Decreased Transfer Abilities 4. Decreased Ambulation Ability/Technique 5. Decreased Balance 6. Increased Pain 7. Decreased Activity Tolerance 8. Decreased Pacing Skills 9. Decreased Knowledge of Precautions 10. Decreased [place] with Home Exercise Program 
 INTERVENTIONS PLANNED: (Benefits and precautions of physical therapy have been discussed with the patient.) 1. Balance Exercise 2. Bed Mobility 3. Gait Training 4. Group Therapy 5. Home Exercise Program (HEP) 6. Therapeutic Activites 7. Therapeutic Exercise/Strengthening 8. Transfer Training 9. Patient Education TREATMENT PLAN: Frequency/Duration: daily for duration of hospital stay Rehabilitation Potential For Stated Goals: Excellent RECOMMENDED REHABILITATION/EQUIPMENT: (at time of discharge pending progress): Due to the probability of continued deficits (see above) this patient will likely need continued skilled physical therapy after discharge. Equipment:  
? None at this time HISTORY:  
History of Present Injury/Illness (Reason for Referral): 
Pt is a 47 y/o male who is almost 2 years s/p L JEIMY who was seen in clinic on 10/29 c/o 5 day history of sudden onset of moderate left hip pain. Denied any known injury. Left hip joint aspirate was obtained and sent for culture which grew pansensitive staph. Aureus. Patient was also noted to have elevated WBC (27.1) as well as elevated ESR (90). Patient reports having flu like symptoms 2 weeks ago and began developing right shoulder pain around that same time. Patient was subsequently evaluated by Dr. Kim Krause and received right shoulder cortisone injection. He reports that he is still experiencing moderate to severe right shoulder pain. Denies fever, chills, nausea, vomiting, etc..  No other new complaints. Past Medical History/Comorbidities:  
Mr. Renetta Dhaliwal  has a past medical history of Arthritis, Biceps muscle tear, Chronic pain, Insomnia, MRSA (methicillin resistant Staphylococcus aureus) infection, Personal history of kidney stones, and Right shoulder pain. Mr. Renetta Dhaliwal  has a past surgical history that includes hx other surgical; hx rotator cuff repair (Left, 2010); hx orthopaedic (Left, 01/2017); hx septoplasty (11/15/2017); HIP ARTHROPLASTY TOTAL REVISION (Left, 11/6/2018); SHOULDER I&D (Right, 11/6/2018); RIGHT SHOULDER ARTHROSCOPY SUBACROMIAL DECOMPRESSION ROTATOR CUFF REPAIR/ DISTAL CLAVICLE RESECTION (Right, 6/13/2017); LEFT TOTAL HIP ARTHROPLASTY (Left, 1/6/2017); and SHOULDER ARTHROSCOPY ACROMIOPLASTY ROTATOR CUFF REPAIR (Left, 10/21/2010). Social History/Living Environment:  
Home Environment: Private residence # Steps to Enter: 2 Rails to Enter: Yes Hand Rails : Left One/Two Story Residence: One story Living Alone: No 
Support Systems: Spouse/Significant Other/Partner Patient Expects to be Discharged to[de-identified] Private residence Current DME Used/Available at Home: Walker, rolling, Raised toilet seat, Grab bars, Adaptive bathing aides, Adaptive dressing aides Tub or Shower Type: Tub/Shower combination Prior Level of Function/Work/Activity: 
 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  
Most Recent Physical Functioning:  
Gross Assessment: 
  
         
  
Posture: 
  
Balance: 
  Bed Mobility: 
  
Wheelchair Mobility: 
  
Transfers: 
  
Gait: 
  
   
  
Body Structures Involved: 1. Nerves 2. Metabolic 3. Endocrine 4. Bones 5. Joints 6. Muscles Body Functions Affected: 1. Sensory/Pain 2. Neuromusculoskeletal 
3. Movement Related 4. Metobolic/Endocrine Activities and Participation Affected: 1. Mobility 2. Self Care 3. Domestic Life 4. Interpersonal Interactions and Relationships 5. Community, Social and [place] Number of elements that affect the Plan of Care: 4+: HIGH COMPLEXITY CLINICAL PRESENTATION:  
Presentation: Stable and uncomplicated: LOW COMPLEXITY CLINICAL DECISION MAKING:  
[facility] AM-PAC 6 Clicks Basic Mobility Inpatient Short Form How much difficulty does the patient currently have. .. Unable A Lot A Little None 1. Turning over in bed (including adjusting bedclothes, sheets and blankets)? [] 1   [] 2   [x] 3   [] 4  
2. Sitting down on and standing up from a chair with arms ( e.g., wheelchair, bedside commode, etc.)   [] 1   [] 2   [x] 3   [] 4  
3. Moving from lying on back to sitting on the side of the bed? [] 1   [x] 2   [] 3   [] 4 How much help from another person does the patient currently need. .. Total A Lot A Little None 4. Moving to and from a bed to a chair (including a wheelchair)? [] 1   [] 2   [x] 3   [] 4  
5. Need to walk in hospital room? [] 1   [x] 2   [] 3   [] 4  
6. Climbing 3-5 steps with a railing? [] 1   [x] 2   [] 3   [] 4  
© 2007, Trustees of [employer], under license to [employer]. All rights reserved Score:  Initial: 15 Most Recent: X (Date: -- ) Interpretation of Tool:  Represents activities that are increasingly more difficult (i.e. Bed mobility, Transfers, Gait). Score 24 23 22-20 19-15 14-10 9-7 6 Modifier CH CI CJ CK CL CM CN   
 
? Mobility - Walking and Moving Around:  
  - CURRENT STATUS: CK - 40%-59% impaired, limited or restricted  - GOAL STATUS: CJ - 20%-39% impaired, limited or restricted  - D/C STATUS:  ---------------To be determined--------------- Payor: Erick Sepulveda / Plan: [employer] OAP / Product Type: Commerical /   
 
Medical Necessity:    
· Patient demonstrates excellent rehab potential due to higher previous functional level. Reason for Services/Other Comments: 
· Patient continues to require modification of therapeutic interventions to increase complexity of exercises. Use of outcome tool(s) and clinical judgement create a POC that gives a: Clear prediction of patient's progress: LOW COMPLEXITY  
  
 
 
 
TREATMENT:    
    
Pre-treatment Symptoms/Complaints: \"OK \" 
Pain: Initial:  
   Post Session:  5/10 Therapeutic Activity: (     0 minutes): Therapeutic activities including bed mobility training, transfer training, ambulation on level ground, static/dynamic standing balance,and patient education to improve mobility, strength and balance. Required minimal   to promote static and dynamic balance in standing. Therapeutic Exercise: (   17 minutes):  Exercises per grid below to improve mobility, strength and range of motion RUE. Required minimal visual, manual and tactile cues to promote proper body alignment and promote proper body posture. Progressed range as indicated. Date: 
11/11/18 Date: 
11/12/18 Date: 
11/13/18 Date: 
11/13/18 Date: 
11/14/18 Date: 
11/15/18 Date: 
11/16/18 ACTIVITY/EXERCISE AM PM PM AM PM PM AM PM  
RUE shoulder flexion 30 passive 30 passive 2x15 R 
PROM 2x15R PROM 2x20R PROM 2x15R PROM 2x15R PROM 2x15R PROM  
RUE elbow flexion 30 AA 30 AA 2x15 R 
 AAROM 2x15R 
AROM 2x15R  
AROM x15 AROM 2x15R 
AROM 2x15R PROM  
RUE finger flexion/extension 30 AA 30 AA x15 R 
 Active x15R A x15R A x15 R A 2x15R A 2x15R A  
RUE shoulder abduction 30 passive 30 passive 2x15R PROM 2x15R PROM 2x15R PROM 2x15 R PROM 2x15R PROM 2x15R PROM Gentle pendulums       x1' Forearm pronation/supination   x10R A x10R A x15R A x15 R A 2x15R A 2x15R A  
          
          
          
B = bilateral; AA = active assistive; A = active; P = passive Braces/Orthotics/Lines/Etc:  
 -None Treatment/Session Assessment:   
· Response to Treatment: see above · Interdisciplinary Collaboration:  
o Physical Therapy Assistant 
o Registered Nurse · After treatment position/precautions:  
o Supine in bed 
o Bed/Chair-wheels locked 
o Bed in low position 
o Call light within reach 
o RN notified · Compliance with Program/Exercises: doing better · Recommendations/Intent for next treatment session: \"Next visit will focus on advancements to more challenging activities and reduction in assistance provided\". Total Treatment Duration: PT Patient Time In/Time Out Time In: 1130 Time Out: 4614 Madi Pruitt, ROXANNE

## 2018-11-16 NOTE — PROGRESS NOTES
[facility] PROGRESS NOTE Follow up for:  RANI Subjective:  
Patient seen and examined at bedside. Doing well. Increased UOP but creatinine still climbing between HD treatments. Plans for tunneled HD cath later today. ROS: 
Gen - no fever, no chills, appetite okay CV - no chest pain, no orthopnea Lung - no shortness of breath, no cough Abd - no tenderness, no nausea, no vomiting Ext - no edema Objective:  
Exam: 
Vitals:  
 11/15/18 2000 11/15/18 2349 11/16/18 7644 11/16/18 1273 BP: 184/84 144/78 155/82 167/83 Pulse: 67 61 (!) 55 66 Resp: 20 18 18 20 Temp: 98.4 °F (36.9 °C) 98.4 °F (36.9 °C) 98.3 °F (36.8 °C) 98.2 °F (36.8 °C) SpO2: 94% 94% 95% 96% Weight:      
 
 
 
Intake/Output Summary (Last 24 hours) at 11/16/2018 2977 Last data filed at 11/16/2018 7498 Gross per 24 hour Intake  Output 725 ml Net -725 ml  
 
 
Current Facility-Administered Medications Medication Dose Route Frequency  vancomycin (VANCOCIN) 750 mg in  ml infusion  750 mg IntraVENous Once per day on Fri  
 [START ON 11/19/2018] vancomycin (VANCOCIN) 500 mg in 0.9% sodium chloride 100 mL IVPB  500 mg IntraVENous Once per day on Mon Wed  heparin (porcine) 1,000 unit/mL injection 5,000 Units  5,000 Units Hemodialysis DIALYSIS PRN  
 amLODIPine (NORVASC) tablet 5 mg  5 mg Oral DAILY  metoprolol tartrate (LOPRESSOR) tablet 50 mg  50 mg Oral Q12H  
 acetaminophen (TYLENOL) tablet 1,000 mg  1,000 mg Oral Q6H PRN  
 cloNIDine HCl (CATAPRES) tablet 0.2 mg  0.2 mg Oral BID  hydrALAZINE (APRESOLINE) 20 mg/mL injection 10 mg  10 mg IntraVENous Q6H PRN  
 simethicone (MYLICON) tablet 80 mg  80 mg Oral QID PRN  polyethylene glycol (MIRALAX) packet 17 g  17 g Oral DAILY PRN  
 heparin (porcine) injection 5,000 Units  5,000 Units SubCUTAneous Q12H  
 epoetin zee (EPOGEN;PROCRIT) injection 20,000 Units  20,000 Units SubCUTAneous Q7D  
 famotidine (PEPCID) tablet 20 mg  20 mg Oral DAILY  diphenhydrAMINE (BENADRYL) capsule 25 mg  25 mg Oral Q4H PRN  
 HYDROmorphone (PF) (DILAUDID) injection 1 mg  1 mg IntraVENous Q3H PRN  
 naloxone (NARCAN) injection 0.2-0.4 mg  0.2-0.4 mg IntraVENous Q10MIN PRN  
 oxyCODONE IR (ROXICODONE) tablet 10 mg  10 mg Oral Q3H PRN  
 senna-docusate (PERICOLACE) 8.6-50 mg per tablet 2 Tab  2 Tab Oral DAILY  sodium chloride (NS) flush 5-10 mL  5-10 mL IntraVENous Q8H  
 sodium chloride (NS) flush 5-10 mL  5-10 mL IntraVENous PRN  
 zolpidem (AMBIEN) tablet 5 mg  5 mg Oral QHS PRN  
 alcohol 62% (NOZIN) nasal  1 Ampule  1 Ampule Topical Q12H  
 ondansetron (ZOFRAN) injection 4 mg  4 mg IntraVENous Q4H PRN  
 NUTRITIONAL SUPPORT ELECTROLYTE PRN ORDERS   Does Not Apply PRN  
 0.9% sodium chloride infusion 250 mL  250 mL IntraVENous PRN  
 
 
EXAM 
GEN - Alert, oriented, in no distress CV - S1, S2, RRR, no rub, murmur, or gallop Lung - clear to auscultation bilaterally Abd - soft, nontender, BS present Ext - trace edema Recent Labs 11/16/18 
0417 11/15/18 
0819 11/14/18 
1317 WBC 8.3 10.3  --   
HGB 7.5* 7.8* 7.7* HCT 24.0* 24.2* 24.2*  
* 428  --   
  
 
Recent Labs 11/16/18 
0417 11/15/18 
0232 11/14/18 
6391  134* 134* K 4.9 4.6 4.8  
CL 99 99 98  
CO2 25 27 22 BUN 51* 43* 60* CREA 12.50* 10.70* 13.00* CA 8.2* 8.4 8.5 GLU 85 92 82 Assessment and Plan:  
 
1) RANI - HD dependent 
- uop picking up - temp cath pulled in hopes that he was having renal recovery but creatinine still up between HD treatments. - plan on placing a tunneled catheter later today. Ok to wait for HD in am  
- will need outpatient dialysis slot 2) MSSA Bacteremia  
- late onset left JEIMY infection. S/p polyethylene exchange 11/5 
- right shoulder septic arthritis - ID following - Plan Vanc for 6 weeks, EOT 12/19/18. Can dose with hemodialysis 3) Anemia - Hb stable 4) HTN 
- clonidine, metoprolol, norvasc [facility] [place]

## 2018-11-16 NOTE — PROGRESS NOTES
IR Nurse Pre-Procedure Checklist Part 2 Consent signed: Yes 
 
H&P complete:  Yes Antibiotics: Not applicable Airway/Mallampati Done: Yes Shaved: Yes Pregnancy Form:Not applicable Patient Position: Yes MD Side: Yes Biopsy Worksheet: Not applicable Specimen Medium: Not applicable

## 2018-11-16 NOTE — PROGRESS NOTES
Hospitalist Progress Note 2018 Admit Date: 2018  9:42 AM  
NAME: Jessee Maxwell :  1965 MRN:  [id number] Attending: Bette Mena DO 
PCP:  Lexie Lopez MD 
 
SUBJECTIVE:  
 52 y/o M with history of L ZEF ZRE 7387 and R rotator cuff repair 2017.  Two weeks PTA developed malaise, flu like symptoms, fever blisters, R shoulder pain.  Had R shoulder cortisone injection given.  Next day began having pain L hip.  Had hip aspirated 10/29 which grew MSSA.  Admitted by ortho on  for hip revision and I&D of shoulder on .   ID and hospitalist consulted. Blood cultures pos MSSA bacteremia  -  blood cx neg. TTE neg. Developed RANI due to ATN with hyperkalemia. Transferred to [facility] on  for possible HD. Nephro, Ortho and ID following 11/15 - no new complaints. Right shoulder getting stiff, patient encouraged to do exercises in bed as per PT/OT. Left hip feels better. Review of Systems negative with exception of pertinent positives noted above PHYSICAL EXAM  
 
Visit Vitals /85 (BP 1 Location: Left arm, BP Patient Position: At rest) Pulse 60 Temp 98.3 °F (36.8 °C) Resp 20 Wt 94.4 kg (208 lb 3.2 oz) SpO2 98% BMI 32.61 kg/m² Temp (24hrs), Av.3 °F (36.8 °C), Min:98.2 °F (36.8 °C), Max:98.4 °F (36.9 °C) Oxygen Therapy O2 Sat (%): 98 % (18) Pulse via Oximetry: 75 beats per minute (18) O2 Device: Nasal cannula (18) O2 Flow Rate (L/min): 2 l/min (18) FIO2 (%): 28 % (18) Intake/Output Summary (Last 24 hours) at 2018 1456 Last data filed at 2018 9325 Gross per 24 hour Intake  Output 725 ml Net -725 ml General: No acute distress   
Lungs:  CTA Bilaterally. Heart:  Regular rate and rhythm,  No murmur, rub, or gallop Abdomen: Soft, Non distended, Non tender, Positive bowel sounds Extremities: No cyanosis, clubbing or edema right shoulder in sling Neurologic:  No focal deficits ASSESSMENT Active Hospital Problems Diagnosis Date Noted  Sepsis ([facility] [address] 11/07/2018  Postoperative anemia due to acute blood loss 11/07/2018  Staphylococcal arthritis of right shoulder ([facility] [address] 11/06/2018  Acute renal failure with tubular necrosis ([facility] [address] 11/06/2018  Infected prosthetic hip ([facility] [address] 11/05/2018  Septic arthritis of shoulder, right ([facility] [address] 11/05/2018  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 
  
1- MSSA bacteremia due to septic R shoulder, pain controlled postop, Abx per ID on Vanco X 6 weeks - EOT 12/19. Suggested vanco doses in ID note. 2- ATN vs AIN, started on dialysis, severely oliguric, Nephro managing. Will need outpatient HD slot. Plans for permcath placement today. 3- Normocytic anemia, s/p PRBC 2U transfusion, Hgb drifting down. No active signs of bleeding. Monitor. EPO level pending - will defer to nephro 4- Abdominal pain, US and CT unremarkable. Pain has resolved. 5- Pain control. GI cocktail as needed. 6- HTN. Started on clonidine, increased Metoprolol. Still suboptimal. Added norvasc.  
  
Dispo; given need for HD and IV atbx may be [facility] candidate. Otherwise, [facility] with atbx dosed at HD. If home will need outpatient HD slot - CM following High risk pt. DVT ppx:  hsq Discussed plan with pt who is in agreement. All questions answered. DVT Prophylaxis: hep sq Signed By: Jeanette Chavarria DO November 16, 2018

## 2018-11-16 NOTE — PROCEDURES
Department of Interventional Radiology  [phone]        Interventional Radiology Brief Procedure Note    Patient: Didier Mccray MRN: [id number]  SSN: [id number]    YOB: 1965  Age: 48 y.o.   Sex: male      Date of Procedure: 11/16/2018    Pre-Procedure Diagnosis: renal failure    Post-Procedure Diagnosis: SAME    Procedure(s):   Tunneled Central Venous Catheter    Brief Description of Procedure: tunneled dialysis catheter    Performed By: Marce Hutchins MD     Assistants: None    Anesthesia:Moderate Sedation    Estimated Blood Loss: Less than 10ml    Specimens:  None    Implants:  Tunnelled Hemodialysis Catheter    Findings: patent right IJ    Complications: None    Recommendations: Ready to use     Follow Up: as needed    Signed By: Marce Hutchins MD     November 16, 2018

## 2018-11-16 NOTE — PROGRESS NOTES
Problem: Interdisciplinary Rounds Goal: Interdisciplinary Rounds Outcome: Progressing Towards Goal 
Interdisciplinary team rounds were held 11/16/2018 with the following team members:Care Management, Physical Therapy, Physician and . Referral to be sent to [facility] for HD and IV abx therapy. Plan of care discussed. See clinical pathway and/or care plan for interventions and desired outcomes.

## 2018-11-16 NOTE — PROGRESS NOTES
TRANSFER - OUT REPORT: 
 
Verbal report given to CLARISSA Cuenca on Dolores Soliz  being transferred to  for routine progression of care Report consisted of patients Situation, Background, Assessment and  
Recommendations(SBAR). Information from the following report(s) SBAR and MAR was reviewed with the receiving nurse. Lines:  
Peripheral IV 11/10/18 Left Antecubital (Active) Site Assessment Clean, dry, & intact 11/15/2018  8:50 AM  
Phlebitis Assessment 0 11/15/2018  8:50 AM  
Infiltration Assessment 0 11/15/2018  8:50 AM  
Dressing Status Clean, dry, & intact 11/15/2018  8:50 AM  
Dressing Type Tape;Transparent 11/15/2018  8:50 AM  
Hub Color/Line Status Flushed 11/15/2018  8:50 AM  
Action Taken Open ports on tubing capped 11/14/2018  9:23 PM  
Alcohol Cap Used No 11/14/2018  9:23 PM  
  
 
Opportunity for questions and clarification was provided. Patient transported with: 
 Registered Nurse

## 2018-11-17 LAB
ANION GAP SERPL CALC-SCNC: 11 MMOL/L (ref 7–16)
BUN SERPL-MCNC: 57 MG/DL (ref 6–23)
CALCIUM SERPL-MCNC: 8.1 MG/DL (ref 8.3–10.4)
CHLORIDE SERPL-SCNC: 100 MMOL/L (ref 98–107)
CO2 SERPL-SCNC: 26 MMOL/L (ref 21–32)
CREAT SERPL-MCNC: 13.8 MG/DL (ref 0.8–1.5)
EPO SERPL-ACNC: 49.9 MIU/ML (ref 2.6–18.5)
ERYTHROCYTE [DISTWIDTH] IN BLOOD BY AUTOMATED COUNT: 14.9 %
GLUCOSE SERPL-MCNC: 100 MG/DL (ref 65–100)
HCT VFR BLD AUTO: 22.6 % (ref 41.1–50.3)
HGB BLD-MCNC: 7.1 G/DL (ref 13.6–17.2)
MCH RBC QN AUTO: 28.3 PG (ref 26.1–32.9)
MCHC RBC AUTO-ENTMCNC: 31.4 G/DL (ref 31.4–35)
MCV RBC AUTO: 90 FL (ref 79.6–97.8)
NRBC # BLD: 0 K/UL (ref 0–0.2)
PLATELET # BLD AUTO: 525 K/UL (ref 150–450)
PMV BLD AUTO: 9.6 FL (ref 9.4–12.3)
POTASSIUM SERPL-SCNC: 5.1 MMOL/L (ref 3.5–5.1)
RBC # BLD AUTO: 2.51 M/UL (ref 4.23–5.6)
SODIUM SERPL-SCNC: 137 MMOL/L (ref 136–145)
WBC # BLD AUTO: 9.3 K/UL (ref 4.3–11.1)

## 2018-11-17 PROCEDURE — 85027 COMPLETE CBC AUTOMATED: CPT

## 2018-11-17 PROCEDURE — 74011250637 HC RX REV CODE- 250/637: Performed by: INTERNAL MEDICINE

## 2018-11-17 PROCEDURE — 74011250636 HC RX REV CODE- 250/636: Performed by: INTERNAL MEDICINE

## 2018-11-17 PROCEDURE — 65660000000 HC RM CCU STEPDOWN

## 2018-11-17 PROCEDURE — 80048 BASIC METABOLIC PNL TOTAL CA: CPT

## 2018-11-17 PROCEDURE — 36415 COLL VENOUS BLD VENIPUNCTURE: CPT

## 2018-11-17 PROCEDURE — 97110 THERAPEUTIC EXERCISES: CPT

## 2018-11-17 PROCEDURE — 90935 HEMODIALYSIS ONE EVALUATION: CPT

## 2018-11-17 RX ADMIN — OXYCODONE HYDROCHLORIDE 10 MG: 5 TABLET ORAL at 14:32

## 2018-11-17 RX ADMIN — FAMOTIDINE 20 MG: 20 TABLET ORAL at 08:07

## 2018-11-17 RX ADMIN — SENNOSIDES AND DOCUSATE SODIUM 2 TABLET: 8.6; 5 TABLET ORAL at 08:07

## 2018-11-17 RX ADMIN — CLONIDINE HYDROCHLORIDE 0.2 MG: 0.2 TABLET ORAL at 08:07

## 2018-11-17 RX ADMIN — METOPROLOL TARTRATE 50 MG: 50 TABLET ORAL at 22:04

## 2018-11-17 RX ADMIN — HEPARIN SODIUM 5000 UNITS: 5000 INJECTION INTRAVENOUS; SUBCUTANEOUS at 12:58

## 2018-11-17 RX ADMIN — VANCOMYCIN HYDROCHLORIDE 750 MG: 10 INJECTION, POWDER, LYOPHILIZED, FOR SOLUTION INTRAVENOUS at 12:07

## 2018-11-17 RX ADMIN — OXYCODONE HYDROCHLORIDE 10 MG: 5 TABLET ORAL at 22:05

## 2018-11-17 RX ADMIN — Medication 1 AMPULE: at 08:07

## 2018-11-17 RX ADMIN — Medication 1 AMPULE: at 22:06

## 2018-11-17 RX ADMIN — METOPROLOL TARTRATE 50 MG: 50 TABLET ORAL at 08:07

## 2018-11-17 RX ADMIN — OXYCODONE HYDROCHLORIDE 10 MG: 5 TABLET ORAL at 18:10

## 2018-11-17 RX ADMIN — Medication 5 ML: at 14:32

## 2018-11-17 RX ADMIN — HEPARIN SODIUM 5000 UNITS: 5000 INJECTION INTRAVENOUS; SUBCUTANEOUS at 22:05

## 2018-11-17 RX ADMIN — CLONIDINE HYDROCHLORIDE 0.2 MG: 0.2 TABLET ORAL at 18:10

## 2018-11-17 RX ADMIN — Medication 10 ML: at 06:00

## 2018-11-17 RX ADMIN — HEPARIN SODIUM 5000 UNITS: 1000 INJECTION INTRAVENOUS; SUBCUTANEOUS at 09:06

## 2018-11-17 RX ADMIN — AMLODIPINE BESYLATE 5 MG: 5 TABLET ORAL at 08:07

## 2018-11-17 NOTE — PROGRESS NOTES
[facility] PROGRESS NOTE Follow up for:  RANI Subjective:  
Patient seen and examined on HD, no complaints ROS: 
Gen - no fever, no chills, appetite okay CV - no chest pain, no orthopnea Lung - no shortness of breath, no cough Abd - no tenderness, no nausea, no vomiting Ext - no edema Objective:  
Exam: 
Vitals:  
 11/16/18 8363 11/17/18 0316 11/17/18 3866 11/17/18 1389 BP: 176/86 150/77 173/89 167/89 Pulse: 64 63 68 65 Resp: 18 18 17 Temp: 98.3 °F (36.8 °C) 98.8 °F (37.1 °C) 99 °F (37.2 °C) SpO2: 96% 97% 96% Weight:      
 
 
 
Intake/Output Summary (Last 24 hours) at 11/17/2018 1313 Last data filed at 11/17/2018 6758 Gross per 24 hour Intake  Output 1975 ml Net -1975 ml Current Facility-Administered Medications Medication Dose Route Frequency  vancomycin (VANCOCIN) 750 mg in  ml infusion  750 mg IntraVENous Once per day on Sat  [START ON 11/20/2018] vancomycin (VANCOCIN) 500 mg in 0.9% sodium chloride 100 mL IVPB  500 mg IntraVENous Once per day on Tue Thu  
 0.9% sodium chloride infusion  25 mL/hr IntraVENous CONTINUOUS  
 heparin (PF) 2 units/ml in NS infusion 2,000 Units  1,000 mL Irrigation CONTINUOUS  
 midazolam (VERSED) injection 0.5-2 mg  0.5-2 mg IntraVENous Multiple  fentaNYL citrate (PF) injection 25-50 mcg  25-50 mcg IntraVENous Multiple  heparin (porcine) 1,000 unit/mL injection 5,000 Units  5,000 Units Hemodialysis DIALYSIS PRN  
 amLODIPine (NORVASC) tablet 5 mg  5 mg Oral DAILY  metoprolol tartrate (LOPRESSOR) tablet 50 mg  50 mg Oral Q12H  
 acetaminophen (TYLENOL) tablet 1,000 mg  1,000 mg Oral Q6H PRN  
 cloNIDine HCl (CATAPRES) tablet 0.2 mg  0.2 mg Oral BID  hydrALAZINE (APRESOLINE) 20 mg/mL injection 10 mg  10 mg IntraVENous Q6H PRN  
 simethicone (MYLICON) tablet 80 mg  80 mg Oral QID PRN  polyethylene glycol (MIRALAX) packet 17 g  17 g Oral DAILY PRN  
  heparin (porcine) injection 5,000 Units  5,000 Units SubCUTAneous Q12H  
 epoetin zee (EPOGEN;PROCRIT) injection 20,000 Units  20,000 Units SubCUTAneous Q7D  
 famotidine (PEPCID) tablet 20 mg  20 mg Oral DAILY  diphenhydrAMINE (BENADRYL) capsule 25 mg  25 mg Oral Q4H PRN  
 HYDROmorphone (PF) (DILAUDID) injection 1 mg  1 mg IntraVENous Q3H PRN  
 naloxone (NARCAN) injection 0.2-0.4 mg  0.2-0.4 mg IntraVENous Q10MIN PRN  
 oxyCODONE IR (ROXICODONE) tablet 10 mg  10 mg Oral Q3H PRN  
 senna-docusate (PERICOLACE) 8.6-50 mg per tablet 2 Tab  2 Tab Oral DAILY  sodium chloride (NS) flush 5-10 mL  5-10 mL IntraVENous Q8H  
 sodium chloride (NS) flush 5-10 mL  5-10 mL IntraVENous PRN  
 zolpidem (AMBIEN) tablet 5 mg  5 mg Oral QHS PRN  
 alcohol 62% (NOZIN) nasal  1 Ampule  1 Ampule Topical Q12H  
 ondansetron (ZOFRAN) injection 4 mg  4 mg IntraVENous Q4H PRN  
 NUTRITIONAL SUPPORT ELECTROLYTE PRN ORDERS   Does Not Apply PRN  
 0.9% sodium chloride infusion 250 mL  250 mL IntraVENous PRN  
 
 
EXAM 
GEN - Alert, oriented, in no distress CV - S1, S2, RRR, no rub, murmur, or gallop Lung - clear to auscultation bilaterally Abd - soft, nontender, BS present Ext - trace edema Recent Labs 11/17/18 
032 433 92 68 11/16/18 
0417 11/15/18 
3811 WBC 9.3 8.3 10.3 HGB 7.1* 7.5* 7.8* HCT 22.6* 24.0* 24.2*  
* 474* 428 Recent Labs 11/17/18 
032 433 92 68 11/16/18 
0417 11/15/18 
0497  137 134* K 5.1 4.9 4.6  99 99 CO2 26 25 27 BUN 57* 51* 43* CREA 13.80* 12.50* 10.70* CA 8.1* 8.2* 8.4  85 92 Assessment and Plan:  
 
1) RANI - HD dependent - Outpatient slot locally pending - [facility]. - Seen on HD, well tolerated. 2) MSSA Bacteremia  
- late onset left JEIMY infection. S/p polyethylene exchange 11/5 
- right shoulder septic arthritis - ID following - Plan Vanc for 6 weeks, EOT 12/19/18. I have arrange antibiotics at [facility] dialysis 3) Anemia - Hb stable 4) HTN 
- [facility], [place]

## 2018-11-17 NOTE — PROGRESS NOTES
Problem: Falls - Risk of 
Goal: *Absence of Falls Document Mor Gordon Fall Risk and appropriate interventions in the flowsheet. Outcome: Progressing Towards Goal 
Fall Risk Interventions: 
Mobility Interventions: Bed/chair exit alarm, OT consult for ADLs, Patient to call before getting OOB, PT Consult for mobility concerns, PT Consult for assist device competence Mentation Interventions: Bed/chair exit alarm Medication Interventions: Bed/chair exit alarm, Patient to call before getting OOB, Teach patient to arise slowly Elimination Interventions: Bed/chair exit alarm, Call light in reach, Patient to call for help with toileting needs, Urinal in reach

## 2018-11-17 NOTE — PROGRESS NOTES
TRANSFER - IN REPORT: 
 
Verbal report received from Grisel Muse RN(name) on Jessee Maxwell  being received from IR(unit) for routine progression of care Report consisted of patients Situation, Background, Assessment and  
Recommendations(SBAR). Information from the following report(s) Kardex was reviewed with the receiving nurse. Opportunity for questions and clarification was provided. Assessment completed upon patients arrival to unit and care assumed.

## 2018-11-17 NOTE — PROGRESS NOTES
TRANSFER OUT- DIALYSIS Hemodialysis treatment completed without complications. Patient alert and oriented and VS stable  /83  P 55   
 
 1.6 Kgs removed. Flushed both ports with 10 mL of NS.  CVC dressing clean, dry, and intact, tego caps intact, bilateral lumens wrapped with 4x4 gauze. Patient to [phone] after dialysis.

## 2018-11-17 NOTE — DIALYSIS
TRANSFER IN - DIALYSIS Received patient in dialysis unit  from [facility] (unit) for ordered procedure. Consent verified for renal replacement therapy. Patient alert and vital signs stable. /89 P 65 Hemodialysis initiated using Right tunneled CVC. Aspirated and flushed both ports without difficulty. Dressing clean, dry and intact. Machine settings per MD order. Heparin 1000 unit bolus and 500 units/hr. Will monitor during treatment.

## 2018-11-17 NOTE — PROGRESS NOTES
2018 Post Op day: * No surgery found * Admit Date: 2018 Admit Diagnosis: infected left hip  
ARF (acute renal failure) ([address] LAB:   
Recent Results (from the past 24 hour(s)) METABOLIC PANEL, BASIC Collection Time: 18  4:34 AM  
Result Value Ref Range Sodium 137 136 - 145 mmol/L Potassium 5.1 3.5 - 5.1 mmol/L Chloride 100 98 - 107 mmol/L  
 CO2 26 21 - 32 mmol/L Anion gap 11 7 - 16 mmol/L Glucose 100 65 - 100 mg/dL BUN 57 (H) 6 - 23 MG/DL Creatinine 13.80 (H) 0.8 - 1.5 MG/DL  
 GFR est AA 5 (L) >60 ml/min/1.73m2 GFR est non-AA 4 (L) >60 ml/min/1.73m2 Calcium 8.1 (L) 8.3 - 10.4 MG/DL  
CBC W/O DIFF Collection Time: 18  4:34 AM  
Result Value Ref Range WBC 9.3 4.3 - 11.1 K/uL  
 RBC 2.51 (L) 4.23 - 5.6 M/uL HGB 7.1 (L) 13.6 - 17.2 g/dL HCT 22.6 (L) 41.1 - 50.3 % MCV 90.0 79.6 - 97.8 FL  
 MCH 28.3 26.1 - 32.9 PG  
 MCHC 31.4 31.4 - 35.0 g/dL  
 RDW 14.9 % PLATELET 539 (H) 411 - 450 K/uL MPV 9.6 9.4 - 12.3 FL ABSOLUTE NRBC 0.00 0.0 - 0.2 K/uL Vital Signs:   
Patient Vitals for the past 8 hrs: 
 BP Temp Pulse Resp SpO2  
18 0316 150/77 98.8 °F (37.1 °C) 63 18 97 % Temp (24hrs), Av.3 °F (36.8 °C), Min:97.8 °F (36.6 °C), Max:98.8 °F (37.1 °C) Body mass index is 33.14 kg/m². Pain Control:  
Pain Assessment Pain Scale 1: Numeric (0 - 10) Pain Intensity 1: 4 Pain Onset 1: post-dialysis Pain Location 1: Arm, Shoulder Pain Orientation 1: Right Pain Description 1: Throbbing Pain Intervention(s) 1: Medication (see MAR) Subjective: Doing well, No complaints, No SOB, No Chest Pain, No Nausea or Vomitting Objective: Vital Signs are Stable, No Acute Distress, Alert and Oriented, Dressing is Dry,  Neurovascular exam is normal. 
  
 
PT/OT:  
  
  
Activity Response: Tolerated well Assistive Device: Fall prevention device Weight Bearing Status: WBAT Meds: 
[unfilled] [unfilled] [unfilled] Assessment:  
Patient Active Problem List  
Diagnosis Code  Hip pain M25.559  Acute pain of right shoulder M25.511  S/P total hip arthroplasty Z96.649  Arthritis of left hip M16.12  Infected prosthetic hip ([address] T84.59XA, Q18.740  Septic arthritis of shoulder, right ([address] M00.9  Degenerative joint disease of right acromioclavicular joint M19.011  
 Osteoarthritis of right glenohumeral joint M19.011  
 Complete tear of right rotator cuff M75.121  
 Staphylococcal arthritis of right shoulder ([address] M00.011  Acute renal failure with tubular necrosis (HCC) N17.0  Sepsis ([address] A41.9  Postoperative anemia due to acute blood loss D62 Plan: Continue Physical Therapy, Monitor  Hbg, awaiting dialysis Signed By: Joel Alfaro MD

## 2018-11-17 NOTE — PROGRESS NOTES
Hospitalist Progress Note 2018 Admit Date: 2018  9:42 AM  
NAME: Zeinab Michaeler :  1965 MRN:  [id number] Attending: Adriana Cuevas DO 
PCP:  Daysi Rowe MD 
 
SUBJECTIVE:  
 52 y/o M with history of L USC YYE 5187 and R rotator cuff repair 2017.  Two weeks PTA developed malaise, flu like symptoms, fever blisters, R shoulder pain.  Had R shoulder cortisone injection given.  Next day began having pain L hip.  Had hip aspirated 10/29 which grew MSSA.  Admitted by ortho on  for hip revision and I&D of shoulder on .   ID and hospitalist consulted. Blood cultures pos MSSA bacteremia  -  blood cx neg. TTE neg. Developed RANI due to ATN with hyperkalemia. Transferred to [facility] on  for possible HD. Nephro, Ortho and ID following. Today, no abdominal pain or fever. Feels weak after each dialysis setting. No SOB or dizziness. PHYSICAL EXAM  
 
Visit Vitals /81 (BP 1 Location: Left arm, BP Patient Position: At rest) Pulse 63 Temp 98.1 °F (36.7 °C) Resp 17 Wt 96 kg (211 lb 9.6 oz) SpO2 95% BMI 33.14 kg/m² Temp (24hrs), Av.5 °F (36.9 °C), Min:98.1 °F (36.7 °C), Max:99 °F (37.2 °C) Oxygen Therapy O2 Sat (%): 95 % (18 1650) Pulse via Oximetry: 63 beats per minute (18 1650) O2 Device: Room air (18 165) O2 Flow Rate (L/min): 2 l/min (18) FIO2 (%): 28 % (18) ETCO2 (mmHg): 36 mmHg (18 172) Intake/Output Summary (Last 24 hours) at 2018 1816 Last data filed at 2018 1214 Gross per 24 hour Intake  Output 3550 ml Net -3550 ml General: Pale, mild distress   
Lungs:  CTA Bilaterally. Heart:  Regular rate and rhythm,  No murmur, rub, or gallop Abdomen: Soft, Non distended, Non tender, Positive bowel sounds Extremities: No cyanosis, clubbing or edema right shoulder in sling Neurologic:  No focal deficits ASSESSMENT Active Hospital Problems Diagnosis Date Noted  Sepsis ([facility] [address] 11/07/2018  Postoperative anemia due to acute blood loss 11/07/2018  Staphylococcal arthritis of right shoulder ([facility] [address] 11/06/2018  Acute renal failure with tubular necrosis ([facility] [address] 11/06/2018  Infected prosthetic hip ([facility] [address] 11/05/2018  Septic arthritis of shoulder, right ([facility] [address] 11/05/2018  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 Continue IV abx per ID until 12-19-18 and dialysis until recovery. [facility] when [facility]. I spoke to pt and is agreeable at this time. Would transfuse PRBC if Hb < 7 
  
1- MSSA bacteremia due to septic R shoulder, pain controlled postop, Abx per ID on Vanco X 6 weeks - EOT 12/19. Suggested vanco doses in ID note. 2- ATN vs AIN, started on dialysis, severely oliguric, Nephro managing. Will need outpatient HD slot. Plans for permcath placement today. 3- Normocytic anemia, s/p PRBC 2U transfusion, Hgb drifting down. No active signs of bleeding. Monitor. EPO level pending - will defer to nephro 4- Abdominal pain, US and CT unremarkable. Pain has resolved. 5- Pain control. GI cocktail as needed. 6- HTN. Started on clonidine, increased Metoprolol. Still suboptimal. Added norvasc.  
  
Dispo; given need for HD and IV atbx may be [facility] candidate. Otherwise, [address] with atbx dosed at HD. If home will need outpatient HD slot - CM following High risk pt. DVT ppx:  hsq Discussed plan with pt who is in agreement. All questions answered. DVT Prophylaxis: hep sq Signed By: Sameera Sorensen MD   
 November 17, 2018

## 2018-11-17 NOTE — PROGRESS NOTES
Problem: Falls - Risk of 
Goal: *Absence of Falls Document Guillermo Arita Fall Risk and appropriate interventions in the flowsheet. Outcome: Progressing Towards Goal 
Fall Risk Interventions: 
Mobility Interventions: Bed/chair exit alarm, Patient to call before getting OOB Mentation Interventions: Bed/chair exit alarm Medication Interventions: Bed/chair exit alarm, Patient to call before getting OOB, Teach patient to arise slowly Elimination Interventions: Call light in reach, Patient to call for help with toileting needs

## 2018-11-18 LAB
ANION GAP SERPL CALC-SCNC: 8 MMOL/L (ref 7–16)
BUN SERPL-MCNC: 47 MG/DL (ref 6–23)
CALCIUM SERPL-MCNC: 8.3 MG/DL (ref 8.3–10.4)
CHLORIDE SERPL-SCNC: 101 MMOL/L (ref 98–107)
CO2 SERPL-SCNC: 29 MMOL/L (ref 21–32)
CREAT SERPL-MCNC: 11.3 MG/DL (ref 0.8–1.5)
GLUCOSE SERPL-MCNC: 95 MG/DL (ref 65–100)
HCT VFR BLD AUTO: 28.3 % (ref 41.1–50.3)
HGB BLD-MCNC: 7 G/DL (ref 13.6–17.2)
HGB BLD-MCNC: 8.9 G/DL (ref 13.6–17.2)
POTASSIUM SERPL-SCNC: 4.9 MMOL/L (ref 3.5–5.1)
SODIUM SERPL-SCNC: 138 MMOL/L (ref 136–145)

## 2018-11-18 PROCEDURE — 85014 HEMATOCRIT: CPT

## 2018-11-18 PROCEDURE — 74011250637 HC RX REV CODE- 250/637: Performed by: INTERNAL MEDICINE

## 2018-11-18 PROCEDURE — 86923 COMPATIBILITY TEST ELECTRIC: CPT

## 2018-11-18 PROCEDURE — 86901 BLOOD TYPING SEROLOGIC RH(D): CPT

## 2018-11-18 PROCEDURE — 85018 HEMOGLOBIN: CPT

## 2018-11-18 PROCEDURE — 77030020263 HC SOL INJ SOD CL0.9% LFCR 1000ML

## 2018-11-18 PROCEDURE — 77030039270 HC TU BLD FLTR CARD -A

## 2018-11-18 PROCEDURE — 36415 COLL VENOUS BLD VENIPUNCTURE: CPT

## 2018-11-18 PROCEDURE — 97110 THERAPEUTIC EXERCISES: CPT

## 2018-11-18 PROCEDURE — 74011250636 HC RX REV CODE- 250/636: Performed by: INTERNAL MEDICINE

## 2018-11-18 PROCEDURE — 80048 BASIC METABOLIC PNL TOTAL CA: CPT

## 2018-11-18 PROCEDURE — 36430 TRANSFUSION BLD/BLD COMPNT: CPT

## 2018-11-18 PROCEDURE — P9016 RBC LEUKOCYTES REDUCED: HCPCS

## 2018-11-18 PROCEDURE — 65660000000 HC RM CCU STEPDOWN

## 2018-11-18 RX ORDER — SODIUM CHLORIDE 9 MG/ML
250 INJECTION, SOLUTION INTRAVENOUS AS NEEDED
Status: DISCONTINUED | OUTPATIENT
Start: 2018-11-18 | End: 2018-11-20 | Stop reason: HOSPADM

## 2018-11-18 RX ADMIN — Medication 1 AMPULE: at 21:11

## 2018-11-18 RX ADMIN — HEPARIN SODIUM 5000 UNITS: 5000 INJECTION INTRAVENOUS; SUBCUTANEOUS at 21:11

## 2018-11-18 RX ADMIN — METOPROLOL TARTRATE 50 MG: 50 TABLET ORAL at 21:11

## 2018-11-18 RX ADMIN — CLONIDINE HYDROCHLORIDE 0.2 MG: 0.2 TABLET ORAL at 17:47

## 2018-11-18 RX ADMIN — OXYCODONE HYDROCHLORIDE 10 MG: 5 TABLET ORAL at 21:11

## 2018-11-18 RX ADMIN — OXYCODONE HYDROCHLORIDE 10 MG: 5 TABLET ORAL at 13:54

## 2018-11-18 RX ADMIN — Medication 1 AMPULE: at 08:38

## 2018-11-18 RX ADMIN — Medication 10 ML: at 21:12

## 2018-11-18 RX ADMIN — AMLODIPINE BESYLATE 5 MG: 5 TABLET ORAL at 08:39

## 2018-11-18 RX ADMIN — METOPROLOL TARTRATE 50 MG: 50 TABLET ORAL at 08:39

## 2018-11-18 RX ADMIN — CLONIDINE HYDROCHLORIDE 0.2 MG: 0.2 TABLET ORAL at 08:39

## 2018-11-18 RX ADMIN — HEPARIN SODIUM 5000 UNITS: 5000 INJECTION INTRAVENOUS; SUBCUTANEOUS at 09:52

## 2018-11-18 RX ADMIN — FAMOTIDINE 20 MG: 20 TABLET ORAL at 08:39

## 2018-11-18 RX ADMIN — ZOLPIDEM TARTRATE 5 MG: 5 TABLET ORAL at 21:11

## 2018-11-18 RX ADMIN — SENNOSIDES AND DOCUSATE SODIUM 2 TABLET: 8.6; 5 TABLET ORAL at 08:38

## 2018-11-18 NOTE — PROGRESS NOTES
[facility] PROGRESS NOTE Follow up for:  RANI Subjective:  
Patient seen and examined on HD, no complaints ROS: 
Gen - no fever, no chills, appetite okay CV - no chest pain, no orthopnea Lung - no shortness of breath, no cough Abd - no tenderness, no nausea, no vomiting Ext - no edema Objective:  
Exam: 
Vitals:  
 11/18/18 0045 11/18/18 0544 11/18/18 0805 11/18/18 1201 BP: 118/75 157/80 154/80 154/83 Pulse: (!) 59 64 64 61 Resp: 20 20 16 20 Temp: 97.8 °F (36.6 °C) 98.5 °F (36.9 °C) 98.6 °F (37 °C) 98.1 °F (36.7 °C) SpO2: 95% 98% 94% 94% Weight:      
 
 
 
Intake/Output Summary (Last 24 hours) at 11/18/2018 1210 Last data filed at 11/18/2018 2290 Gross per 24 hour Intake  Output 3175 ml Net -3175 ml Current Facility-Administered Medications Medication Dose Route Frequency  0.9% sodium chloride infusion 250 mL  250 mL IntraVENous PRN  
 DIALYSIS: vancomycin (VANCOCIN) 750 mg in  ml infusion  750 mg IntraVENous Once per day on Sat  [START ON 11/20/2018] DIALYSIS: vancomycin (VANCOCIN) 500 mg in 0.9% sodium chloride 100 mL IVPB  500 mg IntraVENous Once per day on Tue Thu  
 0.9% sodium chloride infusion  25 mL/hr IntraVENous CONTINUOUS  
 heparin (PF) 2 units/ml in NS infusion 2,000 Units  1,000 mL Irrigation CONTINUOUS  
 midazolam (VERSED) injection 0.5-2 mg  0.5-2 mg IntraVENous Multiple  fentaNYL citrate (PF) injection 25-50 mcg  25-50 mcg IntraVENous Multiple  heparin (porcine) 1,000 unit/mL injection 5,000 Units  5,000 Units Hemodialysis DIALYSIS PRN  
 amLODIPine (NORVASC) tablet 5 mg  5 mg Oral DAILY  metoprolol tartrate (LOPRESSOR) tablet 50 mg  50 mg Oral Q12H  
 acetaminophen (TYLENOL) tablet 1,000 mg  1,000 mg Oral Q6H PRN  
 cloNIDine HCl (CATAPRES) tablet 0.2 mg  0.2 mg Oral BID  hydrALAZINE (APRESOLINE) 20 mg/mL injection 10 mg  10 mg IntraVENous Q6H PRN  
 simethicone (MYLICON) tablet 80 mg  80 mg Oral QID PRN  
  polyethylene glycol (MIRALAX) packet 17 g  17 g Oral DAILY PRN  
 heparin (porcine) injection 5,000 Units  5,000 Units SubCUTAneous Q12H  
 epoetin zee (EPOGEN;PROCRIT) injection 20,000 Units  20,000 Units SubCUTAneous Q7D  
 famotidine (PEPCID) tablet 20 mg  20 mg Oral DAILY  diphenhydrAMINE (BENADRYL) capsule 25 mg  25 mg Oral Q4H PRN  
 HYDROmorphone (PF) (DILAUDID) injection 1 mg  1 mg IntraVENous Q3H PRN  
 naloxone (NARCAN) injection 0.2-0.4 mg  0.2-0.4 mg IntraVENous Q10MIN PRN  
 oxyCODONE IR (ROXICODONE) tablet 10 mg  10 mg Oral Q3H PRN  
 senna-docusate (PERICOLACE) 8.6-50 mg per tablet 2 Tab  2 Tab Oral DAILY  sodium chloride (NS) flush 5-10 mL  5-10 mL IntraVENous Q8H  
 sodium chloride (NS) flush 5-10 mL  5-10 mL IntraVENous PRN  
 zolpidem (AMBIEN) tablet 5 mg  5 mg Oral QHS PRN  
 alcohol 62% (NOZIN) nasal  1 Ampule  1 Ampule Topical Q12H  
 ondansetron (ZOFRAN) injection 4 mg  4 mg IntraVENous Q4H PRN  
 NUTRITIONAL SUPPORT ELECTROLYTE PRN ORDERS   Does Not Apply PRN  
 0.9% sodium chloride infusion 250 mL  250 mL IntraVENous PRN  
 
 
EXAM 
GEN - Alert, oriented, in no distress CV - S1, S2, RRR, no rub, murmur, or gallop Lung - clear to auscultation bilaterally Abd - soft, nontender, BS present Ext - trace edema Recent Labs 11/18/18 
0600 11/17/18 
0434 11/16/18 0417 WBC  --  9.3 8.3 HGB 7.0* 7.1* 7.5* HCT  --  22.6* 24.0*  
PLT  --  525* 474* Recent Labs 11/18/18 
0600 11/17/18 
0434 11/16/18 0417  137 137  
K 4.9 5.1 4.9  100 99 CO2 29 26 25 BUN 47* 57* 51* CREA 11.30* 13.80* 12.50* CA 8.3 8.1* 8.2*  
GLU 95 100 85 Assessment and Plan:  
 
1) RANI - HD dependent - Outpatient slot locally pending - [facility] [place]. - Seen on HD, well tolerated. 2) MSSA Bacteremia  
- late onset left JEIMY infection. S/p polyethylene exchange 11/5 
- right shoulder septic arthritis - ID following - Plan Vanc for 6 weeks, EOT 12/19/18. I have arrange antibiotics at [facility] dialysis - I do expect his kidney function to recover before his antibiotics are completed. This prospect will have to be worked out with ID for a \"post dialysis\" treatment plan. 3) Anemia - Hb stable 4) HTN 
- clonidine, metoprolol, norvasc Todd Sprague MD

## 2018-11-18 NOTE — PROGRESS NOTES
Hospitalist Progress Note Subjective:  
Daily Progress Note: 11/18/2018 8:35 AM 
 
Patient presented to ER at [facility] 11/7 with right shoulder pain, malaise, flu like symptoms, po HSV x 2-3 weeks. WBC on arrival: 27.1, ESR: 90. Was given cortisone injection in right shoulder joint, 24 hours later began left hip pain. History of left JEIMY 1/17 and right RCR 6/17. Hip was aspirated 10/29 in Ortho clinic with MSSA growth. Admitted by Ortho on 11/5 for hip revision and I+D of shoulder. ID and hospitalist consulted on 11/6. Blood cultures at that time grew MSSA. As of 11/4-8, blood cultures negative. TTE negative. Then developed RANI due to ATN with hyperkalemia. Creatinine was 1.06 in December 2016, 1.32 on 11/4, up to 1.72 on 11/6, and 4.64 on 11/7. On 11/6, underwent left JEIMY revision. Rifampin added. Transferred here to ICU 11/7 for Nephrology care. Nafcillin discontinued per ID, cefazolin with renal dosing begun, wll need six weeks of treatment once blood cultures clear, followed by a long course of po suppression. Currently Nephro, Ortho and ID following. See ID note for abx given throughout hospitalization. 11/8, HD access placed in IR. Holding rifampin, cefazolin stopped. Beginning vancomycin alone. 11/9:  First dialysis. Complaining of periumbilical pain radiating to back and right flank x 3 days. 11/18:  Dialysis yesterday. BUN/Creatinine: 47/11.30. Hgb down to 7.0. Spoke with Dr Brenna Eng, will transfuse 2 units PRBC. Dr Janis Colunga in. Dr Brenna Eng, Nephrology in, attempting to secure HD slot OP. Will give Vancomycin at HD. ADDITIONAL HISTORY:  History of possible MRSA. Patient states he believes this to be related to spider bite 2007. OA, biceps tear, insomnia, kidney stones Current Facility-Administered Medications Medication Dose Route Frequency  DIALYSIS: vancomycin (VANCOCIN) 750 mg in  ml infusion  750 mg IntraVENous Once per day on Sat  [START ON 11/20/2018] DIALYSIS: vancomycin (VANCOCIN) 500 mg in 0.9% sodium chloride 100 mL IVPB  500 mg IntraVENous Once per day on Tue Thu  
 0.9% sodium chloride infusion  25 mL/hr IntraVENous CONTINUOUS  
 heparin (PF) 2 units/ml in NS infusion 2,000 Units  1,000 mL Irrigation CONTINUOUS  
 midazolam (VERSED) injection 0.5-2 mg  0.5-2 mg IntraVENous Multiple  fentaNYL citrate (PF) injection 25-50 mcg  25-50 mcg IntraVENous Multiple  heparin (porcine) 1,000 unit/mL injection 5,000 Units  5,000 Units Hemodialysis DIALYSIS PRN  
 amLODIPine (NORVASC) tablet 5 mg  5 mg Oral DAILY  metoprolol tartrate (LOPRESSOR) tablet 50 mg  50 mg Oral Q12H  
 acetaminophen (TYLENOL) tablet 1,000 mg  1,000 mg Oral Q6H PRN  
 cloNIDine HCl (CATAPRES) tablet 0.2 mg  0.2 mg Oral BID  hydrALAZINE (APRESOLINE) 20 mg/mL injection 10 mg  10 mg IntraVENous Q6H PRN  
 simethicone (MYLICON) tablet 80 mg  80 mg Oral QID PRN  polyethylene glycol (MIRALAX) packet 17 g  17 g Oral DAILY PRN  
 heparin (porcine) injection 5,000 Units  5,000 Units SubCUTAneous Q12H  
 epoetin zee (EPOGEN;PROCRIT) injection 20,000 Units  20,000 Units SubCUTAneous Q7D  
 famotidine (PEPCID) tablet 20 mg  20 mg Oral DAILY  diphenhydrAMINE (BENADRYL) capsule 25 mg  25 mg Oral Q4H PRN  
 HYDROmorphone (PF) (DILAUDID) injection 1 mg  1 mg IntraVENous Q3H PRN  
 naloxone (NARCAN) injection 0.2-0.4 mg  0.2-0.4 mg IntraVENous Q10MIN PRN  
 oxyCODONE IR (ROXICODONE) tablet 10 mg  10 mg Oral Q3H PRN  
 senna-docusate (PERICOLACE) 8.6-50 mg per tablet 2 Tab  2 Tab Oral DAILY  sodium chloride (NS) flush 5-10 mL  5-10 mL IntraVENous Q8H  
 sodium chloride (NS) flush 5-10 mL  5-10 mL IntraVENous PRN  
 zolpidem (AMBIEN) tablet 5 mg  5 mg Oral QHS PRN  
 alcohol 62% (NOZIN) nasal  1 Ampule  1 Ampule Topical Q12H  
 ondansetron (ZOFRAN) injection 4 mg  4 mg IntraVENous Q4H PRN  
  NUTRITIONAL SUPPORT ELECTROLYTE PRN ORDERS   Does Not Apply PRN  
 0.9% sodium chloride infusion 250 mL  250 mL IntraVENous PRN Review of Systems A comprehensive review of systems was negative except for that written in the HPI. Objective:  
 
Visit Vitals /80 (BP 1 Location: Left arm, BP Patient Position: At rest) Pulse 64 Temp 98.6 °F (37 °C) Resp 16 Wt 96 kg (211 lb 9.6 oz) SpO2 94% BMI 33.14 kg/m² O2 Flow Rate (L/min): 2 l/min O2 Device: Room air Temp (24hrs), Av.3 °F (36.8 °C), Min:97.8 °F (36.6 °C), Max:98.6 °F (37 °C) 
 
701 - 1900 In: -  
Out: 425 [Urine:425] 1901 - 700 In: -  
Out: 1555 [Urine:2250] General appearance: Oriented and alert, cooperative, pain controlled. Head: Normocephalic, without obvious abnormality, atraumatic Throat: Lips, mucosa, and tongue normal. Teeth and gums normal 
Neck: supple, symmetrical, trachea midline, and no JVD Lungs: clear to auscultation bilaterally. HD access site intact without signs of infection. Heart: regular rate and rhythm, S1, S2 normal, no murmur, click, rub or gallop Abdomen: soft, non-tender. Bowel sounds normal. No masses,  no organomegaly Extremities: Right shoulder and left hip wounds healing without signs of infection. Up with PT. All other extremities normal, atraumatic, no cyanosis or edema Skin: Skin color, texture, turgor normal. No rashes or lesions Neurologic: Grossly normal 
 
Additional comments: Notes,orders, test results, vitals reviewed Data Review Recent Results (from the past 24 hour(s)) METABOLIC PANEL, BASIC Collection Time: 18  6:00 AM  
Result Value Ref Range Sodium 138 136 - 145 mmol/L Potassium 4.9 3.5 - 5.1 mmol/L Chloride 101 98 - 107 mmol/L  
 CO2 29 21 - 32 mmol/L Anion gap 8 7 - 16 mmol/L Glucose 95 65 - 100 mg/dL BUN 47 (H) 6 - 23 MG/DL  Creatinine 11.30 (H) 0.8 - 1.5 MG/DL  
 GFR est AA 6 (L) >60 ml/min/1.73m2 GFR est non-AA 5 (L) >60 ml/min/1.73m2 Calcium 8.3 8.3 - 10.4 MG/DL  
HEMOGLOBIN Collection Time: 11/18/18  6:00 AM  
Result Value Ref Range HGB 7.0 (L) 13.6 - 17.2 g/dL  
 
 11/7:  RENALULTRASOUND:  Unremarkable examination. 
  
11/9:  CXR:  Normal CXR 
 
11/9:  KUB: Unremarkable supine view the abdomen 11/10:  ABDOMINAL ULTRASOUND: Mild hepatomegaly with no focal liver lesion. Borderline dilatation of the common bile duct without intrahepatic biliary ductal dilatation or cholelithiasis is of uncertain etiology and clinical 
significance 11/1O:  CT ABDOMEN:  SCATTERED COLONIC DIVERTICULOSIS WITH NONVISUALIZATION THE APPENDIX BUT NO SECONDARY SIGNS OF APPENDICITIS, DIVERTICULITIS, OR OTHER ACUTE ABDOMINOPELVIC ABNORMALITY IDENTIFIED. SMALL BILATERAL PLEURAL EFFUSIONS AND BIBASILAR ATELECTASIS. MILD ANASARCA. Assessment/Plan:  
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 Nephrology on board with HD, continue until                               recovery Monitor renal function Pharmacy dosing vancomycin Sepsis due to MSSA bacteremia with  infected shoulder                  and hip JEIMY Continue vancomycin x 6 weeks with EOT: 12/19 Postoperative anemia most likely due to acute blood loss Heme stool due to diverticulosis findings also Monitor labs Transfused with 2 units PRBC 11/18 HTN. Started on clonidine, increased Metoprolol. Still                   suboptimal. Added norvasc.  
   
High risk pt. DVT ppx:  hsq Dispo:  Home with home health vs rehab and will need OP HD and vanc at site. Care Plan discussed with: Patient, Dr Lucas Brasher, and Nurse Signed By: Alex Villeda NP November 18, 2018

## 2018-11-18 NOTE — PROGRESS NOTES
Orthopedic Joint Progress Note 2018 Admit Date: 2018 Admit Diagnosis: infected left hip  
ARF (acute renal failure) ([facility] [address] * No surgery found * Subjective: feels better today Jessee Maxwell Review of Systems: Pertinent items are noted in HPI. Objective:  
 
PT/OT:  
 
PATIENT MOBILITY Bed Mobility Rolling: Supervision, Stand-by assistance Supine to Sit: Stand-by assistance Sit to Supine: Stand-by assistance Scooting: Stand-by assistance Transfers Sit to Stand: Supervision Stand to Sit: Supervision Bed to Chair: Minimum assistance Gait Base of Support: Center of gravity altered, Widened Speed/Emma: Slow Step Length: Right shortened, Left shortened Gait Abnormalities: Decreased step clearance, Path deviations, Antalgic Ambulation - Level of Assistance: Supervision Distance (ft): 250 Feet (ft) Assistive Device: Walker, rolling Interventions: Safety awareness training, Verbal cues, Visual/Demos Weight Bearing Status Right Side Weight Bearing: Full Left Side Weight Bearing: As tolerated Vital Signs:   
Blood pressure 154/80, pulse 64, temperature 98.6 °F (37 °C), resp. rate 16, weight 96 kg (211 lb 9.6 oz), SpO2 94 %. Temp (24hrs), Av.3 °F (36.8 °C), Min:97.8 °F (36.6 °C), Max:98.6 °F (37 °C) Pain Control:  
Pain Assessment Pain Scale 1: Numeric (0 - 10) Pain Intensity 1: 5 Pain Onset 1: with movement in the right shoulder. Pain Location 1: Arm, Shoulder Pain Orientation 1: Right Pain Description 1: Throbbing Pain Intervention(s) 1: Repositioned Meds: 
Current Facility-Administered Medications Medication Dose Route Frequency  DIALYSIS: vancomycin (VANCOCIN) 750 mg in  ml infusion  750 mg IntraVENous Once per day on Sat  [START ON 2018] DIALYSIS: vancomycin (VANCOCIN) 500 mg in 0.9% sodium chloride 100 mL IVPB  500 mg IntraVENous Once per day on   
  0.9% sodium chloride infusion  25 mL/hr IntraVENous CONTINUOUS  
 heparin (PF) 2 units/ml in NS infusion 2,000 Units  1,000 mL Irrigation CONTINUOUS  
 midazolam (VERSED) injection 0.5-2 mg  0.5-2 mg IntraVENous Multiple  fentaNYL citrate (PF) injection 25-50 mcg  25-50 mcg IntraVENous Multiple  heparin (porcine) 1,000 unit/mL injection 5,000 Units  5,000 Units Hemodialysis DIALYSIS PRN  
 amLODIPine (NORVASC) tablet 5 mg  5 mg Oral DAILY  metoprolol tartrate (LOPRESSOR) tablet 50 mg  50 mg Oral Q12H  
 acetaminophen (TYLENOL) tablet 1,000 mg  1,000 mg Oral Q6H PRN  
 cloNIDine HCl (CATAPRES) tablet 0.2 mg  0.2 mg Oral BID  hydrALAZINE (APRESOLINE) 20 mg/mL injection 10 mg  10 mg IntraVENous Q6H PRN  
 simethicone (MYLICON) tablet 80 mg  80 mg Oral QID PRN  polyethylene glycol (MIRALAX) packet 17 g  17 g Oral DAILY PRN  
 heparin (porcine) injection 5,000 Units  5,000 Units SubCUTAneous Q12H  
 epoetin zee (EPOGEN;PROCRIT) injection 20,000 Units  20,000 Units SubCUTAneous Q7D  
 famotidine (PEPCID) tablet 20 mg  20 mg Oral DAILY  diphenhydrAMINE (BENADRYL) capsule 25 mg  25 mg Oral Q4H PRN  
 HYDROmorphone (PF) (DILAUDID) injection 1 mg  1 mg IntraVENous Q3H PRN  
 naloxone (NARCAN) injection 0.2-0.4 mg  0.2-0.4 mg IntraVENous Q10MIN PRN  
 oxyCODONE IR (ROXICODONE) tablet 10 mg  10 mg Oral Q3H PRN  
 senna-docusate (PERICOLACE) 8.6-50 mg per tablet 2 Tab  2 Tab Oral DAILY  sodium chloride (NS) flush 5-10 mL  5-10 mL IntraVENous Q8H  
 sodium chloride (NS) flush 5-10 mL  5-10 mL IntraVENous PRN  
 zolpidem (AMBIEN) tablet 5 mg  5 mg Oral QHS PRN  
 alcohol 62% (NOZIN) nasal  1 Ampule  1 Ampule Topical Q12H  
 ondansetron (ZOFRAN) injection 4 mg  4 mg IntraVENous Q4H PRN  
 NUTRITIONAL SUPPORT ELECTROLYTE PRN ORDERS   Does Not Apply PRN  
 0.9% sodium chloride infusion 250 mL  250 mL IntraVENous PRN  
  
 
LAB:   
Lab Results Component Value Date/Time INR 1.1 11/05/2018 05:23 PM  
 INR 1.0 12/26/2016 10:54 AM  
 
Lab Results Component Value Date/Time HGB 7.0 (L) 11/18/2018 06:00 AM  
 HGB 7.1 (L) 11/17/2018 04:34 AM  
 HGB 7.5 (L) 11/16/2018 04:17 AM  
 
 
Wound Hip Left (Active) Number of days: 102 Wound Hip Lateral (Active) Number of days: 182 Wound Shoulder Right (Active) Number of days: 453 Wound Hip Left (Active) DRESSING STATUS Clean, dry, and intact 11/17/2018  7:56 PM  
DRESSING TYPE Aquacel 11/17/2018  7:56 PM  
Drainage Amount  Small  11/15/2018  8:50 AM  
Drainage Color Brown 11/15/2018  8:50 AM  
Wound Odor None 11/15/2018  8:50 AM  
Number of days: 12 Wound Shoulder Right (Active) DRESSING STATUS Clean, dry, and intact 11/17/2018  7:56 PM  
DRESSING TYPE 4 x 4;Transparent film 11/17/2018  7:56 PM  
SPLINT TYPE/MATERIAL Sling 11/17/2018  7:47 AM  
Drainage Amount  None 11/15/2018  8:50 AM  
Wound Odor None 11/14/2018  9:23 PM  
Number of days: 12  
   
 
Physical Exam: No significant changes Assessment:  
  
Principal Problem: 
  Staphylococcal arthritis of right shoulder ([address] (11/6/2018) Active Problems: 
  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  
 
Continue PT/OT/Rehab Wound healed suture removed Observe Continue care Shoulder Orthopaedically stable Follow up Dr. Uzair Joshua 3 weeks Patient Expects to be Discharged to[de-identified] Private residence Signed By: Benton Walker MD

## 2018-11-18 NOTE — PROGRESS NOTES
Problem: Falls - Risk of 
Goal: *Absence of Falls Document Mimi José Fall Risk and appropriate interventions in the flowsheet. Outcome: Progressing Towards Goal 
Fall Risk Interventions: 
Mobility Interventions: Bed/chair exit alarm, OT consult for ADLs, Patient to call before getting OOB, PT Consult for mobility concerns, PT Consult for assist device competence Mentation Interventions: Adequate sleep, hydration, pain control, Evaluate medications/consider consulting pharmacy Medication Interventions: Bed/chair exit alarm, Patient to call before getting OOB, Teach patient to arise slowly Elimination Interventions: Call light in reach, Patient to call for help with toileting needs, Bed/chair exit alarm

## 2018-11-18 NOTE — PROGRESS NOTES
Problem: Mobility Impaired (Adult and Pediatric) Goal: *Acute Goals and Plan of Care (Insert Text) Discharge Goals Plan of care updated 11/15/18 and ongoing 11/17/2018 
(1.)Mr. Hansa Velasquez will move from supine to sit and sit to supine , scoot up and down and roll side to side in bed with MODIFIED INDEPENDENCE within 5 treatment day(s). (2.)Mr. Hansa Velasquez will transfer from bed to chair and chair to bed with MODIFIED INDEPENDENCE using the least restrictive device within 5 treatment day(s). (3.)Mr. Hansa Velasquez will ambulate with MODIFIED INDEPENDENCE for 500 feet with the least restrictive device within 5 treatment day(s). (4.)Mr. Hansa Velasquez will perform standing static and dynamic balance activities x 15 minutes with MODIFIED INDEPENDENCE to improve safety within 5 day(s). (5.)Mr. Hansa Velasquez will ascend and descend 2 stairs using L hand rail(s) with SUPERVISION to improve functional mobility and safety within 5 day(s). (6.)Mr. Hansa Velasquez will tolerate AAROM, AROM and gentle pendulums of RUE with no increase in pain within 5 treatment days to improve independence with transfers. (7.)Mr. Hansa Velasquez will tolerate 50+ minutes of therapeutic activity/exercise within 5 days to improve activity tolerance for functional mobility. 
________________________________________________________________________________________________ PHYSICAL THERAPY: Daily Note, Treatment Day: 2nd, PM 11/17/2018INPATIENT: Hospital Day: 11 Payor: Lyla Kirk / Plan: SIMON YOUNGBLOOD OAP / Product Type: Commerical /  
 LLE total hip precautions, WBAT 
RUE WBAT : Active and passive range of motion RIGHT elbow hand ACTIVE AND ACTIVE ASSISTED MOTION RIGHT SHOULDER GENTLE pendulums 
sling RIGHT shoulder NAME/AGE/GENDER: Sobeida Betancur is a 48 y.o. male PRIMARY DIAGNOSIS: infected left hip  
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 
Patient has no known allergies. ASSESSMENT:  
Mr. Brandy Guzman presents supine in bed and is agreeable to ROM. He declines ambulatory treatment and for the hip and requests right shoulder alone today. He is willing to sit edge of bed SBA x 1 from supine and with rolling. Treatment included RUE ROM exercises per chart below requiring cues to perform properly and with good technique. Minimal increase in R shoulder pain with shoulder abduction, performed to patient's tolerance. Pt was left supine with needs in reach. Will continue with POC. This section established at most recent assessment PROBLEM LIST (Impairments causing functional limitations): 1. Decreased Strength 2. Decreased ADL/Functional Activities 3. Decreased Transfer Abilities 4. Decreased Ambulation Ability/Technique 5. Decreased Balance 6. Increased Pain 7. Decreased Activity Tolerance 8. Decreased Pacing Skills 9. Decreased Knowledge of Precautions 10. Decreased [place] with Home Exercise Program 
 INTERVENTIONS PLANNED: (Benefits and precautions of physical therapy have been discussed with the patient.) 1. Balance Exercise 2. Bed Mobility 3. Gait Training 4. Group Therapy 5. Home Exercise Program (HEP) 6. Therapeutic Activites 7. Therapeutic Exercise/Strengthening 8. Transfer Training 9. Patient Education TREATMENT PLAN: Frequency/Duration: daily for duration of hospital stay Rehabilitation Potential For Stated Goals: Excellent RECOMMENDED REHABILITATION/EQUIPMENT: (at time of discharge pending progress): Due to the probability of continued deficits (see above) this patient will likely need continued skilled physical therapy after discharge. Equipment:  
? None at this time HISTORY:  
History of Present Injury/Illness (Reason for Referral): 
Pt is a 49 y/o male who is almost 2 years s/p L JEIMY who was seen in clinic on 10/29 c/o 5 day history of sudden onset of moderate left hip pain. Denied any known injury. Left hip joint aspirate was obtained and sent for culture which grew pansensitive staph. Aureus. Patient was also noted to have elevated WBC (27.1) as well as elevated ESR (90). Patient reports having flu like symptoms 2 weeks ago and began developing right shoulder pain around that same time. Patient was subsequently evaluated by Dr. Otilia Thurston and received right shoulder cortisone injection. He reports that he is still experiencing moderate to severe right shoulder pain. Denies fever, chills, nausea, vomiting, etc..  No other new complaints. Past Medical History/Comorbidities:  
Mr. Julio Maurice  has a past medical history of Arthritis, Biceps muscle tear, Chronic pain, Insomnia, MRSA (methicillin resistant Staphylococcus aureus) infection, Personal history of kidney stones, and Right shoulder pain. Mr. Julio Maurice  has a past surgical history that includes hx other surgical; hx rotator cuff repair (Left, 2010); hx orthopaedic (Left, 01/2017); hx septoplasty (11/15/2017); HIP ARTHROPLASTY TOTAL REVISION (Left, 11/6/2018); SHOULDER I&D (Right, 11/6/2018); RIGHT SHOULDER ARTHROSCOPY SUBACROMIAL DECOMPRESSION ROTATOR CUFF REPAIR/ DISTAL CLAVICLE RESECTION (Right, 6/13/2017); LEFT TOTAL HIP ARTHROPLASTY (Left, 1/6/2017); and SHOULDER ARTHROSCOPY ACROMIOPLASTY ROTATOR CUFF REPAIR (Left, 10/21/2010). Social History/Living Environment:  
Home Environment: Private residence # Steps to Enter: 2 Rails to Enter: Yes Hand Rails : Left One/Two Story Residence: One story Living Alone: No 
Support Systems: Spouse/Significant Other/Partner Patient Expects to be Discharged to[de-identified] Private residence Current DME Used/Available at Home: Walker, rolling, Raised toilet seat, Grab bars, Adaptive bathing aides, Adaptive dressing aides Tub or Shower Type: Tub/Shower combination Prior Level of Function/Work/Activity: He is typically independent with ambulation, ADLs, driving and works a heavy labor job Number of Personal Factors/Comorbidities that affect the Plan of Care: 3+: HIGH COMPLEXITY EXAMINATION:  
Most Recent Physical Functioning:  
Gross Assessment: 
  
         
  
Posture: 
  
Balance: 
  Bed Mobility: 
Rolling: Supervision;Stand-by assistance Supine to Sit: Stand-by assistance Scooting: Stand-by assistance Wheelchair Mobility: 
  
Transfers: 
  
Gait: 
  
   
  
Body Structures Involved: 1. Nerves 2. Metabolic 3. Endocrine 4. Bones 5. Joints 6. Muscles Body Functions Affected: 1. Sensory/Pain 2. Neuromusculoskeletal 
3. Movement Related 4. Metobolic/Endocrine Activities and Participation Affected: 1. Mobility 2. Self Care 3. Domestic Life 4. Interpersonal Interactions and Relationships 5. Community, Social and [place] Number of elements that affect the Plan of Care: 4+: HIGH COMPLEXITY CLINICAL PRESENTATION:  
Presentation: Stable and uncomplicated: LOW COMPLEXITY CLINICAL DECISION MAKIN[address] [place] [address] AM-PAC 6 Clicks Basic Mobility Inpatient Short Form How much difficulty does the patient currently have. .. Unable A Lot A Little None 1. Turning over in bed (including adjusting bedclothes, sheets and blankets)? [] 1   [] 2   [x] 3   [] 4  
2. Sitting down on and standing up from a chair with arms ( e.g., wheelchair, bedside commode, etc.)   [] 1   [] 2   [x] 3   [] 4  
3. Moving from lying on back to sitting on the side of the bed? [] 1   [x] 2   [] 3   [] 4 How much help from another person does the patient currently need. .. Total A Lot A Little None 4. Moving to and from a bed to a chair (including a wheelchair)? [] 1   [] 2   [x] 3   [] 4  
5. Need to walk in hospital room? [] 1   [x] 2   [] 3   [] 4  
6. Climbing 3-5 steps with a railing? [] 1   [x] 2   [] 3   [] 4  
© , Trustees of [address] [place] [address], under license to [employer].  All rights reserved Score:  Initial: 15 Most Recent: X (Date: -- ) Interpretation of Tool:  Represents activities that are increasingly more difficult (i.e. Bed mobility, Transfers, Gait). Score 24 23 22-20 19-15 14-10 9-7 6 Modifier CH CI CJ CK CL CM CN   
 
? Mobility - Walking and Moving Around:  
  - CURRENT STATUS: CK - 40%-59% impaired, limited or restricted  - GOAL STATUS: CJ - 20%-39% impaired, limited or restricted  - D/C STATUS:  ---------------To be determined--------------- Payor: Jarek Powers / Plan: SIMON YOUNGBLOOD OAP / Product Type: Commerical /   
 
Medical Necessity:    
· Patient demonstrates excellent rehab potential due to higher previous functional level. Reason for Services/Other Comments: 
· Patient continues to require modification of therapeutic interventions to increase complexity of exercises. Use of outcome tool(s) and clinical judgement create a POC that gives a: Clear prediction of patient's progress: LOW COMPLEXITY  
  
 
 
 
TREATMENT:    
    
Pre-treatment Symptoms/Complaints: Only want to do the shoulder today, I am walking around in the room. Pain: Initial:  
Pain Intensity 1: 5 Pain Intervention(s) 1: Repositioned  Post Session:  5/10 Therapeutic Activity: (     0 minutes): Therapeutic activities including bed mobility training, transfer training, ambulation on level ground, static/dynamic standing balance,and patient education to improve mobility, strength and balance. Required minimal   to promote static and dynamic balance in standing. Therapeutic Exercise: (   23 minutes):  Exercises per grid below to improve mobility, strength and range of motion RUE. Required minimal visual, manual and tactile cues to promote proper body alignment and promote proper body posture. Progressed range as indicated. Date: 
11/11/18 Date: 
11/12/18 Date: 
11/13/18 Date: 
11/13/18 Date: 
11/14/18 Date: 
11/15/18 Date: 
11/16/18 Date : 11/17/2018 ACTIVITY/EXERCISE AM PM PM AM PM PM AM PM PM    
RUE shoulder flexion 30 passive 30 passive 2x15 R 
PROM 2x15R PROM 2x20R PROM 2x15R PROM 2x15R PROM 2x15R PROM 30 x's AAROM, and PROM   
RUE elbow flexion 30 AA 30 AA 2x15 R 
 AAROM 2x15R 
AROM 2x15R  
AROM x15 AROM 2x15R 
AROM 2x15R PROM 30 x's AAROM, and PROM   
RUE finger flexion/extension 30 AA 30 AA x15 R Active x15R A x15R A x15 R A 2x15R A 2x15R A 30 x's AAROM, and PROM   
RUE shoulder abduction 30 passive 30 passive 2x15R PROM 2x15R PROM 2x15R PROM 2x15 R PROM 2x15R PROM 2x15R PROM 30 x's AAROM, and PROM Gentle pendulums       x1' Forearm pronation/supination   x10R A x10R A x15R A x15 R A 2x15R A 2x15R A 30 x's AAROM, and PROM Gripping          20 x's B = bilateral; AA = active assistive; A = active; P = passive Braces/Orthotics/Lines/Etc:  
 -None Treatment/Session Assessment:   
· Response to Treatment: see above · Interdisciplinary Collaboration:  
o Physical Therapist 
o Registered Nurse · After treatment position/precautions:  
o Supine in bed 
o Bed/Chair-wheels locked 
o Bed in low position 
o Call light within reach 
o RN notified · Compliance with Program/Exercises: doing better · Recommendations/Intent for next treatment session: \"Next visit will focus on advancements to more challenging activities and reduction in assistance provided\". Total Treatment Duration: PT Patient Time In/Time Out Time In: 1821 Time Out: 2027 Kathy Florence, PT

## 2018-11-19 LAB
ABO + RH BLD: NORMAL
ALBUMIN SERPL-MCNC: 2.2 G/DL (ref 3.5–5)
ALBUMIN/GLOB SERPL: 0.5 {RATIO} (ref 1.2–3.5)
ALP SERPL-CCNC: 71 U/L (ref 50–136)
ALT SERPL-CCNC: 20 U/L (ref 12–65)
ANION GAP SERPL CALC-SCNC: 11 MMOL/L (ref 7–16)
AST SERPL-CCNC: 36 U/L (ref 15–37)
BASOPHILS # BLD: 0.1 K/UL (ref 0–0.2)
BASOPHILS NFR BLD: 1 % (ref 0–2)
BILIRUB SERPL-MCNC: 0.4 MG/DL (ref 0.2–1.1)
BLD PROD TYP BPU: NORMAL
BLD PROD TYP BPU: NORMAL
BLOOD GROUP ANTIBODIES SERPL: NORMAL
BPU ID: NORMAL
BPU ID: NORMAL
BUN SERPL-MCNC: 52 MG/DL (ref 6–23)
CALCIUM SERPL-MCNC: 8.7 MG/DL (ref 8.3–10.4)
CHLORIDE SERPL-SCNC: 100 MMOL/L (ref 98–107)
CO2 SERPL-SCNC: 29 MMOL/L (ref 21–32)
CREAT SERPL-MCNC: 11.2 MG/DL (ref 0.8–1.5)
CROSSMATCH RESULT,%XM: NORMAL
CROSSMATCH RESULT,%XM: NORMAL
DIFFERENTIAL METHOD BLD: ABNORMAL
EOSINOPHIL # BLD: 0.4 K/UL (ref 0–0.8)
EOSINOPHIL NFR BLD: 5 % (ref 0.5–7.8)
ERYTHROCYTE [DISTWIDTH] IN BLOOD BY AUTOMATED COUNT: 14.8 %
GLOBULIN SER CALC-MCNC: 4.4 G/DL (ref 2.3–3.5)
GLUCOSE SERPL-MCNC: 89 MG/DL (ref 65–100)
HCT VFR BLD AUTO: 29.2 % (ref 41.1–50.3)
HGB BLD-MCNC: 9.2 G/DL (ref 13.6–17.2)
IMM GRANULOCYTES # BLD: 0.1 K/UL (ref 0–0.5)
IMM GRANULOCYTES NFR BLD AUTO: 1 % (ref 0–5)
LYMPHOCYTES # BLD: 2 K/UL (ref 0.5–4.6)
LYMPHOCYTES NFR BLD: 21 % (ref 13–44)
MAGNESIUM SERPL-MCNC: 2.2 MG/DL (ref 1.8–2.4)
MCH RBC QN AUTO: 28.4 PG (ref 26.1–32.9)
MCHC RBC AUTO-ENTMCNC: 31.5 G/DL (ref 31.4–35)
MCV RBC AUTO: 90.1 FL (ref 79.6–97.8)
MONOCYTES # BLD: 0.9 K/UL (ref 0.1–1.3)
MONOCYTES NFR BLD: 9 % (ref 4–12)
NEUTS SEG # BLD: 6.2 K/UL (ref 1.7–8.2)
NEUTS SEG NFR BLD: 64 % (ref 43–78)
NRBC # BLD: 0 K/UL (ref 0–0.2)
PHOSPHATE SERPL-MCNC: 7.5 MG/DL (ref 2.5–4.5)
PLATELET # BLD AUTO: 564 K/UL (ref 150–450)
PMV BLD AUTO: 9.4 FL (ref 9.4–12.3)
POTASSIUM SERPL-SCNC: 4.8 MMOL/L (ref 3.5–5.1)
PROT SERPL-MCNC: 6.6 G/DL (ref 6.3–8.2)
RBC # BLD AUTO: 3.24 M/UL (ref 4.23–5.6)
SODIUM SERPL-SCNC: 140 MMOL/L (ref 136–145)
SPECIMEN EXP DATE BLD: NORMAL
STATUS OF UNIT,%ST: NORMAL
STATUS OF UNIT,%ST: NORMAL
UNIT DIVISION, %UDIV: 0
UNIT DIVISION, %UDIV: 0
WBC # BLD AUTO: 9.7 K/UL (ref 4.3–11.1)

## 2018-11-19 PROCEDURE — 83735 ASSAY OF MAGNESIUM: CPT

## 2018-11-19 PROCEDURE — 97110 THERAPEUTIC EXERCISES: CPT

## 2018-11-19 PROCEDURE — 97535 SELF CARE MNGMENT TRAINING: CPT

## 2018-11-19 PROCEDURE — 36415 COLL VENOUS BLD VENIPUNCTURE: CPT

## 2018-11-19 PROCEDURE — 74011000258 HC RX REV CODE- 258: Performed by: INTERNAL MEDICINE

## 2018-11-19 PROCEDURE — 74011250636 HC RX REV CODE- 250/636: Performed by: INTERNAL MEDICINE

## 2018-11-19 PROCEDURE — 74011250637 HC RX REV CODE- 250/637: Performed by: INTERNAL MEDICINE

## 2018-11-19 PROCEDURE — 97530 THERAPEUTIC ACTIVITIES: CPT

## 2018-11-19 PROCEDURE — 80053 COMPREHEN METABOLIC PANEL: CPT

## 2018-11-19 PROCEDURE — 84100 ASSAY OF PHOSPHORUS: CPT

## 2018-11-19 PROCEDURE — 65660000000 HC RM CCU STEPDOWN

## 2018-11-19 PROCEDURE — 85025 COMPLETE CBC W/AUTO DIFF WBC: CPT

## 2018-11-19 PROCEDURE — 90935 HEMODIALYSIS ONE EVALUATION: CPT

## 2018-11-19 RX ADMIN — OXYCODONE HYDROCHLORIDE 10 MG: 5 TABLET ORAL at 11:36

## 2018-11-19 RX ADMIN — AMLODIPINE BESYLATE 5 MG: 5 TABLET ORAL at 11:28

## 2018-11-19 RX ADMIN — FAMOTIDINE 20 MG: 20 TABLET ORAL at 11:28

## 2018-11-19 RX ADMIN — CLONIDINE HYDROCHLORIDE 0.2 MG: 0.2 TABLET ORAL at 11:28

## 2018-11-19 RX ADMIN — SENNOSIDES AND DOCUSATE SODIUM 2 TABLET: 8.6; 5 TABLET ORAL at 11:28

## 2018-11-19 RX ADMIN — CLONIDINE HYDROCHLORIDE 0.2 MG: 0.2 TABLET ORAL at 17:27

## 2018-11-19 RX ADMIN — METOPROLOL TARTRATE 50 MG: 50 TABLET ORAL at 21:47

## 2018-11-19 RX ADMIN — Medication 5 ML: at 14:00

## 2018-11-19 RX ADMIN — HEPARIN SODIUM 5000 UNITS: 5000 INJECTION INTRAVENOUS; SUBCUTANEOUS at 11:29

## 2018-11-19 RX ADMIN — Medication 10 ML: at 22:00

## 2018-11-19 RX ADMIN — Medication 1 AMPULE: at 11:31

## 2018-11-19 RX ADMIN — METOPROLOL TARTRATE 50 MG: 50 TABLET ORAL at 11:28

## 2018-11-19 RX ADMIN — ZOLPIDEM TARTRATE 5 MG: 5 TABLET ORAL at 21:46

## 2018-11-19 RX ADMIN — HEPARIN SODIUM 5000 UNITS: 5000 INJECTION INTRAVENOUS; SUBCUTANEOUS at 21:48

## 2018-11-19 RX ADMIN — OXYCODONE HYDROCHLORIDE 10 MG: 5 TABLET ORAL at 21:46

## 2018-11-19 RX ADMIN — Medication 1 AMPULE: at 21:46

## 2018-11-19 RX ADMIN — OXYCODONE HYDROCHLORIDE 10 MG: 5 TABLET ORAL at 17:30

## 2018-11-19 RX ADMIN — VANCOMYCIN HYDROCHLORIDE 500 MG: 1 INJECTION, POWDER, LYOPHILIZED, FOR SOLUTION INTRAVENOUS at 11:27

## 2018-11-19 NOTE — PROGRESS NOTES
Problem: Mobility Impaired (Adult and Pediatric) Goal: *Acute Goals and Plan of Care (Insert Text) Discharge Goals Plan of care updated 11/15/18 and ongoing 11/17/2018 
(1.)Mr. Eladio Davison will move from supine to sit and sit to supine , scoot up and down and roll side to side in bed with MODIFIED INDEPENDENCE within 5 treatment day(s). (2.)Mr. Eladio Davison will transfer from bed to chair and chair to bed with MODIFIED INDEPENDENCE using the least restrictive device within 5 treatment day(s). (3.)Mr. Eladio Davison will ambulate with MODIFIED INDEPENDENCE for 500 feet with the least restrictive device within 5 treatment day(s). (4.)Mr. Eladio Davison will perform standing static and dynamic balance activities x 15 minutes with MODIFIED INDEPENDENCE to improve safety within 5 day(s). (5.)Mr. Eladio Davison will ascend and descend 2 stairs using L hand rail(s) with SUPERVISION to improve functional mobility and safety within 5 day(s). (6.)Mr. Eladio Davison will tolerate AAROM, AROM and gentle pendulums of RUE with no increase in pain within 5 treatment days to improve independence with transfers. (7.)Mr. Eladio Davison will tolerate 50+ minutes of therapeutic activity/exercise within 5 days to improve activity tolerance for functional mobility. 
________________________________________________________________________________________________ PHYSICAL THERAPY: Daily Note, Treatment Day: 4th, PM 11/19/2018INPATIENT: Hospital Day: 13 Payor: Monika Chaidez / Plan: SIMON YOUNGBLOOD OAP / Product Type: Commerical /  
 LLE total hip precautions, WBAT 
RUE WBAT : Active and passive range of motion RIGHT elbow hand ACTIVE AND ACTIVE ASSISTED MOTION RIGHT SHOULDER GENTLE pendulums 
sling RIGHT shoulder NAME/AGE/GENDER: Barrett Vicente is a 48 y.o. male PRIMARY DIAGNOSIS: infected left hip  
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 
Patient has no known allergies. ASSESSMENT:  
Mr. Gloria Choi presents supine in bed and is agreeable to ROM. Had dialysis today. He declines ambulatory treatment and for the hip and requests right shoulder alone today. Remained supine for the treatment. Treatment included RUE ROM exercises per chart below requiring cues to perform properly and with good technique. Minimal increase in R shoulder pain with shoulder abduction, performed to patient's tolerance. Pt was left supine with needs in reach. States that he would walk later. Will continue with POC. This section established at most recent assessment PROBLEM LIST (Impairments causing functional limitations): 1. Decreased Strength 2. Decreased ADL/Functional Activities 3. Decreased Transfer Abilities 4. Decreased Ambulation Ability/Technique 5. Decreased Balance 6. Increased Pain 7. Decreased Activity Tolerance 8. Decreased Pacing Skills 9. Decreased Knowledge of Precautions 10. Decreased [place] with Home Exercise Program 
 INTERVENTIONS PLANNED: (Benefits and precautions of physical therapy have been discussed with the patient.) 1. Balance Exercise 2. Bed Mobility 3. Gait Training 4. Group Therapy 5. Home Exercise Program (HEP) 6. Therapeutic Activites 7. Therapeutic Exercise/Strengthening 8. Transfer Training 9. Patient Education TREATMENT PLAN: Frequency/Duration: daily for duration of hospital stay Rehabilitation Potential For Stated Goals: Excellent RECOMMENDED REHABILITATION/EQUIPMENT: (at time of discharge pending progress): Due to the probability of continued deficits (see above) this patient will likely need continued skilled physical therapy after discharge. Equipment:  
? None at this time HISTORY:  
History of Present Injury/Illness (Reason for Referral): 
 Pt is a 47 y/o male who is almost 2 years s/p L JEIMY who was seen in clinic on 10/29 c/o 5 day history of sudden onset of moderate left hip pain. Denied any known injury. Left hip joint aspirate was obtained and sent for culture which grew pansensitive staph. Aureus. Patient was also noted to have elevated WBC (27.1) as well as elevated ESR (90). Patient reports having flu like symptoms 2 weeks ago and began developing right shoulder pain around that same time. Patient was subsequently evaluated by Dr. Manuel Shore and received right shoulder cortisone injection. He reports that he is still experiencing moderate to severe right shoulder pain. Denies fever, chills, nausea, vomiting, etc..  No other new complaints. Past Medical History/Comorbidities:  
Mr. Deepak Robert  has a past medical history of Arthritis, Biceps muscle tear, Chronic pain, Insomnia, MRSA (methicillin resistant Staphylococcus aureus) infection, Personal history of kidney stones, and Right shoulder pain. Mr. Deepak Robert  has a past surgical history that includes hx other surgical; hx rotator cuff repair (Left, 2010); hx orthopaedic (Left, 01/2017); hx septoplasty (11/15/2017); HIP ARTHROPLASTY TOTAL REVISION (Left, 11/6/2018); SHOULDER I&D (Right, 11/6/2018); RIGHT SHOULDER ARTHROSCOPY SUBACROMIAL DECOMPRESSION ROTATOR CUFF REPAIR/ DISTAL CLAVICLE RESECTION (Right, 6/13/2017); LEFT TOTAL HIP ARTHROPLASTY (Left, 1/6/2017); and SHOULDER ARTHROSCOPY ACROMIOPLASTY ROTATOR CUFF REPAIR (Left, 10/21/2010). Social History/Living Environment:  
Home Environment: Private residence # Steps to Enter: 2 Rails to Enter: Yes Hand Rails : Left One/Two Story Residence: One story Living Alone: No 
Support Systems: Spouse/Significant Other/Partner Patient Expects to be Discharged to[de-identified] Private residence Current DME Used/Available at Home: Walker, rolling, Raised toilet seat, Grab bars, Adaptive bathing aides, Adaptive dressing aides Tub or Shower Type: Tub/Shower combination Prior Level of Function/Work/Activity: 
 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  
Most Recent Physical Functioning:  
Gross Assessment: 
  
         
  
Posture: 
  
Balance: 
Sitting: (n/a) Bed Mobility: 
  
Wheelchair Mobility: 
  
Transfers: 
  
Gait: 
Right Side Weight Bearing: Full Left Side Weight Bearing: As tolerated Body Structures Involved: 1. Nerves 2. Metabolic 3. Endocrine 4. Bones 5. Joints 6. Muscles Body Functions Affected: 1. Sensory/Pain 2. Neuromusculoskeletal 
3. Movement Related 4. Metobolic/Endocrine Activities and Participation Affected: 1. Mobility 2. Self Care 3. Domestic Life 4. Interpersonal Interactions and Relationships 5. Community, Social and [place] Number of elements that affect the Plan of Care: 4+: HIGH COMPLEXITY CLINICAL PRESENTATION:  
Presentation: Stable and uncomplicated: LOW COMPLEXITY CLINICAL DECISION MAKING:  
[facility] 6 Clicks Basic Mobility Inpatient Short Form How much difficulty does the patient currently have. .. Unable A Lot A Little None 1. Turning over in bed (including adjusting bedclothes, sheets and blankets)? [] 1   [] 2   [x] 3   [] 4  
2. Sitting down on and standing up from a chair with arms ( e.g., wheelchair, bedside commode, etc.)   [] 1   [] 2   [x] 3   [] 4  
3. Moving from lying on back to sitting on the side of the bed? [] 1   [x] 2   [] 3   [] 4 How much help from another person does the patient currently need. .. Total A Lot A Little None 4. Moving to and from a bed to a chair (including a wheelchair)? [] 1   [] 2   [x] 3   [] 4  
5. Need to walk in hospital room? [] 1   [x] 2   [] 3   [] 4  
6. Climbing 3-5 steps with a railing?    [] 1   [x] 2   [] 3   [] 4  
 © 2007, Trustees of [facility], under license to [employer]. All rights reserved Score:  Initial: 15 Most Recent: X (Date: -- ) Interpretation of Tool:  Represents activities that are increasingly more difficult (i.e. Bed mobility, Transfers, Gait). Score 24 23 22-20 19-15 14-10 9-7 6 Modifier CH CI CJ CK CL CM CN   
 
? Mobility - Walking and Moving Around:  
  - CURRENT STATUS: CK - 40%-59% impaired, limited or restricted  - GOAL STATUS: CJ - 20%-39% impaired, limited or restricted  - D/C STATUS:  ---------------To be determined--------------- Payor: Delaney Chance / Plan: SIMON ALBERTOP / Product Type: Commerical /   
 
Medical Necessity:    
· Patient demonstrates excellent rehab potential due to higher previous functional level. Reason for Services/Other Comments: 
· Patient continues to require modification of therapeutic interventions to increase complexity of exercises. Use of outcome tool(s) and clinical judgement create a POC that gives a: Clear prediction of patient's progress: LOW COMPLEXITY  
  
 
 
 
TREATMENT:    
    
Pre-treatment Symptoms/Complaints: declined walking. \"where you been? \" 
Pain: Initial: 
Pain Intensity 1: 0 Pain Location 1: Abdomen  Post Session:  same Therapeutic Activity: (     0 minutes): Therapeutic activities including bed mobility training, transfer training, ambulation on level ground, static/dynamic standing balance,and patient education to improve mobility, strength and balance. Required minimal   to promote static and dynamic balance in standing. Therapeutic Exercise: (   16 minutes):  Exercises per grid below to improve mobility, strength and range of motion RUE. Required minimal visual, manual and tactile cues to promote proper body alignment and promote proper body posture. Progressed range as indicated. Date: 
11/11/18 Date: 
11/12/18 Date: 
11/13/18 Date: 
11/13/18 Date: 
11/14/18 Date: 
11/15/18 Date: 11/16/18 Date : 11/17/2018  Date: 
11/19/18 ACTIVITY/EXERCISE AM PM PM AM PM PM AM PM PM  PM  
RUE shoulder flexion 30 passive 30 passive 2x15 R 
PROM 2x15R PROM 2x20R PROM 2x15R PROM 2x15R PROM 2x15R PROM 30 x's AAROM, and PROM 30 reps AAROM  
RUE elbow flexion 30 AA 30 AA 2x15 R 
 AAROM 2x15R 
AROM 2x15R  
AROM x15 AROM 2x15R 
AROM 2x15R PROM 30 x's AAROM, and PROM x20 active RUE finger flexion/extension 30 AA 30 AA x15 R Active x15R A x15R A x15 R A 2x15R A 2x15R A 30 x's AAROM, and PROM x20 active RUE shoulder abduction 30 passive 30 passive 2x15R PROM 2x15R PROM 2x15R PROM 2x15 R PROM 2x15R PROM 2x15R PROM 30 x's AAROM, and PROM x30 AAROM Gentle pendulums       x1' Forearm pronation/supination   x10R A x10R A x15R A x15 R A 2x15R A 2x15R A 30 x's AAROM, and PROM x20 active Wrist extension/flexion          x20 active Gripping          20 x's B = bilateral; AA = active assistive; A = active; P = passive Braces/Orthotics/Lines/Etc:  
 -None Treatment/Session Assessment:   
· Response to Treatment: Tolerated exercises well · Interdisciplinary Collaboration:  
o Physical Therapy Assistant 
o Registered Nurse · After treatment position/precautions:  
o Supine in bed 
o Bed alarm/tab alert on 
o Bed/Chair-wheels locked 
o Bed in low position 
o Call light within reach 
o RN notified · Compliance with Program/Exercises: doing better · Recommendations/Intent for next treatment session: \"Next visit will focus on advancements to more challenging activities and reduction in assistance provided\". Total Treatment Duration: PT Patient Time In/Time Out Time In: 1400 Time Out: 1416 Cade Hanson PTA

## 2018-11-19 NOTE — PROGRESS NOTES
Pharmacokinetic Consult to Pharmacist 
 
Socorroyanely Sutton is a 48 y.o. male being treated for MSSA bacteremia, L JEIMY infection, and R shoulder septic arthritis with vancomycin. Weight: 95.3 kg (210 lb 1.6 oz) Lab Results Component Value Date/Time BUN 52 (H) 11/19/2018 05:22 AM  
 Creatinine 11.20 (H) 11/19/2018 05:22 AM  
 WBC 9.7 11/19/2018 05:22 AM  
 Lactic acid 0.5 11/10/2018 10:56 AM  
 Lactic Acid (POC) 1.52 11/04/2018 04:40 PM  
  
Estimated Creatinine Clearance: 8.6 mL/min (A) (based on SCr of 11.2 mg/dL (H)). Lab Results Component Value Date/Time Vancomycin, random 22.1 11/14/2018 04:11 PM  
 
 
Day 12 of vancomycin. Goal trough is 15-20, but dosing intermittently due to renal failure. Per discussion with ID and nephrology, plan to treat for 6 weeks with EOT 12/19/18. For ease of transition to outpatient dosing for now, will need to dose vancomycin WITH dialysis sessions. Dialysis was recently switched back to MWF and vancomycin doses now reflect this. Plan to dose vancomycin 750 mg IV WITH dialysis on Fridays and vancomycin 500 mg IV WITH dialysis on Monday and Wednessday. For now, would ideally check vancomycin random level in between dialysis sessions once or twice a week and adjust vancomycin dose accordingly as needed to maintain adequate levels. As patient's renal function improves over time, the plan for dosing vancomycin will need to change, especially if he is able to transition off of hemodialysis at some point during the 6 weeks of treatment with vancomycin. Appears that social work checked with patient's insurance and daptomycin would be covered, so may eventually plan to place IV access and switch from vancomycin to daptomycin for the duration of therapy if patient is improving and able to come off dialysis prior to end of therapy for ease of dosing. Consider obtaining vancomycin random level with morning labs (11/21) and dose based on levels. Pharmacy will continue to follow. Please call with any questions. Rodney Joya, SabasD  [facility] 
[email or web address]

## 2018-11-19 NOTE — PROGRESS NOTES
[facility] PROGRESS NOTE Follow up for:  RANI Subjective:  
Patient seen and examined on HD, no complaints. Goal UF 2 kg. Tolerating well. /81. HR 57. No sob, cp, n/v. UOP up. ROS: 
Gen - no fever, no chills, appetite okay CV - no chest pain, no orthopnea Lung - no shortness of breath, no cough Abd - no tenderness, no nausea, no vomiting Ext - no edema Objective:  
Exam: 
Vitals:  
 11/19/18 6716 11/19/18 0818 11/19/18 0831 11/19/18 0513 BP: 165/80 (!) 181/94 (!) 181/94 155/81 Pulse: 61 62 62 (!) 57 Resp: 20 Temp: 98.9 °F (37.2 °C) SpO2: 93% Weight:      
 
 
 
Intake/Output Summary (Last 24 hours) at 11/19/2018 5606 Last data filed at 11/19/2018 8617 Gross per 24 hour Intake 683.3 ml Output 1650 ml Net -966.7 ml  
 
 
Current Facility-Administered Medications Medication Dose Route Frequency  vancomycin (VANCOCIN) 500 mg in 0.9% sodium chloride 100 mL IVPB give with dialysis  500 mg IntraVENous Once per day on Mon Wed  [START ON 11/23/2018] vancomycin (VANCOCIN) 750 mg in  ml infusion give with dialysis  750 mg IntraVENous Once per day on Fri  
 0.9% sodium chloride infusion 250 mL  250 mL IntraVENous PRN  
 0.9% sodium chloride infusion  25 mL/hr IntraVENous CONTINUOUS  
 heparin (PF) 2 units/ml in NS infusion 2,000 Units  1,000 mL Irrigation CONTINUOUS  
 midazolam (VERSED) injection 0.5-2 mg  0.5-2 mg IntraVENous Multiple  fentaNYL citrate (PF) injection 25-50 mcg  25-50 mcg IntraVENous Multiple  heparin (porcine) 1,000 unit/mL injection 5,000 Units  5,000 Units Hemodialysis DIALYSIS PRN  
 amLODIPine (NORVASC) tablet 5 mg  5 mg Oral DAILY  metoprolol tartrate (LOPRESSOR) tablet 50 mg  50 mg Oral Q12H  
 acetaminophen (TYLENOL) tablet 1,000 mg  1,000 mg Oral Q6H PRN  
 cloNIDine HCl (CATAPRES) tablet 0.2 mg  0.2 mg Oral BID  hydrALAZINE (APRESOLINE) 20 mg/mL injection 10 mg  10 mg IntraVENous Q6H PRN  
  simethicone (MYLICON) tablet 80 mg  80 mg Oral QID PRN  polyethylene glycol (MIRALAX) packet 17 g  17 g Oral DAILY PRN  
 heparin (porcine) injection 5,000 Units  5,000 Units SubCUTAneous Q12H  
 epoetin zee (EPOGEN;PROCRIT) injection 20,000 Units  20,000 Units SubCUTAneous Q7D  
 famotidine (PEPCID) tablet 20 mg  20 mg Oral DAILY  diphenhydrAMINE (BENADRYL) capsule 25 mg  25 mg Oral Q4H PRN  
 HYDROmorphone (PF) (DILAUDID) injection 1 mg  1 mg IntraVENous Q3H PRN  
 naloxone (NARCAN) injection 0.2-0.4 mg  0.2-0.4 mg IntraVENous Q10MIN PRN  
 oxyCODONE IR (ROXICODONE) tablet 10 mg  10 mg Oral Q3H PRN  
 senna-docusate (PERICOLACE) 8.6-50 mg per tablet 2 Tab  2 Tab Oral DAILY  sodium chloride (NS) flush 5-10 mL  5-10 mL IntraVENous Q8H  
 sodium chloride (NS) flush 5-10 mL  5-10 mL IntraVENous PRN  
 zolpidem (AMBIEN) tablet 5 mg  5 mg Oral QHS PRN  
 alcohol 62% (NOZIN) nasal  1 Ampule  1 Ampule Topical Q12H  
 ondansetron (ZOFRAN) injection 4 mg  4 mg IntraVENous Q4H PRN  
 NUTRITIONAL SUPPORT ELECTROLYTE PRN ORDERS   Does Not Apply PRN  
 0.9% sodium chloride infusion 250 mL  250 mL IntraVENous PRN  
 
 
EXAM 
GEN - Alert, oriented, in no distress CV - S1, S2, RRR, no rub, murmur, or gallop Lung - clear to auscultation bilaterally Abd - soft, nontender, BS present Ext - trace edema Right IJ PC 
 
Recent Labs 11/19/18 0522 11/18/18 1953 11/18/18 
0600 11/17/18 
0434 WBC 9.7  --   --  9.3 HGB 9.2* 8.9* 7.0* 7.1*  
HCT 29.2* 28.3*  --  22.6*  
*  --   --  525* Recent Labs 11/19/18 0522 11/18/18 
0600 11/17/18 
0434  138 137  
K 4.8 4.9 5.1  101 100 CO2 29 29 26 BUN 52* 47* 57* CREA 11.20* 11.30* 13.80* CA 8.7 8.3 8.1*  
GLU 89 95 100 MG 2.2  --   --   
PHOS 7.5*  --   --   
 
 
Assessment and Plan:  
 
1) RANI - HD dependent 
- UOP picking up. ? Starting to see some recovery. Following labs and uop closely - Outpatient slot locally pending - [facility] 
- Seen on HD, well tolerated 2) MSSA Bacteremia  
- late onset left JEIMY infection. S/p polyethylene exchange 11/5 
- right shoulder septic arthritis - ID following - Plan Vanc for 6 weeks, EOT 12/19/18. Antibiotics have been arranged at [facility] dialysis - If his kidney function recovers before his antibiotics are completed then a plan will have to be worked out with ID for a \"post dialysis\" treatment plan 3) Anemia - Hb stable 4) HTN 
- clonidine, metoprolol, norvasc Melissa Hamman, ACNP

## 2018-11-19 NOTE — PROGRESS NOTES
Infectious Disease Progress Note Today's Date: 2018 Admit Date: 2018 Impression: · MSSA bacteremia (18) -  BCX NG; TTE no evidence of endocarditis · Late onset L JEIMY infection, MSSA (10/29), s/p I&D and polyethylene exchange  · MSSA R shoulder septic arthritis, s/p I and D  · History R shoulder rotator cuff repair · Oral HSV, resolved · RANI - ATN (post-op hypotension, Staphylococcal kidney injury, rifampin) vs AIN (nafcillin, keflex, or cefazolin) - now on HD with some sign of recovery, however nephro still evaluating · Elevated LFTs, Hep B, Hep C neg Plan:  
· Continue renally dosed Vancomycin. Pt will need 6 weeks of treatment (EOT 18). Will need oral therapy given presence of orthopedic hardware. Would use non-beta lactam regimen given possible AIN. · Closely watch renal function and vancomycin levels, adjust dosing based on renal function recovery · Discussed with Nephrology- please notify ID if patient no longer requiring dialysis before antibiotic EOT () · ID follow up 18 @ 9:00am and 18 @ 9:00am 
 
 
Anti-infectives: · Nafcillin · Cefazolin---Vanc (- · Rifampin Subjective: Interval History:  
Resting quietly on dialysis No Known Allergies Review of Systems:  A comprehensive review of systems was negative except for that written in the History of Present Illness. Objective:  
 
Visit Vitals /70 Pulse 61 Temp 98.9 °F (37.2 °C) Resp 20 Wt 95.3 kg (210 lb 1.6 oz) SpO2 93% BMI 32.91 kg/m² Temp (24hrs), Av.1 °F (36.7 °C), Min:97.3 °F (36.3 °C), Max:98.9 °F (37.2 °C) Lines:  CVC: R IJ HD cath Physical Exam:   
General:  Alert, cooperative, in no acute distress;  
Eyes:  Sclera anicteric Neck: Supple Lungs:   Unlabored Abdomen:   non distended Extremities: No cyanosis, trace LE edema Skin: No acute rash Musculoskeletal: Muscular. R shoulder edema with clean dressing Data Review: CBC: 
Recent Labs 18 
0618 
0434 WBC 9.7  --   --  9.3 GRANS 64  --   --   -- MONOS 9  --   --   --   
EOS 5  --   --   --   
ANEU 6.2  --   --   --   
ABL 2.0  --   --   --   
HGB 9.2* 8.9* 7.0* 7.1*  
HCT 29.2* 28.3*  --  22.6*  
*  --   --  525* BMP: 
Recent Labs 18 
0618 
0434 CREA 11.20* 11.30* 13.80* BUN 52* 47* 57*  138 137  
K 4.8 4.9 5.1  101 100 CO2 29 29 26 AGAP 11 8 11 GLU 89 95 100 LFTS: 
Recent Labs 18 TBILI 0.4 ALT 20 SGOT 36 AP 71  
TP 6.6 ALB 2.2* Microbiology:  
 
All Micro Results Procedure Component Value Units Date/Time CULTURE, BLOOD [[id number]] Collected:  18 Order Status:  Completed Specimen:  Whole Blood Updated:  18 3483 Special Requests: --     
  RIGHT 
HAND Culture result: NO GROWTH 5 DAYS     
 CULTURE, BLOOD [[id number]] Collected:  18 Order Status:  Completed Specimen:  Whole Blood Updated:  18 6164 Special Requests: --     
  RIGHT 
HAND Culture result: NO GROWTH 5 DAYS     
 CULTURE, URINE [[id number]] Collected:  11/10/18 1424 Order Status:  Completed Specimen:  Urine Updated:  18 0764 Special Requests: NO SPECIAL REQUESTS Culture result: NO GROWTH 2 DAYS     
 CULTURE, TISSUE Melissa Dec STAIN [[id number]]  (Abnormal)  (Susceptibility) Collected:  18 1862 Order Status:  Completed Specimen:  Joint, Shoulder Updated:  11/10/18 8380 Special Requests: NO SPECIAL REQUESTS     
  GRAM STAIN 0 TO 1 WBC'S/OIF  
   NO DEFINITE ORGANISM SEEN Culture result:    
  LIGHT STAPHYLOCOCCUS AUREUS  
     
   LIGHT 
NORMAL SKIN LATASHA ISOLATED 
   
      
  THIS ORGANISM WILL BE HELD FOR 7 DAYS. IF FURTHER TESTING IS REQUIRED PLEASE NOTIFY MICROBIOLOGY Imagin/8/18 TTE: SUMMARY: 
 
 -  Left ventricle: Systolic function was normal. Ejection fraction was 
estimated in the range of 55 % to 60 %. There were no regional wall motion 
abnormalities. -  Aortic valve: The valve was probably trileaflet. Leaflets exhibited mild 
sclerosis. There was no evidence for vegetation. -  Mitral valve: There was mild regurgitation. There was no evidence for 
vegetation. Signed By: Reva Martino MD   
 November 19, 2018

## 2018-11-19 NOTE — DIALYSIS
TRANSFER OUT- DIALYSIS Hemodialysis treatment completed without complications by CLARISSA Polanco. Patient alert and oriented. Post VS- B/P-151/73, HR- 76, RR- 20 
 
 1.6 Kgs removed. Flushed both ports with 10 mL of NS.  CVC dressing clean, dry, and intact, tego caps intact, bilateral lumens wrapped with 4x4 gauze. Patient to room 735 via transport dept  after dialysis.

## 2018-11-19 NOTE — INTERDISCIPLINARY ROUNDS
Interdisciplinary team rounds were held 11/19/2018 with the following team members:Care Management, Nursing, Nurse Practitioner and Occupational Therapy and the patient. Plan of care discussed. See clinical pathway and/or care plan for interventions and desired outcomes. Plan to discharge home with home health. Awaiting dialysis outpt slot.

## 2018-11-19 NOTE — PROGRESS NOTES
Problem: Self Care Deficits Care Plan (Adult) Goal: *Acute Goals and Plan of Care (Insert Text) 1. Pt will toilet with SBA. PROGRESSING 11/19/18 2. Pt will complete functional mobility for ADLs with SBA. GOAL MET 11/19/18 3. Pt will complete lower body dressing with SBA using AE as needed. 4. Pt will complete grooming and hygiene at sink with SBA. GOAL MET 11/19/18 5. Pt will complete UB bathing/ dressing with set up. PROGRESSING 11/19/18 6. Pt will complete bed mobility with SBA in prep for ADLs. GOAL MET 11/19/18 Timeframe: 7 days OCCUPATIONAL THERAPY: Daily Note, Treatment Day: 2nd and PM  
 11/19/2018INPATIENT: Hospital Day: 13 Payor: Claude Older / Plan: SIMON YOUNGBLOOD OAP / Product Type: Commerical /  
  
NAME/AGE/GENDER: Elia Moody is a 48 y.o. male PRIMARY DIAGNOSIS:  infected left hip  
ARF (acute renal failure) (HCC) Staphylococcal arthritis of right shoulder ([facility]) Staphylococcal arthritis of right shoulder ([facility] [address] ICD-10: Treatment Diagnosis:  
 · Pain in Right Shoulder (M25.511) Precautions/Allergies: 
  RUE WBAT, LLE WBAT, hip precautions Patient has no known allergies. ASSESSMENT:  
Mr. Gisell Fowler was admitted w/ R shoulder staphylococcal arthritis and L hip infection, s/p R shldr I&D and revision of L JEIMY. LLE WBAT, RUE WBAT no ROM restrictions, UE sling. Pt lives with his wife and was fully independent at baseline, works a physical job. Pt has all necessary DME/ AE from initial JEIMY. 11/19/18: Pt supine in bed upon arrival, alert and agreeable to OT teratment. Pt very talkative, requiring redirection throughout session. Pt practiced bed mobility with SBA, functional transfers with SBA, and functional mobility around room with SBA. Pt required verbal cues for proper RW management during turns. Pt practiced grooming in standing at sink with SBA and upper body bathing in standing at sink with SBA.  Pt left seated in chair with PCT and with call bell in reach. Patient is making good progress towards goals. Will continue to follow. This section established at most recent assessment PROBLEM LIST (Impairments causing functional limitations): 1. Decreased Strength 2. Decreased ADL/Functional Activities 3. Decreased Transfer Abilities 4. Decreased Balance 5. Increased Pain 6. Decreased Activity Tolerance INTERVENTIONS PLANNED: (Benefits and precautions of occupational therapy have been discussed with the patient.) 1. Activities of daily living training 2. Adaptive equipment training 3. Balance training 4. Clothing management 5. Donning&doffing training 6. Therapeutic activity 7. Therapeutic exercise TREATMENT PLAN: Frequency/Duration: Follow patient 6 times/ week to address above goals. Rehabilitation Potential For Stated Goals: Excellent RECOMMENDED REHABILITATION/EQUIPMENT: (at time of discharge pending progress): Due to the probability of continued deficits (see above) this patient will likely need continued skilled occupational therapy after discharge. Equipment:  
? None at this time OCCUPATIONAL PROFILE AND HISTORY:  
History of Present Injury/Illness (Reason for Referral): 
See H&P Past Medical History/Comorbidities:  
Mr. Julio Maurice  has a past medical history of Arthritis, Biceps muscle tear, Chronic pain, Insomnia, MRSA (methicillin resistant Staphylococcus aureus) infection, Personal history of kidney stones, and Right shoulder pain. Mr. Julio Maurice  has a past surgical history that includes hx other surgical; hx rotator cuff repair (Left, 2010); hx orthopaedic (Left, 01/2017); hx septoplasty (11/15/2017); HIP ARTHROPLASTY TOTAL REVISION (Left, 11/6/2018);  SHOULDER I&D (Right, 11/6/2018); RIGHT SHOULDER ARTHROSCOPY SUBACROMIAL DECOMPRESSION ROTATOR CUFF REPAIR/ DISTAL CLAVICLE RESECTION (Right, 6/13/2017); LEFT TOTAL HIP ARTHROPLASTY (Left, 1/6/2017); and SHOULDER ARTHROSCOPY ACROMIOPLASTY ROTATOR CUFF REPAIR (Left, 10/21/2010). Social History/Living Environment:  
Home Environment: Private residence # Steps to Enter: 2 Rails to Enter: Yes Hand Rails : Left One/Two Story Residence: One story Living Alone: No 
Support Systems: Spouse/Significant Other/Partner Patient Expects to be Discharged to[de-identified] Private residence Current DME Used/Available at Home: Walker, rolling, Raised toilet seat, Grab bars, Adaptive bathing aides, Adaptive dressing aides Tub or Shower Type: Tub/Shower combination Prior Level of Function/Work/Activity: 
Pt lives with his wife and was fully independent at baseline, works a physical job. Pt has all necessary DME/ AE from initial JEIMY. Number of Personal Factors/Comorbidities that affect the Plan of Care: Brief history (0):  LOW COMPLEXITY ASSESSMENT OF OCCUPATIONAL PERFORMANCE[de-identified]  
Activities of Daily Living:  
Basic ADLs (From Assessment) Complex ADLs (From Assessment) Feeding: Setup Oral Facial Hygiene/Grooming: Contact guard assistance Bathing: Minimum assistance Upper Body Dressing: Minimum assistance Lower Body Dressing: Contact guard assistance Toileting: Minimum assistance Instrumental ADL Meal Preparation: Moderate assistance Homemaking: Maximum assistance Medication Management: Setup Financial Management: Independent Grooming/Bathing/Dressing Activities of Daily Living Grooming Grooming Assistance: Stand-by assistance Washing Face: Stand-by assistance Brushing Teeth: Stand-by assistance Cognitive Retraining Safety/Judgement: Fall prevention Upper Body Bathing Bathing Assistance: Stand-by assistance Position Performed: Standing Bed/Mat Mobility Supine to Sit: Stand-by assistance Sit to Stand: Stand-by assistance Bed to Chair: Stand-by assistance Scooting: Stand-by assistance Most Recent Physical Functioning:  
Gross Assessment: 
  
         
  
Posture: Posture (WDL): Exceptions to [facility] Posture Assessment: Forward head Balance: 
Sitting: Intact Standing: Intact; With support(rolling walker) Standing - Static: Good Standing - Dynamic : Good Bed Mobility: 
Supine to Sit: Stand-by assistance Scooting: Stand-by assistance Wheelchair Mobility: 
  
Transfers: 
Sit to Stand: Stand-by assistance Stand to Sit: Supervision Bed to Chair: Stand-by assistance Patient Vitals for the past 6 hrs: 
 BP BP Patient Position SpO2 Pulse 11/19/18 1027 152/68   (!) 56  
11/19/18 1044 151/76   63  
11/19/18 1157 170/80 At rest 93 % 60 Mental Status Neurologic State: Alert Orientation Level: Oriented X4 Cognition: Follows commands Perception: Appears intact Perseveration: No perseveration noted Safety/Judgement: Fall prevention Physical Skills Involved: 1. Range of Motion 2. Balance 3. Strength 4. Activity Tolerance 5. Pain (acute) Cognitive Skills Affected (resulting in the inability to perform in a timely and safe manner): 1. none Psychosocial Skills Affected: 1. Habits/Routines Number of elements that affect the Plan of Care: 3-5:  MODERATE COMPLEXITY CLINICAL DECISION MAKING:  
[facility] AM-PAC 6 Clicks Daily Activity Inpatient Short Form How much help from another person does the patient currently need. .. Total A Lot A Little None 1. Putting on and taking off regular lower body clothing? [] 1   [] 2   [x] 3   [] 4  
2. Bathing (including washing, rinsing, drying)? [] 1   [] 2   [x] 3   [] 4  
3. Toileting, which includes using toilet, bedpan or urinal?   [] 1   [] 2   [x] 3   [] 4  
4. Putting on and taking off regular upper body clothing? [] 1   [] 2   [x] 3   [] 4  
5. Taking care of personal grooming such as brushing teeth? [] 1   [] 2   [] 3   [x] 4  
6. Eating meals? [] 1   [] 2   [] 3   [x] 4  
© 2007, Trustees of [facility], under license to [employer].  All rights reserved Score:  Initial: 20 Most Recent: X (Date: -- ) Interpretation of Tool:  Represents activities that are increasingly more difficult (i.e. Bed mobility, Transfers, Gait). Score 24 23 22-20 19-15 14-10 9-7 6 Modifier CH CI CJ CK CL CM CN   
 
? Self Care:  
  - CURRENT STATUS: CJ - 20%-39% impaired, limited or restricted  - GOAL STATUS: CI - 1%-19% impaired, limited or restricted  - D/C STATUS:  ---------------To be determined--------------- Payor: Delaney Chance / Plan: SIMON ALBERTOP / Product Type: Commerical /   
 
Medical Necessity:    
· Patient demonstrates excellent rehab potential due to higher previous functional level. Reason for Services/Other Comments: 
· Patient continues to require skilled intervention due to decreased ADLs and functional performance from baseline. Use of outcome tool(s) and clinical judgement create a POC that gives a: LOW COMPLEXITY  
 
 
 
TREATMENT:  
(In addition to Assessment/Re-Assessment sessions the following treatments were rendered) Pre-treatment Symptoms/Complaints:   
Pain: Initial:  
Pain Intensity 1: 0  Post Session:  same Self Care: (15 minutes): Procedure(s) (per grid) utilized to improve and/or restore self-care/home management as related to bathing and grooming. Required stand by assistance cueing to facilitate activities of daily living skills and compensatory activities. Pt practiced face washing, tooth brushing, and upper body bathing in standing at sink with stand by assistance. Therapeutic Activity: (   17 minutes): Therapeutic activities including Bed transfers, Chair transfers, Ambulation on level ground to improve mobility, strength and balance. Required stand by assistance   to promote static and dynamic balance in standing. Pt practiced bed mobility with stand by assist, functional transfers with stand by assist, and functional mobility around room with RW and stand by assistance.  Pt required verbal cueing for proper RW management during turns. Braces/Orthotics/Lines/Etc:  
· O2 Device: Room air Treatment/Session Assessment:   
· Response to Treatment:  Tolerated well · Interdisciplinary Collaboration:  
o Occupational Therapist 
o Registered Nurse 
o Certified Nursing Assistant/Patient Care Technician · After treatment position/precautions:  
o Up in chair 
o Bed/Chair-wheels locked 
o Call light within reach 
o with PCT for vitals · Compliance with Program/Exercises: Will assess as treatment progresses. · Recommendations/Intent for next treatment session: \"Next visit will focus on advancements to more challenging activities and reduction in assistance provided\". Total Treatment Duration: OT Patient Time In/Time Out Time In: 1509 Time Out: 6154 Gila Osuna OTR/L

## 2018-11-19 NOTE — PROGRESS NOTES
Request for outpt HD slot faxed to [address] Renal admission [phone], will await response central admissions number [phone]

## 2018-11-20 VITALS
DIASTOLIC BLOOD PRESSURE: 73 MMHG | OXYGEN SATURATION: 95 % | SYSTOLIC BLOOD PRESSURE: 153 MMHG | HEART RATE: 54 BPM | BODY MASS INDEX: 31.54 KG/M2 | RESPIRATION RATE: 18 BRPM | TEMPERATURE: 98.5 F | WEIGHT: 201.4 LBS

## 2018-11-20 LAB
ANION GAP SERPL CALC-SCNC: 8 MMOL/L (ref 7–16)
BUN SERPL-MCNC: 39 MG/DL (ref 6–23)
CALCIUM SERPL-MCNC: 8.8 MG/DL (ref 8.3–10.4)
CHLORIDE SERPL-SCNC: 102 MMOL/L (ref 98–107)
CO2 SERPL-SCNC: 29 MMOL/L (ref 21–32)
CREAT SERPL-MCNC: 7.82 MG/DL (ref 0.8–1.5)
GLUCOSE SERPL-MCNC: 109 MG/DL (ref 65–100)
POTASSIUM SERPL-SCNC: 4.2 MMOL/L (ref 3.5–5.1)
SODIUM SERPL-SCNC: 139 MMOL/L (ref 136–145)

## 2018-11-20 PROCEDURE — 74011250637 HC RX REV CODE- 250/637: Performed by: INTERNAL MEDICINE

## 2018-11-20 PROCEDURE — 74011250636 HC RX REV CODE- 250/636: Performed by: INTERNAL MEDICINE

## 2018-11-20 PROCEDURE — 77030008031

## 2018-11-20 PROCEDURE — 36415 COLL VENOUS BLD VENIPUNCTURE: CPT

## 2018-11-20 PROCEDURE — 94760 N-INVAS EAR/PLS OXIMETRY 1: CPT

## 2018-11-20 PROCEDURE — 97110 THERAPEUTIC EXERCISES: CPT

## 2018-11-20 PROCEDURE — 97530 THERAPEUTIC ACTIVITIES: CPT

## 2018-11-20 PROCEDURE — 80048 BASIC METABOLIC PNL TOTAL CA: CPT

## 2018-11-20 RX ORDER — AMLODIPINE BESYLATE 5 MG/1
5 TABLET ORAL DAILY
Qty: 30 TAB | Refills: 0 | Status: SHIPPED | OUTPATIENT
Start: 2018-11-21 | End: 2018-12-26 | Stop reason: SDUPTHER

## 2018-11-20 RX ORDER — CLONIDINE HYDROCHLORIDE 0.2 MG/1
0.2 TABLET ORAL 2 TIMES DAILY
Qty: 60 TAB | Refills: 0 | Status: SHIPPED | OUTPATIENT
Start: 2018-11-20 | End: 2018-12-11 | Stop reason: DRUGHIGH

## 2018-11-20 RX ORDER — METOPROLOL TARTRATE 50 MG/1
25 TABLET ORAL EVERY 12 HOURS
Qty: 60 TAB | Refills: 0 | Status: SHIPPED | OUTPATIENT
Start: 2018-11-20 | End: 2018-12-26 | Stop reason: SINTOL

## 2018-11-20 RX ADMIN — FAMOTIDINE 20 MG: 20 TABLET ORAL at 09:43

## 2018-11-20 RX ADMIN — OXYCODONE HYDROCHLORIDE 10 MG: 5 TABLET ORAL at 15:42

## 2018-11-20 RX ADMIN — SENNOSIDES AND DOCUSATE SODIUM 2 TABLET: 8.6; 5 TABLET ORAL at 09:43

## 2018-11-20 RX ADMIN — Medication 1 AMPULE: at 09:42

## 2018-11-20 RX ADMIN — OXYCODONE HYDROCHLORIDE 10 MG: 5 TABLET ORAL at 09:48

## 2018-11-20 RX ADMIN — Medication 5 ML: at 15:43

## 2018-11-20 RX ADMIN — AMLODIPINE BESYLATE 5 MG: 5 TABLET ORAL at 09:43

## 2018-11-20 RX ADMIN — CLONIDINE HYDROCHLORIDE 0.2 MG: 0.2 TABLET ORAL at 09:43

## 2018-11-20 RX ADMIN — HEPARIN SODIUM 5000 UNITS: 5000 INJECTION INTRAVENOUS; SUBCUTANEOUS at 09:43

## 2018-11-20 NOTE — PROGRESS NOTES
Received pts seat at [facility] for HD slot, will have dialysis on M/W/F, per [facility] pt coordinator she will contact pt with appointment time

## 2018-11-20 NOTE — PROGRESS NOTES
Pt's D/C instructions completed. Verbalized understanding of all instructions including diet, activity, s/sx to alert MD, medications, wound care, and f/u appointment. Family at [facility].

## 2018-11-20 NOTE — DISCHARGE INSTRUCTIONS
DISCHARGE SUMMARY from Nurse    PATIENT INSTRUCTIONS:    After general anesthesia or intravenous sedation, for 24 hours or while taking prescription Narcotics:  · Limit your activities  · Do not drive and operate hazardous machinery  · Do not make important personal or business decisions  · Do  not drink alcoholic beverages  · If you have not urinated within 8 hours after discharge, please contact your surgeon on call. Report the following to your surgeon:  · Excessive pain, swelling, redness or odor of or around the surgical area  · Temperature over 100.5  · Nausea and vomiting lasting longer than 4 hours or if unable to take medications  · Any signs of decreased circulation or nerve impairment to extremity: change in color, persistent  numbness, tingling, coldness or increase pain  · Any questions    What to do at Home:  Recommended activity: Activity as tolerated, per MD instructions    If you experience any of the following symptoms fever > 100.5, nausea, vomiting, pain without control of medications, chest pain and/or shortness of breath to ER please follow up with MD.    *  Please give a list of your current medications to your Primary Care Provider. *  Please update this list whenever your medications are discontinued, doses are      changed, or new medications (including over-the-counter products) are added. *  Please carry medication information at all times in case of emergency situations. These are general instructions for a healthy lifestyle:    No smoking/ No tobacco products/ Avoid exposure to second hand smoke  Surgeon General's Warning:  Quitting smoking now greatly reduces serious risk to your health.     Obesity, smoking, and sedentary lifestyle greatly increases your risk for illness    A healthy diet, regular physical exercise & weight monitoring are important for maintaining a healthy lifestyle    You may be retaining fluid if you have a history of heart failure or if you experience any of the following symptoms:  Weight gain of 3 pounds or more overnight or 5 pounds in a week, increased swelling in our hands or feet or shortness of breath while lying flat in bed. Please call your doctor as soon as you notice any of these symptoms; do not wait until your next office visit. Recognize signs and symptoms of STROKE:    F-face looks uneven    A-arms unable to move or move unevenly    S-speech slurred or non-existent    T-time-call 911 as soon as signs and symptoms begin-DO NOT go       Back to bed or wait to see if you get better-TIME IS BRAIN. Warning Signs of HEART ATTACK     Call 911 if you have these symptoms:   Chest discomfort. Most heart attacks involve discomfort in the center of the chest that lasts more than a few minutes, or that goes away and comes back. It can feel like uncomfortable pressure, squeezing, fullness, or pain.  Discomfort in other areas of the upper body. Symptoms can include pain or discomfort in one or both arms, the back, neck, jaw, or stomach.  Shortness of breath with or without chest discomfort.  Other signs may include breaking out in a cold sweat, nausea, or lightheadedness. Don't wait more than five minutes to call 911 - MINUTES MATTER! Fast action can save your life. Calling 911 is almost always the fastest way to get lifesaving treatment. Emergency Medical Services staff can begin treatment when they arrive -- up to an hour sooner than if someone gets to the hospital by car. The discharge information has been reviewed with the patient. The patient verbalized understanding. Discharge medications reviewed with the patient and appropriate educational materials and side effects teaching were provided. ___________________________________________________________________________________________________________________________________             Sepsis: Care Instructions  Your Care Instructions    Sepsis is an intense reaction to an infection.  It can cause deadly damage to the body and lead to a dangerously low blood pressure. You may have inflammation across large areas of your body. It can damage tissue and even go deep into your organs. Infections that can lead to sepsis include:  · A skin infection such as from a cut. · A lung infection like pneumonia. · A kidney infection. · A gut infection such as E. coli. It's important to care for yourself and try to avoid infections so that you don't get sepsis again. Follow-up care is a key part of your treatment and safety. Be sure to make and go to all appointments, and call your doctor if you are having problems. It's also a good idea to know your test results and keep a list of the medicines you take. How can you care for yourself at home? · If your doctor prescribed antibiotics, take them as directed. Do not stop taking them just because you feel better. You need to take the full course of antibiotics. · Help prevent infections that could lead to sepsis:  ? Try to avoid colds and flu. If you must be around people who have a cold or the flu, wash your hands often. And get a flu vaccine every year. ? Get a pneumococcal vaccine shot (to prevent pneumonia, meningitis, and other infections). If you have had one before, ask your doctor if you need another dose. ? Clean any wounds or scrapes. · Do not smoke or use other tobacco products. When you quit smoking, you are less likely to get a cold, the flu, bronchitis, and pneumonia. If you need help quitting, talk to your doctor about stop-smoking programs and medicines. These can increase your chances of quitting for good. · To prevent dehydration, drink plenty of fluids. Choose water and other caffeine-free clear liquids until you feel better. If you have kidney, heart, or liver disease and have to limit fluids, talk with your doctor before you increase the amount of fluids you drink. · Eat a healthy diet.  Include fruits, vegetables, and whole grains in your diet every day. · If your doctor recommends it, try doing some physical activity. Walking is a good choice. Bit by bit, increase the amount you walk every day. When should you call for help? [id number] anytime you think you may need emergency care. For example, call if:    · You passed out (lost consciousness).    Call your doctor now or seek immediate medical care if:    · You have symptoms of sepsis. These may include:  ? Shortness of breath. ? A fast heart rate. ? Cool, pale, or clammy skin. ? Feeling confused.     · You are dizzy or lightheaded, or you feel like you may faint.     · You have a fever or chills.    Watch closely for changes in your health, and be sure to contact your doctor if:    · You do not get better as expected. Where can you learn more? Go to http://venkatesh-carmelo.info/. Enter S822 in the search box to learn more about \"Sepsis: Care Instructions. \"  Current as of: November 20, 2017  Content Version: 11.8  © 8570-1913 [employer]. Care instructions adapted under license by [employer] (which disclaims liability or warranty for this information). If you have questions about a medical condition or this instruction, always ask your healthcare professional. [address] any warranty or liability for your use of this information.

## 2018-11-20 NOTE — PROGRESS NOTES
Hospitalist Progress Note Subjective:  
Daily Progress Note: 11/19/2018 1202 Patient presented to ER at [facility] 11/7 with right shoulder pain, malaise, flu like symptoms, po HSV x 2-3 weeks. WBC on arrival: 27.1, ESR: 90. Was given cortisone injection in right shoulder joint, 24 hours later began left hip pain. History of left JEIMY 1/17 and right RCR 6/17. Hip was aspirated 10/29 in Ortho clinic with MSSA growth. Admitted by Ortho on 11/5 for hip revision and I+D of shoulder. ID and hospitalist consulted on 11/6. Blood cultures at that time grew MSSA. As of 11/4-8, blood cultures negative. TTE negative. Then developed RANI due to ATN with hyperkalemia. Creatinine was 1.06 in December 2016, 1.32 on 11/4, up to 1.72 on 11/6, and 4.64 on 11/7. On 11/6, underwent left JEIMY revision. Rifampin added. Transferred here to ICU 11/7 for Nephrology care. Nafcillin discontinued per ID, cefazolin with renal dosing begun, wll need six weeks of treatment once blood cultures clear, followed by a long course of po suppression. Currently Nephro, Ortho and ID following. See ID note for abx given throughout hospitalization. 11/8, HD access placed in IR. Holding rifampin, cefazolin stopped. Beginning vancomycin alone. 11/9:  First dialysis. Complaining of periumbilical pain radiating to back and right flank x 3 days. 11/18:  Dialysis yesterday. BUN/Creatinine: 47/11.30. Hgb down to 7.0. Spoke with Dr Lucero Burns, will transfuse 2 units PRBC. Dr Paula Carmona in. Dr Lucero Burns, Nephrology in, attempting to secure HD slot OP. Will give Vancomycin at HD.  
  
11/19:  Does not want to go to rehab, home with [facility] and OP dialysis, will get Vancomycin at dialysis. Anxious to go home. Waiting for HD placement. Pain fairly well controlled. Working with PT. No additional complaints. Reports voiding more today. ADDITIONAL HISTORY:  History of possible MRSA.   Patient states he believes this to be related to spider bite 2007. OA, biceps tear, insomnia, kidney stones 
  
Current Facility-Administered Medications Medication Dose Route Frequency  vancomycin (VANCOCIN) 500 mg in 0.9% sodium chloride 100 mL IVPB give with dialysis  500 mg IntraVENous Once per day on Mon Wed  [START ON 11/23/2018] vancomycin (VANCOCIN) 750 mg in  ml infusion give with dialysis  750 mg IntraVENous Once per day on Fri  
 0.9% sodium chloride infusion 250 mL  250 mL IntraVENous PRN  
 0.9% sodium chloride infusion  25 mL/hr IntraVENous CONTINUOUS  
 heparin (PF) 2 units/ml in NS infusion 2,000 Units  1,000 mL Irrigation CONTINUOUS  
 midazolam (VERSED) injection 0.5-2 mg  0.5-2 mg IntraVENous Multiple  fentaNYL citrate (PF) injection 25-50 mcg  25-50 mcg IntraVENous Multiple  heparin (porcine) 1,000 unit/mL injection 5,000 Units  5,000 Units Hemodialysis DIALYSIS PRN  
 amLODIPine (NORVASC) tablet 5 mg  5 mg Oral DAILY  metoprolol tartrate (LOPRESSOR) tablet 50 mg  50 mg Oral Q12H  
 acetaminophen (TYLENOL) tablet 1,000 mg  1,000 mg Oral Q6H PRN  
 cloNIDine HCl (CATAPRES) tablet 0.2 mg  0.2 mg Oral BID  hydrALAZINE (APRESOLINE) 20 mg/mL injection 10 mg  10 mg IntraVENous Q6H PRN  
 simethicone (MYLICON) tablet 80 mg  80 mg Oral QID PRN  polyethylene glycol (MIRALAX) packet 17 g  17 g Oral DAILY PRN  
 heparin (porcine) injection 5,000 Units  5,000 Units SubCUTAneous Q12H  
 epoetin zee (EPOGEN;PROCRIT) injection 20,000 Units  20,000 Units SubCUTAneous Q7D  
 famotidine (PEPCID) tablet 20 mg  20 mg Oral DAILY  diphenhydrAMINE (BENADRYL) capsule 25 mg  25 mg Oral Q4H PRN  
 HYDROmorphone (PF) (DILAUDID) injection 1 mg  1 mg IntraVENous Q3H PRN  
 naloxone (NARCAN) injection 0.2-0.4 mg  0.2-0.4 mg IntraVENous Q10MIN PRN  
 oxyCODONE IR (ROXICODONE) tablet 10 mg  10 mg Oral Q3H PRN  
 senna-docusate (PERICOLACE) 8.6-50 mg per tablet 2 Tab  2 Tab Oral DAILY  sodium chloride (NS) flush 5-10 mL  5-10 mL IntraVENous Q8H  
 sodium chloride (NS) flush 5-10 mL  5-10 mL IntraVENous PRN  
 zolpidem (AMBIEN) tablet 5 mg  5 mg Oral QHS PRN  
 alcohol 62% (NOZIN) nasal  1 Ampule  1 Ampule Topical Q12H  
 ondansetron (ZOFRAN) injection 4 mg  4 mg IntraVENous Q4H PRN  
 NUTRITIONAL SUPPORT ELECTROLYTE PRN ORDERS   Does Not Apply PRN Review of Systems A comprehensive review of systems was negative except for that written in the HPI. Objective:  
 
Visit Vitals /79 (BP 1 Location: Left arm, BP Patient Position: At rest) Pulse (!) 55 Temp 99.3 °F (37.4 °C) Resp 18 Wt 95.3 kg (210 lb 1.6 oz) SpO2 96% BMI 32.91 kg/m² O2 Flow Rate (L/min): 2 l/min O2 Device: Room air Temp (24hrs), Av.8 °F (37.1 °C), Min:98.3 °F (36.8 °C), Max:99.3 °F (37.4 °C) 
 
1901 -  0700 In: -  
Out: 3415 [[id number]]  07 -  190 In: 1283.3 [P.O.:600] Out: 4134 Antony Mater General appearance: Oriented and alert, cooperative, pain controlled. Head: Normocephalic, without obvious abnormality, atraumatic Throat: Lips, mucosa, and tongue normal. Teeth and gums normal 
Neck: supple, symmetrical, trachea midline, and no JVD Lungs: clear to auscultation bilaterally. HD access site intact without signs of infection. Heart: regular rate and rhythm, S1, S2 normal, no murmur, click, rub or gallop Abdomen: soft, non-tender. Bowel sounds normal. No masses,  no organomegaly. Voiding more today. Extremities: Right shoulder and left hip wounds healing without signs of infection. Up with PT. All other extremities normal, atraumatic, no cyanosis or edema Skin: Skin color, texture, turgor normal. No rashes or lesions Neurologic: Grossly normal 
  
Additional comments: Notes,orders, test results, vitals reviewed Data Review :  RENALULTRASOUND:  Unremarkable examination. 
  
:  CXR:  Normal CXR 
  
 11/9:  KUB: Unremarkable supine view the abdomen 
  
11/10:  ABDOMINAL ULTRASOUND: Mild hepatomegaly with no focal liver lesion. Borderline dilatation of the common bile duct without intrahepatic biliary ductal dilatation or cholelithiasis is of uncertain etiology and clinical 
significance 
  
11/1O:  CT ABDOMEN:  SCATTERED COLONIC DIVERTICULOSIS WITH NONVISUALIZATION THE APPENDIX BUT NO SECONDARY SIGNS OF APPENDICITIS, DIVERTICULITIS, OR OTHER ACUTE ABDOMINOPELVIC ABNORMALITY IDENTIFIED. SMALL BILATERAL PLEURAL EFFUSIONS AND BIBASILAR ATELECTASIS. MILD ANASARCA Assessment/Plan:  
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 Nephrology on board with HD, continue until                               recovery Monitor renal function Pharmacy dosing vancomycin Sepsis due to MSSA bacteremia with  infected shoulder                  and hip JEIMY Continue vancomycin x 6 weeks with EOT: 12/19 Postoperative anemia most likely due to acute blood loss Heme stool due to diverticulosis findings also Monitor labs Transfused with 2 units PRBC 11/18 HTN. Started on clonidine, increased Metoprolol. Still                   suboptimal. Added norvasc.  
   
High risk pt. DVT ppx:  hsq 
  
Discharge with HD slot and HH in place when ok with consultants Care Plan discussed with: Patient, CM, care team and Nurse Signed By: Olya Cody NP November 19, 2018

## 2018-11-20 NOTE — PROGRESS NOTES
[facility] PROGRESS NOTE Follow up for:  RANI Subjective:  
Patient seen and examined. No new complaints. No sob, cp, n/v. UOP increasing ~ 2175 ml last 24 hr.  
 
ROS: 
Gen - no fever, no chills, appetite okay CV - no chest pain, no orthopnea Lung - no shortness of breath, no cough Abd - no tenderness, no nausea, no vomiting Ext - no edema Objective:  
Exam: 
Vitals:  
 11/19/18 1942 11/20/18 0029 11/20/18 0421 11/20/18 6617 BP: 142/75 148/79 157/79 161/72 Pulse: (!) 56 (!) 56 (!) 55 (!) 58 Resp: 20 18 18 19 Temp: 98.3 °F (36.8 °C) 99.2 °F (37.3 °C) 99.3 °F (37.4 °C) 99.6 °F (37.6 °C) SpO2: 95% 96% 96% 97% Weight:    91.4 kg (201 lb 6.4 oz) Intake/Output Summary (Last 24 hours) at 11/20/2018 8175 Last data filed at 11/20/2018 0140 Gross per 24 hour Intake 360 ml Output 2975 ml Net -2615 ml  
 
 
Current Facility-Administered Medications Medication Dose Route Frequency  [START ON 11/21/2018] Vancomycin Random Level Reminder   Other ONCE  
 vancomycin (VANCOCIN) 500 mg in 0.9% sodium chloride 100 mL IVPB give with dialysis  500 mg IntraVENous Once per day on Mon Wed  [START ON 11/23/2018] vancomycin (VANCOCIN) 750 mg in  ml infusion give with dialysis  750 mg IntraVENous Once per day on Fri  
 0.9% sodium chloride infusion 250 mL  250 mL IntraVENous PRN  
 0.9% sodium chloride infusion  25 mL/hr IntraVENous CONTINUOUS  
 heparin (PF) 2 units/ml in NS infusion 2,000 Units  1,000 mL Irrigation CONTINUOUS  
 midazolam (VERSED) injection 0.5-2 mg  0.5-2 mg IntraVENous Multiple  fentaNYL citrate (PF) injection 25-50 mcg  25-50 mcg IntraVENous Multiple  heparin (porcine) 1,000 unit/mL injection 5,000 Units  5,000 Units Hemodialysis DIALYSIS PRN  
 amLODIPine (NORVASC) tablet 5 mg  5 mg Oral DAILY  metoprolol tartrate (LOPRESSOR) tablet 50 mg  50 mg Oral Q12H  
 acetaminophen (TYLENOL) tablet 1,000 mg  1,000 mg Oral Q6H PRN  
  cloNIDine HCl (CATAPRES) tablet 0.2 mg  0.2 mg Oral BID  hydrALAZINE (APRESOLINE) 20 mg/mL injection 10 mg  10 mg IntraVENous Q6H PRN  
 simethicone (MYLICON) tablet 80 mg  80 mg Oral QID PRN  polyethylene glycol (MIRALAX) packet 17 g  17 g Oral DAILY PRN  
 heparin (porcine) injection 5,000 Units  5,000 Units SubCUTAneous Q12H  
 epoetin zee (EPOGEN;PROCRIT) injection 20,000 Units  20,000 Units SubCUTAneous Q7D  
 famotidine (PEPCID) tablet 20 mg  20 mg Oral DAILY  diphenhydrAMINE (BENADRYL) capsule 25 mg  25 mg Oral Q4H PRN  
 HYDROmorphone (PF) (DILAUDID) injection 1 mg  1 mg IntraVENous Q3H PRN  
 naloxone (NARCAN) injection 0.2-0.4 mg  0.2-0.4 mg IntraVENous Q10MIN PRN  
 oxyCODONE IR (ROXICODONE) tablet 10 mg  10 mg Oral Q3H PRN  
 senna-docusate (PERICOLACE) 8.6-50 mg per tablet 2 Tab  2 Tab Oral DAILY  sodium chloride (NS) flush 5-10 mL  5-10 mL IntraVENous Q8H  
 sodium chloride (NS) flush 5-10 mL  5-10 mL IntraVENous PRN  
 zolpidem (AMBIEN) tablet 5 mg  5 mg Oral QHS PRN  
 alcohol 62% (NOZIN) nasal  1 Ampule  1 Ampule Topical Q12H  
 ondansetron (ZOFRAN) injection 4 mg  4 mg IntraVENous Q4H PRN  
 NUTRITIONAL SUPPORT ELECTROLYTE PRN ORDERS   Does Not Apply PRN  
 
 
EXAM 
GEN - Alert, oriented, in no distress CV - S1, S2, RRR, no rub, murmur, or gallop Lung - clear to auscultation bilaterally Abd - soft, nontender, BS present Ext - trace edema Right IJ PC 
 
Recent Labs 11/19/18 
0522 11/18/18 1953 11/18/18 
0600 WBC 9.7  --   --   
HGB 9.2* 8.9* 7.0*  
HCT 29.2* 28.3*  --   
*  --   --   
  
 
Recent Labs 11/19/18 
0522 11/18/18 
0600  138  
K 4.8 4.9  101 CO2 29 29 BUN 52* 47* CREA 11.20* 11.30* CA 8.7 8.3 GLU 89 95  
MG 2.2  --   
PHOS 7.5*  --   
 
 
Assessment and Plan:  
 
1) RANI - HD dependent 
- UOP picking up. ? Starting to see some recovery. Following labs and uop closely - Outpatient slot locally pending - [facility] slot requested and antibiotics arranged 
- likely HD again in am depending on labs 2) MSSA Bacteremia  
- late onset left JEIMY infection. S/p polyethylene exchange 11/5 
- right shoulder septic arthritis - ID following - Plan Vanc for 6 weeks, EOT 12/19/18. Antibiotics have been arranged at [facility] dialysis - If his kidney function recovers before his antibiotics are completed then a plan will have to be worked out with ID for a \"post dialysis\" treatment plan 3) Anemia - Hb stable - s/p prbc 11/18 4) HTN 
- clonidine, metoprolol, norvasc Reida Pramod, ACNP

## 2018-11-20 NOTE — PROGRESS NOTES
Problem: Falls - Risk of 
Goal: *Absence of Falls Document Krystin Brantley Fall Risk and appropriate interventions in the flowsheet. Outcome: Progressing Towards Goal 
Fall Risk Interventions: 
Mobility Interventions: Bed/chair exit alarm, Communicate number of staff needed for ambulation/transfer, OT consult for ADLs, Patient to call before getting OOB, PT Consult for mobility concerns, PT Consult for assist device competence Mentation Interventions: Bed/chair exit alarm, Door open when patient unattended, Toileting rounds, Update white board Medication Interventions: Bed/chair exit alarm, Evaluate medications/consider consulting pharmacy, Patient to call before getting OOB, Teach patient to arise slowly Elimination Interventions: Call light in reach, Elevated toilet seat, Patient to call for help with toileting needs, Toilet paper/wipes in reach, Toileting schedule/hourly rounds

## 2018-11-20 NOTE — DISCHARGE SUMMARY
Hospitalist Discharge Summary     Admit Date:  2018  9:42 AM   Name:  Shoshana Lainez   Age:  48 y.o.  :  1965   MRN:  [id number]   PCP:  Ernie Marin MD  Treatment Team: Attending Provider: Jannette Sacnhez DO; Consulting Provider: Nadir Bashir MD; Consulting Provider: Guillermina Fuentes MD; Consulting Provider: Arabella Kan MD; Utilization Review: Ashleigh Messer RN; Care Manager: Marcia García RN; Charge Nurse: Gayathri Smith    Problem List for this Hospitalization:  Hospital Problems as of 2018 Date Reviewed: 2018          Codes Class Noted - Resolved POA    Sepsis ([facility] 75.) ICD-10-CM: A41.9  ICD-9-CM: 038.9, 995.91  2018 - Present Yes        Postoperative anemia due to acute blood loss ICD-10-CM: D62  ICD-9-CM: 285.1  2018 - Present Yes        * (Principal) Staphylococcal arthritis of right shoulder ([facility] ICD-10-CM: M00.011  ICD-9-CM: 711.01, 041.10  2018 - Present Yes        Acute renal failure with tubular necrosis ([facility] ICD-10-CM: N17.0  ICD-9-CM: 584.5  2018 - Present Yes        Infected prosthetic hip ([facility] ICD-10-CM: T84.59XA, Z96.649  ICD-9-CM: 996.66, V43.64  2018 - Present Yes        Septic arthritis of shoulder, right ([facility] ICD-10-CM: M00.9  ICD-9-CM: 711.01  2018 - Present Yes        Degenerative joint disease of right acromioclavicular joint ICD-10-CM: M19.011  ICD-9-CM: 715.91  2018 - Present Yes        Osteoarthritis of right glenohumeral joint ICD-10-CM: M19.011  ICD-9-CM: 715.91  2018 - Present Yes        Complete tear of right rotator cuff ICD-10-CM: M75.121  ICD-9-CM: 727.61  2018 - Present Yes                Admission HPI from 2018:      Patient is a 52 y/o [address] history of L JEIMY 2017 and R rotator cuff repair 2017.  Two weeks ago PTA developed malaise, flu like symptoms, fever blisters, R shoulder pain.  Had R shoulder cortisone injection given.  Next day began having pain L hip.  Had hip aspirated 10/29 which grew MSSA.  admitted by ortho on 11/5 for hip revision. ID and hospitalist consulted. Had worsening RANI on 11/6 with Cr of 1.7. Pt taken to OR that afternoon and postop Cr was 2.22, K 5.5. This morning Cr 4.5 and K 6. UOP has been very low despite being put on continuous IVF yesterday morning and given a bolus last night. Gong in place.     Pt denies any symptoms other than low UOP for 4 days. No abd/flank pain. No edema. One kidney stone in past but no complications from it. Denies trauma, strenuous workout recently. Doesn't take any meds at home except vitamins and supplements, and three 5-hour Energy drinks daily. Denies any kind of injection drug use. No CP, SOB, palpitations. He appears well. Hospital Course:    Pt is a 49 yo male with pmh L JEIMY 01/2017 and right rotator cuff repair June 2017. Two weeks PTA pt developed malaise and right shoulder pain then left hip pain. 10/29 he had his hip aspirated which great MSSA. He was then admitted by ortho 11/05 for hip revision. 11/06 Pt had worsening RANI, now requiring hemodialysis. ID is following for MSSA bacteremia r/t right shoulder septic arthritis s/p left hip polyethylene exchange. Plans for 6 weeks Vanc eot 12/19/18. Nephrology has sent orders for Vanc to dialysis center. Pt is now stable for d/c home, he will start outpatient dialysis tomorrow. He is to follow up with [facility] in 3 weeks and then ID at end of abx treatment. Nephro will be following as well. Follow up instructions and discharge meds at bottom of this note. Plan was discussed with supervising MD, pt, case management. All questions answered. Patient was stable at time of discharge. Diagnostic Imaging/Tests:   Us Retroperitoneum Comp    Result Date: 11/7/2018  ULTRASOUND OF THE KIDNEYS AND BLADDER CLINICAL HISTORY:  Acute renal failure. COMPARISON:  None.  FINDINGS:  Multiple images from real time ultrasound evaluation of the kidneys show that they are normal in size, orientation, and echogenicity bilaterally. The right kidney measures 10.8 cm in length while the left measures 12.2 cm. No definite solid renal mass, hydronephrosis, stone, or abnormal perinephric collection is seen. The bladder was not well evaluated with a Gong catheter in place. Aorta and IVC are unremarkable as imaged. IMPRESSION:  Unremarkable examination. Ir Insert Non Tunl Cvc Over 5 Yrs    Result Date: 11/8/2018  Title:  Temporary dialysis catheter placement. Ultrasound guidance for vascular access. Indication:  Acute renal failure. Consent: Informed written and oral consent was obtained from the patient after explanation of benefits and risks (including, but not limited to: Infection, hemorrhage, pneumothorax). The patient's questions were answered to their satisfaction. The patient stated understanding and requested that we proceed. Procedure:  Maximal sterile barrier technique (including:  cap, mask, sterile gown, sterile gloves, sterile sheet, hand hygiene, chlorhexidene for antiseptic skin preparation, sterile ultrasound techniques including sterile gel and sterile ultrasound probe cover) was used. Following routine prep and drape of the right neck, a local field block with lidocaine was achieved. Ultrasound evaluation of all potential access sites was performed due to lack of a palpable vein. No veins were palpable due to overlying adipose tissue. Using real-time ultrasound guidance, with appropriate image recording and visualization of vascular needle entry, the patent right internal jugular vein was accessed using micropuncture technique. Using fluoroscopy, a triple lumen dialysis catheter was advanced over the wire positioning the tip in the right atrium. A permanent image was recorded. All 3 lumens aspirated easily and were filled with heparinized saline. The catheter was secured with nonabsorbable suture.   Sterile dressings were applied. Complications: None. Radiation Exposure Indices: Reference Air Kerma (Ka,r) = 23 mGy Dose Area Product/Kerma Area Product (DAP/STEFANY/PKA) = 577 cGy-cm2 Fluoroscopy Exposure Time = 24 seconds Findings:  Patent right internal jugular vein. Catheter tip in the mid right atrium. Impression:  Technically successful temporary dialysis catheter placement. Plan:  Catheter is ready for use. Echocardiogram results:  Results for orders placed or performed during the hospital encounter of 18   2D ECHO COMPLETE ADULT (TTE) W OR WO CONTR    Narrative    [place]  [address] [facility], [address] [facility]  [phone]    Transthoracic Echocardiogram  2D, M-mode, Doppler, and Color Doppler    Patient: Denver Gavia  MR #: [id number]  : 1965  Age: 48 years  Gender: Male  Study date: 2018  Account #: [de-identified]  Height: 66.9 in  Weight: 215.6 lb  BSA: 2.09 mï¾²  Status:Routine  Location: [facility]  BP: 113/ 55    Allergies: NO KNOWN ALLERGENS    Sonographer:  Fermin Rivero RDCS  Group:  [facility]  Referring Physician:  Leandra Nunez NP  Reading Physician:  Kristal Salomon MD [facility]    INDICATIONS: MSSA Bacteremia; New onset Murmur. *Unable to turn patient completely onto left side due to hip revision surgery  on 18. Some apicals obtained off-axis to better visualize valves. *    PROCEDURE: This was a routine study. A transthoracic echocardiogram was  performed. The study included complete 2D imaging, M-mode, complete spectral  Doppler, and color Doppler. Echocardiographic views were limited by   restricted  patient mobility and poor acoustic window availability. Image quality was  adequate. LEFT VENTRICLE: Size was normal. Systolic function was normal. Ejection  fraction was estimated in the range of 55 % to 60 %. There were no regional  wall motion abnormalities. Wall thickness was normal. The E/e' ratio was 9.6.   Left ventricular diastolic function parameters were normal.    RIGHT VENTRICLE: The size was normal. Systolic function was normal. The  tricuspid jet envelope definition was inadequate for estimation of RV   systolic  pressure. LEFT ATRIUM: Size was normal.    RIGHT ATRIUM: Size was normal.    SYSTEMIC VEINS: IVC: The inferior vena cava was normal in size and course. AORTIC VALVE: The valve was probably trileaflet. Leaflets exhibited mild  sclerosis. There was no evidence for vegetation. There was no evidence for  stenosis. There was no insufficiency. MITRAL VALVE: There was mild calcification. There was no evidence for  vegetation. There was no evidence for stenosis. There was mild regurgitation. TRICUSPID VALVE: The valve structure was normal. There was no evidence for  vegetation. There was no evidence for stenosis. There was trivial   regurgitation. PULMONIC VALVE: The valve structure was normal. There was no evidence for  vegetation. There was no evidence for stenosis. There was no insufficiency. PERICARDIUM: There was no pericardial effusion. AORTA: The root exhibited normal size. SUMMARY:    -  Left ventricle: Systolic function was normal. Ejection fraction was  estimated in the range of 55 % to 60 %. There were no regional wall motion  abnormalities. -  Aortic valve: The valve was probably trileaflet. Leaflets exhibited mild  sclerosis. There was no evidence for vegetation.    -  Mitral valve: There was mild regurgitation. There was no evidence for  vegetation.     SYSTEM MEASUREMENT TABLES    2D mode  AoR Diam (2D): 3.2 cm  LA Dimension (2D): 4.3 cm  Left Atrium Systolic Volume Index; Method of Disks, Biplane; 2D mode;: 30.2  ml/m2  IVS/LVPW (2D): 1  IVSd (2D): 1.1 cm  LVIDd (2D): 4.9 cm  LVIDs (2D): 3.6 cm  LVOT Area (2D): 3.1 cm2  LVPWd (2D): 1.2 cm    Tissue Doppler Imaging  LV Peak Early [place] Tissue Dev; Lateral MA (TDI): 10.5 cm/s  LV Peak Early [place] Tissue Dev; Medial MA (TDI): 9.1 cm/s    Unspecified Scan Mode  Peak Grad; Mean; Antegrade Flow: 13 mm[Hg]  Vmax; Antegrade Flow: 173 cm/s  LVOT Diam: 2 cm  MV Peak Dev/LV Peak Tissue Dev E-Wave; Lateral MA: 8.9  MV Peak Dev/LV Peak Tissue Dev E-Wave; Medial MA: 10.3    Prepared and signed by    Ana Woody MD [facility]  Signed [id number] 16:54:29           All Micro Results     Procedure Component Value Units Date/Time    CULTURE, BLOOD [[id number]] Collected:  11/08/18 0353    Order Status:  Completed Specimen:  Whole Blood Updated:  11/13/18 0718     Special Requests: --        RIGHT  HAND       Culture result: NO GROWTH 5 DAYS       CULTURE, BLOOD [[id number]] Collected:  11/08/18 0353    Order Status:  Completed Specimen:  Whole Blood Updated:  11/13/18 0718     Special Requests: --        RIGHT  HAND       Culture result: NO GROWTH 5 DAYS       CULTURE, URINE [[id number]] Collected:  11/10/18 1424    Order Status:  Completed Specimen:  Urine Updated:  11/12/18 0725     Special Requests: NO SPECIAL REQUESTS        Culture result: NO GROWTH 2 DAYS       CULTURE, TISSUE Gema Curb STAIN [[id number]]  (Abnormal)  (Susceptibility) Collected:  11/06/18 1745    Order Status:  Completed Specimen:  Joint, Shoulder Updated:  11/10/18 1149     Special Requests: NO SPECIAL REQUESTS        GRAM STAIN 0 TO 1 WBC'S/OIF      NO DEFINITE ORGANISM SEEN        Culture result:       LIGHT STAPHYLOCOCCUS AUREUS            LIGHT  NORMAL SKIN LATASHA ISOLATED               THIS ORGANISM WILL BE HELD FOR 7 DAYS.  IF FURTHER TESTING IS REQUIRED PLEASE NOTIFY MICROBIOLOGY                Labs: Results:       BMP, Mg, Phos Recent Labs     11/20/18  1248 11/19/18 0522 11/18/18  0600    140 138   K 4.2 4.8 4.9    100 101   CO2 29 29 29   AGAP 8 11 8   BUN 39* 52* 47*   CREA 7.82* 11.20* 11.30*   CA 8.8 8.7 8.3   * 89 95   MG  --  2.2  --    PHOS  --  7.5*  --       CBC Recent Labs     11/19/18  0522 11/18/18  1953 11/18/18  0600   WBC 9.7  --   -- RBC 3.24*  --   --    HGB 9.2* 8.9* 7.0*   HCT 29.2* 28.3*  --    *  --   --    GRANS 64  --   --    LYMPH 21  --   --    EOS 5  --   --    MONOS 9  --   --    BASOS 1  --   --    IG 1  --   --    ANEU 6.2  --   --    ABL 2.0  --   --    BROOKE 0.4  --   --    ABM 0.9  --   --    ABB 0.1  --   --    AIG 0.1  --   --       LFT Recent Labs     11/19/18  0522   SGOT 36   ALT 20   AP 71   TP 6.6   ALB 2.2*   GLOB 4.4*   AGRAT 0.5*      Cardiac Testing Lab Results   Component Value Date/Time     (H) 11/07/2018 05:27 AM      Coagulation Tests Lab Results   Component Value Date/Time    Prothrombin time 14.6 (H) 11/05/2018 05:23 PM    Prothrombin time 10.2 12/26/2016 10:54 AM    INR 1.1 11/05/2018 05:23 PM    INR 1.0 12/26/2016 10:54 AM    aPTT 34.7 11/05/2018 05:23 PM    aPTT 26.0 12/26/2016 10:54 AM      A1c No results found for: HBA1C, HGBE8, VXS0FIYD   Lipid Panel No results found for: CHOL, CHOLPOCT, CHOLX, CHLST, CHOLV, 469516, HDL, LDL, LDLC, DLDLP, 456825, VLDLC, VLDL, TGLX, TRIGL, TRIGP, TGLPOCT, CHHD, CHHDX   Thyroid Panel No results found for: TSH, T4, FT4, TT3, T3U, TSHEXT     Most Recent UA Lab Results   Component Value Date/Time    Color RED 11/07/2018 05:37 PM    Appearance TURBID 11/07/2018 05:37 PM    Specific gravity 1.024 (H) 11/07/2018 05:37 PM    pH (UA) 5.5 11/07/2018 05:37 PM    Protein 100 (A) 11/07/2018 05:37 PM    Glucose 100 11/07/2018 05:37 PM    Ketone 15 (A) 11/07/2018 05:37 PM    Bilirubin MODERATE (A) 11/07/2018 05:37 PM    Blood LARGE (A) 11/07/2018 05:37 PM    Urobilinogen 1.0 11/07/2018 05:37 PM    Nitrites POSITIVE (A) 11/07/2018 05:37 PM    Leukocyte Esterase MODERATE (A) 11/07/2018 05:37 PM    WBC  11/07/2018 05:37 PM    RBC 20-50 11/07/2018 05:37 PM    Epithelial cells 0-3 11/07/2018 05:37 PM    Bacteria 4+ (H) 11/07/2018 05:37 PM    Casts 3-5 11/04/2018 05:45 PM    Other observations RESULTS VERIFIED MANUALLY 11/07/2018 05:37 PM        No Known Allergies  Immunization History   Administered Date(s) Administered    TB Skin Test (PPD) Intradermal 01/06/2017, 11/06/2018       All Labs from Last 24 Hrs:  Recent Results (from the past 24 hour(s))   METABOLIC PANEL, BASIC    Collection Time: 11/20/18 12:48 PM   Result Value Ref Range    Sodium 139 136 - 145 mmol/L    Potassium 4.2 3.5 - 5.1 mmol/L    Chloride 102 98 - 107 mmol/L    CO2 29 21 - 32 mmol/L    Anion gap 8 7 - 16 mmol/L    Glucose 109 (H) 65 - 100 mg/dL    BUN 39 (H) 6 - 23 MG/DL    Creatinine 7.82 (H) 0.8 - 1.5 MG/DL    GFR est AA 9 (L) >60 ml/min/1.73m2    GFR est non-AA 8 (L) >60 ml/min/1.73m2    Calcium 8.8 8.3 - 10.4 MG/DL       Current Med List in Hospital:   Current Facility-Administered Medications   Medication Dose Route Frequency    [START ON 11/21/2018] Vancomycin Random Level Reminder   Other ONCE    vancomycin (VANCOCIN) 500 mg in 0.9% sodium chloride 100 mL IVPB give with dialysis  500 mg IntraVENous Once per day on Mon Wed    [START ON 11/23/2018] vancomycin (VANCOCIN) 750 mg in  ml infusion give with dialysis  750 mg IntraVENous Once per day on Fri    0.9% sodium chloride infusion 250 mL  250 mL IntraVENous PRN    0.9% sodium chloride infusion  25 mL/hr IntraVENous CONTINUOUS    heparin (PF) 2 units/ml in NS infusion 2,000 Units  1,000 mL Irrigation CONTINUOUS    midazolam (VERSED) injection 0.5-2 mg  0.5-2 mg IntraVENous Multiple    fentaNYL citrate (PF) injection 25-50 mcg  25-50 mcg IntraVENous Multiple    heparin (porcine) 1,000 unit/mL injection 5,000 Units  5,000 Units Hemodialysis DIALYSIS PRN    amLODIPine (NORVASC) tablet 5 mg  5 mg Oral DAILY    metoprolol tartrate (LOPRESSOR) tablet 50 mg  50 mg Oral Q12H    acetaminophen (TYLENOL) tablet 1,000 mg  1,000 mg Oral Q6H PRN    cloNIDine HCl (CATAPRES) tablet 0.2 mg  0.2 mg Oral BID    hydrALAZINE (APRESOLINE) 20 mg/mL injection 10 mg  10 mg IntraVENous Q6H PRN    simethicone (MYLICON) tablet 80 mg  80 mg Oral QID PRN    polyethylene glycol (MIRALAX) packet 17 g  17 g Oral DAILY PRN    heparin (porcine) injection 5,000 Units  5,000 Units SubCUTAneous Q12H    epoetin zee (EPOGEN;PROCRIT) injection 20,000 Units  20,000 Units SubCUTAneous Q7D    famotidine (PEPCID) tablet 20 mg  20 mg Oral DAILY    diphenhydrAMINE (BENADRYL) capsule 25 mg  25 mg Oral Q4H PRN    HYDROmorphone (PF) (DILAUDID) injection 1 mg  1 mg IntraVENous Q3H PRN    naloxone (NARCAN) injection 0.2-0.4 mg  0.2-0.4 mg IntraVENous Q10MIN PRN    oxyCODONE IR (ROXICODONE) tablet 10 mg  10 mg Oral Q3H PRN    senna-docusate (PERICOLACE) 8.6-50 mg per tablet 2 Tab  2 Tab Oral DAILY    sodium chloride (NS) flush 5-10 mL  5-10 mL IntraVENous Q8H    sodium chloride (NS) flush 5-10 mL  5-10 mL IntraVENous PRN    zolpidem (AMBIEN) tablet 5 mg  5 mg Oral QHS PRN    alcohol 62% (NOZIN) nasal  1 Ampule  1 Ampule Topical Q12H    ondansetron (ZOFRAN) injection 4 mg  4 mg IntraVENous Q4H PRN    NUTRITIONAL SUPPORT ELECTROLYTE PRN ORDERS   Does Not Apply PRN       Discharge Exam:  Patient Vitals for the past 24 hrs:   Temp Pulse Resp BP SpO2   11/20/18 1141 98.5 °F (36.9 °C) (!) 54 18 153/73 95 %   11/20/18 1129     95 %   11/20/18 0759 99.6 °F (37.6 °C) (!) 58 19 161/72 97 %   11/20/18 0421 99.3 °F (37.4 °C) (!) 55 18 157/79 96 %   11/20/18 0029 99.2 °F (37.3 °C) (!) 56 18 148/79 96 %   11/19/18 1942 98.3 °F (36.8 °C) (!) 56 20 142/75 95 %   11/19/18 1606 98.3 °F (36.8 °C) (!) 56 16 165/81 96 %     Oxygen Therapy  O2 Sat (%): 95 % (11/20/18 1141)  Pulse via Oximetry: 54 beats per minute (11/20/18 1141)  O2 Device: Room air (11/20/18 1141)  O2 Flow Rate (L/min): 2 l/min (11/16/18 1726)  FIO2 (%): 28 % (11/11/18 2108)  ETCO2 (mmHg): 96 mmHg (11/20/18 0029)    Intake/Output Summary (Last 24 hours) at 11/20/2018 1435  Last data filed at 11/20/2018 1147  Gross per 24 hour   Intake 360 ml   Output 2075 ml   Net -1715 ml       General:    Well nourished. Alert. Eyes:   Normal sclera. Extraocular movements intact. ENT:  Normocephalic, atraumatic. Moist mucous membranes  CV:   Regular rate and rhythm. No murmur, rub, or gallop. Lungs:  Clear to auscultation bilaterally. No wheezing, rhonchi, or rales. Abdomen: Soft, nontender, nondistended. Bowel sounds normal.   Extremities: Warm and dry. No cyanosis or edema. Neurologic: CN II-XII grossly intact. Sensation intact. Skin:     No rashes or jaundice. Sutures to right shoulder, left hip approximated   Psych:  Normal mood and affect. Discharge Info:   Current Discharge Medication List      START taking these medications    Details   metoprolol tartrate (LOPRESSOR) 50 mg tablet Take 0.5 Tabs by mouth every twelve (12) hours. Qty: 60 Tab, Refills: 0      amLODIPine (NORVASC) 5 mg tablet Take 1 Tab by mouth daily. Qty: 30 Tab, Refills: 0      cloNIDine HCl (CATAPRES) 0.2 mg tablet Take 1 Tab by mouth two (2) times a day. Qty: 60 Tab, Refills: 0         CONTINUE these medications which have NOT CHANGED    Details   oxyCODONE IR (ROXICODONE) 5 mg immediate release tablet Take 1-2 Tabs by mouth every six (6) hours as needed for Pain. Max Daily Amount: 40 mg.  Qty: 20 Tab, Refills: 0    Associated Diagnoses: Left hip pain      HYDROcodone-acetaminophen (NORCO) 5-325 mg per tablet 1-2 tab Q4-6 hours PRN pain  Indications: Pain  Qty: 15 Tab, Refills: 0      ondansetron (ZOFRAN ODT) 8 mg disintegrating tablet 1 tablet Q 8 hours PRN N/V  Qty: 8 Tab, Refills: 0      fluticasone (FLONASE) 50 mcg/actuation nasal spray 2 Sprays by Both Nostrils route two (2) times a day. Qty: 1 Bottle, Refills: 1      FLECTOR 1.3 % pt12 1 Patch by TransDERmal route every twelve (12) hours every twelve (12) hours. Qty: 30 Patch, Refills: 1    Associated Diagnoses: Chronic left shoulder pain      diclofenac EC (VOLTAREN) 75 mg EC tablet Take  by mouth.          STOP taking these medications       ibuprofen (MOTRIN) 800 mg tablet Comments:   Reason for Stopping:         cephALEXin (KEFLEX) 500 mg capsule Comments:   Reason for Stopping:               Disposition: Home    Activity: Regular as tolerated  Diet: FOOD SVCS COMMENTS  DIET NUTRITIONAL SUPPLEMENTS All Meals; Nepro  DIET RENAL Regular    Follow-up Appointments   Procedures    FOLLOW UP VISIT Appointment in: One Week PCP in 1 week     PCP in 1 week     Standing Status:   Standing     Number of Occurrences:   1     Order Specific Question:   Appointment in     Answer: One Week         Follow-up Information     Follow up With Specialties Details Why Contact Info       PT HAS AN APPOINTMENT WITH ID ON 11/28/18 AT 9:00AM AND ON 12/19/18 AT 9:00AM     [facility]  On 11/28/2018 at 9:00 am [address] [facility] [facility] [address], [address] [place] [address]  [phone]    [facility]  On 12/19/2018 at 9:00 am [address] [facility] [address], [address] [place] [address]  [phone]    [facility]   someone from the clinic will contact you with appointment time [phone]  [address] [facility] [address]  M/W/F time slot    Joslyn Martel MD [address] [facility] [address] [facility] [address] [facility]  [phone]            Time spent in patient discharge planning and coordination 35 minutes.     Signed:  Darylene Madden, NP

## 2018-11-20 NOTE — PROGRESS NOTES
Problem: Mobility Impaired (Adult and Pediatric) Goal: *Acute Goals and Plan of Care (Insert Text) Discharge Goals Plan of care updated 11/15/18 and ongoing 11/17/2018 
(1.)Mr. Maxwell Wong will move from supine to sit and sit to supine , scoot up and down and roll side to side in bed with MODIFIED INDEPENDENCE within 5 treatment day(s). Goal met 11/20/18 
(2.)Mr. Maxwell Wong will transfer from bed to chair and chair to bed with MODIFIED INDEPENDENCE using the least restrictive device within 5 treatment day(s). Goal met 11/20/18 
(3.)Mr. Maxwell Wong will ambulate with MODIFIED INDEPENDENCE for 500 feet with the least restrictive device within 5 treatment day(s). goal met 11/20/18 
(4.)Mr. Maxwell Wong will perform standing static and dynamic balance activities x 15 minutes with MODIFIED INDEPENDENCE to improve safety within 5 day(s). (5.)Mr. Maxwell Wong will ascend and descend 2 stairs using L hand rail(s) with SUPERVISION to improve functional mobility and safety within 5 day(s). Goal met 11/20/18 (6.)Mr. Maxwell Wong will tolerate AAROM, AROM and gentle pendulums of RUE with no increase in pain within 5 treatment days to improve independence with transfers. Goal met 11/20/18 
(7.)Mr. Maxwell Wong will tolerate 50+ minutes of therapeutic activity/exercise within 5 days to improve activity tolerance for functional mobility. 
________________________________________________________________________________________________ PHYSICAL THERAPY: Daily Note, Treatment Day: 5th, PM 11/20/2018INPATIENT: Hospital Day: 14 
Payor: Gisela Carlton / Plan: SIMON YOUNGBLOOD OAP / Product Type: Commerical /  
 LLE total hip precautions, WBAT 
RUE WBAT : Active and passive range of motion RIGHT elbow hand ACTIVE AND ACTIVE ASSISTED MOTION RIGHT SHOULDER GENTLE pendulums 
sling RIGHT shoulder NAME/AGE/GENDER: Woody Parrish is a 48 y.o. male PRIMARY DIAGNOSIS: infected left hip  
ARF (acute renal failure) (HCC) Staphylococcal arthritis of right shoulder [facility]) Staphylococcal arthritis of right shoulder ([facility] [address] ICD-10: Treatment Diagnosis: · Difficulty in walking, Not elsewhere classified (R26.2): · Shoulder I and d Precaution/Allergies: 
Patient has no known allergies. ASSESSMENT:  
Mr. Parvez Panda presents supine in bed and is agreeable to therapy. Bed mobility is independent. Gait training with rolling walker x 1000 feet with slow palak and patient has decreased step length on the left. Patient participates in therapeutic exercises active and active assisted. Patient performed pendulums exercises independently. Pt was left sitting in the recliner  with needs in reach. Good session. Patient is making progress with goals. Will continue with POC. This section established at most recent assessment PROBLEM LIST (Impairments causing functional limitations): 1. Decreased Strength 2. Decreased ADL/Functional Activities 3. Decreased Transfer Abilities 4. Decreased Ambulation Ability/Technique 5. Decreased Balance 6. Increased Pain 7. Decreased Activity Tolerance 8. Decreased Pacing Skills 9. Decreased Knowledge of Precautions 10. Decreased Shields with Home Exercise Program 
 INTERVENTIONS PLANNED: (Benefits and precautions of physical therapy have been discussed with the patient.) 1. Balance Exercise 2. Bed Mobility 3. Gait Training 4. Group Therapy 5. Home Exercise Program (HEP) 6. Therapeutic Activites 7. Therapeutic Exercise/Strengthening 8. Transfer Training 9. Patient Education TREATMENT PLAN: Frequency/Duration: daily for duration of hospital stay Rehabilitation Potential For Stated Goals: Excellent RECOMMENDED REHABILITATION/EQUIPMENT: (at time of discharge pending progress): Due to the probability of continued deficits (see above) this patient will likely need continued skilled physical therapy after discharge. Equipment:  
? None at this time HISTORY:  
 History of Present Injury/Illness (Reason for Referral): 
Pt is a 49 y/o male who is almost 2 years s/p L JEIMY who was seen in clinic on 10/29 c/o 5 day history of sudden onset of moderate left hip pain. Denied any known injury. Left hip joint aspirate was obtained and sent for culture which grew pansensitive staph. Aureus. Patient was also noted to have elevated WBC (27.1) as well as elevated ESR (90). Patient reports having flu like symptoms 2 weeks ago and began developing right shoulder pain around that same time. Patient was subsequently evaluated by Dr. Ras Jerez and received right shoulder cortisone injection. He reports that he is still experiencing moderate to severe right shoulder pain. Denies fever, chills, nausea, vomiting, etc..  No other new complaints. Past Medical History/Comorbidities:  
Mr. Belem Llanos  has a past medical history of Arthritis, Biceps muscle tear, Chronic pain, Insomnia, MRSA (methicillin resistant Staphylococcus aureus) infection, Personal history of kidney stones, and Right shoulder pain. Mr. Belem Llanos  has a past surgical history that includes hx other surgical; hx rotator cuff repair (Left, 2010); hx orthopaedic (Left, 01/2017); hx septoplasty (11/15/2017); HIP ARTHROPLASTY TOTAL REVISION (Left, 11/6/2018); SHOULDER I&D (Right, 11/6/2018); RIGHT SHOULDER ARTHROSCOPY SUBACROMIAL DECOMPRESSION ROTATOR CUFF REPAIR/ DISTAL CLAVICLE RESECTION (Right, 6/13/2017); LEFT TOTAL HIP ARTHROPLASTY (Left, 1/6/2017); and SHOULDER ARTHROSCOPY ACROMIOPLASTY ROTATOR CUFF REPAIR (Left, 10/21/2010). Social History/Living Environment:  
Home Environment: Private residence # Steps to Enter: 2 Rails to Enter: Yes Hand Rails : Left One/Two Story Residence: One story Living Alone: No 
Support Systems: Spouse/Significant Other/Partner Patient Expects to be Discharged to[de-identified] Private residence Current DME Used/Available at Home: Walker, rolling, Raised toilet seat, Grab bars, Adaptive bathing aides, Adaptive dressing aides Tub or Shower Type: Tub/Shower combination Prior Level of Function/Work/Activity: 
 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  
Most Recent Physical Functioning:  
Gross Assessment: 
  
         
  
Posture: 
  
Balance: 
Sitting: Intact Standing: Intact Standing - Static: Good Standing - Dynamic : Good Bed Mobility: 
Rolling: Supervision Supine to Sit: Supervision Scooting: Supervision Wheelchair Mobility: 
  
Transfers: 
Sit to Stand: Supervision Stand to Sit: Supervision Gait: 
Right Side Weight Bearing: Full Left Side Weight Bearing: As tolerated Speed/Emma: Slow Step Length: Left shortened Gait Abnormalities: Decreased step clearance Distance (ft): 1000 Feet (ft) Assistive Device: Gait belt;Walker, rolling Ambulation - Level of Assistance: Supervision Interventions: Safety awareness training Body Structures Involved: 1. Nerves 2. Metabolic 3. Endocrine 4. Bones 5. Joints 6. Muscles Body Functions Affected: 1. Sensory/Pain 2. Neuromusculoskeletal 
3. Movement Related 4. Metobolic/Endocrine Activities and Participation Affected: 1. Mobility 2. Self Care 3. Domestic Life 4. Interpersonal Interactions and Relationships 5. Community, Social and [place] Number of elements that affect the Plan of Care: 4+: HIGH COMPLEXITY CLINICAL PRESENTATION:  
Presentation: Stable and uncomplicated: LOW COMPLEXITY CLINICAL DECISION MAKING:  
[employer] 6 Clicks Basic Mobility Inpatient Short Form How much difficulty does the patient currently have. .. Unable A Lot A Little None 1. Turning over in bed (including adjusting bedclothes, sheets and blankets)? [] 1   [] 2   [x] 3   [] 4  
2.   Sitting down on and standing up from a chair with arms ( e.g., wheelchair, bedside commode, etc.)   [] 1   [] 2   [x] 3   [] 4  
3. Moving from lying on back to sitting on the side of the bed? [] 1   [x] 2   [] 3   [] 4 How much help from another person does the patient currently need. .. Total A Lot A Little None 4. Moving to and from a bed to a chair (including a wheelchair)? [] 1   [] 2   [x] 3   [] 4  
5. Need to walk in hospital room? [] 1   [x] 2   [] 3   [] 4  
6. Climbing 3-5 steps with a railing? [] 1   [x] 2   [] 3   [] 4  
© 2007, Trustees of [address] [place] [address] [address] [id number], under license to [employer]). All rights reserved Score:  Initial: 15 Most Recent: X (Date: -- ) Interpretation of Tool:  Represents activities that are increasingly more difficult (i.e. Bed mobility, Transfers, Gait). Score 24 23 22-20 19-15 14-10 9-7 6 Modifier CH CI CJ CK CL CM CN   
 
? Mobility - Walking and Moving Around:  
  - CURRENT STATUS: CK - 40%-59% impaired, limited or restricted  - GOAL STATUS: CJ - 20%-39% impaired, limited or restricted  - D/C STATUS:  ---------------To be determined--------------- Payor: Dedra Villatoro / Plan: SIMON LABERTOP / Product Type: Commerical /   
 
Medical Necessity:    
· Patient demonstrates excellent rehab potential due to higher previous functional level. Reason for Services/Other Comments: 
· Patient continues to require modification of therapeutic interventions to increase complexity of exercises. Use of outcome tool(s) and clinical judgement create a POC that gives a: Clear prediction of patient's progress: LOW COMPLEXITY  
  
 
 
 
TREATMENT:    
    
Pre-treatment Symptoms/Complaint:  \"I'm ready\" Pain: Initial: 
Pain Intensity 1: 0 Pain Location 1: Abdomen  Post Session:  0/10 Therapeutic Activity: (    24 minutes):   Therapeutic activities including bed mobility training, transfer training, ambulation on level ground, static/dynamic standing balance,and patient education to improve mobility, strength and balance. Required minimal Safety awareness training to promote static and dynamic balance in standing. Therapeutic Exercise: (   18 minutes):  Exercises per grid below to improve mobility, strength and range of motion RUE. Required minimal visual, manual and tactile cues to promote proper body alignment and promote proper body posture. Progressed range as indicated. Date: 
11/11/18 Date: 
11/12/18 Date: 
11/13/18 Date: 
11/13/18 Date: 
11/14/18 Date: 
11/15/18 Date: 
11/16/18 Date : 11/17/2018  Date: 
11/20/18 ACTIVITY/EXERCISE AM PM PM AM PM PM AM PM PM  PM  
RUE shoulder flexion 30 passive 30 passive 2x15 R 
PROM 2x15R PROM 2x20R PROM 2x15R PROM 2x15R PROM 2x15R PROM 30 x's AAROM, and PROM 30 reps AAROM  
RUE elbow flexion 30 AA 30 AA 2x15 R 
 AAROM 2x15R 
AROM 2x15R  
AROM x15 AROM 2x15R 
AROM 2x15R PROM 30 x's AAROM, and PROM x20 active RUE finger flexion/extension 30 AA 30 AA x15 R Active x15R A x15R A x15 R A 2x15R A 2x15R A 30 x's AAROM, and PROM x20 active RUE shoulder abduction 30 passive 30 passive 2x15R PROM 2x15R PROM 2x15R PROM 2x15 R PROM 2x15R PROM 2x15R PROM 30 x's AAROM, and PROM x30 AAROM Gentle pendulums       x1'   30 Forearm pronation/supination   x10R A x10R A x15R A x15 R A 2x15R A 2x15R A 30 x's AAROM, and PROM x20 active Wrist extension/flexion          x20 active Gripping          20 x's B = bilateral; AA = active assistive; A = active; P = passive Braces/Orthotics/Lines/Etc:  
 -no wearing sling Treatment/Session Assessment:   
· Response to Treatment: Tolerated exercises well · Interdisciplinary Collaboration:  
o Physical Therapy Assistant 
o Registered Nurse · After treatment position/precautions:  
o Up in chair 
o Bed/Chair-wheels locked 
o Call light within reach 
o RN notified · Compliance with Program/Exercises: doing better · Recommendations/Intent for next treatment session: \"Next visit will focus on advancements to more challenging activities and reduction in assistance provided\". Total Treatment Duration: PT Patient Time In/Time Out Time In: 5989 Time Out: 1414 Isidro Burnham PTA

## 2018-11-20 NOTE — PROGRESS NOTES
Called to [address] Renal admissions spoke with [facility] states trying to reach local office of [place] and will call CM back with response, made aware pt ready for DC today.

## 2018-11-21 NOTE — ADT AUTH CERT NOTES
Clinical Date 11/19/18 by Leonie Baer RN Review Status Review Entered In Primary 11/21/2018 15:43 Criteria Review 11/19/18 LOC: IP Remote Telemetry VS: 98.3, 56, 20, 142/75, 95% ABnl labs:RBC 3.24, H/H 9.2/29.2, Plt 564, BUN 52, creat 11.20, phos 7.5, alb 2.2, glob 4.4, Meds: Norvasc 5mg PO QD, clonidine 0.2mg PO BID, Pepcid 20mg PO QD, heparin 5000u SC BID, Lopressor 50mg PO BID, oxycodone 10mg PO PRN x 3, pericolace PO QD, vancomycin 500mg IV with dialysis, ambien 5mg PO qhs PRN  x1, Plan: renal diet, IP hemodialysis,  
Neph: Assessment and Plan:  
  
1) RANI - HD dependent 
- UOP picking up. ? Starting to see some recovery. Following labs and uop closely - [place] [facility] [place], well tolerated   
2) MSSA Bacteremia  
- late onset left JEIMY infection. S/p polyethylene exchange 11/5 
- right shoulder septic arthritis - ID following - Plan Vanc for 6 weeks, EOT 12/19/18. Antibiotics have been arranged at [facility] dialysis - If his kidney function recovers before his antibiotics are completed then a plan will have to be worked out with ID for a \"post dialysis\" treatment plan 
  
3) Anemia - Hb stable   
4) HTN 
- clonidine, metoprolol, norvasc 
  
ID: Impression: ·           MSSA bacteremia (11/4/18) - 11/8 BCX NG; TTE no evidence of endocarditis ·           Late onset L JEIMY infection, MSSA (10/29), s/p I&D and polyethylene exchange 11/5 ·           MSSA R shoulder septic arthritis, s/p I and D 11/5 ·           History R shoulder rotator cuff repair ·           Oral HSV, resolved ·           RANI - ATN (post-op hypotension, Staphylococcal kidney injury, rifampin) vs AIN (nafcillin, keflex, or cefazolin) - now on HD with some sign of recovery, however nephro still evaluating ·           Elevated LFTs, Hep B, Hep C neg 
  
Plan: 
·           Continue renally dosed Vancomycin.   Pt will need 6 weeks of treatment (EOT 12/19/18). Will need oral therapy given presence of orthopedic hardware. Would use non-beta lactam regimen given possible AIN. ·           Closely watch renal function and vancomycin levels, adjust dosing based on renal function recovery ·           Discussed with Nephrology- please notify ID if patient no longer requiring dialysis before antibiotic EOT (12/19) ·           ID follow up 11/28/18 @ 9:00am and 12/19/18 @ 9:00am 
  
IM: 11/19:  Does not want to go to rehab, home with [facility] and OP dialysis, will get Vancomycin at dialysis. Anxious to go home. Waiting for HD placement. Pain fairly well controlled. Working with PT. No additional complaints. Reports voiding more today. Assessment/Plan: 
Staphylococcal septic arthritis of right shoulder  
            Continue vanc outpatient at HD on discharge Infected prosthetic hip  
            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             Nephrology on board with HD, continue until                               recovery             Monitor renal function             Pharmacy dosing vancomycin Sepsis due to MSSA bacteremia with  infected shoulder                  and hip JEIMY 
            Continue vancomycin x 6 weeks with EOT: 12/19 Postoperative anemia most likely due to acute blood loss  
            Heme stool due to diverticulosis findings also 
            Monitor labs     
            OOUGVHIYJO with 2 units PRBC 11/18 HTN.                 Started on clonidine, increased Metoprolol. Still                   suboptimal. Added norvasc.  
  
High risk pt.  
DVT ppx:  hsq 
  
Discharge with HD slot and HH in place when ok with consultants 
  
OT: 11/19/18: Pt supine in bed upon arrival, alert and agreeable to OT teratment. Pt very talkative, requiring redirection throughout session. Pt practiced bed mobility with SBA, functional transfers with SBA, and functional mobility around room with SBA. Pt required verbal cues for proper RW management during turns. Pt practiced grooming in standing at sink with SBA and upper body bathing in standing at sink with SBA. Pt left seated in chair with PCT and with call bell in reach. Patient is making good progress towards goals. Will continue to follow.  
  
PT: Mr. Muñoz presents supine in bed and is agreeable to ROM. Had dialysis today.  He declines ambulatory treatment and for the hip and requests right shoulder alone today.  Remained supine for the treatment.   Treatment included RUE ROM exercises per chart below requiring cues to perform properly and with good technique. Minimal increase in R shoulder pain with shoulder abduction, performed to patient's tolerance.  Pt was left supine with needs in reach.  States that he would walk later.   Will continue with POC. Clinical 11/18/18 by Amandeep Cabrales RN Review Status Review Entered In Primary 11/21/2018 15:36 Criteria Review 11/18/18 LOC: IP Remote Telemetry VS: 97.4, 64, 18, 167/91, 98% ABnl labs: Hgb 7.0, BUN 47, Creat 11.30, Meds: Norvasc 5mg PO QD, clonidine 0.2mg PO BID, Pepcid 20mg PO QD, heparin 5000u SC BID, Lopressor 50mg PO BID, oxycodone 10mg PO PRN x 2, pericolace PO QD, ambien 5mg PO qhs PRN  x1, Plan: transfuse 2u PRBC's, repeat H/H, renal diet, IP hemodialysis, IM: Assessment/Plan: 
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 Nephrology on board with HD, continue until                               recovery Monitor renal function Pharmacy dosing vancomycin Sepsis due to MSSA bacteremia with  infected shoulder                  and hip JEIMY Continue vancomycin x 6 weeks with EOT: 12/19 Postoperative anemia most likely due to acute blood loss Heme stool due to diverticulosis findings also Monitor labs Transfused with 2 units PRBC 11/18 HTN. Started on clonidine, increased Metoprolol. Still                   suboptimal. Added norvasc.    
High risk pt. DVT ppx:  hsq  
Dispo:  Home with home health vs rehab and will need OP HD and vanc at site. Ortho Surg: Principal Problem: 
  Staphylococcal arthritis of right shoulder ([address] (11/6/2018)  Active Problems: 
  Infected prosthetic hip ([address] (11/5/2018)  
  Septic arthritis of shoulder, right ([address] (11/5/2018)  
  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  
Plan:  
Continue PT/OT/Rehab Wound healed suture removed Observe Continue care Shoulder Orthopaedically stable Follow up Dr. Jaqueline Sauer 3 weeks  
[facility] 
  
Neph: Assessment and Plan:   
1) RANI - HD dependent - [place] [facility] [place] - Seen on HD, well tolerated. 2) MSSA Bacteremia  
- late onset left JEIMY infection. S/p polyethylene exchange 11/5 
- right shoulder septic arthritis - ID following - Plan Vanc for 6 weeks, EOT 12/19/18. I have arrange antibiotics at [facility] dialysis - I do expect his kidney function to recover before his antibiotics are completed. This prospect will have to be worked out with ID for a \"post dialysis\" treatment plan. 3) Anemia - Hb stable   
4) HTN 
- clonidine, metoprolol, norvasc

## 2018-12-05 ENCOUNTER — HOSPITAL ENCOUNTER (OUTPATIENT)
Dept: INFUSION THERAPY | Age: 53
Discharge: HOME | End: 2018-12-05
Payer: COMMERCIAL

## 2018-12-05 VITALS
TEMPERATURE: 97.8 F | HEART RATE: 66 BPM | RESPIRATION RATE: 16 BRPM | SYSTOLIC BLOOD PRESSURE: 116 MMHG | OXYGEN SATURATION: 99 % | DIASTOLIC BLOOD PRESSURE: 67 MMHG

## 2018-12-05 LAB
ALBUMIN SERPL-MCNC: 3.1 G/DL (ref 3.5–5)
ANION GAP SERPL CALC-SCNC: 8 MMOL/L (ref 7–16)
BUN SERPL-MCNC: 42 MG/DL (ref 6–23)
CALCIUM SERPL-MCNC: 9.2 MG/DL (ref 8.3–10.4)
CHLORIDE SERPL-SCNC: 102 MMOL/L (ref 98–107)
CO2 SERPL-SCNC: 26 MMOL/L (ref 21–32)
CREAT SERPL-MCNC: 2.95 MG/DL (ref 0.8–1.5)
GLUCOSE SERPL-MCNC: 89 MG/DL (ref 65–100)
PHOSPHATE SERPL-MCNC: 6.3 MG/DL (ref 2.5–4.5)
POTASSIUM SERPL-SCNC: 5.2 MMOL/L (ref 3.5–5.1)
SODIUM SERPL-SCNC: 136 MMOL/L (ref 136–145)

## 2018-12-05 PROCEDURE — 96365 THER/PROPH/DIAG IV INF INIT: CPT

## 2018-12-05 PROCEDURE — 80069 RENAL FUNCTION PANEL: CPT

## 2018-12-05 PROCEDURE — 74011250636 HC RX REV CODE- 250/636

## 2018-12-05 RX ORDER — SODIUM CHLORIDE 0.9 % (FLUSH) 0.9 %
10 SYRINGE (ML) INJECTION AS NEEDED
Status: DISCONTINUED | OUTPATIENT
Start: 2018-12-05 | End: 2018-12-09 | Stop reason: HOSPADM

## 2018-12-05 RX ADMIN — VANCOMYCIN HYDROCHLORIDE 750 MG: 10 INJECTION, POWDER, LYOPHILIZED, FOR SOLUTION INTRAVENOUS at 10:25

## 2018-12-05 RX ADMIN — Medication 10 ML: at 11:20

## 2018-12-05 RX ADMIN — Medication 10 ML: at 10:15

## 2018-12-05 NOTE — PROGRESS NOTES
Pt ambulatory to area without complaints. Received treatment per order, tolerated well. Called Infectious disease for updated orders, states will fax new ones in. Aware of next appt on [email or web address]. Advised to call dr with any issues/concerns. Discharged home without complaints.

## 2018-12-07 ENCOUNTER — HOSPITAL ENCOUNTER (OUTPATIENT)
Dept: INFUSION THERAPY | Age: 53
Discharge: HOME | End: 2018-12-07
Payer: COMMERCIAL

## 2018-12-07 VITALS
SYSTOLIC BLOOD PRESSURE: 132 MMHG | RESPIRATION RATE: 16 BRPM | OXYGEN SATURATION: 99 % | HEART RATE: 74 BPM | DIASTOLIC BLOOD PRESSURE: 72 MMHG | TEMPERATURE: 97.8 F

## 2018-12-07 LAB
BACTERIA SPEC CULT: NORMAL
SERVICE CMNT-IMP: NORMAL

## 2018-12-07 PROCEDURE — 96365 THER/PROPH/DIAG IV INF INIT: CPT

## 2018-12-07 PROCEDURE — 74011250636 HC RX REV CODE- 250/636: Performed by: INTERNAL MEDICINE

## 2018-12-07 RX ORDER — SODIUM CHLORIDE 0.9 % (FLUSH) 0.9 %
10 SYRINGE (ML) INJECTION AS NEEDED
Status: DISCONTINUED | OUTPATIENT
Start: 2018-12-07 | End: 2018-12-11 | Stop reason: HOSPADM

## 2018-12-07 RX ADMIN — Medication 10 ML: at 11:20

## 2018-12-07 RX ADMIN — Medication 20 ML: at 10:05

## 2018-12-07 RX ADMIN — VANCOMYCIN HYDROCHLORIDE 750 MG: 10 INJECTION, POWDER, LYOPHILIZED, FOR SOLUTION INTRAVENOUS at 10:20

## 2018-12-07 NOTE — PROGRESS NOTES
Pt ambulatory to area without complaints. Received treatment per order, tolerated well. Aware of next appt on [email or web address]. Advised to call dr with any issues/concerns. Discharged home without complaints.

## 2018-12-10 ENCOUNTER — HOSPITAL ENCOUNTER (OUTPATIENT)
Dept: INFUSION THERAPY | Age: 53
Discharge: HOME | End: 2018-12-10
Payer: COMMERCIAL

## 2018-12-10 VITALS
DIASTOLIC BLOOD PRESSURE: 68 MMHG | SYSTOLIC BLOOD PRESSURE: 127 MMHG | HEART RATE: 54 BPM | TEMPERATURE: 97.7 F | WEIGHT: 181.2 LBS | OXYGEN SATURATION: 100 % | BODY MASS INDEX: 28.38 KG/M2 | RESPIRATION RATE: 18 BRPM

## 2018-12-10 LAB
ALBUMIN SERPL-MCNC: 3.2 G/DL (ref 3.5–5)
ANION GAP SERPL CALC-SCNC: 8 MMOL/L (ref 7–16)
BASOPHILS # BLD: 0.1 K/UL (ref 0–0.2)
BASOPHILS NFR BLD: 1 % (ref 0–2)
BUN SERPL-MCNC: 47 MG/DL (ref 6–23)
CALCIUM SERPL-MCNC: 10 MG/DL (ref 8.3–10.4)
CHLORIDE SERPL-SCNC: 104 MMOL/L (ref 98–107)
CO2 SERPL-SCNC: 26 MMOL/L (ref 21–32)
CREAT SERPL-MCNC: 2.3 MG/DL (ref 0.8–1.5)
CREAT SERPL-MCNC: 2.31 MG/DL (ref 0.8–1.5)
DIFFERENTIAL METHOD BLD: ABNORMAL
EOSINOPHIL # BLD: 0.2 K/UL (ref 0–0.8)
EOSINOPHIL NFR BLD: 2 % (ref 0.5–7.8)
ERYTHROCYTE [DISTWIDTH] IN BLOOD BY AUTOMATED COUNT: 14.4 % (ref 11.9–14.6)
GLUCOSE SERPL-MCNC: 90 MG/DL (ref 65–100)
HCT VFR BLD AUTO: 35.7 % (ref 41.1–50.3)
HGB BLD-MCNC: 11.1 G/DL (ref 13.6–17.2)
IMM GRANULOCYTES # BLD: 0.1 K/UL (ref 0–0.5)
IMM GRANULOCYTES NFR BLD AUTO: 1 % (ref 0–5)
LYMPHOCYTES # BLD: 3 K/UL (ref 0.5–4.6)
LYMPHOCYTES NFR BLD: 35 % (ref 13–44)
MCH RBC QN AUTO: 26.1 PG (ref 26.1–32.9)
MCHC RBC AUTO-ENTMCNC: 31.1 G/DL (ref 31.4–35)
MCV RBC AUTO: 84 FL (ref 79.6–97.8)
MONOCYTES # BLD: 0.6 K/UL (ref 0.1–1.3)
MONOCYTES NFR BLD: 7 % (ref 4–12)
NEUTS SEG # BLD: 4.7 K/UL (ref 1.7–8.2)
NEUTS SEG NFR BLD: 54 % (ref 43–78)
NRBC # BLD: 0 K/UL (ref 0–0.2)
PHOSPHATE SERPL-MCNC: 4.9 MG/DL (ref 2.5–4.5)
PLATELET # BLD AUTO: 516 K/UL (ref 150–450)
PMV BLD AUTO: 9.4 FL (ref 9.4–12.3)
POTASSIUM SERPL-SCNC: 5.2 MMOL/L (ref 3.5–5.1)
RBC # BLD AUTO: 4.25 M/UL (ref 4.23–5.6)
SODIUM SERPL-SCNC: 138 MMOL/L (ref 136–145)
VANCOMYCIN TROUGH SERPL-MCNC: 2 UG/ML (ref 5–20)
WBC # BLD AUTO: 8.7 K/UL (ref 4.3–11.1)

## 2018-12-10 PROCEDURE — 85025 COMPLETE CBC W/AUTO DIFF WBC: CPT

## 2018-12-10 PROCEDURE — 96365 THER/PROPH/DIAG IV INF INIT: CPT

## 2018-12-10 PROCEDURE — 80069 RENAL FUNCTION PANEL: CPT

## 2018-12-10 PROCEDURE — 74011250636 HC RX REV CODE- 250/636: Performed by: INTERNAL MEDICINE

## 2018-12-10 PROCEDURE — 80202 ASSAY OF VANCOMYCIN: CPT

## 2018-12-10 RX ORDER — SODIUM CHLORIDE 0.9 % (FLUSH) 0.9 %
10-40 SYRINGE (ML) INJECTION AS NEEDED
Status: DISCONTINUED | OUTPATIENT
Start: 2018-12-10 | End: 2018-12-14 | Stop reason: HOSPADM

## 2018-12-10 RX ADMIN — VANCOMYCIN HYDROCHLORIDE 750 MG: 10 INJECTION, POWDER, LYOPHILIZED, FOR SOLUTION INTRAVENOUS at 10:36

## 2018-12-10 RX ADMIN — Medication 20 ML: at 10:00

## 2018-12-10 RX ADMIN — Medication 20 ML: at 11:20

## 2018-12-10 NOTE — PROGRESS NOTES
Arrived to the [facility]. Labs drawn &  Vancomycin completed. Patient tolerated well. Any issues or concerns during appointment: None. Patient aware of next infusion appointment on 12/12 (date) at 56 (time). Discharged ambulatory in stable condition.

## 2018-12-12 ENCOUNTER — HOSPITAL ENCOUNTER (OUTPATIENT)
Dept: INFUSION THERAPY | Age: 53
Discharge: HOME | End: 2018-12-12
Payer: COMMERCIAL

## 2018-12-12 VITALS
OXYGEN SATURATION: 99 % | DIASTOLIC BLOOD PRESSURE: 78 MMHG | HEART RATE: 67 BPM | TEMPERATURE: 98 F | RESPIRATION RATE: 18 BRPM | SYSTOLIC BLOOD PRESSURE: 147 MMHG

## 2018-12-12 PROCEDURE — 96365 THER/PROPH/DIAG IV INF INIT: CPT

## 2018-12-12 PROCEDURE — 74011000258 HC RX REV CODE- 258: Performed by: NURSE PRACTITIONER

## 2018-12-12 PROCEDURE — 74011250636 HC RX REV CODE- 250/636: Performed by: NURSE PRACTITIONER

## 2018-12-12 RX ORDER — SODIUM CHLORIDE 0.9 % (FLUSH) 0.9 %
10 SYRINGE (ML) INJECTION EVERY 8 HOURS
Status: DISCONTINUED | OUTPATIENT
Start: 2018-12-12 | End: 2018-12-16 | Stop reason: HOSPADM

## 2018-12-12 RX ADMIN — Medication 10 ML: at 10:55

## 2018-12-12 RX ADMIN — VANCOMYCIN HYDROCHLORIDE 500 MG: 1 INJECTION, POWDER, LYOPHILIZED, FOR SOLUTION INTRAVENOUS at 10:25

## 2018-12-12 RX ADMIN — Medication 20 ML: at 10:00

## 2018-12-12 NOTE — PROGRESS NOTES
Pt ambulatory to area c/o increased fatigue. Received treatment per order, tolerated well. Aware of next appt on [email or web address]. Advised to call dr with any issues/concerns. Discharged home without complaints.

## 2018-12-13 ENCOUNTER — HOSPITAL ENCOUNTER (OUTPATIENT)
Dept: INFUSION THERAPY | Age: 53
Discharge: HOME | End: 2018-12-13
Payer: COMMERCIAL

## 2018-12-13 VITALS
RESPIRATION RATE: 18 BRPM | DIASTOLIC BLOOD PRESSURE: 56 MMHG | HEART RATE: 60 BPM | OXYGEN SATURATION: 99 % | SYSTOLIC BLOOD PRESSURE: 109 MMHG | TEMPERATURE: 97.8 F

## 2018-12-13 LAB
CREAT SERPL-MCNC: 2.36 MG/DL (ref 0.8–1.5)
VANCOMYCIN TROUGH SERPL-MCNC: 4 UG/ML (ref 5–20)

## 2018-12-13 PROCEDURE — 82565 ASSAY OF CREATININE: CPT

## 2018-12-13 PROCEDURE — 80202 ASSAY OF VANCOMYCIN: CPT

## 2018-12-13 PROCEDURE — 96365 THER/PROPH/DIAG IV INF INIT: CPT

## 2018-12-13 PROCEDURE — 74011000258 HC RX REV CODE- 258: Performed by: NURSE PRACTITIONER

## 2018-12-13 PROCEDURE — 74011250636 HC RX REV CODE- 250/636: Performed by: NURSE PRACTITIONER

## 2018-12-13 RX ORDER — SODIUM CHLORIDE 0.9 % (FLUSH) 0.9 %
10 SYRINGE (ML) INJECTION AS NEEDED
Status: DISCONTINUED | OUTPATIENT
Start: 2018-12-13 | End: 2018-12-17 | Stop reason: HOSPADM

## 2018-12-13 RX ADMIN — VANCOMYCIN HYDROCHLORIDE 500 MG: 1 INJECTION, POWDER, LYOPHILIZED, FOR SOLUTION INTRAVENOUS at 09:55

## 2018-12-13 RX ADMIN — Medication 10 ML: at 09:35

## 2018-12-13 RX ADMIN — Medication 10 ML: at 10:25

## 2018-12-13 NOTE — PROGRESS NOTES
Pt ambulatory to area without complaints. Received treatment per order, tolerated well. Aware of next appt on [email or web address]. Advised to call dr with any issues/concerns. Discharged home without complaints.

## 2018-12-14 ENCOUNTER — HOSPITAL ENCOUNTER (OUTPATIENT)
Dept: INFUSION THERAPY | Age: 53
Discharge: HOME | End: 2018-12-14
Payer: COMMERCIAL

## 2018-12-14 VITALS
OXYGEN SATURATION: 98 % | DIASTOLIC BLOOD PRESSURE: 75 MMHG | TEMPERATURE: 97.6 F | RESPIRATION RATE: 18 BRPM | SYSTOLIC BLOOD PRESSURE: 152 MMHG | HEART RATE: 66 BPM

## 2018-12-14 PROCEDURE — 74011000258 HC RX REV CODE- 258: Performed by: NURSE PRACTITIONER

## 2018-12-14 PROCEDURE — 74011250636 HC RX REV CODE- 250/636: Performed by: NURSE PRACTITIONER

## 2018-12-14 PROCEDURE — 96365 THER/PROPH/DIAG IV INF INIT: CPT

## 2018-12-14 RX ORDER — SODIUM CHLORIDE 0.9 % (FLUSH) 0.9 %
10-30 SYRINGE (ML) INJECTION AS NEEDED
Status: DISCONTINUED | OUTPATIENT
Start: 2018-12-14 | End: 2018-12-18 | Stop reason: HOSPADM

## 2018-12-14 RX ADMIN — Medication 20 ML: at 08:00

## 2018-12-14 RX ADMIN — VANCOMYCIN HYDROCHLORIDE 500 MG: 1 INJECTION, POWDER, LYOPHILIZED, FOR SOLUTION INTRAVENOUS at 08:26

## 2018-12-14 RX ADMIN — Medication 20 ML: at 08:56

## 2018-12-14 RX ADMIN — Medication 20 ML: at 07:36

## 2018-12-14 NOTE — PROGRESS NOTES
Arrived to the [facility]. Assessment complete. Vancomycin completed. Patient tolerated without problems. Any issues or concerns during appointment: None. Patient aware of next infusion appointment on 12/15/218 (date) at 1300 (time). Discharged ambulatory via cane.

## 2018-12-15 ENCOUNTER — HOSPITAL ENCOUNTER (OUTPATIENT)
Dept: INFUSION THERAPY | Age: 53
Discharge: HOME | End: 2018-12-15
Payer: COMMERCIAL

## 2018-12-15 VITALS
OXYGEN SATURATION: 97 % | HEART RATE: 56 BPM | SYSTOLIC BLOOD PRESSURE: 134 MMHG | DIASTOLIC BLOOD PRESSURE: 73 MMHG | RESPIRATION RATE: 18 BRPM | TEMPERATURE: 97.6 F

## 2018-12-15 PROCEDURE — 74011000258 HC RX REV CODE- 258: Performed by: NURSE PRACTITIONER

## 2018-12-15 PROCEDURE — 74011250636 HC RX REV CODE- 250/636: Performed by: NURSE PRACTITIONER

## 2018-12-15 PROCEDURE — 96365 THER/PROPH/DIAG IV INF INIT: CPT

## 2018-12-15 RX ORDER — SODIUM CHLORIDE 0.9 % (FLUSH) 0.9 %
10 SYRINGE (ML) INJECTION AS NEEDED
Status: DISCONTINUED | OUTPATIENT
Start: 2018-12-15 | End: 2018-12-19 | Stop reason: HOSPADM

## 2018-12-15 RX ADMIN — Medication 10 ML: at 13:50

## 2018-12-15 RX ADMIN — VANCOMYCIN HYDROCHLORIDE 500 MG: 1 INJECTION, POWDER, LYOPHILIZED, FOR SOLUTION INTRAVENOUS at 13:20

## 2018-12-15 NOTE — PROGRESS NOTES
Arrived to the [facility]. Vacomycin completed. Patient tolerated well. Any issues or concerns during appointment: none. Patient aware of next infusion appointment on 12/16/18. Discharged ambulatory.     Jun Menon RN

## 2018-12-16 ENCOUNTER — HOSPITAL ENCOUNTER (OUTPATIENT)
Dept: INFUSION THERAPY | Age: 53
Discharge: HOME | End: 2018-12-16
Payer: COMMERCIAL

## 2018-12-16 VITALS
TEMPERATURE: 97.7 F | DIASTOLIC BLOOD PRESSURE: 77 MMHG | HEART RATE: 59 BPM | RESPIRATION RATE: 18 BRPM | OXYGEN SATURATION: 98 % | SYSTOLIC BLOOD PRESSURE: 138 MMHG

## 2018-12-16 PROCEDURE — 74011000258 HC RX REV CODE- 258: Performed by: NURSE PRACTITIONER

## 2018-12-16 PROCEDURE — 74011250636 HC RX REV CODE- 250/636: Performed by: NURSE PRACTITIONER

## 2018-12-16 PROCEDURE — 96365 THER/PROPH/DIAG IV INF INIT: CPT

## 2018-12-16 RX ORDER — SODIUM CHLORIDE 0.9 % (FLUSH) 0.9 %
10-30 SYRINGE (ML) INJECTION AS NEEDED
Status: DISCONTINUED | OUTPATIENT
Start: 2018-12-16 | End: 2018-12-20 | Stop reason: HOSPADM

## 2018-12-16 RX ADMIN — Medication 20 ML: at 08:59

## 2018-12-16 RX ADMIN — VANCOMYCIN HYDROCHLORIDE 500 MG: 1 INJECTION, POWDER, LYOPHILIZED, FOR SOLUTION INTRAVENOUS at 08:29

## 2018-12-16 RX ADMIN — Medication 20 ML: at 07:35

## 2018-12-16 NOTE — PROGRESS NOTES
[facility] Assessment complete. Vancomycin completed. Patient tolerated without problems. Any issues or concerns during appointment: None. Patient aware of next infusion appointment on 12/17/218 (date) at 0800(time). Discharged ambulatory via cane.

## 2018-12-17 ENCOUNTER — HOSPITAL ENCOUNTER (OUTPATIENT)
Dept: INFUSION THERAPY | Age: 53
Discharge: HOME | End: 2018-12-17
Payer: COMMERCIAL

## 2018-12-17 ENCOUNTER — APPOINTMENT (OUTPATIENT)
Dept: INFUSION THERAPY | Age: 53
End: 2018-12-17
Payer: COMMERCIAL

## 2018-12-17 VITALS
SYSTOLIC BLOOD PRESSURE: 130 MMHG | OXYGEN SATURATION: 99 % | HEART RATE: 76 BPM | TEMPERATURE: 97.7 F | DIASTOLIC BLOOD PRESSURE: 95 MMHG | RESPIRATION RATE: 18 BRPM

## 2018-12-17 LAB
BASOPHILS # BLD: 0.1 K/UL (ref 0–0.2)
BASOPHILS NFR BLD: 1 % (ref 0–2)
CREAT SERPL-MCNC: 1.91 MG/DL (ref 0.8–1.5)
CRP SERPL-MCNC: 5.6 MG/DL (ref 0–0.9)
DIFFERENTIAL METHOD BLD: ABNORMAL
EOSINOPHIL # BLD: 0.2 K/UL (ref 0–0.8)
EOSINOPHIL NFR BLD: 2 % (ref 0.5–7.8)
ERYTHROCYTE [DISTWIDTH] IN BLOOD BY AUTOMATED COUNT: 14.4 % (ref 11.9–14.6)
HCT VFR BLD AUTO: 38.5 % (ref 41.1–50.3)
HGB BLD-MCNC: 12.2 G/DL (ref 13.6–17.2)
IMM GRANULOCYTES # BLD: 0 K/UL (ref 0–0.5)
IMM GRANULOCYTES NFR BLD AUTO: 1 % (ref 0–5)
LYMPHOCYTES # BLD: 3 K/UL (ref 0.5–4.6)
LYMPHOCYTES NFR BLD: 34 % (ref 13–44)
MCH RBC QN AUTO: 26.1 PG (ref 26.1–32.9)
MCHC RBC AUTO-ENTMCNC: 31.7 G/DL (ref 31.4–35)
MCV RBC AUTO: 82.3 FL (ref 79.6–97.8)
MONOCYTES # BLD: 0.4 K/UL (ref 0.1–1.3)
MONOCYTES NFR BLD: 5 % (ref 4–12)
NEUTS SEG # BLD: 5 K/UL (ref 1.7–8.2)
NEUTS SEG NFR BLD: 58 % (ref 43–78)
NRBC # BLD: 0 K/UL (ref 0–0.2)
PLATELET # BLD AUTO: 429 K/UL (ref 150–450)
PMV BLD AUTO: 9.1 FL (ref 9.4–12.3)
RBC # BLD AUTO: 4.68 M/UL (ref 4.23–5.6)
VANCOMYCIN TROUGH SERPL-MCNC: 4.4 UG/ML (ref 5–20)
WBC # BLD AUTO: 8.6 K/UL (ref 4.3–11.1)

## 2018-12-17 PROCEDURE — 85025 COMPLETE CBC W/AUTO DIFF WBC: CPT

## 2018-12-17 PROCEDURE — 82565 ASSAY OF CREATININE: CPT

## 2018-12-17 PROCEDURE — 74011000258 HC RX REV CODE- 258: Performed by: NURSE PRACTITIONER

## 2018-12-17 PROCEDURE — 80202 ASSAY OF VANCOMYCIN: CPT

## 2018-12-17 PROCEDURE — 96365 THER/PROPH/DIAG IV INF INIT: CPT

## 2018-12-17 PROCEDURE — 86140 C-REACTIVE PROTEIN: CPT

## 2018-12-17 PROCEDURE — 74011250636 HC RX REV CODE- 250/636: Performed by: NURSE PRACTITIONER

## 2018-12-17 RX ORDER — SODIUM CHLORIDE 0.9 % (FLUSH) 0.9 %
10 SYRINGE (ML) INJECTION AS NEEDED
Status: ACTIVE | OUTPATIENT
Start: 2018-12-17 | End: 2018-12-17

## 2018-12-17 RX ADMIN — Medication 10 ML: at 08:35

## 2018-12-17 RX ADMIN — Medication 10 ML: at 08:00

## 2018-12-17 RX ADMIN — SODIUM CHLORIDE 500 MG: 900 INJECTION, SOLUTION INTRAVENOUS at 08:05

## 2018-12-17 NOTE — PROGRESS NOTES
Arrived to the [facility]. Vancomycin completed. Patient tolerated well. Any issues or concerns during appointment: None. Patient aware of next infusion appointment on December 18th at 0800. Discharged ambulatory.

## 2018-12-18 ENCOUNTER — HOSPITAL ENCOUNTER (OUTPATIENT)
Dept: INFUSION THERAPY | Age: 53
Discharge: HOME | End: 2018-12-18
Payer: COMMERCIAL

## 2018-12-18 VITALS
SYSTOLIC BLOOD PRESSURE: 137 MMHG | OXYGEN SATURATION: 95 % | DIASTOLIC BLOOD PRESSURE: 81 MMHG | RESPIRATION RATE: 18 BRPM | TEMPERATURE: 97.9 F | HEART RATE: 66 BPM

## 2018-12-18 PROCEDURE — 96365 THER/PROPH/DIAG IV INF INIT: CPT

## 2018-12-18 PROCEDURE — 74011250636 HC RX REV CODE- 250/636: Performed by: NURSE PRACTITIONER

## 2018-12-18 PROCEDURE — 74011000258 HC RX REV CODE- 258: Performed by: NURSE PRACTITIONER

## 2018-12-18 RX ORDER — HEPARIN 100 UNIT/ML
300-600 SYRINGE INTRAVENOUS ONCE
Status: DISCONTINUED | OUTPATIENT
Start: 2018-12-18 | End: 2018-12-18

## 2018-12-18 RX ORDER — SODIUM CHLORIDE 0.9 % (FLUSH) 0.9 %
10-20 SYRINGE (ML) INJECTION AS NEEDED
Status: ACTIVE | OUTPATIENT
Start: 2018-12-18 | End: 2018-12-18

## 2018-12-18 RX ADMIN — Medication 10 ML: at 08:20

## 2018-12-18 RX ADMIN — VANCOMYCIN HYDROCHLORIDE 500 MG: 1 INJECTION, POWDER, LYOPHILIZED, FOR SOLUTION INTRAVENOUS at 07:50

## 2018-12-18 RX ADMIN — Medication 10 ML: at 07:50

## 2018-12-19 ENCOUNTER — HOSPITAL ENCOUNTER (OUTPATIENT)
Dept: INFUSION THERAPY | Age: 53
Discharge: HOME | End: 2018-12-19
Payer: COMMERCIAL

## 2018-12-19 ENCOUNTER — HOSPITAL ENCOUNTER (OUTPATIENT)
Dept: INTERVENTIONAL RADIOLOGY/VASCULAR | Age: 53
Discharge: HOME | End: 2018-12-19
Attending: NURSE PRACTITIONER
Payer: COMMERCIAL

## 2018-12-19 VITALS
SYSTOLIC BLOOD PRESSURE: 134 MMHG | HEART RATE: 62 BPM | OXYGEN SATURATION: 100 % | TEMPERATURE: 98.4 F | RESPIRATION RATE: 18 BRPM | DIASTOLIC BLOOD PRESSURE: 74 MMHG

## 2018-12-19 VITALS
RESPIRATION RATE: 18 BRPM | OXYGEN SATURATION: 98 % | TEMPERATURE: 97.9 F | DIASTOLIC BLOOD PRESSURE: 80 MMHG | SYSTOLIC BLOOD PRESSURE: 165 MMHG | HEART RATE: 62 BPM

## 2018-12-19 DIAGNOSIS — Z79.2 ANTIBIOTIC LONG-TERM USE: ICD-10-CM

## 2018-12-19 PROCEDURE — 36589 REMOVAL TUNNELED CV CATH: CPT

## 2018-12-19 PROCEDURE — 74011250636 HC RX REV CODE- 250/636: Performed by: NURSE PRACTITIONER

## 2018-12-19 PROCEDURE — 96365 THER/PROPH/DIAG IV INF INIT: CPT

## 2018-12-19 PROCEDURE — 74011000258 HC RX REV CODE- 258: Performed by: NURSE PRACTITIONER

## 2018-12-19 RX ORDER — SODIUM CHLORIDE 0.9 % (FLUSH) 0.9 %
10 SYRINGE (ML) INJECTION AS NEEDED
Status: ACTIVE | OUTPATIENT
Start: 2018-12-19 | End: 2018-12-19

## 2018-12-19 RX ADMIN — VANCOMYCIN HYDROCHLORIDE 500 MG: 1 INJECTION, POWDER, LYOPHILIZED, FOR SOLUTION INTRAVENOUS at 10:41

## 2018-12-19 RX ADMIN — Medication 10 ML: at 11:11

## 2018-12-19 RX ADMIN — Medication 10 ML: at 10:41

## 2018-12-19 NOTE — DISCHARGE INSTRUCTIONS
[address]  Department of Interventional Radiology  [facility]  [phone] Office  [phone] Fax    DRAIN/PORT/CATHETER REMOVAL  DISCHARGE INSTRUCTIONS    General Information:     Your doctor has ordered for us to remove your drain, port, or catheter. This could be that you do not need it anymore, it is not doing its job, your physician has decided on another plan for your treatment and/or it may need replacing. Home Care Instructions: You can resume your regular diet and medication regimen. Do not drink alcohol, drive, or make any important legal decisions in the next 24 hours. Do not lift anything heavier than a gallon of milk, or do anything strenuous for the next 24 hours. You will notice a dressing over the site of the removal. This dressing should stay in place until the site is healed. The dressing should be changed at least every 48 hours. You should change the dressing sooner if it becomes soiled or wet. The nurse who discharges you to home should review with you any wound care instructions. Resume your normal level of activity slowly. You may shower after 24 hours, but do not take a bath, swim or immerse yourself in water until the site has healed, and keep the dressing dry with plastic wrap while showering. The site may ooze for a couple of days. This drainage should lessen with each passing day. Call If:     You should call your Physician and/or the Radiology Nurse if you have any bleeding other than a small spot on your bandage. Call if you have any signs of infection, fever, or increased pain at the site of the tube. Call if the oozing increases, if it changes color, or begins to have an odor. Follow-Up Instructions: Please see your ordering doctor as he/she has requested.    Interventional Radiology General Nurse Discharge    After general anesthesia or intravenous sedation, for 24 hours or while taking prescription Narcotics:  · Limit your activities  · Do not drive and operate hazardous machinery  · Do not make important personal or business decisions  · Do  not drink alcoholic beverages  · If you have not urinated within 8 hours after discharge, please contact your surgeon on call. * Please give a list of your current medications to your Primary Care Provider. * Please update this list whenever your medications are discontinued, doses are     changed, or new medications (including over-the-counter products) are added. * Please carry medication information at all times in case of emergency situations. These are general instructions for a healthy lifestyle:    No smoking/ No tobacco products/ Avoid exposure to second hand smoke  Surgeon General's Warning:  Quitting smoking now greatly reduces serious risk to your health. Obesity, smoking, and sedentary lifestyle greatly increases your risk for illness  A healthy diet, regular physical exercise & weight monitoring are important for maintaining a healthy lifestyle    You may be retaining fluid if you have a history of heart failure or if you experience any of the following symptoms:  Weight gain of 3 pounds or more overnight or 5 pounds in a week, increased swelling in our hands or feet or shortness of breath while lying flat in bed. Please call your doctor as soon as you notice any of these symptoms; do not wait until your next office visit. Recognize signs and symptoms of STROKE:  F-face looks uneven    A-arms unable to move or move unevenly    S-speech slurred or non-existent    T-time-call 911 as soon as signs and symptoms begin-DO NOT go       Back to bed or wait to see if you get better-TIME IS BRAIN. To Reach Us: If you have any questions about your procedure, please call the Interventional Radiology department at [phone].  After business hours (5pm) and weekends, call the answering service at [phone] and ask for the Radiologist on call to be paged. Si tiene Preguntas acerca del procedimiento, por favor llame al departamento de Radiología Intervencional al [phone]. Después de horas de oficina (5 pm) y los fines de [place], llamar al Mavis Shad de llamadas al [phone] y pregunte por el Radiologo de [facility].          Patient Signature:  Date: 12/19/2018  Discharging Nurse: Mir Tolentino RN

## 2018-12-19 NOTE — PROGRESS NOTES
Arrived to the [facility] include [facility] beds at the [facility]. Vancomycin completed. Patient tolerated well. Any issues or concerns during appointment: None. Discharged ambulatory.

## 2018-12-21 ENCOUNTER — HOSPITAL ENCOUNTER (OUTPATIENT)
Dept: PHYSICAL THERAPY | Age: 53
End: 2018-12-21

## 2018-12-26 ENCOUNTER — HOSPITAL ENCOUNTER (OUTPATIENT)
Dept: PHYSICAL THERAPY | Age: 53
Discharge: HOME | End: 2018-12-26
Payer: COMMERCIAL

## 2018-12-26 PROCEDURE — 97110 THERAPEUTIC EXERCISES: CPT

## 2018-12-26 PROCEDURE — 97161 PT EVAL LOW COMPLEX 20 MIN: CPT

## 2018-12-26 NOTE — THERAPY EVALUATION
Evelio Back  : 1965  Payor: Shai July / Plan: Susu John RPN / Product Type: Commerical /  [address] at [facility] [place]  [address], [address], [place] [address].  [phone]   [phone]       OUTPATIENT PHYSICAL THERAPY:Initial Assessment 2018     ICD-10: Treatment Diagnosis: Q49.169 R shoulder stiffness, M25.652 L hip stiffness, R26.2 Difficulty walking; M25.511 R shoulder pain, M25.552 L hip pain. Precautions/Allergies:   Patient has no known allergies. Fall Risk Score: 1 (? 5 = High Risk)  MD Orders: eval and treat, HEP, ROM, Strengthening:  Full motion, full strength, all modalities. MEDICAL/REFERRING DIAGNOSIS:  S/P RT Shoulder I&D; LT Hip    DATE OF ONSET: 18  REFERRING PHYSICIAN: Tracey Pascual MD  RETURN PHYSICIAN APPOINTMENT: unknown     INITIAL ASSESSMENT:  Mr. Dwayne Teresa is approximately 3 weeks s/p L hip total hip revision and lavage and debridement of R shoulder after being diagnosed with infection to both areas. He had previous surgeries to L hip in 2017 and R shoulder in 2017. He mostly c/o L hip pain post surgery and has not been moving arm too much. Post-op pain, swelling, wound healing, weakness and decreased ROM are limiting normalized use and function of L LE and R UE. He will benefit from PT for guided shoulder and hip rehab. PROBLEM LIST (Impacting functional limitations):  1. Decreased Strength  2. Decreased ADL/Functional Activities  3. Post-op R shoulder and L hiop pain and swelling  4. Decreased R shoulder and L hip ROM   5. Weakness R shoulder and L hip  6. Difficulty with walking. INTERVENTIONS PLANNED:  1. Modalities PRN, including ultrasound, estim, and iontophoresis  2. Soft tissue and joint mobilization for ROM and flexibility  3. Stretching, progressive resistive exercises and HEP for return to functional activities. 4. Back Education and Training for body mechanics with activities of daily living  5.  If needed, aquatic therapy. TREATMENT PLAN:  Effective Dates: 12/26/2018 TO 3/26/2019 (90 days). Frequency/Duration: 3 times a week for 3 weeks, then 2x per week for 3 weeks. Plan of care to expand to 90 days and will likely continue at 2 per week and then decrease to 1x per week or less as appropriate. GOALS: (Goals have been discussed and agreed upon with patient.)  Short-Term Functional Goals: Time Frame: 4 weeks  1. Report no more than 3/10 intermittent pain to Rshoulder with compensatory use during basic functional activities. 2. R shoulder PROM forward elevation greater than 150 degrees and external rotation greater than 60 degrees to progress into functional ranges. 3. L hip AROM for abduction at least 20 degrees, extension at least 8 degrees for ease with functional activities (walking and car transfers). 4. Demonstrate good R shoulder and L hip isometric strength with manual testing to progress into strength phase. 5. Returns to normalized walking for house hold distances. 6. Independent with initial HEP. Discharge Goals: Time Frame: 12 weeks  1. Return to good strength with going up/down at least 2 flights of stairs. 2. No more than 2/10 intermittent pain R shoulder and L hip pain with return to normalized household and work activities. 3. R shoulder AROM forward elevation greater than 15o degrees, external rotation greater than89 degrees, and strength to shoulder are grossly WNL's for safe use with normalized activities. 4. Demonstrate good functional shoulder and hip strength and endurance for return to normalized household and work activities. 5. DASH score no greater than 15 and LEFS score at least 70/80 for return to full function. 6. Independent with advanced shoulder HEP for continued self-management.   Rehabilitation Potential For Stated Goals: Good  Regarding Nellene Pilling therapy, I certify that the treatment plan above will be carried out by a therapist or under their direction. Thank you for this referral,    Ashley Gann PT       Referring Physician Signature:               Date                                                                             Posturszula, Clyde Marinelli MD            The information in this section was collected on 12/26/18 (except where otherwise noted). HISTORY:      Ambulatory/Rehab Services H2 Model Falls Risk Assessment    Risk Factors:       (1)  Gender [Male] Ability to Rise from Chair:       (1)  Pushes up, successful in one attempt    Falls Prevention Plan:       No modifications necessary   Total: (5 or greater = High Risk): 2    ©2010 [facility] [place]. All Rights Reserved. [facility] States Patent #3,[id number]. Federal Law prohibits the replication, distribution or use without written permission from [employer]       History of Present Injury/Illness (Reason for Referral):  Mr. Julio Maurice is s/p L hip total hip revision and lavage and debridement of R shoulder after being diagnosed with infection to both areas. He had previous surgeries to L hip in Jan 2017 and R shoulder in mid 2017. He mostly c/o L hip pain post surgery and has not been moving arm too much. States that he was in excellent shape prior to this incident and has lost much muscle tone and weight. He also states that his energy level is very low and this is not his norm. He comes to outpatient therapy after undergoing several rounds of infusion to treat staff infection. Was told that he has retorn R RTC from previous surgery but MD chose not to repair at this time. Past Medical History/Comorbidities: Mr. Julio Maurice  has a past medical history of Arthritis, Biceps muscle tear, Chronic pain, Insomnia, MRSA (methicillin resistant Staphylococcus aureus) infection, Personal history of kidney stones, and Right shoulder pain.   Mr. Julio Maurice  has a past surgical history that includes hx other surgical; hx rotator cuff repair (Left, 2010); hx orthopaedic (Left, 01/2017); hx septoplasty (11/15/2017); HIP ARTHROPLASTY TOTAL REVISION (Left, 11/6/2018); SHOULDER I&D (Right, 11/6/2018); RIGHT SHOULDER ARTHROSCOPY SUBACROMIAL DECOMPRESSION ROTATOR CUFF REPAIR/ DISTAL CLAVICLE RESECTION (Right, 6/13/2017); LEFT TOTAL HIP ARTHROPLASTY (Left, 1/6/2017); and SHOULDER ARTHROSCOPY ACROMIOPLASTY ROTATOR CUFF REPAIR (Left, 10/21/2010). Social History/Living Environment:   1 story- lives with spouse with tub/shower combo  Prior Level of Function/Work/Activity: very active with workouts - cardio and weight lifting/ pt currently not working. He did not take any medications prior to this incident. Dominant Side:         RIGHT  Previous Treatment Approaches:          Surgeries in 2017 for R shoulder RTC repair and L THR prior to this incident  Current Medications:     Doxycycline, flexeril, oxycodone, lopressor, amlodipine, sleep aide   Date Last Reviewed:  12/26/18   Number of Personal Factors/Comorbidities that affect the Plan of Care: 1-2: MODERATE COMPLEXITY   EXAMINATION:     Observation/Orthostatic Postural Assessment:  Surgical wound to R  shoulder and L hip appears to be well healed with no drainage or redness noted. R shoulder elevated.      Palpation:   decreased L 1st rib inferior glide; tight L inferior GH capsule  Standing:  Stands with weight shifted to R LE.  ROM:cervical ROM = WFL for all planes   AROM Right shoulder  Left shoulder   Forward elevation 40 painful WNL   Abduction 40 painful WNL   External rotation - WNL   Internal rotation - WNL   Horz ABD - -      PROM RIght shoulder  Left shoulder   Flexion 125    Abduction 110    External rotation 40    Internal rotation 65    Horz ABD -         HIP AROM Right hip  Left hip   Flexion  90   Extension  -5   Abduction  15   External rotation  5   Internal rotation  45              Strength:    Right shoulder  Left shoulder   Forward elevation 3- -   Abduction - -   External rotation 4- 4-   Internal rotation 4 4   Horz ABD - -      HIP Right hip  Left hip   Flexion - 3+   Extension - 3+   Abduction - 3+   External rotation - 3+   Internal rotation - -                      Special Tests:   Flexibility:   · Hamstring Length (in 90/90 position):R not assessed today L -50 degrees  · Straight Leg Raise Test: negative R  · Jorge Test (2-joint hip flexors): not assessed today  · Prone Knee Bend: negative  · Cj's Test:not assessed today  · Gastrocnemius not assessed today  Neurological Screen: Motor intact to R UE, L LE. Sensory not assessed today. Functional Mobility:  Walking : walks with cane in R hand for now secondary to delayed onset of pain in L hip with walking. Sit to/from Stand: pushes off with both hands. Bed Mobility: independent with compensation. Independent with compensation with basic mobility and uses cane for walking. Independent with compensation with dressing and grooming ADL's. Balance:  Did well with transfers and able to walk a straight path        Body Structures Involved:  1. Joints  2. Muscles Body Functions Affected:  1. Movement Related Activities and Participation Affected:  1. General Tasks and Demands  2. Mobility  3. Community, Social and [place]   Number of elements (examined above) that affect the Plan of Care: 4+: HIGH COMPLEXITY   CLINICAL PRESENTATION:   Presentation: Stable and uncomplicated: LOW COMPLEXITY   CLINICAL DECISION MAKING:   Outcome Measure: Tool Used: Lower Extremity Functional Scale (LEFS)  Score:  Initial: 7/80 Most Recent: X/80 (Date: -- )   Interpretation of Score: 20 questions each scored on a 5 point scale with 0 representing \"extreme difficulty or unable to perform\" and 4 representing \"no difficulty\". The lower the score, the greater the functional disability. 80/80 represents no disability. Minimal detectable change is 9 points.   Score 80 79-63 62-48 47-32 31-16 15-1 0   Modifier CH CI CJ CK CL CM CN       Tool Used: Disabilities of the Arm, Shoulder and Hand (DASH) Questionnaire - Quick Version  Score:  Initial: 36/55  Most Recent: X/55 (Date: -- )   Interpretation of Score: The DASH is designed to measure the activities of daily living in person's with upper extremity dysfunction or pain. Each section is scored on a 1-5 scale, 5 representing the greatest disability. The scores of each section are added together for a total score of 55. This number is divided by 11, followed by subtracting 1 and multiplying by 25 to get a percent score of disability. This value represents the percentage disability: 0-20% minimal disability; 20-40% moderate disability; 40-60% severe disability; % dependent for care or exaggerated symptom behavior. Minimal detectable change is 12%. Score 11 12-19 20-28 29-37 38-45 46-54 55   Modifier CH CI CJ CK CL CM CN       Medical Necessity:   · Patient is expected to demonstrate progress in strength and range of motion to increase independence with walking, basic mobility and use of R UE. .  Reason for Services/Other Comments:  · Patient continues to require skilled intervention due to continued weakness and stiffness in R UE, L LE and difficulty with functional activities. .   Use of outcome tool(s) and clinical judgement create a POC that gives a: Clear prediction of patient's progress: LOW COMPLEXITY            TREATMENT:   (In addition to Assessment/Re-Assessment sessions the following treatments were rendered)  Pre-treatment Symptoms/Complaints: L buttock and anterior hip pain- sometimes goes pain goes past knee- has knee pain with walking and R shoulder pain with AROM- very difficult to move arm. Pain: Initial:   8/10 Post Session:  Less than 8/10     Therapeutic Exercise ( 15 min): PROM R shoulder x10 each to ER and IR in shoulder neutral and scaption plane, abduction, and forward elevation.   Instruction and performance in post-op R  shoulder exercises, including AAROM for R shoulder flexion in supine with hand clasp and in sitting with leaning forward on table. For L hip did : L hip ER AROM in supine x 10 then sitting LAQ x 20, L hip ER with yellow tband x 30 and then LAQ with tband x 10. HEP: Provided written HEP for the above exercises. He verbalizes understanding. Treatment/Session Assessment:    · Response to Treatment:  Improvement in shoulder motion. Needed cues for no PPT with sitting LAQ and made need follow up on this. · Compliance with Program/Exercises: Will assess as treatment progresses. · Recommendations/Intent for next treatment session: \"Next visit will focus on advancements to more challenging activities\".    Total Treatment Duration: 65 Minutes  PT Patient Time In/Time Out  Time In: 1355  Time Out: [address], PT

## 2019-01-02 ENCOUNTER — HOSPITAL ENCOUNTER (OUTPATIENT)
Dept: PHYSICAL THERAPY | Age: 54
Discharge: HOME | End: 2019-01-02
Payer: COMMERCIAL

## 2019-01-02 PROCEDURE — 97140 MANUAL THERAPY 1/> REGIONS: CPT

## 2019-01-02 PROCEDURE — 97110 THERAPEUTIC EXERCISES: CPT

## 2019-01-02 NOTE — PROGRESS NOTES
Ron Huerta : 1965 Payor: Elsie Alba / Plan: 19 Alicia Marin / Product Type: [employer] /  [address]  at [address] [place] [address] [address] [facility], [address] [place] [address],[address], [address] [facility], [place] [phone]   [phone] OUTPATIENT PHYSICAL THERAPY:Daily Note 2019 ICD-10: Treatment Diagnosis: M25.611 R shoulder stiffness, M25.652 L hip stiffness, R26.2 Difficulty walking; M25.511 R shoulder pain, M25.552 L hip pain. Precautions/Allergies:  
Patient has no known allergies. Fall Risk Score: 1 (? 5 = High Risk) MD Orders: eval and treat, HEP, ROM, Strengthening: 
Full motion, full strength, all modalities. MEDICAL/REFERRING DIAGNOSIS: 
S/P RT Shoulder I&D; LT Hip DATE OF ONSET: 18 REFERRING PHYSICIAN: Sarahy Bonner MD 
RETURN PHYSICIAN APPOINTMENT: unknown INITIAL ASSESSMENT:  Mr. Annalisa Fowler is approximately 3 weeks s/p L hip total hip revision and lavage and debridement of R shoulder after being diagnosed with infection to both areas. He had previous surgeries to L hip in 2017 and R shoulder in 2017. He mostly c/o L hip pain post surgery and has not been moving arm too much. Post-op pain, swelling, wound healing, weakness and decreased ROM are limiting normalized use and function of L LE and R UE. He will benefit from PT for guided shoulder and hip rehab. PROBLEM LIST (Impacting functional limitations): 1. Decreased Strength 2. Decreased ADL/Functional Activities 3. Post-op R shoulder and L hiop pain and swelling 4. Decreased R shoulder and L hip ROM 5. Weakness R shoulder and L hip 6. Difficulty with walking. INTERVENTIONS PLANNED: 
1. Modalities PRN, including ultrasound, estim, and iontophoresis 2. Soft tissue and joint mobilization for ROM and flexibility 3. Stretching, progressive resistive exercises and HEP for return to functional activities. 4. Back Education and Training for body mechanics with activities of daily living 5. If needed, aquatic therapy. TREATMENT PLAN: 
Effective Dates: 12/26/2018 TO 3/26/2019 (90 days). Frequency/Duration: 3 times a week for 3 weeks, then 2x per week for 3 weeks. Plan of care to expand to 90 days and will likely continue at 2 per week and then decrease to 1x per week or less as appropriate. GOALS: (Goals have been discussed and agreed upon with patient.) Short-Term Functional Goals: Time Frame: 4 weeks 1. Report no more than 3/10 intermittent pain to R shoulder with compensatory use during basic functional activities. 2. R shoulder PROM forward elevation greater than 150 degrees and external rotation greater than 60 degrees to progress into functional ranges. 3. L hip AROM for abduction at least 20 degrees, extension at least 8 degrees for ease with functional activities (walking and car transfers). 4. Demonstrate good R shoulder and L hip isometric strength with manual testing to progress into strength phase. 5. Returns to normalized walking for house hold distances. 6. Independent with initial HEP. Discharge Goals: Time Frame: 12 weeks 1. Return to good strength with going up/down at least 2 flights of stairs. 2. No more than 2/10 intermittent pain R shoulder and L hip pain with return to normalized household and work activities. 3. R shoulder AROM forward elevation greater than 150 degrees, external rotation greater than 89 degrees, and strength to shoulder are grossly WNL's for safe use with normalized activities. 4. Demonstrate good functional shoulder and hip strength and endurance for return to normalized household and work activities. 5. DASH score no greater than 15 and LEFS score at least 70/80 for return to full function. 6. Independent with advanced shoulder HEP for continued self-management. Rehabilitation Potential For Stated Goals: Good The information in this section was collected on 12/26/18 (except where otherwise noted).  
HISTORY:  
 
 Ambulatory/Rehab Services H2 Model Falls Risk Assessment Total: (5 or greater = High Risk): 2  
 ©2010 [facility] of [place] States Patent #[id number]. Federal Law prohibits the replication, distribution or use without written permission from [facility] of [place] History of Present Injury/Illness (Reason for Referral):  Mr. Lobito Downey is s/p L hip total hip revision and lavage and debridement of R shoulder after being diagnosed with infection to both areas. He had previous surgeries to L hip in Jan 2017 and R shoulder in mid 2017. He mostly c/o L hip pain post surgery and has not been moving arm too much. States that he was in excellent shape prior to this incident and has lost much muscle tone and weight. He also states that his energy level is very low and this is not his norm. He comes to outpatient therapy after undergoing several rounds of infusion to treat staff infection. Was told that he has retorn R RTC from previous surgery but MD chose not to repair at this time. Past Medical History/Comorbidities: Mr. Lobito Downey  has a past medical history of Arthritis, Biceps muscle tear, Chronic pain, Insomnia, MRSA (methicillin resistant Staphylococcus aureus) infection, Personal history of kidney stones, and Right shoulder pain. Mr. Lobito Downey  has a past surgical history that includes hx other surgical; hx rotator cuff repair (Left, 2010); hx orthopaedic (Left, 01/2017); hx septoplasty (11/15/2017); HIP ARTHROPLASTY TOTAL REVISION (Left, 11/6/2018); SHOULDER I&D (Right, 11/6/2018); RIGHT SHOULDER ARTHROSCOPY SUBACROMIAL DECOMPRESSION ROTATOR CUFF REPAIR/ DISTAL CLAVICLE RESECTION (Right, 6/13/2017); LEFT TOTAL HIP ARTHROPLASTY (Left, 1/6/2017); and SHOULDER ARTHROSCOPY ACROMIOPLASTY ROTATOR CUFF REPAIR (Left, 10/21/2010). Social History/Living Environment:   1 story- lives with spouse with tub/shower combo Prior Level of Function/Work/Activity: very active with workouts - cardio and weight lifting/ pt currently not working. He did not take any medications prior to this incident. Dominant Side:  
      RIGHT Previous Treatment Approaches:   
      Surgeries in 2017 for R shoulder RTC repair and L THR prior to this incident Current Medications:   
 Doxycycline, flexeril, oxycodone, lopressor, amlodipine, sleep aide Date Last Reviewed:  1/2/19 EXAMINATION:  
 
Observation/Orthostatic Postural Assessment:  Surgical wound to R  shoulder and L hip appears to be well healed with no drainage or redness noted. R shoulder elevated. Palpation:   decreased L 1st rib inferior glide; tight L inferior GH capsule Standing:  Stands with weight shifted to R LE. 
ROM:cervical ROM = WFL for all planes AROM Right shoulder  Left shoulder Forward elevation 40 painful WNL Abduction 40 painful WNL External rotation - WNL Internal rotation - WNL Horz ABD - -  
  
PROM RIght shoulder  Left shoulder Flexion 125 Abduction 110 External rotation 40 Internal rotation 65 Horz ABD -   
  
 
HIP AROM Right hip  Left hip Flexion  90 Extension  -5 Abduction  15 External rotation  5 Internal rotation  45 Strength:  
 Right shoulder  Left shoulder Forward elevation 3- - Abduction - - External rotation 4- 4- Internal rotation 4 4 Horz ABD - -  
  
HIP Right hip  Left hip Flexion - 3+ Extension - 3+ Abduction - 3+ External rotation - 3+ Internal rotation - - Special Tests:  
Flexibility:  
· Hamstring Length (in 90/90 position):R not assessed today L -50 degrees · Straight Leg Raise Test: negative R · Jorge Test (2-joint hip flexors): not assessed today · Prone Knee Bend: negative · Cj's Test:not assessed today · Gastrocnemius not assessed today Neurological Screen: Motor intact to R UE, L LE. Sensory not assessed today.   
Functional Mobility:  Walking : walks with cane in R hand for now secondary to delayed onset of pain in L hip with walking. Sit to/from Stand: pushes off with both hands. Bed Mobility: independent with compensation. Independent with compensation with basic mobility and uses cane for walking. Independent with compensation with dressing and grooming ADL's. Balance:  Did well with transfers and able to walk a straight path Body Structures Involved: 1. Joints 2. Muscles Body Functions Affected: 1. Movement Related Activities and Participation Affected: 1. General Tasks and Demands 2. Mobility 3. Community, Social and [place] CLINICAL DECISION MAKING:  
Outcome Measure: Tool Used: Lower Extremity Functional Scale (LEFS) Score:  Initial: 7/80 Most Recent: X/80 (Date: -- ) Interpretation of Score: 20 questions each scored on a 5 point scale with 0 representing \"extreme difficulty or unable to perform\" and 4 representing \"no difficulty\". The lower the score, the greater the functional disability. 80/80 represents no disability. Minimal detectable change is 9 points. Score 80 79-63 62-48 47-32 31-16 15-1 0 Modifier CH CI CJ CK CL CM CN Tool Used: Disabilities of the Arm, Shoulder and Hand (DASH) Questionnaire - Quick Version Score:  Initial: 36/55  Most Recent: X/55 (Date: -- ) Interpretation of Score: The DASH is designed to measure the activities of daily living in person's with upper extremity dysfunction or pain. Each section is scored on a 1-5 scale, 5 representing the greatest disability. The scores of each section are added together for a total score of 55. This number is divided by 11, followed by subtracting 1 and multiplying by 25 to get a percent score of disability. This value represents the percentage disability: 0-20% minimal disability; 20-40% moderate disability; 40-60% severe disability; % dependent for care or exaggerated symptom behavior. Minimal detectable change is 12%. Score 11 12-19 20-28 29-37 38-45 46-54 55 Modifier CH CI CJ CK CL CM CN Medical Necessity:  
· Patient is expected to demonstrate progress in strength and range of motion to increase independence with walking, basic mobility and use of R UE. Eura Blayne Reason for Services/Other Comments: 
· Patient continues to require skilled intervention due to continued weakness and stiffness in R UE, L LE and difficulty with functional activities. Eura Blayne TREATMENT:  
(In addition to Assessment/Re-Assessment sessions the following treatments were rendered) Pre-treatment Symptoms/Complaints: Reports L leg is really hurting today from walking on it and from driving a stick shift car. States they xrayed his leg last week and everything was okay. Pain: Initial: Pain Intensity 1: 9   Post Session:  Patient said pain increased, but did not quantify. Therapeutic Exercise: (15 Minutes):  Exercises per grid below to improve mobility and strength. Required moderate visual and verbal cues to ensure correct performance. Progressed complexity of movement as indicated. Date: 
1/2/19 Date: 
 Date: Activity/Exercise Parameters Parameters Parameters AAROM R shoulder flex Supine - with L hand assist 1x10 LTR 1x10    
bridging 2x10 SLR   B 2x10 Hip ER with band Red 2x15 HEP: continue current HEP Manual Therapy ( 25 minutes) - for motion - grade 2 to 4 physio mobs R shoulder flex, abduct, IR and ER at multiple angles. Grade 2 to 4 inferior and posterior shoulder glides. Grade 2 to 4- physio mobs L hip abduct,,extn,  IR and ER. Treatment/Session Assessment:   
· Response to Treatment: Patient with continued pain in L LE which he describes as \"nerve pain\". Shoulder not as painful. · Compliance with Program/Exercises: Will assess as treatment progresses. · Recommendations/Intent for next treatment session: \"Next visit will focus on pain reduction and progression of exercises\". Total Treatment Duration: 40 Minutes PT Patient Time In/Time Out Time In: 5251 Time Out: 1428 Dorcas Guillen, PT

## 2019-01-03 ENCOUNTER — HOSPITAL ENCOUNTER (OUTPATIENT)
Dept: PHYSICAL THERAPY | Age: 54
Discharge: HOME | End: 2019-01-03
Payer: COMMERCIAL

## 2019-01-03 PROCEDURE — 97140 MANUAL THERAPY 1/> REGIONS: CPT

## 2019-01-03 PROCEDURE — 97110 THERAPEUTIC EXERCISES: CPT

## 2019-01-03 NOTE — PROGRESS NOTES
Dawn Bose : 1965 Payor: Romario Prakash / Plan: 19 Alicia Marin / Product Type: Commerical /  [address]  at [facility] GEORGE BUTLER [address] [facility], [address] [place] [address],[address], [address] [facility] [phone]   [phone] OUTPATIENT PHYSICAL THERAPY:Daily Note 1/3/2019 ICD-10: Treatment Diagnosis: M25.611 R shoulder stiffness, M25.652 L hip stiffness, R26.2 Difficulty walking; M25.511 R shoulder pain, M25.552 L hip pain. Precautions/Allergies:  
Patient has no known allergies. Fall Risk Score: 1 (? 5 = High Risk) MD Orders: eval and treat, HEP, ROM, Strengthening: 
Full motion, full strength, all modalities. MEDICAL/REFERRING DIAGNOSIS: 
S/P RT Shoulder I&D; LT Hip DATE OF ONSET: 18 REFERRING PHYSICIAN: Robel Bonner MD 
RETURN PHYSICIAN APPOINTMENT: unknown INITIAL ASSESSMENT:  Mr. Gretta Saini is approximately 3 weeks s/p L hip total hip revision and lavage and debridement of R shoulder after being diagnosed with infection to both areas. He had previous surgeries to L hip in 2017 and R shoulder in 2017. He mostly c/o L hip pain post surgery and has not been moving arm too much. Post-op pain, swelling, wound healing, weakness and decreased ROM are limiting normalized use and function of L LE and R UE. He will benefit from PT for guided shoulder and hip rehab. PROBLEM LIST (Impacting functional limitations): 1. Decreased Strength 2. Decreased ADL/Functional Activities 3. Post-op R shoulder and L hiop pain and swelling 4. Decreased R shoulder and L hip ROM 5. Weakness R shoulder and L hip 6. Difficulty with walking. INTERVENTIONS PLANNED: 
1. Modalities PRN, including ultrasound, estim, and iontophoresis 2. Soft tissue and joint mobilization for ROM and flexibility 3. Stretching, progressive resistive exercises and HEP for return to functional activities. 4. Back Education and Training for body mechanics with activities of daily living 5. If needed, aquatic therapy. TREATMENT PLAN: 
Effective Dates: 12/26/2018 TO 3/26/2019 (90 days). Frequency/Duration: 3 times a week for 3 weeks, then 2x per week for 3 weeks. Plan of care to expand to 90 days and will likely continue at 2 per week and then decrease to 1x per week or less as appropriate. GOALS: (Goals have been discussed and agreed upon with patient.) Short-Term Functional Goals: Time Frame: 4 weeks 1. Report no more than 3/10 intermittent pain to R shoulder with compensatory use during basic functional activities. 2. R shoulder PROM forward elevation greater than 150 degrees and external rotation greater than 60 degrees to progress into functional ranges. 3. L hip AROM for abduction at least 20 degrees, extension at least 8 degrees for ease with functional activities (walking and car transfers). 4. Demonstrate good R shoulder and L hip isometric strength with manual testing to progress into strength phase. 5. Returns to normalized walking for house hold distances. 6. Independent with initial HEP. Discharge Goals: Time Frame: 12 weeks 1. Return to good strength with going up/down at least 2 flights of stairs. 2. No more than 2/10 intermittent pain R shoulder and L hip pain with return to normalized household and work activities. 3. R shoulder AROM forward elevation greater than 150 degrees, external rotation greater than 89 degrees, and strength to shoulder are grossly WNL's for safe use with normalized activities. 4. Demonstrate good functional shoulder and hip strength and endurance for return to normalized household and work activities. 5. DASH score no greater than 15 and LEFS score at least 70/80 for return to full function. 6. Independent with advanced shoulder HEP for continued self-management. Rehabilitation Potential For Stated Goals: Good The information in this section was collected on 12/26/18 (except where otherwise noted).  
HISTORY:  
 
 Ambulatory/Rehab Services H2 Model Falls Risk Assessment Total: (5 or greater = High Risk): 2  
 ©2010 [facility] of [place] States Patent #[id number]. Federal Law prohibits the replication, distribution or use without written permission from [facility] of [facility] History of Present Injury/Illness (Reason for Referral):  Mr. Augusta Lance is s/p L hip total hip revision and lavage and debridement of R shoulder after being diagnosed with infection to both areas. He had previous surgeries to L hip in Jan 2017 and R shoulder in mid 2017. He mostly c/o L hip pain post surgery and has not been moving arm too much. States that he was in excellent shape prior to this incident and has lost much muscle tone and weight. He also states that his energy level is very low and this is not his norm. He comes to outpatient therapy after undergoing several rounds of infusion to treat staff infection. Was told that he has retorn R RTC from previous surgery but MD chose not to repair at this time. Past Medical History/Comorbidities: Mr. Augusta Lance  has a past medical history of Arthritis, Biceps muscle tear, Chronic pain, Insomnia, MRSA (methicillin resistant Staphylococcus aureus) infection, Personal history of kidney stones, and Right shoulder pain. Mr. Augusta Lance  has a past surgical history that includes hx other surgical; hx rotator cuff repair (Left, 2010); hx orthopaedic (Left, 01/2017); hx septoplasty (11/15/2017); HIP ARTHROPLASTY TOTAL REVISION (Left, 11/6/2018); SHOULDER I&D (Right, 11/6/2018); RIGHT SHOULDER ARTHROSCOPY SUBACROMIAL DECOMPRESSION ROTATOR CUFF REPAIR/ DISTAL CLAVICLE RESECTION (Right, 6/13/2017); LEFT TOTAL HIP ARTHROPLASTY (Left, 1/6/2017); and SHOULDER ARTHROSCOPY ACROMIOPLASTY ROTATOR CUFF REPAIR (Left, 10/21/2010). Social History/Living Environment:   1 story- lives with spouse with tub/shower combo Prior Level of Function/Work/Activity: very active with workouts - cardio and weight lifting/ pt currently not working. He did not take any medications prior to this incident. Dominant Side:  
      RIGHT Previous Treatment Approaches:   
      Surgeries in 2017 for R shoulder RTC repair and L THR prior to this incident Current Medications:   
 Doxycycline, flexeril, oxycodone, lopressor, amlodipine, sleep aide Date Last Reviewed:  1/2/19 EXAMINATION:  
 
Observation/Orthostatic Postural Assessment:  Surgical wound to R  shoulder and L hip appears to be well healed with no drainage or redness noted. R shoulder elevated. Palpation:   decreased L 1st rib inferior glide; tight L inferior GH capsule Standing:  Stands with weight shifted to R LE. 
ROM:cervical ROM = WFL for all planes AROM Right shoulder  Left shoulder Forward elevation 40 painful WNL Abduction 40 painful WNL External rotation - WNL Internal rotation - WNL Horz ABD - -  
  
PROM RIght shoulder  Left shoulder Flexion 125 Abduction 110 External rotation 40 Internal rotation 65 Horz ABD -   
  
 
HIP AROM Right hip  Left hip Flexion  90 Extension  -5 Abduction  15 External rotation  5 Internal rotation  45 Strength:  
 Right shoulder  Left shoulder Forward elevation 3- - Abduction - - External rotation 4- 4- Internal rotation 4 4 Horz ABD - -  
  
HIP Right hip  Left hip Flexion - 3+ Extension - 3+ Abduction - 3+ External rotation - 3+ Internal rotation - - Special Tests:  
Flexibility:  
· Hamstring Length (in 90/90 position):R not assessed today L -50 degrees · Straight Leg Raise Test: negative R · Jorge Test (2-joint hip flexors): not assessed today · Prone Knee Bend: negative · Cj's Test:not assessed today · Gastrocnemius not assessed today Neurological Screen: Motor intact to R UE, L LE. Sensory not assessed today.   
Functional Mobility:  Walking : walks with cane in R hand for now secondary to delayed onset of pain in L hip with walking. Sit to/from Stand: pushes off with both hands. Bed Mobility: independent with compensation. Independent with compensation with basic mobility and uses cane for walking. Independent with compensation with dressing and grooming ADL's. Balance:  Did well with transfers and able to walk a straight path Body Structures Involved: 1. Joints 2. Muscles Body Functions Affected: 1. Movement Related Activities and Participation Affected: 1. General Tasks and Demands 2. Mobility 3. Community, Social and [place] CLINICAL DECISION MAKING:  
Outcome Measure: Tool Used: Lower Extremity Functional Scale (LEFS) Score:  Initial: 7/80 Most Recent: X/80 (Date: -- ) Interpretation of Score: 20 questions each scored on a 5 point scale with 0 representing \"extreme difficulty or unable to perform\" and 4 representing \"no difficulty\". The lower the score, the greater the functional disability. 80/80 represents no disability. Minimal detectable change is 9 points. Score 80 79-63 62-48 47-32 31-16 15-1 0 Modifier CH CI CJ CK CL CM CN Tool Used: Disabilities of the Arm, Shoulder and Hand (DASH) Questionnaire - Quick Version Score:  Initial: 36/55  Most Recent: X/55 (Date: -- ) Interpretation of Score: The DASH is designed to measure the activities of daily living in person's with upper extremity dysfunction or pain. Each section is scored on a 1-5 scale, 5 representing the greatest disability. The scores of each section are added together for a total score of 55. This number is divided by 11, followed by subtracting 1 and multiplying by 25 to get a percent score of disability. This value represents the percentage disability: 0-20% minimal disability; 20-40% moderate disability; 40-60% severe disability; % dependent for care or exaggerated symptom behavior. Minimal detectable change is 12%. Score 11 12-19 20-28 29-37 38-45 46-54 55 Modifier CH CI CJ CK CL CM CN Medical Necessity:  
· Patient is expected to demonstrate progress in strength and range of motion to increase independence with walking, basic mobility and use of R UE. Heena Chino Reason for Services/Other Comments: 
· Patient continues to require skilled intervention due to continued weakness and stiffness in R UE, L LE and difficulty with functional activities. Heena Chino TREATMENT:  
(In addition to Assessment/Re-Assessment sessions the following treatments were rendered) Pre-treatment Symptoms/Complaints: Reports L leg still really hurting. Had difficulty sleeping because of pain and muscles in L LE were jumping for several hours. States pain is at medial and anterior L LE described as sharp, constant, shooting. Pain: Initial: Pain Intensity 1: 6(0n pain meds)   Post Session:  Patient had some increase in L hip pain bud did not quantify. Therapeutic Exercise: ( 23 min):  Exercises per grid below to improve mobility and strength. Required moderate visual and verbal cues to ensure correct performance. Progressed complexity of movement as indicated. Started session with AIS with L knee flexion for quad stretch,  hip ER in prone and adductor in supine 3 sec x 15 ea. Date: 
1/2/19 Date: 
1/3/19 Date: Activity/Exercise Parameters Parameters Parameters AAROM R shoulder flex Supine - with L hand assist 1x10 AROM 
10x with min A   
LTR 1x10    
bridging 2x10 SLR   B 2x10 Hip ER with band Red 2x15 Push up +  10x 2 Shoulder ER  Side lye 10x Then sitting - demo Mid Trap  Side lye 10x Demo for shoulder ext HEP: continue current HEP Manual Therapy ( 20 minutes) - for motion - grade 2 to 4 physio mobs R shoulder flex, abduct, IR and ER at 30, 45 and 80 ABD. R pec minor and lat dorsi release. Attempted  myofascial release to L adductor muscle. Treatment/Session Assessment: · Response to Treatment: Patient's L hip started hurting when doing side lye L exercises and attempted to relax the muscle but pain only minimally improved. DId not do taping for L LE because amount of hair on LE. · Compliance with Program/Exercises: Will assess as treatment progresses. · Recommendations/Intent for next treatment session: \"Next visit will focus on pain reduction and progression of exercises. Trial of pool PT for L hip. Total Treatment Duration: 43 Minutes PT Patient Time In/Time Out Time In: 1310 Time Out: 1355 Mari Sauer, PT

## 2019-01-08 ENCOUNTER — HOSPITAL ENCOUNTER (OUTPATIENT)
Dept: PHYSICAL THERAPY | Age: 54
Discharge: HOME | End: 2019-01-08
Payer: COMMERCIAL

## 2019-01-08 PROCEDURE — 97113 AQUATIC THERAPY/EXERCISES: CPT

## 2019-01-08 NOTE — PROGRESS NOTES
Yosef Rosales : 1965 Payor: [employer] / Plan: 19 Alicia Marin / Product Type: Commerical /  [address]  at [facility] GEORGE BUTLER [address] [facility], [address] [place] [address],[address] [phone]   [phone] OUTPATIENT PHYSICAL THERAPY:Daily Note 2019 ICD-10: Treatment Diagnosis: M25.611 R shoulder stiffness, M25.652 L hip stiffness, R26.2 Difficulty walking; M25.511 R shoulder pain, M25.552 L hip pain. Precautions/Allergies:  
Patient has no known allergies. Fall Risk Score: 1 (? 5 = High Risk) MD Orders: eval and treat, HEP, ROM, Strengthening: 
Full motion, full strength, all modalities. MEDICAL/REFERRING DIAGNOSIS: 
S/P RT Shoulder I&D; LT Hip DATE OF ONSET: 18 REFERRING PHYSICIAN: Magdiel Bonner MD 
RETURN PHYSICIAN APPOINTMENT: unknown INITIAL ASSESSMENT:  Mr. Kostas Zelaya is approximately 3 weeks s/p L hip total hip revision and lavage and debridement of R shoulder after being diagnosed with infection to both areas. He had previous surgeries to L hip in 2017 and R shoulder in 2017. He mostly c/o L hip pain post surgery and has not been moving arm too much. Post-op pain, swelling, wound healing, weakness and decreased ROM are limiting normalized use and function of L LE and R UE. He will benefit from PT for guided shoulder and hip rehab. PROBLEM LIST (Impacting functional limitations): 1. Decreased Strength 2. Decreased ADL/Functional Activities 3. Post-op R shoulder and L hiop pain and swelling 4. Decreased R shoulder and L hip ROM 5. Weakness R shoulder and L hip 6. Difficulty with walking. INTERVENTIONS PLANNED: 
1. Modalities PRN, including ultrasound, estim, and iontophoresis 2. Soft tissue and joint mobilization for ROM and flexibility 3. Stretching, progressive resistive exercises and HEP for return to functional activities. 4. Back Education and Training for body mechanics with activities of daily living 5. If needed, aquatic therapy. TREATMENT PLAN: 
Effective Dates: 12/26/2018 TO 3/26/2019 (90 days). Frequency/Duration: 3 times a week for 3 weeks, then 2x per week for 3 weeks. Plan of care to expand to 90 days and will likely continue at 2 per week and then decrease to 1x per week or less as appropriate. GOALS: (Goals have been discussed and agreed upon with patient.) Short-Term Functional Goals: Time Frame: 4 weeks 1. Report no more than 3/10 intermittent pain to R shoulder with compensatory use during basic functional activities. 2. R shoulder PROM forward elevation greater than 150 degrees and external rotation greater than 60 degrees to progress into functional ranges. 3. L hip AROM for abduction at least 20 degrees, extension at least 8 degrees for ease with functional activities (walking and car transfers). 4. Demonstrate good R shoulder and L hip isometric strength with manual testing to progress into strength phase. 5. Returns to normalized walking for house hold distances. 6. Independent with initial HEP. Discharge Goals: Time Frame: 12 weeks 1. Return to good strength with going up/down at least 2 flights of stairs. 2. No more than 2/10 intermittent pain R shoulder and L hip pain with return to normalized household and work activities. 3. R shoulder AROM forward elevation greater than 150 degrees, external rotation greater than 89 degrees, and strength to shoulder are grossly WNL's for safe use with normalized activities. 4. Demonstrate good functional shoulder and hip strength and endurance for return to normalized household and work activities. 5. DASH score no greater than 15 and LEFS score at least 70/80 for return to full function. 6. Independent with advanced shoulder HEP for continued self-management. Rehabilitation Potential For Stated Goals: Good The information in this section was collected on 12/26/18 (except where otherwise noted).  
HISTORY:  
 
 Ambulatory/Rehab Services H2 Model Falls Risk Assessment Total: (5 or greater = High Risk): 2  
 ©2010 [facility] of [place] Patent #[id number]. Federal Law prohibits the replication, distribution or use without written permission from [facility] of [place] History of Present Injury/Illness (Reason for Referral):  Mr. Damari Smyth is s/p L hip total hip revision and lavage and debridement of R shoulder after being diagnosed with infection to both areas. He had previous surgeries to L hip in Jan 2017 and R shoulder in mid 2017. He mostly c/o L hip pain post surgery and has not been moving arm too much. States that he was in excellent shape prior to this incident and has lost much muscle tone and weight. He also states that his energy level is very low and this is not his norm. He comes to outpatient therapy after undergoing several rounds of infusion to treat staff infection. Was told that he has retorn R RTC from previous surgery but MD chose not to repair at this time. Past Medical History/Comorbidities: Mr. Damari Smyth  has a past medical history of Arthritis, Biceps muscle tear, Chronic pain, Insomnia, MRSA (methicillin resistant Staphylococcus aureus) infection, Personal history of kidney stones, and Right shoulder pain. Mr. Damari Smyth  has a past surgical history that includes hx other surgical; hx rotator cuff repair (Left, 2010); hx orthopaedic (Left, 01/2017); hx septoplasty (11/15/2017); HIP ARTHROPLASTY TOTAL REVISION (Left, 11/6/2018); SHOULDER I&D (Right, 11/6/2018); RIGHT SHOULDER ARTHROSCOPY SUBACROMIAL DECOMPRESSION ROTATOR CUFF REPAIR/ DISTAL CLAVICLE RESECTION (Right, 6/13/2017); LEFT TOTAL HIP ARTHROPLASTY (Left, 1/6/2017); and SHOULDER ARTHROSCOPY ACROMIOPLASTY ROTATOR CUFF REPAIR (Left, 10/21/2010). Social History/Living Environment:   1 story- lives with spouse with tub/shower combo Prior Level of Function/Work/Activity: very active with workouts - cardio and weight lifting/ pt currently not working. He did not take any medications prior to this incident. Dominant Side:  
      RIGHT Previous Treatment Approaches:   
      Surgeries in 2017 for R shoulder RTC repair and L THR prior to this incident Current Medications:   
 Doxycycline, flexeril, oxycodone, lopressor, amlodipine, sleep aide Date Last Reviewed:  1/8/19 EXAMINATION:  
 
Observation/Orthostatic Postural Assessment:  Surgical wound to R  shoulder and L hip appears to be well healed with no drainage or redness noted. R shoulder elevated. Palpation:   decreased L 1st rib inferior glide; tight L inferior GH capsule Standing:  Stands with weight shifted to R LE. 
ROM:cervical ROM = WFL for all planes AROM Right shoulder  Left shoulder Forward elevation 40 painful WNL Abduction 40 painful WNL External rotation - WNL Internal rotation - WNL Horz ABD - -  
  
PROM RIght shoulder  Left shoulder Flexion 125 Abduction 110 External rotation 40 Internal rotation 65 Horz ABD -   
  
 
HIP AROM Right hip  Left hip Flexion  90 Extension  -5 Abduction  15 External rotation  5 Internal rotation  45 Strength:  
 Right shoulder  Left shoulder Forward elevation 3- - Abduction - - External rotation 4- 4- Internal rotation 4 4 Horz ABD - -  
  
HIP Right hip  Left hip Flexion - 3+ Extension - 3+ Abduction - 3+ External rotation - 3+ Internal rotation - - Special Tests:  
Flexibility:  
· Hamstring Length (in 90/90 position):R not assessed today L -50 degrees · Straight Leg Raise Test: negative R · Jorge Test (2-joint hip flexors): not assessed today · Prone Knee Bend: negative · Cj's Test:not assessed today · Gastrocnemius not assessed today Neurological Screen: Motor intact to R UE, L LE. Sensory not assessed today.   
Functional Mobility:  Walking : walks with cane in R hand for now secondary to delayed onset of pain in L hip with walking. Sit to/from Stand: pushes off with both hands. Bed Mobility: independent with compensation. Independent with compensation with basic mobility and uses cane for walking. Independent with compensation with dressing and grooming ADL's. Balance:  Did well with transfers and able to walk a straight path Body Structures Involved: 1. Joints 2. Muscles Body Functions Affected: 1. Movement Related Activities and Participation Affected: 1. General Tasks and Demands 2. Mobility 3. Community, Social and [place] CLINICAL DECISION MAKING:  
Outcome Measure: Tool Used: Lower Extremity Functional Scale (LEFS) Score:  Initial: 7/80 Most Recent: X/80 (Date: -- ) Interpretation of Score: 20 questions each scored on a 5 point scale with 0 representing \"extreme difficulty or unable to perform\" and 4 representing \"no difficulty\". The lower the score, the greater the functional disability. 80/80 represents no disability. Minimal detectable change is 9 points. Score 80 79-63 62-48 47-32 31-16 15-1 0 Modifier CH CI CJ CK CL CM CN Tool Used: Disabilities of the Arm, Shoulder and Hand (DASH) Questionnaire - Quick Version Score:  Initial: 36/55  Most Recent: X/55 (Date: -- ) Interpretation of Score: The DASH is designed to measure the activities of daily living in person's with upper extremity dysfunction or pain. Each section is scored on a 1-5 scale, 5 representing the greatest disability. The scores of each section are added together for a total score of 55. This number is divided by 11, followed by subtracting 1 and multiplying by 25 to get a percent score of disability. This value represents the percentage disability: 0-20% minimal disability; 20-40% moderate disability; 40-60% severe disability; % dependent for care or exaggerated symptom behavior. Minimal detectable change is 12%. Score 11 12-19 20-28 29-37 38-45 46-54 55 Modifier CH CI CJ CK CL CM CN Medical Necessity:  
· Patient is expected to demonstrate progress in strength and range of motion to increase independence with walking, basic mobility and use of R UE. Swapna Rothman Reason for Services/Other Comments: 
· Patient continues to require skilled intervention due to continued weakness and stiffness in R UE, L LE and difficulty with functional activities. Swapna Rothman TREATMENT:  
(In addition to Assessment/Re-Assessment sessions the following treatments were rendered) Pre-treatment Symptoms/Complaints: Reports L leg calmed down some but still quite painful and not able to tolerate WB yet on it without cane. He is scheduled to go back to work in 2 weeks but knows he will not be able to do this. Pain: Initial: Pain Intensity 1: 8   In hip Post Session:  3/10 after getting out of pool. Therapeutic Exercise: ( 32  min):  Exercises per grid below to improve mobility and strength. Required moderate visual and verbal cues to ensure correct performance. Progressed complexity of movement as indicated. Aquatic Therapy Date: 
1/8/19 Date: 
 Date: Activity/Exercise Parameters Parameters Parameters Walking forward 10 laps Walking backward 10 laps Walking sideways 10 laps High step march 6 laps March in place 30x    
squat 30x Hip 3 way kick Large slow kicks 10 ea way Fast, small kicks 20 sec Land THerapy Date: 
1/2/19 Date: 
1/3/19 Date: Activity/Exercise Parameters Parameters Parameters AAROM R shoulder flex Supine - with L hand assist 1x10 AROM 
10x with min A   
LTR 1x10    
bridging 2x10 SLR   B 2x10 Hip ER with band Red 2x15 Push up +  10x 2 Shoulder ER  Side lye 10x Then sitting - demo Mid Trap  Side lye 10x Demo for shoulder ext HEP: continue current HEP Manual Therapy ( 0 minutes) -not today Treatment/Session Assessment: · Response to Treatment: Patient stated that water therapy felt much better on hip and able to tolerate WB better afterwards. · Compliance with Program/Exercises: Will assess as treatment progresses. · Recommendations/Intent for next treatment session: \"Next visit will focus on pain reduction and progression of exercises. Will continue pool PT for 2x per week, land 1x per week. Treatment for both L hip and R shoulder for pool and R shoulder mostly for land. Total Treatment Duration: 32 Minutes PT Patient Time In/Time Out Time In: 2006 Time Out: 8370 Vamshi Stephenson PT [facility]SPT

## 2019-01-10 ENCOUNTER — HOSPITAL ENCOUNTER (OUTPATIENT)
Dept: PHYSICAL THERAPY | Age: 54
Discharge: HOME | End: 2019-01-10
Payer: COMMERCIAL

## 2019-01-10 PROCEDURE — 97110 THERAPEUTIC EXERCISES: CPT

## 2019-01-10 NOTE — PROGRESS NOTES
Jovanni Veliz : 1965 Payor: Gabrielle Aguilar / Plan: 19 Alicia Marin / Product Type: Commerical /  [address]  at [facility] GEORGE BUTLER [address] [facility], [address] [place] [address],[address], [facility] [address] [phone]   [phone] OUTPATIENT PHYSICAL THERAPY:Daily Note 1/10/2019 ICD-10: Treatment Diagnosis: M25.611 R shoulder stiffness, M25.652 L hip stiffness, R26.2 Difficulty walking; M25.511 R shoulder pain, M25.552 L hip pain. Precautions/Allergies:  
Patient has no known allergies. Fall Risk Score: 1 (? 5 = High Risk) MD Orders: eval and treat, HEP, ROM, Strengthening: 
Full motion, full strength, all modalities. MEDICAL/REFERRING DIAGNOSIS: 
S/P RT Shoulder I&D; LT Hip DATE OF ONSET: 18 REFERRING PHYSICIAN: Zainab Bonner., MD 
RETURN PHYSICIAN APPOINTMENT: unknown INITIAL ASSESSMENT:  Mr. Joy Richardson is approximately 3 weeks s/p L hip total hip revision and lavage and debridement of R shoulder after being diagnosed with infection to both areas. He had previous surgeries to L hip in 2017 and R shoulder in 2017. He mostly c/o L hip pain post surgery and has not been moving arm too much. Post-op pain, swelling, wound healing, weakness and decreased ROM are limiting normalized use and function of L LE and R UE. He will benefit from PT for guided shoulder and hip rehab. PROBLEM LIST (Impacting functional limitations): 1. Decreased Strength 2. Decreased ADL/Functional Activities 3. Post-op R shoulder and L hiop pain and swelling 4. Decreased R shoulder and L hip ROM 5. Weakness R shoulder and L hip 6. Difficulty with walking. INTERVENTIONS PLANNED: 
1. Modalities PRN, including ultrasound, estim, and iontophoresis 2. Soft tissue and joint mobilization for ROM and flexibility 3. Stretching, progressive resistive exercises and HEP for return to functional activities. 4. Back Education and Training for body mechanics with activities of daily living 5. If needed, aquatic therapy. TREATMENT PLAN: 
Effective Dates: 12/26/2018 TO 3/26/2019 (90 days). Frequency/Duration: 3 times a week for 3 weeks, then 2x per week for 3 weeks. Plan of care to expand to 90 days and will likely continue at 2 per week and then decrease to 1x per week or less as appropriate. GOALS: (Goals have been discussed and agreed upon with patient.) Short-Term Functional Goals: Time Frame: 4 weeks 1. Report no more than 3/10 intermittent pain to R shoulder with compensatory use during basic functional activities. 2. R shoulder PROM forward elevation greater than 150 degrees and external rotation greater than 60 degrees to progress into functional ranges. 3. L hip AROM for abduction at least 20 degrees, extension at least 8 degrees for ease with functional activities (walking and car transfers). 4. Demonstrate good R shoulder and L hip isometric strength with manual testing to progress into strength phase. 5. Returns to normalized walking for house hold distances. 6. Independent with initial HEP. Discharge Goals: Time Frame: 12 weeks 1. Return to good strength with going up/down at least 2 flights of stairs. 2. No more than 2/10 intermittent pain R shoulder and L hip pain with return to normalized household and work activities. 3. R shoulder AROM forward elevation greater than 150 degrees, external rotation greater than 89 degrees, and strength to shoulder are grossly WNL's for safe use with normalized activities. 4. Demonstrate good functional shoulder and hip strength and endurance for return to normalized household and work activities. 5. DASH score no greater than 15 and LEFS score at least 70/80 for return to full function. 6. Independent with advanced shoulder HEP for continued self-management. Rehabilitation Potential For Stated Goals: Good The information in this section was collected on 12/26/18 (except where otherwise noted).  
HISTORY:  
 
 Ambulatory/Rehab Services H2 Model Falls Risk Assessment Total: (5 or greater = High Risk): 2  
 ©2010 [facility] [place] Patent #[id number]. Federal Law prohibits the replication, distribution or use without written permission from [facility] [employer] History of Present Injury/Illness (Reason for Referral):  Mr. Kae Slade is s/p L hip total hip revision and lavage and debridement of R shoulder after being diagnosed with infection to both areas. He had previous surgeries to L hip in Jan 2017 and R shoulder in mid 2017. He mostly c/o L hip pain post surgery and has not been moving arm too much. States that he was in excellent shape prior to this incident and has lost much muscle tone and weight. He also states that his energy level is very low and this is not his norm. He comes to outpatient therapy after undergoing several rounds of infusion to treat staff infection. Was told that he has retorn R RTC from previous surgery but MD chose not to repair at this time. Past Medical History/Comorbidities: Mr. Kae Slade  has a past medical history of Arthritis, Biceps muscle tear, Chronic pain, Insomnia, MRSA (methicillin resistant Staphylococcus aureus) infection, Personal history of kidney stones, and Right shoulder pain. Mr. Kae Slade  has a past surgical history that includes hx other surgical; hx rotator cuff repair (Left, 2010); hx orthopaedic (Left, 01/2017); hx septoplasty (11/15/2017); HIP ARTHROPLASTY TOTAL REVISION (Left, 11/6/2018); SHOULDER I&D (Right, 11/6/2018); RIGHT SHOULDER ARTHROSCOPY SUBACROMIAL DECOMPRESSION ROTATOR CUFF REPAIR/ DISTAL CLAVICLE RESECTION (Right, 6/13/2017); LEFT TOTAL HIP ARTHROPLASTY (Left, 1/6/2017); and SHOULDER ARTHROSCOPY ACROMIOPLASTY ROTATOR CUFF REPAIR (Left, 10/21/2010). Social History/Living Environment:   1 story- lives with spouse with tub/shower combo Prior Level of Function/Work/Activity: very active with workouts - cardio and weight lifting/ pt currently not working. He did not take any medications prior to this incident. Dominant Side:  
      RIGHT Previous Treatment Approaches:   
      Surgeries in 2017 for R shoulder RTC repair and L THR prior to this incident Current Medications:   
 Doxycycline, flexeril, oxycodone, lopressor, amlodipine, sleep aide, advil as needed Date Last Reviewed:  1/10/19 EXAMINATION:  
 
Observation/Orthostatic Postural Assessment:  Surgical wound to R  shoulder and L hip appears to be well healed with no drainage or redness noted. R shoulder elevated. Palpation:   decreased L 1st rib inferior glide; tight L inferior GH capsule Standing:  Stands with weight shifted to R LE. 
ROM:cervical ROM = WFL for all planes AROM Right shoulder  Left shoulder R shoulder 1/10/19 Forward elevation 40 painful  degrees at start Abduction 40 painful WNL External rotation - WNL Internal rotation - WNL Horz ABD - -   
  
PROM RIght shoulder  Left shoulder Flexion 125 Abduction 110 External rotation 40 Internal rotation 65 Horz ABD -   
  
 
HIP AROM Right hip  Left hip Flexion  90 Extension  -5 Abduction  15 External rotation  5 Internal rotation  45 Strength:  
 Right shoulder  Left shoulder Forward elevation 3- - Abduction - - External rotation 4- 4- Internal rotation 4 4 Horz ABD - -  
  
HIP Right hip  Left hip Flexion - 3+ Extension - 3+ Abduction - 3+ External rotation - 3+ Internal rotation - - Special Tests:  
Flexibility:  
· Hamstring Length (in 90/90 position):R not assessed today L -50 degrees · Straight Leg Raise Test: negative R · Jorge Test (2-joint hip flexors): not assessed today · Prone Knee Bend: negative · Cj's Test:not assessed today · Gastrocnemius not assessed today Neurological Screen: Motor intact to R UE, L LE. Sensory not assessed today. Functional Mobility:  Walking : walks with cane in R hand for now secondary to delayed onset of pain in L hip with walking. Sit to/from Stand: pushes off with both hands. Bed Mobility: independent with compensation. Independent with compensation with basic mobility and uses cane for walking. Independent with compensation with dressing and grooming ADL's. Balance:  Did well with transfers and able to walk a straight path Body Structures Involved: 1. Joints 2. Muscles Body Functions Affected: 1. Movement Related Activities and Participation Affected: 1. General Tasks and Demands 2. Mobility 3. Community, Social and [place] CLINICAL DECISION MAKING:  
Outcome Measure: Tool Used: Lower Extremity Functional Scale (LEFS) Score:  Initial: 7/80 Most Recent: X/80 (Date: -- ) Interpretation of Score: 20 questions each scored on a 5 point scale with 0 representing \"extreme difficulty or unable to perform\" and 4 representing \"no difficulty\". The lower the score, the greater the functional disability. 80/80 represents no disability. Minimal detectable change is 9 points. Score 80 79-63 62-48 47-32 31-16 15-1 0 Modifier CH CI CJ CK CL CM CN Tool Used: Disabilities of the Arm, Shoulder and Hand (DASH) Questionnaire - Quick Version Score:  Initial: 36/55  Most Recent: X/55 (Date: -- ) Interpretation of Score: The DASH is designed to measure the activities of daily living in person's with upper extremity dysfunction or pain. Each section is scored on a 1-5 scale, 5 representing the greatest disability. The scores of each section are added together for a total score of 55. This number is divided by 11, followed by subtracting 1 and multiplying by 25 to get a percent score of disability.  This value represents the percentage disability: 0-20% minimal disability; 20-40% moderate disability; 40-60% severe disability; % dependent for care or exaggerated symptom behavior. Minimal detectable change is 12%. Score 11 12-19 20-28 29-37 38-45 46-54 55 Modifier CH CI CJ CK CL CM CN Medical Necessity:  
· Patient is expected to demonstrate progress in strength and range of motion to increase independence with walking, basic mobility and use of R UE. [place] Reason for Services/Other Comments: 
· Patient continues to require skilled intervention due to continued weakness and stiffness in R UE, L LE and difficulty with functional activities. [place] TREATMENT:  
(In addition to Assessment/Re-Assessment sessions the following treatments were rendered) Pre-treatment Symptoms/Complaints: Reports L did much better after pool PT. He walked and stood more yesterday and today he has increased pain. Shoulder mostly painful with motion, not at rest.  Pt made mistake on schedule and arrived late. Pain: Initial:    8/10 In R shoulder with motion; 7/10  In L groin Post Session: decrease in pain with motion. Therapeutic Exercise: ( 33  min):  Exercises per grid below to improve mobility and strength. Required moderate visual and verbal cues to ensure correct performance. Progressed complexity of movement as indicated. 1/10/2019 Started today's session with AIS for shoulder as follows:  ER at 27, 39 and 80 ABD; IR at 50 ABD and flexion and scaption 3\" x 10 to 15 reps. Then as per shoulder grid below Aquatic Therapy Grid Date: 
1/8/19 Date: 
 Date: Activity/Exercise Parameters Parameters Parameters Walking forward 10 laps Walking backward 10 laps Walking sideways 10 laps High step march 6 laps March in place 30x    
squat 30x Hip 3 way kick-  Large slow kicks 10 ea way Fast, small kicks 20 sec ea AMARA hips Land Therapy grid Date: 
1/2/19 Date: 
1/3/19 Date: 
1/10/19 Activity/Exercise Parameters Parameters Parameters AAROM R shoulder flex Supine - with L hand assist 1x10 AROM 
10x with min A Pulleys 20x flexion LTR 1x10    
bridging 2x10 SLR   B 2x10 Hip ER with band Red 2x15 Push up +  10x 2 2# 10x2 Shoulder ER  Side lye 10x Then sitting - demo Side lye 1# 
10x2 Mid Trap  Side lye 10x Demo for shoulder ext Side lye 5x3 With feedback for scap post 
depression HEP: continue current HEP Manual Therapy ( 0 minutes)  Not today Treatment/Session Assessment:   
· Response to Treatment: Shoulder A/PROM improving. Very weak and only tolerating slow progression with strengthening. Pt has been told he will likely need R shoulder surgery when he can tolerate this. · Compliance with Program/Exercises: Will assess as treatment progresses. · Recommendations/Intent for next treatment session: \"Next visit will focus on pain reduction and progression of exercises. Will continue pool PT for 2x per week, land 1x per week. Treatment for both L hip and R shoulder for pool and R shoulder mostly for land. R shoulder strengthening on land should be slow progression with focus on scapula strengthening. Total Treatment Duration: 33 Minutes PT Patient Time In/Time Out Time In: 1000 Time Out: 1035 Lg Gorman, PT MSPT

## 2019-01-11 ENCOUNTER — HOSPITAL ENCOUNTER (OUTPATIENT)
Dept: PHYSICAL THERAPY | Age: 54
Discharge: HOME | End: 2019-01-11
Payer: COMMERCIAL

## 2019-01-11 PROCEDURE — 97113 AQUATIC THERAPY/EXERCISES: CPT

## 2019-01-11 NOTE — PROGRESS NOTES
Myles Check : 1965 Payor: Elias Durand / Plan: 19 Alicia Marin / Product Type: [employer] /  [id number]  at [facility] GEORGE BUTLER [address] [facility], [address] [place] [address],[address], [address] [phone]   [phone] OUTPATIENT PHYSICAL THERAPY:Daily Note 2019 ICD-10: Treatment Diagnosis: M25.611 R shoulder stiffness, M25.652 L hip stiffness, R26.2 Difficulty walking; M25.511 R shoulder pain, M25.552 L hip pain. Precautions/Allergies:  
Patient has no known allergies. Fall Risk Score: 1 (? 5 = High Risk) MD Orders: eval and treat, HEP, ROM, Strengthening: 
Full motion, full strength, all modalities. MEDICAL/REFERRING DIAGNOSIS: 
S/P RT Shoulder I&D; LT Hip DATE OF ONSET: 18 REFERRING PHYSICIAN: Jeanie Bonner., MD 
RETURN PHYSICIAN APPOINTMENT: unknown INITIAL ASSESSMENT:  Mr. Jeri Devi is approximately 3 weeks s/p L hip total hip revision and lavage and debridement of R shoulder after being diagnosed with infection to both areas. He had previous surgeries to L hip in 2017 and R shoulder in 2017. He mostly c/o L hip pain post surgery and has not been moving arm too much. Post-op pain, swelling, wound healing, weakness and decreased ROM are limiting normalized use and function of L LE and R UE. He will benefit from PT for guided shoulder and hip rehab. PROBLEM LIST (Impacting functional limitations): 1. Decreased Strength 2. Decreased ADL/Functional Activities 3. Post-op R shoulder and L hiop pain and swelling 4. Decreased R shoulder and L hip ROM 5. Weakness R shoulder and L hip 6. Difficulty with walking. INTERVENTIONS PLANNED: 
1. Modalities PRN, including ultrasound, estim, and iontophoresis 2. Soft tissue and joint mobilization for ROM and flexibility 3. Stretching, progressive resistive exercises and HEP for return to functional activities. 4. Back Education and Training for body mechanics with activities of daily living 5. If needed, aquatic therapy. TREATMENT PLAN: 
Effective Dates: 12/26/2018 TO 3/26/2019 (90 days). Frequency/Duration: 3 times a week for 3 weeks, then 2x per week for 3 weeks. Plan of care to expand to 90 days and will likely continue at 2 per week and then decrease to 1x per week or less as appropriate. GOALS: (Goals have been discussed and agreed upon with patient.) Short-Term Functional Goals: Time Frame: 4 weeks 1. Report no more than 3/10 intermittent pain to R shoulder with compensatory use during basic functional activities. 2. R shoulder PROM forward elevation greater than 150 degrees and external rotation greater than 60 degrees to progress into functional ranges. 3. L hip AROM for abduction at least 20 degrees, extension at least 8 degrees for ease with functional activities (walking and car transfers). 4. Demonstrate good R shoulder and L hip isometric strength with manual testing to progress into strength phase. 5. Returns to normalized walking for house hold distances. 6. Independent with initial HEP. Discharge Goals: Time Frame: 12 weeks 1. Return to good strength with going up/down at least 2 flights of stairs. 2. No more than 2/10 intermittent pain R shoulder and L hip pain with return to normalized household and work activities. 3. R shoulder AROM forward elevation greater than 150 degrees, external rotation greater than 89 degrees, and strength to shoulder are grossly WNL's for safe use with normalized activities. 4. Demonstrate good functional shoulder and hip strength and endurance for return to normalized household and work activities. 5. DASH score no greater than 15 and LEFS score at least 70/80 for return to full function. 6. Independent with advanced shoulder HEP for continued self-management. Rehabilitation Potential For Stated Goals: Good The information in this section was collected on 12/26/18 (except where otherwise noted).  
HISTORY:  
 
 Ambulatory/Rehab Services H2 Model Falls Risk Assessment Total: (5 or greater = High Risk): 2  
 ©2010 [facility] [place] Patent #[id number]. Federal Law prohibits the replication, distribution or use without written permission from [facility] [place] History of Present Injury/Illness (Reason for Referral):  Mr. Franklin Mcclain is s/p L hip total hip revision and lavage and debridement of R shoulder after being diagnosed with infection to both areas. He had previous surgeries to L hip in Jan 2017 and R shoulder in mid 2017. He mostly c/o L hip pain post surgery and has not been moving arm too much. States that he was in excellent shape prior to this incident and has lost much muscle tone and weight. He also states that his energy level is very low and this is not his norm. He comes to outpatient therapy after undergoing several rounds of infusion to treat staff infection. Was told that he has retorn R RTC from previous surgery but MD chose not to repair at this time. Past Medical History/Comorbidities: Mr. Franklin Mcclain  has a past medical history of Arthritis, Biceps muscle tear, Chronic pain, Insomnia, MRSA (methicillin resistant Staphylococcus aureus) infection, Personal history of kidney stones, and Right shoulder pain. Mr. Franklin Mcclain  has a past surgical history that includes hx other surgical; hx rotator cuff repair (Left, 2010); hx orthopaedic (Left, 01/2017); hx septoplasty (11/15/2017); HIP ARTHROPLASTY TOTAL REVISION (Left, 11/6/2018); SHOULDER I&D (Right, 11/6/2018); RIGHT SHOULDER ARTHROSCOPY SUBACROMIAL DECOMPRESSION ROTATOR CUFF REPAIR/ DISTAL CLAVICLE RESECTION (Right, 6/13/2017); LEFT TOTAL HIP ARTHROPLASTY (Left, 1/6/2017); and SHOULDER ARTHROSCOPY ACROMIOPLASTY ROTATOR CUFF REPAIR (Left, 10/21/2010). Social History/Living Environment:   1 story- lives with spouse with tub/shower combo Prior Level of Function/Work/Activity: very active with workouts - cardio and weight lifting/ pt currently not working. He did not take any medications prior to this incident. Dominant Side:  
      RIGHT Previous Treatment Approaches:   
      Surgeries in 2017 for R shoulder RTC repair and L THR prior to this incident Current Medications:   
 Doxycycline, flexeril, oxycodone, lopressor, amlodipine, sleep aide, advil as needed Date Last Reviewed:  1/10/19 EXAMINATION:  
 
Observation/Orthostatic Postural Assessment:  Surgical wound to R  shoulder and L hip appears to be well healed with no drainage or redness noted. R shoulder elevated. Palpation:   decreased L 1st rib inferior glide; tight L inferior GH capsule Standing:  Stands with weight shifted to R LE. 
ROM:cervical ROM = WFL for all planes AROM Right shoulder  Left shoulder R shoulder 1/10/19 Forward elevation 40 painful  degrees at start Abduction 40 painful WNL External rotation - WNL Internal rotation - WNL Horz ABD - -   
  
PROM RIght shoulder  Left shoulder Flexion 125 Abduction 110 External rotation 40 Internal rotation 65 Horz ABD -   
  
 
HIP AROM Right hip  Left hip Flexion  90 Extension  -5 Abduction  15 External rotation  5 Internal rotation  45 Strength:  
 Right shoulder  Left shoulder Forward elevation 3- - Abduction - - External rotation 4- 4- Internal rotation 4 4 Horz ABD - -  
  
HIP Right hip  Left hip Flexion - 3+ Extension - 3+ Abduction - 3+ External rotation - 3+ Internal rotation - - Special Tests:  
Flexibility:  
· Hamstring Length (in 90/90 position):R not assessed today L -50 degrees · Straight Leg Raise Test: negative R · Jorge Test (2-joint hip flexors): not assessed today · Prone Knee Bend: negative · Cj's Test:not assessed today · Gastrocnemius not assessed today Neurological Screen: Motor intact to R UE, L LE. Sensory not assessed today. Functional Mobility:  Walking : walks with cane in R hand for now secondary to delayed onset of pain in L hip with walking. Sit to/from Stand: pushes off with both hands. Bed Mobility: independent with compensation. Independent with compensation with basic mobility and uses cane for walking. Independent with compensation with dressing and grooming ADL's. Balance:  Did well with transfers and able to walk a straight path Body Structures Involved: 1. Joints 2. Muscles Body Functions Affected: 1. Movement Related Activities and Participation Affected: 1. General Tasks and Demands 2. Mobility 3. Community, Social and [place] CLINICAL DECISION MAKING:  
Outcome Measure: Tool Used: Lower Extremity Functional Scale (LEFS) Score:  Initial: 7/80 Most Recent: X/80 (Date: -- ) Interpretation of Score: 20 questions each scored on a 5 point scale with 0 representing \"extreme difficulty or unable to perform\" and 4 representing \"no difficulty\". The lower the score, the greater the functional disability. 80/80 represents no disability. Minimal detectable change is 9 points. Score 80 79-63 62-48 47-32 31-16 15-1 0 Modifier CH CI CJ CK CL CM CN Tool Used: Disabilities of the Arm, Shoulder and Hand (DASH) Questionnaire - Quick Version Score:  Initial: 36/55  Most Recent: X/55 (Date: -- ) Interpretation of Score: The DASH is designed to measure the activities of daily living in person's with upper extremity dysfunction or pain. Each section is scored on a 1-5 scale, 5 representing the greatest disability. The scores of each section are added together for a total score of 55. This number is divided by 11, followed by subtracting 1 and multiplying by 25 to get a percent score of disability.  This value represents the percentage disability: 0-20% minimal disability; 20-40% moderate disability; 40-60% severe disability; % dependent for care or exaggerated symptom behavior. Minimal detectable change is 12%. Score 11 12-19 20-28 29-37 38-45 46-54 55 Modifier CH CI CJ CK CL CM CN Medical Necessity:  
· Patient is expected to demonstrate progress in strength and range of motion to increase independence with walking, basic mobility and use of R UE. Naoma Broccoli Reason for Services/Other Comments: 
· Patient continues to require skilled intervention due to continued weakness and stiffness in R UE, L LE and difficulty with functional activities. Naoma Broccoli TREATMENT:  
(In addition to Assessment/Re-Assessment sessions the following treatments were rendered) Pre-treatment Symptoms/Complaints:  Pt states his left groin is having increased pain today. Pain: Initial:    Not rated Post Session:  Not rated Therapeutic Exercise: ( 33  min):  Exercises per grid below to improve mobility and strength. Required moderate visual and verbal cues to ensure correct performance. Progressed complexity of movement as indicated. 1/11/2019 Started today's session with AIS for shoulder as follows:  ER at 27, 39 and 80 ABD; IR at 50 ABD and flexion and scaption 3\" x 10 to 15 reps. Then as per shoulder grid below Aquatic Therapy Grid Date: 
1/8/19 Date: 
1/11/19 Date: Activity/Exercise Parameters Parameters Parameters Walking forward 10 laps 6 Walking backward 10 laps 6 Walking sideways 10 laps 6 High step march 6 laps 6 March in place 30x With purple noodle 10 B    
squat 30x Hip 3 way kick-  Large slow kicks 10 ea way Fast, small kicks 20 sec ea AMARA hips Hip ABD/ADD  10 B Circles   10 B Hip flexion with straight leg  10 B    
4.5' shoulder exercises with purple noode  10 Hamstring stretch  5 x 5 sec hold Deep water ex: bicycle, jumping janneth, cross country, core stability   2 min each Land Therapy grid Date: 
1/2/19 Date: 
1/3/19 Date: 
1/10/19 Activity/Exercise Parameters Parameters Parameters AAROM R shoulder flex Supine - with L hand assist 1x10 AROM 
10x with min A Pulleys 20x flexion LTR 1x10    
bridging 2x10 SLR   B 2x10 Hip ER with band Red 2x15 Push up +  10x 2 2# 10x2 Shoulder ER  Side lye 10x Then sitting - demo Side lye 1# 
10x2 Mid Trap  Side lye 10x Demo for shoulder ext Side lye 5x3 With feedback for scap post 
depression HEP: continue current HEP Manual Therapy ( 0 minutes)  Not today Treatment/Session Assessment:   
· Response to Treatment:  Pt did well with aquatic exercises. He enjoys the water. · Compliance with Program/Exercises: Will assess as treatment progresses. · Recommendations/Intent for next treatment session: \"Next visit will focus on pain reduction and progression of exercises. Will continue pool PT for 2x per week, land 1x per week. Treatment for both L hip and R shoulder for pool and R shoulder mostly for land. R shoulder strengthening on land should be slow progression with focus on scapula strengthening. Total Treatment Duration: 45 Minutes PT Patient Time In/Time Out Time In: 6263 Time Out: 1230 Kari Majano, PTA

## 2019-01-14 ENCOUNTER — HOSPITAL ENCOUNTER (OUTPATIENT)
Dept: PHYSICAL THERAPY | Age: 54
Discharge: HOME | End: 2019-01-14
Payer: COMMERCIAL

## 2019-01-14 PROCEDURE — 97113 AQUATIC THERAPY/EXERCISES: CPT

## 2019-01-14 NOTE — PROGRESS NOTES
Ibeth Rockwell : 1965 Payor: Steven Rios / Plan: [address] / Product Type: [employer] /  [address]  at [address] [place] [address] [address] [facility], [address] [place] [address],[address], [facility] [address]. [phone]   [phone] OUTPATIENT PHYSICAL THERAPY:Daily Note 2019 ICD-10: Treatment Diagnosis: M25.611 R shoulder stiffness, M25.652 L hip stiffness, R26.2 Difficulty walking; M25.511 R shoulder pain, M25.552 L hip pain. Precautions/Allergies:  
Patient has no known allergies. Fall Risk Score: 1 (? 5 = High Risk) MD Orders: eval and treat, HEP, ROM, Strengthening: 
Full motion, full strength, all modalities. MEDICAL/REFERRING DIAGNOSIS: 
S/P RT Shoulder I&D; LT Hip DATE OF ONSET: 18 REFERRING PHYSICIAN: Carlee Bonner MD 
RETURN PHYSICIAN APPOINTMENT: unknown INITIAL ASSESSMENT:  Mr. Divya Bynum is approximately 3 weeks s/p L hip total hip revision and lavage and debridement of R shoulder after being diagnosed with infection to both areas. He had previous surgeries to L hip in 2017 and R shoulder in 2017. He mostly c/o L hip pain post surgery and has not been moving arm too much. Post-op pain, swelling, wound healing, weakness and decreased ROM are limiting normalized use and function of L LE and R UE. He will benefit from PT for guided shoulder and hip rehab. PROBLEM LIST (Impacting functional limitations): 1. Decreased Strength 2. Decreased ADL/Functional Activities 3. Post-op R shoulder and L hiop pain and swelling 4. Decreased R shoulder and L hip ROM 5. Weakness R shoulder and L hip 6. Difficulty with walking. INTERVENTIONS PLANNED: 
1. Modalities PRN, including ultrasound, estim, and iontophoresis 2. Soft tissue and joint mobilization for ROM and flexibility 3. Stretching, progressive resistive exercises and HEP for return to functional activities. 4. Back Education and Training for body mechanics with activities of daily living 5. If needed, aquatic therapy. TREATMENT PLAN: 
Effective Dates: 12/26/2018 TO 3/26/2019 (90 days). Frequency/Duration: 3 times a week for 3 weeks, then 2x per week for 3 weeks. Plan of care to expand to 90 days and will likely continue at 2 per week and then decrease to 1x per week or less as appropriate. GOALS: (Goals have been discussed and agreed upon with patient.) Short-Term Functional Goals: Time Frame: 4 weeks 1. Report no more than 3/10 intermittent pain to R shoulder with compensatory use during basic functional activities. 2. R shoulder PROM forward elevation greater than 150 degrees and external rotation greater than 60 degrees to progress into functional ranges. 3. L hip AROM for abduction at least 20 degrees, extension at least 8 degrees for ease with functional activities (walking and car transfers). 4. Demonstrate good R shoulder and L hip isometric strength with manual testing to progress into strength phase. 5. Returns to normalized walking for house hold distances. 6. Independent with initial HEP. Discharge Goals: Time Frame: 12 weeks 1. Return to good strength with going up/down at least 2 flights of stairs. 2. No more than 2/10 intermittent pain R shoulder and L hip pain with return to normalized household and work activities. 3. R shoulder AROM forward elevation greater than 150 degrees, external rotation greater than 89 degrees, and strength to shoulder are grossly WNL's for safe use with normalized activities. 4. Demonstrate good functional shoulder and hip strength and endurance for return to normalized household and work activities. 5. DASH score no greater than 15 and LEFS score at least 70/80 for return to full function. 6. Independent with advanced shoulder HEP for continued self-management. Rehabilitation Potential For Stated Goals: Good The information in this section was collected on 12/26/18 (except where otherwise noted).  
HISTORY:  
 
 Ambulatory/Rehab Services H2 Model Falls Risk Assessment Total: (5 or greater = High Risk): 2  
 ©2010 [facility] [place] [place] Patent #[id number]. Federal Law prohibits the replication, distribution or use without written permission from [facility] [employer] History of Present Injury/Illness (Reason for Referral):  Mr. Lobito Downey is s/p L hip total hip revision and lavage and debridement of R shoulder after being diagnosed with infection to both areas. He had previous surgeries to L hip in Jan 2017 and R shoulder in mid 2017. He mostly c/o L hip pain post surgery and has not been moving arm too much. States that he was in excellent shape prior to this incident and has lost much muscle tone and weight. He also states that his energy level is very low and this is not his norm. He comes to outpatient therapy after undergoing several rounds of infusion to treat staff infection. Was told that he has retorn R RTC from previous surgery but MD chose not to repair at this time. Past Medical History/Comorbidities: Mr. Lobito Downey  has a past medical history of Arthritis, Biceps muscle tear, Chronic pain, Insomnia, MRSA (methicillin resistant Staphylococcus aureus) infection, Personal history of kidney stones, and Right shoulder pain. Mr. Lobito Downey  has a past surgical history that includes hx other surgical; hx rotator cuff repair (Left, 2010); hx orthopaedic (Left, 01/2017); hx septoplasty (11/15/2017); HIP ARTHROPLASTY TOTAL REVISION (Left, 11/6/2018); SHOULDER I&D (Right, 11/6/2018); RIGHT SHOULDER ARTHROSCOPY SUBACROMIAL DECOMPRESSION ROTATOR CUFF REPAIR/ DISTAL CLAVICLE RESECTION (Right, 6/13/2017); LEFT TOTAL HIP ARTHROPLASTY (Left, 1/6/2017); and SHOULDER ARTHROSCOPY ACROMIOPLASTY ROTATOR CUFF REPAIR (Left, 10/21/2010). Social History/Living Environment:   1 story- lives with spouse with tub/shower combo Prior Level of Function/Work/Activity: very active with workouts - cardio and weight lifting/ pt currently not working. He did not take any medications prior to this incident. Dominant Side:  
      RIGHT Previous Treatment Approaches:   
      Surgeries in 2017 for R shoulder RTC repair and L THR prior to this incident Current Medications:   
 Doxycycline, flexeril, oxycodone, lopressor, amlodipine, sleep aide, advil as needed Date Last Reviewed:  1/14/2019 EXAMINATION:  
 
Observation/Orthostatic Postural Assessment:  Surgical wound to R  shoulder and L hip appears to be well healed with no drainage or redness noted. R shoulder elevated. Palpation:   decreased L 1st rib inferior glide; tight L inferior GH capsule Standing:  Stands with weight shifted to R LE. 
ROM:cervical ROM = WFL for all planes AROM Right shoulder  Left shoulder R shoulder 1/10/19 Forward elevation 40 painful  degrees at start Abduction 40 painful WNL External rotation - WNL Internal rotation - WNL Horz ABD - -   
  
PROM RIght shoulder  Left shoulder Flexion 125 Abduction 110 External rotation 40 Internal rotation 65 Horz ABD -   
  
 
HIP AROM Right hip  Left hip Flexion  90 Extension  -5 Abduction  15 External rotation  5 Internal rotation  45 Strength:  
 Right shoulder  Left shoulder Forward elevation 3- - Abduction - - External rotation 4- 4- Internal rotation 4 4 Horz ABD - -  
  
HIP Right hip  Left hip Flexion - 3+ Extension - 3+ Abduction - 3+ External rotation - 3+ Internal rotation - - Special Tests:  
Flexibility:  
· Hamstring Length (in 90/90 position):R not assessed today L -50 degrees · Straight Leg Raise Test: negative R · Jorge Test (2-joint hip flexors): not assessed today · Prone Knee Bend: negative · Cj's Test:not assessed today · Gastrocnemius not assessed today Neurological Screen: Motor intact to R UE, L LE. Sensory not assessed today. Functional Mobility:  Walking : walks with cane in R hand for now secondary to delayed onset of pain in L hip with walking. Sit to/from Stand: pushes off with both hands. Bed Mobility: independent with compensation. Independent with compensation with basic mobility and uses cane for walking. Independent with compensation with dressing and grooming ADL's. Balance:  Did well with transfers and able to walk a straight path Body Structures Involved: 1. Joints 2. Muscles Body Functions Affected: 1. Movement Related Activities and Participation Affected: 1. General Tasks and Demands 2. Mobility 3. Community, Social and [place] CLINICAL DECISION MAKING:  
Outcome Measure: Tool Used: Lower Extremity Functional Scale (LEFS) Score:  Initial: 7/80 Most Recent: X/80 (Date: -- ) Interpretation of Score: 20 questions each scored on a 5 point scale with 0 representing \"extreme difficulty or unable to perform\" and 4 representing \"no difficulty\". The lower the score, the greater the functional disability. 80/80 represents no disability. Minimal detectable change is 9 points. Score 80 79-63 62-48 47-32 31-16 15-1 0 Modifier CH CI CJ CK CL CM CN Tool Used: Disabilities of the Arm, Shoulder and Hand (DASH) Questionnaire - Quick Version Score:  Initial: 36/55  Most Recent: X/55 (Date: -- ) Interpretation of Score: The DASH is designed to measure the activities of daily living in person's with upper extremity dysfunction or pain. Each section is scored on a 1-5 scale, 5 representing the greatest disability. The scores of each section are added together for a total score of 55. This number is divided by 11, followed by subtracting 1 and multiplying by 25 to get a percent score of disability.  This value represents the percentage disability: 0-20% minimal disability; 20-40% moderate disability; 40-60% severe disability; % dependent for care or exaggerated symptom behavior. Minimal detectable change is 12%. Score 11 12-19 20-28 29-37 38-45 46-54 55 Modifier CH CI CJ CK CL CM CN Medical Necessity:  
· Patient is expected to demonstrate progress in strength and range of motion to increase independence with walking, basic mobility and use of R UE. Heddie Lilly Reason for Services/Other Comments: 
· Patient continues to require skilled intervention due to continued weakness and stiffness in R UE, L LE and difficulty with functional activities. Heddie Lilly TREATMENT:  
(In addition to Assessment/Re-Assessment sessions the following treatments were rendered) Pre-treatment Symptoms/Complaints:  Pt his pain is in his L groin. Pain: Initial:    4/10 Post Session:  Not rated Therapeutic Exercise: ( 45  min):  Exercises per grid below to improve mobility and strength. Required moderate visual and verbal cues to ensure correct performance. Progressed complexity of movement as indicated. 1/14/2019 Started today's session with AIS for shoulder as follows:  ER at 27, 39 and 80 ABD; IR at 50 ABD and flexion and scaption 3\" x 10 to 15 reps. Then as per shoulder grid below Aquatic Therapy Grid Date: 
1/8/19 Date: 
1/11/19 Date: 
1/14/19 Activity/Exercise Parameters Parameters Parameters Walking forward 10 laps 6 6 Walking backward 10 laps 6 6 Walking sideways 10 laps 6 6 High step march 6 laps 6 6 March in place 30x With purple noodle 10 B 10 x with noodle   
squat 30x Hip 3 way kick-  Large slow kicks 10 ea way Fast, small kicks 20 sec ea AMARA hips Hip ABD/ADD  10 B 10 4# Circles   10 B  10/10 4# Hip flexion with straight leg  10 B 10 4#   
4.5' shoulder exercises with purple noode  10 10 Hamstring stretch  5 x 5 sec hold Deep water ex: bicycle, jumping janneth, cross country, core stability   2 min each 2 min each Land Therapy grid Date: 
1/2/19 Date: 
1/3/19 Date: 
1/10/19 Activity/Exercise Parameters Parameters Parameters AAROM R shoulder flex Supine - with L hand assist 1x10 AROM 
10x with min A Pulleys 20x flexion LTR 1x10    
bridging 2x10 SLR   B 2x10 Hip ER with band Red 2x15 Push up +  10x 2 2# 10x2 Shoulder ER  Side lye 10x Then sitting - demo Side lye 1# 
10x2 Mid Trap  Side lye 10x Demo for shoulder ext Side lye 5x3 With feedback for scap post 
depression HEP: continue current HEP Manual Therapy ( 0 minutes)  Not today Treatment/Session Assessment:   
· Response to Treatment:  Pt did well with exercises in the water. · Compliance with Program/Exercises: Will assess as treatment progresses. · Recommendations/Intent for next treatment session: \"Next visit will focus on pain reduction and progression of exercises. Will continue pool PT for 2x per week, land 1x per week. Treatment for both L hip and R shoulder for pool and R shoulder mostly for land. R shoulder strengthening on land should be slow progression with focus on scapula strengthening. Total Treatment Duration: 45 Minutes Ly Gomez, PT

## 2019-01-15 ENCOUNTER — HOSPITAL ENCOUNTER (OUTPATIENT)
Dept: PHYSICAL THERAPY | Age: 54
Discharge: HOME | End: 2019-01-15
Payer: COMMERCIAL

## 2019-01-15 PROCEDURE — 97110 THERAPEUTIC EXERCISES: CPT

## 2019-01-15 PROCEDURE — 97016 VASOPNEUMATIC DEVICE THERAPY: CPT

## 2019-01-15 NOTE — PROGRESS NOTES
Ibeth Rockwell : 1965 Payor: Steven Rios / Plan: 19 Alicia Marin / Product Type: [employer] /  [address] [employer]  at [address] [place] [address] [address] [facility], [address] [place] [address],[address], [facility] [address]. [phone]   [phone] OUTPATIENT PHYSICAL THERAPY:Daily Note 1/15/2019 ICD-10: Treatment Diagnosis: M25.611 R shoulder stiffness, M25.652 L hip stiffness, R26.2 Difficulty walking; M25.511 R shoulder pain, M25.552 L hip pain. Precautions/Allergies:  
Patient has no known allergies. Fall Risk Score: 1 (? 5 = High Risk) MD Orders: eval and treat, HEP, ROM, Strengthening: 
Full motion, full strength, all modalities. MEDICAL/REFERRING DIAGNOSIS: 
S/P RT Shoulder I&D; LT Hip DATE OF ONSET: 18 REFERRING PHYSICIAN: Carlee Bonner MD 
RETURN PHYSICIAN APPOINTMENT: unknown INITIAL ASSESSMENT:  Mr. Divya Bynum is approximately 3 weeks s/p L hip total hip revision and lavage and debridement of R shoulder after being diagnosed with infection to both areas. He had previous surgeries to L hip in 2017 and R shoulder in 2017. He mostly c/o L hip pain post surgery and has not been moving arm too much. Post-op pain, swelling, wound healing, weakness and decreased ROM are limiting normalized use and function of L LE and R UE. He will benefit from PT for guided shoulder and hip rehab. PROBLEM LIST (Impacting functional limitations): 1. Decreased Strength 2. Decreased ADL/Functional Activities 3. Post-op R shoulder and L hiop pain and swelling 4. Decreased R shoulder and L hip ROM 5. Weakness R shoulder and L hip 6. Difficulty with walking. INTERVENTIONS PLANNED: 
1. Modalities PRN, including ultrasound, estim, and iontophoresis 2. Soft tissue and joint mobilization for ROM and flexibility 3. Stretching, progressive resistive exercises and HEP for return to functional activities. 4. Back Education and Training for body mechanics with activities of daily living 5. If needed, aquatic therapy. TREATMENT PLAN: 
Effective Dates: 12/26/2018 TO 3/26/2019 (90 days). Frequency/Duration: 3 times a week for 3 weeks, then 2x per week for 3 weeks. Plan of care to expand to 90 days and will likely continue at 2 per week and then decrease to 1x per week or less as appropriate. GOALS: (Goals have been discussed and agreed upon with patient.) Short-Term Functional Goals: Time Frame: 4 weeks 1. Report no more than 3/10 intermittent pain to R shoulder with compensatory use during basic functional activities. 2. R shoulder PROM forward elevation greater than 150 degrees and external rotation greater than 60 degrees to progress into functional ranges. 3. L hip AROM for abduction at least 20 degrees, extension at least 8 degrees for ease with functional activities (walking and car transfers). 4. Demonstrate good R shoulder and L hip isometric strength with manual testing to progress into strength phase. 5. Returns to normalized walking for house hold distances. 6. Independent with initial HEP. Discharge Goals: Time Frame: 12 weeks 1. Return to good strength with going up/down at least 2 flights of stairs. 2. No more than 2/10 intermittent pain R shoulder and L hip pain with return to normalized household and work activities. 3. R shoulder AROM forward elevation greater than 150 degrees, external rotation greater than 89 degrees, and strength to shoulder are grossly WNL's for safe use with normalized activities. 4. Demonstrate good functional shoulder and hip strength and endurance for return to normalized household and work activities. 5. DASH score no greater than 15 and LEFS score at least 70/80 for return to full function. 6. Independent with advanced shoulder HEP for continued self-management. Rehabilitation Potential For Stated Goals: Good The information in this section was collected on 12/26/18 (except where otherwise noted).  
HISTORY:  
 
 Ambulatory/Rehab Services H2 Model Falls Risk Assessment Total: (5 or greater = High Risk): 2  
 ©2010 [facility] [place] States Patent #[id number]. Federal Law prohibits the replication, distribution or use without written permission from [facility] History of Present Injury/Illness (Reason for Referral):  Mr. Lobito Downey is s/p L hip total hip revision and lavage and debridement of R shoulder after being diagnosed with infection to both areas. He had previous surgeries to L hip in Jan 2017 and R shoulder in mid 2017. He mostly c/o L hip pain post surgery and has not been moving arm too much. States that he was in excellent shape prior to this incident and has lost much muscle tone and weight. He also states that his energy level is very low and this is not his norm. He comes to outpatient therapy after undergoing several rounds of infusion to treat staff infection. Was told that he has retorn R RTC from previous surgery but MD chose not to repair at this time. Past Medical History/Comorbidities: Mr. Lobito Downey  has a past medical history of Arthritis, Biceps muscle tear, Chronic pain, Insomnia, MRSA (methicillin resistant Staphylococcus aureus) infection, Personal history of kidney stones, and Right shoulder pain. Mr. Lobito Downey  has a past surgical history that includes hx other surgical; hx rotator cuff repair (Left, 2010); hx orthopaedic (Left, 01/2017); hx septoplasty (11/15/2017); HIP ARTHROPLASTY TOTAL REVISION (Left, 11/6/2018); SHOULDER I&D (Right, 11/6/2018); RIGHT SHOULDER ARTHROSCOPY SUBACROMIAL DECOMPRESSION ROTATOR CUFF REPAIR/ DISTAL CLAVICLE RESECTION (Right, 6/13/2017); LEFT TOTAL HIP ARTHROPLASTY (Left, 1/6/2017); and SHOULDER ARTHROSCOPY ACROMIOPLASTY ROTATOR CUFF REPAIR (Left, 10/21/2010). Social History/Living Environment:   1 story- lives with spouse with tub/shower combo Prior Level of Function/Work/Activity: very active with workouts - cardio and weight lifting/ pt currently not working. He did not take any medications prior to this incident. Dominant Side:  
      RIGHT Previous Treatment Approaches:   
      Surgeries in 2017 for R shoulder RTC repair and L THR prior to this incident Current Medications:   
 Doxycycline, flexeril, oxycodone, lopressor, amlodipine, sleep aide, advil as needed Date Last Reviewed:  1/15/2019 EXAMINATION:  
 
Observation/Orthostatic Postural Assessment:  Surgical wound to R  shoulder and L hip appears to be well healed with no drainage or redness noted. R shoulder elevated. Palpation:   decreased L 1st rib inferior glide; tight L inferior GH capsule Standing:  Stands with weight shifted to R LE. 
ROM:cervical ROM = WFL for all planes AROM Right shoulder  Left shoulder R shoulder 1/10/19 Forward elevation 40 painful  degrees at start Abduction 40 painful WNL External rotation - WNL Internal rotation - WNL Horz ABD - -   
  
PROM RIght shoulder  Left shoulder Flexion 125 Abduction 110 External rotation 40 Internal rotation 65 Horz ABD -   
  
 
HIP AROM Right hip  Left hip Flexion  90 Extension  -5 Abduction  15 External rotation  5 Internal rotation  45 Strength:  
 Right shoulder  Left shoulder Forward elevation 3- - Abduction - - External rotation 4- 4- Internal rotation 4 4 Horz ABD - -  
  
HIP Right hip  Left hip Flexion - 3+ Extension - 3+ Abduction - 3+ External rotation - 3+ Internal rotation - - Special Tests:  
Flexibility:  
· Hamstring Length (in 90/90 position):R not assessed today L -50 degrees · Straight Leg Raise Test: negative R · Jorge Test (2-joint hip flexors): not assessed today · Prone Knee Bend: negative · Cj's Test:not assessed today · Gastrocnemius not assessed today Neurological Screen: Motor intact to R UE, L LE. Sensory not assessed today. Functional Mobility:  Walking : walks with cane in R hand for now secondary to delayed onset of pain in L hip with walking. Sit to/from Stand: pushes off with both hands. Bed Mobility: independent with compensation. Independent with compensation with basic mobility and uses cane for walking. Independent with compensation with dressing and grooming ADL's. Balance:  Did well with transfers and able to walk a straight path Body Structures Involved: 1. Joints 2. Muscles Body Functions Affected: 1. Movement Related Activities and Participation Affected: 1. General Tasks and Demands 2. Mobility 3. Community, Social and [facility]ol[facility] [place] CLINICAL DECISION MAKING:  
Outcome Measure: Tool Used: Lower Extremity Functional Scale (LEFS) Score:  Initial: 7/80 Most Recent: X/80 (Date: -- ) Interpretation of Score: 20 questions each scored on a 5 point scale with 0 representing \"extreme difficulty or unable to perform\" and 4 representing \"no difficulty\". The lower the score, the greater the functional disability. 80/80 represents no disability. Minimal detectable change is 9 points. Score 80 79-63 62-48 47-32 31-16 15-1 0 Modifier CH CI CJ CK CL CM CN Tool Used: Disabilities of the Arm, Shoulder and Hand (DASH) Questionnaire - Quick Version Score:  Initial: 36/55  Most Recent: X/55 (Date: -- ) Interpretation of Score: The DASH is designed to measure the activities of daily living in person's with upper extremity dysfunction or pain. Each section is scored on a 1-5 scale, 5 representing the greatest disability. The scores of each section are added together for a total score of 55. This number is divided by 11, followed by subtracting 1 and multiplying by 25 to get a percent score of disability.  This value represents the percentage disability: 0-20% minimal disability; 20-40% moderate disability; 40-60% severe disability; % dependent for care or exaggerated symptom behavior. Minimal detectable change is 12%. Score 11 12-19 20-28 29-37 38-45 46-54 55 Modifier CH CI CJ CK CL CM CN Medical Necessity:  
· Patient is expected to demonstrate progress in strength and range of motion to increase independence with walking, basic mobility and use of R UE. Joey Castillo Reason for Services/Other Comments: 
· Patient continues to require skilled intervention due to continued weakness and stiffness in R UE, L LE and difficulty with functional activities. Joey Castillo TREATMENT:  
(In addition to Assessment/Re-Assessment sessions the following treatments were rendered) Pre-treatment Symptoms/Complaints:  Shoulder hurting more than leg today. Aquatic PT very helpful to leg pain. Pain: Initial:    4-5/10 shoulder Post Session:  2/10 shoulder Therapeutic Exercise: ( 40  min): Focused on land therapy for shoulder today. Exercises per grid below to improve mobility and strength. Required moderate visual and verbal cues to ensure correct performance. Progressed complexity of movement as indicated. Aquatic Therapy Grid Date: 
1/8/19 Date: 
1/11/19 Date: 
1/14/19 Activity/Exercise Parameters Parameters Parameters Walking forward 10 laps 6 6 Walking backward 10 laps 6 6 Walking sideways 10 laps 6 6 High step march 6 laps 6 6 March in place 30x With purple noodle 10 B 10 x with noodle   
squat 30x Hip 3 way kick-  Large slow kicks 10 ea way Fast, small kicks 20 sec ea AMARA hips Hip ABD/ADD  10 B 10 4# Circles   10 B  10/10 4# Hip flexion with straight leg  10 B 10 4#   
4.5' shoulder exercises with purple noode  10 10 Hamstring stretch  5 x 5 sec hold Deep water ex: bicycle, jumping janneth, cross country, core stability   2 min each 2 min each Land therapy: AIS for shoulder as follows:  ER at 30, 45 and 80 ABD; IR at 50 ABD and flexion and scaption 3\" x 10 to 15 reps. Then as per shoulder grid below Land Therapy grid Date: 
1/2/19 Date: 
1/3/19 Date: 
1/10/19 Date 1/15/19 Activity/Exercise Parameters Parameters Parameters AAROM R shoulder flex Supine - with L hand assist 1x10 AROM 
10x with min A Pulleys 20x flexion Pulleys 20x flexion 20x scaption AAROM in sitting 10x LTR 1x10     
bridging 2x10 SLR   B 2x10 Hip ER with band Red 2x15 Push up +  10x 2 2# 10x2 2# 10x2 Shoulder ER  Side lye 10x Then sitting - demo Side lye 1# 
10x2 Side lye 1# 
10x 2# 10x2 Mid Trap  Side lye 10x Demo for shoulder ext Side lye 5x3 With feedback for scap post 
depression Side lye 5x3 With feedback for scap post 
depression UBE    L1 6 min Start of session Prone row and extension    4# 10x ea  
pendulums    5# 20x front and with circles  
tricep    2# 20x supine HEP: pt to do updated HEP. Manual Therapy (5 minutes)   Grade 3 GH inferior and posterior glide Modalities: (15 min) vasopneumatic machine  For decreasing inflammation to area at 38 degrees, min pressure. Treatment/Session Assessment:   
· Response to Treatment:  Shoulder ROM improving but continues with weak RTC. Shoulder AROM by session end ~ 120 degrees with much effort. Hip started hurting during session with walking around. Vaso helpful to R shoulder pain. Updated HEP and given handout. · Compliance with Program/Exercises: Will assess as treatment progresses. · Recommendations/Intent for next treatment session: \"Next visit will focus on pain reduction and progression of exercises. Will continue pool PT for 2x per week, land 1x per week. Treatment for both L hip and R shoulder for pool and R shoulder mostly for land. R shoulder strengthening on land should be slow progression with focus on scapula strengthening. Total Treatment Duration: 60 Minutes PT Patient Time In/Time Out Time In: 7283 Time Out: 8993 Kathe Singh, PT

## 2019-01-17 ENCOUNTER — APPOINTMENT (OUTPATIENT)
Dept: PHYSICAL THERAPY | Age: 54
End: 2019-01-17
Payer: COMMERCIAL

## 2019-01-18 ENCOUNTER — HOSPITAL ENCOUNTER (OUTPATIENT)
Dept: PHYSICAL THERAPY | Age: 54
Discharge: HOME | End: 2019-01-18
Payer: COMMERCIAL

## 2019-01-18 PROCEDURE — 97113 AQUATIC THERAPY/EXERCISES: CPT

## 2019-01-18 NOTE — PROGRESS NOTES
Ron Huerta : 1965 Payor: Elsie Alba / Plan: 19 Alicia Marin / Product Type: Commerical /  [address] [place]  at [facility] GEORGE BUTLER [address] [facility], [address] [place] [address],[address], [facility] [address]. [phone]   [phone] OUTPATIENT PHYSICAL THERAPY:Daily Note 2019 ICD-10: Treatment Diagnosis: M25.611 R shoulder stiffness, M25.652 L hip stiffness, R26.2 Difficulty walking; M25.511 R shoulder pain, M25.552 L hip pain. Precautions/Allergies:  
Patient has no known allergies. Fall Risk Score: 1 (? 5 = High Risk) MD Orders: eval and treat, HEP, ROM, Strengthening: 
Full motion, full strength, all modalities. MEDICAL/REFERRING DIAGNOSIS: 
S/P RT Shoulder I&D; LT Hip DATE OF ONSET: 18 REFERRING PHYSICIAN: Sarahy Bonner MD 
RETURN PHYSICIAN APPOINTMENT: unknown INITIAL ASSESSMENT:  Mr. Annalisa Fowler is approximately 3 weeks s/p L hip total hip revision and lavage and debridement of R shoulder after being diagnosed with infection to both areas. He had previous surgeries to L hip in 2017 and R shoulder in 2017. He mostly c/o L hip pain post surgery and has not been moving arm too much. Post-op pain, swelling, wound healing, weakness and decreased ROM are limiting normalized use and function of L LE and R UE. He will benefit from PT for guided shoulder and hip rehab. PROBLEM LIST (Impacting functional limitations): 1. Decreased Strength 2. Decreased ADL/Functional Activities 3. Post-op R shoulder and L hiop pain and swelling 4. Decreased R shoulder and L hip ROM 5. Weakness R shoulder and L hip 6. Difficulty with walking. INTERVENTIONS PLANNED: 
1. Modalities PRN, including ultrasound, estim, and iontophoresis 2. Soft tissue and joint mobilization for ROM and flexibility 3. Stretching, progressive resistive exercises and HEP for return to functional activities. 4. Back Education and Training for body mechanics with activities of daily living 5. If needed, aquatic therapy. TREATMENT PLAN: 
Effective Dates: 12/26/2018 TO 3/26/2019 (90 days). Frequency/Duration: 3 times a week for 3 weeks, then 2x per week for 3 weeks. Plan of care to expand to 90 days and will likely continue at 2 per week and then decrease to 1x per week or less as appropriate. GOALS: (Goals have been discussed and agreed upon with patient.) Short-Term Functional Goals: Time Frame: 4 weeks 1. Report no more than 3/10 intermittent pain to R shoulder with compensatory use during basic functional activities. 2. R shoulder PROM forward elevation greater than 150 degrees and external rotation greater than 60 degrees to progress into functional ranges. 3. L hip AROM for abduction at least 20 degrees, extension at least 8 degrees for ease with functional activities (walking and car transfers). 4. Demonstrate good R shoulder and L hip isometric strength with manual testing to progress into strength phase. 5. Returns to normalized walking for house hold distances. 6. Independent with initial HEP. Discharge Goals: Time Frame: 12 weeks 1. Return to good strength with going up/down at least 2 flights of stairs. 2. No more than 2/10 intermittent pain R shoulder and L hip pain with return to normalized household and work activities. 3. R shoulder AROM forward elevation greater than 150 degrees, external rotation greater than 89 degrees, and strength to shoulder are grossly WNL's for safe use with normalized activities. 4. Demonstrate good functional shoulder and hip strength and endurance for return to normalized household and work activities. 5. DASH score no greater than 15 and LEFS score at least 70/80 for return to full function. 6. Independent with advanced shoulder HEP for continued self-management. Rehabilitation Potential For Stated Goals: Good The information in this section was collected on 12/26/18 (except where otherwise noted).  
HISTORY:  
 
 Ambulatory/Rehab Services H2 Model Falls Risk Assessment Total: (5 or greater = High Risk): 2  
 ©2010 [facility] [place] States Patent #[id number]. Federal Law prohibits the replication, distribution or use without written permission from [facility] [place] History of Present Injury/Illness (Reason for Referral):  Mr. Delia Lake is s/p L hip total hip revision and lavage and debridement of R shoulder after being diagnosed with infection to both areas. He had previous surgeries to L hip in Jan 2017 and R shoulder in mid 2017. He mostly c/o L hip pain post surgery and has not been moving arm too much. States that he was in excellent shape prior to this incident and has lost much muscle tone and weight. He also states that his energy level is very low and this is not his norm. He comes to outpatient therapy after undergoing several rounds of infusion to treat staff infection. Was told that he has retorn R RTC from previous surgery but MD chose not to repair at this time. Past Medical History/Comorbidities: Mr. Delia Lake  has a past medical history of Arthritis, Biceps muscle tear, Chronic pain, Insomnia, MRSA (methicillin resistant Staphylococcus aureus) infection (2007), Personal history of kidney stones, and Right shoulder pain. He also has no past medical history of Adverse effect of anesthesia, Difficult intubation, Malignant hyperthermia due to anesthesia, Nausea & vomiting, or Pseudocholinesterase deficiency. Mr. Delia Lake  has a past surgical history that includes hx other surgical; hx rotator cuff repair (Left, 2010); hx orthopaedic (Left, 01/2017); and hx septoplasty (11/15/2017). Social History/Living Environment:   1 story- lives with spouse with tub/shower combo Prior Level of Function/Work/Activity: very active with workouts - cardio and weight lifting/ pt currently not working. He did not take any medications prior to this incident. Dominant Side: RIGHT Previous Treatment Approaches:   
      Surgeries in 2017 for R shoulder RTC repair and L THR prior to this incident Current Medications:   
 Doxycycline, flexeril, oxycodone, lopressor, amlodipine, sleep aide, advil as needed Date Last Reviewed:  1/18/2019 EXAMINATION:  
 
Observation/Orthostatic Postural Assessment:  Surgical wound to R  shoulder and L hip appears to be well healed with no drainage or redness noted. R shoulder elevated. Palpation:   decreased L 1st rib inferior glide; tight L inferior GH capsule Standing:  Stands with weight shifted to R LE. 
ROM:cervical ROM = WFL for all planes AROM Right shoulder  Left shoulder R shoulder 1/10/19 Forward elevation 40 painful  degrees at start Abduction 40 painful WNL External rotation - WNL Internal rotation - WNL Horz ABD - -   
  
PROM RIght shoulder  Left shoulder Flexion 125 Abduction 110 External rotation 40 Internal rotation 65 Horz ABD -   
  
 
HIP AROM Right hip  Left hip Flexion  90 Extension  -5 Abduction  15 External rotation  5 Internal rotation  45 Strength:  
 Right shoulder  Left shoulder Forward elevation 3- - Abduction - - External rotation 4- 4- Internal rotation 4 4 Horz ABD - -  
  
HIP Right hip  Left hip Flexion - 3+ Extension - 3+ Abduction - 3+ External rotation - 3+ Internal rotation - - Special Tests:  
Flexibility:  
· Hamstring Length (in 90/90 position):R not assessed today L -50 degrees · Straight Leg Raise Test: negative R · Jorge Test (2-joint hip flexors): not assessed today · Prone Knee Bend: negative · Cj's Test:not assessed today · Gastrocnemius not assessed today Neurological Screen: Motor intact to R UE, L LE. Sensory not assessed today. Functional Mobility:  Walking : walks with cane in R hand for now secondary to delayed onset of pain in L hip with walking.  Sit to/from Stand: pushes off with both hands. Bed Mobility: independent with compensation. Independent with compensation with basic mobility and uses cane for walking. Independent with compensation with dressing and grooming ADL's. Balance:  Did well with transfers and able to walk a straight path Body Structures Involved: 1. Joints 2. Muscles Body Functions Affected: 1. Movement Related Activities and Participation Affected: 1. General Tasks and Demands 2. Mobility 3. Community, Social and [place] CLINICAL DECISION MAKING:  
Outcome Measure: Tool Used: Lower Extremity Functional Scale (LEFS) Score:  Initial: 7/80 Most Recent: X/80 (Date: -- ) Interpretation of Score: 20 questions each scored on a 5 point scale with 0 representing \"extreme difficulty or unable to perform\" and 4 representing \"no difficulty\". The lower the score, the greater the functional disability. 80/80 represents no disability. Minimal detectable change is 9 points. Score 80 79-63 62-48 47-32 31-16 15-1 0 Modifier CH CI CJ CK CL CM CN Tool Used: Disabilities of the Arm, Shoulder and Hand (DASH) Questionnaire - Quick Version Score:  Initial: 36/55  Most Recent: X/55 (Date: -- ) Interpretation of Score: The DASH is designed to measure the activities of daily living in person's with upper extremity dysfunction or pain. Each section is scored on a 1-5 scale, 5 representing the greatest disability. The scores of each section are added together for a total score of 55. This number is divided by 11, followed by subtracting 1 and multiplying by 25 to get a percent score of disability. This value represents the percentage disability: 0-20% minimal disability; 20-40% moderate disability; 40-60% severe disability; % dependent for care or exaggerated symptom behavior. Minimal detectable change is 12%. Score 11 12-19 20-28 29-37 38-45 46-54 55 Modifier CH CI CJ CK CL CM CN Medical Necessity: · Patient is expected to demonstrate progress in strength and range of motion to increase independence with walking, basic mobility and use of R UE. Christian Saunas Reason for Services/Other Comments: 
· Patient continues to require skilled intervention due to continued weakness and stiffness in R UE, L LE and difficulty with functional activities. Christian Saunas TREATMENT:  
(In addition to Assessment/Re-Assessment sessions the following treatments were rendered) Pre-treatment Symptoms/Complaints: States he had shooting pain down leg while doing shoulder ex yesterday. He stated he had pain last night. Pain: Initial:    6/10 down leg  LB Post Session:  3/10 Aquatic Exercise: ( 45 min): For mobility and stability in gait and balance in decreased wt bearing environment in the pool. LE strengthening, gait Exercises per grid below to improve mobility and strength. Required moderate visual and verbal cues to ensure correct performance. Progressed complexity of movement as indicated. Aquatic Therapy Grid Date: 
1/8/19 Date: 
1/11/19 Date: 
1/14/19 1/18/19 Activity/Exercise Parameters Parameters Parameters Walking forward 10 laps 6 6 6 Walking backward 10 laps 6 6 6 Walking sideways 10 laps 6 6 6 High step march 6 laps 6 6 6 March in place 30x With purple noodle 10 B 10 x with noodle 30x  
squat 30x Hip 3 way kick-  Large slow kicks 10 ea way Fast, small kicks 20 sec ea AMARA hips   4# x 10 each Hip ABD/ADD  10 B 10 4# 4# 15 Circles   10 B  10/10 4# 15/15 Hip flexion with straight leg  10 B 10 4#   
4.5' shoulder exercises with purple noode  10 10 Hamstring stretch  5 x 5 sec hold Deep water ex: bicycle, jumping janneth, cross country, core stability   2 min each 2 min each 2 MIN EACH Trunk ext    Over noodle in standing x 10 Land therapy:none today 1/18/19 Land Therapy grid Date: 
1/2/19 Date: 
1/3/19 Date: 
1/10/19 Date 1/15/19 Activity/Exercise Parameters Parameters Parameters AAROM R shoulder flex Supine - with L hand assist 1x10 AROM 
10x with min A Pulleys 20x flexion Pulleys 20x flexion 20x scaption AAROM in sitting 10x LTR 1x10     
bridging 2x10 SLR   B 2x10 Hip ER with band Red 2x15 Push up +  10x 2 2# 10x2 2# 10x2 Shoulder ER  Side lye 10x Then sitting - demo Side lye 1# 
10x2 Side lye 1# 
10x 2# 10x2 Mid Trap  Side lye 10x Demo for shoulder ext Side lye 5x3 With feedback for scap post 
depression Side lye 5x3 With feedback for scap post 
depression UBE    L1 6 min Start of session Prone row and extension    4# 10x ea  
pendulums    5# 20x front and with circles  
tricep    2# 20x supine HEP: pt to do updated HEP. Manual Therapy (0 minutes)   1/18/19 Modalities: (0 min) 1/18/19 Treatment/Session Assessment:   
· Response to Treatment:  Decreased LE and LB pain after aquatics. Progressing with balance and strength in the pool. · Compliance with Program/Exercises: Will assess as treatment progresses. · Recommendations/Intent for next treatment session: \"Next visit will focus on pain reduction and progression of exercises. Will continue pool PT for 2x per week, land 1x per week. Treatment for both L hip and R shoulder for pool and R shoulder mostly for land. R shoulder strengthening on land should be slow progression with focus on scapula strengthening. Total Treatment Duration: 45 Minutes PT Patient Time In/Time Out Time In: 0930 Time Out: 1015 Edmond Pope, PT

## 2019-01-21 ENCOUNTER — HOSPITAL ENCOUNTER (OUTPATIENT)
Dept: PHYSICAL THERAPY | Age: 54
Discharge: HOME | End: 2019-01-21
Payer: COMMERCIAL

## 2019-01-21 PROCEDURE — 97140 MANUAL THERAPY 1/> REGIONS: CPT

## 2019-01-21 PROCEDURE — 97110 THERAPEUTIC EXERCISES: CPT

## 2019-01-21 NOTE — PROGRESS NOTES
Yosef Rosales : 1965 Payor: [employer] / Plan: 19 Alicia Marin / Product Type: Commerical /  [id number]  at [facility] GEORGE BUTLER [address] [facility], [address] [place] [address],[address], [address] [phone]   [phone] OUTPATIENT PHYSICAL THERAPY:Daily Note 2019 ICD-10: Treatment Diagnosis: M25.611 R shoulder stiffness, M25.652 L hip stiffness, R26.2 Difficulty walking; M25.511 R shoulder pain, M25.552 L hip pain. Precautions/Allergies:  
Patient has no known allergies. Fall Risk Score: 1 (? 5 = High Risk) MD Orders: eval and treat, HEP, ROM, Strengthening: 
Full motion, full strength, all modalities. MEDICAL/REFERRING DIAGNOSIS: 
S/P RT Shoulder I&D; LT Hip DATE OF ONSET: 18 REFERRING PHYSICIAN: Magdiel Bonner MD 
RETURN PHYSICIAN APPOINTMENT: unknown INITIAL ASSESSMENT:  Mr. Kostas Zelaya is approximately 3 weeks s/p L hip total hip revision and lavage and debridement of R shoulder after being diagnosed with infection to both areas. He had previous surgeries to L hip in 2017 and R shoulder in 2017. He mostly c/o L hip pain post surgery and has not been moving arm too much. Post-op pain, swelling, wound healing, weakness and decreased ROM are limiting normalized use and function of L LE and R UE. He will benefit from PT for guided shoulder and hip rehab. PROBLEM LIST (Impacting functional limitations): 1. Decreased Strength 2. Decreased ADL/Functional Activities 3. Post-op R shoulder and L hiop pain and swelling 4. Decreased R shoulder and L hip ROM 5. Weakness R shoulder and L hip 6. Difficulty with walking. INTERVENTIONS PLANNED: 
1. Modalities PRN, including ultrasound, estim, and iontophoresis 2. Soft tissue and joint mobilization for ROM and flexibility 3. Stretching, progressive resistive exercises and HEP for return to functional activities. 4. Back Education and Training for body mechanics with activities of daily living 5. If needed, aquatic therapy. TREATMENT PLAN: 
Effective Dates: 12/26/2018 TO 3/26/2019 (90 days). Frequency/Duration: 3 times a week for 3 weeks, then 2x per week for 3 weeks. Plan of care to expand to 90 days and will likely continue at 2 per week and then decrease to 1x per week or less as appropriate. GOALS: (Goals have been discussed and agreed upon with patient.) Short-Term Functional Goals: Time Frame: 4 weeks 1. Report no more than 3/10 intermittent pain to R shoulder with compensatory use during basic functional activities. 2. R shoulder PROM forward elevation greater than 150 degrees and external rotation greater than 60 degrees to progress into functional ranges. 3. L hip AROM for abduction at least 20 degrees, extension at least 8 degrees for ease with functional activities (walking and car transfers). 4. Demonstrate good R shoulder and L hip isometric strength with manual testing to progress into strength phase. 5. Returns to normalized walking for house hold distances. 6. Independent with initial HEP. Discharge Goals: Time Frame: 12 weeks 1. Return to good strength with going up/down at least 2 flights of stairs. 2. No more than 2/10 intermittent pain R shoulder and L hip pain with return to normalized household and work activities. 3. R shoulder AROM forward elevation greater than 150 degrees, external rotation greater than 89 degrees, and strength to shoulder are grossly WNL's for safe use with normalized activities. 4. Demonstrate good functional shoulder and hip strength and endurance for return to normalized household and work activities. 5. DASH score no greater than 15 and LEFS score at least 70/80 for return to full function. 6. Independent with advanced shoulder HEP for continued self-management. Rehabilitation Potential For Stated Goals: Good The information in this section was collected on 12/26/18 (except where otherwise noted).  
HISTORY:  
 
 Ambulatory/Rehab Services H2 Model Falls Risk Assessment Total: (5 or greater = High Risk): 2  
 ©2010 [facility] [place] Patent #[id number]. Federal Law prohibits the replication, distribution or use without written permission from [facility] [employer] History of Present Injury/Illness (Reason for Referral):  Mr. Joy Richardson is s/p L hip total hip revision and lavage and debridement of R shoulder after being diagnosed with infection to both areas. He had previous surgeries to L hip in Jan 2017 and R shoulder in mid 2017. He mostly c/o L hip pain post surgery and has not been moving arm too much. States that he was in excellent shape prior to this incident and has lost much muscle tone and weight. He also states that his energy level is very low and this is not his norm. He comes to outpatient therapy after undergoing several rounds of infusion to treat staff infection. Was told that he has retorn R RTC from previous surgery but MD chose not to repair at this time. Past Medical History/Comorbidities: Mr. Joy Richardson  has a past medical history of Arthritis, Biceps muscle tear, Chronic pain, Insomnia, MRSA (methicillin resistant Staphylococcus aureus) infection (2007), Personal history of kidney stones, and Right shoulder pain. He also has no past medical history of Adverse effect of anesthesia, Difficult intubation, Malignant hyperthermia due to anesthesia, Nausea & vomiting, or Pseudocholinesterase deficiency. Mr. Joy Richardson  has a past surgical history that includes hx other surgical; hx rotator cuff repair (Left, 2010); hx orthopaedic (Left, 01/2017); and hx septoplasty (11/15/2017). Social History/Living Environment:   1 story- lives with spouse with tub/shower combo Prior Level of Function/Work/Activity: very active with workouts - cardio and weight lifting/ pt currently not working. He did not take any medications prior to this incident. Dominant Side: RIGHT Previous Treatment Approaches:   
      Surgeries in 2017 for R shoulder RTC repair and L THR prior to this incident Current Medications:   
 Doxycycline, flexeril, oxycodone, lopressor, amlodipine, sleep aide, advil as needed Date Last Reviewed:  1/21/2019 EXAMINATION:  
 
Observation/Orthostatic Postural Assessment:  Surgical wound to R  shoulder and L hip appears to be well healed with no drainage or redness noted. R shoulder elevated. Palpation:   decreased L 1st rib inferior glide; tight L inferior GH capsule Standing:  Stands with weight shifted to R LE. 
ROM:cervical ROM = WFL for all planes AROM Right shoulder  Left shoulder R shoulder 1/10/19 Forward elevation 40 painful  degrees at start Abduction 40 painful WNL External rotation - WNL Internal rotation - WNL Horz ABD - -   
  
PROM RIght shoulder  Left shoulder Flexion 125 Abduction 110 External rotation 40 Internal rotation 65 Horz ABD -   
  
 
HIP AROM Right hip  Left hip Flexion  90 Extension  -5 Abduction  15 External rotation  5 Internal rotation  45 Strength:  
 Right shoulder  Left shoulder Forward elevation 3- - Abduction - - External rotation 4- 4- Internal rotation 4 4 Horz ABD - -  
  
HIP Right hip  Left hip Flexion - 3+ Extension - 3+ Abduction - 3+ External rotation - 3+ Internal rotation - - Special Tests:  
Flexibility:  
· Hamstring Length (in 90/90 position):R not assessed today L -50 degrees · Straight Leg Raise Test: negative R · Jorge Test (2-joint hip flexors): not assessed today · Prone Knee Bend: negative · Cj's Test:not assessed today · Gastrocnemius not assessed today Neurological Screen: Motor intact to R UE, L LE. Sensory not assessed today. Functional Mobility:  Walking : walks with cane in R hand for now secondary to delayed onset of pain in L hip with walking.  Sit to/from Stand: pushes off with both hands. Bed Mobility: independent with compensation. Independent with compensation with basic mobility and uses cane for walking. Independent with compensation with dressing and grooming ADL's. Balance:  Did well with transfers and able to walk a straight path Body Structures Involved: 1. Joints 2. Muscles Body Functions Affected: 1. Movement Related Activities and Participation Affected: 1. General Tasks and Demands 2. Mobility 3. Community, Social and [place] CLINICAL DECISION MAKING:  
Outcome Measure: Tool Used: Lower Extremity Functional Scale (LEFS) Score:  Initial: 7/80 Most Recent: X/80 (Date: -- ) Interpretation of Score: 20 questions each scored on a 5 point scale with 0 representing \"extreme difficulty or unable to perform\" and 4 representing \"no difficulty\". The lower the score, the greater the functional disability. 80/80 represents no disability. Minimal detectable change is 9 points. Score 80 79-63 62-48 47-32 31-16 15-1 0 Modifier CH CI CJ CK CL CM CN Tool Used: Disabilities of the Arm, Shoulder and Hand (DASH) Questionnaire - Quick Version Score:  Initial: 36/55  Most Recent: X/55 (Date: -- ) Interpretation of Score: The DASH is designed to measure the activities of daily living in person's with upper extremity dysfunction or pain. Each section is scored on a 1-5 scale, 5 representing the greatest disability. The scores of each section are added together for a total score of 55. This number is divided by 11, followed by subtracting 1 and multiplying by 25 to get a percent score of disability. This value represents the percentage disability: 0-20% minimal disability; 20-40% moderate disability; 40-60% severe disability; % dependent for care or exaggerated symptom behavior. Minimal detectable change is 12%. Score 11 12-19 20-28 29-37 38-45 46-54 55 Modifier CH CI CJ CK CL CM CN Medical Necessity: · Patient is expected to demonstrate progress in strength and range of motion to increase independence with walking, basic mobility and use of R UE. Shameka Espinoza Reason for Services/Other Comments: 
· Patient continues to require skilled intervention due to continued weakness and stiffness in R UE, L LE and difficulty with functional activities. Shameka Espinoza TREATMENT:  
(In addition to Assessment/Re-Assessment sessions the following treatments were rendered) Pre-treatment Symptoms/Complaints: Reports that he has ongoing L LE pain. Shoulder felt better after PT. Feels that the pool has been helpful with his L leg, but he has good days/bad days with it. Pain: Initial:    No VAS Post Session:  No VAS; stated R shoulder felt better Note: Today's appointment originally scheduled for aquatic, but patient arrived without pool attire, and appointment moved to land. Aquatic Exercise: ( 0 min):  none today Aquatic Therapy Grid Date: 
1/8/19 Date: 
1/11/19 Date: 
1/14/19 1/18/19 Activity/Exercise Parameters Parameters Parameters Walking forward 10 laps 6 6 6 Walking backward 10 laps 6 6 6 Walking sideways 10 laps 6 6 6 High step march 6 laps 6 6 6 March in place 30x With purple noodle 10 B 10 x with noodle 30x  
squat 30x Hip 3 way kick-  Large slow kicks 10 ea way Fast, small kicks 20 sec ea AMARA hips   4# x 10 each Hip ABD/ADD  10 B 10 4# 4# 15 Circles   10 B  10/10 4# 15/15 Hip flexion with straight leg  10 B 10 4#   
4.5' shoulder exercises with purple noode  10 10 Hamstring stretch  5 x 5 sec hold Deep water ex: bicycle, jumping janneth, cross country, core stability   2 min each 2 min each 2 MIN EACH Trunk ext    Over noodle in standing x 10 Therapeutic Exercises (29 minutes) to improve R shoulder and L hip symptoms. Passive ranging to L LE within symptom tolerance for flexion/extension and hip add/abd to reduce tightness/stiffness.  Exercises performed per grid below. Manual cues to perform low trap raise and cues to active scapular musculature. Patient also performed one set of hamstring curls, 20 repetitions at 20# to reduce soreness after pulleys. Land Therapy grid Date: 
1/2/19 Date: 
1/3/19 Date: 
1/10/19 Date 1/15/19 Date 
1-21-19 Activity/Exercise Parameters Parameters Parameters AAROM R shoulder flex Supine - with L hand assist 1x10 AROM 
10x with min A Pulleys 20x flexion Pulleys 20x flexion 20x scaption AAROM in sitting 10x Pulleys 20 x flexion 20 x scaption LTR 1x10      
bridging 2x10 SLR   B 2x10 Hip ER with band Red 2x15 Push up +  10x 2 2# 10x2 2# 10x2 Shoulder ER  Side lye 10x Then sitting - demo Side lye 1# 
10x2 Side lye 1# 
10x 2# 10x2 Side-lying 2# 2 x 10 Mid Trap  Side lye 10x Demo for shoulder ext Side lye 5x3 With feedback for scap post 
depression Side lye 5x3 With feedback for scap post 
depression Side-lying 2 x 10 Prone 0# 2 x 10 Prone low trap 
0# 2 x 8  
UBE    L1 6 min Start of session -  
Prone row and extension    4# 10x ea  6# prone row 2 x 10 
 
  
pendulums    5# 20x front and with circles   
tricep    2# 20x supine Cable pressdowns 3# x 10 
10# 2 x 10 Scapular retraction     7# x 10 
10# 2 x 10 Deltoid strengthening     Forward raises 0# to 90 deg, 2 x 10 Scaption 0# 2 x 10 Bicep curls     6# 2 x 15 HEP: pt to do updated HEP. Manual Therapy (16 minutes) for continued progress with R shoulder motion. Grade 3-4 physio mobilizations for forward elevation, ER and abduction, with movements performed in scapular plane. Modalities: (0 min) Treatment/Session Assessment:   
· Response to Treatment:  Patient tolerated resisted exercises well. Good external rotation ROM, but limited with forward elevation with migration of humerus superiorly when elevated beyond ~110 degrees. · Compliance with Program/Exercises: Appears compliant. · Recommendations/Intent for next treatment session: \"Next visit will focus on pain reduction and progression of exercises. Will continue pool PT for 2x per week, land 1x per week. Treatment for both L hip and R shoulder for pool and R shoulder mostly for land. R shoulder strengthening on land should be slow progression with focus on scapula strengthening. Total Treatment Duration: 45 Minutes PT Patient Time In/Time Out Time In: 7364 Time Out: 6316 Moriah Borrero, PT

## 2019-01-24 ENCOUNTER — HOSPITAL ENCOUNTER (OUTPATIENT)
Dept: PHYSICAL THERAPY | Age: 54
Discharge: HOME | End: 2019-01-24
Payer: COMMERCIAL

## 2019-01-24 PROCEDURE — 97110 THERAPEUTIC EXERCISES: CPT

## 2019-01-24 PROCEDURE — 97140 MANUAL THERAPY 1/> REGIONS: CPT

## 2019-01-24 NOTE — PROGRESS NOTES
Ara Merritt : 1965 Payor: Frank Cuellar / Plan: 19 Alicia Marin / Product Type: Commerical /  [address]  at [facility] GEORGE BUTLER [address] [facility], [address] [place] [address],[address], [address] [facility] [phone]   [phone] OUTPATIENT PHYSICAL THERAPY:Daily Note and Progress Report 2019 ICD-10: Treatment Diagnosis: M25.611 R shoulder stiffness, M25.652 L hip stiffness, R26.2 Difficulty walking; M25.511 R shoulder pain, M25.552 L hip pain. Precautions/Allergies:  
Patient has no known allergies. Fall Risk Score: 1 (? 5 = High Risk) MD Orders: eval and treat, HEP, ROM, Strengthening: 
Full motion, full strength, all modalities. Pt started P on 18 and has completed 11 visits. MEDICAL/REFERRING DIAGNOSIS: 
S/P RT Shoulder I&D; LT Hip DATE OF ONSET: 18 REFERRING PHYSICIAN: Nahid Bonner, MD 
RETURN PHYSICIAN APPOINTMENT: unknown INITIAL ASSESSMENT:  Mr. Talia Murillo is approximately 2.5 months s/p L hip total hip revision and lavage and debridement of R shoulder after being diagnosed with infection to both areas. He had previous surgeries to L hip in 2017 and R shoulder in mid . His shoulder AROM and strength is improving but not WNL and continue to limit is ability to do moderate level activities. His L LE progress has been slow because of hip initially getting very irritated with just walking. He continues with L gait antalgia that has improved in quality of motion and ability to WB on L LE. He is extremely weak in some of his L hip stabilizer muscles like with piriformis. He continues to need PT for guided rehab to increase function in both R UE and L LE. He does not appear ready to return to work on this time. PROBLEM LIST (Impacting functional limitations): 1. Decreased Strength 2. Decreased ADL/Functional Activities 3. Post-op R shoulder and L hiop pain and swelling 4. Decreased R shoulder and L hip ROM 5. Weakness R shoulder and L hip 6. Difficulty with walking. INTERVENTIONS PLANNED: 
1. Modalities PRN, including ultrasound, estim, and iontophoresis 2. Soft tissue and joint mobilization for ROM and flexibility 3. Stretching, progressive resistive exercises and HEP for return to functional activities. 4. Back Education and Training for body mechanics with activities of daily living 5. If needed, aquatic therapy. TREATMENT PLAN: 
Effective Dates: 12/26/2018 TO 3/26/2019 (90 days). Frequency/Duration: 3 times a week for 3 weeks, then 2x per week for 3 weeks. Plan of care to expand to 90 days and will likely continue at 2 per week and then decrease to 1x per week or less as appropriate. GOALS: (Goals have been discussed and agreed upon with patient.) Short-Term Functional Goals: Time Frame: 4 weeks 1. Report no more than 3/10 intermittent pain to R shoulder with compensatory use during basic functional activities. MET 1/24/19 2. R shoulder PROM forward elevation greater than 150 degrees and external rotation greater than 60 degrees to progress into functional ranges. GOOD PROGRESSION 1/24/19 3. L hip AROM for abduction at least 20 degrees, extension at least 8 degrees for ease with functional activities (walking and car transfers). MET 1/24/19 4. Demonstrate good R shoulder and L hip isometric strength with manual testing to progress into strength phase. PROGRESSSED 1/24/18 5. Returns to normalized walking for house hold distances. GOOD PROGRESSSION 1/24/18 6. Independent with initial HEP. MET 1/24/19 Discharge Goals: Time Frame: 12 weeks. All LTG or ONGOING 1. Return to good strength with going up/down at least 2 flights of stairs. 2. No more than 2/10 intermittent pain R shoulder and L hip pain with return to normalized household and work activities.  
3. R shoulder AROM forward elevation greater than 150 degrees, external rotation greater than 89 degrees, and strength to shoulder are grossly WNL's for safe use with normalized activities. 4. Demonstrate good functional shoulder and hip strength and endurance for return to normalized household and work activities. 5. DASH score no greater than 15 and LEFS score at least 70/80 for return to full function. 6. Independent with advanced shoulder HEP for continued self-management. Rehabilitation Potential For Stated Goals: Good The information in this section was collected on 12/26/18 (except where otherwise noted). HISTORY:  
 
 Ambulatory/Rehab Services H2 Model Falls Risk Assessment Total: (5 or greater = High Risk): 2  
 ©2010 [facility] . [facility] States Patent #[id number]. Federal Law prohibits the replication, distribution or use without written permission from [facility] [employer] History of Present Injury/Illness (Reason for Referral):  Mr. Krzysztof Cross is s/p L hip total hip revision and lavage and debridement of R shoulder after being diagnosed with infection to both areas. He had previous surgeries to L hip in Jan 2017 and R shoulder in mid 2017. He mostly c/o L hip pain post surgery and has not been moving arm too much. States that he was in excellent shape prior to this incident and has lost much muscle tone and weight. He also states that his energy level is very low and this is not his norm. He comes to outpatient therapy after undergoing several rounds of infusion to treat staff infection. Was told that he has retorn R RTC from previous surgery but MD chose not to repair at this time. Past Medical History/Comorbidities: Mr. Krzysztof Cross  has a past medical history of Arthritis, Biceps muscle tear, Chronic pain, Insomnia, MRSA (methicillin resistant Staphylococcus aureus) infection (2007), Personal history of kidney stones, and Right shoulder pain.  He also has no past medical history of Adverse effect of anesthesia, Difficult intubation, Malignant hyperthermia due to anesthesia, Nausea & vomiting, or Pseudocholinesterase deficiency. Mr. Author Underwood  has a past surgical history that includes hx other surgical; hx rotator cuff repair (Left, 2010); hx orthopaedic (Left, 01/2017); and hx septoplasty (11/15/2017). Social History/Living Environment:   1 story- lives with spouse with tub/shower combo Prior Level of Function/Work/Activity: very active with workouts - cardio and weight lifting/ pt currently not working. He did not take any medications prior to this incident. Dominant Side:  
      RIGHT Previous Treatment Approaches:   
      Surgeries in 2017 for R shoulder RTC repair and L THR prior to this incident Current Medications:   
 Doxycycline, flexeril, oxycodone, lopressor, amlodipine, sleep aide, advil as needed Date Last Reviewed:  1/24/2019 EXAMINATION: 1/24.19 Observation/Orthostatic Postural Assessment:  [place]- [place] head glides superiorly around 110 degrees Walking:  Decreased R gait antalgia Palpation:  Point tender to R biceps tendon and L greater trochanter area, tight IT band. ROM:cervical ROM = WFL for all planes  
  
PROM RIght shoulder  Left shoulder Flexion 140 Abduction 110 External rotation 65 Internal rotation 70 Horz ABD -   
  
 
HIP AROM Right hip  Left hip Flexion  90 Extension  -5 Abduction  15 External rotation  30 Internal rotation  45 Strength:  
 Right shoulder  Left shoulder Forward elevation 3+ - Abduction 3+ - External rotation 4- 4- Internal rotation 4+ 4 Horz ABD - -  
  
HIP Right hip  Left hip Flexion - 3+ Extension - 3+ Abduction - 3+ External rotation - 3+ Internal rotation - - Special Tests: not assessed today Flexibility: at IE- not assessed at progress report · Hamstring Length (in 90/90 position):R not assessed today L -50 degrees · Straight Leg Raise Test: negative R 
 · Jorge Test (2-joint hip flexors): not assessed today · Prone Knee Bend: negative · Cj's Test:not assessed today · Gastrocnemius not assessed today Neurological Screen: Motor intact to R UE, L LE. Functional Mobility:  Walking : walks without cane with mild R gait antalgia. Sit to/from Stand: independent - sometimes uses hands. . Bed Mobility: independent with compensation. Independent with compensation with basic mobility. Independent with compensation with dressing and grooming ADL's. Balance:  Did well with transfers and able to walk a straight path Body Structures Involved: 1. Joints 2. Muscles Body Functions Affected: 1. Movement Related Activities and Participation Affected: 1. General Tasks and Demands 2. Mobility 3. Community, Social and [place] CLINICAL DECISION MAKING:  
Outcome Measure: Tool Used: Lower Extremity Functional Scale (LEFS) Score:  Initial: 7/80 Most Recent: 15/80 (Date: -- ) Interpretation of Score: 20 questions each scored on a 5 point scale with 0 representing \"extreme difficulty or unable to perform\" and 4 representing \"no difficulty\". The lower the score, the greater the functional disability. 80/80 represents no disability. Minimal detectable change is 9 points. Score 80 79-63 62-48 47-32 31-16 15-1 0 Modifier CH CI CJ CK CL CM CN Tool Used: Disabilities of the Arm, Shoulder and Hand (DASH) Questionnaire - Quick Version Score:  Initial: 36/55  Most Recent: /55 (Date: -- ) Needs to be reassessed Interpretation of Score: The DASH is designed to measure the activities of daily living in person's with upper extremity dysfunction or pain. Each section is scored on a 1-5 scale, 5 representing the greatest disability. The scores of each section are added together for a total score of 55. This number is divided by 11, followed by subtracting 1 and multiplying by 25 to get a percent score of disability.  This value represents the percentage disability: 0-20% minimal disability; 20-40% moderate disability; 40-60% severe disability; % dependent for care or exaggerated symptom behavior. Minimal detectable change is 12%. Score 11 12-19 20-28 29-37 38-45 46-54 55 Modifier CH CI CJ CK CL CM CN Medical Necessity:  
· Patient is expected to demonstrate progress in strength and range of motion to increase independence with walking, basic mobility and use of R UE. Jo Ann Quiñones Reason for Services/Other Comments: 
· Patient continues to require skilled intervention due to continued weakness and stiffness in R UE, L LE and difficulty with functional activities. Jo Ann Quiñones TREATMENT:  
(In addition to Assessment/Re-Assessment sessions the following treatments were rendered) Pre-treatment Symptoms/Complaints: L LE doing some better with walking but still very weak. R shoulder has some pain but is better. Pain: Initial:    No VAS obtained. Post Session:  No VAS; stated R shoulder felt better Note: Today's appointment originally scheduled for aquatic, but patient arrived without pool attire, and appointment moved to land. Aquatic Exercise: ( 0 min):  none today Aquatic Therapy Grid Date: 
1/8/19 Date: 
1/11/19 Date: 
1/14/19 1/18/19 Activity/Exercise Parameters Parameters Parameters Walking forward 10 laps 6 6 6 Walking backward 10 laps 6 6 6 Walking sideways 10 laps 6 6 6 High step march 6 laps 6 6 6 March in place 30x With purple noodle 10 B 10 x with noodle 30x  
squat 30x Hip 3 way kick-  Large slow kicks 10 ea way Fast, small kicks 20 sec ea AMARA hips   4# x 10 each Hip ABD/ADD  10 B 10 4# 4# 15 Circles   10 B  10/10 4# 15/15 Hip flexion with straight leg  10 B 10 4#   
4.5' shoulder exercises with purple noode  10 10 Hamstring stretch  5 x 5 sec hold Deep water ex: bicycle, jumping janneth, cross country, core stability   2 min each 2 min each 2 MIN EACH Trunk ext    Over noodle in standing x 10  
 Therapeutic Exercises (30 minutes) to improve R shoulder and L hip symptoms. Passive ranging to L LE within symptom tolerance for flexion/extension and hip add/abd to reduce tightness/stiffness. Exercises performed per grid below. Manual cues to perform low trap raise and cues to active scapular musculature. Patient also performed one set of hamstring curls, 20 repetitions at 20# to reduce soreness after pulleys. Land Therapy grid Date: 
1/2/19 Date: 
1/3/19 Date: 
1/10/19 Date 1/15/19 Date 
1-21-19 Date 1-24/19 Activity/Exercise Parameters Parameters Parameters AAROM R shoulder flex Supine - with L hand assist 1x10 AROM 
10x with min A Pulleys 20x flexion Pulleys 20x flexion 20x scaption AAROM in sitting 10x Pulleys 20 x flexion 20 x scaption LTR 1x10       
bridging 2x10 SLR   B 2x10 Hip ER with band Red 2x15     Prone MR -- sustained holds 10x Gentle resist through range 5x Push up +  10x 2 2# 10x2 2# 10x2 Shoulder ER  Side lye 10x Then sitting - demo Side lye 1# 
10x2 Side lye 1# 
10x 2# 10x2 Side-lying 2# 2 x 10  Side-lying 2# 3 x 10  
2 sec hold Mid Trap  Side lye 10x Demo for shoulder ext Side lye 5x3 With feedback for scap post 
depression Side lye 5x3 With feedback for scap post 
depression Side-lying 2 x 10 Prone 0# 2 x 10 Prone low trap 
0# 2 x 8 Prone 1# 2 x 10 Prone EXT 
2# 2x 10 UBE    L1 6 min Start of session - --  
Prone row and extension    4# 10x ea  6# prone row 2 x 10 Stand bent over 6# 3 x 10  
pendulums    5# 20x front and with circles    
tricep    2# 20x supine Cable pressdowns 3# x 10 
10# 2 x 10 Scapular retraction     7# x 10 
10# 2 x 10 Deltoid strengthening     Forward raises 0# to 90 deg, 2 x 10 Scaption 0# 2 x 10  Forward raises 10x Bicep curls     6# 2 x 15 Prone hip flexion      1.5# + MR 
10x 2 with sustained holds as well HEP: pt to do updated HEP. Manual Therapy (15 minutes) for continued progress with R shoulder motion. Grade 3-4 physio mobilizations for forward elevation, ER and abduction, with movements performed in scapular plane. Modalities: (0 min) Treatment/Session Assessment:   
· Response to Treatment:  Patient tolerated resisted exercises well. Good external rotation ROM, but limited with forward elevation with migration of humerus superiorly when elevated beyond ~110 degrees. · Compliance with Program/Exercises: Appears compliant. · Recommendations/Intent for next treatment session: \"Next visit will focus on pain reduction and progression of exercises. To change to  pool PT for 1x per week, land 2x per week. Treatment for both L hip and R shoulder. L hip strengthening slow secondary to amount of weakness makes him tend to overuse other muscles. Also address L lateral hip buritis. Total Treatment Duration: 45 Minutes PT Patient Time In/Time Out Time In: 1571 Time Out: 1040 Yue Hadley, PT

## 2019-01-25 ENCOUNTER — HOSPITAL ENCOUNTER (OUTPATIENT)
Dept: PHYSICAL THERAPY | Age: 54
Discharge: HOME | End: 2019-01-25
Payer: COMMERCIAL

## 2019-01-25 PROCEDURE — 97113 AQUATIC THERAPY/EXERCISES: CPT

## 2019-01-26 NOTE — PROGRESS NOTES
Severa Lacer : 1965 Payor: Grabiel Gardner / Plan:  Alicia Marin / Product Type: Commerical /  [address]  at [facility] GEORGE BUTLER [address] [facility], [address] [place] [address],[address] [phone]   [phone] OUTPATIENT PHYSICAL THERAPY:Daily Note 2019 ICD-10: Treatment Diagnosis: M25.611 R shoulder stiffness, M25.652 L hip stiffness, R26.2 Difficulty walking; M25.511 R shoulder pain, M25.552 L hip pain. Precautions/Allergies:  
Patient has no known allergies. Fall Risk Score: 1 (? 5 = High Risk) MD Orders: eval and treat, HEP, ROM, Strengthening: 
Full motion, full strength, all modalities. MEDICAL/REFERRING DIAGNOSIS: 
S/P RT Shoulder I&D; LT Hip DATE OF ONSET: 18 REFERRING PHYSICIAN: Roman Bonner MD 
RETURN PHYSICIAN APPOINTMENT: unknown INITIAL ASSESSMENT:  Mr. Karlene Vaughn is approximately 3 weeks s/p L hip total hip revision and lavage and debridement of R shoulder after being diagnosed with infection to both areas. He had previous surgeries to L hip in 2017 and R shoulder in 2017. He mostly c/o L hip pain post surgery and has not been moving arm too much. Post-op pain, swelling, wound healing, weakness and decreased ROM are limiting normalized use and function of L LE and R UE. He will benefit from PT for guided shoulder and hip rehab. PROBLEM LIST (Impacting functional limitations): 1. Decreased Strength 2. Decreased ADL/Functional Activities 3. Post-op R shoulder and L hiop pain and swelling 4. Decreased R shoulder and L hip ROM 5. Weakness R shoulder and L hip 6. Difficulty with walking. INTERVENTIONS PLANNED: 
1. Modalities PRN, including ultrasound, estim, and iontophoresis 2. Soft tissue and joint mobilization for ROM and flexibility 3. Stretching, progressive resistive exercises and HEP for return to functional activities. 4. Back Education and Training for body mechanics with activities of daily living 5. If needed, aquatic therapy. TREATMENT PLAN: 
Effective Dates: 12/26/2018 TO 3/26/2019 (90 days). Frequency/Duration: 3 times a week for 3 weeks, then 2x per week for 3 weeks. Plan of care to expand to 90 days and will likely continue at 2 per week and then decrease to 1x per week or less as appropriate. GOALS: (Goals have been discussed and agreed upon with patient.) Short-Term Functional Goals: Time Frame: 4 weeks 1. Report no more than 3/10 intermittent pain to R shoulder with compensatory use during basic functional activities. 2. R shoulder PROM forward elevation greater than 150 degrees and external rotation greater than 60 degrees to progress into functional ranges. 3. L hip AROM for abduction at least 20 degrees, extension at least 8 degrees for ease with functional activities (walking and car transfers). 4. Demonstrate good R shoulder and L hip isometric strength with manual testing to progress into strength phase. 5. Returns to normalized walking for house hold distances. 6. Independent with initial HEP. Discharge Goals: Time Frame: 12 weeks 1. Return to good strength with going up/down at least 2 flights of stairs. 2. No more than 2/10 intermittent pain R shoulder and L hip pain with return to normalized household and work activities. 3. R shoulder AROM forward elevation greater than 150 degrees, external rotation greater than 89 degrees, and strength to shoulder are grossly WNL's for safe use with normalized activities. 4. Demonstrate good functional shoulder and hip strength and endurance for return to normalized household and work activities. 5. DASH score no greater than 15 and LEFS score at least 70/80 for return to full function. 6. Independent with advanced shoulder HEP for continued self-management. Rehabilitation Potential For Stated Goals: Good The information in this section was collected on 12/26/18 (except where otherwise noted).  
HISTORY:  
 
 Ambulatory/Rehab Services H2 Model Falls Risk Assessment Total: (5 or greater = High Risk): 2  
 ©2010 [facility] [place] Patent #[id number]. Federal Law prohibits the replication, distribution or use without written permission from [facility] [employer] History of Present Injury/Illness (Reason for Referral):  Mr. Fred Gordon is s/p L hip total hip revision and lavage and debridement of R shoulder after being diagnosed with infection to both areas. He had previous surgeries to L hip in Jan 2017 and R shoulder in mid 2017. He mostly c/o L hip pain post surgery and has not been moving arm too much. States that he was in excellent shape prior to this incident and has lost much muscle tone and weight. He also states that his energy level is very low and this is not his norm. He comes to outpatient therapy after undergoing several rounds of infusion to treat staff infection. Was told that he has retorn R RTC from previous surgery but MD chose not to repair at this time. Past Medical History/Comorbidities: Mr. Fred Gordon  has a past medical history of Arthritis, Biceps muscle tear, Chronic pain, Insomnia, MRSA (methicillin resistant Staphylococcus aureus) infection (2007), Personal history of kidney stones, and Right shoulder pain. He also has no past medical history of Adverse effect of anesthesia, Difficult intubation, Malignant hyperthermia due to anesthesia, Nausea & vomiting, or Pseudocholinesterase deficiency. Mr. Fred Gordon  has a past surgical history that includes hx other surgical; hx rotator cuff repair (Left, 2010); hx orthopaedic (Left, 01/2017); and hx septoplasty (11/15/2017). Social History/Living Environment:   1 story- lives with spouse with tub/shower combo Prior Level of Function/Work/Activity: very active with workouts - cardio and weight lifting/ pt currently not working. He did not take any medications prior to this incident. Dominant Side: RIGHT Previous Treatment Approaches:   
      Surgeries in 2017 for R shoulder RTC repair and L THR prior to this incident Current Medications:   
 Doxycycline, flexeril, oxycodone, lopressor, amlodipine, sleep aide, advil as needed Date Last Reviewed:  1/25/2019 EXAMINATION:  
 
Observation/Orthostatic Postural Assessment:  Surgical wound to R  shoulder and L hip appears to be well healed with no drainage or redness noted. R shoulder elevated. Palpation:   decreased L 1st rib inferior glide; tight L inferior GH capsule Standing:  Stands with weight shifted to R LE. 
ROM:cervical ROM = WFL for all planes AROM Right shoulder  Left shoulder R shoulder 1/10/19 Forward elevation 40 painful  degrees at start Abduction 40 painful WNL External rotation - WNL Internal rotation - WNL Horz ABD - -   
  
PROM RIght shoulder  Left shoulder Flexion 125 Abduction 110 External rotation 40 Internal rotation 65 Horz ABD -   
  
 
HIP AROM Right hip  Left hip Flexion  90 Extension  -5 Abduction  15 External rotation  5 Internal rotation  45 Strength:  
 Right shoulder  Left shoulder Forward elevation 3- - Abduction - - External rotation 4- 4- Internal rotation 4 4 Horz ABD - -  
  
HIP Right hip  Left hip Flexion - 3+ Extension - 3+ Abduction - 3+ External rotation - 3+ Internal rotation - - Special Tests:  
Flexibility:  
· Hamstring Length (in 90/90 position):R not assessed today L -50 degrees · Straight Leg Raise Test: negative R · Jorge Test (2-joint hip flexors): not assessed today · Prone Knee Bend: negative · Cj's Test:not assessed today · Gastrocnemius not assessed today Neurological Screen: Motor intact to R UE, L LE. Sensory not assessed today. Functional Mobility:  Walking : walks with cane in R hand for now secondary to delayed onset of pain in L hip with walking.  Sit to/from Stand: pushes off with both hands. Bed Mobility: independent with compensation. Independent with compensation with basic mobility and uses cane for walking. Independent with compensation with dressing and grooming ADL's. Balance:  Did well with transfers and able to walk a straight path Body Structures Involved: 1. Joints 2. Muscles Body Functions Affected: 1. Movement Related Activities and Participation Affected: 1. General Tasks and Demands 2. Mobility 3. Community, Social and [place] CLINICAL DECISION MAKING:  
Outcome Measure: Tool Used: Lower Extremity Functional Scale (LEFS) Score:  Initial: 7/80 Most Recent: X/80 (Date: -- ) Interpretation of Score: 20 questions each scored on a 5 point scale with 0 representing \"extreme difficulty or unable to perform\" and 4 representing \"no difficulty\". The lower the score, the greater the functional disability. 80/80 represents no disability. Minimal detectable change is 9 points. Score 80 79-63 62-48 47-32 31-16 15-1 0 Modifier CH CI CJ CK CL CM CN Tool Used: Disabilities of the Arm, Shoulder and Hand (DASH) Questionnaire - Quick Version Score:  Initial: 36/55  Most Recent: X/55 (Date: -- ) Interpretation of Score: The DASH is designed to measure the activities of daily living in person's with upper extremity dysfunction or pain. Each section is scored on a 1-5 scale, 5 representing the greatest disability. The scores of each section are added together for a total score of 55. This number is divided by 11, followed by subtracting 1 and multiplying by 25 to get a percent score of disability. This value represents the percentage disability: 0-20% minimal disability; 20-40% moderate disability; 40-60% severe disability; % dependent for care or exaggerated symptom behavior. Minimal detectable change is 12%. Score 11 12-19 20-28 29-37 38-45 46-54 55 Modifier CH CI CJ CK CL CM CN Medical Necessity: · Patient is expected to demonstrate progress in strength and range of motion to increase independence with walking, basic mobility and use of R UE. Joselito Pollack Reason for Services/Other Comments: 
· Patient continues to require skilled intervention due to continued weakness and stiffness in R UE, L LE and difficulty with functional activities. Joselito Pollack TREATMENT:  
(In addition to Assessment/Re-Assessment sessions the following treatments were rendered) Pre-treatment Symptoms/Complaints: States he is more aware of weakness in LE after working on land with LE. Pain: Initial:    4/10 Post Session:  3/10 Aquatic Exercise: ( 45 min): For mobility and stability in gait and balance in decreased wt bearing environment in the pool. LE strengthening, gait Exercises per grid below to improve mobility and strength. Required moderate visual and verbal cues to ensure correct performance. Progressed complexity of movement as indicated. Aquatic Therapy Grid Date: 
1/8/19 Date: 
1/11/19 Date: 
1/14/19 1/18/19 1/25/19 Activity/Exercise Parameters Parameters Parameters Walking forward 10 laps 6 6 6 6 Walking backward 10 laps 6 6 6 6 Walking sideways 10 laps 6 6 6 6 High step march 6 laps 6 6 6 6 March in place 30x With purple noodle 10 B 10 x with noodle 30x 4# 30x  
squat 30x Hip 3 way kick-  Large slow kicks 10 ea way Fast, small kicks 20 sec ea AMARA hips   4# x 10 each 4# x 15 Hip ABD/ADD  10 B 10 4# 4# 15 4# x 15 Circles   10 B  10/10 4# 11/52 84/74 Hip flexion with straight leg  10 B 10 4#    
4.5' shoulder exercises with purple noode  10 10 Hamstring stretch  5 x 5 sec hold Deep water ex: bicycle, jumping janneth, cross country, core stability   2 min each 2 min each 2 MIN EACH 2 min each Trunk ext    Over noodle in standing x 10 Land therapy:none today 1/18/19 Land Therapy grid Date: 
1/2/19 Date: 
1/3/19 Date: 
1/10/19 Date 1/15/19 Activity/Exercise Parameters Parameters Parameters AAROM R shoulder flex Supine - with L hand assist 1x10 AROM 
10x with min A Pulleys 20x flexion Pulleys 20x flexion 20x scaption AAROM in sitting 10x LTR 1x10     
bridging 2x10 SLR   B 2x10 Hip ER with band Red 2x15 Push up +  10x 2 2# 10x2 2# 10x2 Shoulder ER  Side lye 10x Then sitting - demo Side lye 1# 
10x2 Side lye 1# 
10x 2# 10x2 Mid Trap  Side lye 10x Demo for shoulder ext Side lye 5x3 With feedback for scap post 
depression Side lye 5x3 With feedback for scap post 
depression UBE    L1 6 min Start of session Prone row and extension    4# 10x ea  
pendulums    5# 20x front and with circles  
tricep    2# 20x supine HEP: pt to do updated HEP. Manual Therapy (0 minutes)   1/18/19 Modalities: (0 min) 1/18/19 Treatment/Session Assessment:   
· Response to Treatment:  Did well with ex in pool · Compliance with Program/Exercises: Will assess as treatment progresses. · Recommendations/Intent for next treatment session: \"Next visit will focus on pain reduction and progression of exercises. Will continue pool PT for 2x per week, land 1x per week. Treatment for both L hip and R shoulder for pool and R shoulder mostly for land. R shoulder strengthening on land should be slow progression with focus on scapula strengthening. Total Treatment Duration: 45 Minutes PT Patient Time In/Time Out Time In: 0930 Time Out: 1015 Precious Dos Santos, PT

## 2019-01-28 ENCOUNTER — HOSPITAL ENCOUNTER (OUTPATIENT)
Dept: PHYSICAL THERAPY | Age: 54
Discharge: HOME | End: 2019-01-28
Payer: COMMERCIAL

## 2019-01-28 PROCEDURE — 97113 AQUATIC THERAPY/EXERCISES: CPT

## 2019-01-28 NOTE — PROGRESS NOTES
Jovanni Veliz : 1965 Payor: Gabrielle Aguilar / Plan: 19 Alicia Marin / Product Type: [employer] /  [address] [place]  at [address] [place] [address] [address] [facility], [address] [place] [address],[address], [address] [facility] [phone]   [phone] OUTPATIENT PHYSICAL THERAPY:Daily Note 2019 ICD-10: Treatment Diagnosis: M25.611 R shoulder stiffness, M25.652 L hip stiffness, R26.2 Difficulty walking; M25.511 R shoulder pain, M25.552 L hip pain. Precautions/Allergies:  
Patient has no known allergies. Fall Risk Score: 1 (? 5 = High Risk) MD Orders: eval and treat, HEP, ROM, Strengthening: 
Full motion, full strength, all modalities. MEDICAL/REFERRING DIAGNOSIS: 
S/P RT Shoulder I&D; LT Hip DATE OF ONSET: 18 REFERRING PHYSICIAN: Zainab Bonner MD 
RETURN PHYSICIAN APPOINTMENT: unknown INITIAL ASSESSMENT:  Mr. Joy Richardson is approximately 3 weeks s/p L hip total hip revision and lavage and debridement of R shoulder after being diagnosed with infection to both areas. He had previous surgeries to L hip in 2017 and R shoulder in 2017. He mostly c/o L hip pain post surgery and has not been moving arm too much. Post-op pain, swelling, wound healing, weakness and decreased ROM are limiting normalized use and function of L LE and R UE. He will benefit from PT for guided shoulder and hip rehab. PROBLEM LIST (Impacting functional limitations): 1. Decreased Strength 2. Decreased ADL/Functional Activities 3. Post-op R shoulder and L hiop pain and swelling 4. Decreased R shoulder and L hip ROM 5. Weakness R shoulder and L hip 6. Difficulty with walking. INTERVENTIONS PLANNED: 
1. Modalities PRN, including ultrasound, estim, and iontophoresis 2. Soft tissue and joint mobilization for ROM and flexibility 3. Stretching, progressive resistive exercises and HEP for return to functional activities. 4. Back Education and Training for body mechanics with activities of daily living 5. If needed, aquatic therapy. TREATMENT PLAN: 
Effective Dates: 12/26/2018 TO 3/26/2019 (90 days). Frequency/Duration: 3 times a week for 3 weeks, then 2x per week for 3 weeks. Plan of care to expand to 90 days and will likely continue at 2 per week and then decrease to 1x per week or less as appropriate. GOALS: (Goals have been discussed and agreed upon with patient.) Short-Term Functional Goals: Time Frame: 4 weeks 1. Report no more than 3/10 intermittent pain to R shoulder with compensatory use during basic functional activities. 2. R shoulder PROM forward elevation greater than 150 degrees and external rotation greater than 60 degrees to progress into functional ranges. 3. L hip AROM for abduction at least 20 degrees, extension at least 8 degrees for ease with functional activities (walking and car transfers). 4. Demonstrate good R shoulder and L hip isometric strength with manual testing to progress into strength phase. 5. Returns to normalized walking for house hold distances. 6. Independent with initial HEP. Discharge Goals: Time Frame: 12 weeks 1. Return to good strength with going up/down at least 2 flights of stairs. 2. No more than 2/10 intermittent pain R shoulder and L hip pain with return to normalized household and work activities. 3. R shoulder AROM forward elevation greater than 150 degrees, external rotation greater than 89 degrees, and strength to shoulder are grossly WNL's for safe use with normalized activities. 4. Demonstrate good functional shoulder and hip strength and endurance for return to normalized household and work activities. 5. DASH score no greater than 15 and LEFS score at least 70/80 for return to full function. 6. Independent with advanced shoulder HEP for continued self-management. Rehabilitation Potential For Stated Goals: Good The information in this section was collected on 12/26/18 (except where otherwise noted).  
HISTORY:  
 
 Ambulatory/Rehab Services H2 Model Falls Risk Assessment Total: (5 or greater = High Risk): 2  
 ©2010 [facility] [place] Patent #[id number]. Federal Law prohibits the replication, distribution or use without written permission from [facility] [employer] History of Present Injury/Illness (Reason for Referral):  Mr. Augusta Lance is s/p L hip total hip revision and lavage and debridement of R shoulder after being diagnosed with infection to both areas. He had previous surgeries to L hip in Jan 2017 and R shoulder in mid 2017. He mostly c/o L hip pain post surgery and has not been moving arm too much. States that he was in excellent shape prior to this incident and has lost much muscle tone and weight. He also states that his energy level is very low and this is not his norm. He comes to outpatient therapy after undergoing several rounds of infusion to treat staff infection. Was told that he has retorn R RTC from previous surgery but MD chose not to repair at this time. Past Medical History/Comorbidities: Mr. Augusta Lance  has a past medical history of Arthritis, Biceps muscle tear, Chronic pain, Insomnia, MRSA (methicillin resistant Staphylococcus aureus) infection (2007), Personal history of kidney stones, and Right shoulder pain. He also has no past medical history of Adverse effect of anesthesia, Difficult intubation, Malignant hyperthermia due to anesthesia, Nausea & vomiting, or Pseudocholinesterase deficiency. Mr. Augusta Lance  has a past surgical history that includes hx other surgical; hx rotator cuff repair (Left, 2010); hx orthopaedic (Left, 01/2017); and hx septoplasty (11/15/2017). Social History/Living Environment:   1 story- lives with spouse with tub/shower combo Prior Level of Function/Work/Activity: very active with workouts - cardio and weight lifting/ pt currently not working. He did not take any medications prior to this incident. Dominant Side: RIGHT Previous Treatment Approaches:   
      Surgeries in 2017 for R shoulder RTC repair and L THR prior to this incident Current Medications:   
 Doxycycline, flexeril, oxycodone, lopressor, amlodipine, sleep aide, advil as needed Date Last Reviewed:  1/28/2019 EXAMINATION:  
 
Observation/Orthostatic Postural Assessment:  Surgical wound to R  shoulder and L hip appears to be well healed with no drainage or redness noted. R shoulder elevated. Palpation:   decreased L 1st rib inferior glide; tight L inferior GH capsule Standing:  Stands with weight shifted to R LE. 
ROM:cervical ROM = WFL for all planes AROM Right shoulder  Left shoulder R shoulder 1/10/19 Forward elevation 40 painful  degrees at start Abduction 40 painful WNL External rotation - WNL Internal rotation - WNL Horz ABD - -   
  
PROM RIght shoulder  Left shoulder Flexion 125 Abduction 110 External rotation 40 Internal rotation 65 Horz ABD -   
  
 
HIP AROM Right hip  Left hip Flexion  90 Extension  -5 Abduction  15 External rotation  5 Internal rotation  45 Strength:  
 Right shoulder  Left shoulder Forward elevation 3- - Abduction - - External rotation 4- 4- Internal rotation 4 4 Horz ABD - -  
  
HIP Right hip  Left hip Flexion - 3+ Extension - 3+ Abduction - 3+ External rotation - 3+ Internal rotation - - Special Tests:  
Flexibility:  
· Hamstring Length (in 90/90 position):R not assessed today L -50 degrees · Straight Leg Raise Test: negative R · Jorge Test (2-joint hip flexors): not assessed today · Prone Knee Bend: negative · Cj's Test:not assessed today · Gastrocnemius not assessed today Neurological Screen: Motor intact to R UE, L LE. Sensory not assessed today. Functional Mobility:  Walking : walks with cane in R hand for now secondary to delayed onset of pain in L hip with walking.  Sit to/from Stand: pushes off with both hands. Bed Mobility: independent with compensation. Independent with compensation with basic mobility and uses cane for walking. Independent with compensation with dressing and grooming ADL's. Balance:  Did well with transfers and able to walk a straight path Body Structures Involved: 1. Joints 2. Muscles Body Functions Affected: 1. Movement Related Activities and Participation Affected: 1. General Tasks and Demands 2. Mobility 3. Community, Social and [place] CLINICAL DECISION MAKING:  
Outcome Measure: Tool Used: Lower Extremity Functional Scale (LEFS) Score:  Initial: 7/80 Most Recent: X/80 (Date: -- ) Interpretation of Score: 20 questions each scored on a 5 point scale with 0 representing \"extreme difficulty or unable to perform\" and 4 representing \"no difficulty\". The lower the score, the greater the functional disability. 80/80 represents no disability. Minimal detectable change is 9 points. Score 80 79-63 62-48 47-32 31-16 15-1 0 Modifier CH CI CJ CK CL CM CN Tool Used: Disabilities of the Arm, Shoulder and Hand (DASH) Questionnaire - Quick Version Score:  Initial: 36/55  Most Recent: X/55 (Date: -- ) Interpretation of Score: The DASH is designed to measure the activities of daily living in person's with upper extremity dysfunction or pain. Each section is scored on a 1-5 scale, 5 representing the greatest disability. The scores of each section are added together for a total score of 55. This number is divided by 11, followed by subtracting 1 and multiplying by 25 to get a percent score of disability. This value represents the percentage disability: 0-20% minimal disability; 20-40% moderate disability; 40-60% severe disability; % dependent for care or exaggerated symptom behavior. Minimal detectable change is 12%. Score 11 12-19 20-28 29-37 38-45 46-54 55 Modifier CH CI CJ CK CL CM CN Medical Necessity: · Patient is expected to demonstrate progress in strength and range of motion to increase independence with walking, basic mobility and use of R UE. Garrick Gill Reason for Services/Other Comments: 
· Patient continues to require skilled intervention due to continued weakness and stiffness in R UE, L LE and difficulty with functional activities. Garrick Jairo TREATMENT:  
(In addition to Assessment/Re-Assessment sessions the following treatments were rendered) Pre-treatment Symptoms/Complaints: complaining of knee pain states he walked dog and there was a hill. Pain: Initial:    4/10 Post Session:  3/10 Aquatic Exercise: ( 45 min): For mobility and stability in gait and balance in decreased wt bearing environment in the pool. LE strengthening, gait Exercises per grid below to improve mobility and strength. Required moderate visual and verbal cues to ensure correct performance. Progressed complexity of movement as indicated. Aquatic Therapy Grid Date: 
1/8/19 Date: 
1/11/19 Date: 
1/14/19 1/18/19 1/25/19 1/28/19 Activity/Exercise Parameters Parameters Parameters Walking forward 10 laps 6 6 6 6 6 Walking backward 10 laps 6 6 6 6 6 Walking sideways 10 laps 6 6 6 6 6 High step march 6 laps 6 6 6 6 6 March in place 30x With purple noodle 10 B 10 x with noodle 30x 4# 30x 4# 30x  
squat 30x     20x Hip 3 way kick-  Large slow kicks 10 ea way Fast, small kicks 20 sec ea AMARA hips   4# x 10 each 4# x 15 Hip ABD/ADD  10 B 10 4# 4# 15 4# x 15 4# x 15 Circles   10 B  10/10 4# 15/15 15/15 10/10 Hip flexion with straight leg  10 B 10 4#   15  
4.5' shoulder exercises with purple noode  10 10 Hamstring stretch  5 x 5 sec hold Deep water ex: bicycle, jumping janneth, cross country, core stability   2 min each 2 min each 2 MIN EACH 2 min each 3 min each Trunk ext    Over noodle in standing x 10 Land therapy:none today 1/28/19 Land Therapy grid Date: 
1/2/19 Date: 1/3/19 Date: 
1/10/19 Date 1/15/19 Activity/Exercise Parameters Parameters Parameters AAROM R shoulder flex Supine - with L hand assist 1x10 AROM 
10x with min A Pulleys 20x flexion Pulleys 20x flexion 20x scaption AAROM in sitting 10x LTR 1x10     
bridging 2x10 SLR   B 2x10 Hip ER with band Red 2x15 Push up +  10x 2 2# 10x2 2# 10x2 Shoulder ER  Side lye 10x Then sitting - demo Side lye 1# 
10x2 Side lye 1# 
10x 2# 10x2 Mid Trap  Side lye 10x Demo for shoulder ext Side lye 5x3 With feedback for scap post 
depression Side lye 5x3 With feedback for scap post 
depression UBE    L1 6 min Start of session Prone row and extension    4# 10x ea  
pendulums    5# 20x front and with circles  
tricep    2# 20x supine HEP: pt to do updated HEP. Manual Therapy (0 minutes)   1/18/19 Modalities: (0 min) 1/18/19 Treatment/Session Assessment:   
· Response to Treatment:  Did well with ex in pool · Compliance with Program/Exercises: Will assess as treatment progresses. · Recommendations/Intent for next treatment session: \"Next visit will focus on pain reduction and progression of exercises. Will continue pool PT for 2x per week, land 1x per week. Treatment for both L hip and R shoulder for pool and R shoulder mostly for land. R shoulder strengthening on land should be slow progression with focus on scapula strengthening. Total Treatment Duration: 45 Minutes PT Patient Time In/Time Out Time In: 0930 Time Out: 1015 Reynold Hopkins PT

## 2019-01-31 ENCOUNTER — HOSPITAL ENCOUNTER (OUTPATIENT)
Dept: PHYSICAL THERAPY | Age: 54
Discharge: HOME | End: 2019-01-31
Payer: COMMERCIAL

## 2019-01-31 PROCEDURE — 97110 THERAPEUTIC EXERCISES: CPT

## 2019-01-31 NOTE — PROGRESS NOTES
Shanel Sánchez : 1965 Payor: Dilan Ring / Plan: 19 Alicia Marin / Product Type: Commerical /  [address]  at [facility] GEORGE BUTLER [address] [facility], [address] [place] [address],[address], [address]. [phone]   [phone] OUTPATIENT PHYSICAL THERAPY:Daily Note 2019 ICD-10: Treatment Diagnosis: M25.611 R shoulder stiffness, M25.652 L hip stiffness, R26.2 Difficulty walking; M25.511 R shoulder pain, M25.552 L hip pain. Precautions/Allergies:  
Patient has no known allergies. Fall Risk Score: 1 (? 5 = High Risk) MD Orders: eval and treat, HEP, ROM, Strengthening: 
Full motion, full strength, all modalities. Pt started P on 18 and has completed 11 visits. MEDICAL/REFERRING DIAGNOSIS: 
S/P RT Shoulder I&D; LT Hip DATE OF ONSET: 18 REFERRING PHYSICIAN: Adrienne Bonner MD 
RETURN PHYSICIAN APPOINTMENT: unknown INITIAL ASSESSMENT:  Mr. Yuly Florence is approximately 2.5 months s/p L hip total hip revision and lavage and debridement of R shoulder after being diagnosed with infection to both areas. He had previous surgeries to L hip in 2017 and R shoulder in mid . His shoulder AROM and strength is improving but not WNL and continue to limit is ability to do moderate level activities. His L LE progress has been slow because of hip initially getting very irritated with just walking. He continues with L gait antalgia that has improved in quality of motion and ability to WB on L LE. He is extremely weak in some of his L hip stabilizer muscles like with piriformis. He continues to need PT for guided rehab to increase function in both R UE and L LE. He does not appear ready to return to work on this time. PROBLEM LIST (Impacting functional limitations): 1. Decreased Strength 2. Decreased ADL/Functional Activities 3. Post-op R shoulder and L hiop pain and swelling 4. Decreased R shoulder and L hip ROM 5. Weakness R shoulder and L hip 6. Difficulty with walking. INTERVENTIONS PLANNED: 
1. Modalities PRN, including ultrasound, estim, and iontophoresis 2. Soft tissue and joint mobilization for ROM and flexibility 3. Stretching, progressive resistive exercises and HEP for return to functional activities. 4. Back Education and Training for body mechanics with activities of daily living 5. If needed, aquatic therapy. TREATMENT PLAN: 
Effective Dates: 12/26/2018 TO 3/26/2019 (90 days). Frequency/Duration: 3 times a week for 3 weeks, then 2x per week for 3 weeks. Plan of care to expand to 90 days and will likely continue at 2 per week and then decrease to 1x per week or less as appropriate. GOALS: (Goals have been discussed and agreed upon with patient.) Short-Term Functional Goals: Time Frame: 4 weeks 1. Report no more than 3/10 intermittent pain to R shoulder with compensatory use during basic functional activities. MET 1/24/19 2. R shoulder PROM forward elevation greater than 150 degrees and external rotation greater than 60 degrees to progress into functional ranges. GOOD PROGRESSION 1/24/19 3. L hip AROM for abduction at least 20 degrees, extension at least 8 degrees for ease with functional activities (walking and car transfers). MET 1/24/19 4. Demonstrate good R shoulder and L hip isometric strength with manual testing to progress into strength phase. PROGRESSSED 1/24/18 5. Returns to normalized walking for house hold distances. GOOD PROGRESSSION 1/24/18 6. Independent with initial HEP. MET 1/24/19 Discharge Goals: Time Frame: 12 weeks. All LTG or ONGOING 1. Return to good strength with going up/down at least 2 flights of stairs. 2. No more than 2/10 intermittent pain R shoulder and L hip pain with return to normalized household and work activities.  
3. R shoulder AROM forward elevation greater than 150 degrees, external rotation greater than 89 degrees, and strength to shoulder are grossly WNL's for safe use with normalized activities. 4. Demonstrate good functional shoulder and hip strength and endurance for return to normalized household and work activities. 5. DASH score no greater than 15 and LEFS score at least 70/80 for return to full function. 6. Independent with advanced shoulder HEP for continued self-management. Rehabilitation Potential For Stated Goals: Good The information in this section was collected on 12/26/18 (except where otherwise noted). HISTORY:  
 
 Ambulatory/Rehab Services H2 Model Falls Risk Assessment Total: (5 or greater = High Risk): 2  
 ©2010 [facility] [address] [place], [place] [address]on [place] Patent #[id number]. Federal Law prohibits the replication, distribution or use without written permission from [facility] [employer] History of Present Injury/Illness (Reason for Referral):  Mr. Jeri Devi is s/p L hip total hip revision and lavage and debridement of R shoulder after being diagnosed with infection to both areas. He had previous surgeries to L hip in Jan 2017 and R shoulder in mid 2017. He mostly c/o L hip pain post surgery and has not been moving arm too much. States that he was in excellent shape prior to this incident and has lost much muscle tone and weight. He also states that his energy level is very low and this is not his norm. He comes to outpatient therapy after undergoing several rounds of infusion to treat staff infection. Was told that he has retorn R RTC from previous surgery but MD chose not to repair at this time. Past Medical History/Comorbidities: Mr. Jeri Devi  has a past medical history of Arthritis, Biceps muscle tear, Chronic pain, Insomnia, MRSA (methicillin resistant Staphylococcus aureus) infection (2007), Personal history of kidney stones, and Right shoulder pain.  He also has no past medical history of Adverse effect of anesthesia, Difficult intubation, Malignant hyperthermia due to anesthesia, Nausea & vomiting, or Pseudocholinesterase deficiency. Mr. Zofia Verdugo  has a past surgical history that includes hx other surgical; hx rotator cuff repair (Left, 2010); hx orthopaedic (Left, 01/2017); and hx septoplasty (11/15/2017). Social History/Living Environment:   1 story- lives with spouse with tub/shower combo Prior Level of Function/Work/Activity: very active with workouts - cardio and weight lifting/ pt currently not working. He did not take any medications prior to this incident. Dominant Side:  
      RIGHT Previous Treatment Approaches:   
      Surgeries in 2017 for R shoulder RTC repair and L THR prior to this incident Current Medications:   
 Doxycycline, flexeril, oxycodone, lopressor, amlodipine, sleep aide, advil as needed Date Last Reviewed:  1/31/2019 EXAMINATION: 1/24.19 Observation/Orthostatic Postural Assessment:  [place]- [place] head glides superiorly around 110 degrees Walking:  Decreased R gait antalgia Palpation:  Point tender to R biceps tendon and L greater trochanter area, tight IT band. ROM:cervical ROM = WFL for all planes  
  
PROM RIght shoulder  Left shoulder Flexion 140 Abduction 110 External rotation 65 Internal rotation 70 Horz ABD -   
  
 
HIP AROM Right hip  Left hip Flexion  90 Extension  -5 Abduction  15 External rotation  30 Internal rotation  45 Strength:  
 Right shoulder  Left shoulder Forward elevation 3+ - Abduction 3+ - External rotation 4- 4- Internal rotation 4+ 4 Horz ABD - -  
  
HIP Right hip  Left hip Flexion - 3+ Extension - 3+ Abduction - 3+ External rotation - 3+ Internal rotation - - Special Tests: not assessed today Flexibility: at IE- not assessed at progress report · Hamstring Length (in 90/90 position):R not assessed today L -50 degrees · Straight Leg Raise Test: negative R 
 · Jorge Test (2-joint hip flexors): not assessed today · Prone Knee Bend: negative · Cj's Test:not assessed today · Gastrocnemius not assessed today Neurological Screen: Motor intact to R UE, L LE. Functional Mobility:  Walking : walks without cane with mild R gait antalgia. Sit to/from Stand: independent - sometimes uses hands. . Bed Mobility: independent with compensation. Independent with compensation with basic mobility. Independent with compensation with dressing and grooming ADL's. Balance:  Did well with transfers and able to walk a straight path Body Structures Involved: 1. Joints 2. Muscles Body Functions Affected: 1. Movement Related Activities and Participation Affected: 1. General Tasks and Demands 2. Mobility 3. Community, Social and [place] CLINICAL DECISION MAKING:  
Outcome Measure: Tool Used: Lower Extremity Functional Scale (LEFS) Score:  Initial: 7/80 Most Recent: 15/80 (Date: 1/24/19) Interpretation of Score: 20 questions each scored on a 5 point scale with 0 representing \"extreme difficulty or unable to perform\" and 4 representing \"no difficulty\". The lower the score, the greater the functional disability. 80/80 represents no disability. Minimal detectable change is 9 points. Score 80 79-63 62-48 47-32 31-16 15-1 0 Modifier CH CI CJ CK CL CM CN Tool Used: Disabilities of the Arm, Shoulder and Hand (DASH) Questionnaire - Quick Version Score:  Initial: 36/55  Most Recent:38 /55 (Date: 1/24/19 ) Interpretation of Score: The DASH is designed to measure the activities of daily living in person's with upper extremity dysfunction or pain. Each section is scored on a 1-5 scale, 5 representing the greatest disability. The scores of each section are added together for a total score of 55. This number is divided by 11, followed by subtracting 1 and multiplying by 25 to get a percent score of disability.  This value represents the percentage disability: 0-20% minimal disability; 20-40% moderate disability; 40-60% severe disability; % dependent for care or exaggerated symptom behavior. Minimal detectable change is 12%. Score 11 12-19 20-28 29-37 38-45 46-54 55 Modifier CH CI CJ CK CL CM CN Medical Necessity:  
· Patient is expected to demonstrate progress in strength and range of motion to increase independence with walking, basic mobility and use of R UE. [place] Reason for Services/Other Comments: 
· Patient continues to require skilled intervention due to continued weakness and stiffness in R UE, L LE and difficulty with functional activities. [place] TREATMENT:  
(In addition to Assessment/Re-Assessment sessions the following treatments were rendered) Pre-treatment Symptoms/Complaints: L LE doing some better with walking but still very weak. R shoulder has some pain but is better. Pain: Initial:    No VAS obtained. Post Session:  No VAS; stated R shoulder felt better Note: Today's appointment originally scheduled for aquatic, but patient arrived without pool attire, and appointment moved to land. Aquatic Exercise: ( 0 min):  none today Aquatic Therapy Grid Date: 
1/8/19 Date: 
1/11/19 Date: 
1/14/19 1/18/19 Activity/Exercise Parameters Parameters Parameters Walking forward 10 laps 6 6 6 Walking backward 10 laps 6 6 6 Walking sideways 10 laps 6 6 6 High step march 6 laps 6 6 6 March in place 30x With purple noodle 10 B 10 x with noodle 30x  
squat 30x Hip 3 way kick-  Large slow kicks 10 ea way Fast, small kicks 20 sec ea AMARA hips   4# x 10 each Hip ABD/ADD  10 B 10 4# 4# 15 Circles   10 B  10/10 4# 15/15 Hip flexion with straight leg  10 B 10 4#   
4.5' shoulder exercises with purple noode  10 10 Hamstring stretch  5 x 5 sec hold Deep water ex: bicycle, jumping janneth, cross country, core stability   2 min each 2 min each 2 MIN EACH Trunk ext    Over noodle in standing x 10  
 Therapeutic Exercises (30 minutes) to improve R shoulder and L hip symptoms. After UBE did- Shoulder A to AAROM in standing with back to wall for ER at 40 and 80 ABD 20x ea. Land Therapy grid Date: 
1/2/19 Date: 
1/3/19 Date: 
1/10/19 Date 1/15/19 Date 
1-21-19 Date 1-24/19 1-31-19 Activity/Exercise Parameters Parameters Parameters AAROM R shoulder flex Supine - with L hand assist 1x10 AROM 
10x with min A Pulleys 20x flexion Pulleys 20x flexion 20x scaption AAROM in sitting 10x Pulleys 20 x flexion 20 x scaption LTR 1x10        
bridging 2x10 SLR   B 2x10 Hip ER with band Red 2x15     Prone MR -- sustained holds 10x Gentle resist through range 5x Prone MR -- sustained holds to eshaustion 3x Side lye clam shell 10x 3 Push up +  10x 2 2# 10x2 2# 10x2 Shoulder ER  Side lye 10x Then sitting - demo Side lye 1# 
10x2 Side lye 1# 
10x 2# 10x2 Side-lying 2# 2 x 10  Side-lying 2# 3 x 10  
2 sec hold Side-lying 1# 30x Mid Trap  Side lye 10x Demo for shoulder ext Side lye 5x3 With feedback for scap post 
depression Side lye 5x3 With feedback for scap post 
depression Side-lying 2 x 10 Prone 0# 2 x 10 Prone low trap 
0# 2 x 8 Prone 1# 2 x 10 Prone EXT 
2# 2x 10 Prone 1# 3 x 10 Prone EXT 
2# 3x 10 UBE    L1 6 min Start of session - -- L3 6 min UE/LE Prone row and extension    4# 10x ea  6# prone row 2 x 10 Stand bent over 6# 3 x 10 Prone 6# 10x 3  
pendulums    5# 20x front and with circles     
tricep    2# 20x supine Cable pressdowns 3# x 10 
10# 2 x 10 Scapular retraction     7# x 10 
10# 2 x 10 Deltoid strengthening     Forward raises 0# to 90 deg, 2 x 10 Scaption 0# 2 x 10  Forward raises 10x Bicep curls     6# 2 x 15 Prone hip flexion      1.5# + MR 
10x 2 with sustained holds as well Wall slides       2 diagonals 10x ea LAQ       indpendent Knee flexion       independent HEP: pt to do updated HEP. Manual Therapy (10 minutes) low to mid back central PA grade 2 to 3 ; L pirirformis release;  R shoulder PROM for ER and flexion prior to therex. Modalities: (0 min) Treatment/Session Assessment:   
· Response to Treatment:  Able to do L clam shell but still very weak to manual resistance to L hip ER. · Compliance with Program/Exercises: Appears compliant. · Recommendations/Intent for next treatment session: \"Next visit will focus on pain reduction and progression of exercises. To change to  pool PT for 1x per week, land 2x per week. Treatment for both L hip and R shoulder. L hip strengthening slow secondary to amount of weakness makes him tend to overuse other muscles. Also address L lateral hip buritis. Total Treatment Duration: 40 Minutes PT Patient Time In/Time Out Time In: 9490 Time Out: 3689 Cynthia Faulkner, PT

## 2019-01-31 NOTE — PROGRESS NOTES
Marilin Minor : 1965 Payor: Ian Heller / Plan: 19 Alicia Marin / Product Type: Commerical /  [id number]  at [facility] GEORGE BUTLER [address] [facility], [address] [place] [address],[address] [phone]   [phone] OUTPATIENT PHYSICAL THERAPY:Daily Note 2019 ICD-10: Treatment Diagnosis: M25.611 R shoulder stiffness, M25.652 L hip stiffness, R26.2 Difficulty walking; M25.511 R shoulder pain, M25.552 L hip pain. Precautions/Allergies:  
Patient has no known allergies. Fall Risk Score: 1 (? 5 = High Risk) MD Orders: eval and treat, HEP, ROM, Strengthening: 
Full motion, full strength, all modalities. MEDICAL/REFERRING DIAGNOSIS: 
S/P RT Shoulder I&D; LT Hip DATE OF ONSET: 18 REFERRING PHYSICIAN: Rosa Bonner., MD 
RETURN PHYSICIAN APPOINTMENT: unknown INITIAL ASSESSMENT:  Mr. Barrett Gorman is approximately 3 weeks s/p L hip total hip revision and lavage and debridement of R shoulder after being diagnosed with infection to both areas. He had previous surgeries to L hip in 2017 and R shoulder in 2017. He mostly c/o L hip pain post surgery and has not been moving arm too much. Post-op pain, swelling, wound healing, weakness and decreased ROM are limiting normalized use and function of L LE and R UE. He will benefit from PT for guided shoulder and hip rehab. PROBLEM LIST (Impacting functional limitations): 1. Decreased Strength 2. Decreased ADL/Functional Activities 3. Post-op R shoulder and L hiop pain and swelling 4. Decreased R shoulder and L hip ROM 5. Weakness R shoulder and L hip 6. Difficulty with walking. INTERVENTIONS PLANNED: 
1. Modalities PRN, including ultrasound, estim, and iontophoresis 2. Soft tissue and joint mobilization for ROM and flexibility 3. Stretching, progressive resistive exercises and HEP for return to functional activities. 4. Back Education and Training for body mechanics with activities of daily living 5. If needed, aquatic therapy. TREATMENT PLAN: 
Effective Dates: 12/26/2018 TO 3/26/2019 (90 days). Frequency/Duration: 3 times a week for 3 weeks, then 2x per week for 3 weeks. Plan of care to expand to 90 days and will likely continue at 2 per week and then decrease to 1x per week or less as appropriate. GOALS: (Goals have been discussed and agreed upon with patient.) Short-Term Functional Goals: Time Frame: 4 weeks 1. Report no more than 3/10 intermittent pain to R shoulder with compensatory use during basic functional activities. 2. R shoulder PROM forward elevation greater than 150 degrees and external rotation greater than 60 degrees to progress into functional ranges. 3. L hip AROM for abduction at least 20 degrees, extension at least 8 degrees for ease with functional activities (walking and car transfers). 4. Demonstrate good R shoulder and L hip isometric strength with manual testing to progress into strength phase. 5. Returns to normalized walking for house hold distances. 6. Independent with initial HEP. Discharge Goals: Time Frame: 12 weeks 1. Return to good strength with going up/down at least 2 flights of stairs. 2. No more than 2/10 intermittent pain R shoulder and L hip pain with return to normalized household and work activities. 3. R shoulder AROM forward elevation greater than 150 degrees, external rotation greater than 89 degrees, and strength to shoulder are grossly WNL's for safe use with normalized activities. 4. Demonstrate good functional shoulder and hip strength and endurance for return to normalized household and work activities. 5. DASH score no greater than 15 and LEFS score at least 70/80 for return to full function. 6. Independent with advanced shoulder HEP for continued self-management. Rehabilitation Potential For Stated Goals: Good The information in this section was collected on 12/26/18 (except where otherwise noted).  
HISTORY:  
 
 Ambulatory/Rehab Services H2 Model Falls Risk Assessment Total: (5 or greater = High Risk): 2  
 ©2010 [facility] [place] Patent #[id number]. Federal Law prohibits the replication, distribution or use without written permission from [facility] [employer] History of Present Injury/Illness (Reason for Referral):  Mr. Barrett Gorman is s/p L hip total hip revision and lavage and debridement of R shoulder after being diagnosed with infection to both areas. He had previous surgeries to L hip in Jan 2017 and R shoulder in mid 2017. He mostly c/o L hip pain post surgery and has not been moving arm too much. States that he was in excellent shape prior to this incident and has lost much muscle tone and weight. He also states that his energy level is very low and this is not his norm. He comes to outpatient therapy after undergoing several rounds of infusion to treat staff infection. Was told that he has retorn R RTC from previous surgery but MD chose not to repair at this time. Past Medical History/Comorbidities: Mr. Barrett Gorman  has a past medical history of Arthritis, Biceps muscle tear, Chronic pain, Insomnia, MRSA (methicillin resistant Staphylococcus aureus) infection (2007), Personal history of kidney stones, and Right shoulder pain. He also has no past medical history of Adverse effect of anesthesia, Difficult intubation, Malignant hyperthermia due to anesthesia, Nausea & vomiting, or Pseudocholinesterase deficiency. Mr. Barrett Gorman  has a past surgical history that includes hx other surgical; hx rotator cuff repair (Left, 2010); hx orthopaedic (Left, 01/2017); and hx septoplasty (11/15/2017). Social History/Living Environment:   1 story- lives with spouse with tub/shower combo Prior Level of Function/Work/Activity: very active with workouts - cardio and weight lifting/ pt currently not working. He did not take any medications prior to this incident. Dominant Side: RIGHT Previous Treatment Approaches:   
      Surgeries in 2017 for R shoulder RTC repair and L THR prior to this incident Current Medications:   
 Doxycycline, flexeril, oxycodone, lopressor, amlodipine, sleep aide, advil as needed Date Last Reviewed:  1/31/2019 EXAMINATION:  
 
Observation/Orthostatic Postural Assessment:  Surgical wound to R  shoulder and L hip appears to be well healed with no drainage or redness noted. R shoulder elevated. Palpation:   decreased L 1st rib inferior glide; tight L inferior GH capsule Standing:  Stands with weight shifted to R LE. 
ROM:cervical ROM = WFL for all planes AROM Right shoulder  Left shoulder R shoulder 1/10/19 Forward elevation 40 painful  degrees at start Abduction 40 painful WNL External rotation - WNL Internal rotation - WNL Horz ABD - -   
  
PROM RIght shoulder  Left shoulder Flexion 125 Abduction 110 External rotation 40 Internal rotation 65 Horz ABD -   
  
 
HIP AROM Right hip  Left hip Flexion  90 Extension  -5 Abduction  15 External rotation  5 Internal rotation  45 Strength:  
 Right shoulder  Left shoulder Forward elevation 3- - Abduction - - External rotation 4- 4- Internal rotation 4 4 Horz ABD - -  
  
HIP Right hip  Left hip Flexion - 3+ Extension - 3+ Abduction - 3+ External rotation - 3+ Internal rotation - - Special Tests:  
Flexibility:  
· Hamstring Length (in 90/90 position):R not assessed today L -50 degrees · Straight Leg Raise Test: negative R · Jorge Test (2-joint hip flexors): not assessed today · Prone Knee Bend: negative · Cj's Test:not assessed today · Gastrocnemius not assessed today Neurological Screen: Motor intact to R UE, L LE. Sensory not assessed today. Functional Mobility:  Walking : walks with cane in R hand for now secondary to delayed onset of pain in L hip with walking.  Sit to/from Stand: pushes off with both hands. Bed Mobility: independent with compensation. Independent with compensation with basic mobility and uses cane for walking. Independent with compensation with dressing and grooming ADL's. Balance:  Did well with transfers and able to walk a straight path Body Structures Involved: 1. Joints 2. Muscles Body Functions Affected: 1. Movement Related Activities and Participation Affected: 1. General Tasks and Demands 2. Mobility 3. Community, Social and [place] CLINICAL DECISION MAKING:  
Outcome Measure: Tool Used: Lower Extremity Functional Scale (LEFS) Score:  Initial: 7/80 Most Recent: X/80 (Date: -- ) Interpretation of Score: 20 questions each scored on a 5 point scale with 0 representing \"extreme difficulty or unable to perform\" and 4 representing \"no difficulty\". The lower the score, the greater the functional disability. 80/80 represents no disability. Minimal detectable change is 9 points. Score 80 79-63 62-48 47-32 31-16 15-1 0 Modifier CH CI CJ CK CL CM CN Tool Used: Disabilities of the Arm, Shoulder and Hand (DASH) Questionnaire - Quick Version Score:  Initial: 36/55  Most Recent: X/55 (Date: -- ) Interpretation of Score: The DASH is designed to measure the activities of daily living in person's with upper extremity dysfunction or pain. Each section is scored on a 1-5 scale, 5 representing the greatest disability. The scores of each section are added together for a total score of 55. This number is divided by 11, followed by subtracting 1 and multiplying by 25 to get a percent score of disability. This value represents the percentage disability: 0-20% minimal disability; 20-40% moderate disability; 40-60% severe disability; % dependent for care or exaggerated symptom behavior. Minimal detectable change is 12%. Score 11 12-19 20-28 29-37 38-45 46-54 55 Modifier CH CI CJ CK CL CM CN Medical Necessity: · Patient is expected to demonstrate progress in strength and range of motion to increase independence with walking, basic mobility and use of R UE. Adan Mckinney Reason for Services/Other Comments: 
· Patient continues to require skilled intervention due to continued weakness and stiffness in R UE, L LE and difficulty with functional activities. Adan Mckinney TREATMENT:  
(In addition to Assessment/Re-Assessment sessions the following treatments were rendered) Pre-treatment Symptoms/Complaints: continues tith difficulty walking. Shoulder visit with MD was OK. Pain: Initial:    No VAS obtained Post Session:  No VAS obtained today. No increase in pain reported. Aquatic Exercise: ( 0 min):   Not today Aquatic Therapy Grid Date: 
1/8/19 Date: 
1/11/19 Date: 
1/14/19 1/18/19 1/25/19 1/28/19 Activity/Exercise Parameters Parameters Parameters Walking forward 10 laps 6 6 6 6 6 Walking backward 10 laps 6 6 6 6 6 Walking sideways 10 laps 6 6 6 6 6 High step march 6 laps 6 6 6 6 6 March in place 30x With purple noodle 10 B 10 x with noodle 30x 4# 30x 4# 30x  
squat 30x     20x Hip 3 way kick-  Large slow kicks 10 ea way Fast, small kicks 20 sec ea AMARA hips   4# x 10 each 4# x 15 Hip ABD/ADD  10 B 10 4# 4# 15 4# x 15 4# x 15 Circles   10 B  10/10 4# 15/15 15/15 10/10 Hip flexion with straight leg  10 B 10 4#   15  
4.5' shoulder exercises with purple noode  10 10 Hamstring stretch  5 x 5 sec hold Deep water ex: bicycle, jumping janneth, cross country, core stability   2 min each 2 min each 2 MIN EACH 2 min each 3 min each Trunk ext    Over noodle in standing x 10 Therapeutic Exercise (45 min)  For mobility and strengthening of R UE, L LE as per grid below AROM for shoudler ER with back against wall at 45 ane 80 ABD 10x ea with prolonged holds Land Therapy grid Date: 
1/2/19 Date: 
1/3/19 Date: 
1/10/19 Date 1/15/19 1/31/19 Activity/Exercise Parameters Parameters Parameters AAROM R shoulder flex Supine - with L hand assist 1x10 AROM 
10x with min A Pulleys 20x flexion Pulleys 20x flexion 20x scaption AAROM in sitting 10x LTR 1x10      
bridging 2x10 SLR   B 2x10 Hip ER with band Red 2x15    MR in prone Push up +  10x 2 2# 10x2 2# 10x2 Shoulder ER  Side lye 10x Then sitting - demo Side lye 1# 
10x2 Side lye 1# 
10x 2# 10x2 Mid Trap  Side lye 10x Demo for shoulder ext Side lye 5x3 With feedback for scap post 
depression Side lye 5x3 With feedback for scap post 
depression UBE    L1 6 min Start of session L3 6 min UE and LE Session start Prone row and extension    4# 10x ea   
pendulums    5# 20x front and with circles   
tricep    2# 20x supine Wall slides     On diagonals 10x ea way HEP: pt to do updated HEP. Manual Therapy (0 minutes)   1/18/19 Modalities: (0 min) 1/18/19 Treatment/Session Assessment:   
· Response to Treatment:  Will be ready for upgrade in shoulder and hip HEP next session. · Compliance with Program/Exercises: Will assess as treatment progresses. · Recommendations/Intent for next treatment session: \"Next visit will focus on pain reduction and progression of exercises. Will continue pool PT for 1x per week, land 2x per week. Treatment for both L hip and R shoulder for pool and  For land Total Treatment Duration: 45 Minutes PT Patient Time In/Time Out Time In: 4528 Time Out: 3456 Cynthia Faulkner, PT

## 2019-02-01 ENCOUNTER — HOSPITAL ENCOUNTER (OUTPATIENT)
Dept: PHYSICAL THERAPY | Age: 54
Discharge: HOME | End: 2019-02-01
Payer: COMMERCIAL

## 2019-02-01 PROCEDURE — 97113 AQUATIC THERAPY/EXERCISES: CPT

## 2019-02-01 NOTE — PROGRESS NOTES
Ara Merritt : 1965 Payor: Frank Cuellar / Plan: 19 Alicia Marin / Product Type: Commerical /  [address]  at [facility] GEORGE BUTLER [address] [facility], [place], [address] [place] [address],[address], [facility] [place] [address]. [phone]   [phone] OUTPATIENT PHYSICAL THERAPY:Daily Note 2019 ICD-10: Treatment Diagnosis: M25.611 R shoulder stiffness, M25.652 L hip stiffness, R26.2 Difficulty walking; M25.511 R shoulder pain, M25.552 L hip pain. Precautions/Allergies:  
Patient has no known allergies. Fall Risk Score: 1 (? 5 = High Risk) MD Orders: eval and treat, HEP, ROM, Strengthening: 
Full motion, full strength, all modalities. Pt started P on 18 and has completed 11 visits. MEDICAL/REFERRING DIAGNOSIS: 
S/P RT Shoulder I&D; LT Hip DATE OF ONSET: 18 REFERRING PHYSICIAN: Nahid Bonner, MD 
RETURN PHYSICIAN APPOINTMENT: unknown INITIAL ASSESSMENT:  Mr. Talia Murillo is approximately 2.5 months s/p L hip total hip revision and lavage and debridement of R shoulder after being diagnosed with infection to both areas. He had previous surgeries to L hip in 2017 and R shoulder in mid . His shoulder AROM and strength is improving but not WNL and continue to limit is ability to do moderate level activities. His L LE progress has been slow because of hip initially getting very irritated with just walking. He continues with L gait antalgia that has improved in quality of motion and ability to WB on L LE. He is extremely weak in some of his L hip stabilizer muscles like with piriformis. He continues to need PT for guided rehab to increase function in both R UE and L LE. He does not appear ready to return to work on this time. PROBLEM LIST (Impacting functional limitations): 1. Decreased Strength 2. Decreased ADL/Functional Activities 3. Post-op R shoulder and L hiop pain and swelling 4. Decreased R shoulder and L hip ROM 5. Weakness R shoulder and L hip 6. Difficulty with walking. INTERVENTIONS PLANNED: 
1. Modalities PRN, including ultrasound, estim, and iontophoresis 2. Soft tissue and joint mobilization for ROM and flexibility 3. Stretching, progressive resistive exercises and HEP for return to functional activities. 4. Back Education and Training for body mechanics with activities of daily living 5. If needed, aquatic therapy. TREATMENT PLAN: 
Effective Dates: 12/26/2018 TO 3/26/2019 (90 days). Frequency/Duration: 3 times a week for 3 weeks, then 2x per week for 3 weeks. Plan of care to expand to 90 days and will likely continue at 2 per week and then decrease to 1x per week or less as appropriate. GOALS: (Goals have been discussed and agreed upon with patient.) Short-Term Functional Goals: Time Frame: 4 weeks 1. Report no more than 3/10 intermittent pain to R shoulder with compensatory use during basic functional activities. MET 1/24/19 2. R shoulder PROM forward elevation greater than 150 degrees and external rotation greater than 60 degrees to progress into functional ranges. GOOD PROGRESSION 1/24/19 3. L hip AROM for abduction at least 20 degrees, extension at least 8 degrees for ease with functional activities (walking and car transfers). MET 1/24/19 4. Demonstrate good R shoulder and L hip isometric strength with manual testing to progress into strength phase. PROGRESSSED 1/24/18 5. Returns to normalized walking for house hold distances. GOOD PROGRESSSION 1/24/18 6. Independent with initial HEP. MET 1/24/19 Discharge Goals: Time Frame: 12 weeks. All LTG or ONGOING 1. Return to good strength with going up/down at least 2 flights of stairs. 2. No more than 2/10 intermittent pain R shoulder and L hip pain with return to normalized household and work activities.  
3. R shoulder AROM forward elevation greater than 150 degrees, external rotation greater than 89 degrees, and strength to shoulder are grossly WNL's for safe use with normalized activities. 4. Demonstrate good functional shoulder and hip strength and endurance for return to normalized household and work activities. 5. DASH score no greater than 15 and LEFS score at least 70/80 for return to full function. 6. Independent with advanced shoulder HEP for continued self-management. Rehabilitation Potential For Stated Goals: Good The information in this section was collected on 12/26/18 (except where otherwise noted). HISTORY:  
 
 Ambulatory/Rehab Services H2 Model Falls Risk Assessment Total: (5 or greater = High Risk): 2  
 ©2010 [facility] of [employer] States Patent #[id number]. Federal Law prohibits the replication, distribution or use without written permission from [email or web address] History of Present Injury/Illness (Reason for Referral):  Mr. Jazmin Hill is s/p L hip total hip revision and lavage and debridement of R shoulder after being diagnosed with infection to both areas. He had previous surgeries to L hip in Jan 2017 and R shoulder in mid 2017. He mostly c/o L hip pain post surgery and has not been moving arm too much. States that he was in excellent shape prior to this incident and has lost much muscle tone and weight. He also states that his energy level is very low and this is not his norm. He comes to outpatient therapy after undergoing several rounds of infusion to treat staff infection. Was told that he has retorn R RTC from previous surgery but MD chose not to repair at this time. Past Medical History/Comorbidities: Mr. Jazmin Hill  has a past medical history of Arthritis, Biceps muscle tear, Chronic pain, Insomnia, MRSA (methicillin resistant Staphylococcus aureus) infection (2007), Personal history of kidney stones, and Right shoulder pain.  He also has no past medical history of Adverse effect of anesthesia, Difficult intubation, Malignant hyperthermia due to anesthesia, Nausea & vomiting, or Pseudocholinesterase deficiency. Mr. Author Underwood  has a past surgical history that includes hx other surgical; hx rotator cuff repair (Left, 2010); hx orthopaedic (Left, 01/2017); and hx septoplasty (11/15/2017). Social History/Living Environment:   1 story- lives with spouse with tub/shower combo Prior Level of Function/Work/Activity: very active with workouts - cardio and weight lifting/ pt currently not working. He did not take any medications prior to this incident. Dominant Side:  
      RIGHT Previous Treatment Approaches:   
      Surgeries in 2017 for R shoulder RTC repair and L THR prior to this incident Current Medications:   
 Doxycycline, flexeril, oxycodone, lopressor, amlodipine, sleep aide, advil as needed Date Last Reviewed:  2/1/2019 EXAMINATION: 1/24.19 Observation/Orthostatic Postural Assessment:  [place]- [place] head glides superiorly around 110 degrees Walking:  Decreased R gait antalgia Palpation:  Point tender to R biceps tendon and L greater trochanter area, tight IT band. ROM:cervical ROM = WFL for all planes  
  
PROM RIght shoulder  Left shoulder Flexion 140 Abduction 110 External rotation 65 Internal rotation 70 Horz ABD -   
  
 
HIP AROM Right hip  Left hip Flexion  90 Extension  -5 Abduction  15 External rotation  30 Internal rotation  45 Strength:  
 Right shoulder  Left shoulder Forward elevation 3+ - Abduction 3+ - External rotation 4- 4- Internal rotation 4+ 4 Horz ABD - -  
  
HIP Right hip  Left hip Flexion - 3+ Extension - 3+ Abduction - 3+ External rotation - 3+ Internal rotation - - Special Tests: not assessed today Flexibility: at IE- not assessed at progress report · Hamstring Length (in 90/90 position):R not assessed today L -50 degrees · Straight Leg Raise Test: negative R 
 · Jorge Test (2-joint hip flexors): not assessed today · Prone Knee Bend: negative · Cj's Test:not assessed today · Gastrocnemius not assessed today Neurological Screen: Motor intact to R UE, L LE. Functional Mobility:  Walking : walks without cane with mild R gait antalgia. Sit to/from Stand: independent - sometimes uses hands. . Bed Mobility: independent with compensation. Independent with compensation with basic mobility. Independent with compensation with dressing and grooming ADL's. Balance:  Did well with transfers and able to walk a straight path Body Structures Involved: 1. Joints 2. Muscles Body Functions Affected: 1. Movement Related Activities and Participation Affected: 1. General Tasks and Demands 2. Mobility 3. Community, Social and [place] CLINICAL DECISION MAKING:  
Outcome Measure: Tool Used: Lower Extremity Functional Scale (LEFS) Score:  Initial: 7/80 Most Recent: 15/80 (Date: 1/24/19) Interpretation of Score: 20 questions each scored on a 5 point scale with 0 representing \"extreme difficulty or unable to perform\" and 4 representing \"no difficulty\". The lower the score, the greater the functional disability. 80/80 represents no disability. Minimal detectable change is 9 points. Score 80 79-63 62-48 47-32 31-16 15-1 0 Modifier CH CI CJ CK CL CM CN Tool Used: Disabilities of the Arm, Shoulder and Hand (DASH) Questionnaire - Quick Version Score:  Initial: 36/55  Most Recent:38 /55 (Date: 1/24/19 ) Interpretation of Score: The DASH is designed to measure the activities of daily living in person's with upper extremity dysfunction or pain. Each section is scored on a 1-5 scale, 5 representing the greatest disability. The scores of each section are added together for a total score of 55. This number is divided by 11, followed by subtracting 1 and multiplying by 25 to get a percent score of disability.  This value represents the percentage disability: 0-20% minimal disability; 20-40% moderate disability; 40-60% severe disability; % dependent for care or exaggerated symptom behavior. Minimal detectable change is 12%. Score 11 12-19 20-28 29-37 38-45 46-54 55 Modifier CH CI CJ CK CL CM CN Medical Necessity:  
· Patient is expected to demonstrate progress in strength and range of motion to increase independence with walking, basic mobility and use of R UE. Christian Saunas Reason for Services/Other Comments: 
· Patient continues to require skilled intervention due to continued weakness and stiffness in R UE, L LE and difficulty with functional activities. Christian Saunas TREATMENT:  
(In addition to Assessment/Re-Assessment sessions the following treatments were rendered) Pre-treatment Symptoms/Complaints: states he has gone back to the gym and is doing some things. Pain: Initial:    No VAS obtained. Post Session:   felt better after session Aquatic Exercise: ( 0 min):  none today Aquatic Therapy Grid Date: 
1/8/19 Date: 
1/11/19 Date: 
1/14/19 1/18/19 2/1/19 Activity/Exercise Parameters Parameters Parameters Walking forward 10 laps 6 6 6 6 Walking backward 10 laps 6 6 6 6 Walking sideways 10 laps 6 6 6 6 High step march 6 laps 6 6 6 6 March in place 30x With purple noodle 10 B 10 x with noodle 30x 30x  
squat 30x Hip 3 way kick-  Large slow kicks 10 ea way Fast, small kicks 20 sec ea AMARA hips   4# x 10 each Hip ABD/ADD  10 B 10 4# 4# 15 Circles   10 B  10/10 4# 15/15 Hip flexion with straight leg  10 B 10 4#    
4.5' shoulder exercises with purple noode  10 10 Hamstring stretch  5 x 5 sec hold Deep water ex: bicycle, jumping janneth, cross country, core stability   2 min each 2 min each 2 MIN EACH 3 min each Trunk ext    Over noodle in standing x 10 ER against the wall     5 x 7 sec Therapeutic Exercises (0 minutes) Land Therapy grid Date: 
1/2/19 Date: 
1/3/19 Date: 1/10/19 Date 1/15/19 Date 
1-21-19 Date 1-24/19 1-31-19 Activity/Exercise Parameters Parameters Parameters AAROM R shoulder flex Supine - with L hand assist 1x10 AROM 
10x with min A Pulleys 20x flexion Pulleys 20x flexion 20x scaption AAROM in sitting 10x Pulleys 20 x flexion 20 x scaption LTR 1x10        
bridging 2x10 SLR   B 2x10 Hip ER with band Red 2x15     Prone MR -- sustained holds 10x Gentle resist through range 5x Prone MR -- sustained holds to eshaustion 3x Side lye clam shell 10x 3 Push up +  10x 2 2# 10x2 2# 10x2 Shoulder ER  Side lye 10x Then sitting - demo Side lye 1# 
10x2 Side lye 1# 
10x 2# 10x2 Side-lying 2# 2 x 10  Side-lying 2# 3 x 10  
2 sec hold Side-lying 1# 30x Mid Trap  Side lye 10x Demo for shoulder ext Side lye 5x3 With feedback for scap post 
depression Side lye 5x3 With feedback for scap post 
depression Side-lying 2 x 10 Prone 0# 2 x 10 Prone low trap 
0# 2 x 8 Prone 1# 2 x 10 Prone EXT 
2# 2x 10 Prone 1# 3 x 10 Prone EXT 
2# 3x 10 UBE    L1 6 min Start of session - -- L3 6 min UE/LE Prone row and extension    4# 10x ea  6# prone row 2 x 10 Stand bent over 6# 3 x 10 Prone 6# 10x 3  
pendulums    5# 20x front and with circles     
tricep    2# 20x supine Cable pressdowns 3# x 10 
10# 2 x 10 Scapular retraction     7# x 10 
10# 2 x 10 Deltoid strengthening     Forward raises 0# to 90 deg, 2 x 10 Scaption 0# 2 x 10  Forward raises 10x Bicep curls     6# 2 x 15 Prone hip flexion      1.5# + MR 
10x 2 with sustained holds as well Wall slides       2 diagonals 10x ea LAQ       indpendent Knee flexion       independent HEP: pt to do updated HEP. Manual Therapy (0 minutes) Modalities: (0 min) Treatment/Session Assessment:   
· Response to Treatment:  Did well with all ex in the pool. Was surprised at how weak isometric ER felt. · Compliance with Program/Exercises: Appears compliant. · Recommendations/Intent for next treatment session: \"Next visit will focus on pain reduction and progression of exercises. To change to  pool PT for 1x per week, land 2x per week. Treatment for both L hip and R shoulder. L hip strengthening slow secondary to amount of weakness makes him tend to overuse other muscles. Also address L lateral hip buritis. Total Treatment Duration: 45 Minutes PT Patient Time In/Time Out Time In: 0930 Time Out: 1015 Cory Coronado PT

## 2019-02-05 ENCOUNTER — HOSPITAL ENCOUNTER (OUTPATIENT)
Dept: PHYSICAL THERAPY | Age: 54
Discharge: HOME | End: 2019-02-05
Payer: COMMERCIAL

## 2019-02-05 PROCEDURE — 97110 THERAPEUTIC EXERCISES: CPT

## 2019-02-05 PROCEDURE — 97140 MANUAL THERAPY 1/> REGIONS: CPT

## 2019-02-05 NOTE — PROGRESS NOTES
oRbson Carreon : 1965 Payor: Maria Del Carmen Ped / Plan: 19 Alicia Marin / Product Type: [employer] /  [id number]  at [address] [place] [address] [address] [facility], [address] [place] [address],[address], [address] [phone]   [phone] OUTPATIENT PHYSICAL THERAPY:Daily Note 2019 ICD-10: Treatment Diagnosis: M25.611 R shoulder stiffness, M25.652 L hip stiffness, R26.2 Difficulty walking; M25.511 R shoulder pain, M25.552 L hip pain. Precautions/Allergies:  
Patient has no known allergies. Fall Risk Score: 1 (? 5 = High Risk) MD Orders: eval and treat, HEP, ROM, Strengthening: 
Full motion, full strength, all modalities. MEDICAL/REFERRING DIAGNOSIS: 
S/P RT Shoulder I&D; LT Hip DATE OF ONSET: 18 REFERRING PHYSICIAN: Ayden Bonner MD 
RETURN PHYSICIAN APPOINTMENT: unknown INITIAL ASSESSMENT:  Mr. Wilson Robison is approximately 2.5 months s/p L hip total hip revision and lavage and debridement of R shoulder after being diagnosed with infection to both areas. He had previous surgeries to L hip in 2017 and R shoulder in mid . His shoulder AROM and strength is improving but not WNL and continue to limit is ability to do moderate level activities. His L LE progress has been slow because of hip initially getting very irritated with just walking. He continues with L gait antalgia that has improved in quality of motion and ability to WB on L LE. He is extremely weak in some of his L hip stabilizer muscles like with piriformis. He continues to need PT for guided rehab to increase function in both R UE and L LE. He does not appear ready to return to work on this time. PROBLEM LIST (Impacting functional limitations): 1. Decreased Strength 2. Decreased ADL/Functional Activities 3. Post-op R shoulder and L hiop pain and swelling 4. Decreased R shoulder and L hip ROM 5. Weakness R shoulder and L hip 6. Difficulty with walking.  INTERVENTIONS PLANNED: 
 1. Modalities PRN, including ultrasound, estim, and iontophoresis 2. Soft tissue and joint mobilization for ROM and flexibility 3. Stretching, progressive resistive exercises and HEP for return to functional activities. 4. Back Education and Training for body mechanics with activities of daily living 5. If needed, aquatic therapy. TREATMENT PLAN: 
Effective Dates: 12/26/2018 TO 3/26/2019 (90 days). Frequency/Duration: 3 times a week for 3 weeks, then 2x per week for 3 weeks. Plan of care to expand to 90 days and will likely continue at 2 per week and then decrease to 1x per week or less as appropriate. GOALS: (Goals have been discussed and agreed upon with patient.) Short-Term Functional Goals: Time Frame: 4 weeks 1. Report no more than 3/10 intermittent pain to R shoulder with compensatory use during basic functional activities. MET 1/24/19 2. R shoulder PROM forward elevation greater than 150 degrees and external rotation greater than 60 degrees to progress into functional ranges. GOOD PROGRESSION 1/24/19 3. L hip AROM for abduction at least 20 degrees, extension at least 8 degrees for ease with functional activities (walking and car transfers). MET 1/24/19 4. Demonstrate good R shoulder and L hip isometric strength with manual testing to progress into strength phase. PROGRESSSED 1/24/18 5. Returns to normalized walking for house hold distances. GOOD PROGRESSSION 1/24/18 6. Independent with initial HEP. MET 1/24/19 Discharge Goals: Time Frame: 12 weeks. All LTG or ONGOING 1. Return to good strength with going up/down at least 2 flights of stairs. 2. No more than 2/10 intermittent pain R shoulder and L hip pain with return to normalized household and work activities. 3. R shoulder AROM forward elevation greater than 150 degrees, external rotation greater than 89 degrees, and strength to shoulder are grossly WNL's for safe use with normalized activities. 4. Demonstrate good functional shoulder and hip strength and endurance for return to normalized household and work activities. 5. DASH score no greater than 15 and LEFS score at least 70/80 for return to full function. 6. Independent with advanced shoulder HEP for continued self-management. Rehabilitation Potential For Stated Goals: Good The information in this section was collected on 12/26/18 (except where otherwise noted). HISTORY:  
 
 Ambulatory/Rehab Services H2 Model Falls Risk Assessment Total: (5 or greater = High Risk): 2  
 ©2010 [facility] Patent #[id number]. Federal Law prohibits the replication, distribution or use without written permission from [facility] History of Present Injury/Illness (Reason for Referral):  Mr. Divya Bynum is s/p L hip total hip revision and lavage and debridement of R shoulder after being diagnosed with infection to both areas. He had previous surgeries to L hip in Jan 2017 and R shoulder in mid 2017. He mostly c/o L hip pain post surgery and has not been moving arm too much. States that he was in excellent shape prior to this incident and has lost much muscle tone and weight. He also states that his energy level is very low and this is not his norm. He comes to outpatient therapy after undergoing several rounds of infusion to treat staff infection. Was told that he has retorn R RTC from previous surgery but MD chose not to repair at this time. Past Medical History/Comorbidities: Mr. Divya Bynum  has a past medical history of Arthritis, Biceps muscle tear, Chronic pain, Insomnia, MRSA (methicillin resistant Staphylococcus aureus) infection (2007), Personal history of kidney stones, and Right shoulder pain.  He also has no past medical history of Adverse effect of anesthesia, Difficult intubation, Malignant hyperthermia due to anesthesia, Nausea & vomiting, or Pseudocholinesterase deficiency. Mr. Kostas Zelaya  has a past surgical history that includes hx other surgical; hx rotator cuff repair (Left, 2010); hx orthopaedic (Left, 01/2017); and hx septoplasty (11/15/2017). Social History/Living Environment:   1 story- lives with spouse with tub/shower combo Prior Level of Function/Work/Activity: very active with workouts - cardio and weight lifting/ pt currently not working. He did not take any medications prior to this incident. Dominant Side:  
      RIGHT Previous Treatment Approaches:   
      Surgeries in 2017 for R shoulder RTC repair and L THR prior to this incident Current Medications:   
 Doxycycline, flexeril, oxycodone, lopressor, amlodipine, sleep aide, advil as needed Date Last Reviewed:  2/5/2019 EXAMINATION: 1/24.19 Observation/Orthostatic Postural Assessment:  [place]- [place] head glides superiorly around 110 degrees Walking:  Decreased R gait antalgia Palpation:  Point tender to R biceps tendon and L greater trochanter area, tight IT band. ROM:cervical ROM = WFL for all planes  
  
PROM RIght shoulder  Left shoulder Flexion 140 Abduction 110 External rotation 65 Internal rotation 70 Horz ABD -   
  
 
HIP AROM Right hip  Left hip Flexion  90 Extension  -5 Abduction  15 External rotation  30 Internal rotation  45 Strength:  
 Right shoulder  Left shoulder Forward elevation 3+ - Abduction 3+ - External rotation 4- 4- Internal rotation 4+ 4 Horz ABD - -  
  
HIP Right hip  Left hip Flexion - 3+ Extension - 3+ Abduction - 3+ External rotation - 3+ Internal rotation - - Special Tests: not assessed today Flexibility: at IE- not assessed at progress report · Hamstring Length (in 90/90 position):R not assessed today L -50 degrees · Straight Leg Raise Test: negative R · Jorge Test (2-joint hip flexors): not assessed today · Prone Knee Bend: negative · Cj's Test:not assessed today · Gastrocnemius not assessed today Neurological Screen: Motor intact to R UE, L LE. Functional Mobility:  Walking : walks without cane with mild R gait antalgia. Sit to/from Stand: independent - sometimes uses hands. . Bed Mobility: independent with compensation. Independent with compensation with basic mobility. Independent with compensation with dressing and grooming ADL's. Balance:  Did well with transfers and able to walk a straight path Body Structures Involved: 1. Joints 2. Muscles Body Functions Affected: 1. Movement Related Activities and Participation Affected: 1. General Tasks and Demands 2. Mobility 3. Community, Social and [place] CLINICAL DECISION MAKING:  
Outcome Measure: Tool Used: Lower Extremity Functional Scale (LEFS) Score:  Initial: 7/80 Most Recent: 15/80 (Date: 1/24/19) Interpretation of Score: 20 questions each scored on a 5 point scale with 0 representing \"extreme difficulty or unable to perform\" and 4 representing \"no difficulty\". The lower the score, the greater the functional disability. 80/80 represents no disability. Minimal detectable change is 9 points. Score 80 79-63 62-48 47-32 31-16 15-1 0 Modifier CH CI CJ CK CL CM CN Tool Used: Disabilities of the Arm, Shoulder and Hand (DASH) Questionnaire - Quick Version Score:  Initial: 36/55  Most Recent:38 /55 (Date: 1/24/19 ) Interpretation of Score: The DASH is designed to measure the activities of daily living in person's with upper extremity dysfunction or pain. Each section is scored on a 1-5 scale, 5 representing the greatest disability. The scores of each section are added together for a total score of 55. This number is divided by 11, followed by subtracting 1 and multiplying by 25 to get a percent score of disability.  This value represents the percentage disability: 0-20% minimal disability; 20-40% moderate disability; 40-60% severe disability; % dependent for care or exaggerated symptom behavior. Minimal detectable change is 12%. Score 11 12-19 20-28 29-37 38-45 46-54 55 Modifier CH CI CJ CK CL CM CN Medical Necessity:  
· Patient is expected to demonstrate progress in strength and range of motion to increase independence with walking, basic mobility and use of R UE. Betsey Ruiz Reason for Services/Other Comments: 
· Patient continues to require skilled intervention due to continued weakness and stiffness in R UE, L LE and difficulty with functional activities. Betsey Ruiz TREATMENT:  
(In addition to Assessment/Re-Assessment sessions the following treatments were rendered) Pre-treatment Symptoms/Complaints: Pt had onset of L LE adductor shooting pain after working on hip ER sustained holds in pool. States he was in bed x 2 days because of strength of pain. Pain: Initial:    No VAS obtained. Post Session:  Hip did better with walking. Aquatic Exercise: ( 0 min):  none today Aquatic Therapy Grid Date: 
1/8/19 Date: 
1/11/19 Date: 
1/14/19 1/18/19 Activity/Exercise Parameters Parameters Parameters Walking forward 10 laps 6 6 6 Walking backward 10 laps 6 6 6 Walking sideways 10 laps 6 6 6 High step march 6 laps 6 6 6 March in place 30x With purple noodle 10 B 10 x with noodle 30x  
squat 30x Hip 3 way kick-  Large slow kicks 10 ea way Fast, small kicks 20 sec ea AMARA hips   4# x 10 each Hip ABD/ADD  10 B 10 4# 4# 15 Circles   10 B  10/10 4# 15/15 Hip flexion with straight leg  10 B 10 4#   
4.5' shoulder exercises with purple noode  10 10 Hamstring stretch  5 x 5 sec hold Deep water ex: bicycle, jumping janneth, cross country, core stability   2 min each 2 min each 2 MIN EACH Trunk ext    Over noodle in standing x 10 Therapeutic Exercises (30 minutes) to improve R shoulder and L hip symptoms. After UBE did- Shoulder A to AAROM in standing with back to wall for ER at 40 and 80 ABD 20x ea. Land Therapy grid Date 
1-21-19 Date 1-24/19 1-31-19 2/5/19 Activity/Exercise AAROM R shoulder flex Pulleys 20 x flexion 20 x scaption LTR      
bridging SLR Hip ER with band  Prone MR -- sustained holds 10x Gentle resist through range 5x Prone MR -- sustained holds to eshaustion 3x Side lye clam shell 10x 3 Side lye clam shell 10 secx x10 Prone MR - gentle 10x Push up + Shoulder ER Side-lying 2# 2 x 10  Side-lying 2# 3 x 10  
2 sec hold Side-lying 1# 30x Mid Trap Side-lying 2 x 10 Prone 0# 2 x 10 Prone low trap 
0# 2 x 8 Prone 1# 2 x 10 Prone EXT 
2# 2x 10 Prone 1# 3 x 10 Prone EXT 
2# 3x 10 UBE - -- L3 6 min UE/LE 
 L3 10 min UE/LE Prone row and extension  6# prone row 2 x 10 Stand bent over 6# 3 x 10 Prone 6# 10x 3   
pendulums      
tricep Cable pressdowns 3# x 10 
10# 2 x 10 Scapular retraction 7# x 10 
10# 2 x 10 Deltoid strengthening Forward raises 0# to 90 deg, 2 x 10 Scaption 0# 2 x 10  Forward raises 10x Bicep curls 6# 2 x 15 Prone hip flexion  1.5# + MR 
10x 2 with sustained holds as well Wall slides   2 diagonals 10x ea LAQ   indpendent Knee flexion   independent Hip ADD    5 secx x 10 Sitting sustained holds against MR Hip ADD stretch    Standing 30 sec x 2 HEP: pt to do updated HEP. Manual Therapy (10 minutes)  myofascial release to L adductor brevis and pectinius for trigger point release Modalities: (0 min) Treatment/Session Assessment:   
· Response to Treatment:  L hip ER still weak but improved some after working on trigger points in adductor brevis and pectinius. · Compliance with Program/Exercises: Appears compliant. · Recommendations/Intent for next treatment session:  \"Next visit will focus on pain reduction and progression of exercises. Focus more on shoulder next visit since focused on hip today. Total Treatment Duration: 40 Minutes PT Patient Time In/Time Out Time In: 1350 Time Out: 2348 Brendan Hassan, PT

## 2019-02-06 ENCOUNTER — HOSPITAL ENCOUNTER (OUTPATIENT)
Dept: PHYSICAL THERAPY | Age: 54
Discharge: HOME | End: 2019-02-06
Payer: COMMERCIAL

## 2019-02-06 PROCEDURE — 97113 AQUATIC THERAPY/EXERCISES: CPT

## 2019-02-06 NOTE — PROGRESS NOTES
Ara Merritt : 1965 Payor: Frank Cuellar / Plan: 19 Alicia Marin / Product Type: [employer] /  [address]  at [address] [place] [address] [address] [facility], [address] [place] [address],[address] [phone]   [phone] OUTPATIENT PHYSICAL THERAPY:Daily Note 2019 ICD-10: Treatment Diagnosis: M25.611 R shoulder stiffness, M25.652 L hip stiffness, R26.2 Difficulty walking; M25.511 R shoulder pain, M25.552 L hip pain. Precautions/Allergies:  
Patient has no known allergies. Fall Risk Score: 1 (? 5 = High Risk) MD Orders: eval and treat, HEP, ROM, Strengthening: 
Full motion, full strength, all modalities. MEDICAL/REFERRING DIAGNOSIS: 
S/P RT Shoulder I&D; LT Hip DATE OF ONSET: 18 REFERRING PHYSICIAN: Nahid Bonner MD 
RETURN PHYSICIAN APPOINTMENT: unknown INITIAL ASSESSMENT:  Mr. Talia Murillo is approximately 2.5 months s/p L hip total hip revision and lavage and debridement of R shoulder after being diagnosed with infection to both areas. He had previous surgeries to L hip in 2017 and R shoulder in mid . His shoulder AROM and strength is improving but not WNL and continue to limit is ability to do moderate level activities. His L LE progress has been slow because of hip initially getting very irritated with just walking. He continues with L gait antalgia that has improved in quality of motion and ability to WB on L LE. He is extremely weak in some of his L hip stabilizer muscles like with piriformis. He continues to need PT for guided rehab to increase function in both R UE and L LE. He does not appear ready to return to work on this time. PROBLEM LIST (Impacting functional limitations): 1. Decreased Strength 2. Decreased ADL/Functional Activities 3. Post-op R shoulder and L hiop pain and swelling 4. Decreased R shoulder and L hip ROM 5. Weakness R shoulder and L hip 6. Difficulty with walking.  INTERVENTIONS PLANNED: 
 1. Modalities PRN, including ultrasound, estim, and iontophoresis 2. Soft tissue and joint mobilization for ROM and flexibility 3. Stretching, progressive resistive exercises and HEP for return to functional activities. 4. Back Education and Training for body mechanics with activities of daily living 5. If needed, aquatic therapy. TREATMENT PLAN: 
Effective Dates: 12/26/2018 TO 3/26/2019 (90 days). Frequency/Duration: 3 times a week for 3 weeks, then 2x per week for 3 weeks. Plan of care to expand to 90 days and will likely continue at 2 per week and then decrease to 1x per week or less as appropriate. GOALS: (Goals have been discussed and agreed upon with patient.) Short-Term Functional Goals: Time Frame: 4 weeks 1. Report no more than 3/10 intermittent pain to R shoulder with compensatory use during basic functional activities. MET 1/24/19 2. R shoulder PROM forward elevation greater than 150 degrees and external rotation greater than 60 degrees to progress into functional ranges. GOOD PROGRESSION 1/24/19 3. L hip AROM for abduction at least 20 degrees, extension at least 8 degrees for ease with functional activities (walking and car transfers). MET 1/24/19 4. Demonstrate good R shoulder and L hip isometric strength with manual testing to progress into strength phase. PROGRESSSED 1/24/18 5. Returns to normalized walking for house hold distances. GOOD PROGRESSSION 1/24/18 6. Independent with initial HEP. MET 1/24/19 Discharge Goals: Time Frame: 12 weeks. All LTG or ONGOING 1. Return to good strength with going up/down at least 2 flights of stairs. 2. No more than 2/10 intermittent pain R shoulder and L hip pain with return to normalized household and work activities. 3. R shoulder AROM forward elevation greater than 150 degrees, external rotation greater than 89 degrees, and strength to shoulder are grossly WNL's for safe use with normalized activities. 4. Demonstrate good functional shoulder and hip strength and endurance for return to normalized household and work activities. 5. DASH score no greater than 15 and LEFS score at least 70/80 for return to full function. 6. Independent with advanced shoulder HEP for continued self-management. Rehabilitation Potential For Stated Goals: Good The information in this section was collected on 12/26/18 (except where otherwise noted). HISTORY:  
 
 Ambulatory/Rehab Services H2 Model Falls Risk Assessment Total: (5 or greater = High Risk): 2  
 ©2010 [facility] [employer] [place] Patent #[id number]. Federal Law prohibits the replication, distribution or use without written permission from [facility] [employer] History of Present Injury/Illness (Reason for Referral):  Mr. Karlene Vaughn is s/p L hip total hip revision and lavage and debridement of R shoulder after being diagnosed with infection to both areas. He had previous surgeries to L hip in Jan 2017 and R shoulder in mid 2017. He mostly c/o L hip pain post surgery and has not been moving arm too much. States that he was in excellent shape prior to this incident and has lost much muscle tone and weight. He also states that his energy level is very low and this is not his norm. He comes to outpatient therapy after undergoing several rounds of infusion to treat staff infection. Was told that he has retorn R RTC from previous surgery but MD chose not to repair at this time. Past Medical History/Comorbidities: Mr. Karlene Vaughn  has a past medical history of Arthritis, Biceps muscle tear, Chronic pain, Insomnia, MRSA (methicillin resistant Staphylococcus aureus) infection (2007), Personal history of kidney stones, and Right shoulder pain.  He also has no past medical history of Adverse effect of anesthesia, Difficult intubation, Malignant hyperthermia due to anesthesia, Nausea & vomiting, or Pseudocholinesterase deficiency. Mr. Gretta Saini  has a past surgical history that includes hx other surgical; hx rotator cuff repair (Left, 2010); hx orthopaedic (Left, 01/2017); and hx septoplasty (11/15/2017). Social History/Living Environment:   1 story- lives with spouse with tub/shower combo Prior Level of Function/Work/Activity: very active with workouts - cardio and weight lifting/ pt currently not working. He did not take any medications prior to this incident. Dominant Side:  
      RIGHT Previous Treatment Approaches:   
      Surgeries in 2017 for R shoulder RTC repair and L THR prior to this incident Current Medications:   
 Doxycycline, flexeril, oxycodone, lopressor, amlodipine, sleep aide, advil as needed Date Last Reviewed:  2/6/2019 EXAMINATION: 1/24.19 Observation/Orthostatic Postural Assessment:  [place]- [place] head glides superiorly around 110 degrees Walking:  Decreased R gait antalgia Palpation:  Point tender to R biceps tendon and L greater trochanter area, tight IT band. ROM:cervical ROM = WFL for all planes  
  
PROM RIght shoulder  Left shoulder Flexion 140 Abduction 110 External rotation 65 Internal rotation 70 Horz ABD -   
  
 
HIP AROM Right hip  Left hip Flexion  90 Extension  -5 Abduction  15 External rotation  30 Internal rotation  45 Strength:  
 Right shoulder  Left shoulder Forward elevation 3+ - Abduction 3+ - External rotation 4- 4- Internal rotation 4+ 4 Horz ABD - -  
  
HIP Right hip  Left hip Flexion - 3+ Extension - 3+ Abduction - 3+ External rotation - 3+ Internal rotation - - Special Tests: not assessed today Flexibility: at IE- not assessed at progress report · Hamstring Length (in 90/90 position):R not assessed today L -50 degrees · Straight Leg Raise Test: negative R · Jorge Test (2-joint hip flexors): not assessed today · Prone Knee Bend: negative · Cj's Test:not assessed today · Gastrocnemius not assessed today Neurological Screen: Motor intact to R UE, L LE. Functional Mobility:  Walking : walks without cane with mild R gait antalgia. Sit to/from Stand: independent - sometimes uses hands. . Bed Mobility: independent with compensation. Independent with compensation with basic mobility. Independent with compensation with dressing and grooming ADL's. Balance:  Did well with transfers and able to walk a straight path Body Structures Involved: 1. Joints 2. Muscles Body Functions Affected: 1. Movement Related Activities and Participation Affected: 1. General Tasks and Demands 2. Mobility 3. Community, Social and [place] CLINICAL DECISION MAKING:  
Outcome Measure: Tool Used: Lower Extremity Functional Scale (LEFS) Score:  Initial: 7/80 Most Recent: 15/80 (Date: 1/24/19) Interpretation of Score: 20 questions each scored on a 5 point scale with 0 representing \"extreme difficulty or unable to perform\" and 4 representing \"no difficulty\". The lower the score, the greater the functional disability. 80/80 represents no disability. Minimal detectable change is 9 points. Score 80 79-63 62-48 47-32 31-16 15-1 0 Modifier CH CI CJ CK CL CM CN Tool Used: Disabilities of the Arm, Shoulder and Hand (DASH) Questionnaire - Quick Version Score:  Initial: 36/55  Most Recent:38 /55 (Date: 1/24/19 ) Interpretation of Score: The DASH is designed to measure the activities of daily living in person's with upper extremity dysfunction or pain. Each section is scored on a 1-5 scale, 5 representing the greatest disability. The scores of each section are added together for a total score of 55. This number is divided by 11, followed by subtracting 1 and multiplying by 25 to get a percent score of disability.  This value represents the percentage disability: 0-20% minimal disability; 20-40% moderate disability; 40-60% severe disability; % dependent for care or exaggerated symptom behavior. Minimal detectable change is 12%. Score 11 12-19 20-28 29-37 38-45 46-54 55 Modifier CH CI CJ CK CL CM CN Medical Necessity:  
· Patient is expected to demonstrate progress in strength and range of motion to increase independence with walking, basic mobility and use of R UE. Bud Rush Reason for Services/Other Comments: 
· Patient continues to require skilled intervention due to continued weakness and stiffness in R UE, L LE and difficulty with functional activities. Bud Rush TREATMENT:  
(In addition to Assessment/Re-Assessment sessions the following treatments were rendered) Pre-treatment Symptoms/Complaints: Pt stated his adductor pain was better today. Pain: Initial:    No VAS obtained. Post Session:  Hip did better with walking. Aquatic Exercise: ( 45 min):   
Aquatic Therapy Grid Date: 
1/8/19 Date: 
1/11/19 Date: 
1/14/19 1/18/19 2/6/19 Activity/Exercise Parameters Parameters Parameters  4# Walking forward 10 laps 6 6 6 6 Walking backward 10 laps 6 6 6 6 Walking sideways 10 laps 6 6 6 6 High step march 6 laps 6 6 6 recip gait with paddles 6 laps March in place 30x With purple noodle 10 B 10 x with noodle 30x   
squat 30x Hip 3 way kick-  Large slow kicks 10 ea way Fast, small kicks 20 sec ea AMARA hips   4# x 10 each 4# 20x each Hip ABD/ADD  10 B 10 4# 4# 15 Circles   10 B  10/10 4# 15/15 Hip flexion with straight leg  10 B 10 4#    
4.5' shoulder exercises with purple noode  10 10 Hamstring stretch  5 x 5 sec hold Deep water ex: bicycle, jumping janneth, cross country, core stability   2 min each 2 min each 2 MIN EACH Cross country 4 min Trunk ext    Over noodle in standing x 10 3 x 10 Adductor stretch on step     3 x 20 sec Therapeutic Exercises (0 minutes) none today Land Therapy grid Date 
1-21-19 Date 1-24/19 1-31-19 2/5/19 Activity/Exercise AAROM R shoulder flex Pulleys 20 x flexion 20 x scaption LTR      
bridging SLR Hip ER with band  Prone MR -- sustained holds 10x Gentle resist through range 5x Prone MR -- sustained holds to eshaustion 3x Side lye clam shell 10x 3 Side lye clam shell 10 secx x10 Prone MR - gentle 10x Push up + Shoulder ER Side-lying 2# 2 x 10  Side-lying 2# 3 x 10  
2 sec hold Side-lying 1# 30x Mid Trap Side-lying 2 x 10 Prone 0# 2 x 10 Prone low trap 
0# 2 x 8 Prone 1# 2 x 10 Prone EXT 
2# 2x 10 Prone 1# 3 x 10 Prone EXT 
2# 3x 10 UBE - -- L3 6 min UE/LE 
 L3 10 min UE/LE Prone row and extension  6# prone row 2 x 10 Stand bent over 6# 3 x 10 Prone 6# 10x 3   
pendulums      
tricep Cable pressdowns 3# x 10 
10# 2 x 10 Scapular retraction 7# x 10 
10# 2 x 10 Deltoid strengthening Forward raises 0# to 90 deg, 2 x 10 Scaption 0# 2 x 10  Forward raises 10x Bicep curls 6# 2 x 15 Prone hip flexion  1.5# + MR 
10x 2 with sustained holds as well Wall slides   2 diagonals 10x ea LAQ   indpendent Knee flexion   independent Hip ADD    5 secx x 10 Sitting sustained holds against MR Hip ADD stretch    Standing 30 sec x 2 HEP: pt to do updated HEP. Manual Therapy (0 minutes) Modalities: (0 min) Treatment/Session Assessment:   
· Response to Treatment:  Did well with water ex. Focus on functional and core exercises. · Compliance with Program/Exercises: Appears compliant. · Recommendations/Intent for next treatment session: \"Next visit will focus on pain reduction and progression of exercises. Focus more on shoulder next visit since focused on hip today. Total Treatment Duration: 45 Minutes Ly Gomez, PT

## 2019-02-08 ENCOUNTER — HOSPITAL ENCOUNTER (OUTPATIENT)
Dept: PHYSICAL THERAPY | Age: 54
Discharge: HOME | End: 2019-02-08
Payer: COMMERCIAL

## 2019-02-08 PROCEDURE — 97110 THERAPEUTIC EXERCISES: CPT

## 2019-02-08 PROCEDURE — 97140 MANUAL THERAPY 1/> REGIONS: CPT

## 2019-02-08 NOTE — PROGRESS NOTES
cJ Self : 1965 Payor: Fabiola Aguirre / Plan: 19 Alicia Marin / Product Type: [employer] /  [address]  at [address] [place] [address] [address] [facility], [address] [place] [address],[address], [facility] [place] [address]. [phone]   [phone] OUTPATIENT PHYSICAL THERAPY:Daily Note 2019 ICD-10: Treatment Diagnosis: M25.611 R shoulder stiffness, M25.652 L hip stiffness, R26.2 Difficulty walking; M25.511 R shoulder pain, M25.552 L hip pain. Precautions/Allergies:  
Patient has no known allergies. Fall Risk Score: 1 (? 5 = High Risk) MD Orders: eval and treat, HEP, ROM, Strengthening: 
Full motion, full strength, all modalities. MEDICAL/REFERRING DIAGNOSIS: 
S/P RT Shoulder I&D; LT Hip DATE OF ONSET: 18 REFERRING PHYSICIAN: Jose Bonner., MD 
RETURN PHYSICIAN APPOINTMENT: unknown INITIAL ASSESSMENT:  Mr. Jazmin Hill is approximately 2.5 months s/p L hip total hip revision and lavage and debridement of R shoulder after being diagnosed with infection to both areas. He had previous surgeries to L hip in 2017 and R shoulder in mid . His shoulder AROM and strength is improving but not WNL and continue to limit is ability to do moderate level activities. His L LE progress has been slow because of hip initially getting very irritated with just walking. He continues with L gait antalgia that has improved in quality of motion and ability to WB on L LE. He is extremely weak in some of his L hip stabilizer muscles like with piriformis. He continues to need PT for guided rehab to increase function in both R UE and L LE. He does not appear ready to return to work on this time. PROBLEM LIST (Impacting functional limitations): 1. Decreased Strength 2. Decreased ADL/Functional Activities 3. Post-op R shoulder and L hiop pain and swelling 4. Decreased R shoulder and L hip ROM 5. Weakness R shoulder and L hip 6. Difficulty with walking.  INTERVENTIONS PLANNED: 
 1. Modalities PRN, including ultrasound, estim, and iontophoresis 2. Soft tissue and joint mobilization for ROM and flexibility 3. Stretching, progressive resistive exercises and HEP for return to functional activities. 4. Back Education and Training for body mechanics with activities of daily living 5. If needed, aquatic therapy. TREATMENT PLAN: 
Effective Dates: 12/26/2018 TO 3/26/2019 (90 days). Frequency/Duration: 3 times a week for 3 weeks, then 2x per week for 3 weeks. Plan of care to expand to 90 days and will likely continue at 2 per week and then decrease to 1x per week or less as appropriate. GOALS: (Goals have been discussed and agreed upon with patient.) Short-Term Functional Goals: Time Frame: 4 weeks 1. Report no more than 3/10 intermittent pain to R shoulder with compensatory use during basic functional activities. MET 1/24/19 2. R shoulder PROM forward elevation greater than 150 degrees and external rotation greater than 60 degrees to progress into functional ranges. GOOD PROGRESSION 1/24/19 3. L hip AROM for abduction at least 20 degrees, extension at least 8 degrees for ease with functional activities (walking and car transfers). MET 1/24/19 4. Demonstrate good R shoulder and L hip isometric strength with manual testing to progress into strength phase. PROGRESSSED 1/24/18 5. Returns to normalized walking for house hold distances. GOOD PROGRESSSION 1/24/18 6. Independent with initial HEP. MET 1/24/19 Discharge Goals: Time Frame: 12 weeks. All LTG or ONGOING 1. Return to good strength with going up/down at least 2 flights of stairs. 2. No more than 2/10 intermittent pain R shoulder and L hip pain with return to normalized household and work activities. 3. R shoulder AROM forward elevation greater than 150 degrees, external rotation greater than 89 degrees, and strength to shoulder are grossly WNL's for safe use with normalized activities. 4. Demonstrate good functional shoulder and hip strength and endurance for return to normalized household and work activities. 5. DASH score no greater than 15 and LEFS score at least 70/80 for return to full function. 6. Independent with advanced shoulder HEP for continued self-management. Rehabilitation Potential For Stated Goals: Good The information in this section was collected on 12/26/18 (except where otherwise noted). HISTORY:  
 
 Ambulatory/Rehab Services H2 Model Falls Risk Assessment Total: (5 or greater = High Risk): 2  
 ©2010 [facility] [employer] States Patent #[id number]. Federal Law prohibits the replication, distribution or use without written permission from [facility] [place] [employer] History of Present Injury/Illness (Reason for Referral):  Mr. Vianey Win is s/p L hip total hip revision and lavage and debridement of R shoulder after being diagnosed with infection to both areas. He had previous surgeries to L hip in Jan 2017 and R shoulder in mid 2017. He mostly c/o L hip pain post surgery and has not been moving arm too much. States that he was in excellent shape prior to this incident and has lost much muscle tone and weight. He also states that his energy level is very low and this is not his norm. He comes to outpatient therapy after undergoing several rounds of infusion to treat staff infection. Was told that he has retorn R RTC from previous surgery but MD chose not to repair at this time. Past Medical History/Comorbidities: Mr. Vianey Win  has a past medical history of Arthritis, Biceps muscle tear, Chronic pain, Insomnia, MRSA (methicillin resistant Staphylococcus aureus) infection (2007), Personal history of kidney stones, and Right shoulder pain.  He also has no past medical history of Adverse effect of anesthesia, Difficult intubation, Malignant hyperthermia due to anesthesia, Nausea & vomiting, or Pseudocholinesterase deficiency. Mr. Robbert Goodpasture  has a past surgical history that includes hx other surgical; hx rotator cuff repair (Left, 2010); hx orthopaedic (Left, 01/2017); and hx septoplasty (11/15/2017). Social History/Living Environment:   1 story- lives with spouse with tub/shower combo Prior Level of Function/Work/Activity: very active with workouts - cardio and weight lifting/ pt currently not working. He did not take any medications prior to this incident. Dominant Side:  
      RIGHT Previous Treatment Approaches:   
      Surgeries in 2017 for R shoulder RTC repair and L THR prior to this incident Current Medications:   
 Doxycycline, flexeril, oxycodone, lopressor, amlodipine, sleep aide, advil as needed Date Last Reviewed:  2/8/2019 EXAMINATION: 1/24.19 Observation/Orthostatic Postural Assessment:  [place]- [place] head glides superiorly around 110 degrees Walking:  Decreased R gait antalgia Palpation:  Point tender to R biceps tendon and L greater trochanter area, tight IT band. ROM:cervical ROM = WFL for all planes  
  
PROM RIght shoulder  Left shoulder Flexion 140 Abduction 110 External rotation 65 Internal rotation 70 Horz ABD -   
  
 
HIP AROM Right hip  Left hip Flexion  90 Extension  -5 Abduction  15 External rotation  30 Internal rotation  45 Strength:  
 Right shoulder  Left shoulder Forward elevation 3+ - Abduction 3+ - External rotation 4- 4- Internal rotation 4+ 4 Horz ABD - -  
  
HIP Right hip  Left hip Flexion - 3+ Extension - 3+ Abduction - 3+ External rotation - 3+ Internal rotation - - Special Tests: not assessed today Flexibility: at IE- not assessed at progress report · Hamstring Length (in 90/90 position):R not assessed today L -50 degrees · Straight Leg Raise Test: negative R · Jorge Test (2-joint hip flexors): not assessed today · Prone Knee Bend: negative · Cj's Test:not assessed today · Gastrocnemius not assessed today Neurological Screen: Motor intact to R UE, L LE. Functional Mobility:  Walking : walks without cane with mild R gait antalgia. Sit to/from Stand: independent - sometimes uses hands. . Bed Mobility: independent with compensation. Independent with compensation with basic mobility. Independent with compensation with dressing and grooming ADL's. Balance:  Did well with transfers and able to walk a straight path Body Structures Involved: 1. Joints 2. Muscles Body Functions Affected: 1. Movement Related Activities and Participation Affected: 1. General Tasks and Demands 2. Mobility 3. Community, Social and [place] CLINICAL DECISION MAKING:  
Outcome Measure: Tool Used: Lower Extremity Functional Scale (LEFS) Score:  Initial: 7/80 Most Recent: 15/80 (Date: 1/24/19) Interpretation of Score: 20 questions each scored on a 5 point scale with 0 representing \"extreme difficulty or unable to perform\" and 4 representing \"no difficulty\". The lower the score, the greater the functional disability. 80/80 represents no disability. Minimal detectable change is 9 points. Score 80 79-63 62-48 47-32 31-16 15-1 0 Modifier CH CI CJ CK CL CM CN Tool Used: Disabilities of the Arm, Shoulder and Hand (DASH) Questionnaire - Quick Version Score:  Initial: 36/55  Most Recent:38 /55 (Date: 1/24/19 ) Interpretation of Score: The DASH is designed to measure the activities of daily living in person's with upper extremity dysfunction or pain. Each section is scored on a 1-5 scale, 5 representing the greatest disability. The scores of each section are added together for a total score of 55. This number is divided by 11, followed by subtracting 1 and multiplying by 25 to get a percent score of disability.  This value represents the percentage disability: 0-20% minimal disability; 20-40% moderate disability; 40-60% severe disability; % dependent for care or exaggerated symptom behavior. Minimal detectable change is 12%. Score 11 12-19 20-28 29-37 38-45 46-54 55 Modifier CH CI CJ CK CL CM CN Medical Necessity:  
· Patient is expected to demonstrate progress in strength and range of motion to increase independence with walking, basic mobility and use of R UE. Betha Lute Reason for Services/Other Comments: 
· Patient continues to require skilled intervention due to continued weakness and stiffness in R UE, L LE and difficulty with functional activities. Betha Lute TREATMENT:  
(In addition to Assessment/Re-Assessment sessions the following treatments were rendered) Pre-treatment Symptoms/Complaints: Pt stated his leg is about the same but not having shooting pains. Doing elliptical at gym. 2/8/2019 shoulderAROM  flexion ~ 120 degrees , abduction 90 but can hold at 110 if placed there. Pain: Initial:    No VAS obtained. Post Session:  Hip did better with walking. Aquatic Exercise: ( 0 min):  not today Aquatic Therapy Grid Date: 
1/8/19 Date: 
1/11/19 Date: 
1/14/19 1/18/19 2/6/19 Activity/Exercise Parameters Parameters Parameters  4# Walking forward 10 laps 6 6 6 6 Walking backward 10 laps 6 6 6 6 Walking sideways 10 laps 6 6 6 6 High step march 6 laps 6 6 6 recip gait with paddles 6 laps March in place 30x With purple noodle 10 B 10 x with noodle 30x   
squat 30x Hip 3 way kick-  Large slow kicks 10 ea way Fast, small kicks 20 sec ea AMARA hips   4# x 10 each 4# 20x each Hip ABD/ADD  10 B 10 4# 4# 15 Circles   10 B  10/10 4# 15/15 Hip flexion with straight leg  10 B 10 4#    
4.5' shoulder exercises with purple noode  10 10 Hamstring stretch  5 x 5 sec hold Deep water ex: bicycle, jumping janneth, cross country, core stability   2 min each 2 min each 2 MIN EACH Cross country 4 min Trunk ext    Over noodle in standing x 10 3 x 10  
 Adductor stretch on step     3 x 20 sec Therapeutic Exercises (32 minutes) to improve strength and function, decrease pain:  
 
 
Land Therapy grid Date 
1-21-19 Date 1-24/19 1-31-19 2/5/19 2/7/19 Activity/Exercise AAROM R shoulder flex Pulleys 20 x flexion 20 x scaption LTR       
bridging SLR Hip ER with band  Prone MR -- sustained holds 10x Gentle resist through range 5x Prone MR -- sustained holds to eshaustion 3x Side lye clam shell 10x 3 Side lye clam shell 10 secx x10 Prone MR - gentle 10x Prone MR With eccentric foSide lye clam shell 10x 3cus 10x Push up + Shoulder ER Side-lying 2# 2 x 10  Side-lying 2# 3 x 10  
2 sec hold Side-lying 1# 30x  3#10x3 Mid Trap Side-lying 2 x 10 Prone 0# 2 x 10 Prone low trap 
0# 2 x 8 Prone 1# 2 x 10 Prone EXT 
2# 2x 10 Prone 1# 3 x 10 Prone EXT 
2# 3x 10  PRONE 
2# 10x 3# 10x3 PRONE EXT 3x10  
UBE - -- L3 6 min UE/LE 
 L3 10 min UE/LE L3.5 
5 min UE/LE Prone row and extension  6# prone row 2 x 10 Stand bent over 6# 3 x 10 Prone 6# 10x 3    
pendulums       
tricep Cable pressdowns 3# x 10 
10# 2 x 10 Scapular retraction 7# x 10 
10# 2 x 10 Deltoid strengthening Forward raises 0# to 90 deg, 2 x 10 Scaption 0# 2 x 10  Forward raises 10x   Shoulder flexion with eccentric focus Place and hold 5x flexion 3x ABD Bicep curls 6# 2 x 15 Prone hip flexion  1.5# + MR 
10x 2 with sustained holds as well Wall slides   2 diagonals 10x ea LAQ   indpendent Knee flexion   independent Hip ADD    5 secx x 10 Sitting sustained holds against MR Hip ADD stretch    Standing 30 sec x 2 Sleeper stretch     10 sec x 10 HEP: pt to do updated HEP. Manual Therapy (8 minutes) R GH inferior and posterior mob grade 3 to 4; inferior capsule also worked on in prone with scapula mobilization. myofascial release for L adductore Modalities: (0 min) Treatment/Session Assessment:   
· Response to Treatment:  Hip ER strength slowly improving. Shoulder PROM still stiff to end ranges, AROM slowly improving. Has ongoing RTC tear that is hindering progress with shoulder. · Compliance with Program/Exercises: Appears compliant. · Recommendations/Intent for next treatment session: \"Next visit will focus on pain reduction and progression of exercises. Total Treatment Duration: 40 Minutes PT Patient Time In/Time Out Time In: 0900 Time Out: 1868 David Muñoz, PT

## 2019-02-11 ENCOUNTER — HOSPITAL ENCOUNTER (OUTPATIENT)
Dept: PHYSICAL THERAPY | Age: 54
Discharge: HOME | End: 2019-02-11
Payer: COMMERCIAL

## 2019-02-11 PROCEDURE — 97113 AQUATIC THERAPY/EXERCISES: CPT

## 2019-02-11 NOTE — PROGRESS NOTES
Jovanni Veliz : 1965 Payor: Gabrielle Aguilra / Plan: 19 Alicia Marin / Product Type: Commerical /  [address] [place]  at [facility] GEORGE BUTLER [address] [facility], [address] [place] [address],[address], [address] [facility] [phone]   [phone] OUTPATIENT PHYSICAL THERAPY:Daily Note 2019 ICD-10: Treatment Diagnosis: M25.611 R shoulder stiffness, M25.652 L hip stiffness, R26.2 Difficulty walking; M25.511 R shoulder pain, M25.552 L hip pain. Precautions/Allergies:  
Patient has no known allergies. Fall Risk Score: 1 (? 5 = High Risk) MD Orders: eval and treat, HEP, ROM, Strengthening: 
Full motion, full strength, all modalities. MEDICAL/REFERRING DIAGNOSIS: 
S/P RT Shoulder I&D; LT Hip DATE OF ONSET: 18 REFERRING PHYSICIAN: Zainab Bonner., MD 
RETURN PHYSICIAN APPOINTMENT: unknown INITIAL ASSESSMENT:  Mr. Joy Richardson is approximately 2.5 months s/p L hip total hip revision and lavage and debridement of R shoulder after being diagnosed with infection to both areas. He had previous surgeries to L hip in 2017 and R shoulder in mid . His shoulder AROM and strength is improving but not WNL and continue to limit is ability to do moderate level activities. His L LE progress has been slow because of hip initially getting very irritated with just walking. He continues with L gait antalgia that has improved in quality of motion and ability to WB on L LE. He is extremely weak in some of his L hip stabilizer muscles like with piriformis. He continues to need PT for guided rehab to increase function in both R UE and L LE. He does not appear ready to return to work on this time. PROBLEM LIST (Impacting functional limitations): 1. Decreased Strength 2. Decreased ADL/Functional Activities 3. Post-op R shoulder and L hiop pain and swelling 4. Decreased R shoulder and L hip ROM 5. Weakness R shoulder and L hip 6. Difficulty with walking.  INTERVENTIONS PLANNED: 
 1. Modalities PRN, including ultrasound, estim, and iontophoresis 2. Soft tissue and joint mobilization for ROM and flexibility 3. Stretching, progressive resistive exercises and HEP for return to functional activities. 4. Back Education and Training for body mechanics with activities of daily living 5. If needed, aquatic therapy. TREATMENT PLAN: 
Effective Dates: 12/26/2018 TO 3/26/2019 (90 days). Frequency/Duration: 3 times a week for 3 weeks, then 2x per week for 3 weeks. Plan of care to expand to 90 days and will likely continue at 2 per week and then decrease to 1x per week or less as appropriate. GOALS: (Goals have been discussed and agreed upon with patient.) Short-Term Functional Goals: Time Frame: 4 weeks 1. Report no more than 3/10 intermittent pain to R shoulder with compensatory use during basic functional activities. MET 1/24/19 2. R shoulder PROM forward elevation greater than 150 degrees and external rotation greater than 60 degrees to progress into functional ranges. GOOD PROGRESSION 1/24/19 3. L hip AROM for abduction at least 20 degrees, extension at least 8 degrees for ease with functional activities (walking and car transfers). MET 1/24/19 4. Demonstrate good R shoulder and L hip isometric strength with manual testing to progress into strength phase. PROGRESSSED 1/24/18 5. Returns to normalized walking for house hold distances. GOOD PROGRESSSION 1/24/18 6. Independent with initial HEP. MET 1/24/19 Discharge Goals: Time Frame: 12 weeks. All LTG or ONGOING 1. Return to good strength with going up/down at least 2 flights of stairs. 2. No more than 2/10 intermittent pain R shoulder and L hip pain with return to normalized household and work activities. 3. R shoulder AROM forward elevation greater than 150 degrees, external rotation greater than 89 degrees, and strength to shoulder are grossly WNL's for safe use with normalized activities. 4. Demonstrate good functional shoulder and hip strength and endurance for return to normalized household and work activities. 5. DASH score no greater than 15 and LEFS score at least 70/80 for return to full function. 6. Independent with advanced shoulder HEP for continued self-management. Rehabilitation Potential For Stated Goals: Good The information in this section was collected on 12/26/18 (except where otherwise noted). HISTORY:  
 
 Ambulatory/Rehab Services H2 Model Falls Risk Assessment Total: (5 or greater = High Risk): 2  
 ©2010 [facility] [address] [place] [address] States Patent #[id number]. Federal Law prohibits the replication, distribution or use without written permission from [facility] History of Present Injury/Illness (Reason for Referral):  Mr. Robbert Goodpasture is s/p L hip total hip revision and lavage and debridement of R shoulder after being diagnosed with infection to both areas. He had previous surgeries to L hip in Jan 2017 and R shoulder in mid 2017. He mostly c/o L hip pain post surgery and has not been moving arm too much. States that he was in excellent shape prior to this incident and has lost much muscle tone and weight. He also states that his energy level is very low and this is not his norm. He comes to outpatient therapy after undergoing several rounds of infusion to treat staff infection. Was told that he has retorn R RTC from previous surgery but MD chose not to repair at this time. Past Medical History/Comorbidities: Mr. Robbert Goodpasture  has a past medical history of Arthritis, Biceps muscle tear, Chronic pain, Insomnia, MRSA (methicillin resistant Staphylococcus aureus) infection (2007), Personal history of kidney stones, and Right shoulder pain.  He also has no past medical history of Adverse effect of anesthesia, Difficult intubation, Malignant hyperthermia due to anesthesia, Nausea & vomiting, or Pseudocholinesterase deficiency. Mr. Memo Samson  has a past surgical history that includes hx other surgical; hx rotator cuff repair (Left, 2010); hx orthopaedic (Left, 01/2017); and hx septoplasty (11/15/2017). Social History/Living Environment:   1 story- lives with spouse with tub/shower combo Prior Level of Function/Work/Activity: very active with workouts - cardio and weight lifting/ pt currently not working. He did not take any medications prior to this incident. Dominant Side:  
      RIGHT Previous Treatment Approaches:   
      Surgeries in 2017 for R shoulder RTC repair and L THR prior to this incident Current Medications:   
 Doxycycline, flexeril, oxycodone, lopressor, amlodipine, sleep aide, advil as needed Date Last Reviewed:  2/11/2019 EXAMINATION: 1/24.19 Observation/Orthostatic Postural Assessment:  [place]- [place] head glides superiorly around 110 degrees Walking:  Decreased R gait antalgia Palpation:  Point tender to R biceps tendon and L greater trochanter area, tight IT band. ROM:cervical ROM = WFL for all planes  
  
PROM RIght shoulder  Left shoulder Flexion 140 Abduction 110 External rotation 65 Internal rotation 70 Horz ABD -   
  
 
HIP AROM Right hip  Left hip Flexion  90 Extension  -5 Abduction  15 External rotation  30 Internal rotation  45 Strength:  
 Right shoulder  Left shoulder Forward elevation 3+ - Abduction 3+ - External rotation 4- 4- Internal rotation 4+ 4 Horz ABD - -  
  
HIP Right hip  Left hip Flexion - 3+ Extension - 3+ Abduction - 3+ External rotation - 3+ Internal rotation - - Special Tests: not assessed today Flexibility: at IE- not assessed at progress report · Hamstring Length (in 90/90 position):R not assessed today L -50 degrees · Straight Leg Raise Test: negative R · Jorge Test (2-joint hip flexors): not assessed today · Prone Knee Bend: negative · Cj's Test:not assessed today · Gastrocnemius not assessed today Neurological Screen: Motor intact to R UE, L LE. Functional Mobility:  Walking : walks without cane with mild R gait antalgia. Sit to/from Stand: independent - sometimes uses hands. . Bed Mobility: independent with compensation. Independent with compensation with basic mobility. Independent with compensation with dressing and grooming ADL's. Balance:  Did well with transfers and able to walk a straight path Body Structures Involved: 1. Joints 2. Muscles Body Functions Affected: 1. Movement Related Activities and Participation Affected: 1. General Tasks and Demands 2. Mobility 3. Community, Social and [place] Liberal CLINICAL DECISION MAKING:  
Outcome Measure: Tool Used: Lower Extremity Functional Scale (LEFS) Score:  Initial: 7/80 Most Recent: 15/80 (Date: 1/24/19) Interpretation of Score: 20 questions each scored on a 5 point scale with 0 representing \"extreme difficulty or unable to perform\" and 4 representing \"no difficulty\". The lower the score, the greater the functional disability. 80/80 represents no disability. Minimal detectable change is 9 points. Score 80 79-63 62-48 47-32 31-16 15-1 0 Modifier CH CI CJ CK CL CM CN Tool Used: Disabilities of the Arm, Shoulder and Hand (DASH) Questionnaire - Quick Version Score:  Initial: 36/55  Most Recent:38 /55 (Date: 1/24/19 ) Interpretation of Score: The DASH is designed to measure the activities of daily living in person's with upper extremity dysfunction or pain. Each section is scored on a 1-5 scale, 5 representing the greatest disability. The scores of each section are added together for a total score of 55. This number is divided by 11, followed by subtracting 1 and multiplying by 25 to get a percent score of disability.  This value represents the percentage disability: 0-20% minimal disability; 20-40% moderate disability; 40-60% severe disability; % dependent for care or exaggerated symptom behavior. Minimal detectable change is 12%. Score 11 12-19 20-28 29-37 38-45 46-54 55 Modifier CH CI CJ CK CL CM CN Medical Necessity:  
· Patient is expected to demonstrate progress in strength and range of motion to increase independence with walking, basic mobility and use of R UE. Joey Castillo Reason for Services/Other Comments: 
· Patient continues to require skilled intervention due to continued weakness and stiffness in R UE, L LE and difficulty with functional activities. Joey Castillo TREATMENT:  
(In addition to Assessment/Re-Assessment sessions the following treatments were rendered) Pre-treatment Symptoms/Complaints: Pt stated he had another episode where his leg throbbed all night on Sunday. He states that the pain went past his knee. He states when he holds his breath the throbbing stops. He is very concerned about returning to work in the next couple of weeks as his job is heavy. 2/11/2019 Pain: Initial:    Not hurting today but feels weak  Post Session:   Better after session Aquatic Exercise: (  45min):   
Aquatic Therapy Grid 2/11/19 Activity/Exercise 4# Walking forward 6 Walking backward 6 Walking sideways 6 High step march 6 March in place With noodle 20  
squat Hip 3 way kick-  4# 20x each Hip ABD/ADD Circles  20/20 Hip flexion with straight leg   
4.5' shoulder exercises with purple noode Hamstring stretch Deep water ex: bicycle, jumping janneth, cross country, core stability  Cross country 4 min 2 min all other Trunk ext Adductor stretch on step Therapeutic Exercises (0  minutes) to improve strength and function, decrease pain:  
 
 
Land Therapy grid Date 
1-21-19 Date 1-24/19 1-31-19 2/5/19 2/7/19 Activity/Exercise AAROM R shoulder flex Pulleys 20 x flexion 20 x scaption LTR       
bridging SLR Hip ER with band  Prone MR -- sustained holds 10x Gentle resist through range 5x Prone MR -- sustained holds to eshaustion 3x Side lye clam shell 10x 3 Side lye clam shell 10 secx x10 Prone MR - gentle 10x Prone MR With eccentric foSide lye clam shell 10x 3cus 10x Push up + Shoulder ER Side-lying 2# 2 x 10  Side-lying 2# 3 x 10  
2 sec hold Side-lying 1# 30x  3#10x3 Mid Trap Side-lying 2 x 10 Prone 0# 2 x 10 Prone low trap 
0# 2 x 8 Prone 1# 2 x 10 Prone EXT 
2# 2x 10 Prone 1# 3 x 10 Prone EXT 
2# 3x 10  PRONE 
2# 10x 3# 10x3 PRONE EXT 3x10  
UBE - -- L3 6 min UE/LE 
 L3 10 min UE/LE L3.5 
5 min UE/LE Prone row and extension  6# prone row 2 x 10 Stand bent over 6# 3 x 10 Prone 6# 10x 3    
pendulums       
tricep Cable pressdowns 3# x 10 
10# 2 x 10 Scapular retraction 7# x 10 
10# 2 x 10 Deltoid strengthening Forward raises 0# to 90 deg, 2 x 10 Scaption 0# 2 x 10  Forward raises 10x   Shoulder flexion with eccentric focus Place and hold 5x flexion 3x ABD Bicep curls 6# 2 x 15 Prone hip flexion  1.5# + MR 
10x 2 with sustained holds as well Wall slides   2 diagonals 10x ea LAQ   indpendent Knee flexion   independent Hip ADD    5 secx x 10 Sitting sustained holds against MR Hip ADD stretch    Standing 30 sec x 2 Sleeper stretch     10 sec x 10 HEP: pt to do updated HEP. Manual Therapy ( minutes) Treatment/Session Assessment:   
· Response to Treatment:  Pt able to do all activities in water. He states he tried standing extensions while he was having LE symptoms and it had no effect. · Compliance with Program/Exercises: Appears compliant. · Recommendations/Intent for next treatment session: \"Next visit will focus on pain reduction and progression of exercises. Total Treatment Duration: 45 Minutes PT Patient Time In/Time Out Time In: 0930 Time Out: 1015 Damien Hdz, PT

## 2019-02-12 ENCOUNTER — HOSPITAL ENCOUNTER (OUTPATIENT)
Dept: PHYSICAL THERAPY | Age: 54
Discharge: HOME | End: 2019-02-12
Payer: COMMERCIAL

## 2019-02-12 PROCEDURE — 97110 THERAPEUTIC EXERCISES: CPT

## 2019-02-12 PROCEDURE — 97140 MANUAL THERAPY 1/> REGIONS: CPT

## 2019-02-12 NOTE — PROGRESS NOTES
Kevin Cardoso : 1965 Payor: Amy London / Plan: 19 Alicia Marin / Product Type: Commerical /  [address]  at [facility] GEORGE BUTLER [address] [facility], [address] [place] [address],[address], [facility] [address] [phone]   [phone] OUTPATIENT PHYSICAL THERAPY:Daily Note 2019 ICD-10: Treatment Diagnosis: M25.611 R shoulder stiffness, M25.652 L hip stiffness, R26.2 Difficulty walking; M25.511 R shoulder pain, M25.552 L hip pain. Precautions/Allergies:  
Patient has no known allergies. Fall Risk Score: 1 (? 5 = High Risk) MD Orders: eval and treat, HEP, ROM, Strengthening: 
Full motion, full strength, all modalities. MEDICAL/REFERRING DIAGNOSIS: 
S/P RT Shoulder I&D; LT Hip DATE OF ONSET: 18 REFERRING PHYSICIAN: Abdulkadir Bonner MD 
RETURN PHYSICIAN APPOINTMENT: unknown INITIAL ASSESSMENT:  Mr. Gaurav Reynaga is approximately 3.5 months s/p L hip total hip revision and lavage and debridement of R shoulder after being diagnosed with infection to both areas. He had previous surgeries to L hip in 2017 and R shoulder in 2017. His shoulder AROM and strength is improving but not WNL and continue to limit is ability to do moderate level activities. His L LE progress has been slow. He has improved in hip strength and ROM in most directions but continues to be very weak with hip ER in prone (3/5) and this is likely causing some instability with gait that leads to pain in hip. 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 PROBLEM LIST (Impacting functional limitations): 1. Decreased Strength 2. Decreased ADL/Functional Activities 3. Post-op R shoulder and L hiop pain and swelling 4. Decreased R shoulder and L hip ROM 5. Weakness R shoulder and L hip 6. Difficulty with walking. INTERVENTIONS PLANNED: 
1. Modalities PRN, including ultrasound, estim, and iontophoresis 2. Soft tissue and joint mobilization for ROM and flexibility 3. Stretching, progressive resistive exercises and HEP for return to functional activities. 4. Back Education and Training for body mechanics with activities of daily living 5. If needed, aquatic therapy. TREATMENT PLAN: 
Effective Dates: 12/26/2018 TO 3/26/2019 (90 days). Frequency/Duration: 3 times a week for 3 weeks, then 2x per week for 3 weeks. Plan of care to expand to 90 days and will likely continue at 2 per week and then decrease to 1x per week or less as appropriate. GOALS: (Goals have been discussed and agreed upon with patient.) Short-Term Functional Goals: Time Frame: 4 weeks 1. Report no more than 3/10 intermittent pain to R shoulder with compensatory use during basic functional activities. MET 1/24/19 2. R shoulder PROM forward elevation greater than 150 degrees and external rotation greater than 60 degrees to progress into functional ranges. GOOD PROGRESSION 1/24/19 3. L hip AROM for abduction at least 20 degrees, extension at least 8 degrees for ease with functional activities (walking and car transfers). MET 1/24/19 4. Demonstrate good R shoulder and L hip isometric strength with manual testing to progress into strength phase. PROGRESSSED 1/24/18 5. Returns to normalized walking for house hold distances. GOOD PROGRESSSION 1/24/18 6. Independent with initial HEP. MET 1/24/19 Discharge Goals: Time Frame: 12 weeks. All LTG or ONGOING 1. Return to good strength with going up/down at least 2 flights of stairs. 2. No more than 2/10 intermittent pain R shoulder and L hip pain with return to normalized household and work activities. 3. R shoulder AROM forward elevation greater than 150 degrees, external rotation greater than 89 degrees, and strength to shoulder are grossly WNL's for safe use with normalized activities. 4. Demonstrate good functional shoulder and hip strength and endurance for return to normalized household and work activities. 5. DASH score no greater than 15 and LEFS score at least 70/80 for return to full function. 6. Independent with advanced shoulder HEP for continued self-management. Rehabilitation Potential For Stated Goals: Good The information in this section was collected on 12/26/18 (except where otherwise noted). HISTORY:  
 
 Ambulatory/Rehab Services H2 Model Falls Risk Assessment Total: (5 or greater = High Risk): 2  
 ©2010 [facility] States Patent #[id number]. Federal Law prohibits the replication, distribution or use without written permission from [facility] History of Present Injury/Illness (Reason for Referral):  Mr. Yuly Florence is s/p L hip total hip revision and lavage and debridement of R shoulder after being diagnosed with infection to both areas. He had previous surgeries to L hip in Jan 2017 and R shoulder in mid 2017. He mostly c/o L hip pain post surgery and has not been moving arm too much. States that he was in excellent shape prior to this incident and has lost much muscle tone and weight. He also states that his energy level is very low and this is not his norm. He comes to outpatient therapy after undergoing several rounds of infusion to treat staff infection. Was told that he has retorn R RTC from previous surgery but MD chose not to repair at this time. Past Medical History/Comorbidities: Mr. Yuly Florence  has a past medical history of Arthritis, Biceps muscle tear, Chronic pain, Insomnia, MRSA (methicillin resistant Staphylococcus aureus) infection (2007), Personal history of kidney stones, and Right shoulder pain. He also has no past medical history of Adverse effect of anesthesia, Difficult intubation, Malignant hyperthermia due to anesthesia, Nausea & vomiting, or Pseudocholinesterase deficiency.   Mr. Yuly Florence  has a past surgical history that includes hx other surgical; hx rotator cuff repair (Left, 2010); hx orthopaedic (Left, 01/2017); and hx septoplasty (11/15/2017). Social History/Living Environment:   1 story- lives with spouse with tub/shower combo Prior Level of Function/Work/Activity: very active with workouts - cardio and weight lifting/ pt currently not working. He did not take any medications prior to this incident. Dominant Side:  
      RIGHT Previous Treatment Approaches:   
      Surgeries in 2017 for R shoulder RTC repair and L THR prior to this incident Current Medications:   
 Doxycycline, flexeril, oxycodone, lopressor, amlodipine, sleep aide, advil as needed Date Last Reviewed:  2/12/2019 EXAMINATION: 1/24.19 Observation/Orthostatic Postural Assessment:   
Walking:  moderate L gait antalgia Palpation:  Point tender to L greater trochanter area. Joaquina Hammer ROM:cervical ROM = WFL for all planes  
  
PROM RIght shoulder  Left shoulder Flexion 140 Abduction 110 External rotation 65 Internal rotation 70 Horz ABD -   
  
 
HIP AROM Right hip  Left hip L hip on 2/12/19 Flexion  90 90 Extension  -5 0 Abduction  15 25 External rotation  30 55 Internal rotation  45 40 Strength:  
 Right shoulder  Left shoulder Forward elevation 3+ - Abduction 3+ - External rotation 4- 4- Internal rotation 4+ 4 Horz ABD - -  
  
HIP Right hip  Left hip L hip on 2/12/19 Flexion - 3+ 4 Extension - 3+ 4 Abduction - 3+ 4- External rotation - 3+ 3 painful Internal rotation - - 4- Adduction   3 painful Special Tests: Tight anterior L hip capsule Flexibility: at IE- not assessed at progress report · Hamstring Length (in 90/90 position):R not assessed today L -50 degrees · Straight Leg Raise Test: negative R · Jorge Test (2-joint hip flexors): not assessed today · Prone Knee Bend: negative · Cj's Test:not assessed today · Gastrocnemius not assessed today Neurological Screen: Motor intact to R UE, L LE. Functional Mobility:  Walking : walks without cane with mild R gait antalgia. Sit to/from Stand: independent - sometimes uses hands. . Bed Mobility: independent with compensation. Independent with compensation with basic mobility. Independent with compensation with dressing and grooming ADL's. Balance:  Did well with transfers and able to walk a straight path Body Structures Involved: 1. Joints 2. Muscles Body Functions Affected: 1. Movement Related Activities and Participation Affected: 1. General Tasks and Demands 2. Mobility 3. Community, Social and [place] CLINICAL DECISION MAKING:  
Outcome Measure: Tool Used: Lower Extremity Functional Scale (LEFS) Score:  Initial: 7/80 Most Recent: 15/80 (Date: 1/24/19) Interpretation of Score: 20 questions each scored on a 5 point scale with 0 representing \"extreme difficulty or unable to perform\" and 4 representing \"no difficulty\". The lower the score, the greater the functional disability. 80/80 represents no disability. Minimal detectable change is 9 points. Score 80 79-63 62-48 47-32 31-16 15-1 0 Modifier CH CI CJ CK CL CM CN Tool Used: Disabilities of the Arm, Shoulder and Hand (DASH) Questionnaire - Quick Version Score:  Initial: 36/55  Most Recent:38 /55 (Date: 1/24/19 ) Interpretation of Score: The DASH is designed to measure the activities of daily living in person's with upper extremity dysfunction or pain. Each section is scored on a 1-5 scale, 5 representing the greatest disability. The scores of each section are added together for a total score of 55. This number is divided by 11, followed by subtracting 1 and multiplying by 25 to get a percent score of disability. This value represents the percentage disability: 0-20% minimal disability; 20-40% moderate disability; 40-60% severe disability; % dependent for care or exaggerated symptom behavior. Minimal detectable change is 12%. Score 11 12-19 20-28 29-37 38-45 46-54 55 Modifier CH CI CJ CK CL CM CN Medical Necessity:  
· Patient is expected to demonstrate progress in strength and range of motion to increase independence with walking, basic mobility and use of R UE. Fleipe Sky Reason for Services/Other Comments: 
· Patient continues to require skilled intervention due to continued weakness and stiffness in R UE, L LE and difficulty with functional activities. Felipe Jose Daniel TREATMENT:  
(In addition to Assessment/Re-Assessment sessions the following treatments were rendered) Pre-treatment Symptoms/Complaints: Pt wants to see his Hip surgeon because of ongoing LE pain and episodes of sharp pain. States he has post exercise pain but not pain during. Water therapy helpful to the pain some. Bothered by how long the pain will last at times and now his L knee hurts. 2/12/2019 Pain: Initial:    Not hurting today but feels weak  Post Session:   Better after session Aquatic Exercise: (  45min):   
Aquatic Therapy Grid 2/11/19 Activity/Exercise 4# Walking forward 6 Walking backward 6 Walking sideways 6 High step march 6 March in place With noodle 20  
squat Hip 3 way kick-  4# 20x each Hip ABD/ADD Circles  20/20 Hip flexion with straight leg   
4.5' shoulder exercises with purple noode Hamstring stretch Deep water ex: bicycle, jumping janneth, cross country, core stability  Cross country 4 min 2 min all other Trunk ext Adductor stretch on step Therapeutic Exercises (15 minutes) to improve strength and function, decrease pain:  
-- R shoulder Active assisted stretch with ER at neutral and 45 ABD, flexion, abduction. Review of exercises to do at gym: side lye clam shell, prone shoulder horizontal and extension, side lye shoudler ER. Land Therapy grid Date 
1-21-19 Date 1-24/19 1-31-19 2/5/19 2/7/19 2/12/19 Activity/Exercise AAROM R shoulder flex Pulleys 20 x flexion 20 x scaption LTR        
bridging SLR Hip ER with band  Prone MR -- sustained holds 10x Gentle resist through range 5x Prone MR -- sustained holds to eshaustion 3x Side lye clam shell 10x 3 Side lye clam shell 10 secx x10 Prone MR - gentle 10x Prone MR With eccentric foSide lye clam shell 10x 3cus 10x Push up + Shoulder ER Side-lying 2# 2 x 10  Side-lying 2# 3 x 10  
2 sec hold Side-lying 1# 30x  3#10x3 Mid Trap Side-lying 2 x 10 Prone 0# 2 x 10 Prone low trap 
0# 2 x 8 Prone 1# 2 x 10 Prone EXT 
2# 2x 10 Prone 1# 3 x 10 Prone EXT 
2# 3x 10  PRONE 
2# 10x 3# 10x3 PRONE EXT 3x10   
UBE - -- L3 6 min UE/LE 
 L3 10 min UE/LE L3.5 
5 min UE/LE L3 
6 min UE/LE Prone row and extension  6# prone row 2 x 10 Stand bent over 6# 3 x 10 Prone 6# 10x 3     
pendulums        
tricep Cable pressdowns 3# x 10 
10# 2 x 10 Scapular retraction 7# x 10 
10# 2 x 10 Deltoid strengthening Forward raises 0# to 90 deg, 2 x 10 Scaption 0# 2 x 10  Forward raises 10x   Shoulder flexion with eccentric focus Place and hold 5x flexion 3x ABD Bicep curls 6# 2 x 15 Prone hip flexion  1.5# + MR 
10x 2 with sustained holds as well Wall slides   2 diagonals 10x ea LAQ   indpendent Knee flexion   independent Hip ADD    5 secx x 10 Sitting sustained holds against MR Hip ADD stretch    Standing 30 sec x 2 Sleeper stretch     10 sec x 10 HEP: pt to do updated HEP. Manual Therapy (15 minutes) -- prone with 1 pillow hip PA glide grade 2 to 3 and progression to doing this with double pillow -- L adductor release Treatment/Session Assessment:   
· Response to Treatment:  Hip pain improved with doing PA hip mobs to address stiffness in anterior hip capsule. Would likely benefit for hamstring stretching on L as well. See assessment · Compliance with Program/Exercises: Appears compliant. · Recommendations/Intent for next treatment session: \"Next visit will focus on pain reduction and progression of exercises. L hamstring stretching and L hip anterior hip capsule mobilization. Total Treatment Duration: 30 Minutes PT Patient Time In/Time Out Time In: 1451 Time Out: 1450 Marcos Crow, PT

## 2019-02-12 NOTE — PROGRESS NOTES
Dewey Gilman : 1965 Primary: [employer] R[employer] Secondary:  Therapy Center at [facility] [address] [facility], [address], [place] [address] [phone]   [phone] Dewey Gilman : 1965 Payor: Rach Villarreal / Plan: Trista Marin / Product Type: Commerical /  [address]  at [facility] [address], [address] [facility] [address], [place] [address] [phone]   [phone] OUTPATIENT PHYSICAL THERAPY: Physician Communication 2019 ICD-10: Treatment Diagnosis: s/p R shoulder and L hip I &D Precautions/Allergies:  
Patient has no known allergies. MD Orders: Eval and treat, HEP, full strength, full motion, all modalities MEDICAL/REFERRING DIAGNOSIS: 
S/P RT Shoulder I&D; LT Hip DATE OF ONSET: 18 REFERRING PHYSICIAN: Liliana Bonner., MD 
RETURN PHYSICIAN APPOINTMENT: unknown CURRENT ASSESSMENT/PROGRESS:  Mr. Brooklyn Lezama has attended 23  of 19 scheduled visits to date. He is s/p R shoulder and L hip I &D. He has made good progress with R shoulder AROM and strength and overall his L hip strength has improved except he continues to be unusually weak to hip ER when tested in prone and has pain with weakness to hip ADDuction. He continues to have intermittent problems with sharp shooting pain in his L hip that affects his function. EXAMINATION:  
AROM RIght shoulder 19 Flexion 140 Abduction 110 External rotation 65 Internal rotation 70 Horz ABD -  
  
HIP AROM Left hip at initial eval L hip on 19 Flexion 90 90 Extension -5 0 Abduction 15 25 External rotation 30 55 Internal rotation 45 40 STRENGTH: 
Strength:  
  Right shoulder On 19 Forward elevation 3+ Abduction 3+ External rotation 4- Internal rotation 4+ Horz ABD -  
 
 
 
HIP Left hip at initial eval L hip on 19 Flexion 3+ 4 Extension 3+ 4 Abduction 3+ 4- External rotation 3+ 3 painful Internal rotation - 4-  
 Adduction  3 painful RECOMMENDATION: We will plan to continue current treatment plan. Feel free to contact me with any questions or concerns. Thank you for the opportunity to serve this patient.   
 
Christa Green, PT

## 2019-02-15 ENCOUNTER — HOSPITAL ENCOUNTER (OUTPATIENT)
Dept: PHYSICAL THERAPY | Age: 54
Discharge: HOME | End: 2019-02-15
Payer: COMMERCIAL

## 2019-02-15 PROCEDURE — 97110 THERAPEUTIC EXERCISES: CPT

## 2019-02-15 PROCEDURE — 97140 MANUAL THERAPY 1/> REGIONS: CPT

## 2019-02-15 NOTE — PROGRESS NOTES
Severa Lacer : 1965 Payor: Grabiel Gardner / Plan: 19 Alicia Marin / Product Type: Commerical /  [address]  at [facility] GEORGE BUTLER [address] [facility], [address] [place] [address],[address], [facility] [address]. [phone]   [phone] OUTPATIENT PHYSICAL THERAPY:Daily Note 2/15/2019 ICD-10: Treatment Diagnosis: M25.611 R shoulder stiffness, M25.652 L hip stiffness, R26.2 Difficulty walking; M25.511 R shoulder pain, M25.552 L hip pain. Precautions/Allergies:  
Patient has no known allergies. Fall Risk Score: 1 (? 5 = High Risk) MD Orders: eval and treat, HEP, ROM, Strengthening: 
Full motion, full strength, all modalities. MEDICAL/REFERRING DIAGNOSIS: 
S/P RT Shoulder I&D; LT Hip DATE OF ONSET: 18 REFERRING PHYSICIAN: Roman Bonner MD 
RETURN PHYSICIAN APPOINTMENT: unknown INITIAL ASSESSMENT:  Mr. Karlene Vaughn is approximately 3.5 months s/p L hip total hip revision and lavage and debridement of R shoulder after being diagnosed with infection to both areas. He had previous surgeries to L hip in 2017 and R shoulder in 2017. His shoulder AROM and strength is improving but not WNL and continue to limit is ability to do moderate level activities. His L LE progress has been slow. He has improved in hip strength and ROM in most directions but continues to be very weak with hip ER in prone (3/5) and this is likely causing some instability with gait that leads to pain in hip. 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 PROBLEM LIST (Impacting functional limitations): 1. Decreased Strength 2. Decreased ADL/Functional Activities 3. Post-op R shoulder and L hiop pain and swelling 4. Decreased R shoulder and L hip ROM 5. Weakness R shoulder and L hip 6. Difficulty with walking. INTERVENTIONS PLANNED: 
1. Modalities PRN, including ultrasound, estim, and iontophoresis 2. Soft tissue and joint mobilization for ROM and flexibility 3. Stretching, progressive resistive exercises and HEP for return to functional activities. 4. Back Education and Training for body mechanics with activities of daily living 5. If needed, aquatic therapy. TREATMENT PLAN: 
Effective Dates: 12/26/2018 TO 3/26/2019 (90 days). Frequency/Duration: 3 times a week for 3 weeks, then 2x per week for 3 weeks. Plan of care to expand to 90 days and will likely continue at 2 per week and then decrease to 1x per week or less as appropriate. GOALS: (Goals have been discussed and agreed upon with patient.) Short-Term Functional Goals: Time Frame: 4 weeks 1. Report no more than 3/10 intermittent pain to R shoulder with compensatory use during basic functional activities. MET 1/24/19 2. R shoulder PROM forward elevation greater than 150 degrees and external rotation greater than 60 degrees to progress into functional ranges. GOOD PROGRESSION 1/24/19 3. L hip AROM for abduction at least 20 degrees, extension at least 8 degrees for ease with functional activities (walking and car transfers). MET 1/24/19 4. Demonstrate good R shoulder and L hip isometric strength with manual testing to progress into strength phase. PROGRESSSED 1/24/18 5. Returns to normalized walking for house hold distances. GOOD PROGRESSSION 1/24/18 6. Independent with initial HEP. MET 1/24/19 Discharge Goals: Time Frame: 12 weeks. All LTG or ONGOING 1. Return to good strength with going up/down at least 2 flights of stairs. 2. No more than 2/10 intermittent pain R shoulder and L hip pain with return to normalized household and work activities. 3. R shoulder AROM forward elevation greater than 150 degrees, external rotation greater than 89 degrees, and strength to shoulder are grossly WNL's for safe use with normalized activities. 4. Demonstrate good functional shoulder and hip strength and endurance for return to normalized household and work activities. 5. DASH score no greater than 15 and LEFS score at least 70/80 for return to full function. 6. Independent with advanced shoulder HEP for continued self-management. Rehabilitation Potential For Stated Goals: Good The information in this section was collected on 12/26/18 (except where otherwise noted). HISTORY:  
 
 Ambulatory/Rehab Services H2 Model Falls Risk Assessment Total: (5 or greater = High Risk): 2  
 ©2010 [facility] [place] Patent #[id number]. Federal Law prohibits the replication, distribution or use without written permission from [facility] [employer] History of Present Injury/Illness (Reason for Referral):  Mr. Monterroso is s/p L hip total hip revision and lavage and debridement of R shoulder after being diagnosed with infection to both areas. He had previous surgeries to L hip in Jan 2017 and R shoulder in mid 2017. He mostly c/o L hip pain post surgery and has not been moving arm too much. States that he was in excellent shape prior to this incident and has lost much muscle tone and weight. He also states that his energy level is very low and this is not his norm. He comes to outpatient therapy after undergoing several rounds of infusion to treat staff infection. Was told that he has retorn R RTC from previous surgery but MD chose not to repair at this time. Past Medical History/Comorbidities: Mr. Monterroso  has a past medical history of Arthritis, Biceps muscle tear, Chronic pain, Insomnia, MRSA (methicillin resistant Staphylococcus aureus) infection (2007), Personal history of kidney stones, and Right shoulder pain. He also has no past medical history of Adverse effect of anesthesia, Difficult intubation, Malignant hyperthermia due to anesthesia, Nausea & vomiting, or Pseudocholinesterase deficiency.   Mr. Monterroso  has a past surgical history that includes hx other surgical; hx rotator cuff repair (Left, 2010); hx orthopaedic (Left, 01/2017); and hx septoplasty (11/15/2017). Social History/Living Environment:   1 story- lives with spouse with tub/shower combo Prior Level of Function/Work/Activity: very active with workouts - cardio and weight lifting/ pt currently not working. He did not take any medications prior to this incident. Dominant Side:  
      RIGHT Previous Treatment Approaches:   
      Surgeries in 2017 for R shoulder RTC repair and L THR prior to this incident Current Medications:   
 Doxycycline, flexeril, oxycodone, lopressor, amlodipine, sleep aide, advil as needed Date Last Reviewed:  2/15/2019 EXAMINATION: 1/24.19 Observation/Orthostatic Postural Assessment:   
Walking:  moderate L gait antalgia Palpation:  Point tender to L greater trochanter area. Naoma Broccoli ROM:cervical ROM = WFL for all planes  
  
PROM RIght shoulder  Left shoulder Flexion 140 Abduction 110 External rotation 65 Internal rotation 70 Horz ABD -   
  
 
HIP AROM Right hip  Left hip L hip on 2/12/19 Flexion  90 90 Extension  -5 0 Abduction  15 25 External rotation  30 55 Internal rotation  45 40 Strength:  
 Right shoulder  Left shoulder Forward elevation 3+ - Abduction 3+ - External rotation 4- 4- Internal rotation 4+ 4 Horz ABD - -  
  
HIP Right hip  Left hip L hip on 2/12/19 Flexion - 3+ 4 Extension - 3+ 4 Abduction - 3+ 4- External rotation - 3+ 3 painful Internal rotation - - 4- Adduction   3 painful Special Tests: Tight anterior L hip capsule Flexibility: at IE- not assessed at progress report · Hamstring Length (in 90/90 position):R not assessed today L -50 degrees · Straight Leg Raise Test: negative R · Jorge Test (2-joint hip flexors): not assessed today · Prone Knee Bend: negative · Cj's Test:not assessed today · Gastrocnemius not assessed today Neurological Screen: Motor intact to R UE, L LE. Functional Mobility:  Walking : walks without cane with mild R gait antalgia. Sit to/from Stand: independent - sometimes uses hands. . Bed Mobility: independent with compensation. Independent with compensation with basic mobility. Independent with compensation with dressing and grooming ADL's. Balance:  Did well with transfers and able to walk a straight path Body Structures Involved: 1. Joints 2. Muscles Body Functions Affected: 1. Movement Related Activities and Participation Affected: 1. General Tasks and Demands 2. Mobility 3. Community, Social and [place] CLINICAL DECISION MAKING:  
Outcome Measure: Tool Used: Lower Extremity Functional Scale (LEFS) Score:  Initial: 7/80 Most Recent: 15/80 (Date: 1/24/19) Interpretation of Score: 20 questions each scored on a 5 point scale with 0 representing \"extreme difficulty or unable to perform\" and 4 representing \"no difficulty\". The lower the score, the greater the functional disability. 80/80 represents no disability. Minimal detectable change is 9 points. Score 80 79-63 62-48 47-32 31-16 15-1 0 Modifier CH CI CJ CK CL CM CN Tool Used: Disabilities of the Arm, Shoulder and Hand (DASH) Questionnaire - Quick Version Score:  Initial: 36/55  Most Recent:38 /55 (Date: 1/24/19 ) Interpretation of Score: The DASH is designed to measure the activities of daily living in person's with upper extremity dysfunction or pain. Each section is scored on a 1-5 scale, 5 representing the greatest disability. The scores of each section are added together for a total score of 55. This number is divided by 11, followed by subtracting 1 and multiplying by 25 to get a percent score of disability. This value represents the percentage disability: 0-20% minimal disability; 20-40% moderate disability; 40-60% severe disability; % dependent for care or exaggerated symptom behavior. Minimal detectable change is 12%. Score 11 12-19 20-28 29-37 38-45 46-54 55 Modifier CH CI CJ CK CL CM CN Medical Necessity:  
· Patient is expected to demonstrate progress in strength and range of motion to increase independence with walking, basic mobility and use of R UE. Ruthy Loose Reason for Services/Other Comments: 
· Patient continues to require skilled intervention due to continued weakness and stiffness in R UE, L LE and difficulty with functional activities. Ruthy Loose TREATMENT:  
(In addition to Assessment/Re-Assessment sessions the following treatments were rendered) Pre-treatment Symptoms/Complaints: states that still unable to sleep at night because of leg pain and knee pain- leg hurts at rest at night. Received phone call from Dr. Leandra Loyola office and there in a plan to to a NCV test. 
2/15/2019 negative McMurrays, patella compression Pain: Initial:    Shoulder= 4-5 /02 with certain motions Hip 7/10 Post Session:   Hip = 3-4 10 Aquatic Exercise: (  0 min):   
Aquatic Therapy Grid 2/11/19 Activity/Exercise 4# Walking forward 6 Walking backward 6 Walking sideways 6 High step march 6 March in place With noodle 20  
squat Hip 3 way kick-  4# 20x each Hip ABD/ADD Circles  20/20 Hip flexion with straight leg   
4.5' shoulder exercises with purple noode Hamstring stretch Deep water ex: bicycle, jumping janneth, cross country, core stability  Cross country 4 min 2 min all other Trunk ext Adductor stretch on step Therapeutic Exercises (23 minutes) to improve strength and function, decrease pain:  
-- R shoulder Active assisted stretch with ER at neutral and 45 ABD, flexion, abduction. Review of exercises to do at gym: side lye clam shell, prone shoulder horizontal and extension, side lye shoudler ER, push up + with bench press. Land Therapy grid Date 
1-21-19 Date 1-24/19 1-31-19 2/5/19 2/7/19 2/12/19 2/15/19 Activity/Exercise AAROM R shoulder flex Pulleys 20 x flexion 20 x scaption LTR         
bridging SLR Hip ER with band  Prone MR -- sustained holds 10x Gentle resist through range 5x Prone MR -- sustained holds to eshaustion 3x Side lye clam shell 10x 3 Side lye clam shell 10 secx x10 Prone MR - gentle 10x Prone MR With eccentric foSide lye clam shell 10x 3cus 10x  Prone MR With concentric and eccentric 10x ALso IR 5x Push up + Shoulder ER Side-lying 2# 2 x 10  Side-lying 2# 3 x 10  
2 sec hold Side-lying 1# 30x  3#10x3 Mid Trap Side-lying 2 x 10 Prone 0# 2 x 10 Prone low trap 
0# 2 x 8 Prone 1# 2 x 10 Prone EXT 
2# 2x 10 Prone 1# 3 x 10 Prone EXT 
2# 3x 10  PRONE 
2# 10x 3# 10x3 PRONE EXT 3x10    
UBE - -- L3 6 min UE/LE 
 L3 10 min UE/LE L3.5 
5 min UE/LE L3 
6 min UE/LE Prone row and extension  6# prone row 2 x 10 Stand bent over 6# 3 x 10 Prone 6# 10x 3      
pendulums         
tricep Cable pressdowns 3# x 10 
10# 2 x 10 Scapular retraction 7# x 10 
10# 2 x 10 Deltoid strengthening Forward raises 0# to 90 deg, 2 x 10 Scaption 0# 2 x 10  Forward raises 10x   Shoulder flexion with eccentric focus Place and hold 5x flexion 3x ABD Bicep curls 6# 2 x 15 Prone hip flexion  1.5# + MR 
10x 2 with sustained holds as well Wall slides   2 diagonals 10x ea LAQ   indpendent Knee flexion   independent Hip ADD    5 secx x 10 Sitting sustained holds against MR Hip ADD stretch    Standing 30 sec x 2   Standing back against wall 
10 sec x 10 Sleeper stretch     10 sec x 10 Anterior hip stretch in chair       10 sec x10 HEP: pt to do updated HEP. Manual Therapy (15 minutes) -- prone with 1 pillow hip PA glide grade 2 to 3 and progression to doing this with double pillow -- L adductor release Treatment/Session Assessment:   
· Response to Treatment:  Hip pain improved again with doing PA hip mobs to address stiffness in anterior hip capsule. Pt may need review of exercises to help stretch this. Instructed pt to ice greater trochanter because of pain at the bursa here along with doing his HEP. He verbally agreed. He is also doing other light weight exercises at gym for leg including bicycle. Instructed him not to overdo bicycle secondary to amount of pain. · Compliance with Program/Exercises: Appears compliant. · Recommendations/Intent for next treatment session: \"Next visit will focus on pain reduction and progression of exercises - especially shoulder exercises with respect to humeral head elevation and tear in RTC. L hamstring stretching and L hip anterior hip capsule mobilization. Total Treatment Duration: 38 Minutes PT Patient Time In/Time Out Time In: 8208 Time Out: 5560 Kathe Singh, PT

## 2019-02-19 ENCOUNTER — HOSPITAL ENCOUNTER (OUTPATIENT)
Dept: PHYSICAL THERAPY | Age: 54
Discharge: HOME | End: 2019-02-19
Payer: COMMERCIAL

## 2019-02-19 PROCEDURE — 97110 THERAPEUTIC EXERCISES: CPT

## 2019-02-19 NOTE — PROGRESS NOTES
Jovanni Veliz : 1965 Payor: Gabrielle Aguilar / Plan: 19 Alicia Marin / Product Type: Commerical /  [id number]  at [facility] GEORGE BUTLER [address] [facility], [address] [place] [address],[address], [facility] [address]. [phone]   [phone] OUTPATIENT PHYSICAL THERAPY:Progress Report 2019 ICD-10: Treatment Diagnosis: M25.611 R shoulder stiffness, M25.652 L hip stiffness, R26.2 Difficulty walking; M25.511 R shoulder pain, M25.552 L hip pain. Precautions/Allergies:  
Patient has no known allergies. Fall Risk Score: 1 (? 5 = High Risk) MD Orders: eval and treat, HEP, ROM, Strengthening: 
Full motion, full strength, all modalities. Pt started PT ton 18 and has completed 26 visits. MEDICAL/REFERRING DIAGNOSIS: 
S/P RT Shoulder I&D; LT Hip DATE OF ONSET: 18 REFERRING PHYSICIAN: Zainab Bonner., MD 
RETURN PHYSICIAN APPOINTMENT: unknown INITIAL ASSESSMENT:  Mr. Joy Richardson is approximately 3.5 months s/p L hip total hip revision and lavage and debridement of R shoulder after being diagnosed with infection to both areas. He had previous surgeries to L hip in 2017 and R shoulder in mid . His shoulder AROM and strength is improving but not WNL and continue to limit is ability to do moderate level activities. His L LE progress has been slow. He has improved in hip strength and ROM in most directions but continues to be very weak with hip ER in prone (3/5) and this is likely causing some instability with gait that leads to pain in hip. 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 PROBLEM LIST (Impacting functional limitations): 1. Decreased Strength 2. Decreased ADL/Functional Activities 3. Post-op R shoulder and L hiop pain and swelling 4. Decreased R shoulder and L hip ROM 5. Weakness R shoulder and L hip 6. Difficulty with walking. INTERVENTIONS PLANNED: 
1. Modalities PRN, including ultrasound, estim, and iontophoresis 2. Soft tissue and joint mobilization for ROM and flexibility 3. Stretching, progressive resistive exercises and HEP for return to functional activities. 4. Back Education and Training for body mechanics with activities of daily living 5. If needed, aquatic therapy. TREATMENT PLAN: 
Effective Dates: 12/26/2018 TO 3/26/2019 (90 days). Frequency/Duration: 3 times a week for 3 weeks, then 2x per week for 3 weeks. Plan of care to expand to 90 days and will likely continue at 2 per week and then decrease to 1x per week or less as appropriate. GOALS: (Goals have been discussed and agreed upon with patient.) Short-Term Functional Goals: Time Frame: 4 weeks 1. Report no more than 3/10 intermittent pain to R shoulder with compensatory use during basic functional activities. MET 1/24/19 2. R shoulder PROM forward elevation greater than 150 degrees and external rotation greater than 60 degrees to progress into functional ranges. GOOD PROGRESSION 1/24/19 3. L hip AROM for abduction at least 20 degrees, extension at least 8 degrees for ease with functional activities (walking and car transfers). MET 1/24/19 4. Demonstrate good R shoulder and L hip isometric strength with manual testing to progress into strength phase. PROGRESSSED 1/24/18 5. Returns to normalized walking for house hold distances. GOOD PROGRESSSION 1/24/18 6. Independent with initial HEP. MET 1/24/19 Discharge Goals: Time Frame: 12 weeks. All LTG or ONGOING 1. Return to good strength with going up/down at least 2 flights of stairs. 2. No more than 2/10 intermittent pain R shoulder and L hip pain with return to normalized household and work activities. 3. R shoulder AROM forward elevation greater than 150 degrees, external rotation greater than 89 degrees, and strength to shoulder are grossly WNL's for safe use with normalized activities.   
4. Demonstrate good functional shoulder and hip strength and endurance for return to normalized household and work activities. 5. DASH score no greater than 15 and LEFS score at least 70/80 for return to full function. 6. Independent with advanced shoulder HEP for continued self-management. Rehabilitation Potential For Stated Goals: Good The information in this section was collected on 12/26/18 (except where otherwise noted). HISTORY:  
 
 Ambulatory/Rehab Services H2 Model Falls Risk Assessment Total: (5 or greater = High Risk): 2  
 ©2010 [facility] [address] [place] [address] Patent #[id number]. Federal Law prohibits the replication, distribution or use without written permission from [facility] History of Present Injury/Illness (Reason for Referral):  Mr. Zofia Verdugo is s/p L hip total hip revision and lavage and debridement of R shoulder after being diagnosed with infection to both areas. He had previous surgeries to L hip in Jan 2017 and R shoulder in mid 2017. He mostly c/o L hip pain post surgery and has not been moving arm too much. States that he was in excellent shape prior to this incident and has lost much muscle tone and weight. He also states that his energy level is very low and this is not his norm. He comes to outpatient therapy after undergoing several rounds of infusion to treat staff infection. Was told that he has retorn R RTC from previous surgery but MD chose not to repair at this time. Past Medical History/Comorbidities: Mr. Zofia Verdugo  has a past medical history of Arthritis, Biceps muscle tear, Chronic pain, Insomnia, MRSA (methicillin resistant Staphylococcus aureus) infection (2007), Personal history of kidney stones, and Right shoulder pain. He also has no past medical history of Adverse effect of anesthesia, Difficult intubation, Malignant hyperthermia due to anesthesia, Nausea & vomiting, or Pseudocholinesterase deficiency.   Mr. Zofia Verdugo  has a past surgical history that includes hx other surgical; hx rotator cuff repair (Left, 2010); hx orthopaedic (Left, 01/2017); and hx septoplasty (11/15/2017). Social History/Living Environment:   1 story- lives with spouse with tub/shower combo Prior Level of Function/Work/Activity: very active with workouts - cardio and weight lifting/ pt currently not working. He did not take any medications prior to this incident. Dominant Side:  
      RIGHT Previous Treatment Approaches:   
      Surgeries in 2017 for R shoulder RTC repair and L THR prior to this incident Current Medications:   
 Doxycycline, flexeril, oxycodone, lopressor, amlodipine, sleep aide, advil as needed Date Last Reviewed:  2/19/2019 EXAMINATION: 2/12/19 Observation/Orthostatic Postural Assessment:   
Walking:  Mild to moderate L gait antalgia, depending on day Palpation:  Point tender to L greater trochanter area. Joe Godinez ROM:cervical ROM = WFL for all planes  
  
PROM RIght shoulder  Left shoulder Flexion 140 Abduction 110 External rotation 65 Internal rotation 70 Horz ABD -   
  
 
HIP AROM Right hip  Left hip L hip on 2/12/19 Flexion  90 90 Extension  -5 0 Abduction  15 25 External rotation  30 55 Internal rotation  45 40 Strength: 2/12/19 Right shoulder  Left shoulder R shoulder 2/19/19 Forward elevation 3+ - 4- Abduction 3+ - 4- External rotation 4- 4- 4 Internal rotation 4+ 4 4+ Horz ABD - -   
  
HIP Right hip  Left hip at IE L hip on 2/12/19 Flexion - 3+ 4 Extension - 3+ 4 Abduction - 3+ 4- External rotation - 3+ 3 painful Internal rotation - - 4- Adduction   3 painful Special Tests: Tight anterior L hip capsule Flexibility: at IE- not assessed at progress report · Hamstring Length (in 90/90 position):R not assessed today L -50 degrees · Straight Leg Raise Test: negative R · Jorge Test (2-joint hip flexors): not assessed today · Prone Knee Bend: negative · Cj's Test:not assessed today · Gastrocnemius not assessed today Neurological Screen: Motor intact to R UE, L LE. Functional Mobility:  Walking : walks without cane with mild R gait antalgia. Sit to/from Stand: independent - sometimes uses hands. . Bed Mobility: independent with compensation. Independent with compensation with basic mobility. Independent with compensation with dressing and grooming ADL's. Balance:  Did well with transfers and able to walk a straight path Body Structures Involved: 1. Joints 2. Muscles Body Functions Affected: 1. Movement Related Activities and Participation Affected: 1. General Tasks and Demands 2. Mobility 3. Community, Social and [place] CLINICAL DECISION MAKING:  
Outcome Measure: Tool Used: Lower Extremity Functional Scale (LEFS) Score:  Initial: 7/80 Most Recent: 15/80 (Date: 1/24/19) Interpretation of Score: 20 questions each scored on a 5 point scale with 0 representing \"extreme difficulty or unable to perform\" and 4 representing \"no difficulty\". The lower the score, the greater the functional disability. 80/80 represents no disability. Minimal detectable change is 9 points. Score 80 79-63 62-48 47-32 31-16 15-1 0 Modifier CH CI CJ CK CL CM CN Tool Used: Disabilities of the Arm, Shoulder and Hand (DASH) Questionnaire - Quick Version Score:  Initial: 36/55  Most Recent:38 /55 (Date: 1/24/19 ) Interpretation of Score: The DASH is designed to measure the activities of daily living in person's with upper extremity dysfunction or pain. Each section is scored on a 1-5 scale, 5 representing the greatest disability. The scores of each section are added together for a total score of 55. This number is divided by 11, followed by subtracting 1 and multiplying by 25 to get a percent score of disability.  This value represents the percentage disability: 0-20% minimal disability; 20-40% moderate disability; 40-60% severe disability; % dependent for care or exaggerated symptom behavior. Minimal detectable change is 12%. Score 11 12-19 20-28 29-37 38-45 46-54 55 Modifier CH CI CJ CK CL CM CN Medical Necessity:  
· Patient is expected to demonstrate progress in strength and range of motion to increase independence with walking, basic mobility and use of R UE. Lori Jordan Reason for Services/Other Comments: 
· Patient continues to require skilled intervention due to continued weakness and stiffness in R UE, L LE and difficulty with functional activities. Lori Jordan TREATMENT:  
(In addition to Assessment/Re-Assessment sessions the following treatments were rendered) Pre-treatment Symptoms/Complaints: still does not have NCV scheduled. Pain in shoulder mostly with elevation. Hip starting to feel some better. Able to do hip ER strentghening with light cables at sym. Pain: Initial:    Shoulder= 4-5 /76 with certain motions Hip 7/10 Post Session:   Hip = 3-4 10 Aquatic Exercise: (  0 min):   
Aquatic Therapy Grid 2/11/19 Activity/Exercise 4# Walking forward 6 Walking backward 6 Walking sideways 6 High step march 6 March in place With noodle 20  
squat Hip 3 way kick-  4# 20x each Hip ABD/ADD Circles  20/20 Hip flexion with straight leg   
4.5' shoulder exercises with purple noode Hamstring stretch Deep water ex: bicycle, jumping janneth, cross country, core stability  Cross country 4 min 2 min all other Trunk ext Adductor stretch on step Therapeutic Exercises (40 minutes) to improve strength and function, decrease pain:  
-- R shoulder Active assisted stretch with ER at neutral and 45 ABD, flexion, abduction. Land Therapy grid Date 
1-21-19 Date 1-24/19 1-31-19 2/5/19 2/7/19 2/12/19 2/15/19 2/19/19 Activity/Exercise AAROM R shoulder flex Pulleys 20 x flexion 20 x scaption LTR          
bridging SLR            
 Hip ER with band  Prone MR -- sustained holds 10x Gentle resist through range 5x Prone MR -- sustained holds to eshaustion 3x Side lye clam shell 10x 3 Side lye clam shell 10 secx x10 Prone MR - gentle 10x Prone MR With eccentric foSide lye clam shell 10x 3cus 10x  Prone MR With concentric and eccentric 10x ALso IR 5x Prone MR With concentric and eccentric 10x Push up + Shoulder ER Side-lying 2# 2 x 10  Side-lying 2# 3 x 10  
2 sec hold Side-lying 1# 30x  3#10x3   Scaption 3# 30x 4# 30x Mid Trap Side-lying 2 x 10 Prone 0# 2 x 10 Prone low trap 
0# 2 x 8 Prone 1# 2 x 10 Prone EXT 
2# 2x 10 Prone 1# 3 x 10 Prone EXT 
2# 3x 10  PRONE 
2# 10x 3# 10x3 PRONE EXT 3x10   PRONE 
4#  20x 5# 20x Low Trap 0#20x2 UBE - -- L3 6 min UE/LE 
 L3 10 min UE/LE L3.5 
5 min UE/LE L3 
6 min UE/LE Prone row and extension  6# prone row 2 x 10 Stand bent over 6# 3 x 10 Prone 6# 10x 3       
pendulums          
tricep Cable pressdowns 3# x 10 
10# 2 x 10 Scapular retraction 7# x 10 
10# 2 x 10 Deltoid strengthening Forward raises 0# to 90 deg, 2 x 10 Scaption 0# 2 x 10  Forward raises 10x   Shoulder flexion with eccentric focus Place and hold 5x flexion 3x ABD   Shoulder flexion with eccentric focus Place and hold 10x flexion Bicep curls 6# 2 x 15 Prone hip flexion  1.5# + MR 
10x 2 with sustained holds as well Wall slides   2 diagonals 10x ea     With focus on eccentric control upon return 10x  
LAQ   indpendent Knee flexion   independent Hip ADD    5 secx x 10 Sitting sustained holds against MR Hip ADD stretch    Standing 30 sec x 2   Standing back against wall 
10 sec x 10 Sleeper stretch     10 sec x 10 Anterior hip stretch in chair       10 sec x10 HEP: pt to do updated HEP. Manual Therapy (0 minutes) not today Treatment/Session Assessment: · Response to Treatment:  Hip pain improved again with doing PA hip mobs to address stiffness in anterior hip capsule. Pt may need review of exercises to help stretch this. Instructed pt to ice greater trochanter because of pain at the bursa here along with doing his HEP. He verbally agreed. He is also doing other light weight exercises at gym for leg including bicycle. Instructed him not to overdo bicycle secondary to amount of pain. · Compliance with Program/Exercises: Appears compliant. · Recommendations/Intent for next treatment session: \"Next visit will focus on pain reduction and progression of exercises - especially shoulder exercises with respect to humeral head elevation and tear in RTC. L hamstring stretching and L hip anterior hip capsule mobilization. Need updated DASH and LEFS Total Treatment Duration: 40 Minutes PT Patient Time In/Time Out Time In: 5946 Time Out: 6333 Clinton Kwon, PT

## 2019-02-20 ENCOUNTER — APPOINTMENT (OUTPATIENT)
Dept: PHYSICAL THERAPY | Age: 54
End: 2019-02-20
Payer: COMMERCIAL

## 2019-02-21 ENCOUNTER — HOSPITAL ENCOUNTER (OUTPATIENT)
Dept: PHYSICAL THERAPY | Age: 54
Discharge: HOME | End: 2019-02-21
Payer: COMMERCIAL

## 2019-02-21 PROCEDURE — 97110 THERAPEUTIC EXERCISES: CPT

## 2019-02-21 PROCEDURE — 97140 MANUAL THERAPY 1/> REGIONS: CPT

## 2019-02-21 NOTE — PROGRESS NOTES
[employer] : 1965 Payor: Jorge L Credit / Plan: Rayshawn Argue / Product Type: [employer] /  [address] [employer]  at [address] [place] [address] [address] [facility], [address] [place] [address],[address], [facility] [address]. [phone]   [phone] OUTPATIENT PHYSICAL THERAPY:Daily Note 2019 ICD-10: Treatment Diagnosis: M25.611 R shoulder stiffness, M25.652 L hip stiffness, R26.2 Difficulty walking; M25.511 R shoulder pain, M25.552 L hip pain. Precautions/Allergies:  
Patient has no known allergies. Fall Risk Score: 1 (? 5 = High Risk) MD Orders: eval and treat, HEP, ROM, Strengthening: 
Full motion, full strength, all modalities. Pt started PT ton 18 and has completed 26 visits. MEDICAL/REFERRING DIAGNOSIS: 
S/P RT Shoulder I&D; LT Hip DATE OF ONSET: 18 REFERRING PHYSICIAN: Carlota Bonner, MD 
RETURN PHYSICIAN APPOINTMENT: unknown INITIAL ASSESSMENT:  Mr. Damari Smyth is approximately 3.5 months s/p L hip total hip revision and lavage and debridement of R shoulder after being diagnosed with infection to both areas. He had previous surgeries to L hip in 2017 and R shoulder in 2017. His shoulder AROM and strength is improving but not WNL and continue to limit is ability to do moderate level activities. His L LE progress has been slow. He has improved in hip strength and ROM in most directions but continues to be very weak with hip ER in prone (3/5) and this is likely causing some instability with gait that leads to pain in hip. 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 PROBLEM LIST (Impacting functional limitations): 1. Decreased Strength 2. Decreased ADL/Functional Activities 3. Post-op R shoulder and L hiop pain and swelling 4. Decreased R shoulder and L hip ROM 5. Weakness R shoulder and L hip 6. Difficulty with walking. INTERVENTIONS PLANNED: 
1. Modalities PRN, including ultrasound, estim, and iontophoresis 2. Soft tissue and joint mobilization for ROM and flexibility 3. Stretching, progressive resistive exercises and HEP for return to functional activities. 4. Back Education and Training for body mechanics with activities of daily living 5. If needed, aquatic therapy. TREATMENT PLAN: 
Effective Dates: 12/26/2018 TO 3/26/2019 (90 days). Frequency/Duration: 3 times a week for 3 weeks, then 2x per week for 3 weeks. Plan of care to expand to 90 days and will likely continue at 2 per week and then decrease to 1x per week or less as appropriate. GOALS: (Goals have been discussed and agreed upon with patient.) Short-Term Functional Goals: Time Frame: 4 weeks 1. Report no more than 3/10 intermittent pain to R shoulder with compensatory use during basic functional activities. MET 1/24/19 2. R shoulder PROM forward elevation greater than 150 degrees and external rotation greater than 60 degrees to progress into functional ranges. GOOD PROGRESSION 1/24/19 3. L hip AROM for abduction at least 20 degrees, extension at least 8 degrees for ease with functional activities (walking and car transfers). MET 1/24/19 4. Demonstrate good R shoulder and L hip isometric strength with manual testing to progress into strength phase. PROGRESSSED 1/24/18 5. Returns to normalized walking for house hold distances. GOOD PROGRESSSION 1/24/18 6. Independent with initial HEP. MET 1/24/19 Discharge Goals: Time Frame: 12 weeks. All LTG or ONGOING 1. Return to good strength with going up/down at least 2 flights of stairs. 2. No more than 2/10 intermittent pain R shoulder and L hip pain with return to normalized household and work activities. 3. R shoulder AROM forward elevation greater than 150 degrees, external rotation greater than 89 degrees, and strength to shoulder are grossly WNL's for safe use with normalized activities.   
4. Demonstrate good functional shoulder and hip strength and endurance for return to normalized household and work activities. 5. DASH score no greater than 15 and LEFS score at least 70/80 for return to full function. 6. Independent with advanced shoulder HEP for continued self-management. Rehabilitation Potential For Stated Goals: Good The information in this section was collected on 12/26/18 (except where otherwise noted). HISTORY:  
 
 Ambulatory/Rehab Services H2 Model Falls Risk Assessment Total: (5 or greater = High Risk): 2  
 ©2010 [facility] [address] [place] [address] Patent #[id number]. Federal Law prohibits the replication, distribution or use without written permission from [facility] [place] History of Present Injury/Illness (Reason for Referral):  Mr. Robin Jones is s/p L hip total hip revision and lavage and debridement of R shoulder after being diagnosed with infection to both areas. He had previous surgeries to L hip in Jan 2017 and R shoulder in mid 2017. He mostly c/o L hip pain post surgery and has not been moving arm too much. States that he was in excellent shape prior to this incident and has lost much muscle tone and weight. He also states that his energy level is very low and this is not his norm. He comes to outpatient therapy after undergoing several rounds of infusion to treat staff infection. Was told that he has retorn R RTC from previous surgery but MD chose not to repair at this time. Past Medical History/Comorbidities: Mr. Robin Jones  has a past medical history of Arthritis, Biceps muscle tear, Chronic pain, Insomnia, MRSA (methicillin resistant Staphylococcus aureus) infection (2007), Personal history of kidney stones, and Right shoulder pain. He also has no past medical history of Adverse effect of anesthesia, Difficult intubation, Malignant hyperthermia due to anesthesia, Nausea & vomiting, or Pseudocholinesterase deficiency.   Mr. Robin Jones  has a past surgical history that includes hx other surgical; hx septoplasty (11/15/2017); hx rotator cuff repair (Left, 2010); and hx orthopaedic (Left, 01/2017). Social History/Living Environment:   1 story- lives with spouse with tub/shower combo Prior Level of Function/Work/Activity: very active with workouts - cardio and weight lifting/ pt currently not working. He did not take any medications prior to this incident. Dominant Side:  
      RIGHT Previous Treatment Approaches:   
      Surgeries in 2017 for R shoulder RTC repair and L THR prior to this incident Current Medications:   
 Doxycycline, flexeril, oxycodone, lopressor, amlodipine, sleep aide, advil as needed Date Last Reviewed:  2/21/2019 EXAMINATION: 2/12/19 Observation/Orthostatic Postural Assessment:   
Walking:  Mild to moderate L gait antalgia, depending on day Palpation:  Point tender to L greater trochanter area. Adelfo Him ROM:cervical ROM = WFL for all planes  
  
PROM RIght shoulder  Left shoulder Flexion 140 Abduction 110 External rotation 65 Internal rotation 70 Horz ABD -   
  
 
HIP AROM Right hip  Left hip L hip on 2/12/19 Flexion  90 90 Extension  -5 0 Abduction  15 25 External rotation  30 55 Internal rotation  45 40 Strength: 2/12/19 Right shoulder  Left shoulder R shoulder 2/19/19 Forward elevation 3+ - 4- Abduction 3+ - 4- External rotation 4- 4- 4 Internal rotation 4+ 4 4+ Horz ABD - -   
  
HIP Right hip  Left hip at IE L hip on 2/12/19 Flexion - 3+ 4 Extension - 3+ 4 Abduction - 3+ 4- External rotation - 3+ 3 painful Internal rotation - - 4- Adduction   3 painful Special Tests: Tight anterior L hip capsule Flexibility: at IE- not assessed at progress report · Hamstring Length (in 90/90 position):R not assessed today L -50 degrees · Straight Leg Raise Test: negative R · Jorge Test (2-joint hip flexors): not assessed today · Prone Knee Bend: negative · Cj's Test:not assessed today · Gastrocnemius not assessed today Neurological Screen: Motor intact to R UE, L LE. Functional Mobility:  Walking : walks without cane with mild R gait antalgia. Sit to/from Stand: independent - sometimes uses hands. . Bed Mobility: independent with compensation. Independent with compensation with basic mobility. Independent with compensation with dressing and grooming ADL's. Balance:  Did well with transfers and able to walk a straight path Body Structures Involved: 1. Joints 2. Muscles Body Functions Affected: 1. Movement Related Activities and Participation Affected: 1. General Tasks and Demands 2. Mobility 3. Community, Social and [place] CLINICAL DECISION MAKING:  
Outcome Measure: Tool Used: Lower Extremity Functional Scale (LEFS) Score:  Initial: 7/80  15/80 (Date: 1/24/19) Most recent: 60/80 (2/21/19) Interpretation of Score: 20 questions each scored on a 5 point scale with 0 representing \"extreme difficulty or unable to perform\" and 4 representing \"no difficulty\". The lower the score, the greater the functional disability. 80/80 represents no disability. Minimal detectable change is 9 points. Score 80 79-63 62-48 47-32 31-16 15-1 0 Modifier CH CI CJ CK CL CM CN Tool Used: Disabilities of the Arm, Shoulder and Hand (DASH) Questionnaire - Quick Version Score:  Initial: 36/55  38 /55 (Date: 1/24/19 ) Most recent: 20/55 or 20% limited (2-21-19) Interpretation of Score: The DASH is designed to measure the activities of daily living in person's with upper extremity dysfunction or pain. Each section is scored on a 1-5 scale, 5 representing the greatest disability. The scores of each section are added together for a total score of 55. This number is divided by 11, followed by subtracting 1 and multiplying by 25 to get a percent score of disability.  This value represents the percentage disability: 0-20% minimal disability; 20-40% moderate disability; 40-60% severe disability; % dependent for care or exaggerated symptom behavior. Minimal detectable change is 12%. Score 11 12-19 20-28 29-37 38-45 46-54 55 Modifier CH CI CJ CK CL CM CN Medical Necessity:  
· Patient is expected to demonstrate progress in strength and range of motion to increase independence with walking, basic mobility and use of R UE. Jaswant Opoka Reason for Services/Other Comments: 
· Patient continues to require skilled intervention due to continued weakness and stiffness in R UE, L LE and difficulty with functional activities. Jaswant Opoka TREATMENT:  
(In addition to Assessment/Re-Assessment sessions the following treatments were rendered) Pre-treatment Symptoms/Complaints: Reports that he had nerve conduction test which revealed no issues. Returns to work on Monday. States that he has returned to the gym but that he is frustrated with how slowly he has progressed and that he is limping. Pain: Initial:    Patient did not report any pain Post Session:   No VAS afterward but states that the stretching helped Aquatic Exercise: (  0 min):  none today Aquatic Therapy Grid 2/11/19 Activity/Exercise 4# Walking forward 6 Walking backward 6 Walking sideways 6 High step march 6 March in place With noodle 20  
squat Hip 3 way kick-  4# 20x each Hip ABD/ADD Circles  20/20 Hip flexion with straight leg   
4.5' shoulder exercises with purple noode Hamstring stretch Deep water ex: bicycle, jumping janneth, cross country, core stability  Cross country 4 min 2 min all other Trunk ext Adductor stretch on step Therapeutic Exercises (15 minutes) to improve strength and function, decrease pain. . Passive ranging of R shoulder in PNF D1 and D2 flex/extension to improve R shoulder ROM in multiple planes. Patient educated to avoid overhead lifting due to R shoulder RTC tear and explained to patient how a deficient rotator cuff can lead to upward migration of humeral head and further stress soft tissues. Advised patient to perform 4:1 pulling to pushing at gym and to avoid overhead lifting due to existing R shoulder issues. Patient verbalized understanding. Land Therapy grid Date 
1-21-19 Date 1-24/19 1-31-19 2/5/19 2/7/19 2/12/19 2/15/19 2/19/19 2-21-19 Activity/Exercise AAROM R shoulder flex Pulleys 20 x flexion 20 x scaption LTR           
bridging         3 x 10, single leg bridge, L  
SLR Hip ER with band  Prone MR -- sustained holds 10x Gentle resist through range 5x Prone MR -- sustained holds to eshaustion 3x Side lye clam shell 10x 3 Side lye clam shell 10 secx x10 Prone MR - gentle 10x Prone MR With eccentric foSide lye clam shell 10x 3cus 10x  Prone MR With concentric and eccentric 10x ALso IR 5x Prone MR With concentric and eccentric 10x Seated Blue 3 x 10 Push up + Shoulder ER Side-lying 2# 2 x 10  Side-lying 2# 3 x 10  
2 sec hold Side-lying 1# 30x  3#10x3   Scaption 3# 30x 4# 30x Mid Trap Side-lying 2 x 10 Prone 0# 2 x 10 Prone low trap 
0# 2 x 8 Prone 1# 2 x 10 Prone EXT 
2# 2x 10 Prone 1# 3 x 10 Prone EXT 
2# 3x 10  PRONE 
2# 10x 3# 10x3 PRONE EXT 3x10   PRONE 
4#  20x 5# 20x Low Trap 0#20x2 UBE - -- L3 6 min UE/LE 
 L3 10 min UE/LE L3.5 
5 min UE/LE L3 
6 min UE/LE   L3 6 min UE/LE Prone row and extension  6# prone row 2 x 10 Stand bent over 6# 3 x 10 Prone 6# 10x 3        
pendulums           
tricep Cable pressdowns 3# x 10 
10# 2 x 10 Scapular retraction 7# x 10 
10# 2 x 10 Deltoid strengthening Forward raises 0# to 90 deg, 2 x 10 Scaption 0# 2 x 10  Forward raises 10x   Shoulder flexion with eccentric focus Place and hold 5x flexion 3x ABD   Shoulder flexion with eccentric focus Place and hold 10x flexion Push ups 3 x 10 to elevated mat table Bicep curls 6# 2 x 15 Prone hip flexion  1.5# + MR 
10x 2 with sustained holds as well Wall slides   2 diagonals 10x ea     With focus on eccentric control upon return 10x   
LAQ   indpendent Knee flexion   independent Hip ADD    5 secx x 10 Sitting sustained holds against MR Hip ADD stretch    Standing 30 sec x 2   Standing back against wall 
10 sec x 10 Sleeper stretch     10 sec x 10 Anterior hip stretch in chair       10 sec x10 Manual Therapy (25 minutes) to reduce L hip pain and R shoulder to reduce discomfort and improve ROM. Grade 3-4 physio mobilizations to R shoulder for flexion, abduction, IR and ER. Grade 3-4 posterior-anterior mobilizations to L hip to reduce discomfort. Hip flexor stretch in modified Jorge Test position (3 x 20\") to improve L hip flexor flexibility. Treatment/Session Assessment:   
· Response to Treatment:  Did well with P-A mobilizations to L hip. Tightness in L hip flexors, but reported reduced discomfort following stretches. Did well with elevated push ups with no reports of pain but advised to perform with elbows close to body. · Compliance with Program/Exercises: Appears compliant. · Recommendations/Intent for next treatment session: \"Next visit will focus on pain reduction and progression of exercises - especially shoulder exercises with respect to humeral head elevation and tear in RTC. L hamstring stretching and L hip anterior hip capsule mobilization. Total Treatment Duration: 40 Minutes PT Patient Time In/Time Out Time In: 0930 Time Out: 1030 Narendra Vela, PT

## 2019-02-28 ENCOUNTER — HOSPITAL ENCOUNTER (OUTPATIENT)
Dept: PHYSICAL THERAPY | Age: 54
Discharge: HOME | End: 2019-02-28
Payer: COMMERCIAL

## 2019-02-28 PROCEDURE — 97140 MANUAL THERAPY 1/> REGIONS: CPT

## 2019-02-28 PROCEDURE — 97110 THERAPEUTIC EXERCISES: CPT

## 2019-02-28 NOTE — PROGRESS NOTES
Severa Lacer : 1965 Payor: Grabiel Gardner / Plan: 19 Alicia Marin / Product Type: Commerical /  [id number]  at [facility] GEORGE BUTLER [address] [facility], [address] [place] [address],[address], [facility] [address] [phone]   [phone] OUTPATIENT PHYSICAL THERAPY:Daily Note 2019 ICD-10: Treatment Diagnosis: M25.611 R shoulder stiffness, M25.652 L hip stiffness, R26.2 Difficulty walking; M25.511 R shoulder pain, M25.552 L hip pain. Precautions/Allergies:  
Patient has no known allergies. Fall Risk Score: 1 (? 5 = High Risk) MD Orders: eval and treat, HEP, ROM, Strengthening: 
Full motion, full strength, all modalities. Pt started PT ton 18 and has completed 26 visits. MEDICAL/REFERRING DIAGNOSIS: 
S/P RT Shoulder I&D; LT Hip DATE OF ONSET: 18 REFERRING PHYSICIAN: Roman Bonner MD 
RETURN PHYSICIAN APPOINTMENT: unknown INITIAL ASSESSMENT:  Mr. Karlene Vaughn is approximately 3.5 months s/p L hip total hip revision and lavage and debridement of R shoulder after being diagnosed with infection to both areas. He had previous surgeries to L hip in 2017 and R shoulder in mid . His shoulder AROM and strength is improving but not WNL and continue to limit is ability to do moderate level activities. His L LE progress has been slow. He has improved in hip strength and ROM in most directions but continues to be very weak with hip ER in prone (3/5) and this is likely causing some instability with gait that leads to pain in hip. 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 PROBLEM LIST (Impacting functional limitations): 1. Decreased Strength 2. Decreased ADL/Functional Activities 3. Post-op R shoulder and L hiop pain and swelling 4. Decreased R shoulder and L hip ROM 5. Weakness R shoulder and L hip 6. Difficulty with walking. INTERVENTIONS PLANNED: 
1. Modalities PRN, including ultrasound, estim, and iontophoresis 2. Soft tissue and joint mobilization for ROM and flexibility 3. Stretching, progressive resistive exercises and HEP for return to functional activities. 4. Back Education and Training for body mechanics with activities of daily living 5. If needed, aquatic therapy. TREATMENT PLAN: 
Effective Dates: 12/26/2018 TO 3/26/2019 (90 days). Frequency/Duration: 3 times a week for 3 weeks, then 2x per week for 3 weeks. Plan of care to expand to 90 days and will likely continue at 2 per week and then decrease to 1x per week or less as appropriate. GOALS: (Goals have been discussed and agreed upon with patient.) Short-Term Functional Goals: Time Frame: 4 weeks 1. Report no more than 3/10 intermittent pain to R shoulder with compensatory use during basic functional activities. MET 1/24/19 2. R shoulder PROM forward elevation greater than 150 degrees and external rotation greater than 60 degrees to progress into functional ranges. GOOD PROGRESSION 1/24/19 3. L hip AROM for abduction at least 20 degrees, extension at least 8 degrees for ease with functional activities (walking and car transfers). MET 1/24/19 4. Demonstrate good R shoulder and L hip isometric strength with manual testing to progress into strength phase. PROGRESSSED 1/24/18 5. Returns to normalized walking for house hold distances. GOOD PROGRESSSION 1/24/18 6. Independent with initial HEP. MET 1/24/19 Discharge Goals: Time Frame: 12 weeks. All LTG or ONGOING 1. Return to good strength with going up/down at least 2 flights of stairs. 2. No more than 2/10 intermittent pain R shoulder and L hip pain with return to normalized household and work activities. 3. R shoulder AROM forward elevation greater than 150 degrees, external rotation greater than 89 degrees, and strength to shoulder are grossly WNL's for safe use with normalized activities.   
4. Demonstrate good functional shoulder and hip strength and endurance for return to normalized household and work activities. 5. DASH score no greater than 15 and LEFS score at least 70/80 for return to full function. 6. Independent with advanced shoulder HEP for continued self-management. Rehabilitation Potential For Stated Goals: Good The information in this section was collected on 12/26/18 (except where otherwise noted). HISTORY:  
 
 Ambulatory/Rehab Services H2 Model Falls Risk Assessment Total: (5 or greater = High Risk): 2  
 ©2010 [facility] [address] [place] [address] Patent #[id number]. Federal Law prohibits the replication, distribution or use without written permission from [facility] History of Present Injury/Illness (Reason for Referral):  Mr. Divya Bynum is s/p L hip total hip revision and lavage and debridement of R shoulder after being diagnosed with infection to both areas. He had previous surgeries to L hip in Jan 2017 and R shoulder in mid 2017. He mostly c/o L hip pain post surgery and has not been moving arm too much. States that he was in excellent shape prior to this incident and has lost much muscle tone and weight. He also states that his energy level is very low and this is not his norm. He comes to outpatient therapy after undergoing several rounds of infusion to treat staff infection. Was told that he has retorn R RTC from previous surgery but MD chose not to repair at this time. Past Medical History/Comorbidities: Mr. Divya Bynum  has a past medical history of Arthritis, Biceps muscle tear, Chronic pain, Insomnia, MRSA (methicillin resistant Staphylococcus aureus) infection (2007), Personal history of kidney stones, and Right shoulder pain. He also has no past medical history of Adverse effect of anesthesia, Difficult intubation, Malignant hyperthermia due to anesthesia, Nausea & vomiting, or Pseudocholinesterase deficiency.   Mr. Divya Bynum  has a past surgical history that includes hx other surgical; hx septoplasty (11/15/2017); hx rotator cuff repair (Left, 2010); and hx orthopaedic (Left, 01/2017). Social History/Living Environment:   1 story- lives with spouse with tub/shower combo Prior Level of Function/Work/Activity: very active with workouts - cardio and weight lifting/ pt currently not working. He did not take any medications prior to this incident. Dominant Side:  
      RIGHT Previous Treatment Approaches:   
      Surgeries in 2017 for R shoulder RTC repair and L THR prior to this incident Current Medications:   
 Doxycycline, flexeril, oxycodone, lopressor, amlodipine, sleep aide, advil as needed Date Last Reviewed:  2/28/2019 EXAMINATION: 2/12/19 Observation/Orthostatic Postural Assessment:   
Walking:  Mild to moderate L gait antalgia, depending on day Palpation:  Point tender to L greater trochanter area. Joe Godinez ROM:cervical ROM = WFL for all planes  
  
PROM RIght shoulder  Left shoulder Flexion 140 Abduction 110 External rotation 65 Internal rotation 70 Horz ABD -   
  
 
HIP AROM Right hip  Left hip L hip on 2/12/19 Flexion  90 90 Extension  -5 0 Abduction  15 25 External rotation  30 55 Internal rotation  45 40 Strength: 2/12/19 Right shoulder  Left shoulder R shoulder 2/19/19 Forward elevation 3+ - 4- Abduction 3+ - 4- External rotation 4- 4- 4 Internal rotation 4+ 4 4+ Horz ABD - -   
  
HIP Right hip  Left hip at IE L hip on 2/12/19 Flexion - 3+ 4 Extension - 3+ 4 Abduction - 3+ 4- External rotation - 3+ 3 painful Internal rotation - - 4- Adduction   3 painful Special Tests: Tight anterior L hip capsule Flexibility: at IE- not assessed at progress report · Hamstring Length (in 90/90 position):R not assessed today L -50 degrees · Straight Leg Raise Test: negative R · Jorge Test (2-joint hip flexors): not assessed today · Prone Knee Bend: negative · Cj's Test:not assessed today · Gastrocnemius not assessed today Neurological Screen: Motor intact to R UE, L LE. Functional Mobility:  Walking : walks without cane with mild R gait antalgia. Sit to/from Stand: independent - sometimes uses hands. . Bed Mobility: independent with compensation. Independent with compensation with basic mobility. Independent with compensation with dressing and grooming ADL's. Balance:  Did well with transfers and able to walk a straight path Body Structures Involved: 1. Joints 2. Muscles Body Functions Affected: 1. Movement Related Activities and Participation Affected: 1. General Tasks and Demands 2. Mobility 3. Community, Social and [place] CLINICAL DECISION MAKING:  
Outcome Measure: Tool Used: Lower Extremity Functional Scale (LEFS) Score:  Initial: 7/80  15/80 (Date: 1/24/19) Most recent: 60/80 (2/21/19) Interpretation of Score: 20 questions each scored on a 5 point scale with 0 representing \"extreme difficulty or unable to perform\" and 4 representing \"no difficulty\". The lower the score, the greater the functional disability. 80/80 represents no disability. Minimal detectable change is 9 points. Score 80 79-63 62-48 47-32 31-16 15-1 0 Modifier CH CI CJ CK CL CM CN Tool Used: Disabilities of the Arm, Shoulder and Hand (DASH) Questionnaire - Quick Version Score:  Initial: 36/55  38 /55 (Date: 1/24/19 ) Most recent: 20/55 or 20% limited (2-21-19) Interpretation of Score: The DASH is designed to measure the activities of daily living in person's with upper extremity dysfunction or pain. Each section is scored on a 1-5 scale, 5 representing the greatest disability. The scores of each section are added together for a total score of 55. This number is divided by 11, followed by subtracting 1 and multiplying by 25 to get a percent score of disability.  This value represents the percentage disability: 0-20% minimal disability; 20-40% moderate disability; 40-60% severe disability; % dependent for care or exaggerated symptom behavior. Minimal detectable change is 12%. Score 11 12-19 20-28 29-37 38-45 46-54 55 Modifier CH CI CJ CK CL CM CN Medical Necessity:  
· Patient is expected to demonstrate progress in strength and range of motion to increase independence with walking, basic mobility and use of R UE. Clark Ramsey Reason for Services/Other Comments: 
· Patient continues to require skilled intervention due to continued weakness and stiffness in R UE, L LE and difficulty with functional activities. Clark Ramsey TREATMENT:  
(In addition to Assessment/Re-Assessment sessions the following treatments were rendered) Pre-treatment Symptoms/Complaints: Pt has returned to work. Has had some L hip soreness, but has been able to go to the gym and reduce this discomfort. Did one hour on the elliptical this AM and reports that his L hip was sore initially but felt better during. Pain: Initial:    Patient did not report any pain Post Session:   No VAS afterward but states that the stretching helped Aquatic Exercise: (  0 min):  none today Aquatic Therapy Grid 2/11/19 Activity/Exercise 4# Walking forward 6 Walking backward 6 Walking sideways 6 High step march 6 March in place With noodle 20  
squat Hip 3 way kick-  4# 20x each Hip ABD/ADD Circles  20/20 Hip flexion with straight leg   
4.5' shoulder exercises with purple noode Hamstring stretch Deep water ex: bicycle, jumping janneth, cross country, core stability  Cross country 4 min 2 min all other Trunk ext Adductor stretch on step Therapeutic Exercises (15 minutes) to improve strength and function, decrease pain. Rhtyhmic stabilization performed at 90 degrees and 100 degrees R shoulder flexion in supine with cues for scapular depression. Land Therapy grid Date 
1-21-19 Date 1-24/19 1-31-19 2/5/19 2/7/19 2/12/19 2/15/19 2/19/19 2-21-19 2-28-19 Activity/Exercise AAROM R shoulder flex Pulleys 20 x flexion 20 x scaption LTR            
bridging         3 x 10, single leg bridge, L 3 x 10 unilateral bridge, L  
SLR Hip ER with band  Prone MR -- sustained holds 10x Gentle resist through range 5x Prone MR -- sustained holds to eshaustion 3x Side lye clam shell 10x 3 Side lye clam shell 10 secx x10 Prone MR - gentle 10x Prone MR With eccentric foSide lye clam shell 10x 3cus 10x  Prone MR With concentric and eccentric 10x ALso IR 5x Prone MR With concentric and eccentric 10x Seated Blue 3 x 10 Clamshells 3 x 10 R Push up + Shoulder ER Side-lying 2# 2 x 10  Side-lying 2# 3 x 10  
2 sec hold Side-lying 1# 30x  3#10x3   Scaption 3# 30x 4# 30x Mid Trap Side-lying 2 x 10 Prone 0# 2 x 10 Prone low trap 
0# 2 x 8 Prone 1# 2 x 10 Prone EXT 
2# 2x 10 Prone 1# 3 x 10 Prone EXT 
2# 3x 10  PRONE 
2# 10x 3# 10x3 PRONE EXT 3x10   PRONE 
4#  20x 5# 20x Low Trap 0#20x2 Prone 3# 3 x 10 
 
2 x 8 with isometric hold and slow eccentric UBE - -- L3 6 min UE/LE 
 L3 10 min UE/LE L3.5 
5 min UE/LE L3 
6 min UE/LE   L3 6 min UE/LE Prone row and extension  6# prone row 2 x 10 Stand bent over 6# 3 x 10 Prone 6# 10x 3         
pendulums            
tricep Cable pressdowns 3# x 10 
10# 2 x 10 Scapular retraction 7# x 10 
10# 2 x 10 Deltoid strengthening Forward raises 0# to 90 deg, 2 x 10 Scaption 0# 2 x 10  Forward raises 10x   Shoulder flexion with eccentric focus Place and hold 5x flexion 3x ABD   Shoulder flexion with eccentric focus Place and hold 10x flexion Push ups 3 x 10 to elevated mat table Bicep curls 6# 2 x 15 Prone hip flexion  1.5# + MR 
10x 2 with sustained holds as well Wall slides   2 diagonals 10x ea     With focus on eccentric control upon return 10x    
LAQ   indpendent Knee flexion   independent Hip ADD    5 secx x 10 Sitting sustained holds against MR Hip ADD stretch    Standing 30 sec x 2   Standing back against wall 
10 sec x 10 Sleeper stretch     10 sec x 10 Anterior hip stretch in chair       10 sec x10 Manual Therapy (25 minutes) to reduce L hip pain and R shoulder to reduce discomfort and improve ROM. Grade 3-4 physio mobilizations to R shoulder for flexion, abduction, IR and ER. Grade 3-4 posterior-anterior mobilizations to L hip to reduce discomfort. Hip flexor stretch in modified Jorge Test position (3 x 20\") to improve L hip flexor flexibility. Treatment/Session Assessment:   
· Response to Treatment:  Reports benefit from stretching and mobilizations to R shoulder and L hip, and ambulated with much improved gait following PT today, as patient was noticeably limping upon arrival to PT. Shoulder motion has improved, but cautioned patient that most all overhead activites will likely be uncomfortable due to existing RTC tear. · Compliance with Program/Exercises: Appears compliant. · Recommendations/Intent for next treatment session: \"Next visit will focus on pain reduction and progression of exercises - especially shoulder exercises with respect to humeral head elevation and tear in RTC. L hamstring stretching and L hip anterior hip capsule mobilization. Total Treatment Duration: 40 Minutes PT Patient Time In/Time Out Time In: 1303 Time Out: 1350 Fred Wolff PT

## 2019-03-13 ENCOUNTER — HOSPITAL ENCOUNTER (OUTPATIENT)
Dept: PHYSICAL THERAPY | Age: 54
Discharge: HOME | End: 2019-03-13
Payer: COMMERCIAL

## 2019-03-13 PROCEDURE — 97140 MANUAL THERAPY 1/> REGIONS: CPT

## 2019-03-13 PROCEDURE — 97110 THERAPEUTIC EXERCISES: CPT

## 2019-03-13 NOTE — PROGRESS NOTES
Robson Carreon  : 1965  Payor: Maria Del Carmen Ped / Plan: [id number] RPN / Product Type: Commerical /  [address] at [facility] GEORGE BUTLER  [address], [address], [place] [address].  [phone]   [phone]       OUTPATIENT PHYSICAL THERAPY:Daily Note 3/13/2019     ICD-10: Treatment Diagnosis: M89.258 R shoulder stiffness, M25.652 L hip stiffness, R26.2 Difficulty walking; M25.511 R shoulder pain, M25.552 L hip pain. Precautions/Allergies:   Patient has no known allergies. Fall Risk Score: 1 (? 5 = High Risk)  MD Orders: eval and treat, HEP, ROM, Strengthening:  Full motion, full strength, all modalities. Pt started PT ton 18 and has completed 26 visits. MEDICAL/REFERRING DIAGNOSIS:  S/P RT Shoulder I&D; LT Hip    DATE OF ONSET: 18  REFERRING PHYSICIAN: Gilmer Millard MD  RETURN PHYSICIAN APPOINTMENT: unknown     INITIAL ASSESSMENT:  Mr. Wilson Robison is approximately 3.5 months s/p L hip total hip revision and lavage and debridement of R shoulder after being diagnosed with infection to both areas. He had previous surgeries to L hip in 2017 and R shoulder in 2017. His shoulder AROM and strength is improving but not WNL and continue to limit is ability to do moderate level activities. His L LE progress has been slow. He has improved in hip strength and ROM in most directions but continues to be very weak with hip ER in prone (3/5) and this is likely causing some instability with gait that leads to pain in hip. 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 PROBLEM LIST (Impacting functional limitations):  1. Decreased Strength  2. Decreased ADL/Functional Activities  3. Post-op R shoulder and L hiop pain and swelling  4. Decreased R shoulder and L hip ROM   5. Weakness R shoulder and L hip  6. Difficulty with walking. INTERVENTIONS PLANNED:  1. Modalities PRN, including ultrasound, estim, and iontophoresis  2.  Soft tissue and joint mobilization for ROM and flexibility  3. Stretching, progressive resistive exercises and HEP for return to functional activities. 4. Back Education and Training for body mechanics with activities of daily living  5. If needed, aquatic therapy. TREATMENT PLAN:  Effective Dates: 12/26/2018 TO 3/26/2019 (90 days). Frequency/Duration: 3 times a week for 3 weeks, then 2x per week for 3 weeks. Plan of care to expand to 90 days and will likely continue at 2 per week and then decrease to 1x per week or less as appropriate. GOALS: (Goals have been discussed and agreed upon with patient.)  Short-Term Functional Goals: Time Frame: 4 weeks  1. Report no more than 3/10 intermittent pain to R shoulder with compensatory use during basic functional activities. MET 1/24/19  2. R shoulder PROM forward elevation greater than 150 degrees and external rotation greater than 60 degrees to progress into functional ranges. GOOD PROGRESSION 1/24/19  3. L hip AROM for abduction at least 20 degrees, extension at least 8 degrees for ease with functional activities (walking and car transfers). MET 1/24/19  4. Demonstrate good R shoulder and L hip isometric strength with manual testing to progress into strength phase. PROGRESSSED 1/24/18  5. Returns to normalized walking for house hold distances. GOOD PROGRESSSION 1/24/18    6. Independent with initial HEP. MET 1/24/19  Discharge Goals: Time Frame: 12 weeks. All LTG or ONGOING  1. Return to good strength with going up/down at least 2 flights of stairs. 2. No more than 2/10 intermittent pain R shoulder and L hip pain with return to normalized household and work activities. 3. R shoulder AROM forward elevation greater than 150 degrees, external rotation greater than 89 degrees, and strength to shoulder are grossly WNL's for safe use with normalized activities.    4. Demonstrate good functional shoulder and hip strength and endurance for return to normalized household and work activities. 5. DASH score no greater than 15 and LEFS score at least 70/80 for return to full function. 6. Independent with advanced shoulder HEP for continued self-management. Rehabilitation Potential For Stated Goals: Good              The information in this section was collected on 12/26/18 (except where otherwise noted). HISTORY:      Ambulatory/Rehab Services H2 Model Falls Risk Assessment   Total: (5 or greater = High Risk): 2    ©2010 [facility] [employer]. All Rights Reserved. [facility] States Patent #[id number]. Federal Law prohibits the replication, distribution or use without written permission from [facility] [employer]       History of Present Injury/Illness (Reason for Referral):  Mr. Cristal Dumont is s/p L hip total hip revision and lavage and debridement of R shoulder after being diagnosed with infection to both areas. He had previous surgeries to L hip in Jan 2017 and R shoulder in mid 2017. He mostly c/o L hip pain post surgery and has not been moving arm too much. States that he was in excellent shape prior to this incident and has lost much muscle tone and weight. He also states that his energy level is very low and this is not his norm. He comes to outpatient therapy after undergoing several rounds of infusion to treat staff infection. Was told that he has retorn R RTC from previous surgery but MD chose not to repair at this time. Past Medical History/Comorbidities: Mr. Cristal Dumont  has a past medical history of Arthritis, Biceps muscle tear, Chronic pain, Insomnia, MRSA (methicillin resistant Staphylococcus aureus) infection (2007), Personal history of kidney stones, and Right shoulder pain. He also has no past medical history of Adverse effect of anesthesia, Difficult intubation, Malignant hyperthermia due to anesthesia, Nausea & vomiting, or Pseudocholinesterase deficiency.   Mr. Cristal Dumont  has a past surgical history that includes hx other surgical; hx septoplasty (11/15/2017); hx rotator cuff repair (Left, 2010); and hx orthopaedic (Left, 01/2017). Social History/Living Environment:   1 story- lives with spouse with tub/shower combo  Prior Level of Function/Work/Activity: very active with workouts - cardio and weight lifting/ pt currently not working. He did not take any medications prior to this incident. Dominant Side:         RIGHT  Previous Treatment Approaches:          Surgeries in 2017 for R shoulder RTC repair and L THR prior to this incident  Current Medications:     Doxycycline, flexeril, oxycodone, lopressor, amlodipine, sleep aide, advil as needed   Date Last Reviewed:  3/13/2019   EXAMINATION: 2/12/19     Observation/Orthostatic Postural Assessment:    Walking:  Mild to moderate L gait antalgia, depending on day  Palpation:  Point tender to L greater trochanter area. .  ROM:cervical ROM = WFL for all planes      PROM RIght shoulder  Left shoulder   Flexion 140    Abduction 110    External rotation 65    Internal rotation 70    Horz ABD -         HIP AROM Right hip  Left hip L hip on 2/12/19   Flexion  90 90   Extension  -5 0   Abduction  15 25   External rotation  30 55   Internal rotation  45 40              Strength: 2/12/19   Right shoulder  Left shoulder R shoulder 2/19/19   Forward elevation 3+ - 4-   Abduction 3+ - 4-   External rotation 4- 4- 4   Internal rotation 4+ 4 4+   Horz ABD - -       HIP Right hip  Left hip at IE L hip on 2/12/19   Flexion - 3+ 4   Extension - 3+ 4   Abduction - 3+ 4-   External rotation - 3+ 3 painful   Internal rotation - - 4-   Adduction   3 painful                      Special Tests: Tight anterior L hip capsule    Flexibility: at IE- not assessed at progress report  · Hamstring Length (in 90/90 position):R not assessed today L -50 degrees  · Straight Leg Raise Test: negative R  · Jorge Test (2-joint hip flexors): not assessed today  · Prone Knee Bend: negative  · Cj's Test:not assessed today  · Gastrocnemius not assessed today  Neurological Screen: Motor intact to R UE, L LE. Functional Mobility:  Walking : walks without cane with mild R gait antalgia. Sit to/from Stand: independent - sometimes uses hands. . Bed Mobility: independent with compensation. Independent with compensation with basic mobility. Independent with compensation with dressing and grooming ADL's. Balance:  Did well with transfers and able to walk a straight path        Body Structures Involved:  1. Joints  2. Muscles Body Functions Affected:  1. Movement Related Activities and Participation Affected:  1. General Tasks and Demands  2. Mobility  3. Community, Social and Civic Life   CLINICAL DECISION MAKING:   Outcome Measure: Tool Used: Lower Extremity Functional Scale (LEFS)  Score:  Initial: 7/80  15/80 (Date: 1/24/19) Most recent: 60/80 (2/21/19)   Interpretation of Score: 20 questions each scored on a 5 point scale with 0 representing \"extreme difficulty or unable to perform\" and 4 representing \"no difficulty\". The lower the score, the greater the functional disability. 80/80 represents no disability. Minimal detectable change is 9 points. Score 80 79-63 62-48 47-32 31-16 15-1 0   Modifier CH CI CJ CK CL CM CN       Tool Used: Disabilities of the Arm, Shoulder and Hand (DASH) Questionnaire - Quick Version  Score:  Initial: 36/55  38 /55 (Date: 1/24/19 )   Most recent: 20/55 or 20% limited (2-21-19)   Interpretation of Score: The DASH is designed to measure the activities of daily living in person's with upper extremity dysfunction or pain. Each section is scored on a 1-5 scale, 5 representing the greatest disability. The scores of each section are added together for a total score of 55. This number is divided by 11, followed by subtracting 1 and multiplying by 25 to get a percent score of disability.  This value represents the percentage disability: 0-20% minimal disability; 20-40% moderate disability; 40-60% severe disability; % dependent for care or exaggerated symptom behavior. Minimal detectable change is 12%. Score 11 12-19 20-28 29-37 38-45 46-54 55   Modifier CH CI CJ CK CL CM CN       Medical Necessity:   · Patient is expected to demonstrate progress in strength and range of motion to increase independence with walking, basic mobility and use of R UE. .  Reason for Services/Other Comments:  · Patient continues to require skilled intervention due to continued weakness and stiffness in R UE, L LE and difficulty with functional activities. .            TREATMENT:   (In addition to Assessment/Re-Assessment sessions the following treatments were rendered)  Pre-treatment Symptoms/Complaints: Pt reports that his leg is sore when he gets home from work. Has been going to the gym but reports feeling discouraged as he is unable to achieve the same lifting results he was able to prior to hospitalization. Patient reports that his L leg is just so weak, but he is able to do long duration aerobic exercises at gym. Pain: Initial:    Patient did not report any pain Post Session:   No VAS afterward but states that the stretching helped     Aquatic Exercise: (  0 min):  none today  Aquatic Therapy Grid 2/11/19   Activity/Exercise 4#   Walking forward 6   Walking backward 6   Walking sideways 6   High step march 6 March in place With noodle 20   squat    Hip 3 way kick-  4# 20x each   Hip ABD/ADD    Circles  20/20   Hip flexion with straight leg    4.5' shoulder exercises with purple noode    Hamstring stretch    Deep water ex: bicycle, jumping janneth, cross country, core stability  Cross country 4 min 2 min all other   Trunk ext    Adductor stretch on step          Therapeutic Exercises (40 minutes) to improve strength and function, decrease pain.   Patient received verbal cues and instruction on performance of exercises both in clinic and with his own gym program. Patient advised to focus on strengthening his back to improve R shoulder symptoms as patient continues to be limited with pressing movements when at the gym. Land Therapy grid 3-13-19    Activity/Exercise     Shuttle press Unilateral, left    75# 1 x 10  100# 2 x 10  125# 2 x 10    Standing cable extensions 7# 3 x 12    Bridging 3 x 10 unilateral bridge, L    External rotations Side-lying  4# 1 x 12 R  5# 2 x 12 R    90/90 prone  2 x 10 with manual overpressure    One-arm row 30# 3 x 12 R    AAROM Supine wand ER, x 20     Step ups 8\" 3 x 10  L    Knee extensions 15# unilateral, L  3 x 10                                                                                    Manual Therapy (20 minutes) to reduce L hip pain and R shoulder to reduce discomfort and improve ROM. Grade 3-4 physio mobilizations to R shoulder for flexion, abduction, IR and ER. Grade 3-4 posterior-anterior mobilizations to L hip to reduce discomfort. Hip flexor stretch in modified Jorge Test position (3 x 20\") to improve L hip flexor flexibility, prone quadriceps stretches (3 x 20\") to improve L quadriceps flexibility to reduce L LE leg pain, and L hamstring stretches (3 x 20\") to improve L LE flexibility. HEP: Provided patient with link to website regarding weightlifting techniques for individuals with shoulder issues in order to improve Mr Dania Gillespie gym workouts without aggravating existing conditions. Patient verbalized understanding. Treatment/Session Assessment:    · Response to Treatment:  Patient exhibited reduced L LE strength but no pain with exercises today. Fatigues quickly with L LE strengthening. Patient verbalized understanding as to importance of strengthening scapular stabilizers to reduce pain in R shoulder. · Compliance with Program/Exercises: Appears compliant. · Recommendations/Intent for next treatment session: \"Next visit will focus on pain reduction and progression of exercises - especially shoulder exercises with respect to humeral head elevation and tear in RTC.    L hamstring stretching and L hip anterior hip capsule mobilization.    Total Treatment Duration: 60 Minutes  PT Patient Time In/Time Out  Time In: 1030  Time Out: 1200 [facility] Socorro Decker, PT

## 2019-03-21 ENCOUNTER — HOSPITAL ENCOUNTER (OUTPATIENT)
Dept: PHYSICAL THERAPY | Age: 54
Discharge: HOME | End: 2019-03-21
Payer: COMMERCIAL

## 2019-03-21 PROCEDURE — 97161 PT EVAL LOW COMPLEX 20 MIN: CPT

## 2019-03-21 PROCEDURE — 97140 MANUAL THERAPY 1/> REGIONS: CPT

## 2019-03-21 PROCEDURE — 97110 THERAPEUTIC EXERCISES: CPT

## 2019-03-21 NOTE — PROGRESS NOTES
Mary Kelley : 1965 Payor: Jammie Brizuela / Plan: 19 Alicia Marin / Product Type: Commerical /  [address]  at [facility] GEORGE BUTLER [address] [facility], [address] [place] [address],[address], [facility] [address]. [phone]   [phone] OUTPATIENT PHYSICAL THERAPY:Daily Note and Progress Report 3/21/2019 ICD-10: Treatment Diagnosis: M25.611 R shoulder stiffness, M25.652 L hip stiffness, R26.2 Difficulty walking; M25.511 R shoulder pain, M25.552 L hip pain. Precautions/Allergies:  
Patient has no known allergies. Fall Risk Score: 1 (? 5 = High Risk) MD Orders: eval and treat, HEP, ROM, Strengthening: 
Full motion, full strength, all modalities. MEDICAL/REFERRING DIAGNOSIS: 
S/P RT Shoulder I&D; LT Hip DATE OF ONSET: 18 REFERRING PHYSICIAN: Janneth Bonner MD 
RETURN PHYSICIAN APPOINTMENT: Next month PROGRESS ASSESSMENT (3/21/19):  Mr. Camille Padron is approximately 4 months s/p L hip total hip revision and lavage and debridement of R shoulder after being diagnosed with infection to both areas. He had previous surgeries to L hip in 2017 and R shoulder in 2017. Per chart review of patient's R shoulder operative note, he has a 3.5 cm tear of both the supraspinatus and infrapsinatus, which was noted while I&D was performed to R shoulder. Patient has made progress with R shoulder and R hip range of motion, and has made some progress with hip strength, but remains weak with L hip external rotation. Patient was very physical active prior to I & D surgeries, and is unable to perform activities similarly to his prior level of function. Patient will likely benefit from continued PT to continue to improve his mobility, as patient ambulates with antalgic gait pattern, and optimize R shoulder function. PROBLEM LIST (Impacting functional limitations): 1. Decreased Strength 2. Decreased ADL/Functional Activities 3. Post-op R shoulder and L hiop pain and swelling 4. Decreased R shoulder and L hip ROM 5. Weakness R shoulder and L hip 6. Difficulty with walking. INTERVENTIONS PLANNED: 
1. Modalities PRN, including ultrasound, estim, and iontophoresis 2. Soft tissue and joint mobilization for ROM and flexibility 3. Stretching, progressive resistive exercises and HEP for return to functional activities. 4. Back Education and Training for body mechanics with activities of daily living 5. If needed, aquatic therapy. TREATMENT PLAN: 
Effective Dates: 12/26/2018 TO 3/26/2019 (90 days). Frequency/Duration: 1-2 visits per week for 8 weeks secondary to patient's work schedule. GOALS: (Goals have been discussed and agreed upon with patient.) Short-Term Functional Goals: Time Frame: 4 weeks 1. Report no more than 3/10 intermittent pain to R shoulder with compensatory use during basic functional activities. MET 1/24/19 2. R shoulder PROM forward elevation greater than 150 degrees and external rotation greater than 60 degrees to progress into functional ranges. MET 3-21-19 3. L hip AROM for abduction at least 20 degrees, extension at least 8 degrees for ease with functional activities (walking and car transfers). MET 1/24/19 4. Demonstrate good R shoulder and L hip isometric strength with manual testing to progress into strength phase. Ongoing, progressing 3-21-19 5. Returns to normalized walking for house hold distances. Progressing, but inconsistent. After prolonged inactivity patient ambulates with more pronounced antalgic gait 3-21-19 
6. Independent with initial HEP. MET 1/24/19 Discharge Goals: Time Frame: 12 weeks. All LTG or ONGOING 1. Return to good strength with going up/down at least 2 flights of stairs. 2. No more than 2/10 intermittent pain R shoulder and L hip pain with return to normalized household and work activities.  
3. R shoulder AROM forward elevation greater than 150 degrees, external rotation greater than 89 degrees, and strength to shoulder are grossly WNL's for safe use with normalized activities. 4. Demonstrate good functional shoulder and hip strength and endurance for return to normalized household and work activities. 5. DASH score no greater than 15 and LEFS score at least 70/80 for return to full function. 6. Independent with advanced shoulder HEP for continued self-management. Rehabilitation Potential For Stated Goals: Good The information in this section was collected on 12/26/18 (except where otherwise noted). HISTORY:  
 
 Ambulatory/Rehab Services  Model Falls Risk Assessment Total: (5 or greater = High Risk): 2  
 ©2010 [facility] . [place] Patent #[id number]. Federal Law prohibits the replication, distribution or use without written permission from [facility] [employer] History of Present Injury/Illness (Reason for Referral):  Mr. Wilson Robison is s/p L hip total hip revision and lavage and debridement of R shoulder after being diagnosed with infection to both areas. He had previous surgeries to L hip in Jan 2017 and R shoulder in mid 2017. He mostly c/o L hip pain post surgery and has not been moving arm too much. States that he was in excellent shape prior to this incident and has lost much muscle tone and weight. He also states that his energy level is very low and this is not his norm. He comes to outpatient therapy after undergoing several rounds of infusion to treat staff infection. Was told that he has retorn R RTC from previous surgery but MD chose not to repair at this time. Past Medical History/Comorbidities: Mr. Wilson Robison  has a past medical history of Arthritis, Biceps muscle tear, Chronic pain, Insomnia, MRSA (methicillin resistant Staphylococcus aureus) infection (2007), Personal history of kidney stones, and Right shoulder pain.  He also has no past medical history of Adverse effect of anesthesia, Difficult intubation, Malignant hyperthermia due to anesthesia, Nausea & vomiting, or Pseudocholinesterase deficiency. Mr. Author Underwood  has a past surgical history that includes hx other surgical; hx septoplasty (11/15/2017); hx rotator cuff repair (Left, 2010); and hx orthopaedic (Left, 01/2017). Social History/Living Environment:   1 story- lives with spouse with tub/shower combo Prior Level of Function/Work/Activity: very active with workouts - cardio and weight lifting/ pt currently not working. He did not take any medications prior to this incident. Dominant Side:  
      RIGHT Previous Treatment Approaches:   
      Surgeries in 2017 for R shoulder RTC repair and L THR prior to this incident Current Medications:   
 Doxycycline, takes muscle relaxer as needed Date Last Reviewed:  3/21/2019 EXAMINATION: 3/21/19 Observation/Orthostatic Postural Assessment:   
Walking:  Mild to moderate L gait antalgia Palpation:  Point tender to L greater trochanter area. Kevin Torres ROM:cervical ROM = WFL for all planes  
  
PROM RIght shoulder  Left shoulder Flexion 140 160 (130 active) Abduction 110 180 (115 active) External rotation 65 85 Internal rotation 70 70 Horz ABD -   
  
 
HIP AROM Right hip  Left hip L hip on 2/12/19 L hip 3/21/19 Flexion  90 90 90 Extension  -5 0 8 Abduction  15 25 35 External rotation  30 55 55 (prone) Internal rotation  45 40 35 (prone) Strength: 3/21/19 Right shoulder  Left shoulder R shoulder 2/19/19 R shoulder 3-21-19 Forward elevation 3+ - 4- 4 Abduction 3+ - 4- 4 External rotation 4- 4- 4 4+ Internal rotation 4+ 4 4+ 5 Horz ABD - -    
  
HIP Right hip  Left hip at IE L hip on 2/12/19 L hip on 3/21/19 Flexion - 3+ 4 4- Extension - 3+ 4 4+ Abduction - 3+ 4- 4- External rotation - 3+ 3 painful 4-/5 (seated) Internal rotation - - 4- 5 (seated) Adduction   3 painful -  
 
 
  
    
   
 Special Tests: Negative Lumbar Quadrant test bilaterally Flexibility: at IE- Not assessed 3-21-19 · Hamstring Length (in 90/90 position) N/A · Straight Leg Raise Test: N/A · Jorge Test (2-joint hip flexors): N/A 
· Prone Knee Bend: N/A 
· Cj's Test: N/A 
· Gastrocnemius N/A Neurological Screen: Sensory intact to all B LE dermatomes. Functional Mobility:  Walking : ambulates with mild-moderate antalgic gait pattern with reduced stance time on L LE. Independent with bed mobility and transfers. Balance:  Able to perform single limb stance on L LE x 20 seconds with increased sway. Required multiple attempts to perform, as unable to maintain balance on initial trials. Body Structures Involved: 1. Joints 2. Muscles Body Functions Affected: 1. Movement Related Activities and Participation Affected: 1. General Tasks and Demands 2. Mobility 3. Community, Social and Mariposa Vanderwagen CLINICAL DECISION MAKING:  
Outcome Measure: Tool Used: Lower Extremity Functional Scale (LEFS) Score:  Initial: 7/80  15/80 (Date: 1/24/19)  60/80 (2/21/19) Most recent: 39/80 (3-21-19) Interpretation of Score: 20 questions each scored on a 5 point scale with 0 representing \"extreme difficulty or unable to perform\" and 4 representing \"no difficulty\". The lower the score, the greater the functional disability. 80/80 represents no disability. Minimal detectable change is 9 points. Score 80 79-63 62-48 47-32 31-16 15-1 0 Modifier CH CI CJ CK CL CM CN Tool Used: Disabilities of the Arm, Shoulder and Hand (DASH) Questionnaire - Quick Version Score:  Initial: 36/55  38 /55 (Date: 1/24/19 ) 20/55 or 20% limited (2-21-19) Most recent: 19 (only completed front page) (3-21-19) Interpretation of Score: The DASH is designed to measure the activities of daily living in person's with upper extremity dysfunction or pain. Each section is scored on a 1-5 scale, 5 representing the greatest disability. The scores of each section are added together for a total score of 55. This number is divided by 11, followed by subtracting 1 and multiplying by 25 to get a percent score of disability. This value represents the percentage disability: 0-20% minimal disability; 20-40% moderate disability; 40-60% severe disability; % dependent for care or exaggerated symptom behavior. Minimal detectable change is 12%. Score 11 12-19 20-28 29-37 38-45 46-54 55 Modifier CH CI CJ CK CL CM CN Medical Necessity:  
· Patient is expected to demonstrate progress in strength and range of motion to increase independence with walking, basic mobility and use of R UE. Bud Rush Reason for Services/Other Comments: 
· Patient continues to require skilled intervention due to continued weakness and stiffness in R UE, L LE and difficulty with functional activities. Bud Rush TREATMENT:  
(In addition to Assessment/Re-Assessment sessions the following treatments were rendered) Pre-treatment Symptoms/Complaints: Has significant L hip pain after workday from standing on his feet much of the day. Continues to feel significant weakness in L LE throughout the day. Has difficulty sleeping at night due to L hip and proximal thigh pain that extends down to his knee. Pain: Initial:    Patient did not report any pain upon arrival Post Session:   No VAS Aquatic Exercise: (  0 min):  none today Aquatic Therapy Grid 2/11/19 Activity/Exercise 4# Walking forward 6 Walking backward 6 Walking sideways 6 High step march 6 March in place With noodle 20  
squat Hip 3 way kick-  4# 20x each Hip ABD/ADD Circles  20/20 Hip flexion with straight leg   
4.5' shoulder exercises with purple noode Hamstring stretch Deep water ex: bicycle, jumping janneth, cross country, core stability  Cross country 4 min 2 min all other Trunk ext Adductor stretch on step Therapeutic Exercises (25 minutes) to improve strength and function, decrease pain. Passive ranging to L hip to reduce discomfort through available range of motion. Hip flexor stretch in modified Jorge Test position (3 x 20\") to improve L hip flexor flexibility, and prone quadriceps stretches (3 x 20\") to improve L quadriceps flexibility to reduce L LE leg pain. Verbal cues to perform clamshells with eccentric lowering exaggerated to L LE. [address] grid 3-13-19 3-21-19 Activity/Exercise Shuttle press Unilateral, left 75# 1 x 10 
100# 2 x 10 
125# 2 x 10 Unilateral 
62# x 10 
75# x 10 
100# 2 x 10 
125# 2 x 10 Standing cable extensions 7# 3 x 12 Bridging 3 x 10 unilateral bridge, L External rotations Side-lying 4# 1 x 12 R 
5# 2 x 12 R 
 
90/90 prone 2 x 10 with manual overpressure One-arm row 30# 3 x 12 R   
AAROM Supine wand ER, x 20 Step ups 8\" 3 x 10  L Knee extensions 15# unilateral, L 
3 x 10 Abductions  Side-lying shoulder abductions 3 x 10 with cues to depress R scapula Standing balance  L LE 2 x 20\" FMS board step overs in SLS on L, 2 x 10 Middle trapezius  Side-lying, 3 x 10 Lower trapezius  Side-lying lift offs, 2 x 10 Clamshells  4# 3 x 12 L Manual Therapy (20 minutes) to reduce L hip pain and R shoulder to reduce discomfort and improve ROM. Grade 3-4 physio mobilizations to R shoulder for flexion, abduction, IR and ER. Grade 3-4 posterior-anterior mobilizations to L hip to reduce discomfort. . Anterior hip mobilizations, grade 3-4, in prone to improve hip ROM and reduce discomfort. HEP: Added single limb leg press, one-arm dumbbell row, machine row and cable rows, and single limb balance, side-lying abductions and clamshells to HEP. Patient verbalized understanding and was provided a handout. Treatment/Session Assessment: · Response to Treatment:  Patient continues to be very weak with L hip ER, and remains limited with shoulder motion, as expected given the presence of two rotator cuff tears. Patient has difficulty with L LE strength and control, and needs continued strengthening. · Compliance with Program/Exercises: Appears compliant. · Recommendations/Intent for next treatment session: \"Next visit will focus on pain reduction and progression of exercises . Total Treatment Duration: 65 Minutes (45 min of treatment time, 20 minutes for re-evaluation) PT Patient Time In/Time Out Time In: 0930 Time Out: [id number] Narendra Vela, PT

## 2019-03-21 NOTE — THERAPY RECERTIFICATION
Amaurykimo Dougherty : 1965 Payor: Zoila Dawn / Plan:  Alicia Marin / Product Type: Commerical /  [address]  at [facility] GE[facility] BUTLER [address] [facility], [address] [place] [address],[address], [facility] [address]. [phone]   [phone] OUTPATIENT PHYSICAL THERAPY:Recertification  ICD-10: Treatment Diagnosis: M25.611 R shoulder stiffness, M25.652 L hip stiffness, R26.2 Difficulty walking; M25.511 R shoulder pain, M25.552 L hip pain. Precautions/Allergies:  
Patient has no known allergies. Fall Risk Score: 1 (? 5 = High Risk) MD Orders: eval and treat, HEP, ROM, Strengthening: 
Full motion, full strength, all modalities. MEDICAL/REFERRING DIAGNOSIS: 
S/P RT Shoulder I&D; LT Hip DATE OF ONSET: 18 REFERRING PHYSICIAN: Quan Bonner MD 
RETURN PHYSICIAN APPOINTMENT: Next month PROGRESS ASSESSMENT (3/21/19):  Mr. David Cyr is approximately 4 months s/p L hip total hip revision and lavage and debridement of R shoulder after being diagnosed with infection to both areas. He had previous surgeries to L hip in 2017 and R shoulder in 2017. Per chart review of patient's R shoulder operative note, he has a 3.5 cm tear of both the supraspinatus and infrapsinatus, which was noted while I&D was performed to R shoulder. Patient has made progress with R shoulder and R hip range of motion, and has made some progress with hip strength, but remains weak with L hip external rotation. Patient was very physical active prior to I & D surgeries, and is unable to perform activities similarly to his prior level of function. Patient will likely benefit from continued PT to continue to improve his mobility, as patient ambulates with antalgic gait pattern, and optimize R shoulder function. PROBLEM LIST (Impacting functional limitations): 1. Decreased Strength 2. Decreased ADL/Functional Activities 3. Post-op R shoulder and L hiop pain and swelling 4. Decreased R shoulder and L hip ROM 5. Weakness R shoulder and L hip 6. Difficulty with walking. INTERVENTIONS PLANNED: 
1. Modalities PRN, including ultrasound, estim, and iontophoresis 2. Soft tissue and joint mobilization for ROM and flexibility 3. Stretching, progressive resistive exercises and HEP for return to functional activities. 4. Back Education and Training for body mechanics with activities of daily living 5. If needed, aquatic therapy. TREATMENT PLAN: 
Effective Dates: 12/26/2018 TO 3/26/2019 (90 days). Frequency/Duration: 1-2 visits per week for 8 weeks secondary to patient's work schedule. GOALS: (Goals have been discussed and agreed upon with patient.) Short-Term Functional Goals: Time Frame: 4 weeks 1. Report no more than 3/10 intermittent pain to R shoulder with compensatory use during basic functional activities. MET 1/24/19 2. R shoulder PROM forward elevation greater than 150 degrees and external rotation greater than 60 degrees to progress into functional ranges. MET 3-21-19 3. L hip AROM for abduction at least 20 degrees, extension at least 8 degrees for ease with functional activities (walking and car transfers). MET 1/24/19 4. Demonstrate good R shoulder and L hip isometric strength with manual testing to progress into strength phase. Ongoing, progressing 3-21-19 5. Returns to normalized walking for house hold distances. Progressing, but inconsistent. After prolonged inactivity patient ambulates with more pronounced antalgic gait 3-21-19 
6. Independent with initial HEP. MET 1/24/19 Discharge Goals: Time Frame: 12 weeks. All LTG or ONGOING 1. Return to good strength with going up/down at least 2 flights of stairs. 2. No more than 2/10 intermittent pain R shoulder and L hip pain with return to normalized household and work activities.  
3. R shoulder AROM forward elevation greater than 150 degrees, external rotation greater than 89 degrees, and strength to shoulder are grossly WNL's for safe use with normalized activities. 4. Demonstrate good functional shoulder and hip strength and endurance for return to normalized household and work activities. 5. DASH score no greater than 15 and LEFS score at least 70/80 for return to full function. 6. Independent with advanced shoulder HEP for continued self-management. Rehabilitation Potential For Stated Goals: Good The information in this section was collected on 12/26/18 (except where otherwise noted). HISTORY:  
 
 Ambulatory/Rehab Services H2 Model Falls Risk Assessment Total: (5 or greater = High Risk): 2  
 ©2010 [facility] [address] [place], [place] [address]on [place] Patent #[id number]. Federal Law prohibits the replication, distribution or use without written permission from [facility] [employer] History of Present Injury/Illness (Reason for Referral):  Mr. Karlene Vaughn is s/p L hip total hip revision and lavage and debridement of R shoulder after being diagnosed with infection to both areas. He had previous surgeries to L hip in Jan 2017 and R shoulder in mid 2017. He mostly c/o L hip pain post surgery and has not been moving arm too much. States that he was in excellent shape prior to this incident and has lost much muscle tone and weight. He also states that his energy level is very low and this is not his norm. He comes to outpatient therapy after undergoing several rounds of infusion to treat staff infection. Was told that he has retorn R RTC from previous surgery but MD chose not to repair at this time. Past Medical History/Comorbidities: Mr. Karlene Vaughn  has a past medical history of Arthritis, Biceps muscle tear, Chronic pain, Insomnia, MRSA (methicillin resistant Staphylococcus aureus) infection (2007), Personal history of kidney stones, and Right shoulder pain.  He also has no past medical history of Adverse effect of anesthesia, Difficult intubation, Malignant hyperthermia due to anesthesia, Nausea & vomiting, or Pseudocholinesterase deficiency. Mr. Damari Smyth  has a past surgical history that includes hx other surgical; hx septoplasty (11/15/2017); hx rotator cuff repair (Left, 2010); and hx orthopaedic (Left, 01/2017). Social History/Living Environment:   1 story- lives with spouse with tub/shower combo Prior Level of Function/Work/Activity: very active with workouts - cardio and weight lifting/ pt currently not working. He did not take any medications prior to this incident. Dominant Side:  
      RIGHT Previous Treatment Approaches:   
      Surgeries in 2017 for R shoulder RTC repair and L THR prior to this incident Current Medications:   
 Doxycycline, takes muscle relaxer as needed Date Last Reviewed:  3/21/2019 EXAMINATION: 3/21/19 Observation/Orthostatic Postural Assessment:   
Walking:  Mild to moderate L gait antalgia Palpation:  Point tender to L greater trochanter area. [place] Ring ROM:cervical ROM = WFL for all planes  
  
PROM RIght shoulder  Left shoulder Flexion 140 160 (130 active) Abduction 110 180 (115 active) External rotation 65 85 Internal rotation 70 70 Horz ABD -   
  
 
HIP AROM Right hip  Left hip L hip on 2/12/19 L hip 3/21/19 Flexion  90 90 90 Extension  -5 0 8 Abduction  15 25 35 External rotation  30 55 55 (prone) Internal rotation  45 40 35 (prone) Strength: 3/21/19 Right shoulder  Left shoulder R shoulder 2/19/19 R shoulder 3-21-19 Forward elevation 3+ - 4- 4 Abduction 3+ - 4- 4 External rotation 4- 4- 4 4+ Internal rotation 4+ 4 4+ 5 Horz ABD - -    
  
HIP Right hip  Left hip at IE L hip on 2/12/19 L hip on 3/21/19 Flexion - 3+ 4 4- Extension - 3+ 4 4+ Abduction - 3+ 4- 4- External rotation - 3+ 3 painful 4-/5 (seated) Internal rotation - - 4- 5 (seated) Adduction   3 painful - Special Tests: Negative Lumbar Quadrant test bilaterally Flexibility: at IE- Not assessed 3-21-19 · Hamstring Length (in 90/90 position) N/A · Straight Leg Raise Test: N/A · Jorge Test (2-joint hip flexors): N/A 
· Prone Knee Bend: N/A 
· Cj's Test: N/A 
· Gastrocnemius N/A Neurological Screen: Sensory intact to all B LE dermatomes. Functional Mobility:  Walking : ambulates with mild-moderate antalgic gait pattern with reduced stance time on L LE. Independent with bed mobility and transfers. Balance:  Able to perform single limb stance on L LE x 20 seconds with increased sway. Required multiple attempts to perform, as unable to maintain balance on initial trials. Body Structures Involved: 1. Joints 2. Muscles Body Functions Affected: 1. Movement Related Activities and Participation Affected: 1. General Tasks and Demands 2. Mobility 3. Community, Social and [place] CLINICAL DECISION MAKING:  
Outcome Measure: Tool Used: Lower Extremity Functional Scale (LEFS) Score:  Initial: 7/80  15/80 (Date: 1/24/19)  60/80 (2/21/19) Most recent: 39/80 (3-21-19) Interpretation of Score: 20 questions each scored on a 5 point scale with 0 representing \"extreme difficulty or unable to perform\" and 4 representing \"no difficulty\". The lower the score, the greater the functional disability. 80/80 represents no disability. Minimal detectable change is 9 points. Score 80 79-63 62-48 47-32 31-16 15-1 0 Modifier CH CI CJ CK CL CM CN Tool Used: Disabilities of the Arm, Shoulder and Hand (DASH) Questionnaire - Quick Version Score:  Initial: 36/55  38 /55 (Date: 1/24/19 ) 20/55 or 20% limited (2-21-19) Most recent: 19 (only completed front page) (3-21-19) Interpretation of Score: The DASH is designed to measure the activities of daily living in person's with upper extremity dysfunction or pain. Each section is scored on a 1-5 scale, 5 representing the greatest disability. The scores of each section are added together for a total score of 55. This number is divided by 11, followed by subtracting 1 and multiplying by 25 to get a percent score of disability. This value represents the percentage disability: 0-20% minimal disability; 20-40% moderate disability; 40-60% severe disability; % dependent for care or exaggerated symptom behavior. Minimal detectable change is 12%. Score 11 12-19 20-28 29-37 38-45 46-54 55 Modifier CH CI CJ CK CL CM CN Medical Necessity:  
· Patient is expected to demonstrate progress in strength and range of motion to increase independence with walking, basic mobility and use of R UE. Jo Ann Quiñones Reason for Services/Other Comments: 
· Patient continues to require skilled intervention due to continued weakness and stiffness in R UE, L LE and difficulty with functional activities. Jo Ann Quiñones

## 2019-03-24 ENCOUNTER — HOSPITAL ENCOUNTER (EMERGENCY)
Age: 54
Discharge: HOME | End: 2019-03-24
Attending: EMERGENCY MEDICINE
Payer: COMMERCIAL

## 2019-03-24 VITALS
WEIGHT: 190 LBS | BODY MASS INDEX: 29.82 KG/M2 | OXYGEN SATURATION: 98 % | TEMPERATURE: 98.3 F | RESPIRATION RATE: 17 BRPM | HEART RATE: 83 BPM | DIASTOLIC BLOOD PRESSURE: 101 MMHG | HEIGHT: 67 IN | SYSTOLIC BLOOD PRESSURE: 138 MMHG

## 2019-03-24 DIAGNOSIS — M79.2 NEUROPATHIC PAIN OF LEFT LOWER EXTREMITY: Primary | ICD-10-CM

## 2019-03-24 PROCEDURE — 99283 EMERGENCY DEPT VISIT LOW MDM: CPT | Performed by: EMERGENCY MEDICINE

## 2019-03-24 PROCEDURE — 96372 THER/PROPH/DIAG INJ SC/IM: CPT | Performed by: EMERGENCY MEDICINE

## 2019-03-24 PROCEDURE — 74011250637 HC RX REV CODE- 250/637: Performed by: EMERGENCY MEDICINE

## 2019-03-24 PROCEDURE — 74011250636 HC RX REV CODE- 250/636: Performed by: EMERGENCY MEDICINE

## 2019-03-24 RX ORDER — ONDANSETRON 8 MG/1
8 TABLET, ORALLY DISINTEGRATING ORAL
Status: COMPLETED | OUTPATIENT
Start: 2019-03-24 | End: 2019-03-24

## 2019-03-24 RX ORDER — MORPHINE SULFATE 15 MG/1
15 TABLET ORAL
Qty: 8 TAB | Refills: 0 | Status: SHIPPED | OUTPATIENT
Start: 2019-03-24 | End: 2019-03-27

## 2019-03-24 RX ORDER — HYDROMORPHONE HYDROCHLORIDE 2 MG/ML
1 INJECTION, SOLUTION INTRAMUSCULAR; INTRAVENOUS; SUBCUTANEOUS
Status: COMPLETED | OUTPATIENT
Start: 2019-03-24 | End: 2019-03-24

## 2019-03-24 RX ORDER — GABAPENTIN 100 MG/1
CAPSULE ORAL
Qty: 30 CAP | Refills: 1 | Status: SHIPPED | OUTPATIENT
Start: 2019-03-24 | End: 2019-08-03

## 2019-03-24 RX ORDER — KETOROLAC TROMETHAMINE 30 MG/ML
30 INJECTION, SOLUTION INTRAMUSCULAR; INTRAVENOUS
Status: COMPLETED | OUTPATIENT
Start: 2019-03-24 | End: 2019-03-24

## 2019-03-24 RX ADMIN — ONDANSETRON 8 MG: 8 TABLET, ORALLY DISINTEGRATING ORAL at 19:31

## 2019-03-24 RX ADMIN — KETOROLAC TROMETHAMINE 30 MG: 30 INJECTION, SOLUTION INTRAMUSCULAR; INTRAVENOUS at 19:33

## 2019-03-24 RX ADMIN — HYDROMORPHONE HYDROCHLORIDE 1 MG: 2 INJECTION, SOLUTION INTRAMUSCULAR; INTRAVENOUS; SUBCUTANEOUS at 19:31

## 2019-03-24 NOTE — DISCHARGE INSTRUCTIONS
Patient Education   gabapentin as prescribed and advance dosing as prescribed. Tylenol 501,000 mg every 6 hours for pain. Ibuprofen 400-600 mg every 6 hours for pain. Morphine 1 tablet every 6 hours if needed for breakthrough pain or only at night for pain for 2-3 days. No driving or operating heavy machinery while taking morphine. Do not drink alcohol or take other sedating medications while taking morphine. Call your primary care doctor and your orthopedic surgeon tomorrow morning for follow-up appointment in the next 1-2 weeks. Learning About Peripheral Neuropathy  What is peripheral neuropathy? Peripheral neuropathy is a problem that affects the peripheral nerves. These nerves lead from the spinal cord to other parts of the body. They control your sense of touch, how you feel pain and temperature, and your muscle strength. Most of the time the problem starts in the fingers and toes. As it gets worse, it moves into the limbs. It can cause pain and loss of feeling in the feet, legs, and hands. What causes it? There are several causes:  · Diabetes. This is the most common cause. If your blood sugar is too high for too long, it can damage the nerves. · Kidney problems. These can lead to toxic substances in the blood that damage nerves. · Low levels of thyroid hormone (hypothyroidism). This may cause swelling of the tissues around the nerves, which can put pressure on them. · Infectious or inflammatory diseases. Examples are HIV and Guillain-Barré syndrome. These diseases can damage the nerves. · Peripheral nerve injury. A physical injury can damage the nerves. Injuries can be from things like falls, car crashes, or playing sports. · Overusing alcohol and not eating a healthy diet. These can lead to your body not having enough of certain vitamins, such as vitamin B-12. This can damage nerves. · Being exposed to toxic substances.  These include lead, mercury, and certain medicines, such as those used for chemotherapy. Sometimes the cause isn't known. What are the symptoms? Symptoms of peripheral neuropathy can occur slowly over time. The most common ones are:  · Numbness, tightness, and tingling, especially in the legs, hands, and feet. · Loss of feeling. · Burning, shooting, or stabbing pain in the legs, hands, and feet. Often the pain is worse at night. · Weakness and loss of balance. What can happen if you have it? If peripheral neuropathy gets worse, it can lead to a complete lack of feeling in your hands or feet. This can make you more likely to injure them. It may lead to calluses and blisters. It can also lead to bone and joint problems, infection, and ulcers. For instance, small, repeated injuries to the foot may lead to bigger problems. This can happen because you can't feel the injuries. Reduced feeling in the feet can also change your step, leading to bone or joint problems. If untreated, foot problems can become so severe that the foot or lower leg may have to be amputated. But treatment can slow down peripheral neuropathy. And it's a good idea to take care to avoid injury. How is it diagnosed? To diagnose peripheral neuropathy, your doctor will ask you about:  · Your symptoms. · Your medical history. This may include your use of alcohol, risk of HIV infection, or exposure to toxic substances. · Your family's medical history, including nerve disease. Your doctor may test how well you can feel touch, temperature, and pain. You may also have blood tests. These tests will help the doctor find out if you have conditions that can cause neuropathy. Examples are diabetes, vitamin deficiencies, thyroid disease, and kidney problems. How is it treated? Treatment for peripheral neuropathy can relieve symptoms. This is done by treating the health problem that's causing it. For example, if you have diabetes, keeping your blood sugar within your target range may help.  Or maybe your body lacks certain vitamins caused by drinking too much alcohol. In that case, treatment may include eating a healthy diet, taking vitamins, and stopping alcohol use. You may have physical therapy. This can increase muscle strength and help build muscle control. Over-the-counter medicine can relieve mild nerve pain. Your doctor may also prescribe medicine to help with severe pain, numbness, tingling, and weakness. If you have neuropathy in your feet, it's a good idea to have them checked during each office visit. This can help prevent problems. Some people find that physical therapy, acupuncture, or transcutaneous electrical nerve stimulation (TENS) helps relieve pain. Follow-up care is a key part of your treatment and safety. Be sure to make and go to all appointments, and call your doctor if you are having problems. It's also a good idea to know your test results and keep a list of the medicines you take. Where can you learn more? Go to http://venkatesh-carmelo.info/. Enter P130 in the search box to learn more about \"Learning About Peripheral Neuropathy. \"  Current as of: July 25, 2018  Content Version: 11.9  © 8085-8938 [employer]. Care instructions adapted under license by [employer] (which disclaims liability or warranty for this information). If you have questions about a medical condition or this instruction, always ask your healthcare professional. [address] any warranty or liability for your use of this information.

## 2019-03-24 NOTE — ED PROVIDER NOTES
62-year-old white male with chronic left hip pain radiating into medial leg and knee since hip replacement surgery 5-6 months ago. Patient then told it is healing pain by his orthopedic surgeon, Dr. Gisell Cassidy. Patient has been prescribed a muscle relaxer but it is not helping. Occasionally takes Tylenol and ibuprofen but has not taken anything today. No fever. No redness warmth or swelling in the leg. Past Medical History:  
Diagnosis Date  Arthritis   
 right shoulder  Biceps muscle tear  Chronic pain  Insomnia  MRSA (methicillin resistant Staphylococcus aureus) infection   
 after a spider bite with I&D  Personal history of kidney stones  Right shoulder pain Past Surgical History:  
Procedure Laterality Date  HX ORTHOPAEDIC Left 2017  
 left hip replacement  HX OTHER SURGICAL    
 posterior rt leg spider bite excision per pt.  HX ROTATOR CUFF REPAIR Left   HX SEPTOPLASTY  11/15/2017 Family History:  
Problem Relation Age of Onset  Hypertension Mother  Heart Disease Father Social History Socioeconomic History  Marital status: LIFE PARTNER Spouse name: Not on file  Number of children: Not on file  Years of education: Not on file  Highest education level: Not on file Occupational History  Not on file Social Needs  Financial resource strain: Not on file  Food insecurity:  
  Worry: Not on file Inability: Not on file  Transportation needs:  
  Medical: Not on file Non-medical: Not on file Tobacco Use  Smoking status: Former Smoker Packs/day: 0.50 Years: 1.00 Pack years: 0.50 Last attempt to quit: 2001 Years since quittin.7  Smokeless tobacco: Never Used Substance and Sexual Activity  Alcohol use: Yes Comment: occasionally  Drug use: No  
 Sexual activity: Yes  
  Partners: Female Birth control/protection: None Lifestyle  Physical activity:  
  Days per week: Not on file Minutes per session: Not on file  Stress: Not on file Relationships  Social connections:  
  Talks on phone: Not on file Gets together: Not on file Attends Cheondoism service: Not on file Active member of club or organization: Not on file Attends meetings of clubs or organizations: Not on file Relationship status: Not on file  Intimate partner violence:  
  Fear of current or ex partner: Not on file Emotionally abused: Not on file Physically abused: Not on file Forced sexual activity: Not on file Other Topics Concern  Not on file Social History Narrative  Not on file ALLERGIES: Patient has no known allergies. Review of Systems Constitutional: Negative for fever. Genitourinary: Negative for dysuria and frequency. Neurological: Negative for weakness and numbness. Vitals:  
 03/24/19 1906 BP: (!) 189/117 Pulse: 83 Resp: 16 Temp: 98.3 °F (36.8 °C) SpO2: 99% Weight: 86.2 kg (190 lb) Height: 5' 7\" (1.702 m) Physical Exam  
Constitutional: He appears well-developed and well-nourished. Cardiovascular: Normal rate, regular rhythm and normal heart sounds. Pulmonary/Chest: Effort normal and breath sounds normal.  
Musculoskeletal: Normal range of motion. He exhibits no tenderness. No pain in left hip with internal/external rotation or flexion. No tenderness in the medial thigh. Full range of motion of the knee. No redness warmth or erythema of the leg at all. Neurovascularly intact with 2+ dorsalis pedis posterior tibial pulses. Neurological: No sensory deficit. He exhibits normal muscle tone. Nursing note and vitals reviewed. MDM Number of Diagnoses or Management Options Diagnosis management comments: Clinical exam is normal.  She complains of burning radicular nerve type pain. I will start gabapentin.   I will prescribe a opiate for 2-3 days for pain at night's patient can get some rest.  Patient needs to see Dr. Cade Carbajal and perhaps have some outpatient nerve conduction studies. This is chronic and ongoing for 6 months there is no current symptoms to suggest septic joint or infection. No signs of cauda equina. Procedures

## 2019-03-24 NOTE — ED TRIAGE NOTES
Pt states that he has left leg pain. Long hx of left hip replacement with staph infection approx 5 months

## 2019-03-25 ENCOUNTER — HOSPITAL ENCOUNTER (OUTPATIENT)
Dept: PHYSICAL THERAPY | Age: 54
Discharge: HOME | End: 2019-03-25
Payer: COMMERCIAL

## 2019-03-25 NOTE — ED NOTES
I have reviewed discharge instructions with the patient. The patient verbalized understanding. Patient left ED via Discharge Method: ambulatory to Home with spouse. Opportunity for questions and clarification provided. Patient given 2 scripts. To continue your aftercare when you leave the hospital, you may receive an automated call from our care team to check in on how you are doing. This is a free service and part of our promise to provide the best care and service to meet your aftercare needs.  If you have questions, or wish to unsubscribe from this service please call [phone]. Thank you for Choosing our [facility] Emergency Department.

## 2019-03-26 NOTE — PROGRESS NOTES
Gretta Poster : 1965 Payor: /  [address]  at [facility] BUTLER [address] [facility], [address] [place] [address],[address], [address] [facility] [phone]   [phone] OUTPATIENT PHYSICAL THERAPY:Cancellation note 3/25/19 ICD-10: Treatment Diagnosis: M25.611 R shoulder stiffness, M25.652 L hip stiffness, R26.2 Difficulty walking; M25.511 R shoulder pain, M25.552 L hip pain. Precautions/Allergies:  
Patient has no known allergies. Fall Risk Score: 1 (? 5 = High Risk) MD Orders: eval and treat, HEP, ROM, Strengthening: 
Full motion, full strength, all modalities. MEDICAL/REFERRING DIAGNOSIS: 
S/P RT Shoulder I&D; LT Hip DATE OF ONSET: 18 REFERRING PHYSICIAN: Chaz Bonner MD 
RETURN PHYSICIAN APPOINTMENT: Next month Mr. Lupe Roy cancelled today's appointment secondary to increased pain in L hip. PT will continue plan of care at next treatment session. Clif Thurston, PT 
3/26/2019

## 2019-03-30 ENCOUNTER — HOSPITAL ENCOUNTER (EMERGENCY)
Age: 54
Discharge: HOME | End: 2019-03-30
Attending: EMERGENCY MEDICINE
Payer: COMMERCIAL

## 2019-03-30 VITALS
BODY MASS INDEX: 29.82 KG/M2 | TEMPERATURE: 97.9 F | HEART RATE: 66 BPM | OXYGEN SATURATION: 100 % | WEIGHT: 190 LBS | RESPIRATION RATE: 16 BRPM | HEIGHT: 67 IN | SYSTOLIC BLOOD PRESSURE: 139 MMHG | DIASTOLIC BLOOD PRESSURE: 94 MMHG

## 2019-03-30 DIAGNOSIS — M79.605 PAIN OF LEFT LOWER EXTREMITY: Primary | ICD-10-CM

## 2019-03-30 PROCEDURE — 96372 THER/PROPH/DIAG INJ SC/IM: CPT

## 2019-03-30 PROCEDURE — 74011250637 HC RX REV CODE- 250/637: Performed by: PHYSICIAN ASSISTANT

## 2019-03-30 PROCEDURE — 99284 EMERGENCY DEPT VISIT MOD MDM: CPT

## 2019-03-30 PROCEDURE — 74011250636 HC RX REV CODE- 250/636: Performed by: PHYSICIAN ASSISTANT

## 2019-03-30 RX ORDER — ONDANSETRON 8 MG/1
8 TABLET, ORALLY DISINTEGRATING ORAL
Status: COMPLETED | OUTPATIENT
Start: 2019-03-30 | End: 2019-03-30

## 2019-03-30 RX ORDER — CYCLOBENZAPRINE HCL 10 MG
10 TABLET ORAL
Status: COMPLETED | OUTPATIENT
Start: 2019-03-30 | End: 2019-03-30

## 2019-03-30 RX ORDER — KETOROLAC TROMETHAMINE 10 MG/1
10 TABLET, FILM COATED ORAL 3 TIMES DAILY
Qty: 15 TAB | Refills: 0 | Status: SHIPPED | OUTPATIENT
Start: 2019-03-30 | End: 2019-08-03

## 2019-03-30 RX ORDER — HYDROMORPHONE HYDROCHLORIDE 2 MG/ML
1 INJECTION, SOLUTION INTRAMUSCULAR; INTRAVENOUS; SUBCUTANEOUS
Status: COMPLETED | OUTPATIENT
Start: 2019-03-30 | End: 2019-03-30

## 2019-03-30 RX ORDER — KETOROLAC TROMETHAMINE 30 MG/ML
30 INJECTION, SOLUTION INTRAMUSCULAR; INTRAVENOUS
Status: COMPLETED | OUTPATIENT
Start: 2019-03-30 | End: 2019-03-30

## 2019-03-30 RX ADMIN — CYCLOBENZAPRINE HYDROCHLORIDE 10 MG: 10 TABLET, FILM COATED ORAL at 15:59

## 2019-03-30 RX ADMIN — KETOROLAC TROMETHAMINE 30 MG: 30 INJECTION, SOLUTION INTRAMUSCULAR; INTRAVENOUS at 15:59

## 2019-03-30 RX ADMIN — HYDROMORPHONE HYDROCHLORIDE 1 MG: 2 INJECTION, SOLUTION INTRAMUSCULAR; INTRAVENOUS; SUBCUTANEOUS at 14:50

## 2019-03-30 RX ADMIN — ONDANSETRON 8 MG: 8 TABLET, ORALLY DISINTEGRATING ORAL at 14:50

## 2019-03-30 NOTE — ED TRIAGE NOTES
Patient advises that he had left hip replacement and revision. Patient advises that he has been having severe pain and its not getting any better. MRI is scheduled for 4/2/2019 and states he is having left upper leg pain and lower back pain. MRI is for lumbar.

## 2019-03-30 NOTE — ED NOTES
I have reviewed discharge instructions with the patient and spouse. The patient and spouse verbalized understanding. Patient left ED via Discharge Method: ambulatory to Home with spouse. Opportunity for questions and clarification provided. Patient given 1 scripts. To continue your aftercare when you leave the hospital, you may receive an automated call from our care team to check in on how you are doing. This is a free service and part of our promise to provide the best care and service to meet your aftercare needs.  If you have questions, or wish to unsubscribe from this service please call [phone]. Thank you for Choosing our [facility] Emergency Department.

## 2019-03-30 NOTE — ED PROVIDER NOTES
Pt with h/o left JEIMY with revision done , seen here last week to left hip and thigh pain, given rx for morphine and neurontin, seen by PMR md who ordered mri of l spine to be done 4-2-19, tried to call ortho md and was told pain was not from hip, pt has been back to work about 1 month, no injury, no numbness to lower leg, no bowel or bladder symptoms, still with pain, no fever The history is provided by the patient. Hip Pain This is a new problem. The current episode started more than 1 week ago. The problem occurs constantly. The problem has not changed since onset. Pain location: left hip and thigh. The quality of the pain is described as aching. The pain is at a severity of 10/10. The pain is moderate. Pertinent negatives include no numbness. The symptoms are aggravated by activity and palpation. Treatments tried: muscler relaxers, norco, morphine. The treatment provided mild relief. There has been no history of extremity trauma. Past Medical History:  
Diagnosis Date  Arthritis   
 right shoulder  Biceps muscle tear  Chronic pain  Insomnia  MRSA (methicillin resistant Staphylococcus aureus) infection 2007  
 after a spider bite with I&D  Personal history of kidney stones  Right shoulder pain Past Surgical History:  
Procedure Laterality Date  HX ORTHOPAEDIC Left 01/2017  
 left hip replacement  HX OTHER SURGICAL    
 posterior rt leg spider bite excision per pt.  HX ROTATOR CUFF REPAIR Left 2010  HX SEPTOPLASTY  11/15/2017 Family History:  
Problem Relation Age of Onset  Hypertension Mother  Heart Disease Father Social History Socioeconomic History  Marital status: LIFE PARTNER Spouse name: Not on file  Number of children: Not on file  Years of education: Not on file  Highest education level: Not on file Occupational History  Not on file Social Needs  Financial resource strain: Not on file  Food insecurity:  
  Worry: Not on file Inability: Not on file  Transportation needs:  
  Medical: Not on file Non-medical: Not on file Tobacco Use  Smoking status: Former Smoker Packs/day: 0.50 Years: 1.00 Pack years: 0.50 Last attempt to quit: 2001 Years since quittin.8  Smokeless tobacco: Never Used Substance and Sexual Activity  Alcohol use: Yes Comment: occasionally  Drug use: No  
 Sexual activity: Yes  
  Partners: Female Birth control/protection: None Lifestyle  Physical activity:  
  Days per week: Not on file Minutes per session: Not on file  Stress: Not on file Relationships  Social connections:  
  Talks on phone: Not on file Gets together: Not on file Attends Christian service: Not on file Active member of club or organization: Not on file Attends meetings of clubs or organizations: Not on file Relationship status: Not on file  Intimate partner violence:  
  Fear of current or ex partner: Not on file Emotionally abused: Not on file Physically abused: Not on file Forced sexual activity: Not on file Other Topics Concern  Not on file Social History Narrative  Not on file ALLERGIES: Patient has no known allergies. Review of Systems Neurological: Negative for numbness. All other systems reviewed and are negative. Vitals:  
 19 1351 BP: (!) 189/106 Pulse: 75 Resp: 16 Temp: 98.2 °F (36.8 °C) SpO2: 98% Weight: 86.2 kg (190 lb) Height: 5' 7\" (1.702 m) Physical Exam  
Constitutional: He is oriented to person, place, and time. He appears well-developed and well-nourished. No distress. HENT:  
Head: Normocephalic and atraumatic. Eyes: Pupils are equal, round, and reactive to light. EOM are normal.  
Neck: Normal range of motion. Neck supple. Cardiovascular: Normal rate and regular rhythm. Pulmonary/Chest: Effort normal and breath sounds normal.  
Abdominal: Soft. Bowel sounds are normal.  
Musculoskeletal: Normal range of motion. He exhibits tenderness. He exhibits no edema or deformity. No pain to palpation bony l spine area, soreness to posterior hip but more cramping type pain to medial and  lateral thigh area, good hip motion, full knee motion, no calf or swelling, no numbness or tingling to lower leg Neurological: He is alert and oriented to person, place, and time. Skin: Skin is warm. He is not diaphoretic. Psychiatric: He has a normal mood and affect. Nursing note and vitals reviewed. MDM Number of Diagnoses or Management Options Diagnosis management comments: Left thigh pain, no neuro signs of surgical emergency Stressed to pt need for mri and to keep appt on Tuedday for MRI, Pt given 1 mg dilaudid, 30 mg toradol im as given on previous visits Will add oral toradol  to curretn meds Amount and/or Complexity of Data Reviewed Review and summarize past medical records: yes Procedures

## 2019-04-02 ENCOUNTER — HOSPITAL ENCOUNTER (OUTPATIENT)
Dept: GENERAL RADIOLOGY | Age: 54
Discharge: HOME | End: 2019-04-02
Attending: PHYSICAL MEDICINE & REHABILITATION
Payer: COMMERCIAL

## 2019-04-02 ENCOUNTER — HOSPITAL ENCOUNTER (OUTPATIENT)
Dept: MRI IMAGING | Age: 54
Discharge: HOME | End: 2019-04-02
Attending: PHYSICAL MEDICINE & REHABILITATION
Payer: COMMERCIAL

## 2019-04-02 DIAGNOSIS — M79.605 LEFT LEG PAIN: ICD-10-CM

## 2019-04-02 DIAGNOSIS — M25.552 PAIN OF LEFT HIP JOINT: ICD-10-CM

## 2019-04-02 PROCEDURE — 73502 X-RAY EXAM HIP UNI 2-3 VIEWS: CPT

## 2019-04-02 PROCEDURE — 72148 MRI LUMBAR SPINE W/O DYE: CPT

## 2019-04-10 ENCOUNTER — HOSPITAL ENCOUNTER (OUTPATIENT)
Dept: NUCLEAR MEDICINE | Age: 54
Discharge: HOME | End: 2019-04-10
Attending: ORTHOPAEDIC SURGERY
Payer: COMMERCIAL

## 2019-04-10 ENCOUNTER — HOSPITAL ENCOUNTER (OUTPATIENT)
Dept: LAB | Age: 54
Discharge: HOME | End: 2019-04-10
Attending: ORTHOPAEDIC SURGERY
Payer: COMMERCIAL

## 2019-04-10 DIAGNOSIS — Z96.642 PRESENCE OF LEFT ARTIFICIAL HIP JOINT: ICD-10-CM

## 2019-04-10 DIAGNOSIS — T84.59XA INFECTION AND INFLAMMATORY REACTION DUE TO OTHER INTERNAL JOINT PROSTHESIS, INITIAL ENCOUNTER (HCC): ICD-10-CM

## 2019-04-10 LAB
BASOPHILS # BLD: 0.1 K/UL (ref 0–0.2)
BASOPHILS NFR BLD: 1 % (ref 0–2)
CRP SERPL-MCNC: 0.7 MG/DL (ref 0–0.9)
DIFFERENTIAL METHOD BLD: NORMAL
EOSINOPHIL # BLD: 0 K/UL (ref 0–0.8)
EOSINOPHIL NFR BLD: 1 % (ref 0.5–7.8)
ERYTHROCYTE [DISTWIDTH] IN BLOOD BY AUTOMATED COUNT: 13.6 % (ref 11.9–14.6)
ERYTHROCYTE [SEDIMENTATION RATE] IN BLOOD: 37 MM/HR (ref 0–20)
HCT VFR BLD AUTO: 43.5 % (ref 41.1–50.3)
HGB BLD-MCNC: 14 G/DL (ref 13.6–17.2)
IMM GRANULOCYTES # BLD AUTO: 0 K/UL (ref 0–0.5)
IMM GRANULOCYTES NFR BLD AUTO: 0 % (ref 0–5)
LYMPHOCYTES # BLD: 2.7 K/UL (ref 0.5–4.6)
LYMPHOCYTES NFR BLD: 32 % (ref 13–44)
MCH RBC QN AUTO: 28.9 PG (ref 26.1–32.9)
MCHC RBC AUTO-ENTMCNC: 32.2 G/DL (ref 31.4–35)
MCV RBC AUTO: 89.7 FL (ref 79.6–97.8)
MONOCYTES # BLD: 0.5 K/UL (ref 0.1–1.3)
MONOCYTES NFR BLD: 6 % (ref 4–12)
NEUTS SEG # BLD: 5.1 K/UL (ref 1.7–8.2)
NEUTS SEG NFR BLD: 60 % (ref 43–78)
NRBC # BLD: 0 K/UL (ref 0–0.2)
PLATELET # BLD AUTO: 369 K/UL (ref 150–450)
PMV BLD AUTO: 9.8 FL (ref 9.4–12.3)
RBC # BLD AUTO: 4.85 M/UL (ref 4.23–5.6)
WBC # BLD AUTO: 8.5 K/UL (ref 4.3–11.1)

## 2019-04-10 PROCEDURE — 78315 BONE IMAGING 3 PHASE: CPT

## 2019-04-10 PROCEDURE — 85652 RBC SED RATE AUTOMATED: CPT

## 2019-04-10 PROCEDURE — 86140 C-REACTIVE PROTEIN: CPT

## 2019-04-10 PROCEDURE — 85025 COMPLETE CBC W/AUTO DIFF WBC: CPT

## 2019-04-10 PROCEDURE — 36415 COLL VENOUS BLD VENIPUNCTURE: CPT

## 2019-04-29 NOTE — PROGRESS NOTES
Attempted to see. Pt in OR since lunch.   Did start IVF for RANI and will recheck and see in AM 
 MEDICATIONS  (STANDING):  aspirin enteric coated 81 milliGRAM(s) Oral daily  dextrose 5%. 1000 milliLiter(s) (50 mL/Hr) IV Continuous <Continuous>  dextrose 50% Injectable 12.5 Gram(s) IV Push once  dextrose 50% Injectable 25 Gram(s) IV Push once  dextrose 50% Injectable 25 Gram(s) IV Push once  enalapril 5 milliGRAM(s) Oral daily  influenza   Vaccine 0.5 milliLiter(s) IntraMuscular once  insulin lispro (HumaLOG) corrective regimen sliding scale   SubCutaneous three times a day before meals  metFORMIN 500 milliGRAM(s) Oral two times a day    MEDICATIONS  (PRN):  acetaminophen   Tablet .. 650 milliGRAM(s) Oral every 6 hours PRN Mild Pain (1 - 3)  dextrose 40% Gel 15 Gram(s) Oral once PRN Blood Glucose LESS THAN 70 milliGRAM(s)/deciliter  glucagon  Injectable 1 milliGRAM(s) IntraMuscular once PRN Glucose LESS THAN 70 milligrams/deciliter

## 2019-05-16 NOTE — H&P
[address] [place] [address] History and Physical Exam 
 
Patient ID: 
Amada Jacobs [id number] 
 
71 y.o. 
1965 Today: November 5, 2018 Vitals Signs: Reviewed as noted in medical record. Allergies: No Known Allergies CC: Left hip pain, right shoulder pain HPI:  Pt is a 49 y/o male who is almost 2 years s/p L JEIMY who was seen in clinic on 10/29 c/o 5 day history of sudden onset of moderate left hip pain. Denied any known injury. Left hip joint aspirate was obtained and sent for culture which grew pansensitive staph. Aureus. Patient was also noted to have elevated WBC (27.1) as well as elevated ESR (90). Patient reports having flu like symptoms 2 weeks ago and began developing right shoulder pain around that same time. Patient was subsequently evaluated by Dr. Ras Jerez and received right shoulder cortisone injection. He reports that he is still experiencing moderate to severe right shoulder pain. Denies fever, chills, nausea, vomiting, etc..  No other new complaints. Relevant PMH:  
Past Medical History:  
Diagnosis Date  Arthritis   
 right shoulder  Biceps muscle tear  Chronic pain  Insomnia  MRSA (methicillin resistant Staphylococcus aureus) infection 2007  
 after a spider bite with I&D  Personal history of kidney stones  Right shoulder pain Objective:  
                 HEENT: NC/AT Lungs:  clear Heart:   rrr Abdomen: soft Extremities:  Right shoulder exam reveals overlying skin is intact, slightly warm, no appreciable erythema present. ROM is moderate diminished secondary to pain and weakness. Positive drop arm test present. There is focal tenderness to palpation over [facility] joint and lateral subacromial region. Nontender over glenohumeral joint line. Left hip joint motion is moderately diminished secondary to pain.   Light touch Physical Therapy Screening: 
Services are indicated and therapy order is required. An InBasket screening referral was triggered for physical therapy based on results obtained during the nursing admission assessment. The patients chart was reviewed and the patient is appropriate for a skilled therapy evaluation if medically stable. Please order a consult for physical therapy if you are in agreement and would like an evaluation to be completed. Thank you.  
 
Julián Marcelino, PT 
 
 
 sensation and motor function intact throughout upper and lower extremities. Vascularly intact throughout. Radiographs: reveal stable appearing L JEIMY with no sign of progressive lucency or bony destructive processes present. Assessment: Infected left total hip arthroplasty Plan:  Patient will be admitted to Ortho unit today in anticipation of being carried to OR tomorrow for I&D and revision of infected left total hip arthroplasty. NPO after midnight. Dr. Robert Bolaños has spoken to Dr. Citlaly Cabrera who has agreed to see the patient due to concern for possible right shoulder septic arthritis. Will consult I.R. to perform fluoroscopic guided right shoulder joint aspiration to send for C&S. Will also obtain right shoulder plain films and MRI for further evaluation. Will also consult I.D. to assist with long term postop antibiotic selection and management. The potential risks and benefits of surgery have been discussed with the patient, all questions have been answered, and he has elected to proceed with surgical intervention at this time. Signed By: JAY Esqueda November 5, 2018

## 2019-05-22 NOTE — PROGRESS NOTES
Tanja Dougherty  : 1965  Payor: [address] [place] [address] Road / Plan: Casper Ferrer RPN / Product Type: Commerical /  [id number]  at [facility] GEORGE BUTLER  [address] [facility], [address] [place] [address] [address] [facility]  [phone]   [phone]       OUTPATIENT PHYSICAL THERAPY:Discontinuation Summary 2019     ICD-10: Treatment Diagnosis: B78.070 R shoulder stiffness, M25.652 L hip stiffness, R26.2 Difficulty walking; M25.511 R shoulder pain, M25.552 L hip pain. Precautions/Allergies:   Patient has no known allergies. Fall Risk Score: 1 (? 5 = High Risk)  MD Orders: eval and treat, HEP, ROM, Strengthening:  Full motion, full strength, all modalities. MEDICAL/REFERRING DIAGNOSIS:  S/P RT Shoulder I&D; LT Hip    DATE OF ONSET: 18  REFERRING PHYSICIAN: Emily Caal MD  RETURN PHYSICIAN APPOINTMENT: Next month     Tanja Dougherty has been seen in physical therapy from 18 to 19 for 24 visits. Treatment has been discontinued at this time due to patient failing to return for additional treatment. The below goals were met prior to discontinuation. Some goals were not met due to continued L hip and R shoulder pain and L hip weakness. Thank you for this referral.          GOALS: (Goals have been discussed and agreed upon with patient.)  Short-Term Functional Goals: Time Frame: 4 weeks  1. Report no more than 3/10 intermittent pain to R shoulder with compensatory use during basic functional activities. MET 19  2. R shoulder PROM forward elevation greater than 150 degrees and external rotation greater than 60 degrees to progress into functional ranges. MET 3-21-19  3. L hip AROM for abduction at least 20 degrees, extension at least 8 degrees for ease with functional activities (walking and car transfers). MET 19  4. Demonstrate good R shoulder and L hip isometric strength with manual testing to progress into strength phase. Ongoing, progressing 3-21-19  5.  Returns to normalized walking for house hold distances. Progressing, but inconsistent. After prolonged inactivity patient ambulates with more pronounced antalgic gait 3-21-19  6. Independent with initial HEP. MET 1/24/19  Discharge Goals: Time Frame: 12 weeks. All LTG or ONGOING  1. Return to good strength with going up/down at least 2 flights of stairs. 2. No more than 2/10 intermittent pain R shoulder and L hip pain with return to normalized household and work activities. 3. R shoulder AROM forward elevation greater than 150 degrees, external rotation greater than 89 degrees, and strength to shoulder are grossly WNL's for safe use with normalized activities. 4. Demonstrate good functional shoulder and hip strength and endurance for return to normalized household and work activities. 5. DASH score no greater than 15 and LEFS score at least 70/80 for return to full function. 6. Independent with advanced shoulder HEP for continued self-management. Rehabilitation Potential For Stated Goals: Good                      Ambulatory/Rehab Services H2 Model Falls Risk Assessment   Total: (5 or greater = High Risk): 2    ©2010 [facility] [employer]. All Rights Reserved. [facility] [employer] Patent #[id number]. Federal Law prohibits the replication, distribution or use without written permission from [facility] 88: 3/21/19     Observation/Orthostatic Postural Assessment:    Walking:  Mild to moderate L gait antalgia  Palpation:  Point tender to L greater trochanter area. .  ROM:cervical ROM = WFL for all planes      PROM RIght shoulder  Left shoulder   Flexion 140 160 (130 active)    Abduction 110 180 (115 active)   External rotation 65 85   Internal rotation 70 70   Horz ABD -         HIP AROM Right hip  Left hip L hip on 2/12/19 L hip 3/21/19   Flexion  90 90 90   Extension  -5 0 8    Abduction  15 25 35   External rotation  30 55 55 (prone)   Internal rotation  45 40 35 (prone)              Strength: 3/21/19   Right shoulder Left shoulder R shoulder 2/19/19 R shoulder 3-21-19   Forward elevation 3+ - 4- 4   Abduction 3+ - 4- 4   External rotation 4- 4- 4 4+   Internal rotation 4+ 4 4+ 5   Horz ABD - -        HIP Right hip  Left hip at IE L hip on 2/12/19 L hip on 3/21/19   Flexion - 3+ 4 4-   Extension - 3+ 4 4+   Abduction - 3+ 4- 4-   External rotation - 3+ 3 painful 4-/5 (seated)   Internal rotation - - 4- 5 (seated)   Adduction   3 painful -                      Special Tests: Negative Lumbar Quadrant test bilaterally    Flexibility: at IE- Not assessed 3-21-19  · Hamstring Length (in 90/90 position) N/A  · Straight Leg Raise Test: N/A  · Jorge Test (2-joint hip flexors): N/A  · Prone Knee Bend: N/A  · Cj's Test: N/A  · Gastrocnemius N/A  Neurological Screen: Sensory intact to all B LE dermatomes. Functional Mobility:  Walking : ambulates with mild-moderate antalgic gait pattern with reduced stance time on L LE. Independent with bed mobility and transfers. Balance:  Able to perform single limb stance on L LE x 20 seconds with increased sway. Required multiple attempts to perform, as unable to maintain balance on initial trials. Body Structures Involved:  1. Joints  2. Muscles Body Functions Affected:  1. Movement Related Activities and Participation Affected:  1. General Tasks and Demands  2. Mobility  3. Community, Social and Civic Life   CLINICAL DECISION MAKING:   Outcome Measure: Tool Used: Lower Extremity Functional Scale (LEFS)  Score:  Initial: 7/80  15/80 (Date: 1/24/19)  60/80 (2/21/19) Most recent: 39/80 (3-21-19)   Interpretation of Score: 20 questions each scored on a 5 point scale with 0 representing \"extreme difficulty or unable to perform\" and 4 representing \"no difficulty\". The lower the score, the greater the functional disability. 80/80 represents no disability. Minimal detectable change is 9 points.   Score 80 79-63 62-48 47-32 31-16 15-1 0   Modifier CH CI CJ CK CL CM CN       Tool Used: Disabilities of the Arm, Shoulder and Hand (DASH) Questionnaire - Quick Version  Score:  Initial: 36/55  38 /55 (Date: 1/24/19 )    20/55 or 20% limited (2-21-19) Most recent: 19 (only completed front page) (3-21-19)   Interpretation of Score: The DASH is designed to measure the activities of daily living in person's with upper extremity dysfunction or pain. Each section is scored on a 1-5 scale, 5 representing the greatest disability. The scores of each section are added together for a total score of 55. This number is divided by 11, followed by subtracting 1 and multiplying by 25 to get a percent score of disability. This value represents the percentage disability: 0-20% minimal disability; 20-40% moderate disability; 40-60% severe disability; % dependent for care or exaggerated symptom behavior. Minimal detectable change is 12%.     Score 11 12-19 20-28 29-37 38-45 46-54 55   Modifier CH CI CJ CK CL CM CN

## 2019-08-03 ENCOUNTER — HOSPITAL ENCOUNTER (EMERGENCY)
Age: 54
Discharge: HOME | End: 2019-08-03
Attending: EMERGENCY MEDICINE
Payer: COMMERCIAL

## 2019-08-03 VITALS
TEMPERATURE: 98.3 F | BODY MASS INDEX: 32.96 KG/M2 | SYSTOLIC BLOOD PRESSURE: 131 MMHG | HEART RATE: 102 BPM | DIASTOLIC BLOOD PRESSURE: 72 MMHG | OXYGEN SATURATION: 97 % | HEIGHT: 67 IN | WEIGHT: 210 LBS | RESPIRATION RATE: 16 BRPM

## 2019-08-03 DIAGNOSIS — S61.259A DOG BITE OF FINGER, INITIAL ENCOUNTER: Primary | ICD-10-CM
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
## 2019-08-03 PROCEDURE — 75810000293 HC SIMP/SUPERF WND  RPR

## 2019-08-03 PROCEDURE — 99283 EMERGENCY DEPT VISIT LOW MDM: CPT | Performed by: EMERGENCY MEDICINE

## 2019-08-03 PROCEDURE — 74011250637 HC RX REV CODE- 250/637: Performed by: EMERGENCY MEDICINE

## 2019-08-03 RX ORDER — HYDROCODONE BITARTRATE AND ACETAMINOPHEN 5; 325 MG/1; MG/1
1 TABLET ORAL
Qty: 7 TAB | Refills: 0 | Status: SHIPPED | OUTPATIENT
Start: 2019-08-03 | End: 2019-08-06

## 2019-08-03 RX ORDER — AMOXICILLIN AND CLAVULANATE POTASSIUM 875; 125 MG/1; MG/1
1 TABLET, FILM COATED ORAL
Status: COMPLETED | OUTPATIENT
Start: 2019-08-03 | End: 2019-08-03

## 2019-08-03 RX ORDER — AMOXICILLIN AND CLAVULANATE POTASSIUM 875; 125 MG/1; MG/1
1 TABLET, FILM COATED ORAL 2 TIMES DAILY
Qty: 20 TAB | Refills: 0 | Status: SHIPPED | OUTPATIENT
Start: 2019-08-03 | End: 2019-08-13

## 2019-08-03 RX ADMIN — AMOXICILLIN AND CLAVULANATE POTASSIUM 1 TABLET: 875; 125 TABLET, FILM COATED ORAL at 18:07

## 2019-08-03 NOTE — ED TRIAGE NOTES
States that he was breaking up a dog fight today.   Bites not on the left wrist and right middle finger

## 2019-08-03 NOTE — ED PROVIDER NOTES
HPI:  48 male here with dog bite to the hands. Stated his dog and another dog were fighting. He tried to break them apart. In the process of doing so he believe his dog accidentally bit him in the right hand and left hand. His only main concern is his right hand middle finger dorsum near the knuckle has a very large laceration. He stated the other bite wounds are very minor and he doesn't care about them. History of tetanus shot is up-to-date. He stated he has had his dog for 2 years. Unsure if rabies shots up to date  However his dog has been acting normal.    ROS  Constitutional: No fever, no chills  Skin: no rash  Eye: No vision changes  ENMT:   Respiratory: No shortness of breath, no cough  Cardiovascular: No chest pain, no palpitations  Gastrointestinal: No vomiting, no nausea, no diarrhea, no abdominal pain  : No dysuria  MSK: No back pain, no muscle pain, no joint pain  Neuro: No headache, no change in mental status, no numbness, no tingling, no weakness  Psych:   Endocrine:   All other review of systems positive per history of present illness and the above otherwise negative or noncontributory. Visit Vitals  /76 (BP 1 Location: Left arm, BP Patient Position: At rest)   Pulse (!) 123   Temp 98.3 °F (36.8 °C)   Resp 16   Ht 5' 7\" (1.702 m)   Wt 95.3 kg (210 lb)   SpO2 96%   BMI 32.89 kg/m²     Past Medical History:   Diagnosis Date    Arthritis     right shoulder     Biceps muscle tear     Chronic pain     Insomnia     MRSA (methicillin resistant Staphylococcus aureus) infection 2007    after a spider bite with I&D    Personal history of kidney stones     Right shoulder pain      Past Surgical History:   Procedure Laterality Date    HX ORTHOPAEDIC Left 01/2017    left hip replacement    HX OTHER SURGICAL      posterior rt leg spider bite excision per pt.     HX ROTATOR CUFF REPAIR Left 2010    HX SEPTOPLASTY  11/15/2017     None         Adult Exam   General: alert, no acute distress  Head: normocephalic, atraumatic  ENT: moist mucous membranes  Neck: supple, non-tender; full range of motion  Cardiovascular: regular rate and rhythm, normal peripheral perfusion, no edema  Respiratory:  normal respirations; no wheezing, rales or rhonchi  Gastrointestinal: soft, non-tender; no rebound or guarding, no peritoneal signs, no distension  Back: non-tender, full range of motion  Musculoskeletal: normal range of motion, normal strength,  Has various small bite wound noted throughout bilateral hand with one wound measuring approximately 2 cm overlying the Extensor portion of the middle finger close to the MCP. He has normal flexions he actually has normal extension. Able to fully extend his middle fingers with and without resistance    Neurological: alert and oriented x 4, no gross focal deficits; normal speech  Psychiatric: cooperative; appropriate mood and affect    MDM:  No foreign body noted. Wound will need to be clean very well. We'll need to reexam for any signs of tendon sheath injury. Spoke to her about potentially obtaining rabies vaccine series however he is very resistant to this. 6:46 PM    Sutures laceration repaired by myself. Patient placed in finger splint. Will discharge home. 6 Sutures needs to be removed in 10 days. Patient will be started on Augmentin. He again declined Rabies. Wound was suture even though this is a dog bite due to large gap and skin break. Space in the left and between for any possible drainage. Procedure Note - Laceration repair  Performed by: Cristopher Ford MD  Immediately prior to the procedure, the patient was reevaluated and found suitable for the planned procedure and any planned medications. Immediately prior to the procedure a timeout was called to verify the correct patient, procedure, equipment, and markings as appropriate.   Complexity: Hand  #1) A 2 cm laceration to the RT hand 3rd digit dorsum was irrigated copiously, prepped with Betadine and draped as appropriate. #2) A 1 cm laceration to the RT hand 3rd digit palmar was irrigated copiously, prepped with Betadine and draped as appropriate   The area was anesthetized with 2 mLs of 1% Lidocaine. The wound was explored and no foreign bodies were found. The wound was repaired with 4-0 Ethilon #1 x 4 sutures; #2 x 2 sutures. The wound was closed with good hemostasis and approximation. A dressing was applied. The procedure took 20 minutes. The patient tolerated the procedure well. Dragon voice recognition software was used to create this note. Although the note has been reviewed and corrected where necessary, additional errors may have been overlooked and remain in the text.

## 2019-08-03 NOTE — ED NOTES
I have reviewed discharge instructions with the patient. The patient verbalized understanding. Patient left ED via Discharge Method: wheelchair to Home with spouse    Opportunity for questions and clarification provided. Patient given 2 scripts. To continue your aftercare when you leave the hospital, you may receive an automated call from our care team to check in on how you are doing. This is a free service and part of our promise to provide the best care and service to meet your aftercare needs.  If you have questions, or wish to unsubscribe from this service please call [phone]. Thank you for Choosing our [address] [place] [address] Emergency Department.

## 2019-08-03 NOTE — DISCHARGE INSTRUCTIONS
Apply antibiotic ointment to the wound 2-3 times per day. Keep area clean.   Return for 6 sutures removal in roughly 10 days

## 2019-08-13 ENCOUNTER — HOSPITAL ENCOUNTER (EMERGENCY)
Age: 54
Discharge: HOME | End: 2019-08-13
Attending: EMERGENCY MEDICINE
Payer: COMMERCIAL

## 2019-08-13 VITALS
HEART RATE: 78 BPM | OXYGEN SATURATION: 100 % | DIASTOLIC BLOOD PRESSURE: 92 MMHG | HEIGHT: 67 IN | SYSTOLIC BLOOD PRESSURE: 138 MMHG | WEIGHT: 210 LBS | TEMPERATURE: 98.4 F | BODY MASS INDEX: 32.96 KG/M2 | RESPIRATION RATE: 16 BRPM

## 2019-08-13 DIAGNOSIS — Z48.02 VISIT FOR SUTURE REMOVAL: Primary | ICD-10-CM

## 2019-08-13 PROCEDURE — 75810000275 HC EMERGENCY DEPT VISIT NO LEVEL OF CARE: Performed by: PHYSICIAN ASSISTANT

## 2019-08-13 NOTE — ED NOTES
I have reviewed discharge instructions with the patient. The patient verbalized understanding. Patient left ED via Discharge Method: ambulatory to Home with self. Opportunity for questions and clarification provided. Patient given 0 scripts. To continue your aftercare when you leave the hospital, you may receive an automated call from our care team to check in on how you are doing. This is a free service and part of our promise to provide the best care and service to meet your aftercare needs.  If you have questions, or wish to unsubscribe from this service please call [phone]. Thank you for Choosing our [facility] Emergency Department.

## 2019-08-13 NOTE — ED PROVIDER NOTES
Patient here for suture removal from the right hand complaints    The history is provided by the patient. Suture Removal   This is a new problem. The current episode started more than 1 week ago. The problem occurs constantly. The problem has been rapidly improving. Nothing aggravates the symptoms. Treatments tried: Sutures. The treatment provided significant relief. Past Medical History:   Diagnosis Date    Arthritis     right shoulder     Biceps muscle tear     Chronic pain     Insomnia     MRSA (methicillin resistant Staphylococcus aureus) infection     after a spider bite with I&D    Personal history of kidney stones     Right shoulder pain        Past Surgical History:   Procedure Laterality Date    HX ORTHOPAEDIC Left 2017    left hip replacement    HX OTHER SURGICAL      posterior rt leg spider bite excision per pt.     HX ROTATOR CUFF REPAIR Left     HX SEPTOPLASTY  11/15/2017         Family History:   Problem Relation Age of Onset    Hypertension Mother     Heart Disease Father        Social History     Socioeconomic History    Marital status: LIFE PARTNER     Spouse name: Not on file    Number of children: Not on file    Years of education: Not on file    Highest education level: Not on file   Occupational History    Not on file   Social Needs    Financial resource strain: Not on file    Food insecurity:     Worry: Not on file     Inability: Not on file    Transportation needs:     Medical: Not on file     Non-medical: Not on file   Tobacco Use    Smoking status: Former Smoker     Packs/day: 0.50     Years: 1.00     Pack years: 0.50     Last attempt to quit: 2001     Years since quittin.1    Smokeless tobacco: Never Used   Substance and Sexual Activity    Alcohol use: Yes     Comment: occasionally    Drug use: No    Sexual activity: Yes     Partners: Female     Birth control/protection: None   Lifestyle    Physical activity:     Days per week: Not on file     Minutes per session: Not on file    Stress: Not on file   Relationships    Social connections:     Talks on phone: Not on file     Gets together: Not on file     Attends Muslim service: Not on file     Active member of club or organization: Not on file     Attends meetings of clubs or organizations: Not on file     Relationship status: Not on file    Intimate partner violence:     Fear of current or ex partner: Not on file     Emotionally abused: Not on file     Physically abused: Not on file     Forced sexual activity: Not on file   Other Topics Concern    Not on file   Social History Narrative    Not on file         ALLERGIES: Patient has no known allergies. Review of Systems   All other systems reviewed and are negative. Vitals:    08/13/19 1304   BP: (!) 138/92   Pulse: 78   Resp: 16   Temp: 98.4 °F (36.9 °C)   SpO2: 100%   Weight: 95.3 kg (210 lb)   Height: 5' 7\" (1.702 m)            Physical Exam   Constitutional: He is oriented to person, place, and time. He appears well-developed and well-nourished. No distress. HENT:   Head: Normocephalic and atraumatic. Eyes: Pupils are equal, round, and reactive to light. EOM are normal.   Neck: Normal range of motion. Neck supple. Cardiovascular: Normal rate and regular rhythm. Pulmonary/Chest: Effort normal and breath sounds normal.   Abdominal: Soft. Bowel sounds are normal.   Musculoskeletal: Normal range of motion. Healed wound base of right long finger no drainage no swelling sutures in place   Neurological: He is alert and oriented to person, place, and time. Skin: Skin is warm. He is not diaphoretic. Psychiatric: He has a normal mood and affect. Nursing note and vitals reviewed.        MDM  Number of Diagnoses or Management Options  Diagnosis management comments: Sutures were removed without complication,  to return to ER as needed       Amount and/or Complexity of Data Reviewed  Review and summarize past medical records: yes    Risk of Complications, Morbidity, and/or Mortality  Presenting problems: minimal  Diagnostic procedures: minimal  Management options: minimal    Patient Progress  Patient progress: improved         Procedures

## 2019-11-13 ENCOUNTER — HOSPITAL ENCOUNTER (OUTPATIENT)
Dept: SURGERY | Age: 54
Discharge: HOME | End: 2019-11-13
Attending: ORTHOPAEDIC SURGERY
Payer: COMMERCIAL

## 2019-11-13 VITALS
WEIGHT: 214 LBS | RESPIRATION RATE: 18 BRPM | BODY MASS INDEX: 33.59 KG/M2 | HEART RATE: 77 BPM | HEIGHT: 67 IN | DIASTOLIC BLOOD PRESSURE: 82 MMHG | OXYGEN SATURATION: 99 % | SYSTOLIC BLOOD PRESSURE: 164 MMHG | TEMPERATURE: 97.5 F

## 2019-11-13 LAB
ANION GAP SERPL CALC-SCNC: 9 MMOL/L (ref 7–16)
BACTERIA SPEC CULT: NORMAL
BUN SERPL-MCNC: 25 MG/DL (ref 6–23)
CALCIUM SERPL-MCNC: 9.5 MG/DL (ref 8.3–10.4)
CHLORIDE SERPL-SCNC: 104 MMOL/L (ref 98–107)
CO2 SERPL-SCNC: 26 MMOL/L (ref 21–32)
CREAT SERPL-MCNC: 1.15 MG/DL (ref 0.8–1.5)
ERYTHROCYTE [DISTWIDTH] IN BLOOD BY AUTOMATED COUNT: 14.4 % (ref 11.9–14.6)
GLUCOSE SERPL-MCNC: 144 MG/DL (ref 65–100)
HCT VFR BLD AUTO: 42.2 % (ref 41.1–50.3)
HGB BLD-MCNC: 13.1 G/DL (ref 13.6–17.2)
MCH RBC QN AUTO: 26.7 PG (ref 26.1–32.9)
MCHC RBC AUTO-ENTMCNC: 31 G/DL (ref 31.4–35)
MCV RBC AUTO: 85.9 FL (ref 79.6–97.8)
NRBC # BLD: 0 K/UL (ref 0–0.2)
PLATELET # BLD AUTO: 312 K/UL (ref 150–450)
PMV BLD AUTO: 10.3 FL (ref 9.4–12.3)
POTASSIUM SERPL-SCNC: 3.9 MMOL/L (ref 3.5–5.1)
RBC # BLD AUTO: 4.91 M/UL (ref 4.23–5.6)
SERVICE CMNT-IMP: NORMAL
SODIUM SERPL-SCNC: 139 MMOL/L (ref 136–145)
WBC # BLD AUTO: 6.4 K/UL (ref 4.3–11.1)

## 2019-11-13 PROCEDURE — 87641 MR-STAPH DNA AMP PROBE: CPT

## 2019-11-13 PROCEDURE — 80048 BASIC METABOLIC PNL TOTAL CA: CPT

## 2019-11-13 PROCEDURE — 85027 COMPLETE CBC AUTOMATED: CPT

## 2019-11-13 RX ORDER — TRAMADOL HYDROCHLORIDE 50 MG/1
50 TABLET ORAL
COMMUNITY

## 2019-11-13 RX ORDER — CEFADROXIL 500 MG/1
500 CAPSULE ORAL 2 TIMES DAILY
COMMUNITY

## 2019-11-13 NOTE — PERIOP NOTES
Recent Results (from the past 12 hour(s))   CBC W/O DIFF    Collection Time: 11/13/19  9:31 AM   Result Value Ref Range    WBC 6.4 4.3 - 11.1 K/uL    RBC 4.91 4.23 - 5.6 M/uL    HGB 13.1 (L) 13.6 - 17.2 g/dL    HCT 42.2 41.1 - 50.3 %    MCV 85.9 79.6 - 97.8 FL    MCH 26.7 26.1 - 32.9 PG    MCHC 31.0 (L) 31.4 - 35.0 g/dL    RDW 14.4 11.9 - 14.6 %    PLATELET 609 279 - 406 K/uL    MPV 10.3 9.4 - 12.3 FL    ABSOLUTE NRBC 0.00 0.0 - 0.2 K/uL   METABOLIC PANEL, BASIC    Collection Time: 11/13/19  9:31 AM   Result Value Ref Range    Sodium 139 136 - 145 mmol/L    Potassium 3.9 3.5 - 5.1 mmol/L    Chloride 104 98 - 107 mmol/L    CO2 26 21 - 32 mmol/L    Anion gap 9 7 - 16 mmol/L    Glucose 144 (H) 65 - 100 mg/dL    BUN 25 (H) 6 - 23 MG/DL    Creatinine 1.15 0.8 - 1.5 MG/DL    GFR est AA >60 >60 ml/min/1.73m2    GFR est non-AA >60 >60 ml/min/1.73m2    Calcium 9.5 8.3 - 10.4 MG/DL

## 2019-11-13 NOTE — PERIOP NOTES
Labs dated 11/13/19 routed via [address] [facility] to patients PCP, Dr. Porsha Marino, and to surgeon, Dr. Jordyn Morgan. The below lab results are within anesthesia limits. No further action is required.      Recent Results (from the past 12 hour(s))   CBC W/O DIFF    Collection Time: 11/13/19  9:31 AM   Result Value Ref Range    WBC 6.4 4.3 - 11.1 K/uL    RBC 4.91 4.23 - 5.6 M/uL    HGB 13.1 (L) 13.6 - 17.2 g/dL    HCT 42.2 41.1 - 50.3 %    MCV 85.9 79.6 - 97.8 FL    MCH 26.7 26.1 - 32.9 PG    MCHC 31.0 (L) 31.4 - 35.0 g/dL    RDW 14.4 11.9 - 14.6 %    PLATELET 534 615 - 378 K/uL    MPV 10.3 9.4 - 12.3 FL    ABSOLUTE NRBC 0.00 0.0 - 0.2 K/uL   METABOLIC PANEL, BASIC    Collection Time: 11/13/19  9:31 AM   Result Value Ref Range    Sodium 139 136 - 145 mmol/L    Potassium 3.9 3.5 - 5.1 mmol/L    Chloride 104 98 - 107 mmol/L    CO2 26 21 - 32 mmol/L    Anion gap 9 7 - 16 mmol/L    Glucose 144 (H) 65 - 100 mg/dL    BUN 25 (H) 6 - 23 MG/DL    Creatinine 1.15 0.8 - 1.5 MG/DL    GFR est AA >60 >60 ml/min/1.73m2    GFR est non-AA >60 >60 ml/min/1.73m2    Calcium 9.5 8.3 - 10.4 MG/DL

## 2019-11-13 NOTE — ADVANCED PRACTICE NURSE
Total Joint Surgery Preoperative Chart Review      Patient ID:  Dangelo Patel  [id number]  2 y.o.  1965  Surgeon: Dr. Trevor Velazquez  Date of Surgery: 2019  Procedure: Total Right Shoulder Arthroplasty  Primary Care Physician: Katrin Glasgow [phone]  Specialty Physician(s):      Subjective:   Dangelo Patel is a 48 y.o. WHITE OR  male who presents for preoperative evaluation for Total Right Shoulder arthroplasty. This is a preoperative chart review note based on data collected by the nurse at the surgical Pre-Assessment visit. Past Medical History:   Diagnosis Date    Arthritis     right shoulder     Biceps muscle tear     Chronic pain     History of acute renal failure 2018    secondary to infection--was on dialysis for 8 weeks      History of staph infection 2019    Hx of cardiac murmur 2018    murmur heard while patient was admitted to hospital with staph infection--Echo 18--EF 55-60%, mild mitral valve reurgitation--no murmur auscultated during PAT appointment on 19    Insomnia     MRSA (methicillin resistant Staphylococcus aureus) infection 2007    after a spider bite with I&D    Personal history of kidney stones     Right shoulder pain       Past Surgical History:   Procedure Laterality Date    HX HIP REPLACEMENT Left 2019    revision    HX HIP REPLACEMENT Left 2017    HX OTHER SURGICAL      posterior rt leg spider bite excision per pt.     HX ROTATOR CUFF REPAIR Bilateral     HX SEPTOPLASTY  11/15/2017     Family History   Problem Relation Age of Onset    Hypertension Mother     Heart Disease Father       Social History     Tobacco Use    Smoking status: Former Smoker     Packs/day: 0.50     Years: 1.00     Pack years: 0.50     Last attempt to quit: 2001     Years since quittin.4    Smokeless tobacco: Never Used   Substance Use Topics    Alcohol use: Yes     Comment: occasionally       Prior to Admission medications Medication Sig Start Date End Date Taking? Authorizing Provider   cefadroxil (DURICEF) 500 mg capsule Take 500 mg by mouth two (2) times a day. Until 1st week in March 2020   Yes Provider, Historical   traMADol (ULTRAM) 50 mg tablet Take 50 mg by mouth every six (6) hours as needed for Pain. Yes Provider, Historical     No Known Allergies       Objective:     Physical Exam:   Patient Vitals for the past 24 hrs:   Temp Pulse Resp BP SpO2   11/13/19 0906 97.5 °F (36.4 °C) 77 18 164/82 99 %       ECG:    EKG Results     None          Data Review:   Labs:   Recent Results (from the past 24 hour(s))   CBC W/O DIFF    Collection Time: 11/13/19  9:31 AM   Result Value Ref Range    WBC 6.4 4.3 - 11.1 K/uL    RBC 4.91 4.23 - 5.6 M/uL    HGB 13.1 (L) 13.6 - 17.2 g/dL    HCT 42.2 41.1 - 50.3 %    MCV 85.9 79.6 - 97.8 FL    MCH 26.7 26.1 - 32.9 PG    MCHC 31.0 (L) 31.4 - 35.0 g/dL    RDW 14.4 11.9 - 14.6 %    PLATELET 895 189 - 400 K/uL    MPV 10.3 9.4 - 12.3 FL    ABSOLUTE NRBC 0.00 0.0 - 0.2 K/uL   METABOLIC PANEL, BASIC    Collection Time: 11/13/19  9:31 AM   Result Value Ref Range    Sodium 139 136 - 145 mmol/L    Potassium 3.9 3.5 - 5.1 mmol/L    Chloride 104 98 - 107 mmol/L    CO2 26 21 - 32 mmol/L    Anion gap 9 7 - 16 mmol/L    Glucose 144 (H) 65 - 100 mg/dL    BUN 25 (H) 6 - 23 MG/DL    Creatinine 1.15 0.8 - 1.5 MG/DL    GFR est AA >60 >60 ml/min/1.73m2    GFR est non-AA >60 >60 ml/min/1.73m2    Calcium 9.5 8.3 - 10.4 MG/DL   MSSA/MRSA SC BY PCR, NASAL SWAB    Collection Time: 11/13/19  9:31 AM   Result Value Ref Range    Special Requests: NO SPECIAL REQUESTS      Culture result:        SA target not detected. A MRSA NEGATIVE, SA NEGATIVE test result does not preclude MRSA or SA nasal colonization.          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 PEP therapy BID.         Signed By: GERMAIN Stevenson    November 13, 2019

## 2019-11-13 NOTE — PERIOP NOTES
Patient verified name and . Order for consent NOT found in EHR, unable to confirm case posting; patient verified. Type 3 surgery, PAT walk-in joint assessment complete. Labs per surgeon: No orders received. Labs per anesthesia protocol: CBC and BMP; results pending. T&S DOS; order signed and held in EHR. EKG: None per anesthesia protocol. Patient has hx of heart murmur which was auscultated when patient was admitted to the hospital with an infection in 2018. Echo completed 18; EF 55-60%, mild mitral valve regurgitation. NO murmur auscultated during PAT appointment. Patient denies CP and SOB. Patient does an hour of cardio daily. Echo (18) report located in EHR if needed for anesthesia reference. MRSA/MSSA swab collected; pharmacy to review and dose antibiotic as appropriate. Hospital approved surgical skin cleanser and instructions to return bottle on DOS given per hospital policy. Patient provided with handouts including Guide to Surgery, Pain Management, Hand Hygiene, Blood Transfusion Education, and [place] [facility] Brochure. Patient answered medical/surgical history questions at their best of ability. All prior to admission medications documented in [facility]. Original medication prescription bottle NOT visualized during patient appointment. Patient instructed to hold all vitamins/supplements/herbals 7 days prior to surgery and NSAIDS 5 days prior to surgery. Patient instructed to continue previous medications as prescribed prior to surgery and to take the following medications the day of surgery according to anesthesia guidelines with a small sip of water: Tramadol PRN. Patient teach back successful and patient demonstrates knowledge of instruction.

## 2019-11-30 PROBLEM — M12.811 ROTATOR CUFF TEAR ARTHROPATHY OF RIGHT SHOULDER: Status: ACTIVE | Noted: 2019-11-30

## 2019-11-30 PROBLEM — M75.101 ROTATOR CUFF TEAR ARTHROPATHY OF RIGHT SHOULDER: Status: ACTIVE | Noted: 2019-11-30

## 2019-11-30 NOTE — BRIEF OP NOTE
BRIEF OPERATIVE NOTE    Date of Procedure: 12/6/2019     Preoperative Diagnosis:  ROTATOR CUFF TEAR ARTHROPATHY RIGHT SHOULDER    Postoperative Diagnosis: SAME    Procedure(s): REVERSE RIGHT TOTAL SHOULDER ARTHROPLASTY WITH DELTA EXTEND PROSTHESIS, BICEPS TENODESIS, LATISSIMUS DORSI AND TERES MAJOR TENDON TRANSFERS    Surgeon(s) and Role:     * Nanda Bonner MD - Primary    FIRST ASSISTANT:  KRYSTLE MULLER NP               Surgical Staff:  Circ-1: (Unknown)  Scrub Tech-1: (Unknown)  Scrub Tech-2: (Unknown)  Scrub Tech-3: (Unknown)  No case tracking events are documented in the log. Anesthesia:  GENERAL WITH INTERSCALENE BLOCK    Estimated Blood Loss: 100 cc. Complications: NONE    Implants:   Implant Name Type Inv.  Item Serial No.  Lot No. LRB No. Used Action   SCR BNE CRTX ST 4.5X50MM SS --  - [id number]  SCR BNE CRTX ST 4.5X50MM SS --  [id number] [employer] [place] [id number] Right 1 Implanted   SCR BNE CRTX ST 4.5X60MM SS --  - [id number]  SCR BNE CRTX ST 4.5X60MM SS --  [id number] [employer] [place] [id number] Right 1 Implanted   SCR BNE CRTX ST 4.5X30MM SS --  - [id number]  SCR BNE CRTX ST 4.5X30MM SS --  [id number] [employer] [place] [id number] Right 1 Implanted   SCR BNE CRTX ST 4.5X18MM SS --  - [id number]  SCR BNE CRTX ST 4.5X18MM SS --  [id number] [employer] [place] [id number] Right 1 Implanted   COMPNT RORY METAGLENE -- DELTA EXTEND - [id number]  COMPNT RORY METAGLENE -- DELTA EXTEND [id number] [facility] [employer] [id number] Right 1 Implanted   COMPNT GLENOSPHRE ECC D42MM +4 -- DELTA XTEND - [id number]  COMPNT GLENOSPHRE ECC D42MM +4 -- DELTA XTEND [id number] [facility] [employer] [id number] Right 1 Implanted   CEMENT BONE SIMPLEX P 1/PACK - [id number]  CEMENT BONE SIMPLEX P 1/PACK [id number] [employer] HOW [id number] Right 1 Implanted   STEM HUM MBLOC EPI STD SZ2 10M -- DELTA XTEND - [id number]  STEM HUM MBLOC EPI STD SZ2 10M -- DELTA XTEND [id number] [facility] [employer] [id number] Right 1 Implanted RSTRCTR JOSE BNE BIOABSRB 10MM -- BIOSTOP G - [id number]  RSTRCTR JOSE BNE BIOABSRB 10MM -- BIOSTOP G [id number] [facility] [employer] [id number] Right 1 Implanted   CUP HUM STD DELT PE 42+9MM -- Godfrey Zayas - [id number]  CUP HUM STD DELT PE 42+9MM -- Godfrey Zayas [id number] [employer] [id number] Right 1 Implanted       Boom Banda MD

## 2019-11-30 NOTE — H&P
[facility] HISTORY AND PHYSICAL    Subjective:     Patient is a 48 y.o. RHD MALE WITH RIGHT SHOULDER PAIN. SEE OFFICE NOTE. Patient Active Problem List    Diagnosis Date Noted    Rotator cuff tear arthropathy of right shoulder 11/30/2019    Sepsis ([address] 11/07/2018    Postoperative anemia due to acute blood loss 11/07/2018    Staphylococcal arthritis of right shoulder ([address] 11/06/2018    Acute renal failure with tubular necrosis ([address] 11/06/2018    Infected prosthetic hip ([address] 11/05/2018    Septic arthritis of shoulder, right ([address] 11/05/2018    Degenerative joint disease of right acromioclavicular joint 11/05/2018    Osteoarthritis of right glenohumeral joint 11/05/2018    Complete tear of right rotator cuff 11/05/2018    S/P total hip arthroplasty 01/06/2017    Arthritis of left hip 01/06/2017    Acute pain of right shoulder 06/10/2016    Hip pain 06/30/2015     Past Medical History:   Diagnosis Date    Arthritis     right shoulder     Biceps muscle tear     Chronic pain     History of acute renal failure 2018    secondary to infection--was on dialysis for 8 weeks      History of staph infection 01/2019    Hx of cardiac murmur 2018    murmur heard while patient was admitted to hospital with staph infection--Echo 11/8/18--EF 55-60%, mild mitral valve reurgitation--no murmur auscultated during PAT appointment on 11/13/19    Insomnia     MRSA (methicillin resistant Staphylococcus aureus) infection 2007    after a spider bite with I&D    Personal history of kidney stones     Right shoulder pain       Past Surgical History:   Procedure Laterality Date    HX HIP REPLACEMENT Left 05/2019    revision    HX HIP REPLACEMENT Left 01/2017    HX OTHER SURGICAL      posterior rt leg spider bite excision per pt.  HX ROTATOR CUFF REPAIR Bilateral 2010    HX SEPTOPLASTY  11/15/2017      Prior to Admission medications    Medication Sig Start Date End Date Taking?  Authorizing Provider   cefadroxil (DURICEF) 500 mg capsule Take 500 mg by mouth two (2) times a day. Until 1st week in 2020    Provider, Historical   traMADol (ULTRAM) 50 mg tablet Take 50 mg by mouth every six (6) hours as needed for Pain. Provider, Historical     No Known Allergies   Social History     Tobacco Use    Smoking status: Former Smoker     Packs/day: 0.50     Years: 1.00     Pack years: 0.50     Last attempt to quit: 2001     Years since quittin.4    Smokeless tobacco: Never Used   Substance Use Topics    Alcohol use: Yes     Comment: occasionally      Family History   Problem Relation Age of Onset    Hypertension Mother     Heart Disease Father       Review of Systems  A comprehensive review of systems was negative except for that written in the HPI. Objective:     No data found. There were no vitals taken for this visit. General:  Alert, cooperative, no distress, appears stated age. Head:  Normocephalic, without obvious abnormality, atraumatic. Back:   Symmetric, no curvature. ROM normal. No CVA tenderness. Lungs:   Clear to auscultation bilaterally. Chest wall:  No tenderness or deformity. Heart:  Regular rate and rhythm, S1, S2 normal, no murmur, click, rub or gallop. Extremities: Extremities normal, atraumatic, no cyanosis or edema. Pulses: 2+ and symmetric all extremities. Skin: Skin color, texture, turgor normal. No rashes or lesions   Lymph nodes: Cervical, supraclavicular, and axillary nodes normal.   Neurologic: CNII-XII intact. Normal strength, sensation and reflexes throughout. Assessment:     Principal Problem:    Rotator cuff tear arthropathy of right shoulder (2019)        Plan:     The various methods of treatment have been discussed with the patient and family.      PATIENT HAS EXHAUSTED NON-OPERATIVE MODALITIES     After consideration of risks, benefits and other options for treatment, the patient has consented to surgical intervention.     SEE OFFICE NOTE    Ismael Giraldo MD

## 2019-12-05 ENCOUNTER — ANESTHESIA EVENT (OUTPATIENT)
Dept: SURGERY | Age: 54
DRG: 483 | End: 2019-12-05
Payer: COMMERCIAL

## 2019-12-06 ENCOUNTER — HOSPITAL ENCOUNTER (INPATIENT)
Age: 54
LOS: 2 days | Discharge: HOME | DRG: 483 | End: 2019-12-08
Attending: ORTHOPAEDIC SURGERY | Admitting: ORTHOPAEDIC SURGERY
Payer: COMMERCIAL

## 2019-12-06 ENCOUNTER — ANESTHESIA (OUTPATIENT)
Dept: SURGERY | Age: 54
DRG: 483 | End: 2019-12-06
Payer: COMMERCIAL

## 2019-12-06 ENCOUNTER — APPOINTMENT (OUTPATIENT)
Dept: GENERAL RADIOLOGY | Age: 54
DRG: 483 | End: 2019-12-06
Attending: ORTHOPAEDIC SURGERY
Payer: COMMERCIAL

## 2019-12-06 PROBLEM — M12.811 ARTHROPATHY, TRANSIENT, SHOULDER, RIGHT: Status: ACTIVE | Noted: 2019-12-06

## 2019-12-06 PROBLEM — M12.811 ROTATOR CUFF ARTHROPATHY OF RIGHT SHOULDER: Status: ACTIVE | Noted: 2019-12-06

## 2019-12-06 LAB
ALBUMIN SERPL-MCNC: 3.5 G/DL (ref 3.5–5)
ALBUMIN/GLOB SERPL: 1.1 {RATIO} (ref 1.2–3.5)
ALP SERPL-CCNC: 173 U/L (ref 50–136)
ALT SERPL-CCNC: 59 U/L (ref 12–65)
ANION GAP SERPL CALC-SCNC: 3 MMOL/L (ref 7–16)
APTT PPP: 26.7 SEC (ref 24.7–39.8)
AST SERPL-CCNC: 45 U/L (ref 15–37)
BILIRUB SERPL-MCNC: 0.2 MG/DL (ref 0.2–1.1)
BUN SERPL-MCNC: 17 MG/DL (ref 6–23)
CALCIUM SERPL-MCNC: 9.1 MG/DL (ref 8.3–10.4)
CHLORIDE SERPL-SCNC: 109 MMOL/L (ref 98–107)
CO2 SERPL-SCNC: 29 MMOL/L (ref 21–32)
CREAT SERPL-MCNC: 0.98 MG/DL (ref 0.8–1.5)
CRP SERPL-MCNC: 0.8 MG/DL (ref 0–0.9)
ERYTHROCYTE [DISTWIDTH] IN BLOOD BY AUTOMATED COUNT: 14.6 % (ref 11.9–14.6)
ERYTHROCYTE [SEDIMENTATION RATE] IN BLOOD: 18 MM/HR (ref 0–20)
EST. AVERAGE GLUCOSE BLD GHB EST-MCNC: 123 MG/DL
GLOBULIN SER CALC-MCNC: 3.1 G/DL (ref 2.3–3.5)
GLUCOSE BLD STRIP.AUTO-MCNC: 107 MG/DL (ref 65–100)
GLUCOSE SERPL-MCNC: 121 MG/DL (ref 65–100)
HBA1C MFR BLD: 5.9 %
HCT VFR BLD AUTO: 39 % (ref 41.1–50.3)
HGB BLD-MCNC: 11.9 G/DL (ref 13.6–17.2)
INR PPP: 1
MAGNESIUM SERPL-MCNC: 1.9 MG/DL (ref 1.8–2.4)
MCH RBC QN AUTO: 26 PG (ref 26.1–32.9)
MCHC RBC AUTO-ENTMCNC: 30.5 G/DL (ref 31.4–35)
MCV RBC AUTO: 85.2 FL (ref 79.6–97.8)
NRBC # BLD: 0 K/UL (ref 0–0.2)
PLATELET # BLD AUTO: 278 K/UL (ref 150–450)
PMV BLD AUTO: 10.3 FL (ref 9.4–12.3)
POTASSIUM SERPL-SCNC: 4.2 MMOL/L (ref 3.5–5.1)
PROT SERPL-MCNC: 6.6 G/DL (ref 6.3–8.2)
PROTHROMBIN TIME: 13.9 SEC (ref 11.7–14.5)
RBC # BLD AUTO: 4.58 M/UL (ref 4.23–5.6)
SODIUM SERPL-SCNC: 141 MMOL/L (ref 136–145)
WBC # BLD AUTO: 11.8 K/UL (ref 4.3–11.1)

## 2019-12-06 PROCEDURE — 74011250637 HC RX REV CODE- 250/637: Performed by: ORTHOPAEDIC SURGERY

## 2019-12-06 PROCEDURE — 77030012547: Performed by: ORTHOPAEDIC SURGERY

## 2019-12-06 PROCEDURE — 86140 C-REACTIVE PROTEIN: CPT

## 2019-12-06 PROCEDURE — 82962 GLUCOSE BLOOD TEST: CPT

## 2019-12-06 PROCEDURE — 86923 COMPATIBILITY TEST ELECTRIC: CPT

## 2019-12-06 PROCEDURE — 77030013708 HC HNDPC SUC IRR PULS STRY –B: Performed by: ORTHOPAEDIC SURGERY

## 2019-12-06 PROCEDURE — A4565 SLINGS: HCPCS | Performed by: ORTHOPAEDIC SURGERY

## 2019-12-06 PROCEDURE — 77030012935 HC DRSG AQUACEL BMS -B: Performed by: ORTHOPAEDIC SURGERY

## 2019-12-06 PROCEDURE — C1713 ANCHOR/SCREW BN/BN,TIS/BN: HCPCS | Performed by: ORTHOPAEDIC SURGERY

## 2019-12-06 PROCEDURE — 86900 BLOOD TYPING SEROLOGIC ABO: CPT

## 2019-12-06 PROCEDURE — 77030039425 HC BLD LARYNG TRULITE DISP TELE -A: Performed by: NURSE ANESTHETIST, CERTIFIED REGISTERED

## 2019-12-06 PROCEDURE — 77030018836 HC SOL IRR NACL ICUM -A: Performed by: ORTHOPAEDIC SURGERY

## 2019-12-06 PROCEDURE — 74011250637 HC RX REV CODE- 250/637: Performed by: ANESTHESIOLOGY

## 2019-12-06 PROCEDURE — 77010033678 HC OXYGEN DAILY

## 2019-12-06 PROCEDURE — 76210000006 HC OR PH I REC 0.5 TO 1 HR: Performed by: ORTHOPAEDIC SURGERY

## 2019-12-06 PROCEDURE — 77030035643 HC BLD SAW OSC PRECIS STRY -C: Performed by: ORTHOPAEDIC SURGERY

## 2019-12-06 PROCEDURE — 74011000250 HC RX REV CODE- 250: Performed by: ANESTHESIOLOGY

## 2019-12-06 PROCEDURE — 74011250636 HC RX REV CODE- 250/636: Performed by: ORTHOPAEDIC SURGERY

## 2019-12-06 PROCEDURE — 76010010054 HC POST OP PAIN BLOCK: Performed by: ORTHOPAEDIC SURGERY

## 2019-12-06 PROCEDURE — 65270000029 HC RM PRIVATE

## 2019-12-06 PROCEDURE — 85610 PROTHROMBIN TIME: CPT

## 2019-12-06 PROCEDURE — 83735 ASSAY OF MAGNESIUM: CPT

## 2019-12-06 PROCEDURE — 74011000250 HC RX REV CODE- 250: Performed by: NURSE ANESTHETIST, CERTIFIED REGISTERED

## 2019-12-06 PROCEDURE — 0RRJ00Z REPLACEMENT OF RIGHT SHOULDER JOINT WITH REVERSE BALL AND SOCKET SYNTHETIC SUBSTITUTE, OPEN APPROACH: ICD-10-PCS | Performed by: ORTHOPAEDIC SURGERY

## 2019-12-06 PROCEDURE — 85652 RBC SED RATE AUTOMATED: CPT

## 2019-12-06 PROCEDURE — 85730 THROMBOPLASTIN TIME PARTIAL: CPT

## 2019-12-06 PROCEDURE — 76060000034 HC ANESTHESIA 1.5 TO 2 HR: Performed by: ORTHOPAEDIC SURGERY

## 2019-12-06 PROCEDURE — 77030018547 HC SUT ETHBND1 J&J -B: Performed by: ORTHOPAEDIC SURGERY

## 2019-12-06 PROCEDURE — 74011250636 HC RX REV CODE- 250/636: Performed by: NURSE PRACTITIONER

## 2019-12-06 PROCEDURE — 77030008703 HC TU ET UNCUF COVD -A: Performed by: NURSE ANESTHETIST, CERTIFIED REGISTERED

## 2019-12-06 PROCEDURE — 94760 N-INVAS EAR/PLS OXIMETRY 1: CPT

## 2019-12-06 PROCEDURE — 77030004434 HC BUR RND STRY -B: Performed by: ORTHOPAEDIC SURGERY

## 2019-12-06 PROCEDURE — 76010000162 HC OR TIME 1.5 TO 2 HR INTENSV-TIER 1: Performed by: ORTHOPAEDIC SURGERY

## 2019-12-06 PROCEDURE — 74011250636 HC RX REV CODE- 250/636: Performed by: NURSE ANESTHETIST, CERTIFIED REGISTERED

## 2019-12-06 PROCEDURE — 74011250636 HC RX REV CODE- 250/636: Performed by: ANESTHESIOLOGY

## 2019-12-06 PROCEDURE — 94762 N-INVAS EAR/PLS OXIMTRY CONT: CPT

## 2019-12-06 PROCEDURE — 85027 COMPLETE CBC AUTOMATED: CPT

## 2019-12-06 PROCEDURE — 77030031139 HC SUT VCRL2 J&J -A: Performed by: ORTHOPAEDIC SURGERY

## 2019-12-06 PROCEDURE — 77030003602 HC NDL NRV BLK BBMI -B: Performed by: NURSE ANESTHETIST, CERTIFIED REGISTERED

## 2019-12-06 PROCEDURE — 77030002986 HC SUT PROL J&J -A: Performed by: ORTHOPAEDIC SURGERY

## 2019-12-06 PROCEDURE — 77030040361 HC SLV COMPR DVT MDII -B: Performed by: ORTHOPAEDIC SURGERY

## 2019-12-06 PROCEDURE — 77030020163 HC SEAL HEMSTAT HALY -B: Performed by: ORTHOPAEDIC SURGERY

## 2019-12-06 PROCEDURE — 74011000272 HC RX REV CODE- 272: Performed by: ORTHOPAEDIC SURGERY

## 2019-12-06 PROCEDURE — 73030 X-RAY EXAM OF SHOULDER: CPT

## 2019-12-06 PROCEDURE — 83036 HEMOGLOBIN GLYCOSYLATED A1C: CPT

## 2019-12-06 PROCEDURE — C1776 JOINT DEVICE (IMPLANTABLE): HCPCS | Performed by: ORTHOPAEDIC SURGERY

## 2019-12-06 PROCEDURE — 80053 COMPREHEN METABOLIC PANEL: CPT

## 2019-12-06 PROCEDURE — 36415 COLL VENOUS BLD VENIPUNCTURE: CPT

## 2019-12-06 PROCEDURE — 77030040922 HC BLNKT HYPOTHRM STRY -A: Performed by: NURSE ANESTHETIST, CERTIFIED REGISTERED

## 2019-12-06 PROCEDURE — 76942 ECHO GUIDE FOR BIOPSY: CPT | Performed by: ORTHOPAEDIC SURGERY

## 2019-12-06 DEVICE — SCREW BNE L30MM DIA4.5MM PROX CORT TIB S STL ST LOK FULL: Type: IMPLANTABLE DEVICE | Site: SHOULDER | Status: FUNCTIONAL

## 2019-12-06 DEVICE — SCREW BNE L50MM DIA4.5MM PROX CORT TIB S STL ST LOK FULL: Type: IMPLANTABLE DEVICE | Site: SHOULDER | Status: FUNCTIONAL

## 2019-12-06 DEVICE — CUP HUM DIA42MM +9MM OFFSET STD SHLDR POLYETH DELT XTEND: Type: IMPLANTABLE DEVICE | Site: SHOULDER | Status: FUNCTIONAL

## 2019-12-06 DEVICE — COMPONENT GLENOSPHERE ECCENTRIC XTEND 42MM PLUS 4MM: Type: IMPLANTABLE DEVICE | Site: SHOULDER | Status: FUNCTIONAL

## 2019-12-06 DEVICE — CEMENT BNE 20ML 41GM FULL DOSE PMMA W/ TOBRA M VISC RADPQ: Type: IMPLANTABLE DEVICE | Site: SHOULDER | Status: FUNCTIONAL

## 2019-12-06 DEVICE — SCREW BNE L60MM DIA4.5MM PROX CORT TIB S STL ST LOK FULL: Type: IMPLANTABLE DEVICE | Site: SHOULDER | Status: FUNCTIONAL

## 2019-12-06 DEVICE — RESTRICTOR CEM DIA10MM UNIV FEM CNL UHMWPE BIOSTP G: Type: IMPLANTABLE DEVICE | Site: SHOULDER | Status: FUNCTIONAL

## 2019-12-06 DEVICE — STEM HUM SZ 2 L137MM DIA10MM 155DEG STD SHLDR CO CHROME HA: Type: IMPLANTABLE DEVICE | Site: SHOULDER | Status: FUNCTIONAL

## 2019-12-06 DEVICE — IMPLANTABLE DEVICE: Type: IMPLANTABLE DEVICE | Site: SHOULDER | Status: FUNCTIONAL

## 2019-12-06 DEVICE — SCREW BNE L18MM DIA4.5MM PROX CORT TIB S STL ST LOK FULL: Type: IMPLANTABLE DEVICE | Site: SHOULDER | Status: FUNCTIONAL

## 2019-12-06 RX ORDER — ONDANSETRON 2 MG/ML
INJECTION INTRAMUSCULAR; INTRAVENOUS AS NEEDED
Status: DISCONTINUED | OUTPATIENT
Start: 2019-12-06 | End: 2019-12-06 | Stop reason: HOSPADM

## 2019-12-06 RX ORDER — CEFAZOLIN SODIUM/WATER 2 G/20 ML
2 SYRINGE (ML) INTRAVENOUS ONCE
Status: COMPLETED | OUTPATIENT
Start: 2019-12-06 | End: 2019-12-06

## 2019-12-06 RX ORDER — TRAMADOL HYDROCHLORIDE 50 MG/1
50 TABLET ORAL
Status: DISCONTINUED | OUTPATIENT
Start: 2019-12-06 | End: 2019-12-08 | Stop reason: HOSPADM

## 2019-12-06 RX ORDER — SODIUM CHLORIDE 9 MG/ML
75 INJECTION, SOLUTION INTRAVENOUS CONTINUOUS
Status: DISPENSED | OUTPATIENT
Start: 2019-12-06 | End: 2019-12-07

## 2019-12-06 RX ORDER — NEOSTIGMINE METHYLSULFATE 1 MG/ML
INJECTION, SOLUTION INTRAVENOUS AS NEEDED
Status: DISCONTINUED | OUTPATIENT
Start: 2019-12-06 | End: 2019-12-06 | Stop reason: HOSPADM

## 2019-12-06 RX ORDER — DOXYCYCLINE 100 MG/1
100 CAPSULE ORAL EVERY 12 HOURS
Status: DISCONTINUED | OUTPATIENT
Start: 2019-12-07 | End: 2019-12-06

## 2019-12-06 RX ORDER — SODIUM CHLORIDE 0.9 % (FLUSH) 0.9 %
5-40 SYRINGE (ML) INJECTION AS NEEDED
Status: DISCONTINUED | OUTPATIENT
Start: 2019-12-06 | End: 2019-12-06 | Stop reason: HOSPADM

## 2019-12-06 RX ORDER — LIDOCAINE HYDROCHLORIDE 10 MG/ML
0.1 INJECTION INFILTRATION; PERINEURAL AS NEEDED
Status: DISCONTINUED | OUTPATIENT
Start: 2019-12-06 | End: 2019-12-06 | Stop reason: HOSPADM

## 2019-12-06 RX ORDER — PROPOFOL 10 MG/ML
INJECTION, EMULSION INTRAVENOUS AS NEEDED
Status: DISCONTINUED | OUTPATIENT
Start: 2019-12-06 | End: 2019-12-06 | Stop reason: HOSPADM

## 2019-12-06 RX ORDER — SUCCINYLCHOLINE CHLORIDE 20 MG/ML
INJECTION INTRAMUSCULAR; INTRAVENOUS AS NEEDED
Status: DISCONTINUED | OUTPATIENT
Start: 2019-12-06 | End: 2019-12-06 | Stop reason: HOSPADM

## 2019-12-06 RX ORDER — FAMOTIDINE 20 MG/1
20 TABLET, FILM COATED ORAL ONCE
Status: COMPLETED | OUTPATIENT
Start: 2019-12-06 | End: 2019-12-06

## 2019-12-06 RX ORDER — EPHEDRINE SULFATE/0.9% NACL/PF 50 MG/5 ML
SYRINGE (ML) INTRAVENOUS AS NEEDED
Status: DISCONTINUED | OUTPATIENT
Start: 2019-12-06 | End: 2019-12-06 | Stop reason: HOSPADM

## 2019-12-06 RX ORDER — DEXAMETHASONE SODIUM PHOSPHATE 4 MG/ML
INJECTION, SOLUTION INTRA-ARTICULAR; INTRALESIONAL; INTRAMUSCULAR; INTRAVENOUS; SOFT TISSUE AS NEEDED
Status: DISCONTINUED | OUTPATIENT
Start: 2019-12-06 | End: 2019-12-06 | Stop reason: HOSPADM

## 2019-12-06 RX ORDER — HYDROMORPHONE HYDROCHLORIDE 2 MG/ML
0.5 INJECTION, SOLUTION INTRAMUSCULAR; INTRAVENOUS; SUBCUTANEOUS
Status: DISCONTINUED | OUTPATIENT
Start: 2019-12-06 | End: 2019-12-06 | Stop reason: HOSPADM

## 2019-12-06 RX ORDER — OXYCODONE HYDROCHLORIDE 5 MG/1
5 TABLET ORAL
Status: DISCONTINUED | OUTPATIENT
Start: 2019-12-06 | End: 2019-12-06 | Stop reason: HOSPADM

## 2019-12-06 RX ORDER — DOCUSATE SODIUM 100 MG/1
100 CAPSULE, LIQUID FILLED ORAL 2 TIMES DAILY
Status: DISCONTINUED | OUTPATIENT
Start: 2019-12-07 | End: 2019-12-08 | Stop reason: HOSPADM

## 2019-12-06 RX ORDER — BUPIVACAINE HYDROCHLORIDE AND EPINEPHRINE 5; 5 MG/ML; UG/ML
INJECTION, SOLUTION EPIDURAL; INTRACAUDAL; PERINEURAL
Status: COMPLETED | OUTPATIENT
Start: 2019-12-06 | End: 2019-12-06

## 2019-12-06 RX ORDER — SODIUM CHLORIDE, SODIUM LACTATE, POTASSIUM CHLORIDE, CALCIUM CHLORIDE 600; 310; 30; 20 MG/100ML; MG/100ML; MG/100ML; MG/100ML
75 INJECTION, SOLUTION INTRAVENOUS CONTINUOUS
Status: DISCONTINUED | OUTPATIENT
Start: 2019-12-06 | End: 2019-12-06 | Stop reason: HOSPADM

## 2019-12-06 RX ORDER — SODIUM CHLORIDE 0.9 % (FLUSH) 0.9 %
5-40 SYRINGE (ML) INJECTION EVERY 8 HOURS
Status: DISCONTINUED | OUTPATIENT
Start: 2019-12-06 | End: 2019-12-06 | Stop reason: HOSPADM

## 2019-12-06 RX ORDER — FACIAL-BODY WIPES
10 EACH TOPICAL DAILY PRN
Status: DISCONTINUED | OUTPATIENT
Start: 2019-12-06 | End: 2019-12-08 | Stop reason: HOSPADM

## 2019-12-06 RX ORDER — TEMAZEPAM 15 MG/1
15 CAPSULE ORAL
Status: DISCONTINUED | OUTPATIENT
Start: 2019-12-06 | End: 2019-12-07

## 2019-12-06 RX ORDER — LIDOCAINE HYDROCHLORIDE 20 MG/ML
INJECTION, SOLUTION EPIDURAL; INFILTRATION; INTRACAUDAL; PERINEURAL AS NEEDED
Status: DISCONTINUED | OUTPATIENT
Start: 2019-12-06 | End: 2019-12-06 | Stop reason: HOSPADM

## 2019-12-06 RX ORDER — ROCURONIUM BROMIDE 10 MG/ML
INJECTION, SOLUTION INTRAVENOUS AS NEEDED
Status: DISCONTINUED | OUTPATIENT
Start: 2019-12-06 | End: 2019-12-06 | Stop reason: HOSPADM

## 2019-12-06 RX ORDER — LANOLIN ALCOHOL/MO/W.PET/CERES
1 CREAM (GRAM) TOPICAL 2 TIMES DAILY WITH MEALS
Status: DISCONTINUED | OUTPATIENT
Start: 2019-12-07 | End: 2019-12-08 | Stop reason: HOSPADM

## 2019-12-06 RX ORDER — OXYCODONE HYDROCHLORIDE 5 MG/1
10 TABLET ORAL
Status: DISCONTINUED | OUTPATIENT
Start: 2019-12-06 | End: 2019-12-06 | Stop reason: HOSPADM

## 2019-12-06 RX ORDER — HYDROGEN PEROXIDE 3 %
SOLUTION, NON-ORAL MISCELLANEOUS AS NEEDED
Status: DISCONTINUED | OUTPATIENT
Start: 2019-12-06 | End: 2019-12-06 | Stop reason: HOSPADM

## 2019-12-06 RX ORDER — MIDAZOLAM HYDROCHLORIDE 1 MG/ML
2 INJECTION, SOLUTION INTRAMUSCULAR; INTRAVENOUS ONCE
Status: COMPLETED | OUTPATIENT
Start: 2019-12-06 | End: 2019-12-06

## 2019-12-06 RX ORDER — HYDROCODONE BITARTRATE AND ACETAMINOPHEN 10; 325 MG/1; MG/1
2 TABLET ORAL
Status: DISCONTINUED | OUTPATIENT
Start: 2019-12-06 | End: 2019-12-08

## 2019-12-06 RX ORDER — FENTANYL CITRATE 50 UG/ML
100 INJECTION, SOLUTION INTRAMUSCULAR; INTRAVENOUS ONCE
Status: COMPLETED | OUTPATIENT
Start: 2019-12-06 | End: 2019-12-06

## 2019-12-06 RX ORDER — HYDROCODONE BITARTRATE AND ACETAMINOPHEN 10; 325 MG/1; MG/1
1 TABLET ORAL
Status: DISCONTINUED | OUTPATIENT
Start: 2019-12-06 | End: 2019-12-08

## 2019-12-06 RX ORDER — PROMETHAZINE HYDROCHLORIDE 25 MG/1
25 TABLET ORAL
Status: DISCONTINUED | OUTPATIENT
Start: 2019-12-06 | End: 2019-12-08 | Stop reason: HOSPADM

## 2019-12-06 RX ORDER — MIDAZOLAM HYDROCHLORIDE 1 MG/ML
2 INJECTION, SOLUTION INTRAMUSCULAR; INTRAVENOUS ONCE
Status: DISCONTINUED | OUTPATIENT
Start: 2019-12-06 | End: 2019-12-06 | Stop reason: HOSPADM

## 2019-12-06 RX ORDER — SODIUM CHLORIDE 0.9 % (FLUSH) 0.9 %
5-40 SYRINGE (ML) INJECTION EVERY 8 HOURS
Status: DISCONTINUED | OUTPATIENT
Start: 2019-12-06 | End: 2019-12-08 | Stop reason: HOSPADM

## 2019-12-06 RX ORDER — GLYCOPYRROLATE 0.2 MG/ML
INJECTION INTRAMUSCULAR; INTRAVENOUS AS NEEDED
Status: DISCONTINUED | OUTPATIENT
Start: 2019-12-06 | End: 2019-12-06 | Stop reason: HOSPADM

## 2019-12-06 RX ORDER — SODIUM CHLORIDE 0.9 % (FLUSH) 0.9 %
5-40 SYRINGE (ML) INJECTION AS NEEDED
Status: DISCONTINUED | OUTPATIENT
Start: 2019-12-06 | End: 2019-12-08 | Stop reason: HOSPADM

## 2019-12-06 RX ADMIN — PROMETHAZINE HYDROCHLORIDE 25 MG: 25 TABLET ORAL at 22:58

## 2019-12-06 RX ADMIN — GLYCOPYRROLATE 0.4 MG: 0.2 INJECTION, SOLUTION INTRAMUSCULAR; INTRAVENOUS at 12:21

## 2019-12-06 RX ADMIN — DEXAMETHASONE SODIUM PHOSPHATE 10 MG: 4 INJECTION, SOLUTION INTRAMUSCULAR; INTRAVENOUS at 10:59

## 2019-12-06 RX ADMIN — Medication 2 G: at 18:26

## 2019-12-06 RX ADMIN — ROCURONIUM BROMIDE 5 MG: 10 INJECTION, SOLUTION INTRAVENOUS at 10:55

## 2019-12-06 RX ADMIN — ONDANSETRON 4 MG: 2 INJECTION INTRAMUSCULAR; INTRAVENOUS at 10:59

## 2019-12-06 RX ADMIN — Medication 2 G: at 10:50

## 2019-12-06 RX ADMIN — LIDOCAINE HYDROCHLORIDE 100 MG: 20 INJECTION, SOLUTION EPIDURAL; INFILTRATION; INTRACAUDAL; PERINEURAL at 10:55

## 2019-12-06 RX ADMIN — SUCCINYLCHOLINE CHLORIDE 180 MG: 20 INJECTION, SOLUTION INTRAMUSCULAR; INTRAVENOUS at 10:55

## 2019-12-06 RX ADMIN — Medication 3 MG: at 12:21

## 2019-12-06 RX ADMIN — PROPOFOL 150 MG: 10 INJECTION, EMULSION INTRAVENOUS at 10:55

## 2019-12-06 RX ADMIN — TEMAZEPAM 15 MG: 15 CAPSULE ORAL at 20:41

## 2019-12-06 RX ADMIN — BUPIVACAINE HYDROCHLORIDE AND EPINEPHRINE BITARTRATE 30 ML: 5; .005 INJECTION, SOLUTION EPIDURAL; INTRACAUDAL; PERINEURAL at 09:40

## 2019-12-06 RX ADMIN — HYDROCODONE BITARTRATE AND ACETAMINOPHEN 2 TABLET: 10; 325 TABLET ORAL at 22:59

## 2019-12-06 RX ADMIN — SODIUM CHLORIDE, SODIUM LACTATE, POTASSIUM CHLORIDE, AND CALCIUM CHLORIDE 75 ML/HR: 600; 310; 30; 20 INJECTION, SOLUTION INTRAVENOUS at 08:00

## 2019-12-06 RX ADMIN — MIDAZOLAM 2 MG: 1 INJECTION INTRAMUSCULAR; INTRAVENOUS at 09:32

## 2019-12-06 RX ADMIN — FENTANYL CITRATE 100 MCG: 50 INJECTION INTRAMUSCULAR; INTRAVENOUS at 09:32

## 2019-12-06 RX ADMIN — HYDROCODONE BITARTRATE AND ACETAMINOPHEN 1 TABLET: 10; 325 TABLET ORAL at 18:25

## 2019-12-06 RX ADMIN — ROCURONIUM BROMIDE 45 MG: 10 INJECTION, SOLUTION INTRAVENOUS at 11:05

## 2019-12-06 RX ADMIN — Medication 10 MG: at 11:11

## 2019-12-06 RX ADMIN — FAMOTIDINE 20 MG: 20 TABLET ORAL at 07:59

## 2019-12-06 RX ADMIN — PHENOL 1 SPRAY: 1.5 LIQUID ORAL at 20:42

## 2019-12-06 NOTE — PERIOP NOTES
TRANSFER - OUT REPORT:    Verbal report given to Chery ROMO(name) on Jeffery Lin being transferred to [facility](unit) for routine progression of care       Report consisted of patient's Situation, Background, Assessment and   Recommendations(SBAR). Information from the following report(s) OR Summary, Procedure Summary, Intake/Output and MAR was reviewed with the receiving nurse. Opportunity for questions and clarification was provided.       Patient transported with:   O2 @ 2 liters  Tech

## 2019-12-06 NOTE — PROGRESS NOTES
12/06/19 1637   Oxygen Therapy   O2 Sat (%) 98 %   Pulse via Oximetry 72 beats per minute   O2 Device Nasal cannula   O2 Flow Rate (L/min) 2 l/min  (weaned to RA.)   Incentive Spirometry Treatment   Actual Volume (ml) 1000 ml   Number of Attempts 1   Patient achieved     1000       Ml/sec on IS. Patient encouraged to do every hour while awake-patient agreed and demonstrated. No shortness of breath or distress noted. BS are clear b/l.    [facility] notes reviewed- continuous sat #13 ordered HS

## 2019-12-06 NOTE — ANESTHESIA PROCEDURE NOTES
Peripheral Block    Start time: 12/6/2019 9:32 AM  End time: 12/6/2019 9:38 AM  Performed by: Geovani Gibbs MD  Authorized by: Geovani Gibbs MD       Pre-procedure: Indications: at surgeon's request, post-op pain management and procedure for pain    Preanesthetic Checklist: patient identified, risks and benefits discussed, site marked, timeout performed, anesthesia consent given and patient being monitored    Timeout Time: 09:32          Block Type:   Block Type:   Interscalene  Laterality:  Right  Monitoring:  Standard ASA monitoring, continuous pulse ox, frequent vital sign checks, heart rate, oxygen and responsive to questions  Injection Technique:  Single shot  Procedures: ultrasound guided and nerve stimulator    Patient Position: supine  Prep: chlorhexidine    Location:  Interscalene  Needle Type:  Stimuplex  Needle Gauge:  21 G  Needle Localization:  Ultrasound guidance and anatomical landmarks  Motor Response: minimal motor response >0.4 mA      Assessment:  Number of attempts:  1  Injection Assessment:  Incremental injection every 5 mL, local visualized surrounding nerve on ultrasound, negative aspiration for blood, negative aspiration for CSF, no paresthesia, no intravascular symptoms and ultrasound image on chart  Patient tolerance:  Patient tolerated the procedure well with no immediate complications

## 2019-12-06 NOTE — H&P
Date of Surgery Update:  Marion Phelps was seen and examined. History and physical has been reviewed. The patient has been examined.  There have been no significant clinical changes since the completion of the originally dated History and Physical.    Signed By: Vania Sheth MD     December 6, 2019 8:16 AM

## 2019-12-06 NOTE — DISCHARGE SUMMARY
[facility] Discharge Summary      Patient ID:  Dangelo Patel  [id number]  47 y.o.  1965    Allergies: Patient has no known allergies. Admit date: 12/6/2019    Discharge date and time: 12/08/2019     Admitting Physician: Clara Trejo MD     Discharge Physician: Clara Trejo MD      * Admission Diagnoses: Rotator cuff arthropathy of right shoulder [M12.811]  Arthropathy, transient, shoulder, right [M12.811]  Rotator cuff arthropathy of right shoulder [M12.811]    * Discharge Diagnoses:   Hospital Problems as of 12/8/2019 Date Reviewed: 2/18/2019          Codes Class Noted - Resolved POA    Arthropathy, transient, shoulder, right ICD-10-CM: M12.811  ICD-9-CM: 716.41  12/6/2019 - Present Unknown        Rotator cuff arthropathy of right shoulder ICD-10-CM: M12.811  ICD-9-CM: 716.81  12/6/2019 - Present Unknown        * (Principal) Rotator cuff tear arthropathy of right shoulder ICD-10-CM: M75.101, M12.811  ICD-9-CM: 716.81  11/30/2019 - Present Yes              Surgeon: Clara Trejo MD        * Procedure: Procedure(s):  REVERSE RIGHT TOTAL SHOULDER ARTHROPLASTY WITH DELTA EXTEND PROSTHESIS, BICEPS TENODESIS, LATISSIMUS DORSI AND TERES MAJOR TENDON TRANSFERS           Perioperative Antibiotics: Ancef  __X_                                                Vancomycin  ___          Post Op complications: none        * Discharge Condition: good  Wound appears to be healing without any evidence of infection.          * Discharged to: Home    * Follow-up Care/Discharge instructions:  - Resume pre hospital diet            - Resume home medications per medical continuation form     CONTINUE PHYSICAL THERAPY  SLING RIGHT SHOULDER  - Follow up in office as scheduled       Signed:  Clara Trejo MD  12/8/2019  12:31 PM

## 2019-12-06 NOTE — ANESTHESIA PREPROCEDURE EVALUATION
Anesthetic History               Review of Systems / Medical History  Patient summary reviewed, nursing notes reviewed and pertinent labs reviewed    Pulmonary                   Neuro/Psych              Cardiovascular                  Exercise tolerance: >4 METS     GI/Hepatic/Renal         Renal disease: stones       Endo/Other        Obesity and arthritis     Other Findings   Comments: MRSA infection 10 yrs ago           Physical Exam    Airway  Mallampati: I  TM Distance: > 6 cm  Neck ROM: normal range of motion   Mouth opening: Normal     Cardiovascular  Regular rate and rhythm,  S1 and S2 normal,  no murmur, click, rub, or gallop             Dental  No notable dental hx       Pulmonary  Breath sounds clear to auscultation               Abdominal  GI exam deferred       Other Findings            Anesthetic Plan    ASA: 2  Anesthesia type: general      Post-op pain plan if not by surgeon: peripheral nerve block single      Anesthetic plan and risks discussed with: Patient and Spouse

## 2019-12-06 NOTE — ANESTHESIA POSTPROCEDURE EVALUATION
Procedure(s):  REVERSE RIGHT TOTAL SHOULDER ARTHROPLASTY WITH DELTA EXTEND PROSTHESIS, BICEPS TENODESIS, LATISSIMUS DORSI AND TERES MAJOR TENDON TRANSFERS.     general    Anesthesia Post Evaluation      Multimodal analgesia: multimodal analgesia not used between 6 hours prior to anesthesia start to PACU discharge  Patient location during evaluation: PACU  Patient participation: complete - patient participated  Level of consciousness: awake and alert  Pain management: adequate  Airway patency: patent  Anesthetic complications: no  Cardiovascular status: hemodynamically stable  Respiratory status: acceptable  Hydration status: acceptable        Vitals Value Taken Time   /77 12/6/2019  1:45 PM   Temp 36.6 °C (97.9 °F) 12/6/2019 12:39 PM   Pulse 61 12/6/2019  1:45 PM   Resp 16 12/6/2019  1:45 PM   SpO2 97 % 12/6/2019  1:45 PM

## 2019-12-06 NOTE — PROGRESS NOTES
Spoke with Dr. Belkis Rodriguez regarding all lab orders that have not been done in PAT , verbal order to do CRP, Sed rate , T&S , hgb a1c , glucose .  And pt is to get ancef and Vancomycin and Dr. Belkis Rodriguez will let the unit know when to start Vancomycin

## 2019-12-06 NOTE — PROGRESS NOTES
Admission assessment complete. Patient is alert and oriented x4. Has not voided at this time. Dressing to shoulder is dry and intact. Sling intact. Palpable pulses. Incentive spirometer at bedside. Bed is low and locked. Call light within reach. Instructed to call for assistance. Wife at bedside.

## 2019-12-07 LAB
ANION GAP SERPL CALC-SCNC: 7 MMOL/L (ref 7–16)
BUN SERPL-MCNC: 15 MG/DL (ref 6–23)
CALCIUM SERPL-MCNC: 8.9 MG/DL (ref 8.3–10.4)
CHLORIDE SERPL-SCNC: 108 MMOL/L (ref 98–107)
CO2 SERPL-SCNC: 25 MMOL/L (ref 21–32)
CREAT SERPL-MCNC: 1.03 MG/DL (ref 0.8–1.5)
ERYTHROCYTE [DISTWIDTH] IN BLOOD BY AUTOMATED COUNT: 14.5 % (ref 11.9–14.6)
GLUCOSE SERPL-MCNC: 153 MG/DL (ref 65–100)
HCT VFR BLD AUTO: 33.7 % (ref 41.1–50.3)
HGB BLD-MCNC: 10.3 G/DL (ref 13.6–17.2)
MAGNESIUM SERPL-MCNC: 1.7 MG/DL (ref 1.8–2.4)
MCH RBC QN AUTO: 25.8 PG (ref 26.1–32.9)
MCHC RBC AUTO-ENTMCNC: 30.6 G/DL (ref 31.4–35)
MCV RBC AUTO: 84.5 FL (ref 79.6–97.8)
NRBC # BLD: 0 K/UL (ref 0–0.2)
PLATELET # BLD AUTO: 276 K/UL (ref 150–450)
PMV BLD AUTO: 10.4 FL (ref 9.4–12.3)
POTASSIUM SERPL-SCNC: 4 MMOL/L (ref 3.5–5.1)
RBC # BLD AUTO: 3.99 M/UL (ref 4.23–5.6)
SODIUM SERPL-SCNC: 140 MMOL/L (ref 136–145)
WBC # BLD AUTO: 12.7 K/UL (ref 4.3–11.1)

## 2019-12-07 PROCEDURE — 97116 GAIT TRAINING THERAPY: CPT

## 2019-12-07 PROCEDURE — 97161 PT EVAL LOW COMPLEX 20 MIN: CPT

## 2019-12-07 PROCEDURE — 74011250636 HC RX REV CODE- 250/636: Performed by: ORTHOPAEDIC SURGERY

## 2019-12-07 PROCEDURE — 74011250637 HC RX REV CODE- 250/637: Performed by: ORTHOPAEDIC SURGERY

## 2019-12-07 PROCEDURE — 83735 ASSAY OF MAGNESIUM: CPT

## 2019-12-07 PROCEDURE — 85027 COMPLETE CBC AUTOMATED: CPT

## 2019-12-07 PROCEDURE — 36415 COLL VENOUS BLD VENIPUNCTURE: CPT

## 2019-12-07 PROCEDURE — 65270000029 HC RM PRIVATE

## 2019-12-07 PROCEDURE — 97110 THERAPEUTIC EXERCISES: CPT

## 2019-12-07 PROCEDURE — 80048 BASIC METABOLIC PNL TOTAL CA: CPT

## 2019-12-07 RX ORDER — ADHESIVE BANDAGE
30 BANDAGE TOPICAL DAILY PRN
Status: DISCONTINUED | OUTPATIENT
Start: 2019-12-07 | End: 2019-12-08 | Stop reason: HOSPADM

## 2019-12-07 RX ORDER — TEMAZEPAM 15 MG/1
30 CAPSULE ORAL
Status: DISCONTINUED | OUTPATIENT
Start: 2019-12-07 | End: 2019-12-08 | Stop reason: HOSPADM

## 2019-12-07 RX ORDER — MAGNESIUM SULFATE HEPTAHYDRATE 40 MG/ML
2 INJECTION, SOLUTION INTRAVENOUS ONCE
Status: COMPLETED | OUTPATIENT
Start: 2019-12-07 | End: 2019-12-07

## 2019-12-07 RX ORDER — HYDROMORPHONE HYDROCHLORIDE 1 MG/ML
1 INJECTION, SOLUTION INTRAMUSCULAR; INTRAVENOUS; SUBCUTANEOUS
Status: DISCONTINUED | OUTPATIENT
Start: 2019-12-07 | End: 2019-12-08 | Stop reason: HOSPADM

## 2019-12-07 RX ADMIN — FERROUS SULFATE TAB 325 MG (65 MG ELEMENTAL FE) 325 MG: 325 (65 FE) TAB at 17:09

## 2019-12-07 RX ADMIN — HYDROMORPHONE HYDROCHLORIDE 1 MG: 1 INJECTION, SOLUTION INTRAMUSCULAR; INTRAVENOUS; SUBCUTANEOUS at 23:44

## 2019-12-07 RX ADMIN — Medication 5 ML: at 23:44

## 2019-12-07 RX ADMIN — DOCUSATE SODIUM 100 MG: 100 CAPSULE, LIQUID FILLED ORAL at 10:10

## 2019-12-07 RX ADMIN — MAGNESIUM HYDROXIDE 30 ML: 400 SUSPENSION ORAL at 11:15

## 2019-12-07 RX ADMIN — HYDROCODONE BITARTRATE AND ACETAMINOPHEN 1 TABLET: 10; 325 TABLET ORAL at 10:10

## 2019-12-07 RX ADMIN — DOCUSATE SODIUM 100 MG: 100 CAPSULE, LIQUID FILLED ORAL at 17:09

## 2019-12-07 RX ADMIN — HYDROMORPHONE HYDROCHLORIDE 1 MG: 1 INJECTION, SOLUTION INTRAMUSCULAR; INTRAVENOUS; SUBCUTANEOUS at 20:16

## 2019-12-07 RX ADMIN — HYDROMORPHONE HYDROCHLORIDE 1 MG: 1 INJECTION, SOLUTION INTRAMUSCULAR; INTRAVENOUS; SUBCUTANEOUS at 17:10

## 2019-12-07 RX ADMIN — FERROUS SULFATE TAB 325 MG (65 MG ELEMENTAL FE) 325 MG: 325 (65 FE) TAB at 10:10

## 2019-12-07 RX ADMIN — MAGNESIUM SULFATE HEPTAHYDRATE 2 G: 40 INJECTION, SOLUTION INTRAVENOUS at 10:11

## 2019-12-07 RX ADMIN — HYDROCODONE BITARTRATE AND ACETAMINOPHEN 1 TABLET: 10; 325 TABLET ORAL at 15:00

## 2019-12-07 RX ADMIN — Medication 10 ML: at 20:15

## 2019-12-07 RX ADMIN — HYDROCODONE BITARTRATE AND ACETAMINOPHEN 1 TABLET: 10; 325 TABLET ORAL at 13:15

## 2019-12-07 RX ADMIN — TEMAZEPAM 30 MG: 15 CAPSULE ORAL at 21:33

## 2019-12-07 NOTE — PERIOP NOTES
Post Operative rounds:  Patient doing well, VSS  and no anesthetic complications noted. Patient encouraged to inform our department if any questions about their anesthetic procedure.   Visit Vitals  /76 (BP 1 Location: Left arm, BP Patient Position: At rest)   Pulse 87   Temp 36.7 °C (98.1 °F)   Resp 18   Ht 5' 7\" (1.702 m)   Wt 96.6 kg (212 lb 14.4 oz)   SpO2 94%   BMI 33.34 kg/m²

## 2019-12-07 NOTE — PROGRESS NOTES
Problem: Mobility Impaired (Adult and Pediatric)  Goal: *Acute Goals and Plan of Care (Insert Text)  Description  GOALS (1-4 days):  (1.)  Patient will move from supine to sit and sit to supine  in bed with INDEPENDENT. Progressing 12/7  (2.)  Patient will transfer from bed to chair and chair to bed with INDEPENDENT using the least restrictive device. (3.)  Patient will ambulate with INDEPENDENT for 300 feet with the least restrictive device. Met 12/7  (4.)  Patient will be independent with shoulder HEP to increase range of motion per MD orders. ________________________________________________________________________________________________     Outcome: Progressing Towards Goal     PHYSICAL THERAPY: Daily Note and PM 12/7/2019  INPATIENT: Hospital Day: 2  Payor: Joyce Lopez / Plan: SIMON YOUNGBLOOD OAP / Product Type: Commerical /      NAME/AGE/GENDER: Dangelo Patel is a 48 y.o. male   PRIMARY DIAGNOSIS: Rotator cuff arthropathy of right shoulder [M12.811]  Arthropathy, transient, shoulder, right [M12.811]  Rotator cuff arthropathy of right shoulder [M12.811] Rotator cuff tear arthropathy of right shoulder Rotator cuff tear arthropathy of right shoulder  Procedure(s) (LRB):  REVERSE RIGHT TOTAL SHOULDER ARTHROPLASTY WITH DELTA EXTEND PROSTHESIS, BICEPS TENODESIS, LATISSIMUS DORSI AND TERES MAJOR TENDON TRANSFERS (Right)  1 Day Post-Op  Active and passive range of motion RIGHT Elbow hand   Active assisted and passive range of motion  RIGHT Shoulder to tolerance   Pulleys and pendulums   Do not push motion   nwb  Shoulder RIGHT   wbat ble   Sling  Shoulder RIGHT   S/P LEFT JEIMY  ICD-10: Treatment Diagnosis:   · Pain in Right Shoulder (M25.511)  · Stiffness of Right Shoulder, Not elsewhere classified (M25.611)   Precaution/Allergies:  Patient has no known allergies. ASSESSMENT:     Mr. Malick Rivera presents supine in bed with girlfriend present. He reports some burning in R elbow but otherwise feeling good.  Excellent gait and good tolerance for all UE exercises. Pt demos good recall of surgical precautions and limitations. He will benefit from continued skilled care for the duration of his hospital stay to address ROM, strength, and pain limitations. Pt demos improving recall of exercises and improved elbow mobility this PM. Cont to do well with bed mobility and gait. This section established at most recent assessment   PROBLEM LIST (Impairments causing functional limitations):  1. Decreased [place] with Bed Mobility  2. Decreased [place] with Transfers  3. Decreased [place] with Ambulation   4. Decreased [place] with shoulder HEP   INTERVENTIONS PLANNED: (Benefits and precautions of physical therapy have been discussed with the patient.)  1. Bed Mobility Training  2. Transfer Training  3. Gait Training  4. Therapeutic Exercises per MD orders  5. Modalities for Pain     TREATMENT PLAN: Frequency/Duration: twice daily for duration of hospital stay  Rehabilitation Potential For Stated Goals: Excellent     RECOMMENDED REHABILITATION/EQUIPMENT: (at time of discharge pending progress): Continue Skilled Therapy. HISTORY:   History of Present Injury/Illness (Reason for Referral):   Rotator cuff tear arthropathy of right shoulder (11/30/2019)  The various methods of treatment have been discussed with the patient and family. PATIENT HAS EXHAUSTED NON-OPERATIVE MODALITIES  After consideration of risks, benefits and other options for treatment, the patient has consented to surgical intervention. Past Medical History/Comorbidities:   Mr. Gini Councilman  has a past medical history of Arthritis, Biceps muscle tear, Chronic pain, History of acute renal failure (2018), History of staph infection (01/2019), cardiac murmur (2018), Insomnia, MRSA (methicillin resistant Staphylococcus aureus) infection (2007), Personal history of kidney stones, and Right shoulder pain.   Mr. Gini Councilman  has a past surgical history that includes hx other surgical; hx septoplasty (11/15/2017); hx rotator cuff repair (Bilateral, ); hx hip replacement (Left, 2019); and hx hip replacement (Left, 2017). Social History/Living Environment:   Home Environment: Private residence  # Steps to Enter: 0  One/Two Story Residence: One story  Living Alone: Yes  Support Systems: Spouse/Significant Other/Partner  Patient Expects to be Discharged to[de-identified] Private residence  Current DME Used/Available at Home: None  Prior Level of Function/Work/Activity:  Independent with all ADLs   Number of Personal Factors/Comorbidities that affect the Plan of Care: 0: LOW COMPLEXITY   EXAMINATION:   Most Recent Physical Functioning:   Gross Assessment:  AROM: Within functional limits(B LEs and L UE)  Strength: Within functional limits(B LEs and L UE)  RUE AROM  R Elbow Flexion: 110  R Elbow Extension: 5         RUE Strength  R : 4  Posture:     Balance:  Sitting: Intact  Standing: Intact Bed Mobility:  Supine to Sit: Minimum assistance  Sit to Supine: Independent  Scooting: Supervision  Wheelchair Mobility:     Transfers:  Sit to Stand: Supervision  Stand to Sit: Independent  Gait:     Base of Support: Widened  Stance: Left decreased  Distance (ft): 650 Feet (ft)  Ambulation - Level of Assistance: Independent  Interventions: Verbal cues  Duration: 10 Minutes      Body Structures Involved:  1. Bones  2. Joints  3. Muscles Body Functions Affected:  1. Neuromusculoskeletal  2. Movement Related Activities and Participation Affected:  1. Mobility   Number of elements that affect the Plan of Care: 1-2: LOW COMPLEXITY   CLINICAL PRESENTATION:   Presentation: Stable and uncomplicated: LOW COMPLEXITY   CLINICAL DECISION MAKIN [facility] [address] [id number] AM-PAC 6 Clicks   Basic Mobility Inpatient Short Form  How much difficulty does the patient currently have. .. Unable A Lot A Little None   1. Turning over in bed (including adjusting bedclothes, sheets and blankets)?    [] 1   [] 2 [x] 3   [] 4   2. Sitting down on and standing up from a chair with arms ( e.g., wheelchair, bedside commode, etc.)   [] 1   [] 2   [] 3   [x] 4   3. Moving from lying on back to sitting on the side of the bed? [] 1   [] 2   [x] 3   [] 4   How much help from another person does the patient currently need. .. Total A Lot A Little None   4. Moving to and from a bed to a chair (including a wheelchair)? [] 1   [] 2   [] 3   [x] 4   5. Need to walk in hospital room? [] 1   [] 2   [] 3   [x] 4   6. Climbing 3-5 steps with a railing? [] 1   [] 2   [x] 3   [] 4   © 2007, Trustees of [address] [place], [place] [address], under license to [employer]. All rights reserved      Score:  Initial: 21 Most Recent: X (Date: -- )    Interpretation of Tool:  Represents activities that are increasingly more difficult (i.e. Bed mobility, Transfers, Gait). Medical Necessity:     · Patient is expected to demonstrate progress in   · strength, range of motion, and coordination  ·  to   · increase independence with ADLs and HEP   · .  Reason for Services/Other Comments:  · Patient continues to require skilled intervention due to   · Increased PLOF   · . Use of outcome tool(s) and clinical judgement create a POC that gives a: Clear prediction of patient's progress: LOW COMPLEXITY            TREATMENT:   (In addition to Assessment/Re-Assessment sessions the following treatments were rendered)   Pre-treatment Symptoms/Complaints:  Elbow and shoulder sore  Pain: Initial:   Pain Intensity 1: 4  Post Session:  4     Gait Training (10 Minutes):  Gait training to improve and/or restore physical functioning as related to mobility, strength, balance, and coordination. Ambulated 650 Feet (ft) with Independent using a   and minimal Verbal cuesVCs related to their hip position and motion to promote proper body posture. Therapeutic Exercise: (13 Minutes):  Exercises per grid below to improve mobility, strength, and coordination.   Required minimal visual and verbal cues to promote proper body alignment. Progressed complexity of movement as indicated. Date:  12/7 Date:   Date:     ACTIVITY/EXERCISE AM PM AM PM AM PM   Gripping 10 15       Wrist Flexion/Extension 10 15       Wrist Ulnar/Radial Deviation  15       Pronation/Supination 10 15       Elbow Flexion/Extension 10aa 15aa       Shoulder Flexion/Extension         Shoulder AB/ADduction         Shoulder IR/ER         Pulleys         Pendulums 2x10 2x15       Shrugs 10 20       Isometric:                 Flexion         Extension         ABduction         ADduction         Biceps/Triceps                  B = bilateral; AA = active assistive; A = active; P = passive  Education:  [x]  Home Exercises  [x]  Sling Application   []  Movement Precautions   []  Pulleys   [x]  Use of Ice   []  Other:   Treatment/Session Assessment:    · Response to Treatment:  Good tolerance for exercises  · Interdisciplinary Collaboration:   o Physical Therapist  · After treatment position/precautions:   o Supine in bed  o Bed/Chair-wheels locked  o Bed in low position  o Call light within reach   · Compliance with Program/Exercises: Will assess as treatment progresses. · Recommendations/Intent for next treatment session:  Treatment next visit will focus on increasing Mr. Cohns independence with bed mobility, transfers, gait training, strength/ROM exercises, modalities for pain, and patient education.    Total Treatment Duration:  PT Patient Time In/Time Out  Time In: 1400  Time Out: [address]

## 2019-12-07 NOTE — PROGRESS NOTES
Orthopedic Progress Note    2019  Admit Date: 2019  Admit Diagnosis: Rotator cuff arthropathy of right shoulder [M12.811]  Arthropathy, transient, shoulder, right [M12.811]  Rotator cuff arthropathy of right shoulder [M12.811]    Post Op day: 1 Day Post-Op    Subjective:     Johanne Hill     Appears to be doing okay. Didn't sleep last night. Takes benadryl every night. Objective:     Vital Signs:    Temp (24hrs), Av.8 °F (36.6 °C), Min:97.4 °F (36.3 °C), Max:98.3 °F (36.8 °C)      LAB:    [unfilled]  Lab Results   Component Value Date/Time    INR 1.0 2019 04:04 PM    INR 1.1 2018 05:23 PM     Lab Results   Component Value Date/Time    HGB 10.3 (L) 2019 04:10 AM    HGB 11.9 (L) 2019 04:04 PM       Physical Exam:    No apparent distress   No neurovascular issues reported  No gross problems noted     Plan:     Continue PT/OT rehab as indicated    Nursing and SS for disposition plans  Offered increased restoril vs. Benadryl.  Wants increased restoril         Signed By: Gely Ceja MD

## 2019-12-07 NOTE — PROGRESS NOTES
Care Management Interventions  PCP Verified by CM:  Yes  Transition of Care Consult (CM Consult): (Pt. declined [facility])  Current Support Network: Lives with Spouse  Plan discussed with Pt/Family/Caregiver: Yes  Discharge Location  Discharge Placement: Home with family assistance

## 2019-12-07 NOTE — PROGRESS NOTES
Problem: Mobility Impaired (Adult and Pediatric)  Goal: *Acute Goals and Plan of Care (Insert Text)  Description  GOALS (1-4 days):  (1.)  Patient will move from supine to sit and sit to supine  in bed with INDEPENDENT. (2.)  Patient will transfer from bed to chair and chair to bed with INDEPENDENT using the least restrictive device. (3.)  Patient will ambulate with INDEPENDENT for 300 feet with the least restrictive device. (4.)  Patient will be independent with shoulder HEP to increase range of motion per MD orders. ________________________________________________________________________________________________     Outcome: Progressing Towards Goal     PHYSICAL THERAPY: Initial Assessment and AM 12/7/2019  INPATIENT: Hospital Day: 2  Payor: Brennen Mahajan / Plan: SIMON ALBERTOP / Product Type: Commerical /      NAME/AGE/GENDER: Fito Garcia is a 48 y.o. male   PRIMARY DIAGNOSIS: Rotator cuff arthropathy of right shoulder [M12.811]  Arthropathy, transient, shoulder, right [M12.811]  Rotator cuff arthropathy of right shoulder [M12.811] Rotator cuff tear arthropathy of right shoulder Rotator cuff tear arthropathy of right shoulder  Procedure(s) (LRB):  REVERSE RIGHT TOTAL SHOULDER ARTHROPLASTY WITH DELTA EXTEND PROSTHESIS, BICEPS TENODESIS, LATISSIMUS DORSI AND TERES MAJOR TENDON TRANSFERS (Right)  1 Day Post-Op  Active and passive range of motion RIGHT Elbow hand   Active assisted and passive range of motion  RIGHT Shoulder to tolerance   Pulleys and pendulums   Do not push motion   nwb  Shoulder RIGHT   wbat ble   Sling  Shoulder RIGHT   S/P LEFT JEIMY  ICD-10: Treatment Diagnosis:   Pain in Right Shoulder (M25.511)  Stiffness of Right Shoulder, Not elsewhere classified (M25.611)   Precaution/Allergies:  Patient has no known allergies. ASSESSMENT:     Mr. Thompson Echeverrialy presents supine in bed with girlfriend present. He reports some burning in R elbow but otherwise feeling good.  Excellent gait and good tolerance for all UE exercises. Pt demos good recall of surgical precautions and limitations. He will benefit from continued skilled care for the duration of his hospital stay to address ROM, strength, and pain limitations. This section established at most recent assessment   PROBLEM LIST (Impairments causing functional limitations):  Decreased [place] with Bed Mobility  Decreased [place] with Transfers  Decreased [place] with Ambulation   Decreased [place] with shoulder HEP   INTERVENTIONS PLANNED: (Benefits and precautions of physical therapy have been discussed with the patient.)  Bed Mobility Training  Transfer Training  Gait Training  Therapeutic Exercises per MD orders  Modalities for Pain     TREATMENT PLAN: Frequency/Duration: twice daily for duration of hospital stay  Rehabilitation Potential For Stated Goals: Excellent     RECOMMENDED REHABILITATION/EQUIPMENT: (at time of discharge pending progress): Continue Skilled Therapy. HISTORY:   History of Present Injury/Illness (Reason for Referral):   Rotator cuff tear arthropathy of right shoulder (11/30/2019)  The various methods of treatment have been discussed with the patient and family. PATIENT HAS EXHAUSTED NON-OPERATIVE MODALITIES  After consideration of risks, benefits and other options for treatment, the patient has consented to surgical intervention. Past Medical History/Comorbidities:   Mr. Yesenia Hammond  has a past medical history of Arthritis, Biceps muscle tear, Chronic pain, History of acute renal failure (2018), History of staph infection (01/2019), cardiac murmur (2018), Insomnia, MRSA (methicillin resistant Staphylococcus aureus) infection (2007), Personal history of kidney stones, and Right shoulder pain.   Mr. Yesenia Hammond  has a past surgical history that includes hx other surgical; hx septoplasty (11/15/2017); hx rotator cuff repair (Bilateral, 2010); hx hip replacement (Left, 05/2019); and hx hip replacement (Left, 2017). Social History/Living Environment:   Home Environment: Private residence  # Steps to Enter: 0  One/Two Story Residence: One story  Living Alone: Yes  Support Systems: Spouse/Significant Other/Partner  Patient Expects to be Discharged to[de-identified] Private residence  Current DME Used/Available at Home: None  Prior Level of Function/Work/Activity:  Independent with all ADLs   Number of Personal Factors/Comorbidities that affect the Plan of Care: 0: LOW COMPLEXITY   EXAMINATION:   Most Recent Physical Functioning:   Gross Assessment:  AROM: Within functional limits(B LEs and L UE)  Strength: Within functional limits(B LEs and L UE)  RUE AROM  R Elbow Flexion: 105  R Elbow Extension: 8         RUE Strength  R : 4  Posture:     Balance:  Sitting: Intact  Standing: Intact Bed Mobility:  Supine to Sit: Stand-by assistance  Scooting: Supervision  Wheelchair Mobility:     Transfers:  Sit to Stand: Supervision  Stand to Sit: Independent  Gait:     Base of Support: Widened  Stance: Left decreased  Distance (ft): 650 Feet (ft)  Ambulation - Level of Assistance: Independent      Body Structures Involved:  Bones  Joints  Muscles Body Functions Affected:  Neuromusculoskeletal  Movement Related Activities and Participation Affected: Mobility   Number of elements that affect the Plan of Care: 1-2: LOW COMPLEXITY   CLINICAL PRESENTATION:   Presentation: Stable and uncomplicated: LOW COMPLEXITY   CLINICAL DECISION MAKIN [facility] [address] [id number] AM-PAC 6 Clicks   Basic Mobility Inpatient Short Form  How much difficulty does the patient currently have. .. Unable A Lot A Little None   1. Turning over in bed (including adjusting bedclothes, sheets and blankets)? [] 1   [] 2   [x] 3   [] 4   2. Sitting down on and standing up from a chair with arms ( e.g., wheelchair, bedside commode, etc.)   [] 1   [] 2   [] 3   [x] 4   3. Moving from lying on back to sitting on the side of the bed?    [] 1   [] 2   [x] 3   [] 4   How much help from another person does the patient currently need. .. Total A Lot A Little None   4. Moving to and from a bed to a chair (including a wheelchair)? [] 1   [] 2   [] 3   [x] 4   5. Need to walk in hospital room? [] 1   [] 2   [] 3   [x] 4   6. Climbing 3-5 steps with a railing? [] 1   [] 2   [x] 3   [] 4   © 2007, Trustees of [address] [place] [address] [address] [address], under license to [employer]. All rights reserved      Score:  Initial: 21 Most Recent: X (Date: -- )    Interpretation of Tool:  Represents activities that are increasingly more difficult (i.e. Bed mobility, Transfers, Gait). Medical Necessity:     Patient is expected to demonstrate progress in   strength, range of motion, and coordination   to   increase independence with ADLs and HEP   . Reason for Services/Other Comments:  Patient continues to require skilled intervention due to   Increased PLOF   . Use of outcome tool(s) and clinical judgement create a POC that gives a: Clear prediction of patient's progress: LOW COMPLEXITY            TREATMENT:   (In addition to Assessment/Re-Assessment sessions the following treatments were rendered)   Pre-treatment Symptoms/Complaints:  Elbow burning  Pain: Initial:   Pain Intensity 1: 4  Post Session:  2     Gait Training ( ):  Gait training to improve and/or restore physical functioning as related to mobility, strength, balance, and coordination. Ambulated 650 Feet (ft) with Independent using a   and minimal  VCs related to their hip position and motion to promote proper body posture. Therapeutic Exercise: (16 Minutes):  Exercises per grid below to improve mobility, strength, and coordination. Required minimal visual and verbal cues to promote proper body alignment. Progressed complexity of movement as indicated.      Date:  12/7 Date:   Date:     ACTIVITY/EXERCISE AM PM AM PM AM PM   Gripping 10        Wrist Flexion/Extension 10        Wrist Ulnar/Radial Deviation         Pronation/Supination 10        Elbow Flexion/Extension 10aa        Shoulder Flexion/Extension         Shoulder AB/ADduction         Shoulder IR/ER         Pulleys         Pendulums 2x10        Shrugs 10        Isometric:                 Flexion         Extension         ABduction         ADduction         Biceps/Triceps                  B = bilateral; AA = active assistive; A = active; P = passive  Education:  [x]  Home Exercises  [x]  Sling Application   []  Movement Precautions   []  Pulleys   [x]  Use of Ice   []  Other:   Treatment/Session Assessment:    Response to Treatment:  Excellent  Interdisciplinary Collaboration:   Physical Therapist  Registered Nurse  Rehabilitation Attendant  After treatment position/precautions:   Up in chair  Call light within reach  Family at bedside   Compliance with Program/Exercises: Will assess as treatment progresses. Recommendations/Intent for next treatment session:  Treatment next visit will focus on increasing Mr. Cohns independence with bed mobility, transfers, gait training, strength/ROM exercises, modalities for pain, and patient education.    Total Treatment Duration:  PT Patient Time In/Time Out  Time In: 0855  Time Out: [address], PT

## 2019-12-07 NOTE — PROGRESS NOTES
Chloraseptic spray at bedside for prn use for c/o throat irritation. Restoril 15 mg per pt request for something to help him sleep.

## 2019-12-07 NOTE — OP NOTES
[place]  OPERATIVE REPORT    Name:  Adrianne Gipson  MR#:  [id number]  :  1965  ACCOUNT #:  [de-identified]  DATE OF SERVICE:  2019    PREOPERATIVE DIAGNOSIS:  Rotator cuff tear arthropathy, right shoulder. POSTOPERATIVE DIAGNOSIS:  Rotator cuff tear arthropathy, right shoulder. PROCEDURE PERFORMED:  Reverse right total shoulder arthroplasty with a Delta Xtend prosthesis, biceps tenodesis, latissimus dorsi and teres major tendon transfer. SURGEON:  Vania Faulkner MD    PATHOLOGY:  Rotator cuff tear arthropathy. CPT CODES:  [id number] and 63211 with a modifier 80 for first assistant. ICD-10 CODE:  M12.811    ASSISTANT:  First assistant was Dorris Phalen, NP. Use of a certified first assistant was necessary to optimize the patient's safety and outcome. First assistant was present during the entire operative procedure assisting in exposure, placement of implants. Use of a certified first assistant was necessary. Modifier 80 was applied. ANESTHESIA:  General with an interscalene block. COMPLICATIONS:  None. IMPLANTS:  Hardware utilized is DePuy metaglene 42 eccentric, +4 mm lateralized glenoid, 42 +9 cup, 10.2 stem, 10 mm Biostop G, 60 mm superior, 50 mm inferior, 30 mm anterior and 18 posterior nonlocking cortical screw. ESTIMATED BLOOD LOSS:  100 mL. INDICATIONS:  The patient is a 43-year-old gentleman with a complex history. The patient initially had a previous rotator cuff repair many years ago. The patient also has had a previous left total hip arthroplasty, which became infected seeding his right shoulder, he underwent an I and D. He has now undergone a revision, left total hip arthroplasty in a two-stage fashion for periprosthetic infection involving MSSA, which was sensitive. After appropriate treatment with antibiotics, the patient is now brought to the operative suite for right shoulder operative intervention.     PROCEDURE:  Following identification of the patient, the patient was taken to the operative suite. Following administration of general anesthesia, interscalene block for postop pain control, 2 g of IV Ancef, the patient was then very carefully positioned on the operating room table in the beach-chair fashion. Care was taken to protect his left total hip arthroplasty. The right shoulder was then prepped and draped in a sterile fashion. The right shoulder was then identified. His previous deltopectoral incision was identified. It was slightly keloid in nature. His incision was then marked, InteguSeal was applied. Once the InteguSeal was allowed to cure, his previous surgical incision was elliptically incised and extended proximally and distally. Subcutaneous tissue was then dissected down to the cephalic vein. This was dissected proximally and distally. Deltopectoral interval was then developed. The deltoid was then dissected free and mobilized off the humerus. Strap muscles were retracted medially. Subdeltoid bursa was released. Axillary nerve was protected. Biceps tendon was identified. It was dissected up to the rotator interval.  It was tagged, transected for later tenodesis. Subscapularis was elevated sharply off the lesser tuberosity and tagged. Supra and infraspinatus were absent. Teres minor was intact and subscapularis was marginal quality. Humerus was then very carefully dissected free and dislocated from the wound. There were marked degenerative changes of the humeral head and glenoid. Proximal cutting guide was assembled, proximal cut was then made. Circumferential osteophytes were then resected. Glenoid was then meticulously exposed. Starter wire was then drilled. Glenoid was then drilled and reamed. A metaglene was inserted and secured with a 60 mm superior, 50 mm inferior, 30 mm anterior and 18 mm posterior nonlocking cortical screw. Metaglene was stable.   A 42 eccentric +4 mm lateralized glenoid was then inserted and secured in a standard fashion. Metaglene and glenosphere were stable. Humerus was dislocated back from the wound and reamed to accommodate a 10.2 stem. It was noted that the 10.2 stem with a 42 +9 cup gave excellent stability and mobility. At this point, all trial components were then removed. Latissimus dorsi and teres major tendons were then identified. They were released. They were mobilized, tagged, released off the humeral shaft, mobilized medially 2 cm. Radial nerve was protected for later transfer. At this point, the humeral canal was irrigated and dried. A 10 mm Biostop G was then placed distally. Antibiotic-impregnated cement was then mixed. Cement was inserted into the humeral canal, and a 10.2 stem was cemented in the appropriate version . The fins of the prosthesis had been previously loaded with #5 Mersilene sutures. Once the cement was allowed to cure, the true 42 +9 cup was inserted. Shoulder was reduced. There was excellent stability with excellent mobility. At this point, the arm was put through range of motion and stable. Latissimus dorsi and teres major tendons were then transferred posteriorly and secured with # 5 and #2 Mersilene sutures. Biceps was tenodesed with #5 Mersilene sutures. Axillary and radial nerves were intact. The arm was put through range of motion and stable. The wound was then irrigated. Deltopectoral interval was approximated using #2 Mersilene sutures, skin was closed with 0 Vicryl figure-of-eight sutures, and 0 Prolene subcuticular stitch. A sterile dressing was applied. Sling and swathe was applied. The patient was then transferred to the recovery room in stable condition. MD BROOK [email or web address]  D:  12/06/2019 12:56  T:  12/07/2019 0:15  JOB #:  [id number]  CC:   Haylie Cooper MD

## 2019-12-07 NOTE — PROGRESS NOTES
Resting comfortably,dsng D/I. NV status WDL. Denies needs at present. Sling and swathe in use. Pillow under shoulder and upper arm. Ice bag in use. SCDs on bilaterally. Family member at bedside. Call light within reach.

## 2019-12-08 VITALS
WEIGHT: 212.9 LBS | RESPIRATION RATE: 18 BRPM | TEMPERATURE: 98.8 F | HEIGHT: 67 IN | BODY MASS INDEX: 33.42 KG/M2 | DIASTOLIC BLOOD PRESSURE: 73 MMHG | OXYGEN SATURATION: 95 % | SYSTOLIC BLOOD PRESSURE: 132 MMHG | HEART RATE: 78 BPM

## 2019-12-08 LAB
ANION GAP SERPL CALC-SCNC: 3 MMOL/L (ref 7–16)
BUN SERPL-MCNC: 9 MG/DL (ref 6–23)
CALCIUM SERPL-MCNC: 8.4 MG/DL (ref 8.3–10.4)
CHLORIDE SERPL-SCNC: 105 MMOL/L (ref 98–107)
CO2 SERPL-SCNC: 32 MMOL/L (ref 21–32)
CREAT SERPL-MCNC: 0.87 MG/DL (ref 0.8–1.5)
ERYTHROCYTE [DISTWIDTH] IN BLOOD BY AUTOMATED COUNT: 14.8 % (ref 11.9–14.6)
GLUCOSE SERPL-MCNC: 114 MG/DL (ref 65–100)
HCT VFR BLD AUTO: 35.6 % (ref 41.1–50.3)
HGB BLD-MCNC: 10.5 G/DL (ref 13.6–17.2)
MAGNESIUM SERPL-MCNC: 2 MG/DL (ref 1.8–2.4)
MCH RBC QN AUTO: 25.6 PG (ref 26.1–32.9)
MCHC RBC AUTO-ENTMCNC: 29.5 G/DL (ref 31.4–35)
MCV RBC AUTO: 86.8 FL (ref 79.6–97.8)
NRBC # BLD: 0 K/UL (ref 0–0.2)
PLATELET # BLD AUTO: 266 K/UL (ref 150–450)
PMV BLD AUTO: 10.1 FL (ref 9.4–12.3)
POTASSIUM SERPL-SCNC: 3.7 MMOL/L (ref 3.5–5.1)
RBC # BLD AUTO: 4.1 M/UL (ref 4.23–5.6)
SODIUM SERPL-SCNC: 140 MMOL/L (ref 136–145)
WBC # BLD AUTO: 10 K/UL (ref 4.3–11.1)

## 2019-12-08 PROCEDURE — 97110 THERAPEUTIC EXERCISES: CPT

## 2019-12-08 PROCEDURE — 74011250637 HC RX REV CODE- 250/637: Performed by: ORTHOPAEDIC SURGERY

## 2019-12-08 PROCEDURE — 36415 COLL VENOUS BLD VENIPUNCTURE: CPT

## 2019-12-08 PROCEDURE — 85027 COMPLETE CBC AUTOMATED: CPT

## 2019-12-08 PROCEDURE — 83735 ASSAY OF MAGNESIUM: CPT

## 2019-12-08 PROCEDURE — 74011250636 HC RX REV CODE- 250/636: Performed by: ORTHOPAEDIC SURGERY

## 2019-12-08 PROCEDURE — 80048 BASIC METABOLIC PNL TOTAL CA: CPT

## 2019-12-08 RX ORDER — HYDROMORPHONE HYDROCHLORIDE 4 MG/1
4 TABLET ORAL
Status: DISCONTINUED | OUTPATIENT
Start: 2019-12-08 | End: 2019-12-08 | Stop reason: HOSPADM

## 2019-12-08 RX ORDER — HYDROMORPHONE HYDROCHLORIDE 2 MG/1
2 TABLET ORAL
Status: DISCONTINUED | OUTPATIENT
Start: 2019-12-08 | End: 2019-12-08 | Stop reason: HOSPADM

## 2019-12-08 RX ADMIN — HYDROCODONE BITARTRATE AND ACETAMINOPHEN 2 TABLET: 10; 325 TABLET ORAL at 04:27

## 2019-12-08 RX ADMIN — Medication 10 ML: at 06:02

## 2019-12-08 RX ADMIN — HYDROMORPHONE HYDROCHLORIDE 1 MG: 1 INJECTION, SOLUTION INTRAMUSCULAR; INTRAVENOUS; SUBCUTANEOUS at 03:00

## 2019-12-08 RX ADMIN — HYDROMORPHONE HYDROCHLORIDE 4 MG: 4 TABLET ORAL at 08:37

## 2019-12-08 RX ADMIN — DOCUSATE SODIUM 100 MG: 100 CAPSULE, LIQUID FILLED ORAL at 08:37

## 2019-12-08 RX ADMIN — FERROUS SULFATE TAB 325 MG (65 MG ELEMENTAL FE) 325 MG: 325 (65 FE) TAB at 08:37

## 2019-12-08 RX ADMIN — Medication 10 ML: at 03:00

## 2019-12-08 RX ADMIN — HYDROMORPHONE HYDROCHLORIDE 1 MG: 1 INJECTION, SOLUTION INTRAMUSCULAR; INTRAVENOUS; SUBCUTANEOUS at 06:01

## 2019-12-08 NOTE — DISCHARGE INSTRUCTIONS
Patient Education     DILAUDID 2-4mg as needed for pain  Shoulder Replacement Surgery: What to Expect at Home  Your Recovery    Shoulder replacement surgery replaces the worn parts of your shoulder joint. When you leave the hospital, your arm will be in a sling. It will be helpful if there is someone to help you at home for the next few weeks or until you have more energy and can move around better. You will go home with a bandage and stitches, staples, tissue glue, or tape strips. You can remove the bandage when your doctor tells you to. If you have staples, your doctor will remove them in 10 to 21 days. If you have stitches that are not the type that dissolve, your doctor will remove them in 10 to 14 days. Glue or tape strips will fall off on their own over time. You may still have some mild pain, and the area may be swollen for several months after surgery. Your doctor will give you medicine for the pain. You will continue the rehabilitation program (rehab) you started in the hospital. The better you do with your rehab exercises, the sooner you will get your strength and movement back. Depending on your job, you may be able to return to work as early as 2 to 3 weeks after surgery, as long as you avoid certain arm movements, such as lifting. This care sheet gives you a general idea about how long it will take for you to recover. But each person recovers at a different pace. Follow the steps below to get better as quickly as possible. How can you care for yourself at home? Activity    · Rest when you feel tired. You may take a nap, but don't stay in bed all day.     · Work with your physical therapist to learn the best way to exercise.     · You will have a sling to wear at night.  And it's a good idea to also put a small stack of folded sheets or towels under your upper arm while you are in bed to keep your arm from dropping too far back.     · Your arm should stay next to your body or in front of it for several weeks, both while you are up and during sleep.     · Don't lift anything with the affected arm for 6 weeks.     · Ask your doctor when you can drive again.     · Ask your doctor when it is okay for you to have sex.     · Your doctor may advise you to give up activities that put stress on that shoulder. This includes sports such as weight lifting or tennis, unless your tennis arm was not the one affected. Diet    · By the time you leave the hospital, you will probably be eating your normal diet. If your stomach is upset, try bland, low-fat foods like plain rice, broiled chicken, toast, and yogurt. Your doctor may recommend that you take iron and vitamin supplements.     · Drink plenty of fluids (unless your doctor tells you not to).     · You may notice that your bowel movements are not regular right after your surgery. This is common. Try to avoid constipation and straining with bowel movements. You may want to take a fiber supplement every day. If you have not had a bowel movement after a couple of days, ask your doctor about taking a mild laxative. Medicines    · Your doctor will tell you if and when you can restart your medicines. He or she will also give you instructions about taking any new medicines.     · If you take blood thinners, such as warfarin (Coumadin), clopidogrel (Plavix), or aspirin, be sure to talk to your doctor. He or she will tell you if and when to start taking those medicines again. Make sure that you understand exactly what your doctor wants you to do.     · Be safe with medicines. Take pain medicines exactly as directed. ? If the doctor gave you a prescription medicine for pain, take it as prescribed. ? If you are not taking a prescription pain medicine, ask your doctor if you can take an over-the-counter medicine.     · If you think your pain medicine is making you sick to your stomach:  ? Take your medicine after meals (unless your doctor has told you not to). ?  Ask your doctor for a different pain medicine.     · If your doctor prescribed antibiotics, take them as directed. Don't stop taking them just because you feel better. You need to take the full course of antibiotics.     · If you take a blood thinner, be sure you get instructions about how to take your medicine safely. Blood thinners can cause serious bleeding problems. Incision care    · If your doctor told you how to care for your cut (incision), follow your doctor's instructions. You will have a dressing over the cut. A dressing helps the incision heal and protects it. Your doctor will tell you how to take care of this.     · If you did not get instructions, follow this general advice:  ? If you have strips of tape on the cut the doctor made, leave the tape on for a week or until it falls off.  ? If you have stitches or staples, your doctor will tell you when to come back to have them removed. ? If you have skin adhesive on the cut, leave it on until it falls off. Skin adhesive is also called glue or liquid stitches. ? Change the bandage every day. ? Wash the area daily with warm water, and pat it dry. Don't use hydrogen peroxide or alcohol. They can slow healing. ? You may cover the area with a gauze bandage if it oozes fluid or rubs against clothing. ? You may shower 24 to 48 hours after surgery. Pat the incision dry. Don't swim or take a bath for the first 2 weeks, or until your doctor tells you it is okay. Exercise    · Shoulder rehabilitation is a series of exercises you do after your surgery. This helps you get back your shoulder's range of motion and strength. You will work with your doctor and physical therapist to plan this exercise program. To get the best results, you need to do the exercises correctly and as often and as long as your doctor tells you.    Ice    · For pain, put ice or a cold pack on the area for 10 to 20 minutes at a time. Put a thin cloth between the ice and your skin.    Follow-up care is a key part of your treatment and safety. Be sure to make and go to all appointments, and call your doctor if you are having problems. It's also a good idea to know your test results and keep a list of the medicines you take. When should you call for help? Call 911 anytime you think you may need emergency care. For example, call if:    · You have severe trouble breathing.     · You have symptoms of a blood clot in your lung (called a pulmonary embolism). These may include:  ? Sudden chest pain. ? Trouble breathing. ? Coughing up blood.    Call your doctor now or seek immediate medical care if:    · You have severe or increasing pain.     · You have symptoms of infection, such as:  ? Increased pain, swelling, warmth, or redness. ? Red streaks or pus. ? A fever.     · You have tingling, weakness, or numbness in your arm.     · Your arm turns cold or changes color.     · You have symptoms of a blood clot in your leg (called a deep vein thrombosis). These may include:  ? Pain in the calf, back of the knee, thigh, or groin. ? Redness and swelling in the leg or groin.    Watch closely for changes in your health, and be sure to contact your doctor if:    · You do not get better as expected. Where can you learn more? Go to http://venkatesh-carmelo.info/. Enter F246 in the search box to learn more about \"Shoulder Replacement Surgery: What to Expect at Home. \"  Current as of: June 26, 2019  Content Version: 12.2  © 9882-8083 [employer]. Care instructions adapted under license by [employer] (which disclaims liability or warranty for this information). If you have questions about a medical condition or this instruction, always ask your healthcare professional. [address] any warranty or liability for your use of this information. Shoulder Arthroplasty:  What to Expect at Home:    Your Recovery    Your arm will be in a sling.   You will feel tired for several days. Your shoulder will be swollen and you may notice that your skin is a different color near the cuts the doctor made (incisions). Your hand and arm may also be swollen. This is normal and will go away in a few days. When you can return to work or your usual routine will depend on your physician. Most people need 6 weeks or longer to recover. You may have to limit your activity until your shoulder strength and range of motion return to normal.  You may also be in a rehabilitation program (rehab). If you have a desk job, you may be able to return to work a little bit sooner than 6 weeks. If you lift things at work, it may take months before you return to work. This is something you will need to discuss with your doctor. How can you care for yourself at home? Activity     Rest when you feel tired. Getting enough sleep will help you recover. Lying flat may cause your pain to worsen. Any position that is comfortable for you is fine. Many people are most comfortable in an upright position. Raise your upper body on two or three pillows, or sleep in a reclining chair. Do not sleep on your side. To make your arm and shoulder feel better, keep a thin pillow under the back of your arm while you are lying in bed.  Try to walk each day. Start by walking a little more than you did the day before. Bit by bit, increase the distance you walk. Walking boosts blood flow and helps prevent pneumonia and constipation.  Your arm will be in a sling. Your doctor will advise you on how long to wear it.  You may remove the sling when you shower or dress.  Do not use your arm for repeated movements. These include painting, vacuuming, or using a computer. Diet     You may eat your normal diet. You may experience some nausea while taking pain medicines. If this happens, try bland, low fat foods like plain rice, broiled chicken, toast, or yogurt.    Drink plenty of fluids, unless instructed otherwise by your doctor.  You may notice that your bowel movements are not regular right after surgery. This is common. Try to avoid constipation and straining with bowel movements. You will probably need to take a stimulant laxative daily while you are taking the pain medicine. If you have not had a bowel movement after a couple of days, notify your doctor. Medicines     Take pain medicines as directed by your doctor.  Please notify your doctor of any over the counter pain medicines you may be taking, such as acetaminophen (Tylenol), ibuprofen (Advil, Motrin), or naproxen (Aleve).  If you think your pain medicine is making you sick to your stomach, try taking it after meals. If this doesnt help, ask your doctor for a different pain medicine. Incision Care     If you have a dressing over your incision, keep it clean and dry. You may usually remove it 2 to 3 days after surgery. Your doctor will instruct on his specific dressing instructions.  If your incision is open to air, keep the area clean and dry.  If you have strips of tape on the incision, leave it on until it falls off. Exercise     You may need rehabilitation. This is a series of exercises you do after surgery. This will help you get back your shoulders range of motion and strength. You will work with your doctor and Physical therapist to plan this exercises program.  To get the best results, you need to do the exercises correctly and as often and as long as your doctor tells you. Ice     To reduce swelling and pain, put an ice or cold pack on your shoulder for 20 minute intervals. Do this every 1 to 2 hours while you are awake. Put a thin cloth between the ice and your skin. Follow up care is a key part of your treatment and safety. Be sure to make and go to all appointments, and call your doctor if you are having problems.   Its also a good idea to know your test results and keep an up to date list of your current medications. When should you call for help? Call 911 anytime you think you may need emergency care. For example, call if:     You passed out (lost consciousness).  You have severe trouble breathing.  You have sudden chest pain and shortness of breath, or you cough up blood. Call your doctor now or seek immediate medical care if:     Your hand is cool, pale, numb, or changes color.  You are unable to move your fingers, wrist, or elbow.  You are sick to your stomach and cannot keep liquids down.  You have pain that does not get better after you take your pain medicine.  You have signs of infection, such as:    *Increased pain, swelling, warmth, or redness  *Red streaks from the incision  *Pus draining from the incision  *Swollen lymph nodes in your neck, armpits, or groin  *Fever (If you feel like you have a fever, please take your temperature before calling doctor).  You have loose stitches or your incision comes open.  Your incision bleeds through your first dressing or is still actively bleeding 3 days after your surgery. Watch closely for changes in your health, and be sure to contact your doctor if:     You have new or increased swelling in your arm.  You have new pain that develops in another area of the affected limb. For example, you have pain in your hand or elbow.  You do not have a bowel movement after taking a laxative.  You do not get better as expected.

## 2019-12-08 NOTE — PROGRESS NOTES
Orthopedic Joint Progress Note    2019  Admit Date: 2019  Admit Diagnosis: Rotator cuff arthropathy of right shoulder [M12.811]  Arthropathy, transient, shoulder, right [M12.811]  Rotator cuff arthropathy of right shoulder [M12.811]    2 Days Post-Op    Subjective:     Yas Simmons PATIENT WATCHING TV; FAMILY AT BEDSIDE; REQUESTING IV DILAUDID    Review of Systems: A review of systems was negative. Objective:     PT/OT:     PATIENT MOBILITY    Bed Mobility  Supine to Sit: Minimum assistance  Sit to Supine: Independent  Scooting: Supervision  Transfers  Sit to Stand: Supervision  Stand to Sit: Independent      Gait  Base of Support: Widened  Stance: Left decreased  Ambulation - Level of Assistance: Independent  Distance (ft): 650 Feet (ft)  Interventions: Verbal cues  Duration: 10 Minutes            Vital Signs:    Blood pressure 163/63, pulse 72, temperature 98.1 °F (36.7 °C), resp. rate 18, height 5' 7\" (1.702 m), weight 96.6 kg (212 lb 14.4 oz), SpO2 99 %.   Temp (24hrs), Av.1 °F (36.7 °C), Min:98 °F (36.7 °C), Max:98.2 °F (36.8 °C)      Pain Control:   Pain Assessment  Pain Scale 1: Numeric (0 - 10)  Pain Intensity 1: 8  Pain Location 1: Shoulder  Pain Orientation 1: Right  Pain Description 1: Aching  Pain Intervention(s) 1: Medication (see MAR)    Meds:  Current Facility-Administered Medications   Medication Dose Route Frequency    temazepam (RESTORIL) capsule 30 mg  30 mg Oral QHS PRN    magnesium hydroxide (MILK OF MAGNESIA) 400 mg/5 mL oral suspension 30 mL  30 mL Oral DAILY PRN    HYDROmorphone (PF) (DILAUDID) injection 1 mg  1 mg IntraVENous Q3H PRN    sodium chloride (NS) flush 5-40 mL  5-40 mL IntraVENous Q8H    sodium chloride (NS) flush 5-40 mL  5-40 mL IntraVENous PRN    bisacodyl (DULCOLAX) suppository 10 mg  10 mg Rectal DAILY PRN    sodium phosphate (FLEET'S) enema 1 Enema  1 Enema Rectal PRN    ferrous sulfate tablet 325 mg  1 Tab Oral BID WITH MEALS    docusate sodium (COLACE) capsule 100 mg  100 mg Oral BID    promethazine (PHENERGAN) tablet 25 mg  25 mg Oral Q4H PRN    HYDROcodone-acetaminophen (NORCO)  mg tablet 1 Tab  1 Tab Oral Q4H PRN    HYDROcodone-acetaminophen (NORCO)  mg tablet 2 Tab  2 Tab Oral Q4H PRN    traMADol (ULTRAM) tablet 50 mg  50 mg Oral Q6H PRN    tuberculin injection 5 Units  5 Units IntraDERMal ONCE    phenol throat spray (CHLORASEPTIC) 1 Spray  1 Spray Oral PRN        LAB:    Lab Results   Component Value Date/Time    INR 1.0 12/06/2019 04:04 PM    INR 1.1 11/05/2018 05:23 PM    INR 1.0 12/26/2016 10:54 AM     Lab Results   Component Value Date/Time    HGB 10.5 (L) 12/08/2019 04:18 AM    HGB 10.3 (L) 12/07/2019 04:10 AM    HGB 11.9 (L) 12/06/2019 04:04 PM       Wound Hip Left (Active)   Number of days: 1066       Wound Hip Lateral (Active)   Number of days: 1066       Wound Shoulder Right (Active)   Number of days: 908       Wound Hip Left (Active)   Number of days: 397       Wound Shoulder Right (Active)   Number of days: 397       Wound Shoulder Right (Active)   Dressing Status Clean, dry, and intact 12/8/2019  3:00 AM   Dressing Type Aquacel 12/8/2019  3:00 AM   Splint Type/Material Sling 12/7/2019  7:36 PM   Number of days: 2         Physical Exam:  General: alert, cooperative, no distress, appears stated age  Cardiovascular negative  Lungs: negative  Musculoskeletal: ABLE TO MOVE RIGHT ELBOW, WRIST, HAND; LETS ME RANGE HIS RIGHT SHOULDER WITH SOME DISCOMFORT  Neurological: N/V INTACT  Skin:INCISION AND DRESSING TO RIGHT SHOULDER CLEAN, DRY, INTACT      Assessment:      Principal Problem:    Rotator cuff tear arthropathy of right shoulder (11/30/2019)    Active Problems:    Arthropathy, transient, shoulder, right (12/6/2019)      Rotator cuff arthropathy of right shoulder (12/6/2019)         Plan:     Continue PT/OT/Rehab  Consult: Rehab team including PT, OT, recreational therapy, and     Patient Expects to be Discharged to[de-identified] Private residence     [address] [place] [address] 2 WEEKS

## 2019-12-08 NOTE — PROGRESS NOTES
Orthopedic Joint Progress Note    2019  Admit Date: 2019  Admit Diagnosis: Rotator cuff arthropathy of right shoulder [M12.811]  Arthropathy, transient, shoulder, right [M12.811]  Rotator cuff arthropathy of right shoulder [M12.811]    2 Days Post-Op    Subjective: some pain prefers dilaudid     Johanne Deem     Review of Systems: Pertinent items are noted in HPI. Objective:     PT/OT:     PATIENT MOBILITY    Bed Mobility  Supine to Sit: Minimum assistance  Sit to Supine: Independent  Scooting: Supervision  Transfers  Sit to Stand: Supervision  Stand to Sit: Independent      Gait  Base of Support: Widened  Stance: Left decreased  Ambulation - Level of Assistance: Independent  Distance (ft): 650 Feet (ft)  Interventions: Verbal cues  Duration: 10 Minutes            Vital Signs:    Blood pressure 132/73, pulse 78, temperature 98.8 °F (37.1 °C), resp. rate 18, height 5' 7\" (1.702 m), weight 96.6 kg (212 lb 14.4 oz), SpO2 95 %.   Temp (24hrs), Av.2 °F (36.8 °C), Min:98 °F (36.7 °C), Max:98.8 °F (37.1 °C)      Pain Control:   Pain Assessment  Pain Scale 1: Numeric (0 - 10)  Pain Intensity 1: 8  Pain Location 1: Shoulder  Pain Orientation 1: Right  Pain Description 1: Aching  Pain Intervention(s) 1: Medication (see MAR)    Meds:  Current Facility-Administered Medications   Medication Dose Route Frequency    temazepam (RESTORIL) capsule 30 mg  30 mg Oral QHS PRN    magnesium hydroxide (MILK OF MAGNESIA) 400 mg/5 mL oral suspension 30 mL  30 mL Oral DAILY PRN    HYDROmorphone (PF) (DILAUDID) injection 1 mg  1 mg IntraVENous Q3H PRN    sodium chloride (NS) flush 5-40 mL  5-40 mL IntraVENous Q8H    sodium chloride (NS) flush 5-40 mL  5-40 mL IntraVENous PRN    bisacodyl (DULCOLAX) suppository 10 mg  10 mg Rectal DAILY PRN    sodium phosphate (FLEET'S) enema 1 Enema  1 Enema Rectal PRN    ferrous sulfate tablet 325 mg  1 Tab Oral BID WITH MEALS    docusate sodium (COLACE) capsule 100 mg  100 mg Oral BID    promethazine (PHENERGAN) tablet 25 mg  25 mg Oral Q4H PRN    traMADol (ULTRAM) tablet 50 mg  50 mg Oral Q6H PRN    tuberculin injection 5 Units  5 Units IntraDERMal ONCE    phenol throat spray (CHLORASEPTIC) 1 Spray  1 Spray Oral PRN        LAB:    Lab Results   Component Value Date/Time    INR 1.0 12/06/2019 04:04 PM    INR 1.1 11/05/2018 05:23 PM    INR 1.0 12/26/2016 10:54 AM     Lab Results   Component Value Date/Time    HGB 10.5 (L) 12/08/2019 04:18 AM    HGB 10.3 (L) 12/07/2019 04:10 AM    HGB 11.9 (L) 12/06/2019 04:04 PM       Wound Hip Left (Active)   Number of days: 1066       Wound Hip Lateral (Active)   Number of days: 1066       Wound Shoulder Right (Active)   Number of days: 908       Wound Hip Left (Active)   Number of days: 397       Wound Shoulder Right (Active)   Number of days: 397       Wound Shoulder Right (Active)   Dressing Status Clean, dry, and intact 12/8/2019  3:00 AM   Dressing Type Aquacel 12/8/2019  3:00 AM   Splint Type/Material Sling 12/7/2019  7:36 PM   Number of days: 2         Physical Exam:  No significant changes    Assessment:      Principal Problem:    Rotator cuff tear arthropathy of right shoulder (11/30/2019)    Active Problems:    Arthropathy, transient, shoulder, right (12/6/2019)      Rotator cuff arthropathy of right shoulder (12/6/2019)         Plan:     Continue PT/OT/Rehab  Discharge home    Patient Expects to be Discharged to[facility]de-identified] [facility]

## 2019-12-08 NOTE — PROGRESS NOTES
Pt resting quietly in bed, watching tv. IV, dressing are clean, dry and intact. Able to dorsi/plantar flex. Moderate  on R hand. 2+ bilat radial pulses. IV dilaudid given at 2016. Pt reported pain level was \"10x better\" during reassessment. Restoril given as pt stated he is tired and would like to try to get some sleep tonight. Urinal at bedside. Call light, IS at bedside within reach. Icepack to R shoulder. Pt does not have sling on at this time as he stated he did not want to wear it now. RUE propped on pillow. Will continue to monitor.

## 2019-12-08 NOTE — PROGRESS NOTES
Problem: Mobility Impaired (Adult and Pediatric)  Goal: *Acute Goals and Plan of Care (Insert Text)  Description  GOALS (1-4 days):  (1.)  Patient will move from supine to sit and sit to supine  in bed with INDEPENDENT. Met 12/8  (2.)  Patient will transfer from bed to chair and chair to bed with INDEPENDENT using the least restrictive device. (3.)  Patient will ambulate with INDEPENDENT for 300 feet with the least restrictive device. Met 12/7  (4.)  Patient will be independent with shoulder HEP to increase range of motion per MD orders. Met 12/8  ________________________________________________________________________________________________     Outcome: Progressing Towards Goal     PHYSICAL THERAPY: Daily Note and AM 12/8/2019  INPATIENT: Hospital Day: 3  Payor: Nhi Handler / Plan: SIOMN YOUNGBLOOD OAP / Product Type: Commerical /      NAME/AGE/GENDER: Celine Stoll is a 48 y.o. male   PRIMARY DIAGNOSIS: Rotator cuff arthropathy of right shoulder [M12.811]  Arthropathy, transient, shoulder, right [M12.811]  Rotator cuff arthropathy of right shoulder [M12.811] Rotator cuff tear arthropathy of right shoulder Rotator cuff tear arthropathy of right shoulder  Procedure(s) (LRB):  REVERSE RIGHT TOTAL SHOULDER ARTHROPLASTY WITH DELTA EXTEND PROSTHESIS, BICEPS TENODESIS, LATISSIMUS DORSI AND TERES MAJOR TENDON TRANSFERS (Right)  2 Days Post-Op  Active and passive range of motion RIGHT Elbow hand   Active assisted and passive range of motion  RIGHT Shoulder to tolerance   Pulleys and pendulums   Do not push motion   nwb  Shoulder RIGHT   wbat ble   Sling  Shoulder RIGHT   S/P LEFT JEIMY  ICD-10: Treatment Diagnosis:   · Pain in Right Shoulder (M25.511)  · Stiffness of Right Shoulder, Not elsewhere classified (M25.611)   Precaution/Allergies:  Patient has no known allergies. ASSESSMENT:     Mr. Renny Hancock presents supine in bed with girlfriend present. He reports some burning in R elbow but otherwise feeling good.  Excellent gait and good tolerance for all UE exercises. Pt demos good recall of surgical precautions and limitations. He will benefit from continued skilled care for the duration of his hospital stay to address ROM, strength, and pain limitations. Good recall of exercises and independent with all transfers and gait. Pt states he is ready for discharge home. Provided him with HEP handout and answered all questions. This section established at most recent assessment   PROBLEM LIST (Impairments causing functional limitations):  1. Decreased Clemons with Bed Mobility  2. Decreased Clemons with Transfers  3. Decreased Clemons with Ambulation   4. Decreased Clemons with shoulder HEP   INTERVENTIONS PLANNED: (Benefits and precautions of physical therapy have been discussed with the patient.)  1. Bed Mobility Training  2. Transfer Training  3. Gait Training  4. Therapeutic Exercises per MD orders  5. Modalities for Pain     TREATMENT PLAN: Frequency/Duration: twice daily for duration of hospital stay  Rehabilitation Potential For Stated Goals: Excellent     RECOMMENDED REHABILITATION/EQUIPMENT: (at time of discharge pending progress): Continue Skilled Therapy. HISTORY:   History of Present Injury/Illness (Reason for Referral):   Rotator cuff tear arthropathy of right shoulder (11/30/2019)  The various methods of treatment have been discussed with the patient and family. PATIENT HAS EXHAUSTED NON-OPERATIVE MODALITIES  After consideration of risks, benefits and other options for treatment, the patient has consented to surgical intervention. Past Medical History/Comorbidities:   Mr. Moe Jean Baptiste  has a past medical history of Arthritis, Biceps muscle tear, Chronic pain, History of acute renal failure (2018), History of staph infection (01/2019), cardiac murmur (2018), Insomnia, MRSA (methicillin resistant Staphylococcus aureus) infection (2007), Personal history of kidney stones, and Right shoulder pain. Mr. Tessie Artis  has a past surgical history that includes hx other surgical; hx septoplasty (11/15/2017); hx rotator cuff repair (Bilateral, 2010); hx hip replacement (Left, 05/2019); and hx hip replacement (Left, 01/2017). Social History/Living Environment:   Home Environment: Private residence  # Steps to Enter: 0  One/Two Story Residence: One story  Living Alone: Yes  Support Systems: Spouse/Significant Other/Partner  Patient Expects to be Discharged to[de-identified] Private residence  Current DME Used/Available at Home: None  Prior Level of Function/Work/Activity:  Independent with all ADLs   Number of Personal Factors/Comorbidities that affect the Plan of Care: 0: LOW COMPLEXITY   EXAMINATION:   Most Recent Physical Functioning:   Gross Assessment:  AROM: Within functional limits(B LEs and L UE)  Strength: Within functional limits(B LEs and L UE)  RUE AROM  R Elbow Flexion: 105  R Elbow Extension: 8         RUE Strength  R : 4  Posture:     Balance:  Sitting: Intact  Standing: Intact Bed Mobility:     Wheelchair Mobility:     Transfers:  Sit to Stand: Independent  Stand to Sit: Independent  Gait:     Base of Support: Widened  Stance: Right decreased  Distance (ft): 312 Feet (ft)  Ambulation - Level of Assistance: Independent      Body Structures Involved:  1. Bones  2. Joints  3. Muscles Body Functions Affected:  1. Neuromusculoskeletal  2. Movement Related Activities and Participation Affected:  1. Mobility   Number of elements that affect the Plan of Care: 1-2: LOW COMPLEXITY   CLINICAL PRESENTATION:   Presentation: Stable and uncomplicated: LOW COMPLEXITY   CLINICAL DECISION MAKING:   [facility] 6 Clicks   Basic Mobility Inpatient Short Form  How much difficulty does the patient currently have. .. Unable A Lot A Little None   1. Turning over in bed (including adjusting bedclothes, sheets and blankets)? [] 1   [] 2   [x] 3   [] 4   2.   Sitting down on and standing up from a chair with arms ( e.g., wheelchair, bedside commode, etc.)   [] 1   [] 2   [] 3   [x] 4   3. Moving from lying on back to sitting on the side of the bed? [] 1   [] 2   [x] 3   [] 4   How much help from another person does the patient currently need. .. Total A Lot A Little None   4. Moving to and from a bed to a chair (including a wheelchair)? [] 1   [] 2   [] 3   [x] 4   5. Need to walk in hospital room? [] 1   [] 2   [] 3   [x] 4   6. Climbing 3-5 steps with a railing? [] 1   [] 2   [x] 3   [] 4   © 2007, Trustees of [address] [place] [address] [address] [address], under license to [email or web address]. All rights reserved      Score:  Initial: 21 Most Recent: X (Date: -- )    Interpretation of Tool:  Represents activities that are increasingly more difficult (i.e. Bed mobility, Transfers, Gait). Medical Necessity:     · Patient is expected to demonstrate progress in   · strength, range of motion, and coordination  ·  to   · increase independence with ADLs and HEP   · .  Reason for Services/Other Comments:  · Patient continues to require skilled intervention due to   · Increased PLOF   · . Use of outcome tool(s) and clinical judgement create a POC that gives a: Clear prediction of patient's progress: LOW COMPLEXITY            TREATMENT:   (In addition to Assessment/Re-Assessment sessions the following treatments were rendered)   Pre-treatment Symptoms/Complaints:  Elbow feels tight  Pain: Initial:   Pain Intensity 1: 6  Post Session: 5     Gait Training ( 5):  Gait training to improve and/or restore physical functioning as related to mobility, strength, balance, and coordination. Ambulated 312 Feet (ft) with Independent using a   and minimal  VCs related to their hip position and motion to promote proper body posture. Therapeutic Exercise: (12 Minutes):  Exercises per grid below to improve mobility, strength, and coordination. Required minimal visual and verbal cues to promote proper body alignment. Progressed complexity of movement as indicated. Date:  12/7 Date:  12/8 Date:     ACTIVITY/EXERCISE AM PM AM PM AM PM   Gripping 10 15 20      Wrist Flexion/Extension 10 15 20      Wrist Ulnar/Radial Deviation  15 20      Pronation/Supination 10 15 20      Elbow Flexion/Extension 10aa 15aa 20aa      Shoulder Flexion/Extension         Shoulder AB/ADduction         Shoulder IR/ER         Pulleys         Pendulums 2x10 2x15 2x15      Shrugs 10 20 20      Isometric:                 Flexion         Extension         ABduction         ADduction         Biceps/Triceps                  B = bilateral; AA = active assistive; A = active; P = passive  Education:  [x]  Home Exercises  [x]  Sling Application   []  Movement Precautions   []  Pulleys   [x]  Use of Ice   []  Other:   Treatment/Session Assessment:    · Response to Treatment:  Drowsy but good recall  · Interdisciplinary Collaboration:   o Physical Therapist  · After treatment position/precautions:   o Up in chair  o Bed/Chair-wheels locked  o Call light within reach  o Family at bedside   · Compliance with Program/Exercises: Will assess as treatment progresses. · Recommendations/Intent for next treatment session:  Treatment next visit will focus on increasing Mr. Cohns independence with bed mobility, transfers, gait training, strength/ROM exercises, modalities for pain, and patient education.    Total Treatment Duration:  PT Patient Time In/Time Out  Time In: 0859  Time Out: BETZY Solis

## 2019-12-08 NOTE — DISCHARGE SUMMARY
[facility] SUMMARY    Name:  Chevy Pena  MR#:  [id number]  :  1965  ACCOUNT #:  [de-identified]  ADMIT DATE:  2019  DISCHARGE DATE:  2019    ADMISSION DIAGNOSIS:  Rotator cuff tear arthropathy, right shoulder. DISCHARGE DIAGNOSIS:  Rotator cuff tear arthropathy, right shoulder. Please see H and P, operative summaries, and consult for details. HOSPITAL COURSE:  The patient is a 65-year-old gentleman with a complex orthopedic history. The patient was admitted on 2019, underwent an uncomplicated reverse right total shoulder arthroplasty with a Delta Xtend prosthesis, biceps tenodesis, latissimus dorsi and teres major tendon transfer. On postoperative day #1, hemoglobin was 10.3; potassium was 4.0; magnesium was 1.7, it was repleted. He was begun on therapy as per reverse shoulder arthroplasty protocol. He was having some pain issues. Pain medication was rearranged. On postoperative day #2, he was still having some pain, but he was better controlled. All labs were within normal limits. He was discharged home on postoperative day #2. 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 He will follow up in my office in 10 days.       Claiborne Denver, MD [id number]  D:  2019 8:50  T:  2019 11:30  JOB #:  [id number]  CC:  Boom Otoole MD

## 2019-12-10 LAB
ABO + RH BLD: NORMAL
BLD PROD TYP BPU: NORMAL
BLD PROD TYP BPU: NORMAL
BLOOD GROUP ANTIBODIES SERPL: NORMAL
BPU ID: NORMAL
BPU ID: NORMAL
CROSSMATCH RESULT,%XM: NORMAL
CROSSMATCH RESULT,%XM: NORMAL
SPECIMEN EXP DATE BLD: NORMAL
STATUS OF UNIT,%ST: NORMAL
STATUS OF UNIT,%ST: NORMAL
UNIT DIVISION, %UDIV: 0
UNIT DIVISION, %UDIV: 0

## 2019-12-19 ENCOUNTER — HOSPITAL ENCOUNTER (OUTPATIENT)
Dept: PHYSICAL THERAPY | Age: 54
Discharge: HOME | End: 2019-12-19
Payer: COMMERCIAL

## 2019-12-19 PROCEDURE — 97161 PT EVAL LOW COMPLEX 20 MIN: CPT

## 2019-12-19 NOTE — THERAPY EVALUATION
Santiago Phil  : 1965  Primary: Maria R Nasir  Secondary:  [address]  [facility]  [address] [place] [address],  Michael Campa.  [phone]   [phone]       OUTPATIENT PHYSICAL THERAPY:Initial Assessment 2019     ICD-10: Treatment Diagnosis: Presence of right artificial shoulder joint (Z96.611), M12.811 Other specific arthropathies, not elsewhere classified, right shoulder, Pain in right shoulder (M25.511)  Precautions/Allergies: NKDA     TREATMENT PLAN:  Effective Dates: 2019 TO 3/18/2020 (90 days). Frequency/Duration: 2-3 times a week for 90 Day(s) MEDICAL/REFERRING DIAGNOSIS:  R shoulder reverse TSA  DATE OF ONSET: 19  REFERRING PHYSICIAN: Catalina Garza MD MD Orders: Eval and treat, HEP, ROM, delta protocol  Return MD Appointment: 20     INITIAL ASSESSMENT:  Mr. Gennaro Harper is a R hand dominant male s/p R reverse TSA on 19. He presents with R shoulder pain, decreased R shoulder ROM and presumed decrease in strength. He has decreased functional use of R UE. He could benefit from PT to address deficits and work toward goals. PROBLEM LIST (Impacting functional limitations):  1. Decreased Strength  2. Decreased ADL/Functional Activities  3. Increased Pain  4. Decreased Flexibility/Joint Mobility INTERVENTIONS PLANNED: (Treatment may consist of any combination of the following)  1. Home Exercise Program (HEP)  2. Manual Therapy  3. Therapeutic Exercise/Strengthening  4. modals as needed     GOALS: (Goals have been discussed and agreed upon with patient.)  Patient's stated goal is to regain normal use of R UE. Short-Term Functional Goals: Time Frame: 6 weeks  1. Patient to be independent with HEP  2. Patient to increase PROM R shoulder flex to 135 degrees for progression into strengthening phase of rehab  3. Patient to report decrease in R shoulder pain to 3/10 at rest  Discharge Goals: Time Frame: 90 days  1.  Patient to report no more than minimal R shoulder pain with all activity  2. Patient to improve DASH score to 18 to demo improved use of R UE  3. Patient to increase AROM R shoulder flex to 135 degrees for improved functional use    OUTCOME MEASURE:   Tool Used: Disabilities of the Arm, Shoulder and Hand (DASH) Questionnaire - Quick Version  Score:  Initial: 37/55  Most Recent: X/55 (Date: -- )   Interpretation of Score: The DASH is designed to measure the activities of daily living in person's with upper extremity dysfunction or pain. Each section is scored on a 1-5 scale, 5 representing the greatest disability. The scores of each section are added together for a total score of 55. MEDICAL NECESSITY:   · Patient demonstrates good rehab potential due to higher previous functional level. REASON FOR SERVICES/OTHER COMMENTS:  · Patient continues to require skilled intervention due to need to regain full use of R UE. Lisy Wen Total Duration: 35 minutes  PT Patient Time In/Time Out  Time In: 1430  Time Out: 1505    Rehabilitation Potential For Stated Goals: Good  Regarding Ivan Momin therapy, I certify that the treatment plan above will be carried out by a therapist or under their direction. Thank you for this referral,    Kendrick Rodriguez, PT      Referring Physician Signature: Cole Koo., MD No Signature is Required for this note. PAIN/SUBJECTIVE:   Initial: Pain Intensity 1: 5(5 at rest, 8 at worst)   Post Session: No change reported   HISTORY:   History of Injury/Illness (Reason for Referral):  Patient reports he had PT previously for his hip and it never got better. It turned out that his hip and his shoulder were infected. Once the infection cleared, he had a R reverse TSA on 12/6/19. He would like to get \"as strong as I can\" before the end of the year, but he was informed that he is not yet in the strengthening phase of rehab.    Past Medical History/Comorbidities: from EMR: Arthritis, Biceps muscle tear, Chronic pain, acute renal failure, staph infection, cardiac murmur, Insomnia, MRSA, kidney stones, and Right shoulder pain. septoplasty, B RCR, and L JEIMY,     Social History/Living Environment:      Lives with significant other in one tory home  Prior Level of Function/Work/Activity:  Currently off of work as a   Dominant Side:         RIGHT   Ambulatory/Rehab [address] Factors:       (1)  Gender [Male] Ability to Rise from Chair:       (0)  Ability to rise in a single movement   Falls Prevention Plan:       No modifications necessary   Total: (5 or greater = High Risk): 1   ©2010 [facility] . [facility] States Patent #[id number]. Federal Law prohibits the replication, distribution or use without written permission from [facility] [employer]   Current Medications:  Duricef       Date Last Reviewed:  12/19/19   Number of Personal Factors/Comorbidities that affect the Plan of Care: 0: LOW COMPLEXITY   EXAMINATION:     Observation/Orthostatic Postural Assessment: patient in no acute distress. No sling. Palpation: tender over R shoulder   ROM:       LUE AROM  L Shoulder Flexion: 160  L Shoulder Extension: 65  L Shoulder ABduction: 135  L Shoulder Internal Rotation: (T7 on back)  L Shoulder External Rotation: 45          RUE PROM  R Shoulder Flexion: 120  R Shoulder ABduction: 104  R Shoulder Internal Rotation: 60(to abdomen)  R Shoulder External Rotation: 10                   Strength: L UE 5/5, R UE not tested  Special Tests:none  Neurological Screen: light touch intact in B UEs  Functional Mobility: Independent, but decreased use of R UE           Body Structures Involved:  1. Joints  2. Muscles Body Functions Affected:  1. Neuromusculoskeletal  2. Movement Related Activities and Participation Affected:  1. General Tasks and Demands  2.  Self Care   Number of elements (examined above) that affect the Plan of Care: 1-2: LOW COMPLEXITY CLINICAL PRESENTATION:   Presentation: Stable and uncomplicated: LOW COMPLEXITY   CLINICAL DECISION MAKING:   Use of outcome tool(s) and clinical judgement create a POC that gives a: Clear prediction of patient's progress: LOW COMPLEXITY     Verbally reviewed ROM exercises with patient.

## 2019-12-26 ENCOUNTER — HOSPITAL ENCOUNTER (OUTPATIENT)
Dept: PHYSICAL THERAPY | Age: 54
Discharge: HOME | End: 2019-12-26
Payer: COMMERCIAL

## 2019-12-26 PROCEDURE — 97140 MANUAL THERAPY 1/> REGIONS: CPT

## 2019-12-26 NOTE — PROGRESS NOTES
Harlene Fajardo  : 1965  Payor: Shirin Kelley / Plan: SIMON YOUNGBLOOD OAP / Product Type: Commerical /  [address] [facility]  at [address] [place] [address] Rd  [address] [facility], [address], [facility]  [phone]   [phone]       OUTPATIENT PHYSICAL THERAPY: Daily Treatment Note 2019  Visit Count: 2     ICD-10: Treatment Diagnosis: Presence of right artificial shoulder joint (V09.744), M12.811 Other specific arthropathies, not elsewhere classified, right shoulder, Pain in right shoulder (M25.511)  Precautions/Allergies:   Patient has no known allergies. TREATMENT PLAN:  Effective Dates: 2019 TO 3/18/2020. Frequency/Duration: 2-3 times a week for 90 Day(s) MEDICAL/REFERRING DIAGNOSIS:  R shoulder reverse TSA  DATE OF ONSET: 19  REFERRING PHYSICIAN: Penny Enriquez MD MD Orders:Evaluate and treat, HEP, ROM, delta protocol  Return MD Appointment: 20       Pre-treatment Symptoms/Complaints:  Patient reports that his R shoulder has been painful at times, but overall it is doing well. Pain: Initial:   No VAS Post Session:  No VAS   Medications Last Reviewed:  19    Updated Objective Findings:   None today     TREATMENT:     MANUAL THERAPY: (39 minutes) for improved R shoulder motion. Grade 3-4 physiologic mobilizations into forward elevation, abduction in scapular plane, and ER. Scapular mobilizations in side-lying for improved scapular control. Soft tissue mobilizations and scar mobilizations to R deltoid to reduce tightness. R elbow motion grade 4 for improved R UE motion. THERAPEUTIC EXERCISE (1 minute) for R deltoid muscle activation. Isometrics in reclined position to R UE for flexion, ER, IR and abduction x  15 of each. HEP: No changes to current HEP. Treatment/Session Summary:    · Response to Treatment:  Patient's motion is very good at this stage of his recovery.   · Communication/Consultation:  None today  · Equipment provided today:  None today  · Recommendations/Intent for next treatment session: Next visit will focus on improving R shoulder motion. Continue with R deltoid muscle activation.     Total Treatment Billable Duration:  40 minutes  PT Patient Time In/Time Out  Time In: 1435  Time Out: 6818 DAY Rico PT    Future Appointments   Date Time Provider Jaret Levine   12/30/2019  9:30 AM Sang Babcock, PT [facility]   12/31/2019  9:00 AM Rosi Chaparro, PT [facility]

## 2019-12-30 ENCOUNTER — HOSPITAL ENCOUNTER (OUTPATIENT)
Dept: PHYSICAL THERAPY | Age: 54
Discharge: HOME | End: 2019-12-30
Payer: COMMERCIAL

## 2019-12-30 PROCEDURE — 97140 MANUAL THERAPY 1/> REGIONS: CPT

## 2019-12-30 PROCEDURE — 97110 THERAPEUTIC EXERCISES: CPT

## 2019-12-30 NOTE — PROGRESS NOTES
Anat Hightower  : 1965  Payor: Bay Sloan / Plan: SIMON YOUNGBLOOD OAP / Product Type: Commhueyl /  Alanna Terry at [facility] CARRIE  [address] [facility], [address], [place] [address].  [phone]   [phone]       OUTPATIENT PHYSICAL THERAPY: Daily Treatment Note 2019  Visit Count: 3     ICD-10: Treatment Diagnosis: Presence of right artificial shoulder joint (X37.869), M12.811 Other specific arthropathies, not elsewhere classified, right shoulder, Pain in right shoulder (M25.511)  Precautions/Allergies:   Patient has no known allergies. TREATMENT PLAN:  Effective Dates: 2019 TO 3/18/2020. Frequency/Duration: 2-3 times a week for 90 Day(s) MEDICAL/REFERRING DIAGNOSIS:  R shoulder reverse TSA  DATE OF ONSET: 19  REFERRING PHYSICIAN: Polly Alcocer MD MD Orders:Evaluate and treat, HEP, ROM, delta protocol  Return MD Appointment: 20       Pre-treatment Symptoms/Complaints:  Patient reports that his R shoulder has still been a little sore and makes it hard to sleep. He wants to go to the gym today. Pain: Initial: Pain Intensity 1: 4(\"3 or 4\")   Post Session: \"Feels better\"   Medications Last Reviewed:  19    Updated Objective Findings:   None today     TREATMENT:     MANUAL THERAPY: (30 minutes) for improved R shoulder motion. Grade 2 to 4- phsyio mobs R shouldr flex, abduct, IR and ER. Grade 2 to 4- inferior and posterior shoulder glides. THERAPEUTIC EXERCISE (10 minutes) for muscle activation -  manually resisted shoulder isometrics for flex, extn, abduct, IR and ER 2 x 10 each with patient in supine. HEP: No changes to current HEP. Treatment/Session Summary:    · Response to Treatment:  Patient appears to be progressing well. He acknowledged that he will only work on LEs when he goes to gym today. He is anxious to begin strengthening and has to be reminded that MD has not allowed this yet.    · Communication/Consultation:  None today  · Equipment provided today:  None today  · Recommendations/Intent for next treatment session: Next visit will focus on improving R shoulder motion. Continue with R deltoid muscle activation.     Total Treatment Billable Duration:  40 minutes  PT Patient Time In/Time Out  Time In: 1979  Time Out: [address]    Future Appointments   Date Time Provider Jaret Levine   12/31/2019  9:00 AM Rosa Shelton, PT [facility]

## 2019-12-31 ENCOUNTER — HOSPITAL ENCOUNTER (OUTPATIENT)
Dept: PHYSICAL THERAPY | Age: 54
Discharge: HOME | End: 2019-12-31
Payer: COMMERCIAL

## 2019-12-31 PROCEDURE — 97110 THERAPEUTIC EXERCISES: CPT

## 2019-12-31 PROCEDURE — 97140 MANUAL THERAPY 1/> REGIONS: CPT

## 2019-12-31 NOTE — PROGRESS NOTES
Adan Dupont  : 1965  Payor: Missy Gillespie / Plan: [address] RPN / Product Type: Commerical /  [address]  at [facility] GEORGE BUTLER  [address] [facility], [address] [facility] [address], [address]  [phone]   [phone]       OUTPATIENT PHYSICAL THERAPY: Daily Treatment Note 2019  Visit Count: 4     ICD-10: Treatment Diagnosis: Presence of right artificial shoulder joint (U37.521), M12.811 Other specific arthropathies, not elsewhere classified, right shoulder, Pain in right shoulder (M25.511)  Precautions/Allergies:   Patient has no known allergies. TREATMENT PLAN:  Effective Dates: 2019 TO 3/18/2020. Frequency/Duration: 2-3 times a week for 90 Day(s) MEDICAL/REFERRING DIAGNOSIS:  R shoulder reverse TSA  DATE OF ONSET: 19  REFERRING PHYSICIAN: Chloe Roberts MD MD Orders:Evaluate and treat, HEP, ROM, delta protocol  Return MD Appointment: 20       Pre-treatment Symptoms/Complaints:  Patient reports that he went to the gym yesterday but only did cardio. 40 minutes on bike and 10 on elliptical, but he did move his arms on the elliptical, but let the left arm do most of the work. Pain: Initial: Pain Intensity 1: 4   Post Session:  No change reported. Medications Last Reviewed:  19    Updated Objective Findings:   None today     TREATMENT:     MANUAL THERAPY: (30 minutes) for improved R shoulder motion. Grade 2 to 4- phsyio mobs R shouldr flex, abduct, IR and ER. Grade 2 to 4- inferior and posterior shoulder glides. THERAPEUTIC EXERCISE (10 minutes) for muscle activation -  manually resisted shoulder isometrics for flex, extn, abduct, IR and ER 2 x 10 each with patient in supine. HEP: No changes to current HEP. Treatment/Session Summary:    · Response to Treatment:  Patient did well with treatment today. No major changes noted.    · Communication/Consultation:  None today  · Equipment provided today:  None today  · Recommendations/Intent for next treatment session: Next visit will focus on improving R shoulder motion. Total Treatment Billable Duration:  40 minutes  PT Patient Time In/Time Out  Time In: 0901  Time Out: 0949    Blake Eubanks PT    No future appointments.

## 2020-01-23 ENCOUNTER — HOSPITAL ENCOUNTER (OUTPATIENT)
Dept: PHYSICAL THERAPY | Age: 55
Discharge: HOME | End: 2020-01-23
Payer: COMMERCIAL

## 2020-01-23 PROCEDURE — 97110 THERAPEUTIC EXERCISES: CPT

## 2020-01-23 PROCEDURE — 97140 MANUAL THERAPY 1/> REGIONS: CPT

## 2020-01-23 NOTE — PROGRESS NOTES
Li Lipscomb  : 1965  Payor: Kuldip Zeng / Plan: Marina Chaparro RPN / Product Type: Commerical /  [address]  at [address] [place] [address] Rd  [address] [facility], [address], [place] [address].  [phone]   [phone]       OUTPATIENT PHYSICAL THERAPY: Daily Treatment Note 2020  Visit Count: 5     ICD-10: Treatment Diagnosis: Presence of right artificial shoulder joint (J18.214), M12.811 Other specific arthropathies, not elsewhere classified, right shoulder, Pain in right shoulder (M25.511)  Precautions/Allergies:   Patient has no known allergies. TREATMENT PLAN:  Effective Dates: 2019 TO 3/18/2020. Frequency/Duration: 2-3 times a week for 90 Day(s) MEDICAL/REFERRING DIAGNOSIS:  R shoulder reverse TSA  DATE OF ONSET: 19  REFERRING PHYSICIAN: Charlie Vogt MD MD Orders:Evaluate and treat, HEP, ROM, transition to HEP  Full motion, full strength  Return MD Appointment: 20       Pre-treatment Symptoms/Complaints:  Patient reports that he went to MD yesterday. He is to do two more PT sessions and then continue on his own. Pain: Initial: Pain Intensity 1: 2   Post Session:  No increase. Medications Last Reviewed:  20    Updated Objective Findings:   None today     TREATMENT:     MANUAL THERAPY: (15 minutes) for improved R shoulder motion. Grade 2 to 4- phsyio mobs R shouldr flex, abduct, IR and ER. Grade 2 to 4- inferior and posterior shoulder glides. Therapeutic Exercise: (25 Minutes):  Exercises per grid below to improve mobility and strength. Required moderate visual and verbal cues to ensure correct performance. Progressed complexity of movement as indicated.      Date:  20 Date:   Date:     Activity/Exercise Parameters Parameters Parameters   B serratus punch 2# 2x15     Side lying abduct 2# 2x15     Side lying ER 2# 2x15     Prone rows 2# 2x15     Prone shldr extm 2# 2x15     Prone mid trap 1# 2x15     Prone low trap 1# 2x15     Wall push ups 2x10     IR with band Yellow 2x15     ER with band Yellow 2x15     B bicep curls 2# 2x15                 HEP: No changes to current HEP. Treatment/Session Summary:    · Response to Treatment:  Patient did well with exercises. He plans to continue on his own after next session. · Communication/Consultation:  None today  · Equipment provided today:  None today  · Recommendations/Intent for next treatment session: Next visit will focus on improving R shoulder motion. and strength. Review and add to HEP.        Total Treatment Billable Duration:  40 minutes  PT Patient Time In/Time Out  Time In: 0730  Time Out: 0813    Donald Andrade, PT

## 2020-01-27 ENCOUNTER — HOSPITAL ENCOUNTER (OUTPATIENT)
Dept: PHYSICAL THERAPY | Age: 55
Discharge: HOME | End: 2020-01-27
Payer: COMMERCIAL

## 2020-01-27 PROCEDURE — 97140 MANUAL THERAPY 1/> REGIONS: CPT

## 2020-01-27 PROCEDURE — 97110 THERAPEUTIC EXERCISES: CPT

## 2020-01-27 NOTE — PROGRESS NOTES
Jin Carla  : 1965  Payor: Manjeet Casanova / Plan: Kody Kahn RPN / Product Type: Commerical /  [address]  [facility], [address], [place] [address].  [phone]   [phone]       OUTPATIENT PHYSICAL THERAPY: Daily Treatment Note 2020  Visit Count: 6     ICD-10: Treatment Diagnosis: Presence of right artificial shoulder joint (M74.188), M12.811 Other specific arthropathies, not elsewhere classified, right shoulder, Pain in right shoulder (M25.511)  Precautions/Allergies:   Patient has no known allergies. TREATMENT PLAN:  Effective Dates: 2019 TO 3/18/2020. Frequency/Duration: 2-3 times a week for 90 Day(s) MEDICAL/REFERRING DIAGNOSIS:  R shoulder reverse TSA  DATE OF ONSET: 19  REFERRING PHYSICIAN: Abigail Akers MD MD Orders:Evaluate and treat, HEP, ROM, transition to HEP  Full motion, full strength  Return MD Appointment: 20       Pre-treatment Symptoms/Complaints:  Patient reports that he will continue on his own after today's session. He went to the gym already this morning and plans to go back after PT. He will call PT if he has any problems. Pain: Initial: Pain Intensity 1: 1   Post Session:  No increase noted. Medications Last Reviewed:  20    Updated Objective Findings:   None today     TREATMENT:     MANUAL THERAPY: (15 minutes) for improved R shoulder motion. Grade 2 to 4- phsyio mobs R shouldr flex, abduct, IR and ER. Grade 2 to 4- inferior and posterior shoulder glides. Therapeutic Exercise: (25 Minutes):  Exercises per grid below to improve mobility and strength. Required moderate visual and verbal cues to ensure correct performance. Progressed resistance as indicated.      Date:  20 Date:  20 Date:     Activity/Exercise Parameters Parameters Parameters   B serratus punch 2# 2x15 3# 2x15    Side lying abduct 2# 2x15 3# 2x15    Side lying ER 2# 2x15 3# 2x15    Prone rows 2# 2x15 3# 2x15    Prone shldr extn 2# 2x15 3# 2x15    Prone mid trap 1# 2x15 2# 2x15    Prone low trap 1# 2x15 2# 2x15    Wall push ups 2x10 2x10    IR with band Yellow 2x15 Red 2x15    ER with band Yellow 2x15 Red 2x15    B bicep curls 2# 2x15 3# 2x15    D1/D2 diagonals on wall  2x10 ea          HEP: No changes to current HEP. Treatment/Session Summary:    · Response to Treatment:  Patient did well with exercises. He plans to continue on his own unless he runs into problems. Will keep his chart open in case of problems. · Communication/Consultation:  None today  · Equipment provided today:  None today  · Recommendations/Intent for next treatment session: Next visit will focus on improving R shoulder motion. and strength.   .       Total Treatment Billable Duration:  40 minutes  PT Patient Time In/Time Out  Time In: 1430  Time Out: [address] Karmen SergoHoward

## 2020-05-26 NOTE — THERAPY DISCHARGE
Hair Hoang : 1965 Primary: Jessica Merlos Rpn Secondary:  Therapy Center at [facility] GEORGE BUTLER [address] [facility], [address] [place] [address] [address], [address] [facility] [phone]   [phone] OUTPATIENT PHYSICAL THERAPY:Discharge Summary 2020 ICD-10: Treatment Diagnosis: Presence of right artificial shoulder joint (Z96.611), M12.811 Other specific arthropathies, not elsewhere classified, right shoulder, Pain in right shoulder (M25.511) Precautions/Allergies: NKDA TREATMENT PLAN: Discharge PT Patient attended 6 PT sessions from 19 to 20 with no missed sessions MEDICAL/REFERRING DIAGNOSIS: 
R shoulder reverse TSA DATE OF ONSET: 19 REFERRING PHYSICIAN: Angelica Bonner MD MD Orders: Eval and treat, HEP, ROM, delta protocol, transition to HEP Return MD Appointment: unknown INITIAL ASSESSMENT:  Mr. Oneyda Dodd is a R hand dominant male s/p R reverse TSA on 19. He presented with R shoulder pain, decreased R shoulder ROM and presumed decrease in strength. He had decreased functional use of R UE. He progressed well and when he returned to MD in January, he was allowed to transition to HEP. He has not returned to PT since late January and he will now be discharged from PT.     
 
GOALS: (Goals have been discussed and agreed upon with patient.) - unable to assess final progress toward goals. Patient's stated goal is to regain normal use of R UE. Short-Term Functional Goals: Time Frame: 6 weeks 1. Patient to be independent with HEP 2. Patient to increase PROM R shoulder flex to 135 degrees for progression into strengthening phase of rehab 3. Patient to report decrease in R shoulder pain to 3/10 at rest 
Discharge Goals: Time Frame: 90 days 1. Patient to report no more than minimal R shoulder pain with all activity 2. Patient to improve DASH score to 18 to demo improved use of R UE 
3.  Patient to increase AROM R shoulder flex to 135 degrees for improved functional use OUTCOME MEASURE:  
Tool Used: Disabilities of the Arm, Shoulder and Hand (DASH) Questionnaire - Quick Version Score:  Initial: 37/55  Most Recent: X/55 (Date: -- ) Interpretation of Score: The DASH is designed to measure the activities of daily living in person's with upper extremity dysfunction or pain. Each section is scored on a 1-5 scale, 5 representing the greatest disability. The scores of each section are added together for a total score of 55. Data from initial evaluation unless otherwise specified. PAIN/SUBJECTIVE:  
Initial: Pain Intensity 1: 1   Post Session: No change reported HISTORY:  
History of Injury/Illness (Reason for Referral): 
Patient reports he had PT previously for his hip and it never got better. It turned out that his hip and his shoulder were infected. Once the infection cleared, he had a R reverse TSA on 12/6/19. He would like to get \"as strong as I can\" before the end of the year, but he was informed that he is not yet in the strengthening phase of rehab. Past Medical History/Comorbidities: from EMR: Arthritis, Biceps muscle tear, Chronic pain, acute renal failure, staph infection, cardiac murmur, Insomnia, MRSA, kidney stones, and Right shoulder pain. septoplasty, B RCR, and L JEIMY, Social History/Living Environment:  
   Lives with significant other in one tory home Prior Level of Function/Work/Activity: 
Currently off of work as a  Dominant Side:  
      RIGHT Current Medications:  Duricef Date Last Reviewed:  12/19/19 EXAMINATION:  
 
Observation/Orthostatic Postural Assessment: patient in no acute distress. No sling. Palpation: tender over R shoulder ROM:   
LUE AROM 
L Shoulder Flexion: 160 L Shoulder Extension: 65 L Shoulder ABduction: 135 L Shoulder Internal Rotation: (T7 on back) L Shoulder External Rotation: 45 RUE PROM 
R Shoulder Flexion: 120 R Shoulder ABduction: 104 R Shoulder Internal Rotation: 60(to abdomen) R Shoulder External Rotation: 10 
   
 
Strength: L UE 5/5, R UE not tested Special Tests:none Neurological Screen: light touch intact in B UEs Functional Mobility: Independent, but decreased use of R UE

## 2021-05-28 ENCOUNTER — APPOINTMENT (OUTPATIENT)
Dept: GENERAL RADIOLOGY | Age: 56
End: 2021-05-28
Attending: STUDENT IN AN ORGANIZED HEALTH CARE EDUCATION/TRAINING PROGRAM

## 2021-05-28 ENCOUNTER — HOSPITAL ENCOUNTER (EMERGENCY)
Age: 56
Discharge: HOME | End: 2021-05-28
Attending: STUDENT IN AN ORGANIZED HEALTH CARE EDUCATION/TRAINING PROGRAM

## 2021-05-28 VITALS
DIASTOLIC BLOOD PRESSURE: 86 MMHG | HEIGHT: 67 IN | WEIGHT: 190 LBS | OXYGEN SATURATION: 94 % | HEART RATE: 114 BPM | BODY MASS INDEX: 29.82 KG/M2 | RESPIRATION RATE: 20 BRPM | TEMPERATURE: 98.6 F | SYSTOLIC BLOOD PRESSURE: 162 MMHG
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
## 2021-05-28 DIAGNOSIS — S41.111A LACERATION OF RIGHT UPPER ARM WITHOUT COMPLICATION, INITIAL ENCOUNTER: ICD-10-CM

## 2021-05-28 LAB
ALBUMIN SERPL-MCNC: 4.1 G/DL (ref 3.5–5)
ALBUMIN/GLOB SERPL: 1.1 {RATIO} (ref 1.2–3.5)
ALP SERPL-CCNC: 102 U/L (ref 50–136)
ALT SERPL-CCNC: 52 U/L (ref 12–65)
ANION GAP SERPL CALC-SCNC: 12 MMOL/L (ref 7–16)
AST SERPL-CCNC: 64 U/L (ref 15–37)
BASOPHILS # BLD: 0.1 K/UL (ref 0–0.2)
BASOPHILS NFR BLD: 1 % (ref 0–2)
BILIRUB SERPL-MCNC: 0.5 MG/DL (ref 0.2–1.1)
BUN SERPL-MCNC: 33 MG/DL (ref 6–23)
CALCIUM SERPL-MCNC: 9.4 MG/DL (ref 8.3–10.4)
CHLORIDE SERPL-SCNC: 103 MMOL/L (ref 98–107)
CO2 SERPL-SCNC: 24 MMOL/L (ref 21–32)
CREAT SERPL-MCNC: 1.47 MG/DL (ref 0.8–1.5)
DIFFERENTIAL METHOD BLD: ABNORMAL
EOSINOPHIL # BLD: 0 K/UL (ref 0–0.8)
EOSINOPHIL NFR BLD: 0 % (ref 0.5–7.8)
ERYTHROCYTE [DISTWIDTH] IN BLOOD BY AUTOMATED COUNT: 15 % (ref 11.9–14.6)
GLOBULIN SER CALC-MCNC: 3.6 G/DL (ref 2.3–3.5)
GLUCOSE SERPL-MCNC: 143 MG/DL (ref 65–100)
HCT VFR BLD AUTO: 47.8 % (ref 41.1–50.3)
HGB BLD-MCNC: 15.5 G/DL (ref 13.6–17.2)
IMM GRANULOCYTES # BLD AUTO: 0 K/UL (ref 0–0.5)
IMM GRANULOCYTES NFR BLD AUTO: 0 % (ref 0–5)
LYMPHOCYTES # BLD: 4.1 K/UL (ref 0.5–4.6)
LYMPHOCYTES NFR BLD: 37 % (ref 13–44)
MCH RBC QN AUTO: 28.8 PG (ref 26.1–32.9)
MCHC RBC AUTO-ENTMCNC: 32.4 G/DL (ref 31.4–35)
MCV RBC AUTO: 88.8 FL (ref 79.6–97.8)
MONOCYTES # BLD: 0.8 K/UL (ref 0.1–1.3)
MONOCYTES NFR BLD: 7 % (ref 4–12)
NEUTS SEG # BLD: 6.1 K/UL (ref 1.7–8.2)
NEUTS SEG NFR BLD: 55 % (ref 43–78)
NRBC # BLD: 0 K/UL (ref 0–0.2)
PLATELET # BLD AUTO: 217 K/UL (ref 150–450)
PMV BLD AUTO: 10.3 FL (ref 9.4–12.3)
POTASSIUM SERPL-SCNC: 3.9 MMOL/L (ref 3.5–5.1)
PROT SERPL-MCNC: 7.7 G/DL (ref 6.3–8.2)
RBC # BLD AUTO: 5.38 M/UL (ref 4.23–5.6)
SODIUM SERPL-SCNC: 139 MMOL/L (ref 136–145)
WBC # BLD AUTO: 11.1 K/UL (ref 4.3–11.1)

## 2021-05-28 PROCEDURE — 74011000250 HC RX REV CODE- 250: Performed by: STUDENT IN AN ORGANIZED HEALTH CARE EDUCATION/TRAINING PROGRAM

## 2021-05-28 PROCEDURE — 74011250636 HC RX REV CODE- 250/636: Performed by: STUDENT IN AN ORGANIZED HEALTH CARE EDUCATION/TRAINING PROGRAM

## 2021-05-28 PROCEDURE — 73090 X-RAY EXAM OF FOREARM: CPT

## 2021-05-28 PROCEDURE — 96375 TX/PRO/DX INJ NEW DRUG ADDON: CPT

## 2021-05-28 PROCEDURE — 96376 TX/PRO/DX INJ SAME DRUG ADON: CPT

## 2021-05-28 PROCEDURE — 96374 THER/PROPH/DIAG INJ IV PUSH: CPT

## 2021-05-28 PROCEDURE — 99281 EMR DPT VST MAYX REQ PHY/QHP: CPT

## 2021-05-28 PROCEDURE — 80053 COMPREHEN METABOLIC PANEL: CPT

## 2021-05-28 PROCEDURE — 75810000293 HC SIMP/SUPERF WND  RPR

## 2021-05-28 PROCEDURE — 85025 COMPLETE CBC W/AUTO DIFF WBC: CPT

## 2021-05-28 RX ORDER — AMOXICILLIN AND CLAVULANATE POTASSIUM 875; 125 MG/1; MG/1
1 TABLET, FILM COATED ORAL 2 TIMES DAILY
Qty: 14 TABLET | Refills: 0 | Status: SHIPPED | OUTPATIENT
Start: 2021-05-28

## 2021-05-28 RX ORDER — MORPHINE SULFATE 4 MG/ML
4 INJECTION INTRAVENOUS
Status: COMPLETED | OUTPATIENT
Start: 2021-05-28 | End: 2021-05-28

## 2021-05-28 RX ORDER — LIDOCAINE HYDROCHLORIDE AND EPINEPHRINE 10; 10 MG/ML; UG/ML
1.5 INJECTION, SOLUTION INFILTRATION; PERINEURAL ONCE
Status: COMPLETED | OUTPATIENT
Start: 2021-05-28 | End: 2021-05-28

## 2021-05-28 RX ORDER — HYDROCODONE BITARTRATE AND ACETAMINOPHEN 5; 325 MG/1; MG/1
1 TABLET ORAL
Qty: 8 TABLET | Refills: 0 | Status: SHIPPED | OUTPATIENT
Start: 2021-05-28 | End: 2021-05-31

## 2021-05-28 RX ORDER — ONDANSETRON 2 MG/ML
4 INJECTION INTRAMUSCULAR; INTRAVENOUS
Status: COMPLETED | OUTPATIENT
Start: 2021-05-28 | End: 2021-05-28

## 2021-05-28 RX ADMIN — MORPHINE SULFATE 4 MG: 4 INJECTION INTRAVENOUS at 15:14

## 2021-05-28 RX ADMIN — ONDANSETRON 4 MG: 2 INJECTION INTRAMUSCULAR; INTRAVENOUS at 13:38

## 2021-05-28 RX ADMIN — MORPHINE SULFATE 4 MG: 4 INJECTION INTRAVENOUS at 13:37

## 2021-05-28 RX ADMIN — LIDOCAINE HYDROCHLORIDE,EPINEPHRINE BITARTRATE 15 MG: 10; .01 INJECTION, SOLUTION INFILTRATION; PERINEURAL at 13:27

## 2021-05-28 NOTE — ED TRIAGE NOTES
Pt to ED with c/o dog bite about 30 minutes ago to right FA. tournquet in place. Pt states he was breaking up a dog fight. Unknown when last tetanus shot was. Masked.

## 2021-05-28 NOTE — ED PROVIDER NOTES
72-year-old male patient presents to the emergency department after sustaining a dog bite to his right forearm. Patient states a mailman arrived at his house to deliver mail causing his dog to become agitated. Dog apparently engaged in an altercation with another animal at which time the patient attempted to break up the fight. He states his own dog latched onto his right forearm. Patient denies any other injury. Reports a significant amount of blood lost on scene. Apparently the mail worker applied a tourniquet the patient's arm prior to arrival.  Patient states dog is fully vaccinated, reports most recent tetanus shot for him occurred last year. Does not take blood thinners. Past Medical History:   Diagnosis Date    Arthritis     right shoulder     Biceps muscle tear     Chronic pain     History of acute renal failure 2018    secondary to infection--was on dialysis for 8 weeks      History of staph infection 01/2019    Hx of cardiac murmur 2018    murmur heard while patient was admitted to hospital with staph infection--Echo 11/8/18--EF 55-60%, mild mitral valve reurgitation--no murmur auscultated during PAT appointment on 11/13/19    Insomnia     MRSA (methicillin resistant Staphylococcus aureus) infection 2007    after a spider bite with I&D    Personal history of kidney stones     Right shoulder pain        Past Surgical History:   Procedure Laterality Date    HX HIP REPLACEMENT Left 05/2019    revision    HX HIP REPLACEMENT Left 01/2017    HX OTHER SURGICAL      posterior rt leg spider bite excision per pt.     HX ROTATOR CUFF REPAIR Bilateral 2010    HX SEPTOPLASTY  11/15/2017         Family History:   Problem Relation Age of Onset    Hypertension Mother     Heart Disease Father        Social History     Socioeconomic History    Marital status: LIFE PARTNER     Spouse name: Not on file    Number of children: Not on file    Years of education: Not on file    Highest education level: Not on file   Occupational History    Not on file   Tobacco Use    Smoking status: Former Smoker     Packs/day: 0.50     Years: 1.00     Pack years: 0.50     Quit date: 2001     Years since quittin.9    Smokeless tobacco: Never Used   Substance and Sexual Activity    Alcohol use: Yes     Comment: occasionally    Drug use: No    Sexual activity: Yes     Partners: Female     Birth control/protection: None   Other Topics Concern    Not on file   Social History Narrative    Not on file     Social Determinants of Health     Financial Resource Strain:     Difficulty of Paying Living Expenses:    Food Insecurity:     Worried About Running Out of Food in the Last Year:     [address] in the Last Year:    Transportation Needs:     Lack of Transportation (Medical):  Lack of Transportation (Non-Medical):    Physical Activity:     Days of Exercise per Week:     Minutes of Exercise per Session:    Stress:     Feeling of Stress :    Social Connections:     Frequency of Communication with Friends and Family:     Frequency of Social Gatherings with Friends and Family:     Attends Sabianist Services:     Active Member of Clubs or Organizations:     Attends Club or Organization Meetings:     Marital Status:    Intimate Partner Violence:     Fear of Current or Ex-Partner:     Emotionally Abused:     Physically Abused:     Sexually Abused: ALLERGIES: Patient has no known allergies. Review of Systems   Constitutional: Negative for chills, diaphoresis and fever. HENT: Negative for congestion, sneezing and sore throat. Eyes: Negative for visual disturbance. Respiratory: Negative for cough, chest tightness, shortness of breath and wheezing. Cardiovascular: Negative for chest pain and leg swelling. Gastrointestinal: Negative for abdominal pain, blood in stool, diarrhea, nausea and vomiting. Endocrine: Negative for polyuria.    Genitourinary: Negative for difficulty urinating, dysuria, flank pain, hematuria and urgency. Musculoskeletal: Negative for back pain, myalgias, neck pain and neck stiffness. Skin: Positive for wound. Negative for color change and rash. Neurological: Negative for dizziness, syncope, speech difficulty, weakness, light-headedness, numbness and headaches. Psychiatric/Behavioral: Negative for behavioral problems. Vitals:    05/28/21 1314   BP: (!) 162/86   Pulse: (!) 114   Resp: 20   Temp: 98.6 °F (37 °C)   SpO2: 94%   Weight: 86.2 kg (190 lb)   Height: 5' 7\" (1.702 m)            Physical Exam  Vitals and nursing note reviewed. Constitutional:       General: He is not in acute distress. Appearance: He is well-developed. He is not diaphoretic. Comments: Well-appearing male patient, tourniquet in place to right upper extremity alert and oriented to person place and time. No acute distress, speaks in clear, fluid sentences. HENT:      Head: Normocephalic and atraumatic. Right Ear: External ear normal.      Left Ear: External ear normal.      Nose: Nose normal.   Eyes:      Pupils: Pupils are equal, round, and reactive to light. Cardiovascular:      Rate and Rhythm: Normal rate and regular rhythm. Heart sounds: Normal heart sounds. No murmur heard. No friction rub. No gallop. Pulmonary:      Effort: Pulmonary effort is normal. No respiratory distress. Breath sounds: Normal breath sounds. No stridor. No decreased breath sounds, wheezing, rhonchi or rales. Chest:      Chest wall: No tenderness. Abdominal:      General: There is no distension. Palpations: Abdomen is soft. There is no mass. Tenderness: There is no abdominal tenderness. There is no guarding or rebound. Hernia: No hernia is present. Musculoskeletal:         General: No tenderness or deformity. Normal range of motion. Cervical back: Normal range of motion.       Comments: Volar surface of the forearm reveals 2 large, irregular shaped lacerations measuring approximately 3.5 cm in length. A third laceration overlies the medial, posterior aspect of the forearm, measures approximately 2.5 cm in length. Bleeding is controlled on arrival.  No pulsatile bleeding with removal of the tourniquet. Distal pulses are intact with brisk capillary refill present. Patient is able to move all of the digits of the affected extremity without difficulty. Skin:     General: Skin is warm and dry. Neurological:      Mental Status: He is alert and oriented to person, place, and time. Cranial Nerves: No cranial nerve deficit. MDM  Number of Diagnoses or Management Options  Diagnosis management comments: Laboratory testing unremarkable, x-ray negative. Patient is up-to-date on vaccines. Patient presenting with several large lacerations that I chose to loosely approximate. Discussed this plan with patient who was understanding and voiced agreement. Wound repaired at bedside without complication. Pain controlled. Pressure dressing applied. We will place patient on short course of prophylactic antibiotics.   Long discussion with patient regarding the reasons to return to this department sooner, will have patient return in 10 days for suture removal.       Amount and/or Complexity of Data Reviewed  Clinical lab tests: ordered and reviewed  Tests in the radiology section of CPT®: ordered and reviewed  Tests in the medicine section of CPT®: ordered and reviewed  Independent visualization of images, tracings, or specimens: yes    Risk of Complications, Morbidity, and/or Mortality  Presenting problems: moderate  Diagnostic procedures: low  Management options: moderate    Patient Progress  Patient progress: stable         Wound Repair    Date/Time: 5/28/2021 2:50 PM  Performed by: attendingPreparation: skin prepped with Betadine  Location details: right arm  Wound length:7.6 - 12.5 cm  Anesthesia: local infiltration    Anesthesia:  Local Anesthetic: lidocaine 1% with epinephrine  Foreign bodies: no foreign bodies  Irrigation solution: saline  Irrigation method: syringe  Debridement: minimal  Wound skin closure material used: 3-0. Number of sutures: 8  Technique: simple and horizontal mattress  Approximation: loose  My total time at bedside, performing this procedure was 1-15 minutes. Comments: Patient presenting with 3 separate, irregularly-shaped lacerations from dog bite over the right forearm. Decision made to bring scalp wound margins together loosely with several sutures. This was accomplished with 7 simple interrupted sutures and one horizontal mattress. Bleeding present during exam though no pulsatile bleeding was noted. Hemostasis was achieved with lidocaine with epinephrine injection subcutaneously and gentle pressure to the area. Minimal bleeding after wound was closed. Orders placed for pressure dressing.

## 2021-05-28 NOTE — ED NOTES
I have reviewed discharge instructions with the patient. The patient verbalized understanding. Patient left ED via Discharge Method: ambulatory to Home with friend    Opportunity for questions and clarification provided. Patient given 2 scripts. To continue your aftercare when you leave the hospital, you may receive an automated call from our care team to check in on how you are doing. This is a free service and part of our promise to provide the best care and service to meet your aftercare needs.  If you have questions, or wish to unsubscribe from this service please call [phone]. Thank you for Choosing our [facility] Emergency Department.

## 2021-05-28 NOTE — DISCHARGE INSTRUCTIONS
Monitor for signs of infection including increased redness, swelling, worsening drainage from the area. Return immediately for the symptoms. Take the antibiotics prescribed as directed. Apply ice to the area to help with swelling and leave the bandage placed today in place for the next 12 hours. After this period of time, you can remove the dressing. Do not submerge the wound in water until follow-up. Clean the area daily with warm soap and water.

## 2021-06-08 ENCOUNTER — HOSPITAL ENCOUNTER (EMERGENCY)
Age: 56
Discharge: HOME | End: 2021-06-08
Attending: EMERGENCY MEDICINE

## 2021-06-08 VITALS
OXYGEN SATURATION: 98 % | DIASTOLIC BLOOD PRESSURE: 84 MMHG | BODY MASS INDEX: 29.82 KG/M2 | HEART RATE: 65 BPM | TEMPERATURE: 97.7 F | WEIGHT: 190 LBS | RESPIRATION RATE: 18 BRPM | SYSTOLIC BLOOD PRESSURE: 125 MMHG | HEIGHT: 67 IN

## 2021-06-08 DIAGNOSIS — Z48.02 VISIT FOR SUTURE REMOVAL: Primary | ICD-10-CM

## 2021-06-08 PROCEDURE — 99282 EMERGENCY DEPT VISIT SF MDM: CPT

## 2021-06-08 PROCEDURE — 75810000275 HC EMERGENCY DEPT VISIT NO LEVEL OF CARE

## 2021-06-08 NOTE — ED PROVIDER NOTES
82-year-old male who presents for suture removal.  Sutures were placed several days ago. He has had an unremarkable healing course. Past Medical History:   Diagnosis Date    Arthritis     right shoulder     Biceps muscle tear     Chronic pain     History of acute renal failure 2018    secondary to infection--was on dialysis for 8 weeks      History of staph infection 2019    Hx of cardiac murmur 2018    murmur heard while patient was admitted to hospital with staph infection--Echo 18--EF 55-60%, mild mitral valve reurgitation--no murmur auscultated during PAT appointment on 19    Insomnia     MRSA (methicillin resistant Staphylococcus aureus) infection 2007    after a spider bite with I&D    Personal history of kidney stones     Right shoulder pain        Past Surgical History:   Procedure Laterality Date    HX HIP REPLACEMENT Left 2019    revision    HX HIP REPLACEMENT Left 2017    HX OTHER SURGICAL      posterior rt leg spider bite excision per pt.     HX ROTATOR CUFF REPAIR Bilateral     HX SEPTOPLASTY  11/15/2017         Family History:   Problem Relation Age of Onset    Hypertension Mother     Heart Disease Father        Social History     Socioeconomic History    Marital status: LIFE PARTNER     Spouse name: Not on file    Number of children: Not on file    Years of education: Not on file    Highest education level: Not on file   Occupational History    Not on file   Tobacco Use    Smoking status: Former Smoker     Packs/day: 0.50     Years: 1.00     Pack years: 0.50     Quit date: 2001     Years since quittin.0    Smokeless tobacco: Never Used   Substance and Sexual Activity    Alcohol use: Yes     Comment: occasionally    Drug use: No    Sexual activity: Yes     Partners: Female     Birth control/protection: None   Other Topics Concern    Not on file   Social History Narrative    Not on file     Social Determinants of Health Financial Resource Strain:     Difficulty of Paying Living Expenses:    Food Insecurity:     Worried About Running Out of Food in the Last Year:     [address] in the Last Year:    Transportation Needs:     Lack of Transportation (Medical):  Lack of Transportation (Non-Medical):    Physical Activity:     Days of Exercise per Week:     Minutes of Exercise per Session:    Stress:     Feeling of Stress :    Social Connections:     Frequency of Communication with Friends and Family:     Frequency of Social Gatherings with Friends and Family:     Attends [employer] Services:     Active Member of Clubs or Organizations:     Attends Club or Organization Meetings:     Marital Status:    Intimate Partner Violence:     Fear of Current or Ex-Partner:     Emotionally Abused:     Physically Abused:     Sexually Abused: ALLERGIES: Patient has no known allergies. Review of Systems   Constitutional: Negative for chills and fever. HENT: Negative for facial swelling. Respiratory: Negative for chest tightness and shortness of breath. Cardiovascular: Negative for chest pain. Gastrointestinal: Negative for abdominal pain, nausea and vomiting. Musculoskeletal: Negative for myalgias. Skin: Positive for wound. Neurological: Negative for headaches. Psychiatric/Behavioral: Negative for confusion. All other systems reviewed and are negative. Vitals:    06/08/21 0749   BP: 125/84   Pulse: 65   Resp: 18   Temp: 97.7 °F (36.5 °C)   SpO2: 98%   Weight: 86.2 kg (190 lb)   Height: 5' 7\" (1.702 m)            Physical Exam  Constitutional:       Appearance: Normal appearance. HENT:      Head: Normocephalic and atraumatic. Mouth/Throat:      Mouth: Mucous membranes are moist.   Eyes:      Pupils: Pupils are equal, round, and reactive to light. Cardiovascular:      Rate and Rhythm: Normal rate and regular rhythm. Pulmonary:      Effort: No respiratory distress.       Breath sounds: Normal breath sounds. Abdominal:      Palpations: Abdomen is soft. Tenderness: There is no abdominal tenderness. There is no guarding. Skin:     General: Skin is warm and dry. Neurological:      Mental Status: He is alert and oriented to person, place, and time. Mental status is at baseline. Psychiatric:         Mood and Affect: Mood normal.          MDM  Number of Diagnoses or Management Options  Diagnosis management comments: Patient presents with complaint of needing situation. Patient states 8 sutures was replaced after patient was bitten by dog several days ago. Patient has an unremarkable healing course. He is taking antibiotics as prescribed. Sutures were removed by nurse.     Risk of Complications, Morbidity, and/or Mortality  Presenting problems: minimal  Diagnostic procedures: minimal  Management options: minimal    Patient Progress  Patient progress: improved         Procedures

## 2021-06-08 NOTE — ED NOTES
I have reviewed discharge instructions with the patient. The patient verbalized understanding. Patient left ED via Discharge Method: ambulatory to Home with self. Opportunity for questions and clarification provided. Patient given 0 scripts. To continue your aftercare when you leave the hospital, you may receive an automated call from our care team to check in on how you are doing. This is a free service and part of our promise to provide the best care and service to meet your aftercare needs.  If you have questions, or wish to unsubscribe from this service please call [phone]. Thank you for Choosing our [facility] Emergency Department.

## 2022-03-18 PROBLEM — A41.9 SEPSIS (HCC): Status: ACTIVE | Noted: 2018-11-07

## 2022-03-18 PROBLEM — M12.811 ROTATOR CUFF TEAR ARTHROPATHY OF RIGHT SHOULDER: Status: ACTIVE | Noted: 2019-11-30

## 2022-03-18 PROBLEM — M75.101 ROTATOR CUFF TEAR ARTHROPATHY OF RIGHT SHOULDER: Status: ACTIVE | Noted: 2019-11-30

## 2022-03-18 PROBLEM — M00.011 STAPHYLOCOCCAL ARTHRITIS OF RIGHT SHOULDER (HCC): Status: ACTIVE | Noted: 2018-11-06

## 2022-03-18 PROBLEM — M19.011 OSTEOARTHRITIS OF RIGHT GLENOHUMERAL JOINT: Status: ACTIVE | Noted: 2018-11-05

## 2022-03-18 PROBLEM — N17.0 ACUTE RENAL FAILURE WITH TUBULAR NECROSIS (HCC): Status: ACTIVE | Noted: 2018-11-06

## 2022-03-18 PROBLEM — M00.9 SEPTIC ARTHRITIS OF SHOULDER, RIGHT (HCC): Status: ACTIVE | Noted: 2018-11-05

## 2022-03-18 PROBLEM — M19.011 DEGENERATIVE JOINT DISEASE OF RIGHT ACROMIOCLAVICULAR JOINT: Status: ACTIVE | Noted: 2018-11-05

## 2022-03-18 PROBLEM — M12.811 ARTHROPATHY, TRANSIENT, SHOULDER, RIGHT: Status: ACTIVE | Noted: 2019-12-06

## 2022-03-19 PROBLEM — Z96.649 S/P TOTAL HIP ARTHROPLASTY: Status: ACTIVE | Noted: 2017-01-06

## 2022-03-19 PROBLEM — T84.59XA INFECTED PROSTHETIC HIP (HCC): Status: ACTIVE | Noted: 2018-11-05

## 2022-03-19 PROBLEM — M12.811 ROTATOR CUFF ARTHROPATHY OF RIGHT SHOULDER: Status: ACTIVE | Noted: 2019-12-06

## 2022-03-19 PROBLEM — M75.121 COMPLETE TEAR OF RIGHT ROTATOR CUFF: Status: ACTIVE | Noted: 2018-11-05

## 2022-03-19 PROBLEM — D62 POSTOPERATIVE ANEMIA DUE TO ACUTE BLOOD LOSS: Status: ACTIVE | Noted: 2018-11-07

## 2022-03-19 PROBLEM — Z96.649 INFECTED PROSTHETIC HIP (HCC): Status: ACTIVE | Noted: 2018-11-05

## 2022-03-20 PROBLEM — M16.12 ARTHRITIS OF LEFT HIP: Status: ACTIVE | Noted: 2017-01-06

## 2022-12-05 NOTE — PROGRESS NOTES
Pt ambulatory to area without complaints. Received treatment per order, tolerated well. Aware of next appt on [email or web address]. Advised to call dr with any issues/concerns. Discharged home without complaints.
Statement Selected

## 2022-12-15 NOTE — DIALYSIS
TRANSFER IN - DIALYSIS Received patient in dialysis unit  from [facility] (unit) for ordered procedure. Consent verified for renal replacement therapy. Patient alert and vital signs stable. /94 P62 Hemodialysis initiated using  Right perm catheter. Aspirated and flushed both ports without difficulty. Dressing clean, dry and intact. Machine settings per MD order. Will monitor during treatment. Discontinue Regimen: Ciclopirox Plan: Wear shoes with wide toe box, minimize trauma Render In Strict Bullet Format?: Yes Detail Level: Simple Initiate Treatment: DSC nail solution apply to aa bid daily

## 2022-12-23 NOTE — PROGRESS NOTES
Dr. Dagmar Torres notified of hgb 8.7 after the second unit of PRBC and creatinine increasing to 7.08. No new orders received. eyeglasses

## 2023-06-20 ENCOUNTER — OFFICE VISIT (OUTPATIENT)
Dept: ORTHOPEDIC SURGERY | Age: 58
End: 2023-06-20
Payer: MEDICARE

## 2023-06-20 ENCOUNTER — TELEPHONE (OUTPATIENT)
Dept: ORTHOPEDIC SURGERY | Age: 58
End: 2023-06-20

## 2023-06-20 VITALS — HEIGHT: 67 IN | WEIGHT: 185 LBS | BODY MASS INDEX: 29.03 KG/M2

## 2023-06-20 DIAGNOSIS — M75.22 BICIPITAL TENDINITIS OF LEFT SHOULDER: ICD-10-CM

## 2023-06-20 DIAGNOSIS — M12.812 ROTATOR CUFF TEAR ARTHROPATHY OF LEFT SHOULDER: Primary | ICD-10-CM

## 2023-06-20 DIAGNOSIS — M19.012 DEGENERATIVE JOINT DISEASE OF LEFT ACROMIOCLAVICULAR JOINT: ICD-10-CM

## 2023-06-20 DIAGNOSIS — Z09 FOLLOW-UP EXAMINATION AFTER ORTHOPEDIC SURGERY: ICD-10-CM

## 2023-06-20 DIAGNOSIS — Z96.611 PRESENCE OF RIGHT ARTIFICIAL SHOULDER JOINT: ICD-10-CM

## 2023-06-20 DIAGNOSIS — M75.102 ROTATOR CUFF TEAR ARTHROPATHY OF LEFT SHOULDER: Primary | ICD-10-CM

## 2023-06-20 DIAGNOSIS — R33.9 URINARY RETENTION: ICD-10-CM

## 2023-06-20 LAB — PSA SERPL-MCNC: 2.3 NG/ML

## 2023-06-20 PROCEDURE — 3017F COLORECTAL CA SCREEN DOC REV: CPT | Performed by: ORTHOPAEDIC SURGERY

## 2023-06-20 PROCEDURE — 1036F TOBACCO NON-USER: CPT | Performed by: ORTHOPAEDIC SURGERY

## 2023-06-20 PROCEDURE — G8427 DOCREV CUR MEDS BY ELIG CLIN: HCPCS | Performed by: ORTHOPAEDIC SURGERY

## 2023-06-20 PROCEDURE — G8419 CALC BMI OUT NRM PARAM NOF/U: HCPCS | Performed by: ORTHOPAEDIC SURGERY

## 2023-06-20 PROCEDURE — 99204 OFFICE O/P NEW MOD 45 MIN: CPT | Performed by: ORTHOPAEDIC SURGERY

## 2023-06-20 RX ORDER — GABAPENTIN 100 MG/1
100 CAPSULE ORAL 3 TIMES DAILY
Qty: 90 CAPSULE | Refills: 0 | Status: SHIPPED | OUTPATIENT
Start: 2023-06-20 | End: 2023-07-20

## 2023-06-20 NOTE — TELEPHONE ENCOUNTER
Called and spoke to Mary veloz and informed her that rx would be sent when chart is signed later today. Also advised her that if anyone in urology group can see pt sooner, that would be fine. She agreed.

## 2023-06-20 NOTE — PROGRESS NOTES
the potential need for reverse left total shoulder arthroplasty. We will defer surgery for now. I injected his left shoulder. I provided him a prescription for gabapentin. We will start him on a home shoulder rehab program.  He has had a long history of urinary retention and has been afraid to be evaluated by a urologist.  I will send him to have a PSA drawn and I will refer him to [facility] for an evaluation. I stressed to him the importance of following up with this appointment. If there is anything abnormal on the PSA I will contact him. I will reassess his progress back in 3 months. If he still having left shoulder difficulty we would consider an MRI of the left shoulder. I will recheck him back in 3 months  4 This is a chronic illness/condition with exacerbation and progression    Follow up: Return in about 3 months (around 9/20/2023).              Tiana Aguilar MD

## 2023-06-20 NOTE — TELEPHONE ENCOUNTER
He was seen earlier and a RX was supposed to be sent in for gabapentin. The pharmacy has not yet received.

## 2023-06-21 ENCOUNTER — TELEPHONE (OUTPATIENT)
Dept: ORTHOPEDIC SURGERY | Age: 58
End: 2023-06-21

## 2023-06-21 RX ORDER — GABAPENTIN 100 MG/1
100 CAPSULE ORAL 3 TIMES DAILY
Qty: 90 CAPSULE | Refills: 0 | Status: SHIPPED | OUTPATIENT
Start: 2023-06-21 | End: 2023-07-21

## 2023-06-21 NOTE — TELEPHONE ENCOUNTER
She has been to the pharmacy three times to get the gabapentin and it isn't there. It looks like it did not go through. Can you check the status please.

## 2023-06-21 NOTE — TELEPHONE ENCOUNTER
Called and spoke to pt and his wife and informed them that allen should be at pharmacy with receipt confirmed and also advised them per AGP that PSA was normal.

## 2023-07-25 ENCOUNTER — OFFICE VISIT (OUTPATIENT)
Dept: UROLOGY | Age: 58
End: 2023-07-25
Payer: MEDICARE

## 2023-07-25 DIAGNOSIS — R35.0 URINARY FREQUENCY: ICD-10-CM

## 2023-07-25 DIAGNOSIS — N40.1 BENIGN PROSTATIC HYPERPLASIA WITH LOWER URINARY TRACT SYMPTOMS, SYMPTOM DETAILS UNSPECIFIED: Primary | ICD-10-CM

## 2023-07-25 LAB
BILIRUBIN, URINE, POC: NEGATIVE
BLOOD URINE, POC: NEGATIVE
GLUCOSE URINE, POC: NEGATIVE
KETONES, URINE, POC: NEGATIVE
LEUKOCYTE ESTERASE, URINE, POC: NEGATIVE
NITRITE, URINE, POC: NEGATIVE
PH, URINE, POC: 6 (ref 4.6–8)
PROTEIN,URINE, POC: 30
SPECIFIC GRAVITY, URINE, POC: 1.02 (ref 1–1.03)
URINALYSIS CLARITY, POC: NORMAL
URINALYSIS COLOR, POC: NORMAL
UROBILINOGEN, POC: NORMAL

## 2023-07-25 PROCEDURE — 99204 OFFICE O/P NEW MOD 45 MIN: CPT | Performed by: UROLOGY

## 2023-07-25 PROCEDURE — G8419 CALC BMI OUT NRM PARAM NOF/U: HCPCS | Performed by: UROLOGY

## 2023-07-25 PROCEDURE — 1036F TOBACCO NON-USER: CPT | Performed by: UROLOGY

## 2023-07-25 PROCEDURE — 3017F COLORECTAL CA SCREEN DOC REV: CPT | Performed by: UROLOGY

## 2023-07-25 PROCEDURE — 81003 URINALYSIS AUTO W/O SCOPE: CPT | Performed by: UROLOGY

## 2023-07-25 PROCEDURE — G8427 DOCREV CUR MEDS BY ELIG CLIN: HCPCS | Performed by: UROLOGY

## 2023-07-25 ASSESSMENT — ENCOUNTER SYMPTOMS
SKIN LESIONS: 0
HEARTBURN: 0
CONSTIPATION: 0
DIARRHEA: 1
COUGH: 0
EYE PAIN: 0
VOMITING: 0
WHEEZING: 0
EYE DISCHARGE: 0
INDIGESTION: 0
BACK PAIN: 0
BLOOD IN STOOL: 0
ABDOMINAL PAIN: 0
SHORTNESS OF BREATH: 0
NAUSEA: 0

## 2023-07-25 NOTE — PROGRESS NOTES
frequency hearing loss and dry mouth. Respiratory:  Negative for cough, blood in sputum, shortness of breath and wheezing. Cardiovascular: Positive for hypertension. Negative for chest pain, irregular heartbeat, leg pain, leg swelling, regular rate and rhythm and varicose veins. GI: Positive for diarrhea. Negative for nausea, vomiting, abdominal pain, blood in stool, constipation, indigestion and heartburn. Genitourinary: Positive for urinary burning, nocturia, slower stream, straining, urgency, leakage w/ urge, frequent urination and incomplete emptying. Negative for hematuria, flank pain, recurrent UTIs, history of urolithiasis, erectile dysfunction, testicular pain, sexually transmitted disease, discharge and urethral stricture. Musculoskeletal: Positive for bone pain and arthralgias. Negative for back pain, tenderness, muscle weakness and neck pain. Neurological:  Negative for dizziness, focal weakness, numbness, seizures and tremors. Psychological:  Negative for depression and psychiatric problem. Endocrine:  Negative for cold intolerance, thirst, excessive urination, fatigue and heat intolerance. Hem/Lymphatic:  Negative for easy bleeding, easy bruising and frequent infections. Urinalysis  UA - Dipstick  Results for orders placed or performed in visit on 07/25/23   AMB POC URINALYSIS DIP STICK AUTO W/O MICRO   Result Value Ref Range    Color (UA POC)      Clarity (UA POC)      Glucose, Urine, POC Negative Negative    Bilirubin, Urine, POC Negative Negative    KETONES, Urine, POC Negative Negative    Specific Gravity, Urine, POC 1.025 1.001 - 1.035    Blood (UA POC) Negative Negative    pH, Urine, POC 6.0 4.6 - 8.0    Protein, Urine, POC 30 Negative    Urobilinogen, POC 0.2 mg/dL     Nitrite, Urine, POC Negative Negative    Leukocyte Esterase, Urine, POC Negative Negative       There were no vitals taken for this visit.      GENERAL: NAD, ALERT, A&O x 3, GAIT NORMAL  LUNGS: easy work of

## 2023-08-22 ENCOUNTER — PROCEDURE VISIT (OUTPATIENT)
Dept: UROLOGY | Age: 58
End: 2023-08-22
Payer: MEDICARE

## 2023-08-22 ENCOUNTER — TELEPHONE (OUTPATIENT)
Dept: UROLOGY | Age: 58
End: 2023-08-22

## 2023-08-22 DIAGNOSIS — N40.1 BENIGN PROSTATIC HYPERPLASIA WITH LOWER URINARY TRACT SYMPTOMS, SYMPTOM DETAILS UNSPECIFIED: Primary | ICD-10-CM

## 2023-08-22 DIAGNOSIS — R35.0 URINARY FREQUENCY: ICD-10-CM

## 2023-08-22 LAB
BILIRUBIN, URINE, POC: NEGATIVE
BLOOD URINE, POC: NORMAL
GLUCOSE URINE, POC: NEGATIVE
KETONES, URINE, POC: NEGATIVE
LEUKOCYTE ESTERASE, URINE, POC: NEGATIVE
NITRITE, URINE, POC: NEGATIVE
PH, URINE, POC: 6 (ref 4.6–8)
PROTEIN,URINE, POC: 30
SPECIFIC GRAVITY, URINE, POC: 1.02 (ref 1–1.03)
URINALYSIS CLARITY, POC: NORMAL
URINALYSIS COLOR, POC: NORMAL
UROBILINOGEN, POC: NORMAL

## 2023-08-22 PROCEDURE — 3017F COLORECTAL CA SCREEN DOC REV: CPT | Performed by: UROLOGY

## 2023-08-22 PROCEDURE — 1036F TOBACCO NON-USER: CPT | Performed by: UROLOGY

## 2023-08-22 PROCEDURE — G8427 DOCREV CUR MEDS BY ELIG CLIN: HCPCS | Performed by: UROLOGY

## 2023-08-22 PROCEDURE — G8419 CALC BMI OUT NRM PARAM NOF/U: HCPCS | Performed by: UROLOGY

## 2023-08-22 PROCEDURE — 81003 URINALYSIS AUTO W/O SCOPE: CPT | Performed by: UROLOGY

## 2023-08-22 PROCEDURE — 99214 OFFICE O/P EST MOD 30 MIN: CPT | Performed by: UROLOGY

## 2023-08-22 PROCEDURE — 52000 CYSTOURETHROSCOPY: CPT | Performed by: UROLOGY

## 2023-08-22 NOTE — H&P (VIEW-ONLY)
[facility]  [address] [place] [place], [address] [place]  [phone]    Abbie Gonzalez  : 1965         HPI   66 y.o., male referred by Dr. Jose Guadalupe Quezada in  in regards to severe LUTS. He complains of slow stream, feeling of incomplete emptying, frequency, nocturia. Symptoms have been gradually worsening over years. He has tried no treatment. Patient can think of no other instigating or exacerbating events. PSA:  2.3  IPSS/QOL:  33/3    Past Medical History:   Diagnosis Date    Arthritis     right shoulder     Biceps muscle tear     Chronic kidney disease     Chronic pain     History of acute renal failure 2018    secondary to infection--was on dialysis for 8 weeks      History of staph infection 2019    Hx of cardiac murmur 2018    murmur heard while patient was admitted to hospital with staph infection--Echo 18--EF 55-60%, mild mitral valve reurgitation--no murmur auscultated during PAT appointment on 19    Hypertension     Insomnia     MRSA (methicillin resistant Staphylococcus aureus) infection     after a spider bite with I&D    Personal history of kidney stones     Right shoulder pain      Past Surgical History:   Procedure Laterality Date    IR NONTUNNELED VASCULAR CATHETER  2018    IR NONTUNNELED VASCULAR CATHETER 2018 [facility] SPE[facility]    IR TUNNELED CATHETER PLACEMENT GREATER THAN 5 YEARS  2018    IR TUNNELED CATHETER PLACEMENT GREATER THAN 5 YEARS 2018 [facility] SPECI[facility]    OTHER SURGICAL HISTORY      posterior rt leg spider bite excision per pt. ROTATOR CUFF REPAIR Bilateral     SEPTOPLASTY  11/15/2017    TOTAL HIP ARTHROPLASTY Left 2019    revision    TOTAL HIP ARTHROPLASTY Left 2017     Current Outpatient Medications   Medication Sig Dispense Refill    gabapentin (NEURONTIN) 100 MG capsule Take 1 capsule by mouth 3 times daily for 30 days.  90 capsule 0     No current facility-administered medications for

## 2023-08-22 NOTE — PROGRESS NOTES
this visit. No Known Allergies  Social History     Socioeconomic History    Marital status: Life Partner     Spouse name: Not on file    Number of children: Not on file    Years of education: Not on file    Highest education level: Not on file   Occupational History    Not on file   Tobacco Use    Smoking status: Former     Packs/day: 0.50     Types: Cigarettes     Quit date: 2001     Years since quittin.2    Smokeless tobacco: Never   Substance and Sexual Activity    Alcohol use: Yes    Drug use: No    Sexual activity: Not on file   Other Topics Concern    Not on file   Social History Narrative    Not on file     Social Determinants of Health     Financial Resource Strain: Not on file   Food Insecurity: Not on file   Transportation Needs: Not on file   Physical Activity: Not on file   Stress: Not on file   Social Connections: Not on file   Intimate Partner Violence: Not on file   Housing Stability: Not on file     Family History   Problem Relation Age of Onset    Hypertension Mother     Heart Disease Father        There were no vitals taken for this visit. UA - Dipstick  Results for orders placed or performed in visit on 23   AMB POC URINALYSIS DIP STICK AUTO W/O MICRO   Result Value Ref Range    Color (UA POC)      Clarity (UA POC)      Glucose, Urine, POC Negative Negative    Bilirubin, Urine, POC Negative Negative    KETONES, Urine, POC Negative Negative    Specific Gravity, Urine, POC 1.020 1.001 - 1.035    Blood (UA POC) Trace Negative    pH, Urine, POC 6.0 4.6 - 8.0    Protein, Urine, POC 30 Negative    Urobilinogen, POC 0.2 mg/dL     Nitrite, Urine, POC Negative Negative    Leukocyte Esterase, Urine, POC Negative Negative       There were no vitals taken for this visit.      GENERAL: NAD, ALERT, A&O x 3, GAIT NORMAL  CARDIAC: regular rate and rhythm  CHEST AND LUNG: Easy work of breathing, clear to auscultation bilaterally  ABDOMEN: soft, non tender, non distended, + bowel sounds, no

## 2023-08-22 NOTE — TELEPHONE ENCOUNTER
----- Message from Mg More MD sent at 8/22/2023  4:15 PM EDT -----  Regarding: schedule  Hospital: [facility]     Surgeon: darcy  Assist: NONE    Diagnosis: bph    Procedure: bipolar TURP    Posting time: 90 min    Special Instruments Needed:  NONE    Anesthesia: GENERAL    Labs: CBC, BMP    Tests: EKG per anesthesia    Blood: NONE    Bowel Prep: NONE    Special Instructions: hold any blood thinners    Orders/HandP: done/done    Follow up appointment: 1 mo post op Hedy Nguyen

## 2023-08-24 ENCOUNTER — TELEPHONE (OUTPATIENT)
Dept: UROLOGY | Age: 58
End: 2023-08-24

## 2023-08-24 PROBLEM — N40.1 ENLARGED PROSTATE WITH LOWER URINARY TRACT SYMPTOMS (LUTS): Status: ACTIVE | Noted: 2023-08-24

## 2023-08-24 NOTE — TELEPHONE ENCOUNTER
The patient has decided to wait to have the surgery at this time. He will call back when ready to schedule, he is thinking December.

## 2023-08-24 NOTE — TELEPHONE ENCOUNTER
Procedures: Procedure(s):   CYSTOSCOPY TRANSURETHRAL RESECTION PROSTATE BIPOLAR   Date: 9/11/2023   Time: 0800   Location: [facility] MAIN OR [address]

## 2023-09-11 ENCOUNTER — TELEPHONE (OUTPATIENT)
Dept: UROLOGY | Age: 58
End: 2023-09-11

## 2023-09-11 NOTE — TELEPHONE ENCOUNTER
Patient has been having hematuria and blood from rectum since cystoscopy. He was scheduled for surgery today and canceled now calling stating this. Does he need to come in or just reschedule surgery soon? Wasn't sure if should check a urine or not.

## 2023-09-12 ENCOUNTER — OFFICE VISIT (OUTPATIENT)
Dept: UROLOGY | Age: 58
End: 2023-09-12
Payer: MEDICARE

## 2023-09-12 DIAGNOSIS — N40.1 BENIGN PROSTATIC HYPERPLASIA WITH LOWER URINARY TRACT SYMPTOMS, SYMPTOM DETAILS UNSPECIFIED: Primary | ICD-10-CM

## 2023-09-12 DIAGNOSIS — N39.0 URINARY TRACT INFECTION WITHOUT HEMATURIA, SITE UNSPECIFIED: ICD-10-CM

## 2023-09-12 DIAGNOSIS — R35.0 URINARY FREQUENCY: ICD-10-CM

## 2023-09-12 LAB
BILIRUBIN, URINE, POC: NEGATIVE
BLOOD URINE, POC: NORMAL
GLUCOSE URINE, POC: NEGATIVE
KETONES, URINE, POC: NEGATIVE
LEUKOCYTE ESTERASE, URINE, POC: NORMAL
NITRITE, URINE, POC: POSITIVE
PH, URINE, POC: 6 (ref 4.6–8)
PROTEIN,URINE, POC: 300
PVR, POC: 164 CC
SPECIFIC GRAVITY, URINE, POC: 1.02 (ref 1–1.03)
URINALYSIS CLARITY, POC: NORMAL
URINALYSIS COLOR, POC: NORMAL
UROBILINOGEN, POC: NORMAL

## 2023-09-12 PROCEDURE — 51798 US URINE CAPACITY MEASURE: CPT | Performed by: NURSE PRACTITIONER

## 2023-09-12 PROCEDURE — 1036F TOBACCO NON-USER: CPT | Performed by: NURSE PRACTITIONER

## 2023-09-12 PROCEDURE — 3017F COLORECTAL CA SCREEN DOC REV: CPT | Performed by: NURSE PRACTITIONER

## 2023-09-12 PROCEDURE — G8427 DOCREV CUR MEDS BY ELIG CLIN: HCPCS | Performed by: NURSE PRACTITIONER

## 2023-09-12 PROCEDURE — G8419 CALC BMI OUT NRM PARAM NOF/U: HCPCS | Performed by: NURSE PRACTITIONER

## 2023-09-12 PROCEDURE — 99214 OFFICE O/P EST MOD 30 MIN: CPT | Performed by: NURSE PRACTITIONER

## 2023-09-12 PROCEDURE — 81003 URINALYSIS AUTO W/O SCOPE: CPT | Performed by: NURSE PRACTITIONER

## 2023-09-12 RX ORDER — TAMSULOSIN HYDROCHLORIDE 0.4 MG/1
0.4 CAPSULE ORAL DAILY
Qty: 30 CAPSULE | Refills: 0 | Status: SHIPPED | OUTPATIENT
Start: 2023-09-12

## 2023-09-12 RX ORDER — SULFAMETHOXAZOLE AND TRIMETHOPRIM 800; 160 MG/1; MG/1
1 TABLET ORAL 2 TIMES DAILY
Qty: 14 TABLET | Refills: 0 | Status: SHIPPED | OUTPATIENT
Start: 2023-09-12 | End: 2023-09-19

## 2023-09-12 RX ORDER — PHENAZOPYRIDINE HYDROCHLORIDE 200 MG/1
200 TABLET, FILM COATED ORAL 3 TIMES DAILY PRN
Qty: 30 TABLET | Refills: 1 | Status: SHIPPED | OUTPATIENT
Start: 2023-09-12

## 2023-09-12 ASSESSMENT — ENCOUNTER SYMPTOMS: NAUSEA: 0

## 2023-09-12 NOTE — PROGRESS NOTES
[facility]  [address] [facility], [address] [place] [address]  [phone]          Carol Wright  : 1965    Chief Complaint   Patient presents with    Follow-up          HPI     Carol Wright is a 66 y.o. male   Here today with complaints of increased urinary pain, dysuria and difficulty with urine stream/flow. Had cystoscopy a couple of weeks ago and trilobular enlargement was seen. He is scheduled to have Bipolar TURP. He is not having fever or chills. PVR today shows 164mls. Past Medical History:   Diagnosis Date    Arthritis     right shoulder     Biceps muscle tear     Chronic kidney disease     Chronic pain     History of acute renal failure 2018    secondary to infection--was on dialysis for 8 weeks      History of staph infection 2019    Hx of cardiac murmur 2018    murmur heard while patient was admitted to hospital with staph infection--Echo 18--EF 55-60%, mild mitral valve reurgitation--no murmur auscultated during PAT appointment on 19    Hypertension     Insomnia     MRSA (methicillin resistant Staphylococcus aureus) infection 2007    after a spider bite with I&D    Personal history of kidney stones     Right shoulder pain      Past Surgical History:   Procedure Laterality Date    IR NONTUNNELED VASCULAR CATHETER  2018    IR NONTUNNELED VASCULAR CATHETER 2018 [facility] SPECI[facility]    IR TUNNELED CATHETER PLACEMENT GREATER THAN 5 YEARS  2018    IR TUNNELED CATHETER PLACEMENT GREATER THAN 5 YEARS 2018 [facility] RADIOLOGY SPECIALS    OTHER SURGICAL HISTORY      posterior rt leg spider bite excision per pt.     ROTATOR CUFF REPAIR Bilateral     SEPTOPLASTY  11/15/2017    TOTAL HIP ARTHROPLASTY Left 2019    revision    TOTAL HIP ARTHROPLASTY Left 2017     Current Outpatient Medications   Medication Sig Dispense Refill    sulfamethoxazole-trimethoprim (BACTRIM DS) 800-160 MG per tablet Take 1 tablet by mouth 2 times daily for 7 days 14

## 2023-09-12 NOTE — TELEPHONE ENCOUNTER
Procedures: Procedure(s):   CYSTOSCOPY TRANSURETHRAL RESECTION PROSTATE BIPOLAR   Date: 9/20/2023   Time: 0800   Location: [facility] MAIN OR [address]

## 2023-09-15 LAB
BACTERIA SPEC CULT: ABNORMAL
BACTERIA SPEC CULT: ABNORMAL
SERVICE CMNT-IMP: ABNORMAL

## 2023-09-18 LAB
BACTERIA SPEC CULT: ABNORMAL
BACTERIA SPEC CULT: ABNORMAL
SERVICE CMNT-IMP: ABNORMAL

## 2023-09-18 NOTE — PRE-PROCEDURE INSTRUCTIONS
Patient verified name and . Order for consent was not found in EHR ; patient verifies procedure. Type II surgery, phone assessment complete. Orders were received. Labs per surgeon: none found for PAT at this time. Labs per anesthesia protocol: Hemoglobin and Type and Screen DOS. Patient poor historian and answered medical/surgical history questions at their best of ability. All prior to admission medications documented in EPIC. Patient instructed to take the following medications the day of surgery according to anesthesia guidelines with a small sip of water: Bactrim. On the day before surgery please take Tylenol (Acetaminophen) 1000mg in the morning and then again before bed. You may substitute for Tylenol 650 mg. Hold all vitamins 7 days prior to surgery and NSAIDS 5 days prior to surgery. Prescription meds to hold:none    Patient instructed on the following and needs reenforcement.:    > Arrive at [facility], time of arrival to be called the day before by 1700  > NPO after midnight, unless otherwise indicated, including gum, mints, and ice chips  > Responsible adult must drive patient to the hospital, stay during surgery, and patient will need supervision 24 hours after anesthesia  > Use antibacterial soap in shower the night before surgery and on the morning of surgery  > All piercings must be removed prior to arrival.    > Leave all valuables (money and jewelry) at home but bring insurance card and ID on DOS.   > You may be required to pay a deductible or co-pay on the day of your procedure. You can pre-pay by calling [phone] if your surgery is at the [facility] or [phone] if your surgery is at the [facility]. > Do not wear make-up, nail polish, lotions, cologne, perfumes, powders, or oil on skin. Artificial nails are not permitted.

## 2023-09-19 ENCOUNTER — ANESTHESIA EVENT (OUTPATIENT)
Dept: SURGERY | Age: 58
End: 2023-09-19
Payer: MEDICARE

## 2023-09-20 ENCOUNTER — ANESTHESIA (OUTPATIENT)
Dept: SURGERY | Age: 58
End: 2023-09-20
Payer: MEDICARE

## 2023-09-20 ENCOUNTER — HOSPITAL ENCOUNTER (INPATIENT)
Age: 58
LOS: 1 days | Discharge: HOME | End: 2023-09-22
Attending: UROLOGY | Admitting: UROLOGY
Payer: MEDICARE

## 2023-09-20 DIAGNOSIS — N40.1 BENIGN PROSTATIC HYPERPLASIA WITH URINARY RETENTION: Primary | ICD-10-CM

## 2023-09-20 DIAGNOSIS — R33.8 BENIGN PROSTATIC HYPERPLASIA WITH URINARY RETENTION: Primary | ICD-10-CM

## 2023-09-20 PROBLEM — N13.8 BPH WITH OBSTRUCTION/LOWER URINARY TRACT SYMPTOMS: Status: ACTIVE | Noted: 2023-09-20

## 2023-09-20 LAB
ABO + RH BLD: NORMAL
BLOOD GROUP ANTIBODIES SERPL: NORMAL
HGB BLD-MCNC: 15 G/DL (ref 13.6–17.2)
SPECIMEN EXP DATE BLD: NORMAL

## 2023-09-20 PROCEDURE — 2580000003 HC RX 258: Performed by: UROLOGY

## 2023-09-20 PROCEDURE — 6370000000 HC RX 637 (ALT 250 FOR IP): Performed by: UROLOGY

## 2023-09-20 PROCEDURE — 88305 TISSUE EXAM BY PATHOLOGIST: CPT

## 2023-09-20 PROCEDURE — 86900 BLOOD TYPING SEROLOGIC ABO: CPT

## 2023-09-20 PROCEDURE — 6370000000 HC RX 637 (ALT 250 FOR IP): Performed by: PHYSICIAN ASSISTANT

## 2023-09-20 PROCEDURE — 6360000002 HC RX W HCPCS: Performed by: UROLOGY

## 2023-09-20 PROCEDURE — 2580000003 HC RX 258: Performed by: ANESTHESIOLOGY

## 2023-09-20 PROCEDURE — 6370000000 HC RX 637 (ALT 250 FOR IP): Performed by: ANESTHESIOLOGY

## 2023-09-20 PROCEDURE — 7100000000 HC PACU RECOVERY - FIRST 15 MIN: Performed by: UROLOGY

## 2023-09-20 PROCEDURE — 6360000002 HC RX W HCPCS: Performed by: ANESTHESIOLOGY

## 2023-09-20 PROCEDURE — 3600000013 HC SURGERY LEVEL 3 ADDTL 15MIN: Performed by: UROLOGY

## 2023-09-20 PROCEDURE — 0VB08ZZ EXCISION OF PROSTATE, VIA NATURAL OR ARTIFICIAL OPENING ENDOSCOPIC: ICD-10-PCS | Performed by: UROLOGY

## 2023-09-20 PROCEDURE — 86901 BLOOD TYPING SEROLOGIC RH(D): CPT

## 2023-09-20 PROCEDURE — 2709999900 HC NON-CHARGEABLE SUPPLY: Performed by: UROLOGY

## 2023-09-20 PROCEDURE — 52601 PROSTATECTOMY (TURP): CPT | Performed by: UROLOGY

## 2023-09-20 PROCEDURE — 2720000010 HC SURG SUPPLY STERILE: Performed by: UROLOGY

## 2023-09-20 PROCEDURE — 2500000003 HC RX 250 WO HCPCS: Performed by: NURSE ANESTHETIST, CERTIFIED REGISTERED

## 2023-09-20 PROCEDURE — 3600000003 HC SURGERY LEVEL 3 BASE: Performed by: UROLOGY

## 2023-09-20 PROCEDURE — 86850 RBC ANTIBODY SCREEN: CPT

## 2023-09-20 PROCEDURE — 85018 HEMOGLOBIN: CPT

## 2023-09-20 PROCEDURE — 2500000003 HC RX 250 WO HCPCS: Performed by: UROLOGY

## 2023-09-20 PROCEDURE — 3700000001 HC ADD 15 MINUTES (ANESTHESIA): Performed by: UROLOGY

## 2023-09-20 PROCEDURE — G0378 HOSPITAL OBSERVATION PER HR: HCPCS

## 2023-09-20 PROCEDURE — 7100000001 HC PACU RECOVERY - ADDTL 15 MIN: Performed by: UROLOGY

## 2023-09-20 PROCEDURE — 3700000000 HC ANESTHESIA ATTENDED CARE: Performed by: UROLOGY

## 2023-09-20 PROCEDURE — 6360000002 HC RX W HCPCS: Performed by: NURSE ANESTHETIST, CERTIFIED REGISTERED

## 2023-09-20 RX ORDER — ONDANSETRON 2 MG/ML
4 INJECTION INTRAMUSCULAR; INTRAVENOUS
Status: DISCONTINUED | OUTPATIENT
Start: 2023-09-20 | End: 2023-09-20 | Stop reason: HOSPADM

## 2023-09-20 RX ORDER — SODIUM CHLORIDE 9 MG/ML
INJECTION, SOLUTION INTRAVENOUS PRN
Status: DISCONTINUED | OUTPATIENT
Start: 2023-09-20 | End: 2023-09-20 | Stop reason: HOSPADM

## 2023-09-20 RX ORDER — ACETAMINOPHEN 500 MG
1000 TABLET ORAL ONCE
Status: COMPLETED | OUTPATIENT
Start: 2023-09-20 | End: 2023-09-20

## 2023-09-20 RX ORDER — ONDANSETRON 4 MG/1
4 TABLET, ORALLY DISINTEGRATING ORAL EVERY 8 HOURS PRN
Status: DISCONTINUED | OUTPATIENT
Start: 2023-09-20 | End: 2023-09-22 | Stop reason: HOSPADM

## 2023-09-20 RX ORDER — DIPHENHYDRAMINE HYDROCHLORIDE 50 MG/ML
12.5 INJECTION INTRAMUSCULAR; INTRAVENOUS
Status: COMPLETED | OUTPATIENT
Start: 2023-09-20 | End: 2023-09-20

## 2023-09-20 RX ORDER — DEXAMETHASONE SODIUM PHOSPHATE 4 MG/ML
INJECTION, SOLUTION INTRA-ARTICULAR; INTRALESIONAL; INTRAMUSCULAR; INTRAVENOUS; SOFT TISSUE PRN
Status: DISCONTINUED | OUTPATIENT
Start: 2023-09-20 | End: 2023-09-20 | Stop reason: SDUPTHER

## 2023-09-20 RX ORDER — SODIUM CHLORIDE 9 MG/ML
INJECTION, SOLUTION INTRAVENOUS PRN
Status: DISCONTINUED | OUTPATIENT
Start: 2023-09-20 | End: 2023-09-22 | Stop reason: HOSPADM

## 2023-09-20 RX ORDER — HYDROCODONE BITARTRATE AND ACETAMINOPHEN 5; 325 MG/1; MG/1
1 TABLET ORAL EVERY 6 HOURS PRN
Status: DISCONTINUED | OUTPATIENT
Start: 2023-09-20 | End: 2023-09-22 | Stop reason: HOSPADM

## 2023-09-20 RX ORDER — HYDROMORPHONE HYDROCHLORIDE 2 MG/ML
0.5 INJECTION, SOLUTION INTRAMUSCULAR; INTRAVENOUS; SUBCUTANEOUS EVERY 5 MIN PRN
Status: DISCONTINUED | OUTPATIENT
Start: 2023-09-20 | End: 2023-09-20 | Stop reason: HOSPADM

## 2023-09-20 RX ORDER — OXYBUTYNIN CHLORIDE 5 MG/1
5 TABLET ORAL 3 TIMES DAILY PRN
Status: DISPENSED | OUTPATIENT
Start: 2023-09-20 | End: 2023-09-21

## 2023-09-20 RX ORDER — LIDOCAINE HYDROCHLORIDE 20 MG/ML
JELLY TOPICAL
Status: COMPLETED | OUTPATIENT
Start: 2023-09-20 | End: 2023-09-20

## 2023-09-20 RX ORDER — DEXTROSE MONOHYDRATE, SODIUM CHLORIDE, AND POTASSIUM CHLORIDE 50; 1.49; 4.5 G/1000ML; G/1000ML; G/1000ML
INJECTION, SOLUTION INTRAVENOUS CONTINUOUS
Status: DISCONTINUED | OUTPATIENT
Start: 2023-09-20 | End: 2023-09-21

## 2023-09-20 RX ORDER — LIDOCAINE HYDROCHLORIDE 20 MG/ML
JELLY TOPICAL PRN
Status: DISCONTINUED | OUTPATIENT
Start: 2023-09-20 | End: 2023-09-22 | Stop reason: HOSPADM

## 2023-09-20 RX ORDER — POLYETHYLENE GLYCOL 3350 17 G/17G
17 POWDER, FOR SOLUTION ORAL DAILY PRN
Status: DISCONTINUED | OUTPATIENT
Start: 2023-09-20 | End: 2023-09-22 | Stop reason: HOSPADM

## 2023-09-20 RX ORDER — MIDAZOLAM HYDROCHLORIDE 2 MG/2ML
2 INJECTION, SOLUTION INTRAMUSCULAR; INTRAVENOUS
Status: COMPLETED | OUTPATIENT
Start: 2023-09-20 | End: 2023-09-20

## 2023-09-20 RX ORDER — SODIUM CHLORIDE 0.9 % (FLUSH) 0.9 %
5-40 SYRINGE (ML) INJECTION EVERY 12 HOURS SCHEDULED
Status: DISCONTINUED | OUTPATIENT
Start: 2023-09-20 | End: 2023-09-20 | Stop reason: HOSPADM

## 2023-09-20 RX ORDER — SODIUM CHLORIDE 0.9 % (FLUSH) 0.9 %
5-40 SYRINGE (ML) INJECTION PRN
Status: DISCONTINUED | OUTPATIENT
Start: 2023-09-20 | End: 2023-09-22 | Stop reason: HOSPADM

## 2023-09-20 RX ORDER — PROCHLORPERAZINE EDISYLATE 5 MG/ML
5 INJECTION INTRAMUSCULAR; INTRAVENOUS
Status: COMPLETED | OUTPATIENT
Start: 2023-09-20 | End: 2023-09-20

## 2023-09-20 RX ORDER — LIDOCAINE HYDROCHLORIDE 10 MG/ML
1 INJECTION, SOLUTION INFILTRATION; PERINEURAL
Status: DISCONTINUED | OUTPATIENT
Start: 2023-09-20 | End: 2023-09-20 | Stop reason: HOSPADM

## 2023-09-20 RX ORDER — SODIUM CHLORIDE 0.9 % (FLUSH) 0.9 %
5-40 SYRINGE (ML) INJECTION EVERY 12 HOURS SCHEDULED
Status: DISCONTINUED | OUTPATIENT
Start: 2023-09-20 | End: 2023-09-22 | Stop reason: HOSPADM

## 2023-09-20 RX ORDER — SODIUM CHLORIDE 0.9 % (FLUSH) 0.9 %
5-40 SYRINGE (ML) INJECTION PRN
Status: DISCONTINUED | OUTPATIENT
Start: 2023-09-20 | End: 2023-09-20 | Stop reason: HOSPADM

## 2023-09-20 RX ORDER — PROPOFOL 10 MG/ML
INJECTION, EMULSION INTRAVENOUS PRN
Status: DISCONTINUED | OUTPATIENT
Start: 2023-09-20 | End: 2023-09-20 | Stop reason: SDUPTHER

## 2023-09-20 RX ORDER — FENTANYL CITRATE 50 UG/ML
INJECTION, SOLUTION INTRAMUSCULAR; INTRAVENOUS PRN
Status: DISCONTINUED | OUTPATIENT
Start: 2023-09-20 | End: 2023-09-20 | Stop reason: SDUPTHER

## 2023-09-20 RX ORDER — EPHEDRINE SULFATE/0.9% NACL/PF 50 MG/5 ML
SYRINGE (ML) INTRAVENOUS PRN
Status: DISCONTINUED | OUTPATIENT
Start: 2023-09-20 | End: 2023-09-20 | Stop reason: SDUPTHER

## 2023-09-20 RX ORDER — TEMAZEPAM 15 MG/1
15 CAPSULE ORAL NIGHTLY PRN
Status: DISCONTINUED | OUTPATIENT
Start: 2023-09-20 | End: 2023-09-22 | Stop reason: HOSPADM

## 2023-09-20 RX ORDER — OXYCODONE HYDROCHLORIDE 5 MG/1
10 TABLET ORAL PRN
Status: COMPLETED | OUTPATIENT
Start: 2023-09-20 | End: 2023-09-20

## 2023-09-20 RX ORDER — ONDANSETRON 2 MG/ML
4 INJECTION INTRAMUSCULAR; INTRAVENOUS EVERY 6 HOURS PRN
Status: DISCONTINUED | OUTPATIENT
Start: 2023-09-20 | End: 2023-09-22 | Stop reason: HOSPADM

## 2023-09-20 RX ORDER — SODIUM CHLORIDE, SODIUM LACTATE, POTASSIUM CHLORIDE, CALCIUM CHLORIDE 600; 310; 30; 20 MG/100ML; MG/100ML; MG/100ML; MG/100ML
INJECTION, SOLUTION INTRAVENOUS CONTINUOUS
Status: DISCONTINUED | OUTPATIENT
Start: 2023-09-20 | End: 2023-09-20 | Stop reason: HOSPADM

## 2023-09-20 RX ORDER — OXYCODONE HYDROCHLORIDE 5 MG/1
5 TABLET ORAL PRN
Status: COMPLETED | OUTPATIENT
Start: 2023-09-20 | End: 2023-09-20

## 2023-09-20 RX ORDER — LIDOCAINE HYDROCHLORIDE 20 MG/ML
INJECTION, SOLUTION EPIDURAL; INFILTRATION; INTRACAUDAL; PERINEURAL PRN
Status: DISCONTINUED | OUTPATIENT
Start: 2023-09-20 | End: 2023-09-20 | Stop reason: SDUPTHER

## 2023-09-20 RX ORDER — HYDROMORPHONE HYDROCHLORIDE 2 MG/ML
0.25 INJECTION, SOLUTION INTRAMUSCULAR; INTRAVENOUS; SUBCUTANEOUS EVERY 5 MIN PRN
Status: DISCONTINUED | OUTPATIENT
Start: 2023-09-20 | End: 2023-09-20 | Stop reason: HOSPADM

## 2023-09-20 RX ORDER — DIPHENHYDRAMINE HCL 25 MG
25 CAPSULE ORAL NIGHTLY PRN
Status: DISCONTINUED | OUTPATIENT
Start: 2023-09-20 | End: 2023-09-22 | Stop reason: HOSPADM

## 2023-09-20 RX ORDER — ONDANSETRON 2 MG/ML
INJECTION INTRAMUSCULAR; INTRAVENOUS PRN
Status: DISCONTINUED | OUTPATIENT
Start: 2023-09-20 | End: 2023-09-20

## 2023-09-20 RX ADMIN — LIDOCAINE HYDROCHLORIDE: 20 JELLY TOPICAL at 12:38

## 2023-09-20 RX ADMIN — Medication 10 MG: at 07:36

## 2023-09-20 RX ADMIN — PROCHLORPERAZINE EDISYLATE 5 MG: 5 INJECTION INTRAMUSCULAR; INTRAVENOUS at 11:04

## 2023-09-20 RX ADMIN — LIDOCAINE HYDROCHLORIDE 100 MG: 20 INJECTION, SOLUTION EPIDURAL; INFILTRATION; INTRACAUDAL; PERINEURAL at 07:36

## 2023-09-20 RX ADMIN — HYDROMORPHONE HYDROCHLORIDE 0.5 MG: 2 INJECTION, SOLUTION INTRAMUSCULAR; INTRAVENOUS; SUBCUTANEOUS at 08:47

## 2023-09-20 RX ADMIN — DEXAMETHASONE SODIUM PHOSPHATE 4 MG: 4 INJECTION INTRA-ARTICULAR; INTRALESIONAL; INTRAMUSCULAR; INTRAVENOUS; SOFT TISSUE at 07:47

## 2023-09-20 RX ADMIN — FENTANYL CITRATE 50 MCG: 50 INJECTION, SOLUTION INTRAMUSCULAR; INTRAVENOUS at 07:59

## 2023-09-20 RX ADMIN — HYDROMORPHONE HYDROCHLORIDE 0.5 MG: 2 INJECTION, SOLUTION INTRAMUSCULAR; INTRAVENOUS; SUBCUTANEOUS at 08:42

## 2023-09-20 RX ADMIN — POLYETHYLENE GLYCOL 3350 17 G: 17 POWDER, FOR SOLUTION ORAL at 17:01

## 2023-09-20 RX ADMIN — SODIUM CHLORIDE, POTASSIUM CHLORIDE, SODIUM LACTATE AND CALCIUM CHLORIDE: 600; 310; 30; 20 INJECTION, SOLUTION INTRAVENOUS at 06:42

## 2023-09-20 RX ADMIN — TEMAZEPAM 15 MG: 15 CAPSULE ORAL at 22:38

## 2023-09-20 RX ADMIN — OXYBUTYNIN CHLORIDE 5 MG: 5 TABLET ORAL at 09:12

## 2023-09-20 RX ADMIN — OXYCODONE HYDROCHLORIDE 10 MG: 5 TABLET ORAL at 09:45

## 2023-09-20 RX ADMIN — DIPHENHYDRAMINE HYDROCHLORIDE 25 MG: 25 CAPSULE ORAL at 20:09

## 2023-09-20 RX ADMIN — POTASSIUM CHLORIDE, DEXTROSE MONOHYDRATE AND SODIUM CHLORIDE: 150; 5; 450 INJECTION, SOLUTION INTRAVENOUS at 13:32

## 2023-09-20 RX ADMIN — HYDROMORPHONE HYDROCHLORIDE 0.5 MG: 2 INJECTION, SOLUTION INTRAMUSCULAR; INTRAVENOUS; SUBCUTANEOUS at 09:07

## 2023-09-20 RX ADMIN — ACETAMINOPHEN 1000 MG: 500 TABLET, FILM COATED ORAL at 06:42

## 2023-09-20 RX ADMIN — DIPHENHYDRAMINE HYDROCHLORIDE 12.5 MG: 50 INJECTION, SOLUTION INTRAMUSCULAR; INTRAVENOUS at 10:23

## 2023-09-20 RX ADMIN — FENTANYL CITRATE 50 MCG: 50 INJECTION, SOLUTION INTRAMUSCULAR; INTRAVENOUS at 07:46

## 2023-09-20 RX ADMIN — OXYBUTYNIN CHLORIDE 5 MG: 5 TABLET ORAL at 16:42

## 2023-09-20 RX ADMIN — SODIUM CHLORIDE, PRESERVATIVE FREE 10 ML: 5 INJECTION INTRAVENOUS at 22:11

## 2023-09-20 RX ADMIN — MIDAZOLAM HYDROCHLORIDE 2 MG: 1 INJECTION, SOLUTION INTRAMUSCULAR; INTRAVENOUS at 07:11

## 2023-09-20 RX ADMIN — Medication 2000 MG: at 07:41

## 2023-09-20 RX ADMIN — PROPOFOL 280 MG: 10 INJECTION, EMULSION INTRAVENOUS at 07:35

## 2023-09-20 RX ADMIN — HYDROCODONE BITARTRATE AND ACETAMINOPHEN 1 TABLET: 5; 325 TABLET ORAL at 16:42

## 2023-09-20 RX ADMIN — ONDANSETRON 4 MG: 2 INJECTION INTRAMUSCULAR; INTRAVENOUS at 07:47

## 2023-09-20 RX ADMIN — HYDROCODONE BITARTRATE AND ACETAMINOPHEN 1 TABLET: 5; 325 TABLET ORAL at 22:42

## 2023-09-20 RX ADMIN — Medication 10 MG: at 07:45

## 2023-09-20 RX ADMIN — HYDROMORPHONE HYDROCHLORIDE 0.5 MG: 2 INJECTION, SOLUTION INTRAMUSCULAR; INTRAVENOUS; SUBCUTANEOUS at 08:55

## 2023-09-20 ASSESSMENT — PAIN DESCRIPTION - LOCATION
LOCATION: ABDOMEN
LOCATION: PENIS

## 2023-09-20 ASSESSMENT — PAIN - FUNCTIONAL ASSESSMENT
PAIN_FUNCTIONAL_ASSESSMENT: 0-10
PAIN_FUNCTIONAL_ASSESSMENT: 0-10

## 2023-09-20 ASSESSMENT — PAIN SCALES - GENERAL
PAINLEVEL_OUTOF10: 6
PAINLEVEL_OUTOF10: 7
PAINLEVEL_OUTOF10: 8
PAINLEVEL_OUTOF10: 5
PAINLEVEL_OUTOF10: 0
PAINLEVEL_OUTOF10: 1
PAINLEVEL_OUTOF10: 8

## 2023-09-20 ASSESSMENT — PAIN DESCRIPTION - DESCRIPTORS: DESCRIPTORS: ACHING;CRAMPING

## 2023-09-20 NOTE — PROGRESS NOTES
TRANSFER - IN REPORT:    Verbal report received from [address] [place] [address] on Juan C Lopez  being received from Post-Op for routine progression of patient care      Report consisted of patient's Situation, Background, Assessment and   Recommendations(SBAR). Information from the following report(s) Nurse Handoff Report was reviewed with the receiving nurse. Opportunity for questions and clarification was provided. Assessment will be completed upon patient's arrival to unit and care assumed.

## 2023-09-20 NOTE — PERIOP NOTE
Pt encouraged to use restroom before sedation medication given. Denied need. Continuous pulse oximetry in place following sedation medication.

## 2023-09-20 NOTE — OP NOTE
[facility]  OPERATIVE REPORT    Name:  Hemant Gordon  MR#:  [id number]  :  1965  ACCOUNT #:  [de-identified]  DATE OF SERVICE:  2023    PREOPERATIVE DIAGNOSIS:  Benign prostatic hyperplasia with lower urinary tract symptoms. POSTOPERATIVE DIAGNOSIS:  Benign prostatic hyperplasia with lower urinary tract symptoms. PROCEDURE PERFORMED:  Bipolar transurethral resection of prostate. SURGEON:  Ja Miranda MD    ASSISTANT:  None. ANESTHESIA:  General.    COMPLICATIONS:  None apparent. SPECIMENS REMOVED:  Prostate chips. IMPLANTS:  None. ESTIMATED BLOOD LOSS:  Minimal.    INDICATIONS:  This is a 29-year-old gentleman with severe lower urinary tract symptoms who on cystoscopy in the office was seen have a large median lobe obstructing his prostatic urethra, and after discussion of the risks versus benefits, he decided to move forward with the above-named procedure. FINDINGS:  2.5 cm long prostatic urethra with a very large median lobe. TECHNIQUE:  The patient was taken to operating room, placed in the supine position. Anesthesia was induced via Anesthesiology service. He was repositioned in the lithotomy position and prepped and draped in sterile surgical fashion with genitalia in sterile field. A 26-Cuban bipolar resectoscope with visual obturator and lens was introduced atraumatically via the urethra. No cystoscopic abnormalities noted in the prostate as mentioned above. I switched the resection loop and brought the scope back into the prostatic urethra and began resection. The majority of the resection was performed upon the median lobe; although, the lateral lobes were resected as well. All chips were washed into the bladder as I resected. Hemostasis was maintained as I resected using electrocautery function of the loop. After all resection had been performed, the chips were washed through the sheath leaving no chips within the bladder.   I never

## 2023-09-20 NOTE — PROGRESS NOTES
Pt is eating and drinking normally. Pt complains of penile pain d/t weiss. Gave oxybutynin and norco. Pt's CBI looks mostly clear, pinkish. Hourly rounding completed and all needs met. Bed is low, call light is in reach, and pt is encouraged to ask for assistance. Pt is resting in bed with no complaints. Significant other at bedside.

## 2023-09-20 NOTE — INTERVAL H&P NOTE
Update History & Physical    The patient's History and Physical of August 22, 2023 was reviewed with the patient and I examined the patient. There was no change. The surgical site was confirmed by the patient and me. Plan: The risks, benefits, expected outcome, and alternative to the recommended procedure have been discussed with the patient. Patient understands and wants to proceed with the procedure.      Electronically signed by John Esquivel MD on 9/20/2023 at 7:04 AM

## 2023-09-20 NOTE — BRIEF OP NOTE
Brief Postoperative Note      Patient: Abi Wells  YOB: 1965  MRN: [id number]    Date of Procedure: 9/20/2023    Pre-Op Diagnosis Codes:     * Enlarged prostate with lower urinary tract symptoms (LUTS) [N40.1]    Post-Op Diagnosis: Same       Procedure(s):  CYSTOSCOPY TRANSURETHRAL RESECTION PROSTATE BIPOLAR    Surgeon(s):  Nicolasa Mccarty MD    Assistant:  * No surgical staff found *    Anesthesia: General    Estimated Blood Loss (mL): Minimal    Complications: None    Specimens:   ID Type Source Tests Collected by Time Destination   A : PROSTATE CHIPS Tissue Prostate SURGICAL PATHOLOGY Nicolasa Mccarty MD 9/20/2023 0813        Implants:  * No implants in log *      Drains:   Urinary Catheter 09/20/23 3 Way (Active)       Findings: see dictation      Electronically signed by Nicolasa Mccarty MD on 9/20/2023 at 8:34 AM

## 2023-09-21 PROCEDURE — 2580000003 HC RX 258: Performed by: UROLOGY

## 2023-09-21 PROCEDURE — 6370000000 HC RX 637 (ALT 250 FOR IP): Performed by: PHYSICIAN ASSISTANT

## 2023-09-21 PROCEDURE — 1100000000 HC RM PRIVATE

## 2023-09-21 PROCEDURE — 6370000000 HC RX 637 (ALT 250 FOR IP): Performed by: UROLOGY

## 2023-09-21 PROCEDURE — 36415 COLL VENOUS BLD VENIPUNCTURE: CPT

## 2023-09-21 PROCEDURE — 99231 SBSQ HOSP IP/OBS SF/LOW 25: CPT | Performed by: PHYSICIAN ASSISTANT

## 2023-09-21 PROCEDURE — 80299 QUANTITATIVE ASSAY DRUG: CPT

## 2023-09-21 RX ORDER — TRAZODONE HYDROCHLORIDE 50 MG/1
50 TABLET ORAL NIGHTLY
Status: DISCONTINUED | OUTPATIENT
Start: 2023-09-21 | End: 2023-09-22 | Stop reason: HOSPADM

## 2023-09-21 RX ORDER — CARBOXYMETHYLCELLULOSE SODIUM 10 MG/ML
1 GEL OPHTHALMIC 3 TIMES DAILY
Status: DISCONTINUED | OUTPATIENT
Start: 2023-09-21 | End: 2023-09-22 | Stop reason: HOSPADM

## 2023-09-21 RX ADMIN — CARBOXYMETHYLCELLULOSE SODIUM 1 DROP: 10 GEL OPHTHALMIC at 22:33

## 2023-09-21 RX ADMIN — HYDROCODONE BITARTRATE AND ACETAMINOPHEN 1 TABLET: 5; 325 TABLET ORAL at 17:12

## 2023-09-21 RX ADMIN — TRAZODONE HYDROCHLORIDE 50 MG: 50 TABLET ORAL at 21:09

## 2023-09-21 RX ADMIN — HYDROCODONE BITARTRATE AND ACETAMINOPHEN 1 TABLET: 5; 325 TABLET ORAL at 09:31

## 2023-09-21 RX ADMIN — SODIUM CHLORIDE, PRESERVATIVE FREE 10 ML: 5 INJECTION INTRAVENOUS at 09:29

## 2023-09-21 RX ADMIN — SODIUM CHLORIDE, PRESERVATIVE FREE 10 ML: 5 INJECTION INTRAVENOUS at 21:10

## 2023-09-21 RX ADMIN — POLYETHYLENE GLYCOL 3350 17 G: 17 POWDER, FOR SOLUTION ORAL at 15:08

## 2023-09-21 ASSESSMENT — PAIN SCALES - GENERAL
PAINLEVEL_OUTOF10: 4
PAINLEVEL_OUTOF10: 0
PAINLEVEL_OUTOF10: 6

## 2023-09-21 ASSESSMENT — PAIN DESCRIPTION - LOCATION
LOCATION: PENIS
LOCATION: PENIS

## 2023-09-21 ASSESSMENT — PAIN DESCRIPTION - DESCRIPTORS: DESCRIPTORS: ACHING

## 2023-09-21 NOTE — PROGRESS NOTES
Pt walked 300 feet today and had a bowel movement earlier this morning. Pt is expected to be discharged tomorrow per attending. Pt requested [facility] for penile pain d/t catheter twice during this shift. Hourly rounding completed and all needs met. Bed is low, call light is in reach, and pt is encouraged to ask for assistance. Pt is resting in bed with no complaints. Will give report to oncoming shift.

## 2023-09-21 NOTE — PROGRESS NOTES
Pt  bed in lowest position, wheels locked, and call light within reach. Hourly rounds completed. VSS. IV is patent and dressing is clean, dry, and intact. CBI patent and draining at slow rate. PRN pain meds given per MAR.  Urojet ordered per pt's request.

## 2023-09-22 VITALS
BODY MASS INDEX: 30.98 KG/M2 | SYSTOLIC BLOOD PRESSURE: 152 MMHG | HEART RATE: 51 BPM | OXYGEN SATURATION: 93 % | DIASTOLIC BLOOD PRESSURE: 89 MMHG | WEIGHT: 197.4 LBS | HEIGHT: 67 IN | RESPIRATION RATE: 16 BRPM | TEMPERATURE: 98.1 F

## 2023-09-22 PROCEDURE — 6370000000 HC RX 637 (ALT 250 FOR IP): Performed by: UROLOGY

## 2023-09-22 PROCEDURE — 99231 SBSQ HOSP IP/OBS SF/LOW 25: CPT | Performed by: NURSE PRACTITIONER

## 2023-09-22 PROCEDURE — 2580000003 HC RX 258: Performed by: UROLOGY

## 2023-09-22 RX ORDER — HYDROCODONE BITARTRATE AND ACETAMINOPHEN 10; 325 MG/1; MG/1
0.5 TABLET ORAL EVERY 6 HOURS PRN
Qty: 6 TABLET | Refills: 0 | Status: SHIPPED | OUTPATIENT
Start: 2023-09-22 | End: 2023-09-25

## 2023-09-22 RX ADMIN — HYDROCODONE BITARTRATE AND ACETAMINOPHEN 1 TABLET: 5; 325 TABLET ORAL at 09:20

## 2023-09-22 RX ADMIN — SODIUM CHLORIDE, PRESERVATIVE FREE 10 ML: 5 INJECTION INTRAVENOUS at 09:20

## 2023-09-22 ASSESSMENT — PAIN DESCRIPTION - LOCATION: LOCATION: PENIS

## 2023-09-22 ASSESSMENT — PAIN SCALES - GENERAL: PAINLEVEL_OUTOF10: 4

## 2023-09-22 NOTE — DISCHARGE SUMMARY
Discharge Summary    Date: 9/22/2023  Patient Name: Babita Talley    YOB: 1965     Age: 66 y.o. Admit Date: 9/20/2023  Discharge Date: 9/22/2023  Discharge Condition: Stable    Admission Diagnosis  Enlarged prostate with lower urinary tract symptoms (LUTS) [N40.1]; BPH with obstruction/lower urinary tract symptoms [N40.1, N13.8]      Discharge Diagnosis  Principal Problem:    Enlarged prostate with lower urinary tract symptoms (LUTS)  Active Problems:    BPH with obstruction/lower urinary tract symptoms  Resolved Problems:    * No resolved hospital problems. [facility] Stay  Narrative of Hospital Course:  Patient Active Problem List:     Rotator cuff tear arthropathy of right shoulder     Degenerative joint disease of right acromioclavicular joint     Arthropathy, transient, shoulder, right     Acute renal failure with tubular necrosis (HCC)     Septic arthritis of shoulder, right (HCC)     Staphylococcal arthritis of right shoulder (HCC)     Sepsis (HCC)     Osteoarthritis of right glenohumeral joint     Hip pain     Complete tear of right rotator cuff     Infected prosthetic hip (HCC)     S/P total hip arthroplasty     Rotator cuff arthropathy of right shoulder     Acute pain of right shoulder     Postoperative anemia due to acute blood loss     Arthritis of left hip     Enlarged prostate with lower urinary tract symptoms (LUTS)     BPH with obstruction/lower urinary tract symptoms        Consultants:  None    Surgeries/procedures Performed:  TURP     Treatments:    Analgesia    Acetaminophen and Acetaminophen w/ Codeine    Discharge Plan/Disposition:  Home    Hospital/Incidental Findings Requiring Follow Up:    Patient Instructions:    Diet: Regular Diet    Activity:Activity as Tolerated  For number of days (if applicable): Other Instructions:    Provider Follow-Up:   No follow-ups on file.      Significant Diagnostic Studies:    Recent Labs:  Admission on 09/20/2023  Hemoglobin

## 2023-09-22 NOTE — PROGRESS NOTES
stress test  1. Myocardial perfusion scan demonstrates moderate fixed inferior defect  probably due to attenuation. There is no reversible myocardial  stress-induced ischemia.    2. Normal  post-regadenoson LV systolic function.    3. Cardiac Risk Assessment: Low.    Pt states the 15 mg of PRN Restoril he has ordered for sleep does not work. Messaged physician via [employer], 50 mg of Trazodone ordered and administered. Pt stated it worked but by the time his \"football game was over, the medication wore off\". Pt noted to be running isael, HR running between 50-56 bmp tonight. All known needs met at this time, bed locked and lowered, call light within reach.

## 2023-09-22 NOTE — PROGRESS NOTES
Pt urinated 80mL around 0845 in urinal. Urine was red, clear, with red flakes. Pt urinated 200mL at 0935 - urine was red, clear, with clots/red flakes. Pt stated slight burning with urination but no difficulty urinating.

## 2023-09-22 NOTE — PROGRESS NOTES
Noted pt may be discharged soon          [address]!           Sherry Wilder    [phone]

## 2023-09-22 NOTE — PROGRESS NOTES
If you need to see a   -  just ask the nurse to call us. We look forward to serving your family!!         Hafsa Garrison

## 2023-09-27 LAB — TRAZODONE SERPL-MCNC: <20 NG/ML (ref 800–1600)

## 2023-10-24 ENCOUNTER — OFFICE VISIT (OUTPATIENT)
Dept: UROLOGY | Age: 58
End: 2023-10-24
Payer: MEDICARE

## 2023-10-24 DIAGNOSIS — N40.1 BENIGN PROSTATIC HYPERPLASIA WITH LOWER URINARY TRACT SYMPTOMS, SYMPTOM DETAILS UNSPECIFIED: Primary | ICD-10-CM

## 2023-10-24 DIAGNOSIS — N30.00 ACUTE CYSTITIS WITHOUT HEMATURIA: ICD-10-CM

## 2023-10-24 LAB
BILIRUBIN, URINE, POC: NEGATIVE
BLOOD URINE, POC: NORMAL
GLUCOSE URINE, POC: NEGATIVE
KETONES, URINE, POC: NORMAL
LEUKOCYTE ESTERASE, URINE, POC: NORMAL
NITRITE, URINE, POC: NEGATIVE
PH, URINE, POC: 6 (ref 4.6–8)
PROTEIN,URINE, POC: 100
SPECIFIC GRAVITY, URINE, POC: 1.02 (ref 1–1.03)
URINALYSIS CLARITY, POC: NORMAL
URINALYSIS COLOR, POC: NORMAL
UROBILINOGEN, POC: NORMAL

## 2023-10-24 PROCEDURE — 99024 POSTOP FOLLOW-UP VISIT: CPT | Performed by: UROLOGY

## 2023-10-24 PROCEDURE — 81003 URINALYSIS AUTO W/O SCOPE: CPT | Performed by: UROLOGY

## 2023-10-24 RX ORDER — CIPROFLOXACIN 500 MG/1
500 TABLET, FILM COATED ORAL 2 TIMES DAILY
Qty: 14 TABLET | Refills: 0 | Status: SHIPPED | OUTPATIENT
Start: 2023-10-24 | End: 2023-10-31

## 2023-10-24 ASSESSMENT — ENCOUNTER SYMPTOMS: BACK PAIN: 0

## 2023-10-24 NOTE — PROGRESS NOTES
[address] [place]   [facility], [address] [facility]  [phone]          Torri Wright  : 1965    Chief Complaint   Patient presents with    Post-Op Check    Urinary Burning          HPI     Torri Wright is a 66 y.o. male referred by Dr. Virgie Doyle in  in regards to severe LUTS. He complained of slow stream, feeling of incomplete emptying, frequency, nocturia. Symptoms had been gradually worsening over years. He had tried no treatment. He was found to have a large median lobe and underwent TURP 23. Pathology returned benign. He has some mild dysuria, but is otherwise thrilled with result. Stream is strong. PSA:  2.3  IPSS/QOL:  33/3, TURP, 10/23 13/2      Past Medical History:   Diagnosis Date    Arthritis     right shoulder     Biceps muscle tear     Chronic kidney disease     Chronic pain     History of acute renal failure 2018    secondary to infection--was on dialysis for 8 weeks      History of staph infection 2019    Hx of cardiac murmur 2018    murmur heard while patient was admitted to hospital with staph infection--Echo 18--EF 55-60%, mild mitral valve reurgitation--no murmur auscultated during PAT appointment on 19    Hypertension     Insomnia     MRSA (methicillin resistant Staphylococcus aureus) infection     after a spider bite with I&D    Personal history of kidney stones     Right shoulder pain      Past Surgical History:   Procedure Laterality Date    IR NONTUNNELED VASCULAR CATHETER  2018    IR NONTUNNELED VASCULAR CATHETER 2018 [facility]    IR TUNNELED CATHETER PLACEMENT GREATER THAN 5 YEARS  2018    IR TUNNELED CATHETER PLACEMENT GREATER THAN 5 YEARS 2018 [facility]    OTHER SURGICAL HISTORY      posterior rt leg spider bite excision per pt.     PROSTATE SURGERY      REVERSE TOTAL SHOULDER ARTHROPLASTY Right 2019    ROTATOR CUFF REPAIR Bilateral

## 2024-05-01 ENCOUNTER — TELEPHONE (OUTPATIENT)
Dept: ORTHOPEDIC SURGERY | Age: 59
End: 2024-05-01

## 2024-05-01 ENCOUNTER — OFFICE VISIT (OUTPATIENT)
Dept: ORTHOPEDIC SURGERY | Age: 59
End: 2024-05-01

## 2024-05-01 DIAGNOSIS — Z09 FOLLOW-UP EXAMINATION AFTER ORTHOPEDIC SURGERY: ICD-10-CM

## 2024-05-01 DIAGNOSIS — M75.102 ROTATOR CUFF TEAR ARTHROPATHY OF LEFT SHOULDER: Primary | ICD-10-CM

## 2024-05-01 DIAGNOSIS — M75.22 BICIPITAL TENDINITIS OF LEFT SHOULDER: ICD-10-CM

## 2024-05-01 DIAGNOSIS — Z96.611 PRESENCE OF RIGHT ARTIFICIAL SHOULDER JOINT: ICD-10-CM

## 2024-05-01 DIAGNOSIS — M12.812 ROTATOR CUFF TEAR ARTHROPATHY OF LEFT SHOULDER: Primary | ICD-10-CM

## 2024-05-01 RX ORDER — GABAPENTIN 100 MG/1
100 CAPSULE ORAL 3 TIMES DAILY
Qty: 90 CAPSULE | Refills: 0 | Status: SHIPPED | OUTPATIENT
Start: 2024-05-01 | End: 2024-05-31

## 2024-05-01 NOTE — PROGRESS NOTES
Name: Keagan Julien  YOB: 1965  Gender: male  MRN: [id number]          HPI: Keagan Julien is a 59 y.o. right-hand-dominant male seen for left shoulder problems.  I injected his left shoulder June 20, 2023 and he did very well.  He returns with left shoulder pain.  He notes that the gabapentin helps him.  His right shoulder is doing well.  He currently has a tooth abscess and is on antibiotics and is being treated by a dentist.  He did see Dr. Richards for his urologic issues and he is extremely happy and doing very well      He has a complicated orthopedic history.  He underwent an arthroscopy of the left shoulder arthroscopic rotator cuff repair and arthroscopic distal clavicle resection by Dr. Reeves on October 21, 2010.  Operative findings were notable for a large rotator cuff tear.  More recently his left shoulder has begun to bother him.  He complains of weakness pain difficulty sleeping at night.  He denies any numbness or tingling.  He denies any fevers or chills.  His right shoulder is doing very well.  He is 3.5 years status post reverse right total shoulder arthroplasty with a delta xtend prosthesis biceps tenodesis latissimus dorsi and teres major tendon transfer.  He had undergone an I&D of the right shoulder for MSSA septic arthritis a year prior 4.5 years.  He has had an infected left total hip arthroplasty and a two-stage revision.  He did go into renal failure during the treatment of his infection.  He complains of 5 years of difficulty voiding.  He gets up at night 5-7 times to urinate.  He does not urinate a lot.  He is not been checked by a urologist.      ROS/Meds/PSH/PMH/FH/SH: A ten system review of systems was performed and is negative other than what is in the HPI.   Tobacco:  reports that he quit smoking about 22 years ago. His smoking use included cigarettes. He started smoking about 23 years ago. He has a 0.3 pack-year smoking history. He has never used

## 2024-05-01 NOTE — TELEPHONE ENCOUNTER
He says Dr. Odell was going to increase his dose to 300mg. Can you change the RX and let him know when its done.

## 2024-05-02 DIAGNOSIS — Z76.89 ENCOUNTER TO ESTABLISH CARE WITH NEW DOCTOR: Primary | ICD-10-CM

## 2024-05-02 RX ORDER — GABAPENTIN 300 MG/1
300 CAPSULE ORAL 3 TIMES DAILY
Qty: 90 CAPSULE | Refills: 2 | Status: SHIPPED | OUTPATIENT
Start: 2024-05-02 | End: 2024-07-31

## 2024-06-25 DIAGNOSIS — N40.1 BENIGN PROSTATIC HYPERPLASIA WITH LOWER URINARY TRACT SYMPTOMS, SYMPTOM DETAILS UNSPECIFIED: Primary | ICD-10-CM

## 2024-07-02 ENCOUNTER — OFFICE VISIT (OUTPATIENT)
Dept: UROLOGY | Age: 59
End: 2024-07-02
Payer: MEDICARE

## 2024-07-02 DIAGNOSIS — N40.1 BENIGN PROSTATIC HYPERPLASIA WITH LOWER URINARY TRACT SYMPTOMS, SYMPTOM DETAILS UNSPECIFIED: Primary | ICD-10-CM

## 2024-07-02 LAB
BILIRUBIN, URINE, POC: NEGATIVE
BLOOD URINE, POC: NEGATIVE
GLUCOSE URINE, POC: NEGATIVE
KETONES, URINE, POC: NEGATIVE
LEUKOCYTE ESTERASE, URINE, POC: NEGATIVE
NITRITE, URINE, POC: NEGATIVE
PH, URINE, POC: 5.5 (ref 4.6–8)
PROTEIN,URINE, POC: 100
SPECIFIC GRAVITY, URINE, POC: 1.02 (ref 1–1.03)
URINALYSIS CLARITY, POC: NORMAL
URINALYSIS COLOR, POC: NORMAL
UROBILINOGEN, POC: NORMAL

## 2024-07-02 PROCEDURE — 1036F TOBACCO NON-USER: CPT | Performed by: UROLOGY

## 2024-07-02 PROCEDURE — 81003 URINALYSIS AUTO W/O SCOPE: CPT | Performed by: UROLOGY

## 2024-07-02 PROCEDURE — G8427 DOCREV CUR MEDS BY ELIG CLIN: HCPCS | Performed by: UROLOGY

## 2024-07-02 PROCEDURE — 99213 OFFICE O/P EST LOW 20 MIN: CPT | Performed by: UROLOGY

## 2024-07-02 PROCEDURE — G8417 CALC BMI ABV UP PARAM F/U: HCPCS | Performed by: UROLOGY

## 2024-07-02 PROCEDURE — 3017F COLORECTAL CA SCREEN DOC REV: CPT | Performed by: UROLOGY

## 2024-07-02 ASSESSMENT — ENCOUNTER SYMPTOMS: BACK PAIN: 0

## 2024-07-02 NOTE — PROGRESS NOTES
file   Stress: Not on file   Social Connections: Not on file   Intimate Partner Violence: Not on file   Housing Stability: Not on file     Family History   Problem Relation Age of Onset    Hypertension Mother     Heart Disease Father        Review of Systems  Constitutional:   Negative for fever.  Genitourinary:  Negative for hematuria.  Musculoskeletal:  Negative for back pain.      Urinalysis  UA - Dipstick  Results for orders placed or performed in visit on 07/02/24   AMB POC URINALYSIS DIP STICK AUTO W/O MICRO   Result Value Ref Range    Color (UA POC)      Clarity (UA POC)      Glucose, Urine, POC Negative     Bilirubin, Urine, POC Negative     KETONES, Urine, POC Negative     Specific Gravity, Urine, POC 1.025 1.001 - 1.035    Blood (UA POC) Negative     pH, Urine, POC 5.5 4.6 - 8.0    Protein, Urine,      Urobilinogen, POC 0.2 mg/dL     Nitrite, Urine, POC Negative     Leukocyte Esterase, Urine, POC Negative        There were no vitals taken for this visit.     GENERAL: NAD, ALERT, A&O x 3, GAIT NORMAL  LUNGS: easy work of breathing  ABDOMEN: soft, non tender  NEUROLOGIC: cranial nerves 2-12 grossly intact     Assessment and Plan    ICD-10-CM    1. Benign prostatic hyperplasia with lower urinary tract symptoms, symptom details unspecified  N40.1 AMB POC URINALYSIS DIP STICK AUTO W/O MICRO          He is voiding well and happy in regards to voiding.  He may follow up prn.    I have spent 20 minutes today reviewing previous notes, test results and face to face with the patient as well as documenting.

## 2025-02-02 SDOH — HEALTH STABILITY: PHYSICAL HEALTH: ON AVERAGE, HOW MANY DAYS PER WEEK DO YOU ENGAGE IN MODERATE TO STRENUOUS EXERCISE (LIKE A BRISK WALK)?: 5 DAYS

## 2025-02-02 SDOH — ECONOMIC STABILITY: FOOD INSECURITY: WITHIN THE PAST 12 MONTHS, YOU WORRIED THAT YOUR FOOD WOULD RUN OUT BEFORE YOU GOT MONEY TO BUY MORE.: NEVER TRUE

## 2025-02-02 SDOH — HEALTH STABILITY: PHYSICAL HEALTH: ON AVERAGE, HOW MANY MINUTES DO YOU ENGAGE IN EXERCISE AT THIS LEVEL?: 90 MIN

## 2025-02-02 SDOH — ECONOMIC STABILITY: FOOD INSECURITY: WITHIN THE PAST 12 MONTHS, THE FOOD YOU BOUGHT JUST DIDN'T LAST AND YOU DIDN'T HAVE MONEY TO GET MORE.: NEVER TRUE

## 2025-02-02 SDOH — ECONOMIC STABILITY: INCOME INSECURITY: IN THE LAST 12 MONTHS, WAS THERE A TIME WHEN YOU WERE NOT ABLE TO PAY THE MORTGAGE OR RENT ON TIME?: NO

## 2025-02-02 SDOH — ECONOMIC STABILITY: TRANSPORTATION INSECURITY
IN THE PAST 12 MONTHS, HAS LACK OF TRANSPORTATION KEPT YOU FROM MEETINGS, WORK, OR FROM GETTING THINGS NEEDED FOR DAILY LIVING?: NO

## 2025-02-02 SDOH — ECONOMIC STABILITY: TRANSPORTATION INSECURITY
IN THE PAST 12 MONTHS, HAS THE LACK OF TRANSPORTATION KEPT YOU FROM MEDICAL APPOINTMENTS OR FROM GETTING MEDICATIONS?: NO

## 2025-02-02 ASSESSMENT — PATIENT HEALTH QUESTIONNAIRE - PHQ9
2. FEELING DOWN, DEPRESSED OR HOPELESS: NOT AT ALL
SUM OF ALL RESPONSES TO PHQ QUESTIONS 1-9: 0
SUM OF ALL RESPONSES TO PHQ QUESTIONS 1-9: 0
1. LITTLE INTEREST OR PLEASURE IN DOING THINGS: NOT AT ALL
SUM OF ALL RESPONSES TO PHQ QUESTIONS 1-9: 0
SUM OF ALL RESPONSES TO PHQ QUESTIONS 1-9: 0
SUM OF ALL RESPONSES TO PHQ9 QUESTIONS 1 & 2: 0

## 2025-02-02 ASSESSMENT — LIFESTYLE VARIABLES
HOW OFTEN DO YOU HAVE A DRINK CONTAINING ALCOHOL: 2
HOW MANY STANDARD DRINKS CONTAINING ALCOHOL DO YOU HAVE ON A TYPICAL DAY: PATIENT DOES NOT DRINK
HOW OFTEN DO YOU HAVE A DRINK CONTAINING ALCOHOL: MONTHLY OR LESS
HOW MANY STANDARD DRINKS CONTAINING ALCOHOL DO YOU HAVE ON A TYPICAL DAY: 0
HOW OFTEN DO YOU HAVE SIX OR MORE DRINKS ON ONE OCCASION: 1

## 2025-02-04 ENCOUNTER — OFFICE VISIT (OUTPATIENT)
Dept: INTERNAL MEDICINE CLINIC | Facility: CLINIC | Age: 60
End: 2025-02-04

## 2025-02-04 VITALS
WEIGHT: 210 LBS | HEIGHT: 67 IN | RESPIRATION RATE: 16 BRPM | DIASTOLIC BLOOD PRESSURE: 86 MMHG | BODY MASS INDEX: 32.96 KG/M2 | SYSTOLIC BLOOD PRESSURE: 126 MMHG

## 2025-02-04 DIAGNOSIS — R73.03 PREDIABETES: ICD-10-CM

## 2025-02-04 DIAGNOSIS — Z11.4 ENCOUNTER FOR SCREENING FOR HIV: ICD-10-CM

## 2025-02-04 DIAGNOSIS — I10 PRIMARY HYPERTENSION: ICD-10-CM

## 2025-02-04 DIAGNOSIS — R79.89 ELEVATED SERUM CREATININE: ICD-10-CM

## 2025-02-04 DIAGNOSIS — R73.03 PREDIABETES: Primary | ICD-10-CM

## 2025-02-04 DIAGNOSIS — Z13.220 SCREENING FOR HYPERLIPIDEMIA: ICD-10-CM

## 2025-02-04 DIAGNOSIS — Z00.00 MEDICARE ANNUAL WELLNESS VISIT, INITIAL: ICD-10-CM

## 2025-02-04 DIAGNOSIS — N40.0 BENIGN PROSTATIC HYPERPLASIA WITHOUT LOWER URINARY TRACT SYMPTOMS: ICD-10-CM

## 2025-02-04 DIAGNOSIS — Z00.00 INITIAL MEDICARE ANNUAL WELLNESS VISIT: ICD-10-CM

## 2025-02-04 DIAGNOSIS — Z12.11 COLON CANCER SCREENING: ICD-10-CM

## 2025-02-04 PROBLEM — M19.011 OSTEOARTHRITIS OF RIGHT GLENOHUMERAL JOINT: Status: RESOLVED | Noted: 2018-11-05 | Resolved: 2025-02-04

## 2025-02-04 PROBLEM — N40.1 ENLARGED PROSTATE WITH LOWER URINARY TRACT SYMPTOMS (LUTS): Status: RESOLVED | Noted: 2023-08-24 | Resolved: 2025-02-04

## 2025-02-04 PROBLEM — M00.011 STAPHYLOCOCCAL ARTHRITIS OF RIGHT SHOULDER: Status: RESOLVED | Noted: 2018-11-06 | Resolved: 2025-02-04

## 2025-02-04 PROBLEM — M00.9 SEPTIC ARTHRITIS OF SHOULDER, RIGHT: Status: RESOLVED | Noted: 2018-11-05 | Resolved: 2025-02-04

## 2025-02-04 PROBLEM — M12.811 ARTHROPATHY, TRANSIENT, SHOULDER, RIGHT: Status: RESOLVED | Noted: 2019-12-06 | Resolved: 2025-02-04

## 2025-02-04 PROBLEM — Z96.649 S/P TOTAL HIP ARTHROPLASTY: Status: RESOLVED | Noted: 2017-01-06 | Resolved: 2025-02-04

## 2025-02-04 PROBLEM — M16.12 ARTHRITIS OF LEFT HIP: Status: RESOLVED | Noted: 2017-01-06 | Resolved: 2025-02-04

## 2025-02-04 PROBLEM — M12.811 ROTATOR CUFF TEAR ARTHROPATHY OF RIGHT SHOULDER: Status: RESOLVED | Noted: 2019-11-30 | Resolved: 2025-02-04

## 2025-02-04 PROBLEM — Z96.649 INFECTED PROSTHETIC HIP (HCC): Status: RESOLVED | Noted: 2018-11-05 | Resolved: 2025-02-04

## 2025-02-04 PROBLEM — M75.121 COMPLETE TEAR OF RIGHT ROTATOR CUFF: Status: RESOLVED | Noted: 2018-11-05 | Resolved: 2025-02-04

## 2025-02-04 PROBLEM — D62 POSTOPERATIVE ANEMIA DUE TO ACUTE BLOOD LOSS: Status: RESOLVED | Noted: 2018-11-07 | Resolved: 2025-02-04

## 2025-02-04 PROBLEM — M12.811 ROTATOR CUFF ARTHROPATHY OF RIGHT SHOULDER: Status: RESOLVED | Noted: 2019-12-06 | Resolved: 2025-02-04

## 2025-02-04 PROBLEM — T84.59XA INFECTED PROSTHETIC HIP (HCC): Status: RESOLVED | Noted: 2018-11-05 | Resolved: 2025-02-04

## 2025-02-04 PROBLEM — N17.0 ACUTE RENAL FAILURE WITH TUBULAR NECROSIS (HCC): Status: RESOLVED | Noted: 2018-11-06 | Resolved: 2025-02-04

## 2025-02-04 PROBLEM — N13.8 BPH WITH OBSTRUCTION/LOWER URINARY TRACT SYMPTOMS: Status: RESOLVED | Noted: 2023-09-20 | Resolved: 2025-02-04

## 2025-02-04 PROBLEM — A41.9 SEPSIS (HCC): Status: RESOLVED | Noted: 2018-11-07 | Resolved: 2025-02-04

## 2025-02-04 PROBLEM — M19.011 DEGENERATIVE JOINT DISEASE OF RIGHT ACROMIOCLAVICULAR JOINT: Status: RESOLVED | Noted: 2018-11-05 | Resolved: 2025-02-04

## 2025-02-04 PROBLEM — N40.1 BPH WITH OBSTRUCTION/LOWER URINARY TRACT SYMPTOMS: Status: RESOLVED | Noted: 2023-09-20 | Resolved: 2025-02-04

## 2025-02-04 PROBLEM — M75.101 ROTATOR CUFF TEAR ARTHROPATHY OF RIGHT SHOULDER: Status: RESOLVED | Noted: 2019-11-30 | Resolved: 2025-02-04

## 2025-02-04 LAB
ALBUMIN SERPL-MCNC: 4.1 G/DL (ref 3.5–5)
ALBUMIN/GLOB SERPL: 1.3 (ref 1–1.9)
ALP SERPL-CCNC: 119 U/L (ref 40–129)
ALT SERPL-CCNC: 48 U/L (ref 8–55)
ANION GAP SERPL CALC-SCNC: 9 MMOL/L (ref 7–16)
APPEARANCE UR: CLEAR
AST SERPL-CCNC: 51 U/L (ref 15–37)
BACTERIA URNS QL MICRO: NEGATIVE /HPF
BASOPHILS # BLD: 0.04 K/UL (ref 0–0.2)
BASOPHILS NFR BLD: 0.5 % (ref 0–2)
BILIRUB DIRECT SERPL-MCNC: <0.2 MG/DL (ref 0–0.4)
BILIRUB SERPL-MCNC: 0.5 MG/DL (ref 0–1.2)
BILIRUB UR QL: NEGATIVE
BUN SERPL-MCNC: 15 MG/DL (ref 6–23)
CALCIUM SERPL-MCNC: 9.9 MG/DL (ref 8.8–10.2)
CHLORIDE SERPL-SCNC: 104 MMOL/L (ref 98–107)
CHOLEST SERPL-MCNC: 206 MG/DL (ref 0–200)
CO2 SERPL-SCNC: 28 MMOL/L (ref 20–29)
COLOR UR: ABNORMAL
CREAT SERPL-MCNC: 1.05 MG/DL (ref 0.8–1.3)
DIFFERENTIAL METHOD BLD: NORMAL
EOSINOPHIL # BLD: 0.09 K/UL (ref 0–0.8)
EOSINOPHIL NFR BLD: 1.2 % (ref 0.5–7.8)
EPI CELLS #/AREA URNS HPF: ABNORMAL /HPF (ref 0–5)
ERYTHROCYTE [DISTWIDTH] IN BLOOD BY AUTOMATED COUNT: 13.5 % (ref 11.9–14.6)
EST. AVERAGE GLUCOSE BLD GHB EST-MCNC: 125 MG/DL
GLOBULIN SER CALC-MCNC: 3.1 G/DL (ref 2.3–3.5)
GLUCOSE SERPL-MCNC: 100 MG/DL (ref 70–99)
GLUCOSE UR STRIP.AUTO-MCNC: NEGATIVE MG/DL
HBA1C MFR BLD: 6 % (ref 0–5.6)
HCT VFR BLD AUTO: 44.6 % (ref 41.1–50.3)
HDLC SERPL-MCNC: 57 MG/DL (ref 40–60)
HDLC SERPL: 3.6 (ref 0–5)
HGB BLD-MCNC: 15 G/DL (ref 13.6–17.2)
HGB UR QL STRIP: NEGATIVE
HIV 1+2 AB+HIV1 P24 AG SERPL QL IA: NONREACTIVE
HIV 1/2 RESULT COMMENT: NORMAL
HYALINE CASTS URNS QL MICRO: ABNORMAL /LPF
IMM GRANULOCYTES # BLD AUTO: 0.02 K/UL (ref 0–0.5)
IMM GRANULOCYTES NFR BLD AUTO: 0.3 % (ref 0–5)
KETONES UR QL STRIP.AUTO: NEGATIVE MG/DL
LDLC SERPL CALC-MCNC: 129 MG/DL (ref 0–100)
LEUKOCYTE ESTERASE UR QL STRIP.AUTO: NEGATIVE
LYMPHOCYTES # BLD: 2.9 K/UL (ref 0.5–4.6)
LYMPHOCYTES NFR BLD: 38.6 % (ref 13–44)
MCH RBC QN AUTO: 30.6 PG (ref 26.1–32.9)
MCHC RBC AUTO-ENTMCNC: 33.6 G/DL (ref 31.4–35)
MCV RBC AUTO: 91 FL (ref 82–102)
MONOCYTES # BLD: 0.59 K/UL (ref 0.1–1.3)
MONOCYTES NFR BLD: 7.9 % (ref 4–12)
NEUTS SEG # BLD: 3.87 K/UL (ref 1.7–8.2)
NEUTS SEG NFR BLD: 51.5 % (ref 43–78)
NITRITE UR QL STRIP.AUTO: NEGATIVE
NRBC # BLD: 0 K/UL (ref 0–0.2)
PH UR STRIP: 6.5 (ref 5–9)
PLATELET # BLD AUTO: 245 K/UL (ref 150–450)
PMV BLD AUTO: 10.1 FL (ref 9.4–12.3)
POTASSIUM SERPL-SCNC: 5.4 MMOL/L (ref 3.5–5.1)
PROT SERPL-MCNC: 7.1 G/DL (ref 6.3–8.2)
PROT UR STRIP-MCNC: 30 MG/DL
RBC # BLD AUTO: 4.9 M/UL (ref 4.23–5.6)
RBC #/AREA URNS HPF: ABNORMAL /HPF (ref 0–5)
SODIUM SERPL-SCNC: 140 MMOL/L (ref 136–145)
SP GR UR REFRACTOMETRY: 1.03 (ref 1–1.02)
TRIGL SERPL-MCNC: 99 MG/DL (ref 0–150)
TSH, 3RD GENERATION: 2.19 UIU/ML (ref 0.27–4.2)
UROBILINOGEN UR QL STRIP.AUTO: 0.2 EU/DL (ref 0.2–1)
VLDLC SERPL CALC-MCNC: 20 MG/DL (ref 6–23)
WBC # BLD AUTO: 7.5 K/UL (ref 4.3–11.1)
WBC URNS QL MICRO: ABNORMAL /HPF (ref 0–4)

## 2025-02-04 ASSESSMENT — ENCOUNTER SYMPTOMS
COUGH: 0
ABDOMINAL PAIN: 0
VOMITING: 0
NAUSEA: 0
EYE DISCHARGE: 0
SORE THROAT: 0
BACK PAIN: 0
DIARRHEA: 0
EYE ITCHING: 0
VOICE CHANGE: 0
SHORTNESS OF BREATH: 0
ANAL BLEEDING: 0
WHEEZING: 0
RHINORRHEA: 0
BLOOD IN STOOL: 0
COLOR CHANGE: 0
CONSTIPATION: 0

## 2025-02-04 NOTE — PROGRESS NOTES
Yeison Tidwell M[place] [address]  Phone: [phone]  Fax: [phone]    Hypertension  This is a chronic problem. Pertinent negatives include no chest pain, neck pain, palpitations or shortness of breath.     Keagan Julien is a 59 y.o. White (non-) male.  He has  not seen a PCP in years.     No current outpatient medications on file.     No current facility-administered medications for this visit.     No Known Allergies    Past Medical History:   Diagnosis Date    Arthritis     right shoulder     Biceps muscle tear     Chronic kidney disease     Chronic pain     History of acute renal failure 2018    secondary to infection--was on dialysis for 8 weeks      History of staph infection 01/2019    Hx of cardiac murmur 2018    murmur heard while patient was admitted to hospital with staph infection--Echo 11/8/18--EF 55-60%, mild mitral valve reurgitation--no murmur auscultated during PAT appointment on 11/13/19    Hypertension     Insomnia     MRSA (methicillin resistant Staphylococcus aureus) infection 2007    after a spider bite with I&D    Personal history of kidney stones     Right shoulder pain      Past Surgical History:   Procedure Laterality Date    IR NONTUNNELED VASCULAR CATHETER > 5 YEARS  11/08/2018    IR NONTUNNELED VASCULAR CATHETER 11/8/2018 [facility]    IR TUNNELED CVC PLACE WO SQ PORT/PUMP > 5 YEARS  11/16/2018    IR TUNNELED CATHETER PLACEMENT GREATER THAN 5 YEARS 11/16/2018 [facility]    OTHER SURGICAL HISTORY      posterior rt leg spider bite excision per pt.    PROSTATE SURGERY      REVERSE TOTAL SHOULDER ARTHROPLASTY Right 12/06/2019    ROTATOR CUFF REPAIR Bilateral 2010    SEPTOPLASTY  11/15/2017    TOTAL HIP ARTHROPLASTY Left 05/2019    revision    TOTAL HIP ARTHROPLASTY Left 01/2017    TURP N/A 09/20/2023    CYSTOSCOPY TRANSURETHRAL RESECTION PROSTATE BIPOLAR performed by Nitesh COLEMAN

## 2025-02-04 NOTE — PATIENT INSTRUCTIONS
Please arrive 20 minutes prior to your scheduled time to see the doctor to allow sufficient time for check-in and rooming.      Please bring all your prescription bottles to each appointment.      I recommend all standard healthcare maintenance and cancer screenings (Colon cancer screening if due, Lung cancer screening if due, Mammogram and Bone Density if female and appropriate, etc) and standard immunizations (Flu, Pneumonia, Covid-19, RSV, Shingrix, Tetanus boosters, etc) as appropriate and due to lower your risk for potentially preventable morbidity/mortality from these diseases.     Check BP 1-2 times per week.  Call our office if BP runs above 140/80.     Recommend tobacco cessation if you use tobacco products.         Learning About Vision Tests  What are vision tests?     The four most common vision tests are visual acuity tests, refraction, visual field tests, and color vision tests.  Visual acuity (sharpness) tests  These tests are used:  To see if you need glasses or contact lenses.  To monitor an eye problem.  To check an eye injury.  Visual acuity tests are done as part of routine exams. You may also have this test when you get your 's license or apply for some types of jobs.  Visual field tests  These tests are used:  To check for vision loss in any area of your range of vision.  To screen for certain eye diseases.  To look for nerve damage after a stroke, head injury, or other problem that could reduce blood flow to the brain.  Refraction and color tests  A refraction test is done to find the right prescription for glasses and contact lenses.  A color vision test is done to check for color blindness.  Color vision is often tested as part of a routine exam. You may also have this test when you apply for a job where recognizing different colors is important, such as , electronics, or the .  How are vision tests done?  Visual acuity test   You cover one eye at a time.  You read

## 2025-02-28 NOTE — PROGRESS NOTES
February 28, 2025      Iftikhar Brand   [address]  [facility] [address]      Dear Iftikhar Brand:    Our records indicate that you have missed, or failed to cancel, one or more appointments with us during the last 12 months.    [facility] requests that patients notify the provider's office of cancellations 24 hours prior to their appointment, and to arrive within 10 minutes of the scheduled appointment time.  For appointments scheduled on Monday, please notify the appointment desk by noon on Saturday.      Cancelling your appointment with proper notice allows other patients the opportunity to be seen. The [facility] policy states that patient may be dismissed from the practice after three missed appointments within a 12-month period.    The following are your missed appointments:  Recent No Show Appointments       Date Time Department Provider    4/1/2024  9:00 AM [place] [address] [facility]    2/27/2025  7:00 AM [facility] [address]             Your health care is important to us.  Please call our office at your earliest convenience to reschedule your appointment or to inform us of any possible scheduling errors.      We look forward to hearing from you soon. Thank you for understanding our position in regards to this problem. If you have any questions, please don't hesitate to contact us.       Sincerely,  [facility]           Problem: Mobility Impaired (Adult and Pediatric) Goal: *Acute Goals and Plan of Care (Insert Text) Discharge Goals Plan of care updated 11/15/18 and ongoing 11/17/2018 
(1.)Mr. Hansa Velasquez will move from supine to sit and sit to supine , scoot up and down and roll side to side in bed with MODIFIED INDEPENDENCE within 5 treatment day(s). (2.)Mr. Hansa Velasquez will transfer from bed to chair and chair to bed with MODIFIED INDEPENDENCE using the least restrictive device within 5 treatment day(s). (3.)Mr. Hansa Velasquez will ambulate with MODIFIED INDEPENDENCE for 500 feet with the least restrictive device within 5 treatment day(s). (4.)Mr. Hansa Velasquez will perform standing static and dynamic balance activities x 15 minutes with MODIFIED INDEPENDENCE to improve safety within 5 day(s). (5.)Mr. Hansa Velasquez will ascend and descend 2 stairs using L hand rail(s) with SUPERVISION to improve functional mobility and safety within 5 day(s). (6.)Mr. Hansa Velasquez will tolerate AAROM, AROM and gentle pendulums of RUE with no increase in pain within 5 treatment days to improve independence with transfers. (7.)Mr. Hansa Velasquez will tolerate 50+ minutes of therapeutic activity/exercise within 5 days to improve activity tolerance for functional mobility. 
________________________________________________________________________________________________ PHYSICAL THERAPY: Daily Note, Treatment Day: 3rd, PM 11/18/2018INPATIENT: Hospital Day: 12 Payor: Lyla Kirk / Plan: SIMON YOUNGBLOOD OAP / Product Type: Commerical /  
 LLE total hip precautions, WBAT 
RUE WBAT : Active and passive range of motion RIGHT elbow hand ACTIVE AND ACTIVE ASSISTED MOTION RIGHT SHOULDER GENTLE pendulums 
sling RIGHT shoulder NAME/AGE/GENDER: Sobeida Betancur is a 48 y.o. male PRIMARY DIAGNOSIS: infected left hip  
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 
Patient has no known allergies. ASSESSMENT:  
Mr. Dwayne Teresa presents supine in bed and is agreeable to ROM. He declines ambulatory treatment and for the hip and requests right shoulder alone today. He is willing to sit edge of bed SBA x 1 from supine and with rolling. Treatment included RUE ROM exercises per chart below requiring cues to perform properly and with good technique. Minimal increase in R shoulder pain with shoulder abduction, performed to patient's tolerance. Pt was left supine with needs in reach. Will continue with POC. This section established at most recent assessment PROBLEM LIST (Impairments causing functional limitations): 1. Decreased Strength 2. Decreased ADL/Functional Activities 3. Decreased Transfer Abilities 4. Decreased Ambulation Ability/Technique 5. Decreased Balance 6. Increased Pain 7. Decreased Activity Tolerance 8. Decreased Pacing Skills 9. Decreased Knowledge of Precautions 10. Decreased Tensas with Home Exercise Program 
 INTERVENTIONS PLANNED: (Benefits and precautions of physical therapy have been discussed with the patient.) 1. Balance Exercise 2. Bed Mobility 3. Gait Training 4. Group Therapy 5. Home Exercise Program (HEP) 6. Therapeutic Activites 7. Therapeutic Exercise/Strengthening 8. Transfer Training 9. Patient Education TREATMENT PLAN: Frequency/Duration: daily for duration of hospital stay Rehabilitation Potential For Stated Goals: Excellent RECOMMENDED REHABILITATION/EQUIPMENT: (at time of discharge pending progress): Due to the probability of continued deficits (see above) this patient will likely need continued skilled physical therapy after discharge. Equipment:  
? None at this time HISTORY:  
History of Present Injury/Illness (Reason for Referral): 
 Pt is a 47 y/o male who is almost 2 years s/p L JEIMY who was seen in clinic on 10/29 c/o 5 day history of sudden onset of moderate left hip pain. Denied any known injury. Left hip joint aspirate was obtained and sent for culture which grew pansensitive staph. Aureus. Patient was also noted to have elevated WBC (27.1) as well as elevated ESR (90). Patient reports having flu like symptoms 2 weeks ago and began developing right shoulder pain around that same time. Patient was subsequently evaluated by Dr. Barbara Castillo and received right shoulder cortisone injection. He reports that he is still experiencing moderate to severe right shoulder pain. Denies fever, chills, nausea, vomiting, etc..  No other new complaints. Past Medical History/Comorbidities:  
Mr. Pranav Vargas  has a past medical history of Arthritis, Biceps muscle tear, Chronic pain, Insomnia, MRSA (methicillin resistant Staphylococcus aureus) infection, Personal history of kidney stones, and Right shoulder pain. Mr. Pranav Vargas  has a past surgical history that includes hx other surgical; hx rotator cuff repair (Left, 2010); hx orthopaedic (Left, 01/2017); hx septoplasty (11/15/2017); HIP ARTHROPLASTY TOTAL REVISION (Left, 11/6/2018); SHOULDER I&D (Right, 11/6/2018); RIGHT SHOULDER ARTHROSCOPY SUBACROMIAL DECOMPRESSION ROTATOR CUFF REPAIR/ DISTAL CLAVICLE RESECTION (Right, 6/13/2017); LEFT TOTAL HIP ARTHROPLASTY (Left, 1/6/2017); and SHOULDER ARTHROSCOPY ACROMIOPLASTY ROTATOR CUFF REPAIR (Left, 10/21/2010). Social History/Living Environment:  
Home Environment: Private residence # Steps to Enter: 2 Rails to Enter: Yes Hand Rails : Left One/Two Story Residence: One story Living Alone: No 
Support Systems: Spouse/Significant Other/Partner Patient Expects to be Discharged to[de-identified] Private residence Current DME Used/Available at Home: Walker, rolling, Raised toilet seat, Grab bars, Adaptive bathing aides, Adaptive dressing aides Tub or Shower Type: Tub/Shower combination Prior Level of Function/Work/Activity: 
 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  
Most Recent Physical Functioning:  
Gross Assessment: 
  
         
  
Posture: 
  
Balance: 
  Bed Mobility: 
  
Wheelchair Mobility: 
  
Transfers: 
  
Gait: 
  
   
  
Body Structures Involved: 1. Nerves 2. Metabolic 3. Endocrine 4. Bones 5. Joints 6. Muscles Body Functions Affected: 1. Sensory/Pain 2. Neuromusculoskeletal 
3. Movement Related 4. Metobolic/Endocrine Activities and Participation Affected: 1. Mobility 2. Self Care 3. Domestic Life 4. Interpersonal Interactions and Relationships 5. Community, Social and [place] Number of elements that affect the Plan of Care: 4+: HIGH COMPLEXITY CLINICAL PRESENTATION:  
Presentation: Stable and uncomplicated: LOW COMPLEXITY CLINICAL DECISION MAKIN[address] [place] [id number] AM-PAC 6 Clicks Basic Mobility Inpatient Short Form How much difficulty does the patient currently have. .. Unable A Lot A Little None 1. Turning over in bed (including adjusting bedclothes, sheets and blankets)? [] 1   [] 2   [x] 3   [] 4  
2. Sitting down on and standing up from a chair with arms ( e.g., wheelchair, bedside commode, etc.)   [] 1   [] 2   [x] 3   [] 4  
3. Moving from lying on back to sitting on the side of the bed? [] 1   [x] 2   [] 3   [] 4 How much help from another person does the patient currently need. .. Total A Lot A Little None 4. Moving to and from a bed to a chair (including a wheelchair)? [] 1   [] 2   [x] 3   [] 4  
5. Need to walk in hospital room? [] 1   [x] 2   [] 3   [] 4  
6. Climbing 3-5 steps with a railing? [] 1   [x] 2   [] 3   [] 4  
© , Trustees of [address] [place] [id number], under license to [employer]. All rights reserved Score:  Initial: 15 Most Recent: X (Date: -- ) Interpretation of Tool:  Represents activities that are increasingly more difficult (i.e. Bed mobility, Transfers, Gait). Score 24 23 22-20 19-15 14-10 9-7 6 Modifier CH CI CJ CK CL CM CN   
 
? Mobility - Walking and Moving Around:  
  - CURRENT STATUS: CK - 40%-59% impaired, limited or restricted  - GOAL STATUS: CJ - 20%-39% impaired, limited or restricted  - D/C STATUS:  ---------------To be determined--------------- Payor: Ferd Collet / Plan: SIMON ALBERTOP / Product Type: Commerical /   
 
Medical Necessity:    
· Patient demonstrates excellent rehab potential due to higher previous functional level. Reason for Services/Other Comments: 
· Patient continues to require modification of therapeutic interventions to increase complexity of exercises. Use of outcome tool(s) and clinical judgement create a POC that gives a: Clear prediction of patient's progress: LOW COMPLEXITY  
  
 
 
 
TREATMENT:    
    
Pre-treatment Symptoms/Complaints: declined walking. \"I'm doing great with that. \" 
Pain: Initial: No not really. When I move it a bit. Post Session:  same Therapeutic Activity: (     0 minutes): Therapeutic activities including bed mobility training, transfer training, ambulation on level ground, static/dynamic standing balance,and patient education to improve mobility, strength and balance. Required minimal   to promote static and dynamic balance in standing. Therapeutic Exercise: (   23 minutes):  Exercises per grid below to improve mobility, strength and range of motion RUE. Required minimal visual, manual and tactile cues to promote proper body alignment and promote proper body posture. Progressed range as indicated. Date: 
11/11/18 Date: 
11/12/18 Date: 
11/13/18 Date: 
11/13/18 Date: 
11/14/18 Date: 
11/15/18 Date: 
11/16/18 Date : 11/17/2018  Date: 
11/18/18 ACTIVITY/EXERCISE AM PM PM AM PM PM AM PM PM  PM  
 RUE shoulder flexion 30 passive 30 passive 2x15 R 
PROM 2x15R PROM 2x20R PROM 2x15R PROM 2x15R PROM 2x15R PROM 30 x's AAROM, and PROM 30 reps AAROM  
RUE elbow flexion 30 AA 30 AA 2x15 R 
 AAROM 2x15R 
AROM 2x15R  
AROM x15 AROM 2x15R 
AROM 2x15R PROM 30 x's AAROM, and PROM x20 active RUE finger flexion/extension 30 AA 30 AA x15 R Active x15R A x15R A x15 R A 2x15R A 2x15R A 30 x's AAROM, and PROM x20 active RUE shoulder abduction 30 passive 30 passive 2x15R PROM 2x15R PROM 2x15R PROM 2x15 R PROM 2x15R PROM 2x15R PROM 30 x's AAROM, and PROM x30 AAROM Gentle pendulums       x1'   x20 forward/back 
x20 side to side 
x20 CW 
x20 CCW Forearm pronation/supination   x10R A x10R A x15R A x15 R A 2x15R A 2x15R A 30 x's AAROM, and PROM x20 active Wrist extension/flexion          x20 active Gripping          20 x's B = bilateral; AA = active assistive; A = active; P = passive Braces/Orthotics/Lines/Etc:  
 -None Treatment/Session Assessment:   
· Response to Treatment: see above · Interdisciplinary Collaboration:  
o Physical Therapist 
o Registered Nurse · After treatment position/precautions:  
o Up in chair 
o Bed/Chair-wheels locked 
o Caregiver at bedside 
o Call light within reach 
o Family at bedside · Compliance with Program/Exercises: doing better · Recommendations/Intent for next treatment session: \"Next visit will focus on advancements to more challenging activities and reduction in assistance provided\". Total Treatment Duration: PT Patient Time In/Time Out Time In: 9341 Time Out: 3797 Amisha Garcia, PT

## 2025-05-01 ENCOUNTER — TELEPHONE (OUTPATIENT)
Dept: ORTHOPEDIC SURGERY | Age: 60
End: 2025-05-01

## 2025-05-02 NOTE — TELEPHONE ENCOUNTER
Called and spoke to pt and informed him that he should contact his PCP regarding refills as we have not seen him in over a year. Pt was upset.

## 2025-05-02 NOTE — TELEPHONE ENCOUNTER
He called back to say it's the Gabapentin he needs and he is hoping to get it with refills. He says if he can send Flexeril and Gabapentin that would be even better.

## 2025-05-29 ENCOUNTER — OFFICE VISIT (OUTPATIENT)
Age: 60
End: 2025-05-29

## 2025-05-29 VITALS — BODY MASS INDEX: 32.46 KG/M2 | HEIGHT: 67 IN | WEIGHT: 206.8 LBS

## 2025-05-29 DIAGNOSIS — M54.16 LUMBAR RADICULOPATHY: ICD-10-CM

## 2025-05-29 DIAGNOSIS — M51.362 DEGENERATION OF INTERVERTEBRAL DISC OF LUMBAR REGION WITH DISCOGENIC BACK PAIN AND LOWER EXTREMITY PAIN: Primary | ICD-10-CM

## 2025-05-29 RX ORDER — TIZANIDINE 2 MG/1
2 TABLET ORAL 3 TIMES DAILY PRN
Qty: 30 TABLET | Refills: 0 | Status: SHIPPED | OUTPATIENT
Start: 2025-05-29

## 2025-05-29 RX ORDER — GABAPENTIN 300 MG/1
300 CAPSULE ORAL 3 TIMES DAILY
Qty: 90 CAPSULE | Refills: 0 | Status: SHIPPED | OUTPATIENT
Start: 2025-05-29 | End: 2025-06-28

## 2025-05-29 RX ORDER — METHYLPREDNISOLONE 4 MG/1
TABLET ORAL
Qty: 1 KIT | Refills: 0 | Status: SHIPPED | OUTPATIENT
Start: 2025-05-29

## 2025-05-29 NOTE — PROGRESS NOTES
positive right  Sensory testing reveals intact sensation to light touch and in the distribution of the L3-S1 dermatomes bilaterally  Ankle jerk is negative for clonus    Reflexes   Right Left   Quadriceps (L4) 2 2   Achilles (S1) 2 2     Strength testing in the lower extremity reveals the following based on the 5 point grading scale:     HF (L2) H Ab (L5) KE (L3/4) ADF (L4) EHL (L5) A Ev (S1) APF (S1)   Right 5 5 5 5 5 5 5   Left 5 5 5 5 5 5 5     PSYCH:  Alert and oriented X 3.  Appropriate affect.  Intact judgment and insight.       Radiographic Studies:     AP, lateral and spot views of the lumbar spine:      Independent review of x-rays taken at urgent care reveal loss of lumbar lordosis.  Advanced degenerative changes at L5-S1          Assessment/Plan:       Diagnosis Orders   1. Degeneration of intervertebral disc of lumbar region with discogenic back pain and lower extremity pain  MRI LUMBAR SPINE WO CONTRAST      2. Lumbar radiculopathy  MRI LUMBAR SPINE WO CONTRAST          This patient's clinical history and physical exam is consistent with a right  L-5 and S-1 lumbar radiculopathy.  Patient may have an acute disc herniation versus foraminal stenosis causing his acute onset of pain and radicular symptoms.  He would like to wait a few weeks before scheduling an MRI but will repeat steroids, placed him on gabapentin which he tolerates and change him to tizanidine.  He is mercedes continue some home exercises.  If no improvement will look at getting an MRI of the lumbar spine     we discussed the natural history of lumbar radiculopathy in that many of these patients have near complete resolution of their symptoms within eight to twelve weeks with conservative care. We discussed that conservative treatments typically start with activity modification, and medication followed by physical therapy as symptoms allow.  Oral and/or epidural steroids are other options. I also discussed potential surgical options if the

## 2025-06-16 ENCOUNTER — OFFICE VISIT (OUTPATIENT)
Age: 60
End: 2025-06-16
Payer: MEDICARE

## 2025-06-16 DIAGNOSIS — M48.062 SPINAL STENOSIS, LUMBAR REGION WITH NEUROGENIC CLAUDICATION: Primary | ICD-10-CM

## 2025-06-16 DIAGNOSIS — M48.061 STENOSIS OF LATERAL RECESS OF LUMBAR SPINE: ICD-10-CM

## 2025-06-16 DIAGNOSIS — M54.16 LUMBAR RADICULOPATHY: ICD-10-CM

## 2025-06-16 PROCEDURE — G2211 COMPLEX E/M VISIT ADD ON: HCPCS | Performed by: NURSE PRACTITIONER

## 2025-06-16 PROCEDURE — 99214 OFFICE O/P EST MOD 30 MIN: CPT | Performed by: NURSE PRACTITIONER

## 2025-06-16 PROCEDURE — 1036F TOBACCO NON-USER: CPT | Performed by: NURSE PRACTITIONER

## 2025-06-16 PROCEDURE — 3017F COLORECTAL CA SCREEN DOC REV: CPT | Performed by: NURSE PRACTITIONER

## 2025-06-16 PROCEDURE — G8417 CALC BMI ABV UP PARAM F/U: HCPCS | Performed by: NURSE PRACTITIONER

## 2025-06-16 PROCEDURE — G8428 CUR MEDS NOT DOCUMENT: HCPCS | Performed by: NURSE PRACTITIONER

## 2025-06-16 NOTE — PROGRESS NOTES
Name: Keagan Julien  YOB: 1965  Gender: male  MRN: [id number]    CC: Follow-up acute right lower back and leg pain    HPI: This is a 60 y.o. year old male who who has had a complicated medical course over the past couple years.  He ended up with septic joint in his right shoulder and left total hip arthroplasty back in 2018.  He underwent multiple surgeries.  Has been doing well but a few weeks ago went to  a jug of gas and developed severe right lower back pain.  This does travel down his leg.  He was treated urgent care.  X-rays were found to have advanced disc degeneration at L5-S1.  He was treated with prednisone and Flexeril.  He states that he has been getting slowly better.  Pain is worse with sitting for long periods of time.  Straight leg raise is significantly positive per urgent care's reports.      The patient denies any change in bowel or bladder function since the onset of the symptoms. he  has not had lumbar surgery in the past.      Thus far, the patient has tried tylenol, muscle relaxers, oral steroids, and physician directed home exercise program    At last visit I started patient on gabapentin, steroids and tizanidine.  He has improved but still has pain.  He is unable to do and exercise what he would like to because of his right posterior lateral leg pain.  It is about a 6 out of 10.  Worse with standing for long periods of time.  At last visit patient was referred for an MRI of the lumbar spine           5/29/2025     2:21 PM   AMB PAIN ASSESSMENT   Location of Pain Back   Location Modifiers Right   Severity of Pain 8   Frequency of Pain Constant   Limiting Behavior Yes   Result of Injury No   Work-Related Injury No   Are there other pain locations you wish to document? No            No Known Allergies    Current Outpatient Medications:     methylPREDNISolone (MEDROL DOSEPACK) 4 MG tablet, Take by mouth as directed. Start in morning, Disp: 1 kit, Rfl: 0

## 2025-06-17 ENCOUNTER — OFFICE VISIT (OUTPATIENT)
Dept: ORTHOPEDIC SURGERY | Age: 60
End: 2025-06-17
Payer: MEDICARE

## 2025-06-17 DIAGNOSIS — M54.16 LUMBAR RADICULOPATHY: ICD-10-CM

## 2025-06-17 DIAGNOSIS — M48.062 SPINAL STENOSIS, LUMBAR REGION WITH NEUROGENIC CLAUDICATION: Primary | ICD-10-CM

## 2025-06-17 DIAGNOSIS — M48.061 STENOSIS OF LATERAL RECESS OF LUMBAR SPINE: ICD-10-CM

## 2025-06-17 PROCEDURE — 64484 NJX AA&/STRD TFRM EPI L/S EA: CPT | Performed by: PHYSICAL MEDICINE & REHABILITATION

## 2025-06-17 PROCEDURE — 64483 NJX AA&/STRD TFRM EPI L/S 1: CPT | Performed by: PHYSICAL MEDICINE & REHABILITATION

## 2025-06-17 RX ORDER — TRIAMCINOLONE ACETONIDE 40 MG/ML
40 INJECTION, SUSPENSION INTRA-ARTICULAR; INTRAMUSCULAR ONCE
Status: COMPLETED | OUTPATIENT
Start: 2025-06-17 | End: 2025-06-17

## 2025-06-17 RX ADMIN — TRIAMCINOLONE ACETONIDE 40 MG: 40 INJECTION, SUSPENSION INTRA-ARTICULAR; INTRAMUSCULAR at 13:39

## 2025-06-17 NOTE — PROGRESS NOTES
Name: Keagan Julien  YOB: 1965  Gender: male  MRN: [id number]        Transforaminal VAL Procedure Note    Procedure: Right  L5-S1 and S1-S2 transforaminal epidural steroid injections     Precautions: Keagan Julien denies prior sensitivity to steroid, local anesthetic, iodine, or shellfish.       Consent:  Consent was obtained prior to the procedure. The procedure was discussed at length with Keagan Julien. He was given the opportunity to ask questions regarding the procedure and its associated risks.  In addition to the potential risks associated with the procedure itself, the patient was informed both verbally and in writing of potential side effects of the use glucocorticoids.  The patient appeared to comprehend the informed consent and desired to have the procedure performed, and informed consent was signed.     He was placed in a prone position on the fluoroscopy table and the skin was prepped and draped in a routine sterile fashion. The areas to be injected were each anesthetized with 1 ml of 1% Lidocaine. A 22 gauge 3.5 inch spinal needle was carefully advanced under fluoroscopic guidance to the right L5-S1 transforaminal space  0.5 ml of 70% of Omnipaque was injected to confirm proper needle placement and absence of subdural or vascular flow Once proper placement was confirmed, 0.5ml of 0.25 marcaine and 40 mg kenalog were injected through the spinal needle.       The above procedure was then repeated at the Right  S1-S2 transforaminal space.    Fluoroscopic guidance was used intermittently over a 10-minute period to insure proper needle placement and his safety. A hard copy of the fluoroscopic image has been placed in his chart and is saved on the C-arm hard drive. He was monitored after the procedure and discharged home in a stable fashion with a routine follow up.    Procedural Diagnosis:     ICD-10-CM    1. Spinal stenosis, lumbar region with neurogenic

## 2025-06-25 RX ORDER — GABAPENTIN 300 MG/1
CAPSULE ORAL
Qty: 90 CAPSULE | Refills: 0 | OUTPATIENT
Start: 2025-06-25

## 2025-07-02 ENCOUNTER — OFFICE VISIT (OUTPATIENT)
Age: 60
End: 2025-07-02
Payer: MEDICARE

## 2025-07-02 DIAGNOSIS — M48.062 SPINAL STENOSIS, LUMBAR REGION WITH NEUROGENIC CLAUDICATION: Primary | ICD-10-CM

## 2025-07-02 DIAGNOSIS — M54.16 LUMBAR RADICULOPATHY: ICD-10-CM

## 2025-07-02 PROCEDURE — G8417 CALC BMI ABV UP PARAM F/U: HCPCS | Performed by: NURSE PRACTITIONER

## 2025-07-02 PROCEDURE — 99214 OFFICE O/P EST MOD 30 MIN: CPT | Performed by: NURSE PRACTITIONER

## 2025-07-02 PROCEDURE — 1036F TOBACCO NON-USER: CPT | Performed by: NURSE PRACTITIONER

## 2025-07-02 PROCEDURE — 3017F COLORECTAL CA SCREEN DOC REV: CPT | Performed by: NURSE PRACTITIONER

## 2025-07-02 PROCEDURE — G2211 COMPLEX E/M VISIT ADD ON: HCPCS | Performed by: NURSE PRACTITIONER

## 2025-07-02 PROCEDURE — G8428 CUR MEDS NOT DOCUMENT: HCPCS | Performed by: NURSE PRACTITIONER

## 2025-07-02 RX ORDER — TIZANIDINE 2 MG/1
2 TABLET ORAL 3 TIMES DAILY PRN
Qty: 90 TABLET | Refills: 2 | Status: SHIPPED | OUTPATIENT
Start: 2025-07-02

## 2025-07-02 RX ORDER — GABAPENTIN 300 MG/1
300 CAPSULE ORAL 3 TIMES DAILY
Qty: 90 CAPSULE | Refills: 2 | Status: SHIPPED | OUTPATIENT
Start: 2025-07-02 | End: 2025-09-30

## 2025-07-02 NOTE — PROGRESS NOTES
Name: Keagan Julien  YOB: 1965  Gender: male  MRN: [id number]    CC: Follow-up acute right lower back and leg pain    HPI: This is a 60 y.o. year old male who returns today after lumbar injections.     Patient has known spinal stenosis at L4-5 that is moderate to severe in nature with lateral recess stenosis.  He had symptoms consistent with right L5 radiculopathy.  Patient was not interested in surgical intervention.  We have placed him on gabapentin and tizanidine along with steroids.  He also was working on home exercise regimen.  At last visit patient was referred for a right L5 transforaminal VAL.  Patient reports 80% relief in his leg pain.  He is very pleased.    Percentage of pain relief: 80%             5/29/2025     2:21 PM   AMB PAIN ASSESSMENT   Location of Pain Back   Location Modifiers Right   Severity of Pain 8   Frequency of Pain Constant   Limiting Behavior Yes   Result of Injury No   Work-Related Injury No   Are there other pain locations you wish to document? No           No Known Allergies    Current Outpatient Medications:     tiZANidine (ZANAFLEX) 2 MG tablet, Take 1 tablet by mouth 3 times daily as needed (muscle spasm), Disp: 90 tablet, Rfl: 2    gabapentin (NEURONTIN) 300 MG capsule, Take 1 capsule by mouth 3 times daily for 90 days. Intended supply: 30 days, Disp: 90 capsule, Rfl: 2    methylPREDNISolone (MEDROL DOSEPACK) 4 MG tablet, Take by mouth as directed. Start in morning, Disp: 1 kit, Rfl: 0    gabapentin (NEURONTIN) 300 MG capsule, Take 1 capsule by mouth 3 times daily for 30 days. Start 1 p.o. twice daily then increase to 3 times daily as tolerated  intended supply: 30 days, Disp: 90 capsule, Rfl: 0    tiZANidine (ZANAFLEX) 2 MG tablet, Take 1 tablet by mouth 3 times daily as needed (muscle spasm), Disp: 30 tablet, Rfl: 0  Past Medical History:   Diagnosis Date    Acute renal failure with tubular necrosis 11/06/2018    Arthritis     right shoulder

## 2025-08-04 ENCOUNTER — OFFICE VISIT (OUTPATIENT)
Dept: INTERNAL MEDICINE CLINIC | Facility: CLINIC | Age: 60
End: 2025-08-04
Payer: MEDICARE

## 2025-08-04 VITALS
OXYGEN SATURATION: 97 % | RESPIRATION RATE: 16 BRPM | HEIGHT: 67 IN | BODY MASS INDEX: 32.65 KG/M2 | HEART RATE: 56 BPM | DIASTOLIC BLOOD PRESSURE: 88 MMHG | SYSTOLIC BLOOD PRESSURE: 155 MMHG | TEMPERATURE: 97 F | WEIGHT: 208 LBS

## 2025-08-04 DIAGNOSIS — Z12.11 COLON CANCER SCREENING: ICD-10-CM

## 2025-08-04 DIAGNOSIS — Z12.5 PROSTATE CANCER SCREENING: ICD-10-CM

## 2025-08-04 DIAGNOSIS — I10 PRIMARY HYPERTENSION: ICD-10-CM

## 2025-08-04 DIAGNOSIS — E78.2 MIXED HYPERLIPIDEMIA: Primary | ICD-10-CM

## 2025-08-04 DIAGNOSIS — E78.2 MIXED HYPERLIPIDEMIA: ICD-10-CM

## 2025-08-04 DIAGNOSIS — R73.03 PREDIABETES: ICD-10-CM

## 2025-08-04 LAB
ALBUMIN SERPL-MCNC: 4 G/DL (ref 3.5–5)
ALBUMIN/GLOB SERPL: 1.2 (ref 1–1.9)
ALP SERPL-CCNC: 84 U/L (ref 40–129)
ALT SERPL-CCNC: 36 U/L (ref 8–55)
ANION GAP SERPL CALC-SCNC: 11 MMOL/L (ref 7–16)
APPEARANCE UR: CLEAR
AST SERPL-CCNC: 35 U/L (ref 15–37)
BACTERIA URNS QL MICRO: NEGATIVE /HPF
BASOPHILS # BLD: 0.05 K/UL (ref 0–0.2)
BASOPHILS NFR BLD: 0.7 % (ref 0–2)
BILIRUB DIRECT SERPL-MCNC: 0.1 MG/DL (ref 0–0.3)
BILIRUB SERPL-MCNC: 0.4 MG/DL (ref 0–1.2)
BILIRUB UR QL: NEGATIVE
BUN SERPL-MCNC: 19 MG/DL (ref 6–23)
CALCIUM SERPL-MCNC: 10.1 MG/DL (ref 8.8–10.2)
CHLORIDE SERPL-SCNC: 101 MMOL/L (ref 98–107)
CHOLEST SERPL-MCNC: 233 MG/DL (ref 0–200)
CO2 SERPL-SCNC: 27 MMOL/L (ref 20–29)
COLOR UR: ABNORMAL
CREAT SERPL-MCNC: 1.12 MG/DL (ref 0.8–1.3)
DIFFERENTIAL METHOD BLD: ABNORMAL
EOSINOPHIL # BLD: 0.09 K/UL (ref 0–0.8)
EOSINOPHIL NFR BLD: 1.2 % (ref 0.5–7.8)
EPI CELLS #/AREA URNS HPF: ABNORMAL /HPF (ref 0–5)
ERYTHROCYTE [DISTWIDTH] IN BLOOD BY AUTOMATED COUNT: 13.3 % (ref 11.9–14.6)
EST. AVERAGE GLUCOSE BLD GHB EST-MCNC: 126 MG/DL
GLOBULIN SER CALC-MCNC: 3.4 G/DL (ref 2.3–3.5)
GLUCOSE SERPL-MCNC: 93 MG/DL (ref 70–99)
GLUCOSE UR STRIP.AUTO-MCNC: NEGATIVE MG/DL
HBA1C MFR BLD: 6 % (ref 0–5.6)
HCT VFR BLD AUTO: 44.8 % (ref 41.1–50.3)
HDLC SERPL-MCNC: 64 MG/DL (ref 40–60)
HDLC SERPL: 3.6 (ref 0–5)
HGB BLD-MCNC: 15 G/DL (ref 13.6–17.2)
HGB UR QL STRIP: NEGATIVE
HYALINE CASTS URNS QL MICRO: ABNORMAL /LPF
IMM GRANULOCYTES # BLD AUTO: 0.01 K/UL (ref 0–0.5)
IMM GRANULOCYTES NFR BLD AUTO: 0.1 % (ref 0–5)
KETONES UR QL STRIP.AUTO: NEGATIVE MG/DL
LDLC SERPL CALC-MCNC: 148 MG/DL (ref 0–100)
LEUKOCYTE ESTERASE UR QL STRIP.AUTO: NEGATIVE
LYMPHOCYTES # BLD: 3.36 K/UL (ref 0.5–4.6)
LYMPHOCYTES NFR BLD: 44.9 % (ref 13–44)
MCH RBC QN AUTO: 31.4 PG (ref 26.1–32.9)
MCHC RBC AUTO-ENTMCNC: 33.5 G/DL (ref 31.4–35)
MCV RBC AUTO: 93.7 FL (ref 82–102)
MONOCYTES # BLD: 0.52 K/UL (ref 0.1–1.3)
MONOCYTES NFR BLD: 6.9 % (ref 4–12)
NEUTS SEG # BLD: 3.46 K/UL (ref 1.7–8.2)
NEUTS SEG NFR BLD: 46.2 % (ref 43–78)
NITRITE UR QL STRIP.AUTO: NEGATIVE
NRBC # BLD: 0 K/UL (ref 0–0.2)
PH UR STRIP: 7 (ref 5–9)
PLATELET # BLD AUTO: 245 K/UL (ref 150–450)
PMV BLD AUTO: 10.1 FL (ref 9.4–12.3)
POTASSIUM SERPL-SCNC: 4.8 MMOL/L (ref 3.5–5.1)
PROT SERPL-MCNC: 7.4 G/DL (ref 6.3–8.2)
PROT UR STRIP-MCNC: ABNORMAL MG/DL
PSA SERPL-MCNC: 1.3 NG/ML (ref 0–4)
RBC # BLD AUTO: 4.78 M/UL (ref 4.23–5.6)
RBC #/AREA URNS HPF: ABNORMAL /HPF (ref 0–5)
SODIUM SERPL-SCNC: 139 MMOL/L (ref 136–145)
SP GR UR REFRACTOMETRY: 1.02 (ref 1–1.02)
TRIGL SERPL-MCNC: 102 MG/DL (ref 0–150)
TSH, 3RD GENERATION: 2.06 UIU/ML (ref 0.27–4.2)
UROBILINOGEN UR QL STRIP.AUTO: 0.2 EU/DL (ref 0.2–1)
VLDLC SERPL CALC-MCNC: 20 MG/DL (ref 6–23)
WBC # BLD AUTO: 7.5 K/UL (ref 4.3–11.1)
WBC URNS QL MICRO: ABNORMAL /HPF (ref 0–4)

## 2025-08-04 PROCEDURE — G8417 CALC BMI ABV UP PARAM F/U: HCPCS | Performed by: INTERNAL MEDICINE

## 2025-08-04 PROCEDURE — 1036F TOBACCO NON-USER: CPT | Performed by: INTERNAL MEDICINE

## 2025-08-04 PROCEDURE — 3079F DIAST BP 80-89 MM HG: CPT | Performed by: INTERNAL MEDICINE

## 2025-08-04 PROCEDURE — G8427 DOCREV CUR MEDS BY ELIG CLIN: HCPCS | Performed by: INTERNAL MEDICINE

## 2025-08-04 PROCEDURE — 3077F SYST BP >= 140 MM HG: CPT | Performed by: INTERNAL MEDICINE

## 2025-08-04 PROCEDURE — 3017F COLORECTAL CA SCREEN DOC REV: CPT | Performed by: INTERNAL MEDICINE

## 2025-08-04 PROCEDURE — 99214 OFFICE O/P EST MOD 30 MIN: CPT | Performed by: INTERNAL MEDICINE

## 2025-08-04 PROCEDURE — G2211 COMPLEX E/M VISIT ADD ON: HCPCS | Performed by: INTERNAL MEDICINE

## 2025-08-04 RX ORDER — HYDROCHLOROTHIAZIDE 12.5 MG/1
12.5 CAPSULE ORAL EVERY MORNING
Qty: 100 CAPSULE | Refills: 1 | Status: SHIPPED | OUTPATIENT
Start: 2025-08-04

## 2025-08-19 ENCOUNTER — OFFICE VISIT (OUTPATIENT)
Age: 60
End: 2025-08-19
Payer: MEDICARE

## 2025-08-19 VITALS — WEIGHT: 208.2 LBS | BODY MASS INDEX: 32.68 KG/M2 | HEIGHT: 67 IN

## 2025-08-19 DIAGNOSIS — M54.16 LUMBAR RADICULOPATHY: ICD-10-CM

## 2025-08-19 DIAGNOSIS — M48.062 SPINAL STENOSIS, LUMBAR REGION WITH NEUROGENIC CLAUDICATION: Primary | ICD-10-CM

## 2025-08-19 PROCEDURE — G8427 DOCREV CUR MEDS BY ELIG CLIN: HCPCS | Performed by: ORTHOPAEDIC SURGERY

## 2025-08-19 PROCEDURE — 3017F COLORECTAL CA SCREEN DOC REV: CPT | Performed by: ORTHOPAEDIC SURGERY

## 2025-08-19 PROCEDURE — 1036F TOBACCO NON-USER: CPT | Performed by: ORTHOPAEDIC SURGERY

## 2025-08-19 PROCEDURE — G8417 CALC BMI ABV UP PARAM F/U: HCPCS | Performed by: ORTHOPAEDIC SURGERY

## 2025-08-19 PROCEDURE — 99214 OFFICE O/P EST MOD 30 MIN: CPT | Performed by: ORTHOPAEDIC SURGERY

## 2025-08-19 RX ORDER — METHYLPREDNISOLONE 4 MG/1
TABLET ORAL
Qty: 1 KIT | Refills: 0 | Status: SHIPPED | OUTPATIENT
Start: 2025-08-19

## 2025-08-20 LAB — NONINV COLON CA DNA+OCC BLD SCRN STL QL: NEGATIVE

## 2025-08-28 ENCOUNTER — OFFICE VISIT (OUTPATIENT)
Dept: ORTHOPEDIC SURGERY | Age: 60
End: 2025-08-28

## 2025-08-28 DIAGNOSIS — M48.061 STENOSIS OF LATERAL RECESS OF LUMBAR SPINE: ICD-10-CM

## 2025-08-28 DIAGNOSIS — M54.16 LUMBAR RADICULOPATHY: ICD-10-CM

## 2025-08-28 DIAGNOSIS — M48.062 SPINAL STENOSIS, LUMBAR REGION WITH NEUROGENIC CLAUDICATION: Primary | ICD-10-CM

## 2025-08-28 RX ORDER — TRIAMCINOLONE ACETONIDE 40 MG/ML
40 INJECTION, SUSPENSION INTRA-ARTICULAR; INTRAMUSCULAR ONCE
Status: COMPLETED | OUTPATIENT
Start: 2025-08-28 | End: 2025-08-28

## 2025-08-28 RX ADMIN — TRIAMCINOLONE ACETONIDE 40 MG: 40 INJECTION, SUSPENSION INTRA-ARTICULAR; INTRAMUSCULAR at 10:40

## (undated) DEVICE — GOWN,AURORA,FABRIC-REINFORCED,2XL: Brand: MEDLINE

## (undated) DEVICE — GUIDEPIN ORTH L190MM DIA2.5MM METAGLENE CTRL FOR DELT XTEND

## (undated) DEVICE — 4-PORT MANIFOLD: Brand: NEPTUNE 2

## (undated) DEVICE — SURGICAL PROCEDURE PACK TOT HIP CDS

## (undated) DEVICE — TRAY PREP DRY W/ PREM GLV 2 APPL 6 SPNG 2 UNDPD 1 OVERWRAP

## (undated) DEVICE — CONTAINER,SPECIMEN,O.R.STRL,4.5OZ: Brand: MEDLINE

## (undated) DEVICE — SOLUTION IRRIG 3000ML 0.9% SOD CHL FLX CONT 0797208] ICU MEDICAL INC]

## (undated) DEVICE — ELECTRODE NDL 2.8IN COAT VALLEYLAB

## (undated) DEVICE — PAD,ABDOMINAL,5"X9",STERILE,LF,1/PK: Brand: MEDLINE INDUSTRIES, INC.

## (undated) DEVICE — TOTAL SHOULDER DR POSTA: Brand: MEDLINE INDUSTRIES, INC.

## (undated) DEVICE — STOCKINETTE TUBE 6X48 -- MEDICHOICE

## (undated) DEVICE — PACKING WND W1INXL6FT VAG WVN COT GZ RADPQ

## (undated) DEVICE — INTEGUSEAL MICROBIAL SEALANT: Brand: AVANOS

## (undated) DEVICE — HANDPIECE SET WITH BONE CLEANING TIP AND SUCTION TUBE: Brand: INTERPULSE

## (undated) DEVICE — BRUSH SCRB 4% CHG RED DISP --

## (undated) DEVICE — DRAPE,TOP,102X53,STERILE: Brand: MEDLINE

## (undated) DEVICE — 4.0MM ROUND FAST CUTTING BUR

## (undated) DEVICE — DRAPE,HIP,W/POUCHES,STERILE: Brand: MEDLINE

## (undated) DEVICE — ABDOMINAL PAD: Brand: DERMACEA

## (undated) DEVICE — [AUGER BUR, ARTHROSCOPIC SHAVER BLADE,  DO NOT RESTERILIZE,  DO NOT USE IF PACKAGE IS DAMAGED,  KEEP DRY,  KEEP AWAY FROM SUNLIGHT]: Brand: FORMULA

## (undated) DEVICE — TRAY CATH 16F DRN BG LTX -- CONVERT TO ITEM 363158

## (undated) DEVICE — RESTRAINT UNIVERSAL HEAD DISP --

## (undated) DEVICE — 60 ML SYRINGE,CATHETER TIP: Brand: MONOJECT

## (undated) DEVICE — (D)PREP SKN CHLRAPRP APPL 26ML -- CONVERT TO ITEM 371833

## (undated) DEVICE — AMD ANTIMICROBIAL NON-ADHERENT PAD,0.2% POLYHEXAMETHYLENE BIGUANIDE HCI (PHMB): Brand: TELFA

## (undated) DEVICE — SUTURE ETHBND EXCEL SZ 5 L30IN NONABSORBABLE GRN L40MM V-37 MB66G

## (undated) DEVICE — STOCKINETTE ORTH W9XL36IN COT 2 PLY HLLW FOR HANDLING LMB

## (undated) DEVICE — SYSTEM CULT COLL OR TRNSPRT CLR DBL SWAB W/ MOD AERB AMIES

## (undated) DEVICE — SOLUTION IRRIG 3000ML 0.9% SOD CHL USP UROMATIC PLAS CONT

## (undated) DEVICE — KENDALL SCD EXPRESS SLEEVES, KNEE LENGTH, MEDIUM: Brand: KENDALL SCD

## (undated) DEVICE — SOLUTION IV 1000ML 0.9% SOD CHL

## (undated) DEVICE — SYRINGE,TOOMEY,IRRIGATION,70CC,STERILE: Brand: MEDLINE

## (undated) DEVICE — SUTURE VCRL SZ 0 L27IN ABSRB UD L36MM CP-1 1/2 CIR REV CUT J267H

## (undated) DEVICE — BAG,DRAINAGE,4L,A/R TOWER,LL,SLIDE TAP: Brand: MEDLINE

## (undated) DEVICE — OSCILLATING TIP SAW CARTRIDGE: Brand: PRECISION FALCON

## (undated) DEVICE — AMD ANTIMICROBIAL GAUZE SPONGES,12 PLY USP TYPE VII, 0.2% POLYHEXAMETHYLENE BIGUANIDE HCI (PHMB): Brand: CURITY

## (undated) DEVICE — SURGICAL PROCEDURE PACK BASIC ST FRANCIS

## (undated) DEVICE — SOLUTION IRRIG 1000ML 0.9% SOD CHL USP POUR PLAS BTL

## (undated) DEVICE — T4 HOOD

## (undated) DEVICE — GLOVE ORANGE PI 8 1/2   MSG9085

## (undated) DEVICE — SUTURE PDS II SZ 1 L96IN ABSRB VLT TP-1 L65MM 1/2 CIR Z880G

## (undated) DEVICE — REM POLYHESIVE ADULT PATIENT RETURN ELECTRODE: Brand: VALLEYLAB

## (undated) DEVICE — 90-S ACCELERATOR, SUCTION PROBE, NON-BENDABLE, MAX CUT LEVEL 11: Brand: SERFAS ENERGY

## (undated) DEVICE — FAN SPRAY KIT: Brand: PULSAVAC®

## (undated) DEVICE — GOWN,REINFORCED,POLY,AURORA,XXLARGE,STR: Brand: MEDLINE

## (undated) DEVICE — SHEET, DRAPE, SPLIT, STERILE: Brand: MEDLINE

## (undated) DEVICE — SYR LR LCK 1ML GRAD NSAF 30ML --

## (undated) DEVICE — TURP TURB: Brand: MEDLINE INDUSTRIES, INC.

## (undated) DEVICE — SYSTEM SKIN CLSR 22CM DERMBND PRINEO

## (undated) DEVICE — BLADE SHV DIA4MM RED RESECT FOR GEN DEB REM OF PERIOST FR

## (undated) DEVICE — JELLY LUBRICATING 10GM PREFIL SYR LUBE

## (undated) DEVICE — BANDAGE COMPR SELF ADH 5 YDX4 IN TAN STRL PREMIERPRO LF

## (undated) DEVICE — NDL SPNE QNCKE 18GX3.5IN LF --

## (undated) DEVICE — 1840 FOAM BLOCK NEEDLE COUNTER: Brand: DEVON

## (undated) DEVICE — IMMOBILIZER ORTH LIGHTWEIGHT LG UNIV 9X7 IN PREMIERPRO

## (undated) DEVICE — SYR 50ML LR LCK 1ML GRAD NSAF --

## (undated) DEVICE — BIPOLAR SEALER 23-112-1 AQM 6.0: Brand: AQUAMANTYS ®

## (undated) DEVICE — DRAPE,SHOULDER,BEACH CHAIR,STERILE: Brand: MEDLINE

## (undated) DEVICE — 3M™ COBAN™ SELF-ADHERENT WRAP, 1586S, STERILE, 6 IN X 5 YD (15 CM X 4,5 M), 12 ROLLS/CASE: Brand: 3M™ COBAN™

## (undated) DEVICE — 3000CC GUARDIAN II: Brand: GUARDIAN

## (undated) DEVICE — CATHETER URETH 24FR BLLN 30CC STD LTX 3 W TWO OPP DRNGE EYE

## (undated) DEVICE — DRAPE,U/SHT,SPLIT,FILM,60X84,STERILE: Brand: MEDLINE

## (undated) DEVICE — SUTURE MCRYL SZ 2-0 L27IN ABSRB UD CP-1 1 L36MM 1/2 CIR REV Y266H

## (undated) DEVICE — SPONGE LAP 18X18IN STRL -- 5/PK

## (undated) DEVICE — ARGYLE SIGMOID SURGICAL SUCTION INSTRUMENT 23 FR/CH (7.7 MM): Brand: ARGYLE

## (undated) DEVICE — Z DISCONTINUED PER MEDLINE USE 2741944 DRESSING AQUACEL 12 IN SURG W9XL30CM SIL CVR WTRPRF VIR BACT BARR ANTIMIC

## (undated) DEVICE — GOWN,REINF,POLY,ECL,PP SLV,XL: Brand: MEDLINE

## (undated) DEVICE — FIRSTPASS ST SUTURE PASSER, SELF CAPTURE: Brand: FIRSTPASS

## (undated) DEVICE — MEDI-VAC YANKAUER SUCTION HANDLE W/BULBOUS TIP: Brand: CARDINAL HEALTH

## (undated) DEVICE — NEEDLE SUT PASS FLX DISP RP360

## (undated) DEVICE — SUTURE PROL SZ 0 L30IN NONABSORBABLE BLU FSLX L36MM 3/8 CIR 8690H

## (undated) DEVICE — GARMENT,MEDLINE,DVT,INT,CALF,MED, GEN2: Brand: MEDLINE

## (undated) DEVICE — 2.3MM ICONIX DISPOSABLE DRILL: Brand: ICONIX

## (undated) DEVICE — SLING ARM SWTH UNIV FOAM 1 SZ FITS MOST

## (undated) DEVICE — COVER,TABLE,HEAVY DUTY,79"X110",STRL: Brand: MEDLINE

## (undated) DEVICE — COVER LT HNDL FOR OR SURG LAMP

## (undated) DEVICE — GUIDEPIN ORTH L300MM DIA1.5MM GLENOSPHERE FOR DELT XTEND

## (undated) DEVICE — INFLOW CASSETTE TUBING, DO NOT USE IF PACKAGE IS DAMAGED: Brand: CROSSFLOW

## (undated) DEVICE — NEEDLE HYPO 18GA L1.5IN PNK S STL HUB POLYPR SHLD REG BVL

## (undated) DEVICE — DRAPE TWL SURG 16X26IN BLU ORB04] ALLCARE INC]

## (undated) DEVICE — [THREADED CANNULA.  DO NOT RESTERILIZE,  DO NOT USE IF PACKAGE IS DAMAGED]: Brand: DRI-LOK

## (undated) DEVICE — RETRIEVER SUT ARTHSCP HOFFEE --

## (undated) DEVICE — BUTTON SWITCH PENCIL BLADE ELECTRODE, HOLSTER: Brand: EDGE

## (undated) DEVICE — 2000CC GUARDIAN II: Brand: GUARDIAN

## (undated) DEVICE — OUTFLOW CASSETTE TUBING, DO NOT USE IF PACKAGE IS DAMAGED: Brand: CROSSFLOW

## (undated) DEVICE — CUTTING LOOP, BIPOLAR, 24/26 FR.: Brand: N.A.

## (undated) DEVICE — SUTURE PDS II SZ 1 L36IN ABSRB VLT L48MM CTX 1/2 CIR Z371T

## (undated) DEVICE — GARMENT,MEDLINE,DVT,INT,CALF,LG, GEN2: Brand: MEDLINE

## (undated) DEVICE — BACTISURE WOUND LAVAGE

## (undated) DEVICE — HANDPIECE SET WITH COAXIAL HIGH FLOW TIP AND SUCTION TUBE: Brand: INTERPULSE